# Patient Record
Sex: FEMALE | Race: WHITE | NOT HISPANIC OR LATINO | Employment: OTHER | URBAN - METROPOLITAN AREA
[De-identification: names, ages, dates, MRNs, and addresses within clinical notes are randomized per-mention and may not be internally consistent; named-entity substitution may affect disease eponyms.]

---

## 2017-01-04 DIAGNOSIS — F51.01 PRIMARY INSOMNIA: Primary | ICD-10-CM

## 2017-01-04 DIAGNOSIS — F41.9 ANXIETY: Primary | ICD-10-CM

## 2017-01-04 RX ORDER — ALPRAZOLAM 0.25 MG
0.25 TABLET ORAL 3 TIMES DAILY PRN
Qty: 30 TABLET | Refills: 0 | Status: SHIPPED | OUTPATIENT
Start: 2017-01-04 | End: 2017-02-02

## 2017-01-04 RX ORDER — TRAZODONE HYDROCHLORIDE 100 MG/1
100 TABLET ORAL
Qty: 90 TABLET | Refills: 2 | Status: SHIPPED | OUTPATIENT
Start: 2017-01-04 | End: 2017-09-24

## 2017-01-04 NOTE — TELEPHONE ENCOUNTER
alprazolam      Last Written Prescription Date:  11/29/16  Last Fill Quantity: 30,   # refills: 0  Last Office Visit with [facility], [facility] or  Health prescribing provider: 10/14/16  Future Office visit:       Routing refill request to provider for review/approval because:  Drug not on the [facility], [facility] or  Health refill protocol or controlled substance

## 2017-01-04 NOTE — TELEPHONE ENCOUNTER
Routing refill request to provider for review/approval because:  Medication is reported/historical

## 2017-01-04 NOTE — TELEPHONE ENCOUNTER
Trazodone       Last Written Prescription Date: 2/15/16  Last Fill Quantity: 90; # refills: 2  Last Office Visit with [facility], [facility] or [facility] prescribing provider:  10/14/16        Last PHQ-9 score on record=   PHQ-9 SCORE 6/23/2016   Total Score -   Total Score 3       AST       32   10/7/2015  ALT       33   10/7/2015    Labs showing if normal/abnormal  Lab Results   Component Value Date    AST 32 10/07/2015    ALT 33 10/07/2015

## 2017-01-05 ENCOUNTER — TELEPHONE (OUTPATIENT)
Dept: INTERNAL MEDICINE | Facility: CLINIC | Age: 62
End: 2017-01-05

## 2017-01-05 NOTE — TELEPHONE ENCOUNTER
Reason for Call:  Medication or medication refill:    Do you use a [facility]?  Name of the pharmacy and phone number for the current request:  NA    Name of the medication requested: Probiotics 1    Other request: Patient would like to know if you think this is a good idea for her to be taking, would like Dr. Del Valle advice. Patient already purchased the probiotics.    Can we leave a detailed message on this number? YES    Phone number patient can be reached at: Home number on file [phone] (home)    Best Time: any    Call taken on 1/5/2017 at 3:52 PM by Sigrid Yoon

## 2017-01-26 ENCOUNTER — OFFICE VISIT (OUTPATIENT)
Dept: FAMILY MEDICINE | Facility: CLINIC | Age: 62
End: 2017-01-26
Payer: COMMERCIAL

## 2017-01-26 VITALS
HEIGHT: 65 IN | HEART RATE: 71 BPM | SYSTOLIC BLOOD PRESSURE: 136 MMHG | WEIGHT: 137 LBS | DIASTOLIC BLOOD PRESSURE: 78 MMHG | BODY MASS INDEX: 22.82 KG/M2 | TEMPERATURE: 97.5 F

## 2017-01-26 DIAGNOSIS — H61.21 IMPACTED CERUMEN OF RIGHT EAR: Primary | ICD-10-CM

## 2017-01-26 PROCEDURE — 99212 OFFICE O/P EST SF 10 MIN: CPT | Performed by: FAMILY MEDICINE

## 2017-01-26 RX ORDER — ALPRAZOLAM 0.25 MG
0.25 TABLET ORAL 3 TIMES DAILY PRN
Qty: 30 TABLET | Refills: 0 | Status: CANCELLED | OUTPATIENT
Start: 2017-01-26

## 2017-01-26 NOTE — PROGRESS NOTES
"  SUBJECTIVE:                                                    Denise Ralph is a 62 year old female who presents to clinic today for the following health issues:      Ear pain and plugging on the right, past couple days. Trouble sleeping. NO fevers. Took left over antibiotic last night on the chance it was in infection.       Problem list and histories reviewed & adjusted, as indicated.  Additional history: none    Problem list, Medication list, Allergies, and Medical/Social/Surgical histories reviewed in [facility] and updated as appropriate.    ROS:  Constitutional, HEENT, cardiovascular, pulmonary, gi and gu systems are negative, except as otherwise noted.    OBJECTIVE:                                                    /78 mmHg  Pulse 71  Temp(Src) 97.5  F (36.4  C) (Oral)  Ht 5' 5\" (1.651 m)  Wt 137 lb (62.143 kg)  BMI 22.80 kg/m2  Breastfeeding? No  Body mass index is 22.8 kg/(m^2).  GENERAL: healthy, alert and no distress  HENT: right ear with diffuse cerumen in canal, no erythema, TM visualized, clear with no effusion, left ear normal  NECK: no adenopathy    Diagnostic Test Results:  none     Procedure - right EAC irrigated with good results, and thereafter are clear, with the exception of slight erythema and edema.  The tympanic membrane(s) is  benign in appeareance.    ASSESSMENT:  Ear canal cerumen impaction.    PLAN:  Follow-up if recurs, or as needed.     ASSESSMENT/PLAN:                                                      1. Impacted cerumen of right ear  - REMOVE IMPACTED CERUMEN      Jeanie Trivedi MD  [facility]    "

## 2017-01-26 NOTE — NURSING NOTE
"Chief Complaint   Patient presents with     Ear Problem     Refill Request     pending       Initial /78 mmHg  Pulse 71  Temp(Src) 97.5  F (36.4  C) (Oral)  Ht 5' 5\" (1.651 m)  Wt 137 lb (62.143 kg)  BMI 22.80 kg/m2  Breastfeeding? No Estimated body mass index is 22.8 kg/(m^2) as calculated from the following:    Height as of this encounter: 5' 5\" (1.651 m).    Weight as of this encounter: 137 lb (62.143 kg).  BP completed using cuff size: josselyn Daily CMA          "

## 2017-02-02 DIAGNOSIS — F41.9 ANXIETY: Primary | ICD-10-CM

## 2017-02-02 NOTE — TELEPHONE ENCOUNTER
XANAX      Last Written Prescription Date:  01/04/17  Last Fill Quantity: 30,   # refills: 0  Last Office Visit with [facility], [facility] or  Health prescribing provider: 12/14/16  Future Office visit:       Routing refill request to provider for review/approval because:  Drug not on the [facility], [facility] or  Health refill protocol or controlled substance

## 2017-02-03 RX ORDER — ALPRAZOLAM 0.25 MG
0.25 TABLET ORAL 3 TIMES DAILY PRN
Qty: 30 TABLET | Refills: 0 | Status: SHIPPED | OUTPATIENT
Start: 2017-02-03 | End: 2017-03-13

## 2017-03-13 DIAGNOSIS — F41.9 ANXIETY: ICD-10-CM

## 2017-03-13 RX ORDER — ALPRAZOLAM 0.25 MG
0.25 TABLET ORAL 3 TIMES DAILY PRN
Qty: 30 TABLET | Refills: 0 | Status: SHIPPED | OUTPATIENT
Start: 2017-03-13 | End: 2017-04-18

## 2017-03-28 ENCOUNTER — TELEPHONE (OUTPATIENT)
Dept: INTERNAL MEDICINE | Facility: CLINIC | Age: 62
End: 2017-03-28

## 2017-03-28 NOTE — TELEPHONE ENCOUNTER
Pt calling.  C/o HA (intermittently) and not sleeping well x 4 days.  Took BP just now and was 158/83.    Currently taking Atenolol 50mg BID.    Asking if she needs to increase med?    Last OV 12-14-16    Please advise, thanks.

## 2017-03-28 NOTE — TELEPHONE ENCOUNTER
This is not a dangerous level. If it were over 170/105 would need urgent treatment. Often the BP goes up because of headache. She should do some BP checks at different times of day, try to get 3-4 readings over a week and call them in.

## 2017-03-28 NOTE — TELEPHONE ENCOUNTER
Called pt, relay first portion of MD message. Pt reports her HA has been intermittent since Sunday, but 7-8/10 today with nausea. Reports hx of migraines with auras. States her neck is very tight and her anxiety level is high. Reports her anxiety is out of control and feels it's contributing to HA. Being out in public cause high anxiety today, this is new for her. Takes Xanax prn, but doesn't like to take unless absolutely necessary.    Denies vision changes, fever, numbness/weakness/tingling, vomiting.     Pt reports just taking advil and anne-marie in hopes of decreasing stress level and HA. Discuss not taking meds with alcohol.     Scheduled appt for pt tomorrow. Discuss MD may need to discuss daily med to help address anxiety symptoms, but will be up to MD at appt. Discuss icing or using hot pad or hot shower on neck and attempting to relax.

## 2017-04-12 ENCOUNTER — OFFICE VISIT (OUTPATIENT)
Dept: OBGYN | Facility: CLINIC | Age: 62
End: 2017-04-12
Payer: COMMERCIAL

## 2017-04-12 VITALS
BODY MASS INDEX: 23.01 KG/M2 | WEIGHT: 138.1 LBS | DIASTOLIC BLOOD PRESSURE: 80 MMHG | SYSTOLIC BLOOD PRESSURE: 124 MMHG | TEMPERATURE: 97.5 F | HEIGHT: 65 IN

## 2017-04-12 DIAGNOSIS — N39.0 FREQUENT UTI: Primary | ICD-10-CM

## 2017-04-12 PROCEDURE — 99213 OFFICE O/P EST LOW 20 MIN: CPT | Performed by: OBSTETRICS & GYNECOLOGY

## 2017-04-12 RX ORDER — NITROFURANTOIN 25; 75 MG/1; MG/1
100 CAPSULE ORAL
Qty: 10 CAPSULE | Refills: 0 | Status: SHIPPED | OUTPATIENT
Start: 2017-04-12 | End: 2017-07-06

## 2017-04-12 RX ORDER — ESTRADIOL 10 UG/1
10 INSERT VAGINAL DAILY
Qty: 20 TABLET | Refills: 11 | Status: SHIPPED | OUTPATIENT
Start: 2017-04-12 | End: 2017-05-17

## 2017-04-12 NOTE — MR AVS SNAPSHOT
After Visit Summary   4/12/2017    Denise Ralph    MRN: [id number]           Patient Information     Date Of Birth          1955        Visit Information        Provider Department      4/12/2017 10:15 AM Chelsea Restrepo MD [facility]        Today's Diagnoses     Frequent UTI    -  1       Follow-ups after your visit        Your next 10 appointments already scheduled     Apr 17, 2017  3:40 PM CDT   [facility]    [address]  [facility] [id number] [phone]            May 17, 2017 10:00 AM CDT   [facility] MD   [facility]    [address] [place]  [facility] [id number] [phone]              Who to contact     If you have questions or need follow up information about today's clinic visit or your schedule please contact [facility] directly at [phone].  Normal or non-critical lab and imaging results will be communicated to you by Juntineshart, letter or phone within 4 business days after the clinic has received the results. If you do not hear from us within 7 days, please contact the clinic through [employer] or phone. If you have a critical or abnormal lab result, we will notify you by phone as soon as possible.  Submit refill requests through Funky Moves or call your pharmacy and they will forward the refill request to us. Please allow 3 business days for your refill to be completed.          Additional Information About Your Visit        Juntineshart Information     Funky Moves gives you secure access to your electronic health record. If you see a primary care provider, you can also send messages to your care team and make appointments. If you have questions, please call your primary care clinic.  If you do not have a primary care provider, please call [phone] and they will assist you.        Care  "EveryWhere ID     This is your Care EveryWhere ID. This could be used by other organizations to access your [place] medical records  [id number]        Your Vitals Were     Temperature Height BMI (Body Mass Index)             97.5  F (36.4  C) (Oral) 5' 5\" (1.651 m) 22.98 kg/m2          Blood Pressure from Last 3 Encounters:   04/12/17 124/80   01/26/17 136/78   12/14/16 108/68    Weight from Last 3 Encounters:   04/12/17 138 lb 1.6 oz (62.6 kg)   01/26/17 137 lb (62.1 kg)   12/14/16 137 lb 4.8 oz (62.3 kg)              Today, you had the following     No orders found for display         Today's Medication Changes          These changes are accurate as of: 4/12/17  1:04 PM.  If you have any questions, ask your nurse or doctor.               Start taking these medicines.        Dose/Directions    estradiol 10 MCG Tabs vaginal tablet   Commonly known as:  VAGIFEM   Used for:  Frequent UTI   Started by:  Chelsea Restrepo MD        Dose:  10 mcg   Place 1 tablet (10 mcg) vaginally daily Daily for 14 days then twice weekly   Quantity:  20 tablet   Refills:  11       nitrofurantoin (macrocrystal-monohydrate) 100 MG capsule   Commonly known as:  MACROBID   Used for:  Frequent UTI   Started by:  Chelsea Restrepo MD        Dose:  100 mg   Take 1 capsule (100 mg) by mouth once as needed Take once after intercourse   Quantity:  10 capsule   Refills:  0            Where to get your medicines      These medications were sent to [place] [address] [place] [facility] [address]  [address] [facility] [address]     Phone:  [phone]     estradiol 10 MCG Tabs vaginal tablet    nitrofurantoin (macrocrystal-monohydrate) 100 MG capsule                Primary Care Provider Office Phone # Fax #    Juan F Smallwood [phone]       [facility] [address] [facility] [address]        Thank you!     Thank you for choosing [facility]  for your care. Our goal is " always to provide you with excellent care. Hearing back from our patients is one way we can continue to improve our services. Please take a few minutes to complete the written survey that you may receive in the mail after your visit with us. Thank you!             Your Updated Medication List - Protect others around you: Learn how to safely use, store and throw away your medicines at www.disposemymeds.org.          This list is accurate as of: 4/12/17  1:04 PM.  Always use your most recent med list.                   Brand Name Dispense Instructions for use    albuterol 108 (90 BASE) MCG/ACT Inhaler    PROAIR HFA/PROVENTIL HFA/VENTOLIN HFA    1 Inhaler    Inhale 2 puffs into the lungs every 6 hours as needed for shortness of breath / dyspnea or wheezing       ALLEGRA PO      Take 30 mg by mouth daily       ALPRAZolam 0.25 MG tablet    XANAX    30 tablet    Take 1 tablet (0.25 mg) by mouth 3 times daily as needed for anxiety       atenolol 50 MG tablet    TENORMIN    180 tablet    Take 1 tablet (50 mg) by mouth 2 times daily       estradiol 10 MCG Tabs vaginal tablet    VAGIFEM    20 tablet    Place 1 tablet (10 mcg) vaginally daily Daily for 14 days then twice weekly       fluticasone 50 MCG/ACT spray    FLONASE     USE 2 SPRAYS NASALLY DAILY AS DIRECTED       nitrofurantoin (macrocrystal-monohydrate) 100 MG capsule    MACROBID    10 capsule    Take 1 capsule (100 mg) by mouth once as needed Take once after intercourse       omeprazole 20 MG tablet     90 tablet    Take 1 tablet (20 mg) by mouth daily       traZODone 100 MG tablet    DESYREL    90 tablet    Take 1 tablet (100 mg) by mouth nightly as needed for sleep       XALATAN 0.005 % ophthalmic solution   Generic drug:  latanoprost

## 2017-04-12 NOTE — NURSING NOTE
"Chief Complaint   Patient presents with     UTI     having recurrent UTI's--no sx currently--treated one and it is gone--pt thinks she gets them after intercourse--wondering what to do to prevent them       Initial /80  Temp 97.5  F (36.4  C) (Oral)  Ht 5' 5\" (1.651 m)  Wt 138 lb 1.6 oz (62.6 kg)  BMI 22.98 kg/m2 Estimated body mass index is 22.98 kg/(m^2) as calculated from the following:    Height as of this encounter: 5' 5\" (1.651 m).    Weight as of this encounter: 138 lb 1.6 oz (62.6 kg).  Medication Reconciliation: tahir Vences CMA      "

## 2017-04-12 NOTE — PROGRESS NOTES
CC: Frequent UTIs    S: Denise Ralph is a 62 year old female who reports several UTIs over the past year. Symptoms include frequency, urgency, and dysuria. Never any fevers, chills, nausea, or flank pain. She does not have symptoms today but is just finishing another course of cipro.  - reports intercourse once weekly, often develops symptoms after sex. She does void immediately after and cleans the vulvar area. Afraid to have intercourse now due to frequent UTIs.  - has pyridium at home which does help with symptoms.       Recent urine studies reviewed:  Component      Latest Ref Rng & Units 6/13/2012 10/7/2013   Color Urine       Yellow Yellow   Appearance Urine       Clear Clear   Glucose Urine      NEG mg/dL Negative Negative   Bilirubin Urine      NEG Negative Negative   Ketones Urine      NEG mg/dL Negative Negative   Specific Gravity Urine      1.003 - 1.035 1.010 1.010   Blood Urine      NEG Negative Moderate (A)   pH Urine      5.0 - 7.0 pH 6.5 6.5   Protein Albumin Urine      NEG mg/dL Negative Negative   Urobilinogen Urine      0.2 - 1.0 EU/dL 0.2 0.2   Nitrite Urine      NEG Negative Negative   Leukocyte Esterase Urine      NEG Negative Small (A)   Source       Midstream Urine Midstream Urine   Specimen Description        Midstream Urine   Special Requests           Culture Micro        >100,000 colonies/mL Escherichia coli   Micro Report Status        FINAL 10/09/2013   WBC Urine      0 - 2 /HPF  10-25 (A)   RBC Urine      0 - 2 /HPF  5-10 (A)     Component      Latest Ref Rng & Units 5/19/2014 10/13/2014 5/8/2015   Color Urine       Yellow Yellow Yellow   Appearance Urine       Clear Clear Clear   Glucose Urine      NEG mg/dL Negative Negative Negative   Bilirubin Urine      NEG Negative Negative Negative   Ketones Urine      NEG mg/dL Negative Negative Negative   Specific Gravity Urine      1.003 - 1.035 <=1.005 1.015 1.010   Blood Urine      NEG Negative Negative Negative   pH Urine      5.0 - 7.0  "pH 6.5 7.5 (H) 7.0   Protein Albumin Urine      NEG mg/dL Negative Negative Negative   Urobilinogen Urine      0.2 - 1.0 EU/dL 0.2 0.2 0.2   Nitrite Urine      NEG Negative Negative Negative   Leukocyte Esterase Urine      NEG Small (A) Negative Negative   Source       Midstream Urine . . . Midstream Urine Midstream Urine   Specimen Description            Special Requests            Culture Micro            Micro Report Status            WBC Urine      0 - 2 /HPF O - 2     RBC Urine      0 - 2 /HPF O - 2       Component      Latest Ref Rng & Units 2/12/2016   Color Urine          Appearance Urine          Glucose Urine      NEG mg/dL    Bilirubin Urine      NEG    Ketones Urine      NEG mg/dL    Specific Gravity Urine      1.003 - 1.035    Blood Urine      NEG    pH Urine      5.0 - 7.0 pH    Protein Albumin Urine      NEG mg/dL    Urobilinogen Urine      0.2 - 1.0 EU/dL    Nitrite Urine      NEG    Leukocyte Esterase Urine      NEG    Source          Specimen Description       Midstream Urine   Special Requests       Specimen received in preservative   Culture Micro       10,000 to 50,000 colonies/mL mixed urogenital kasia   Micro Report Status       FINAL 02/14/2016   WBC Urine      0 - 2 /HPF    RBC Urine      0 - 2 /HPF        O: /80  Temp 97.5  F (36.4  C) (Oral)  Ht 5' 5\" (1.651 m)  Wt 138 lb 1.6 oz (62.6 kg)  BMI 22.98 kg/m2   Exam not indicated in this encounter which was spent entirely in counseling    A/P: Denise Ralph is a 62 year old female with frequent urinary symptoms concerning for recurrent UTIs  - rare culture positive infections in our Epic system  - symptoms due seem c/w UTIs  - performing good vulvar hygiene     1. Frequent UTI  - will try vaginal estrogen to aid in prevention and in the mean time provided prescription for macrobid ppx following intercourse.   - continue pyridium as needed for symptomatic control  - drink plenty of fluids  - estradiol (VAGIFEM) 10 MCG TABS vaginal " tablet; Place 1 tablet (10 mcg) vaginally daily Daily for 14 days then twice weekly  Dispense: 20 tablet; Refill: 11  - nitrofurantoin, macrocrystal-monohydrate, (MACROBID) 100 MG capsule; Take 1 capsule (100 mg) by mouth once as needed Take once after intercourse  Dispense: 10 capsule; Refill: 0     - encourage patient to call clinic for UA/UC (not reflex) for future symptoms to help determine if she is truly experiencing infections vs bacteruria vs other etiology for bladder symptoms.     - return to clinic in 1-2 months to follow up on estrogen therapy. Will perform pelvic exam at that time and evaluate for bladder prolapse, which can contribute to frequent infections.     Total time spent was 25 minutes; greater than 50% of time was spent in counseling and/or coordination of care for the above listed diagnoses.     Chelsea Restrepo MD

## 2017-04-17 ENCOUNTER — OFFICE VISIT (OUTPATIENT)
Dept: INTERNAL MEDICINE | Facility: CLINIC | Age: 62
End: 2017-04-17
Payer: COMMERCIAL

## 2017-04-17 VITALS
DIASTOLIC BLOOD PRESSURE: 78 MMHG | WEIGHT: 136.56 LBS | BODY MASS INDEX: 22.75 KG/M2 | TEMPERATURE: 97.8 F | HEART RATE: 50 BPM | OXYGEN SATURATION: 100 % | SYSTOLIC BLOOD PRESSURE: 132 MMHG | HEIGHT: 65 IN

## 2017-04-17 DIAGNOSIS — K21.9 GASTROESOPHAGEAL REFLUX DISEASE WITHOUT ESOPHAGITIS: ICD-10-CM

## 2017-04-17 DIAGNOSIS — F41.1 ANXIETY STATE: ICD-10-CM

## 2017-04-17 DIAGNOSIS — I10 HYPERTENSION GOAL BP (BLOOD PRESSURE) < 140/80: Primary | ICD-10-CM

## 2017-04-17 LAB
ERYTHROCYTE [DISTWIDTH] IN BLOOD BY AUTOMATED COUNT: 12.1 % (ref 10–15)
HCT VFR BLD AUTO: 39.8 % (ref 35–47)
HGB BLD-MCNC: 13.2 G/DL (ref 11.7–15.7)
MCH RBC QN AUTO: 31.9 PG (ref 26.5–33)
MCHC RBC AUTO-ENTMCNC: 33.2 G/DL (ref 31.5–36.5)
MCV RBC AUTO: 96 FL (ref 78–100)
PLATELET # BLD AUTO: 224 10E9/L (ref 150–450)
RBC # BLD AUTO: 4.14 10E12/L (ref 3.8–5.2)
WBC # BLD AUTO: 5.9 10E9/L (ref 4–11)

## 2017-04-17 PROCEDURE — 80053 COMPREHEN METABOLIC PANEL: CPT | Performed by: INTERNAL MEDICINE

## 2017-04-17 PROCEDURE — 84443 ASSAY THYROID STIM HORMONE: CPT | Performed by: INTERNAL MEDICINE

## 2017-04-17 PROCEDURE — 99214 OFFICE O/P EST MOD 30 MIN: CPT | Performed by: INTERNAL MEDICINE

## 2017-04-17 PROCEDURE — 85027 COMPLETE CBC AUTOMATED: CPT | Performed by: INTERNAL MEDICINE

## 2017-04-17 PROCEDURE — 36415 COLL VENOUS BLD VENIPUNCTURE: CPT | Performed by: INTERNAL MEDICINE

## 2017-04-17 PROCEDURE — 80061 LIPID PANEL: CPT | Performed by: INTERNAL MEDICINE

## 2017-04-17 NOTE — MR AVS SNAPSHOT
After Visit Summary   4/17/2017    Denise Ralph    MRN: [id number]           Patient Information     Date Of Birth          1955        Visit Information        Provider Department      4/17/2017 3:40 PM Juan F Smallwood MD [facility]        Today's Diagnoses     Hypertension goal BP (blood pressure) < 140/80    -  1    Anxiety state        Gastroesophageal reflux disease without esophagitis           Follow-ups after your visit        Your next 10 appointments already scheduled     May 01, 2017 10:05 AM CDT   MA SCREENING DIGITAL BILATERAL with [id number]   [facility] ([facility])    [address], [address]  [facility] [address]   [phone]           Do not use any powder, lotion or deodorant under your arms or on your breast. If you do, we will ask you to remove it before your exam.  Wear comfortable, two-piece clothing.  If you have any allergies, tell your care team.  Bring any previous mammograms from other facilities or have them mailed to the breast center. This mammogram location, [facility], now offers 3D mammography. It doesn't replace a screening mammogram and can be done with a regular screening mammogram. It is optional and not all insurances will pay for it. 3D mammography is a special kind of mammogram that produces a three-dimensional image of the breast by using low dose-xrays. 3D allows the radiologist to see the breast tissue differently from 2D, which reduces the chance of repeat testing due to overlapping breast tissue. If you are interested in have a 3D mammogram, please check with your insurance before you arrive for your exam. On the day of your exam you will be asked if you would like 3D imaging.            May 17, 2017 10:00 AM CDT   SHORT with Chelsea Restrepo MD   [facility]    [address]  [facility] [address]   [phone]          "     Who to contact     If you have questions or need follow up information about today's clinic visit or your schedule please contact [facility] directly at [phone].  Normal or non-critical lab and imaging results will be communicated to you by MyChart, letter or phone within 4 business days after the clinic has received the results. If you do not hear from us within 7 days, please contact the clinic through Book'n'Bloomhart or phone. If you have a critical or abnormal lab result, we will notify you by phone as soon as possible.  Submit refill requests through [email or web address] or call your pharmacy and they will forward the refill request to us. Please allow 3 business days for your refill to be completed.          Additional Information About Your Visit        [email or web address] Information     [email or web address] gives you secure access to your electronic health record. If you see a primary care provider, you can also send messages to your care team and make appointments. If you have questions, please call your primary care clinic.  If you do not have a primary care provider, please call [phone] and they will assist you.        Care EveryWhere ID     This is your Care EveryWhere ID. This could be used by other organizations to access your [facility] medical records  [id number]        Your Vitals Were     Pulse Temperature Height Pulse Oximetry BMI (Body Mass Index)       50 97.8  F (36.6  C) (Oral) 5' 5\" (1.651 m) 100% 22.73 kg/m2        Blood Pressure from Last 3 Encounters:   04/17/17 132/78   04/12/17 124/80   01/26/17 136/78    Weight from Last 3 Encounters:   04/17/17 136 lb 9 oz (61.9 kg)   04/12/17 138 lb 1.6 oz (62.6 kg)   01/26/17 137 lb (62.1 kg)              We Performed the Following     CBC with platelets     Comprehensive metabolic panel     Lipid panel reflex to direct LDL     TSH with free T4 reflex        Primary Care Provider Office Phone # Fax #    Juan F Smallwood [phone]       " [facility] [address] [facility] [address]        Thank you!     Thank you for choosing [facility]  for your care. Our goal is always to provide you with excellent care. Hearing back from our patients is one way we can continue to improve our services. Please take a few minutes to complete the written survey that you may receive in the mail after your visit with us. Thank you!             Your Updated Medication List - Protect others around you: Learn how to safely use, store and throw away your medicines at www.disposemymeds.org.          This list is accurate as of: 4/17/17 11:59 PM.  Always use your most recent med list.                   Brand Name Dispense Instructions for use    ALLEGRA PO      Take 30 mg by mouth daily       ALPRAZolam 0.25 MG tablet    XANAX    30 tablet    Take 1 tablet (0.25 mg) by mouth 3 times daily as needed for anxiety       atenolol 50 MG tablet    TENORMIN    180 tablet    Take 1 tablet (50 mg) by mouth 2 times daily       estradiol 10 MCG Tabs vaginal tablet    VAGIFEM    20 tablet    Place 1 tablet (10 mcg) vaginally daily Daily for 14 days then twice weekly       fluticasone 50 MCG/ACT spray    FLONASE     USE 2 SPRAYS NASALLY DAILY AS DIRECTED       nitrofurantoin (macrocrystal-monohydrate) 100 MG capsule    MACROBID    10 capsule    Take 1 capsule (100 mg) by mouth once as needed Take once after intercourse       omeprazole 20 MG tablet     90 tablet    Take 1 tablet (20 mg) by mouth daily       traZODone 100 MG tablet    DESYREL    90 tablet    Take 1 tablet (100 mg) by mouth nightly as needed for sleep       XALATAN 0.005 % ophthalmic solution   Generic drug:  latanoprost

## 2017-04-17 NOTE — LETTER
[facility]  [address], [address]  [place]  [address]      April 19, 2017    Denise Ralph                                                                                                                                                       [address] [facility] [address]              Dear Denise,      The results of your recent tests were within normal.      Enclosed is a copy of the results.  It was a pleasure to see you at your last appointment.    If you have any questions or concerns, please contact our office at [phone].        Sincerely,      Juan F Smallwood M.D.

## 2017-04-17 NOTE — PROGRESS NOTES
SUBJECTIVE:                                                    Denise Ralph is a 62 year old female who presents to clinic today for the following health issues:    Patient is seen for a follow up visit.    Hypertension Follow-up      Outpatient blood pressures are being checked at home.  Results are normal.    Low Salt Diet: low salt   Has h/o HTN. on medical treatment. BP has been controlled. No side effects from medications. No CP, HA, dizziness. good compliance with medications and low salt diet.    Has h/o GERD on PPI  treatment. symptoms are  controlled. No nausea, vomiting, heartburns, bloating.    Has h/o Anxiety. Controlled on PRN Xanax.       Amount of exercise or physical activity: 3 days/week     Problems taking medications regularly: No    Medication side effects: none    Diet: regular (no restrictions)    Reviewed preventive measures       Problem list and histories reviewed & adjusted, as indicated.  Additional history: as documented    Patient Active Problem List   Diagnosis     Irritable bowel syndrome     Anxiety state     Essential hypertension, benign     Disorder of bone and cartilage     Atrophy of vulva     Palpitations     Sciatica     Other and unspecified disc disorder of lumbar region     Depressive disorder, not elsewhere classified     CARDIOVASCULAR SCREENING; LDL GOAL LESS THAN 130     Basal cell carcinoma     Squamous cell carcinoma (H)     Advanced directives, counseling/discussion     GERD (gastroesophageal reflux disease)     Glaucoma     Hypertension goal BP (blood pressure) < 140/80     Past Surgical History:   Procedure Laterality Date     HYSTERECTOMY       HYSTERECTOMY VAGINAL       LAPAROSCOPIC CHOLECYSTECTOMY  2002    with repeat operation because of bile leak     Left shoulder decomp surgery for impingement  approx 1993     WRIST SURGERY Left        Social History   Substance Use Topics     Smoking status: Former Smoker     Packs/day: 0.50     Years: 25.00     Quit date:  "10/1/1997     Smokeless tobacco: Never Used      Comment: quit      Alcohol use 1.0 oz/week     2 Glasses of wine per week      Comment: occasional     Family History   Problem Relation Age of Onset     Cardiovascular Mother      / mi     Breast Cancer Mother      diagnosed age 60's     Respiratory Father      pulmonary fibrosis.     Cardiovascular Brother       of MI age 34 2nd to Cocaine Use     Cancer - colorectal No family hx of          Current Outpatient Prescriptions   Medication Sig Dispense Refill     estradiol (VAGIFEM) 10 MCG TABS vaginal tablet Place 1 tablet (10 mcg) vaginally daily Daily for 14 days then twice weekly 20 tablet 11     nitrofurantoin, macrocrystal-monohydrate, (MACROBID) 100 MG capsule Take 1 capsule (100 mg) by mouth once as needed Take once after intercourse 10 capsule 0     ALPRAZolam (XANAX) 0.25 MG tablet Take 1 tablet (0.25 mg) by mouth 3 times daily as needed for anxiety 30 tablet 0     traZODone (DESYREL) 100 MG tablet Take 1 tablet (100 mg) by mouth nightly as needed for sleep 90 tablet 2     fluticasone (FLONASE) 50 MCG/ACT spray USE 2 SPRAYS NASALLY DAILY AS DIRECTED  6     Fexofenadine HCl (ALLEGRA PO) Take 30 mg by mouth daily       omeprazole 20 MG tablet Take 1 tablet (20 mg) by mouth daily 90 tablet 1     atenolol (TENORMIN) 50 MG tablet Take 1 tablet (50 mg) by mouth 2 times daily 180 tablet 1     XALATAN 0.005 % OP SOLN          Reviewed and updated as needed this visit by clinical staff       Reviewed and updated as needed this visit by Provider         ROS:  Constitutional, HEENT, cardiovascular, pulmonary, GI, , musculoskeletal, neuro, skin, endocrine and psych systems are negative, except as otherwise noted.    OBJECTIVE:                                                    /78  Pulse 50  Temp 97.8  F (36.6  C) (Oral)  Ht 5' 5\" (1.651 m)  Wt 136 lb 9 oz (61.9 kg)  SpO2 100%  BMI 22.73 kg/m2  Body mass index is 22.73 kg/(m^2).  GENERAL: " healthy, alert and no distress  EYES: Eyes grossly normal to inspection, PERRL and conjunctivae and sclerae normal  HENT: ear canals and TM's normal, nose and mouth without ulcers or lesions  NECK: no adenopathy, no asymmetry, masses, or scars and thyroid normal to palpation  RESP: lungs clear to auscultation - no rales, rhonchi or wheezes  CV: regular rate and rhythm, normal S1 S2, no S3 or S4, no murmur, click or rub, no peripheral edema and peripheral pulses strong  ABDOMEN: soft, nontender, no hepatosplenomegaly, no masses and bowel sounds normal  MS: no gross musculoskeletal defects noted, no edema    Diagnostic Test Results:  Results for orders placed or performed in visit on 04/17/17   CBC with platelets   Result Value Ref Range    WBC 5.9 4.0 - 11.0 10e9/L    RBC Count 4.14 3.8 - 5.2 10e12/L    Hemoglobin 13.2 11.7 - 15.7 g/dL    Hematocrit 39.8 35.0 - 47.0 %    MCV 96 78 - 100 fl    MCH 31.9 26.5 - 33.0 pg    MCHC 33.2 31.5 - 36.5 g/dL    RDW 12.1 10.0 - 15.0 %    Platelet Count 224 150 - 450 10e9/L   Comprehensive metabolic panel   Result Value Ref Range    Sodium 139 133 - 144 mmol/L    Potassium 4.2 3.4 - 5.3 mmol/L    Chloride 101 94 - 109 mmol/L    Carbon Dioxide 28 20 - 32 mmol/L    Anion Gap 10 3 - 14 mmol/L    Glucose 92 70 - 99 mg/dL    Urea Nitrogen 14 7 - 30 mg/dL    Creatinine 0.79 0.52 - 1.04 mg/dL    GFR Estimate 73 >60 mL/min/1.7m2    GFR Estimate If Black 89 >60 mL/min/1.7m2    Calcium 9.5 8.5 - 10.1 mg/dL    Bilirubin Total 0.6 0.2 - 1.3 mg/dL    Albumin 4.3 3.4 - 5.0 g/dL    Protein Total 7.1 6.8 - 8.8 g/dL    Alkaline Phosphatase 62 40 - 150 U/L    ALT 24 0 - 50 U/L    AST 27 0 - 45 U/L   TSH with free T4 reflex   Result Value Ref Range    TSH 1.94 0.40 - 4.00 mU/L   Lipid panel reflex to direct LDL   Result Value Ref Range    Cholesterol 187 <200 mg/dL    Triglycerides 68 <150 mg/dL    HDL Cholesterol 87 >49 mg/dL    LDL Cholesterol Calculated 86 <100 mg/dL    Non HDL Cholesterol 100  <130 mg/dL        ASSESSMENT/PLAN:                                                      Problem List Items Addressed This Visit     Anxiety state    GERD (gastroesophageal reflux disease)    Hypertension goal BP (blood pressure) < 140/80 - Primary    Relevant Orders    CBC with platelets (Completed)    Comprehensive metabolic panel (Completed)    TSH with free T4 reflex (Completed)    Lipid panel reflex to direct LDL (Completed)           Assess lab work   Cont treatment   Monitor BP     Follow-Up:in 6 months     Juan F Smallwood MD  [facility]

## 2017-04-17 NOTE — NURSING NOTE
"Chief Complaint   Patient presents with     Recheck Medication     F/U on BP and anxiety       Initial /78  Pulse 50  Temp 97.8  F (36.6  C) (Oral)  Ht 5' 5\" (1.651 m)  Wt 136 lb 9 oz (61.9 kg)  SpO2 100%  BMI 22.73 kg/m2 Estimated body mass index is 22.73 kg/(m^2) as calculated from the following:    Height as of this encounter: 5' 5\" (1.651 m).    Weight as of this encounter: 136 lb 9 oz (61.9 kg).  Medication Reconciliation: complete   Tess Wells MA      "

## 2017-04-18 DIAGNOSIS — F41.9 ANXIETY: ICD-10-CM

## 2017-04-18 LAB
ALBUMIN SERPL-MCNC: 4.3 G/DL (ref 3.4–5)
ALP SERPL-CCNC: 62 U/L (ref 40–150)
ALT SERPL W P-5'-P-CCNC: 24 U/L (ref 0–50)
ANION GAP SERPL CALCULATED.3IONS-SCNC: 10 MMOL/L (ref 3–14)
AST SERPL W P-5'-P-CCNC: 27 U/L (ref 0–45)
BILIRUB SERPL-MCNC: 0.6 MG/DL (ref 0.2–1.3)
BUN SERPL-MCNC: 14 MG/DL (ref 7–30)
CALCIUM SERPL-MCNC: 9.5 MG/DL (ref 8.5–10.1)
CHLORIDE SERPL-SCNC: 101 MMOL/L (ref 94–109)
CHOLEST SERPL-MCNC: 187 MG/DL
CO2 SERPL-SCNC: 28 MMOL/L (ref 20–32)
CREAT SERPL-MCNC: 0.79 MG/DL (ref 0.52–1.04)
GFR SERPL CREATININE-BSD FRML MDRD: 73 ML/MIN/1.7M2
GLUCOSE SERPL-MCNC: 92 MG/DL (ref 70–99)
HDLC SERPL-MCNC: 87 MG/DL
LDLC SERPL CALC-MCNC: 86 MG/DL
NONHDLC SERPL-MCNC: 100 MG/DL
POTASSIUM SERPL-SCNC: 4.2 MMOL/L (ref 3.4–5.3)
PROT SERPL-MCNC: 7.1 G/DL (ref 6.8–8.8)
SODIUM SERPL-SCNC: 139 MMOL/L (ref 133–144)
TRIGL SERPL-MCNC: 68 MG/DL
TSH SERPL DL<=0.005 MIU/L-ACNC: 1.94 MU/L (ref 0.4–4)

## 2017-04-18 ASSESSMENT — ANXIETY QUESTIONNAIRES
IF YOU CHECKED OFF ANY PROBLEMS ON THIS QUESTIONNAIRE, HOW DIFFICULT HAVE THESE PROBLEMS MADE IT FOR YOU TO DO YOUR WORK, TAKE CARE OF THINGS AT HOME, OR GET ALONG WITH OTHER PEOPLE: NOT DIFFICULT AT ALL
2. NOT BEING ABLE TO STOP OR CONTROL WORRYING: NOT AT ALL
6. BECOMING EASILY ANNOYED OR IRRITABLE: SEVERAL DAYS
1. FEELING NERVOUS, ANXIOUS, OR ON EDGE: SEVERAL DAYS
7. FEELING AFRAID AS IF SOMETHING AWFUL MIGHT HAPPEN: NOT AT ALL
3. WORRYING TOO MUCH ABOUT DIFFERENT THINGS: SEVERAL DAYS
GAD7 TOTAL SCORE: 7
5. BEING SO RESTLESS THAT IT IS HARD TO SIT STILL: MORE THAN HALF THE DAYS

## 2017-04-18 ASSESSMENT — PATIENT HEALTH QUESTIONNAIRE - PHQ9: 5. POOR APPETITE OR OVEREATING: MORE THAN HALF THE DAYS

## 2017-04-19 RX ORDER — ALPRAZOLAM 0.25 MG
TABLET ORAL
Qty: 30 TABLET | Refills: 0 | Status: SHIPPED | OUTPATIENT
Start: 2017-04-19 | End: 2017-05-30

## 2017-04-19 ASSESSMENT — ANXIETY QUESTIONNAIRES: GAD7 TOTAL SCORE: 7

## 2017-04-19 NOTE — TELEPHONE ENCOUNTER
Alprazolam      Last Written Prescription Date:  03/13/17  Last Fill Quantity: 30,   # refills: 0  Last Office Visit with [facility], [facility] or [facility] prescribing provider: 04/17/17  Future Office visit:    Next 5 appointments (look out 90 days)     May 17, 2017 10:00 AM CDT   [facility] with Chelsea Restrepo MD   [facility]    [address]  [facility] [address]   [phone]                   Routing refill request to provider for review/approval because:  Drug not on the [facility], [facility] or [facility] refill protocol or controlled substance

## 2017-04-29 ENCOUNTER — TELEPHONE (OUTPATIENT)
Dept: NURSING | Facility: CLINIC | Age: 62
End: 2017-04-29

## 2017-04-29 NOTE — TELEPHONE ENCOUNTER
"Call Type: Triage Call    Presenting Problem: \"I am taking vagifem and my back has become  terrible.\" Worse when first gets out of bed, then loosens up  throughout the days. Called pharmacy today and was told a possible  side effect. She wants to not take it until she talks with her  provider on Monday. She is in [place] now and coming to [place] tomorrow.  Triage Note:  Guideline Title: Back Symptoms ; Back Symptoms  Recommended Disposition: See Provider within 72 Hours  Original Inclination: Would have called clinic  Override Disposition: Call Provider When Office is Open  Intended Action: Call PCP/HCP  Physician Contacted: No  New onset of unexplained back pain with any one of the following risk factors: age  50 years or older, has a known malignancy, diagnosed osteoporosis, OR unexplained  weight loss of 10 pounds or more in 6 months or less. ?  YES  New or worsening signs and symptoms that may indicate shock ? NO  Pregnant,  less than 20 weeks gestation ? NO  Severe back pain AND history of IV drug use, alcoholism, or previous spinal trauma  ? NO  Pregnant and gestation greater than 37 weeks AND low backache/pain that is  constant or increasing in intensity ? NO  Pregnant and gestation 20-37 weeks AND low backache/pain that is constant or  increasing in intensity ? NO  Following significant trauma or injury to neck or back AND immobile since event ?  NO  Pain intensifies with coughing, sneezing or straining ? NO  Pregnant, back symptoms AND no signs of labor ? NO  Burning or tingling feeling, itchiness or stabbing pain in a localized area on one  side of body followed by reddened fluid-filled blisters that break and crust ? NO  Back pain associated with any breathing symptoms (such as noisy breathing,  struggling to breathe, sudden change in respiratory rate) ? NO  New paralysis (unable to move); new weakness (not due to pain); new loss of  coordination (purposeful action) OR new unexplained numbness/tingling " involving  arm and leg on same side of body ? NO  Following significant trauma AND has been mobile since injury but is now having  back or neck pain ? NO  Age 50 years or older with sudden onset of deep boring or tearing pain in back OR  abdomen; may radiate to groin, hips or lower extremities ? NO  Pain predominately in flank area ? NO  Urinary tract symptoms are primary symptoms ? NO  Urinary tract symptoms are primary symptoms ? NO  New onset back pain associated with numbness or weakness in both legs ? NO  Fever of 100.5 F (38.1 C) or higher associated with back symptoms ? NO  Low back pain AND urinary tract symptoms ? NO  Back pain radiates into thigh or below knee ? NO  New onset or unexplained change in bowel or bladder control (unable to urinate and  full feeling or loss of control of bowel or bladder) ? NO  Numbness in the groin and saddle area of pelvis AND lower extremity weakness OR  change in bowel or bladder control (loss of control, retention, overflow) ? NO  Evaluated by provider AND no improvement in symptoms after following treatment  plan for the time specified by provider ? NO  Painful spasms or cramping of large muscle groups (back, legs or abdomen) AND  recent heat exposure ? NO  Mild to moderate pain in back with normal activity or rest AND not responding to  72 hours of home care ? NO  Any other cardiac signs/symptoms for more than 5 minutes, now or within last hour.  Pain is NOT associated with taking a deep breath or a productive cough, movement,  or touch to a localized area on the chest or upper body. ? NO  Pain, redness, and local swelling over tailbone in the crease of the buttocks; may  notice pinkish, cloudy drainage or drainage of pus. ? NO  History of recurrent back pain AND pain not responding to usual medications,  treatments or self care. ? NO  Pain predominately in the neck ? NO  New onset of severe disabling back pain (unable to stand upright; pain wakens you  from sleep;  constant or intense pain when lying down) ? NO  Any temperature elevation in an immunocompromised individual OR frail elderly with  signs of dehydration ? NO  Physician Instructions:  Care Advice:

## 2017-05-01 ENCOUNTER — HOSPITAL ENCOUNTER (OUTPATIENT)
Dept: MAMMOGRAPHY | Facility: CLINIC | Age: 62
Discharge: HOME | End: 2017-05-01
Attending: OBSTETRICS & GYNECOLOGY | Admitting: OBSTETRICS & GYNECOLOGY
Payer: COMMERCIAL

## 2017-05-01 DIAGNOSIS — Z12.31 VISIT FOR SCREENING MAMMOGRAM: ICD-10-CM

## 2017-05-01 PROCEDURE — G0202 SCR MAMMO BI INCL CAD: HCPCS

## 2017-05-03 ENCOUNTER — OFFICE VISIT (OUTPATIENT)
Dept: ORTHOPEDICS | Facility: CLINIC | Age: 62
End: 2017-05-03
Payer: COMMERCIAL

## 2017-05-03 ENCOUNTER — RADIANT APPOINTMENT (OUTPATIENT)
Dept: GENERAL RADIOLOGY | Facility: CLINIC | Age: 62
End: 2017-05-03
Attending: PHYSICAL MEDICINE & REHABILITATION
Payer: COMMERCIAL

## 2017-05-03 VITALS
DIASTOLIC BLOOD PRESSURE: 80 MMHG | SYSTOLIC BLOOD PRESSURE: 125 MMHG | HEIGHT: 65 IN | WEIGHT: 136 LBS | BODY MASS INDEX: 22.66 KG/M2

## 2017-05-03 DIAGNOSIS — M54.42 ACUTE BILATERAL LOW BACK PAIN WITH BILATERAL SCIATICA: Primary | ICD-10-CM

## 2017-05-03 DIAGNOSIS — M43.16 SPONDYLOLISTHESIS OF LUMBAR REGION: ICD-10-CM

## 2017-05-03 DIAGNOSIS — M54.41 ACUTE BILATERAL LOW BACK PAIN WITH BILATERAL SCIATICA: ICD-10-CM

## 2017-05-03 DIAGNOSIS — M51.369 LUMBAR DEGENERATIVE DISC DISEASE: ICD-10-CM

## 2017-05-03 DIAGNOSIS — M54.42 ACUTE BILATERAL LOW BACK PAIN WITH BILATERAL SCIATICA: ICD-10-CM

## 2017-05-03 DIAGNOSIS — M54.41 ACUTE BILATERAL LOW BACK PAIN WITH BILATERAL SCIATICA: Primary | ICD-10-CM

## 2017-05-03 PROCEDURE — 99204 OFFICE O/P NEW MOD 45 MIN: CPT | Performed by: PHYSICAL MEDICINE & REHABILITATION

## 2017-05-03 PROCEDURE — 72100 X-RAY EXAM L-S SPINE 2/3 VWS: CPT

## 2017-05-03 RX ORDER — CYCLOBENZAPRINE HCL 10 MG
10 TABLET ORAL
Qty: 30 TABLET | Refills: 0 | Status: SHIPPED | OUTPATIENT
Start: 2017-05-03 | End: 2017-07-06

## 2017-05-03 RX ORDER — METHYLPREDNISOLONE 4 MG
TABLET, DOSE PACK ORAL
Qty: 21 TABLET | Refills: 0 | Status: SHIPPED | OUTPATIENT
Start: 2017-05-03 | End: 2017-05-16

## 2017-05-03 NOTE — MR AVS SNAPSHOT
After Visit Summary   5/3/2017    Denise Ralph    MRN: [id number]           Patient Information     Date Of Birth          1955        Visit Information        Provider Department      5/3/2017 1:40 PM Javier Mejia DO [facility]        Today's Diagnoses     Acute bilateral low back pain with bilateral sciatica    -  1    Lumbar degenerative disc disease        Spondylolisthesis of lumbar region          Care Instructions    We addressed the following today:    1. Bilateral sciatica  2. Lumbar spondylolisthesis  3. Lumbar degenerative disc disease    Activity modification as discussed  Physical therapy: [facility] - [phone]  Over the counter medication: Acetaminophen (Tylenol) 1000 mg every 6 hours with food (Maximum of 3000mg/day)  Ibuprofen (Advil) maximum of 800 mg four times a day with food  Prescription Medication as directed: Medrol Dose Pack and Cyclobenzaprine  Call in 1 week if no improvement of symptoms if interested in initiation of Gabapentin for further treatment purposes  Follow up/call in 4 weeks if no improvement of symptoms for further evaluation/medical care  (sooner if needed; call direct clinic number [[phone]] at any time with questions or concerns)            Follow-ups after your visit        Additional Services     [facility] PT, HAND, AND CHIROPRACTIC REFERRAL       **This order will print in the [facility] Scheduling Office**    Physical Therapy, Hand Therapy and Chiropractic Care are available through:    *[facility]  *[facility]  *[facility]    Call one number to schedule at any of the above locations: [phone].    Your provider has referred you to: Physical Therapy at [facility] or [facility] - Dasha Chang or Mannie Ta    Indication/Reason for Referral: Low Back Pain  Onset of Illness: 3 weeks  Therapy Orders: Evaluate and Treat  Special Programs: None  Special Request:  None    Naun Magallon      Additional Comments for the Therapist or Chiropractor: Formal physical therapy - exercises to include gentle strengthening/mobilization exercises with progression to abdominal/back extensor strengthening exercises with use of modalities (ie manual traction, electrical stimulation, etc.) as deemed appropriate with home exercise prescription.    Please be aware that coverage of these services is subject to the terms and limitations of your health insurance plan.  Call member services at your health plan with any benefit or coverage questions.      Please bring the following to your appointment:    *Your personal calendar for scheduling future appointments  *Comfortable clothing                  Follow-up notes from your care team     Return in about 4 weeks (around 5/31/2017).      Your next 10 appointments already scheduled     May 17, 2017 10:00 AM CDT   SHORT with Chelsea Restrepo MD   [facility] ([facility]    [address]  [facility] [id number] [phone]              Who to contact     If you have questions or need follow up information about today's clinic visit or your schedule please contact [facility] directly at [phone].  Normal or non-critical lab and imaging results will be communicated to you by LayerVaulthart, letter or phone within 4 business days after the clinic has received the results. If you do not hear from us within 7 days, please contact the clinic through LayerVaulthart or phone. If you have a critical or abnormal lab result, we will notify you by phone as soon as possible.  Submit refill requests through Linkurious or call your pharmacy and they will forward the refill request to us. Please allow 3 business days for your refill to be completed.          Additional Information About Your Visit        Linkurious Information     Linkurious gives you secure access to your electronic health record. If you see a  "primary care provider, you can also send messages to your care team and make appointments. If you have questions, please call your primary care clinic.  If you do not have a primary care provider, please call [phone] and they will assist you.        Care EveryWhere ID     This is your Care EveryWhere ID. This could be used by other organizations to access your [place] medical records  [id number]        Your Vitals Were     Height BMI (Body Mass Index)                5' 5\" (1.651 m) 22.63 kg/m2           Blood Pressure from Last 3 Encounters:   05/03/17 125/80   04/17/17 132/78   04/12/17 124/80    Weight from Last 3 Encounters:   05/03/17 136 lb (61.7 kg)   04/17/17 136 lb 9 oz (61.9 kg)   04/12/17 138 lb 1.6 oz (62.6 kg)              We Performed the Following     [facility], HAND, AND CHIROPRACTIC REFERRAL          Today's Medication Changes          These changes are accurate as of: 5/3/17  2:44 PM.  If you have any questions, ask your nurse or doctor.               Start taking these medicines.        Dose/Directions    cyclobenzaprine 10 MG tablet   Commonly known as:  FLEXERIL   Used for:  Acute bilateral low back pain with bilateral sciatica, Lumbar degenerative disc disease, Spondylolisthesis of lumbar region   Started by:  Javier Mejia DO        Dose:  10 mg   Take 1 tablet (10 mg) by mouth nightly as needed for muscle spasms   Quantity:  30 tablet   Refills:  0       methylPREDNISolone 4 MG tablet   Commonly known as:  MEDROL DOSEPAK   Used for:  Acute bilateral low back pain with bilateral sciatica, Lumbar degenerative disc disease, Spondylolisthesis of lumbar region   Started by:  Javier Mejia DO        Follow package instructions   Quantity:  21 tablet   Refills:  0            Where to get your medicines      These medications were sent to [facility] [address] [place] [facility] [address]  [address] [facility] [address]     Phone:  [phone]     cyclobenzaprine 10 MG " tablet    methylPREDNISolone 4 MG tablet                Primary Care Provider Office Phone # Fax #    Juan F Smallwood [phone]       [facility] [address] [facility] [address]        Thank you!     Thank you for choosing [facility]  for your care. Our goal is always to provide you with excellent care. Hearing back from our patients is one way we can continue to improve our services. Please take a few minutes to complete the written survey that you may receive in the mail after your visit with us. Thank you!             Your Updated Medication List - Protect others around you: Learn how to safely use, store and throw away your medicines at www.disposemymeds.org.          This list is accurate as of: 5/3/17  2:44 PM.  Always use your most recent med list.                   Brand Name Dispense Instructions for use    ALLEGRA PO      Take 30 mg by mouth daily       ALPRAZolam 0.25 MG tablet    XANAX    30 tablet    TAKE 1 TABLET BY MOUTH 3 TIMES DAILY AS NEEDED FOR ANXIETY       atenolol 50 MG tablet    TENORMIN    180 tablet    Take 1 tablet (50 mg) by mouth 2 times daily       cyclobenzaprine 10 MG tablet    FLEXERIL    30 tablet    Take 1 tablet (10 mg) by mouth nightly as needed for muscle spasms       estradiol 10 MCG Tabs vaginal tablet    VAGIFEM    20 tablet    Place 1 tablet (10 mcg) vaginally daily Daily for 14 days then twice weekly       fluticasone 50 MCG/ACT spray    FLONASE     USE 2 SPRAYS NASALLY DAILY AS DIRECTED       methylPREDNISolone 4 MG tablet    MEDROL DOSEPAK    21 tablet    Follow package instructions       nitrofurantoin (macrocrystal-monohydrate) 100 MG capsule    MACROBID    10 capsule    Take 1 capsule (100 mg) by mouth once as needed Take once after intercourse       omeprazole 20 MG tablet     90 tablet    Take 1 tablet (20 mg) by mouth daily       traZODone 100 MG tablet    DESYREL    90 tablet    Take 1 tablet (100 mg) by  mouth nightly as needed for sleep       XALATAN 0.005 % ophthalmic solution   Generic drug:  latanoprost

## 2017-05-03 NOTE — PATIENT INSTRUCTIONS
We addressed the following today:    1. Bilateral sciatica  2. Lumbar spondylolisthesis  3. Lumbar degenerative disc disease    Activity modification as discussed  Physical therapy: [facility] - [phone]  Over the counter medication: Acetaminophen (Tylenol) 1000 mg every 6 hours with food (Maximum of 3000mg/day)  Ibuprofen (Advil) maximum of 800 mg four times a day with food  Prescription Medication as directed: Medrol Dose Pack and Cyclobenzaprine  Call in 1 week if no improvement of symptoms if interested in initiation of Gabapentin for further treatment purposes  Follow up/call in 4 weeks if no improvement of symptoms for further evaluation/medical care  (sooner if needed; call direct clinic number [[phone]] at any time with questions or concerns)

## 2017-05-03 NOTE — Clinical Note
Dear Denise Quispe saw me at [facility] on May 3, 2017.  Please refer to the visit note at your convenience and feel free to contact me should you have any questions.  Sincerely,  Javier Mejia DO, [facility] ]

## 2017-05-03 NOTE — PROGRESS NOTES
" [facility]   Clinic Visit s May 3, 2017    Subjective:  Denise Ralph is a 62 year old female who is seen as self referral for evaluation of acute bilateral low back pain with radiation.    Symptoms began 3 weeks ago.  Reports insidious onset without acute precipitating event.  Reports sharp, unbearable, shooting, and radiating low back pain with radiation present down both legs, right greater than left, with radiation down the posterior thighs/calves to the right 2nd toe.  Pain is 10/10 in maximal severity and 5/10 currently.  Symptoms are generally worse with getting out of bed in the morning and with twisting activities and better with Ibuprofen and changing positions.  Associated symptoms include denies any bowel/bladder dysfunction or saddle anesthesia. Weakness of the lower extremities, right greater than left.  Denies any numbness/tingling of the lower extremities.  Denies any previous low back surgeries.    Patient's past medical, surgical, social, and family histories are reviewed today.  There are no significant contributory medical issues  Past Medical History:   Diagnosis Date     Anxiety     Gets panic attacks     Depressive disorder, not elsewhere classified      Essential hypertension, benign      Irritable bowel syndrome     has had neg colonoscopy & EGD w/u     Lumbar degenerative disc disease 1996    Had PT, traction, no surgery     Sciatica 3/06    R thigh;  MRI = R L2-3 disc        Review of Systems:  Constitutional: NEGATIVE for fever, chills, or change in weight  Skin: NEGATIVE for worrisome rashes, moles, or lesions  Neuro: POSITIVE for weakness of the lower extremities  MSK: see HPI  Additional 10 point ROS is negative other than symptoms noted above and in HPI    Objective:  /80  Ht 5' 5\" (1.651 m)  Wt 136 lb (61.7 kg)  BMI 22.63 kg/m2  General: healthy, alert, and in no distress  Skin: no suspicious lesions or rashes  Psych: mentation appears normal and " affect normal/bright  HEENT: no scleral icterus  CV: no pedal edema  Resp: normal respiratory effort without conversational dyspnea   Neuro: sensory exam is within normal limits.  Motor strength as noted below  Lymph: no palpable lymphadenopathy    MSK:    THORACIC/LUMBAR SPINE  Inspection:    No redness, swelling, overlying skin change, or gross deformity/asymmetry  Palpation:    Tender about the right SI joint    No tenderness over the lumbar spinous processes, lumbar facet joints, left SI joint, left sciatic notch, or right sciatic notch  Range of Motion:     Lumbar flexion limited by pain    Lumbar extension limited by pain  Strength:    5/5 - quadriceps, hamstrings, tibialis anterior, gastrocsoleus, and extensor hallicus longus  Special Tests:    Positive: Straight leg raise (right) and slump test (right)    Negative: Straight leg raise (left), slump test (left), SI joint compression (bilateral), LUKE (bilateral), and Gaenslen's test (right)    Imaging:  No x-rays indicated during today's visit  Lumbar spine x-rays were ordered, independent visualization of images was performed, and interpreted in the office today  Impression:  1. There is a grade 1 spondylolisthesis at L4-L5 which is likely degenerative.   2. Vertebral body alignment is otherwise normal.   3. No fracture or osseous lesion is seen.   4. Again seen is prominent disc height loss at L5-S1.   5. The remaining disc heights are well preserved.  6. There are surgical clips in the soft tissues.   7. No other soft tissue abnormality is seen.    ASSESSMENT:  1. Bilateral lumbosacral radiculitis  2. Lumbar degenerative disc disease  3. Lumbar spondylolisthesis    PLAN:  1. Activity modification as discussed, including limitation of activities that cause pain/discomfort.  2. Formal physical therapy - exercises to include gentle strengthening/mobilization exercises with progression to abdominal/back extensor strengthening exercises with use of modalities  (ie manual traction, electrical stimulation, etc.) as deemed appropriate with home exercise prescription.  3. Acetaminophen/heat/ice as needed for improved pain control.  4. Initiate use of Medrol dose pack for 1 week as prescribed for treatment purposes.  6. Initiate use of Flexeril as needed as prescribed for further treatment purposes.  7. Call in 1 week if no improvement of symptoms if interested in initiation of Gabapentin for further treatment purposes.  8. Follow-up/call in 4 weeks if no improvement of symptoms for further evaluation/medical care.  Consider MRI of the lumbar spine without contrast, etc. as deemed appropriate moving forward. Instructed to contact our office should the condition evolve or worsen, or should any new/progressive neurologic symptoms develop.    Patient's conditions were thoroughly discussed during today's visit with greater than 50% of the visit spent counseling the patient with total time spent face-to-face with the patient being 20 minutes.    Javier Mejia DO, CAM  [facility]    Disclaimer: This note consists of symbols derived from keyboarding, dictation and/or voice recognition software. As a result, there may be errors in the script that have gone undetected. Please consider this when interpreting information found in this chart.

## 2017-05-04 ENCOUNTER — THERAPY VISIT (OUTPATIENT)
Dept: PHYSICAL THERAPY | Facility: CLINIC | Age: 62
End: 2017-05-04
Payer: COMMERCIAL

## 2017-05-04 DIAGNOSIS — M54.41 ACUTE BILATERAL LOW BACK PAIN WITH RIGHT-SIDED SCIATICA: ICD-10-CM

## 2017-05-04 DIAGNOSIS — M54.42 BILATERAL LOW BACK PAIN WITH LEFT-SIDED SCIATICA: Primary | ICD-10-CM

## 2017-05-04 PROCEDURE — 97014 ELECTRIC STIMULATION THERAPY: CPT | Mod: GP | Performed by: PHYSICAL THERAPIST

## 2017-05-04 PROCEDURE — 97110 THERAPEUTIC EXERCISES: CPT | Mod: GP | Performed by: PHYSICAL THERAPIST

## 2017-05-04 PROCEDURE — 97161 PT EVAL LOW COMPLEX 20 MIN: CPT | Mod: GP | Performed by: PHYSICAL THERAPIST

## 2017-05-04 PROCEDURE — 97010 HOT OR COLD PACKS THERAPY: CPT | Mod: GP | Performed by: PHYSICAL THERAPIST

## 2017-05-04 NOTE — PROGRESS NOTES
Subjective:    Patient is a 62 year old female presenting with rehab back hpi.           Pt reports an insidious onset of bilateral LBP with pain radiation into her buttocks and both legs posteriorly down to her ankles.  Awoke one morning approximately 3 weeks ago with this pain and had significant difficulty getting out of bed.  This has continued.  Her pain improves with the leg pain resolving after being up and walking for 15 minutes.  Worse again if sitting longer than 30 minutes..    Patient reports pain:  Lower lumbar spine.  Radiates to:  Gluteals right, gluteals left, thigh right, thigh left, lower leg right and lower leg left.  Pain is described as sharp and shooting and is constant (intermittent leg pain) and reported as 9/10.  Associated symptoms:  Loss of motion/stiffness. Pain is worse in the A.M..  Symptoms are exacerbated by bending, sitting, lifting and standing and relieved by nothing.  Since onset symptoms are gradually improving.  Special tests:  X-ray.      General health as reported by patient is good.                  Barriers include:  None as reported by the patient.    Red flags:  None as reported by the patient.                        Objective:    System         Lumbar/SI Evaluation    Lumbar Myotomes:  normal            Lumbar DTR's:  normal                                                                   Malik Lumbar Evaluation    Posture:  Sitting: fair  Standing: good  Lordosis: WNL  Lateral Shift: no  Correction of Posture: better    Movement Loss:  Flexion (Flex): min and pain  Extension (EXT): mod and pain  Side [place] R (SG R): nil  Side Glide L (SG L): nil  Test Movements:      AALIYAH: During: increases  After: no worse    Repeat AALIYAH: During: no effect  After: no effect    EIL: During: no effect  After: no effect    Repeat EIL: During: decreases  After: better  Mechanical Response: IncROM        Conclusion: derangement  Principle of Treatment:      Extension: Extension                                            ROS    Assessment/Plan:      Patient is a 62 year old female with lumbar complaints.    Patient has the following significant findings with corresponding treatment plan.                Diagnosis 1:  Lumbar derangement with bilateral radiculopathy  Pain -  hot/cold therapy, electric stimulation, self management, education, directional preference exercise and home program  Decreased ROM/flexibility - therapeutic exercise and home program  Decreased strength - therapeutic exercise, therapeutic activities and home program    Therapy Evaluation Codes:   1) History comprised of:   Personal factors that impact the plan of care:      None.    Comorbidity factors that impact the plan of care are:      None.     Medications impacting care: None.  2) Examination of Body Systems comprised of:   Body structures and functions that impact the plan of care:      Lumbar spine.   Activity limitations that impact the plan of care are:      Bending, Lifting and Sitting.  3) Clinical presentation characteristics are:   Stable/Uncomplicated.  4) Decision-Making    Low complexity using standardized patient assessment instrument and/or measureable assessment of functional outcome.  Cumulative Therapy Evaluation is: Low complexity.    Previous and current functional limitations:  (See Goal Flow Sheet for this information)    Short term and Long term goals: (See Goal Flow Sheet for this information)     Communication ability:  Patient appears to be able to clearly communicate and understand verbal and written communication and follow directions correctly.  Treatment Explanation - The following has been discussed with the patient:   RX ordered/plan of care  Anticipated outcomes  Possible risks and side effects  This patient would benefit from PT intervention to resume normal activities.   Rehab potential is good.    Frequency:  1 X week, once daily  Duration:  for 4 weeks  Discharge Plan:  Achieve all  LTG.  Independent in home treatment program.  Reach maximal therapeutic benefit.    Please refer to the daily flowsheet for treatment today, total treatment time and time spent performing 1:1 timed codes.

## 2017-05-05 NOTE — PROGRESS NOTES
Subjective:    Patient is a 62 year old female presenting with rehab left ankle/foot hpi.                                      Pertinent medical history includes:  Migraines (arthritis).  Medical allergies: adhesive.    Current medications:  Sleep medication, muscle relaxants and steroids (eye drops glaucoma).  Current occupation is [employer] insurance.    Primary job tasks include:  Prolonged sitting, prolonged standing, lifting and repetitive tasks (carrying, pushing, pulling, computer work).        Red flags:  Pain at rest/night.                        Objective:    System    Physical Exam    General     ROS    Assessment/Plan:

## 2017-05-07 DIAGNOSIS — I10 ESSENTIAL HYPERTENSION, BENIGN: ICD-10-CM

## 2017-05-07 DIAGNOSIS — R00.2 PALPITATIONS: ICD-10-CM

## 2017-05-08 ENCOUNTER — TELEPHONE (OUTPATIENT)
Dept: ORTHOPEDICS | Facility: CLINIC | Age: 62
End: 2017-05-08

## 2017-05-08 DIAGNOSIS — M51.369 LUMBAR DEGENERATIVE DISC DISEASE: ICD-10-CM

## 2017-05-08 DIAGNOSIS — M43.16 SPONDYLOLISTHESIS OF LUMBAR REGION: ICD-10-CM

## 2017-05-08 DIAGNOSIS — M54.17 LUMBOSACRAL RADICULITIS: Primary | ICD-10-CM

## 2017-05-08 RX ORDER — ATENOLOL 50 MG/1
TABLET ORAL
Qty: 180 TABLET | Refills: 1 | Status: SHIPPED | OUTPATIENT
Start: 2017-05-08 | End: 2017-11-05

## 2017-05-08 NOTE — TELEPHONE ENCOUNTER
Please call patient to inform that options at this time include initiation of Gabapentin for further treatment purposes or MRI of the lumbar spine without contrast for further evaluation/medical care. Would recommend refraining from use of Flexeril except as night for improvement of symptoms because of side effects noted.  Can use Acetaminophen up to 3000 mg for treatment purposes and Ibuprofen up to 3200 mg per day. Would not recommended initiation of narcotic pain medications for treatment purposes. Would recommend holding on formal physical therapy at this time and continue with pain-free home exercise program for improvement of symptoms and avoiding things that are painful for improvement of pain/discomfort.    Javier Mejia DO, CAQSM  [facility]

## 2017-05-08 NOTE — TELEPHONE ENCOUNTER
"Patient left voicemail stating she is having severe pain. Having difficulty getting out of bed. Would like a call as soon as possible and needs help with back pain.     Phone call to patient. Patient tearful at times during conversation. She states she is feeling a little better now that she is up. However, felt like going to the ER this am. States pain was \"beyond a +10/10\".   \"felt like someone was tearing my leg off\"    Cried when tried to get out of bed and  had to help her to the bathroom.   Bearing weight was excruciating. Feels pressure in low back with lying on side.   Pain a little better the day after she started prednisone. Same day she started physical therapy, pain worsened and gradually worsened when it was excruciating this am.   Pain located in low back and radiates down right buttock and leg to foot. Intensity has increased and more on the right side today. Some tingling in whole right leg to foot at times. Denies foot drop.   Denies loss of bladder control or groin pain/numbness.  However, reports difficulty of bearing down with a bowel movement. Does not feel like she was able to go all the way due to pain. States this is not new, but is much worse today.   Icing has helped.   Took last prednisone today.  Taking 400m Advil every 6 hrs.   Did take 1000mg tylenol twice daily yesterday.   Informed she may take 1000mg three times daily, not to exceed 3000mg in a 24 hr period.   She has previous history of elevated LFT's with narcotics/tylenol.   Took Flexeril twice in the evening, but made her too fatigued the whole next day. Also feels it causes constipation. Has not taken anymore.   Patient would like to know what can help with pain. Discussed that narcotics would not be recommended.   Also discussed gabapentin previously discussed as an option and the side effect of drowsiness. Also informed that it works on nerve pain and takes time to take effect. She is somewhat willing to start " gabapentin.     Please advise.     QIANA Mcdonald RN

## 2017-05-08 NOTE — TELEPHONE ENCOUNTER
Atenolol      Last Written Prescription Date: 11/21/16  Last Fill Quantity: 180, # refills: 1    Last Office Visit with [facility], [facility] or [facility] prescribing provider:  04/17/17   Future Office Visit:    Next 5 appointments (look out 90 days)     May 17, 2017 10:00 AM CDT   SHORT with Chelsea Restrepo MD   [facility] ([facility])    [address]  [facility] [id number] [phone]                    BP Readings from Last 3 Encounters:   05/03/17 125/80   04/17/17 132/78   04/12/17 124/80

## 2017-05-09 RX ORDER — GABAPENTIN 300 MG/1
300 CAPSULE ORAL 3 TIMES DAILY
Qty: 90 CAPSULE | Refills: 2 | Status: SHIPPED | OUTPATIENT
Start: 2017-05-09 | End: 2017-07-06

## 2017-05-09 NOTE — TELEPHONE ENCOUNTER
Patient informed that orders were placed and she should f/u in clinic afterward to review results and discuss treatment options.    Travis Daily, ATC

## 2017-05-09 NOTE — TELEPHONE ENCOUNTER
Gabapentin sent to the patient's pharmacy and MRI of the lumbar spine without contrast ordered. Please inform patient and to follow-up 1-2 business days after completion of further diagnostic testing for discussion of results/further medical care.    Javier Mejia DO, CAQSM  [facility]

## 2017-05-09 NOTE — TELEPHONE ENCOUNTER
Spoke with patient and she is very nervous of her pain and is scared to wake up tomorrow because the pain was so severe. She is interested in doing the gabapentin and proceeding with a lumbar MRI because she feels eventually she will need a lumbar injection.     Selected pharmacy in [facility] [place].     Travis Daily ATC

## 2017-05-11 DIAGNOSIS — F40.240 CLAUSTROPHOBIA: Primary | ICD-10-CM

## 2017-05-11 RX ORDER — DIAZEPAM 10 MG
10 TABLET ORAL ONCE
Qty: 1 TABLET | Refills: 0 | Status: SHIPPED | OUTPATIENT
Start: 2017-05-11 | End: 2017-05-11

## 2017-05-11 NOTE — TELEPHONE ENCOUNTER
Patient left voicemail stating she has an MRI scheduled at [facility] on 5/12/17 and has to be there at 9:30 am. She is claustrophobic and requires a sedative.   She would also like to discuss her pain further. Please call.     Phone call to patient. She has previously taken valium prior to MRI's and would like an prescription sent to her pharmacy.  In reviewing her chart, she does take xanax as a prn. She states she hasn't taken it for a couple weeks.   She wants Dr. Mejia to be aware that she is not taking the Gabapentin. She states it gave her a very bad stomach ache, then restless legs. Took one dose only  and has not taken any since then. Does not like how it made her feel.  Is also not taking flexeril. Took it  twice at bedtime and was wiped out the whole next day. Did not feel it helped her pain.     She expresses fear of severe pain in the mornings. Gets better as the day goes on and she keeps active. Pain has not changed much except she is noticing more constant tingling today in both feet. Worse in the lateral edge of right foot. Discussed if pain changed and became worse to contact our office. Red flag symptoms discussed. Informed that MRI will tell Dr. Mejia more. Appointment scheduled for 5/16/17 as schedule was full for Monday. She verbalized understanding.     Please advise on prescription pending and print/sign if appropriate. This can be hand faxed or called to the pharmacy.     QIANA Mcdonald RN

## 2017-05-11 NOTE — TELEPHONE ENCOUNTER
Valium prescription signed. Please call in to patient's pharmacy.    Javier Mejia DO, CAQSM  [facility]

## 2017-05-11 NOTE — TELEPHONE ENCOUNTER
Prescription called to [facility].   Consent to communicate on file. Left message for patient that prescription called to pharmacy.     QIANA Mcdonald RN

## 2017-05-12 ENCOUNTER — HOSPITAL ENCOUNTER (OUTPATIENT)
Dept: MRI IMAGING | Facility: CLINIC | Age: 62
Discharge: HOME | End: 2017-05-12
Attending: PHYSICAL MEDICINE & REHABILITATION | Admitting: PHYSICAL MEDICINE & REHABILITATION
Payer: COMMERCIAL

## 2017-05-12 DIAGNOSIS — M51.369 LUMBAR DEGENERATIVE DISC DISEASE: ICD-10-CM

## 2017-05-12 DIAGNOSIS — M54.17 LUMBOSACRAL RADICULITIS: ICD-10-CM

## 2017-05-12 DIAGNOSIS — M43.16 SPONDYLOLISTHESIS OF LUMBAR REGION: ICD-10-CM

## 2017-05-12 PROCEDURE — 72148 MRI LUMBAR SPINE W/O DYE: CPT

## 2017-05-15 ENCOUNTER — TELEPHONE (OUTPATIENT)
Dept: ORTHOPEDICS | Facility: CLINIC | Age: 62
End: 2017-05-15

## 2017-05-15 NOTE — TELEPHONE ENCOUNTER
Called patient to briefly review MRI results. She will keep f/u visit tomorrow to discuss in greater detail.    Travis Daily ATC

## 2017-05-15 NOTE — TELEPHONE ENCOUNTER
Please call the patient to inform that her MRI shows arthritis along with spinal stenosis that will be discussed further at her clinical visit on 5/16/2017.    Javier Mejia DO, GRAYM  [facility]

## 2017-05-15 NOTE — TELEPHONE ENCOUNTER
"Patient left voicemail stating she had MRI on 5/12/17. She has an appointment with Dr. Mejia on 5/16/17 but is asking if he can look at results today and get back with her. She states it is very difficult for her to \"stand this pain\".     Phone call to patient. Pain is almost unbearable in the mornings. Muscle relaxants don't really help with pain, just make her drowsy. Feels pain was a little worse this am. Has been getting nauseated off and on the past 3 days. Taking 600mg ibuprofen every 6 hrs with food. Denies foot drop or loss of bowel/bladder control.   States she is getting more depressed.   Will discuss with Dr. Mejia and get back with her.     Please advise.     QIANA Mcdonald RN        "

## 2017-05-16 ENCOUNTER — TELEPHONE (OUTPATIENT)
Dept: PALLIATIVE MEDICINE | Facility: CLINIC | Age: 62
End: 2017-05-16

## 2017-05-16 ENCOUNTER — OFFICE VISIT (OUTPATIENT)
Dept: ORTHOPEDICS | Facility: CLINIC | Age: 62
End: 2017-05-16
Payer: COMMERCIAL

## 2017-05-16 VITALS
SYSTOLIC BLOOD PRESSURE: 128 MMHG | HEIGHT: 65 IN | BODY MASS INDEX: 22.66 KG/M2 | DIASTOLIC BLOOD PRESSURE: 73 MMHG | WEIGHT: 136 LBS

## 2017-05-16 DIAGNOSIS — M51.369 LUMBAR DEGENERATIVE DISC DISEASE: ICD-10-CM

## 2017-05-16 DIAGNOSIS — M51.369 BULGING LUMBAR DISC: ICD-10-CM

## 2017-05-16 DIAGNOSIS — M48.061 LUMBAR SPINAL STENOSIS: Primary | ICD-10-CM

## 2017-05-16 PROCEDURE — 99213 OFFICE O/P EST LOW 20 MIN: CPT | Performed by: PHYSICAL MEDICINE & REHABILITATION

## 2017-05-16 NOTE — PROGRESS NOTES
" [facility]   Follow-up Visit s May 16, 2017    Subjective:  Denise Ralph is a 62 year old female who is seen in follow up for evaluation of acute lower back pain with radiation for discussion of MRI of the lumbar spine without contrast results. Last visit was on 5/3/2017. Since that time, symptoms have been worsening. Tried doing physical therapy but hasn't been able to tolerate doing the exercises so she discontinued. Completed a Medrol Dosepak and didn't see any change while on the medication. Tried taking Gabapentin for 1 dose and discontinued because it caused her severe stomach pains and restless leg symptoms. Been using Advil and ice for improvement of symptoms.    Note right low back pain with radiation to the right posterior thigh/calf regions. Symptoms are worse in the morning and with bending activities. Denies any numbness/tingling of the right lower extremity.  Notes weakness of the right lower extremity.  Denies any bowel/bladder incontinence. Denies any saddle anesthesia. Denies any trauma/falls since last clinical visit.    Patient's past medical, surgical, social, and family histories are reviewed today    Objective:  /73  Ht 5' 5\" (1.651 m)  Wt 136 lb (61.7 kg)  BMI 22.63 kg/m2  General: healthy, alert, and in mild distress  Skin: no suspicious lesions or rashes  Neuro: motor exam grossly normal with no focal neurologic deficits appreciated    MSK:    THORACIC/LUMBAR SPINE  Strength:    5/5 - quadriceps, hamstrings, tibialis anterior, gastrocsoleus, and extensor hallicus longus    Imaging:  No x-rays indicated during today's clinical visit   Previous films were reviewed today, independent visualization of images was performed, and results were discussed with the patient   MRI LUMBAR SPINE WITHOUT CONTRAST - 5/12/2017   IMPRESSION:   1. At L4-L5 there has been progression of degenerative changes and what is now moderate-to-severe central canal stenosis with " mild-to-moderate left and mild right neural foraminal stenosis.  2. A previously seen disc herniation on the right at L2-L3 has resolved.  3. Degenerative changes elsewhere with no disc herniation demonstrated. Again seen is mild narrowing of the neural foramina bilaterally at L4-L5. No other area of stenosis is seen.    ASSESSMENT:  1. Lumbar spinal stenosis  2. Lumbar disc bulge  3. Lumbar degenerative disc disease    PLAN:  1. Discussed potential side effects and complications of a L4-5 interlaminar epidural corticosteroid injection including but not necessarily limited to infection, bleeding, allergic reaction, post-injection flare, local tissue breakdown (including but not limited to potential for skin depigmentation and/or subcutaneous fat atrophy), systemic effects of corticosteroids, elevation of blood glucose, injury to soft tissue and/or nerves and seizure.  Patient indicated their understanding and agreed to proceed.    2. Activity modification as discussed, including limitation of activities that cause pain/discomfort.  3. Hold on completion of formal physical therapy/pain-free home exercise at this time because of pain/discomfort.  4. Acetaminophen/Ibuprofen/ice/heat as needed for improved pain control.  5. Return to use of Gabapentin as able to tolerate as discussed and return to use of Flexeril as needed as able to tolerate for further treatment purposes.  6. Follow up 2 weeks after completion of lumbar epidural corticosteroid injection for further evaluation/medical care.  Consider caudal epidural corticosteroid injection, spine surgery referral, initiation of Lyrica, initiation of Tizanidine/Robaxin, return to formal physical therapy/pain-free home exercise program, etc. as deemed appropriate moving forward. Instructed to follow-up if change of symptoms arise.    Patient's conditions were thoroughly discussed during today's visit with greater than 50% of the visit spent counseling the patient with  total time spent face-to-face with the patient being 15 minutes.    Javier Mejia DO, GRAYM  [facility]    Disclaimer: This note consists of symbols derived from keyboarding, dictation and/or voice recognition software. As a result, there may be errors in the script that have gone undetected. Please consider this when interpreting information found in this chart.

## 2017-05-16 NOTE — MR AVS SNAPSHOT
After Visit Summary   5/16/2017    Denise Ralph    MRN: [id number]           Patient Information     Date Of Birth          1955        Visit Information        Provider Department      5/16/2017 11:20 AM Javier Mejia DO [facility]        Today's Diagnoses     Lumbar spinal stenosis    -  1    Bulging lumbar disc        Lumbar degenerative disc disease          Care Instructions    We addressed the following today:    1. Lumbar spinal stenosis/arthritis/disc bulge    Activity modification as discussed  Hold on further formal physical therapy/home exercise program  Topical Treatments: Ice or heat  Over the counter medication: Acetaminophen (Tylenol) 1000 mg every 6 hours with food (Maximum of 3000 mg/day)  Ibuprofen (Advil) maximum of 800 mg four times a day with food  Prescription Medication as directed: Return to use of Gabapentin and Flexeril as discussed as able to tolerate  Epidural corticosteroid injection to be completed at [facility]  Follow up 2 weeks after completion of lumbar epidural corticosteroid injection for further evaluation/medical care (sooner if needed; call direct clinic number [[phone]] at any time with questions or concerns)            Follow-ups after your visit        Additional Services     PAIN MANAGEMENT CENTER ([place]) REFERRAL       Your provider has referred you to the [facility].    Reason for Referral: Procedure or injection only - patient will be contacted within 24 hrs to schedule: Epidural Steroid (interlaminar approach): Lumbar - L4-5    Please complete the following questions:    What is your diagnosis for the patient's pain? Lumbar spinal stenosis/lumbar degenerative disc disease    Do you have any specific questions for the pain specialist? No    Are there any red flags that may impact the assessment or management of the patient? None    **ANY DIAGNOSTIC TESTS THAT ARE NOT IN EPIC  SHOULD BE SENT TO THE PAIN CENTER**    Please note the Pre-Op Pain Consult must be scheduled 2-3 weeks prior to the patient's surgery.  Patient's trying to schedule within 2 weeks of surgery may not be accommodated.     Pre-Op Pain Consults are only good for 30 days.    REGARDING OPIOID MEDICATIONS:  We will always address appropriateness of opioid pain medications, but we generally will not automatically take on a prescribing role. When we do take on prescribing of opioids for chronic pain, it is in collaboration with the referring physician for an intermediate period of time (months), with an expectation that the primary physician or provider will assume the prescribing role if medications are effective at stable doses with demonstrated compliance.  Therefore, please do not assume that your prescribing responsibilities end on the day of pain clinic consultation.  Is this agreeable to you? YES    For any questions, contact the [facility] at [phone].    Please be aware that coverage of these services is subject to the terms and limitations of your health insurance plan.  Call member services at your health plan with any benefit or coverage questions.      Please bring the following with you to your appointment:    (1) Any X-Rays, CTs or MRIs which have been performed.  Contact the facility where they were done to arrange for  prior to your scheduled appointment.    (2) List of current medications   (3) This referral request   (4) Any documents/labs given to you for this referral                  Your next 10 appointments already scheduled     May 16, 2017  2:40 PM CDT   [facility] Spine with Mannie Ta PT   [facility]    [address] [address]   [phone]            May 17, 2017 10:00 AM CDT   [facility]   [facility]    [address]  [place]  "[place] [address]   [phone]              Who to contact     If you have questions or need follow up information about today's clinic visit or your schedule please contact [facility] directly at [phone].  Normal or non-critical lab and imaging results will be communicated to you by FuelMyBloghart, letter or phone within 4 business days after the clinic has received the results. If you do not hear from us within 7 days, please contact the clinic through FuelMyBloghart or phone. If you have a critical or abnormal lab result, we will notify you by phone as soon as possible.  Submit refill requests through Coty or call your pharmacy and they will forward the refill request to us. Please allow 3 business days for your refill to be completed.          Additional Information About Your Visit        Coty Information     Coty gives you secure access to your electronic health record. If you see a primary care provider, you can also send messages to your care team and make appointments. If you have questions, please call your primary care clinic.  If you do not have a primary care provider, please call [phone] and they will assist you.        Care EveryWhere ID     This is your Care EveryWhere ID. This could be used by other organizations to access your [facility] medical records  [id number]        Your Vitals Were     Height BMI (Body Mass Index)                5' 5\" (1.651 m) 22.63 kg/m2           Blood Pressure from Last 3 Encounters:   05/16/17 128/73   05/03/17 125/80   04/17/17 132/78    Weight from Last 3 Encounters:   05/16/17 136 lb (61.7 kg)   05/03/17 136 lb (61.7 kg)   04/17/17 136 lb 9 oz (61.9 kg)              We Performed the Following     PAIN MANAGEMENT CENTER ([place]) REFERRAL          Today's Medication Changes          These changes are accurate as of: 5/16/17 12:28 PM.  If you have any questions, ask your nurse or doctor.               Stop taking these medicines if you haven't " already. Please contact your care team if you have questions.     methylPREDNISolone 4 MG tablet   Commonly known as:  MEDROL DOSEPAK   Stopped by:  Javier Mejia DO                    Primary Care Provider Office Phone # Fax #    Juan F Smallwood [phone]       [facility] [address] [facility] [address]        Thank you!     Thank you for choosing [facility] [place]  for your care. Our goal is always to provide you with excellent care. Hearing back from our patients is one way we can continue to improve our services. Please take a few minutes to complete the written survey that you may receive in the mail after your visit with us. Thank you!             Your Updated Medication List - Protect others around you: Learn how to safely use, store and throw away your medicines at www.disposemymeds.org.          This list is accurate as of: 5/16/17 12:28 PM.  Always use your most recent med list.                   Brand Name Dispense Instructions for use    ALLEGRA PO      Take 30 mg by mouth daily       ALPRAZolam 0.25 MG tablet    XANAX    30 tablet    TAKE 1 TABLET BY MOUTH 3 TIMES DAILY AS NEEDED FOR ANXIETY       atenolol 50 MG tablet    TENORMIN    180 tablet    TAKE 1 TABLET (50 MG) BY MOUTH 2 TIMES DAILY       cyclobenzaprine 10 MG tablet    FLEXERIL    30 tablet    Take 1 tablet (10 mg) by mouth nightly as needed for muscle spasms       estradiol 10 MCG Tabs vaginal tablet    VAGIFEM    20 tablet    Place 1 tablet (10 mcg) vaginally daily Daily for 14 days then twice weekly       fluticasone 50 MCG/ACT spray    FLONASE     USE 2 SPRAYS NASALLY DAILY AS DIRECTED       gabapentin 300 MG capsule    NEURONTIN    90 capsule    Take 1 capsule (300 mg) by mouth 3 times daily       nitrofurantoin (macrocrystal-monohydrate) 100 MG capsule    MACROBID    10 capsule    Take 1 capsule (100 mg) by mouth once as needed Take once after intercourse       omeprazole 20 MG  tablet     90 tablet    Take 1 tablet (20 mg) by mouth daily       traZODone 100 MG tablet    DESYREL    90 tablet    Take 1 tablet (100 mg) by mouth nightly as needed for sleep       XALATAN 0.005 % ophthalmic solution   Generic drug:  latanoprost

## 2017-05-16 NOTE — TELEPHONE ENCOUNTER
Pre-screening Questions for Radiology Injections:    Injection to be done at which interventional clinic site? [facility]    Procedure ordered by Dr. Javier Mejia    Procedure ordered? Lumbar Epidural Steroid Injection    What insurance would patient like us to bill for this procedure? BCBS      Worker's comp-Any injection DO NOT SCHEDULE and route to Julianna Dietrich.      [employer] insurance - For SI joint injections, DO NOT SCHEDULE and route Julianna Dietrich.      [employer]- MBB's must be scheduled at LEAST two weeks apart      [employer] - Any injection besides hip/shoulder/knee joint DO NOT SCHEDULE and route to Julianna Dietrich. She will obtain PA and call pt back to schedule procedure or notify pt of denial.     HP [employer]-PA REQUIRED FOR NON-PATRICIA OR Joint injections    Any chance of pregnancy? NO   If YES, do NOT schedule and route to RN pool    Is an  needed? No     Patient has a drive home? (mandatory) YES: INFORMED    Is patient taking any blood thinners (plavix, coumadin, jantoven, warfarin, heparin, pradaxa or dabigatran )? No   If hold needed, do NOT schedule, route to RN pool     Is patient taking any aspirin products? No     If more than 325mg/day do NOT schedule; route to RN pool     For CERVICAL procedures, hold all aspirin products for 6 days.      Does the patient have a bleeding or clotting disorder? No     If yes, okay to schedule AND route to RN nurse pool    **For any patients with platelet count <100, must be forwarded to provider**    Is patient diabetic?  No  If YES, have them bring their glucometer.    Does patient have an active infection or treated for one within the past week? No     Is patient currently taking any antibiotics?  No     For patients on chronic, preventative, or prophylactic antibiotics, procedures may be scheduled.     For patients on antibiotics for active or recent infection:    Adilene Quiñones [place], [place]-antibiotic course must have been  completed for 4 days    Kaitlyn Reyna-antibiotic course must have been completed for 7 days    Is patient currently taking any steroid medications? (i.e. Prednisone, Medrol)  No     For patients on steroid medications:    Adilene Quiñones [place], [place]-steroid course must have been completed for 4 days    Kaitlyn Reyna-steroid course must have been completed for 7 days    Reviewed with patient:  If you are started on any steroids or antibiotics between now and your appointment, you must contact us because it may affect our ability to perform your procedure.  Yes    Is patient actively being treated for cancer or immunocompromised, including the spleen having been removed? No    If YES, do NOT schedule and route to RN pool     **For Dr. Mclain patients without spleens should have the chart sent to her**    Are you able to get on and off an exam table with minimal or no assistance? Yes  If NO, do NOT schedule and route to RN pool    Are you able to roll over and lay on your stomach with minimal or no assistance? Yes  If NO, do NOT schedule and route to RN pool     Any allergies to contrast dye, iodine, shellfish, or numbing and steroid medications? No  If YES, route to RN pool AND add allergy information to appointment notes    Allergies: Msir [morphine sulfate]; Erythromycin; Penicillins; and Reglan        Has the patient had a flu shot or any other vaccinations within 7 days before or after the procedure.  No       Does patient have an MRI/CT?  YES: MRI 5/2017  (SI joint, hip injections, lumbar sympathetic blocks, and stellate ganglion blocks do not require an MRI)    Was the MRI done w/in the last 3 years?  Yes    Was MRI done at [facility]? Yes      If not, where was it done? N/A       If MRI was not done at [facility], [facility] or [facility] do NOT schedule and route to nursing.  If pt has an imaging disc, the injection may be scheduled but pt has to bring disc to appt. If they show up w/out  disc the injection cannot be done    Reminders (please tell patient if applicable):       Instructed pt to arrive 30 minutes early for IV start if this is for a cervical procedure, ALL sympathetic (stellate ganglion, hypogastric, or lumbar sympathetic block) and all sedation procedures (RFA, spinal cord stimulation trials).  Not Applicable    -IVs are not routinely placed for [place] cervical case       If NPO for sedation, informed patient that it is okay to take medications with sips of water (except if they are to hold blood thinners).  Not Applicable   *DO take blood pressure medication if it is prescribed*      If this is for a cervical PATRICIA, informed patient that aspirin needs to be held for 6 days.   Not Applicable      For all patients not having spinal cord stimulator (SCS) trials or radiofrequency ablations (RFAs), informed patient:  IV sedation is not provided for this procedure.  If you feel that an oral anti-anxiety medication is needed, you can discuss this further with your referring provider or primary care provider.  The Pain Clinic provider will discuss specifics of what the procedure includes at your appointment.  Most procedures last 10-20 minutes.  We use numbing medications to help with any discomfort during the procedure.  Not Applicable      Do not schedule procedures requiring IV placement in the first appointment of the day or first appointment after lunch. REVIEWED      For patients 85 or older we recommend having an adult stay w/ them for the remainder of the day.   REVIEWED    Does the patient have any questions?  NO  Connie Dykes  [facility]

## 2017-05-16 NOTE — NURSING NOTE
"Chief Complaint   Patient presents with     RECHECK     f/u lower back MRI results       Initial /73  Ht 5' 5\" (1.651 m)  Wt 136 lb (61.7 kg)  BMI 22.63 kg/m2 Estimated body mass index is 22.63 kg/(m^2) as calculated from the following:    Height as of this encounter: 5' 5\" (1.651 m).    Weight as of this encounter: 136 lb (61.7 kg).  Medication Reconciliation: complete     Travis Daily, ATC      "

## 2017-05-16 NOTE — PROGRESS NOTES
[facility] - Procedure Note    Date of Visit: 5/17/2017    Procedure performed: Lumbar L4-5 interlaminar epidural steroid injection  Diagnosis: Lumbar spondylosis; Lumbar radiculitis/radiculopathy  : Marija Lopez MD   Anesthesia: none    Indications: Denise Ralph is a 62 year old female who is seen at the request of Dr. Javier Mejia for a lumbar epidural steroid injection. The patient describes a one month history of acute onset low back pain that radiates into the posterior of both legs - Right greater than Left. 50% of her pain is in her back and 50% is radiating pain in her legs. The worst pain is in the morning and she describes it as 10/10 and incapacitating.  The patient has been exhibiting symptoms consistent with lumbar intraspinal inflammation and radiculopathy. Symptoms have been persistent, disabling, and intermittently severe. The patient reports minimal improvement with conservative treatment, including PT and medications.    Lumbar MRI was done on 5/12/2017 which showed   FINDINGS: Numbering of the levels is again based on what appear to be  five lumbar type vertebral bodies. There has been development of a  mild grade 1 spondylolisthesis at L4-L5 which is degenerative in  nature. Vertebral body alignment is otherwise normal. No fracture is  seen. No pars interarticularis defect is demonstrated. Again seen is a  large benign-appearing hemangioma involving the posterior left aspect  of the L1 vertebral body. There are moderately prominent Modic type II  signal changes in the endplates adjacent to the L5-S1 disc. No other  abnormal marrow signal intensity is identified. The conus medullaris  terminates at the level of the T12-L1 disc. No intrathecal abnormality  is seen. The adjacent soft tissues are unremarkable.     Findings by specific level:     T12-L1: The disc and facet joints are normal. No stenosis is seen.     L1-L2: There is mild disc dehydration. The disc height  is  well-preserved. There is mild disc bulge. There is a small fissure of  the posterior annular fibers centrally with no disc herniation or  stenosis seen. The facet joints are unremarkable.     L2-L3: There is mild disc dehydration. The disc height is  well-preserved. There is a mild diffuse disc bulge with no focal  herniation seen. A previously seen right central disc herniation has  resolved. No stenosis is present. The facet joints are unremarkable.     L3-L4: There is disc dehydration. The disc height is well-preserved.  Again seen is a mild disc bulge with no focal herniation seen.  Prominent left and mild-to-moderate right facet joint degenerative  changes are present. There is also prominence of the ligamentum flava.  The degree of facet joint degeneration has progressed. No stenosis is  seen.     L4-L5: There is disc dehydration. The disc height is well-preserved.  There is a moderate disc bulge with no focal herniation seen. There  are prominent hypertrophic degenerative changes in the facet joints  and marked prominence of the ligamentum flava. This has significantly  progressed since the previous examination with progression of what is  now moderate-to-severe central canal stenosis. There is mild to  moderate left and mild right neural foraminal stenosis which has also  progressed.     L5-S1: Again seen is disc dehydration with prominent disc height loss.  There is a mild disc bulge with no focal herniation seen. The facet  joints are unremarkable. No central canal stenosis is seen. There is  mild narrowing of the neural foramina bilaterally. This level is  unchanged.         IMPRESSION:   1. At L4-L5 there has been progression of degenerative changes and  what is now moderate-to-severe central canal stenosis with  mild-to-moderate left and mild right neural foraminal stenosis.  2. A previously seen disc herniation on the right at L2-L3 has  resolved.  3. Degenerative changes elsewhere with no disc  herniation  demonstrated. Again seen is mild narrowing of the neural foramina  bilaterally at L4-L5. No other area of stenosis is seen.     MARKIE STEWART MD    Allergies:      Allergies   Allergen Reactions     Msir [Morphine Sulfate] Nausea     Intollerant to Morphine Sulfate: Nausea,vomiting     Erythromycin      Penicillins Itching     Reglan Other (See Comments)     Muscle spasms in throat        Vitals:  /89  Pulse 64  SpO2 100%    Review of Systems: The patient denies recent fever, chills, illness, use of antibiotics or anticoagulants. All other 10-point review of systems negative.     Procedure: The procedure and risks were explained, and informed written consent was obtained from the patient. Risks include but are not limited to: infection, bleeding, increased pain, and damage to soft tissue, nerve, muscle, and vasculature structures. After getting informed consent, patient was brought into the procedure suite and was placed in a prone position on the procedure table. A Pause for the Cause was performed. Patient was prepped and draped in sterile fashion.     The L4-5 interspace was identified with use of fluoroscopy in AP view. A 25-gauge, 1.5 inch needle was used to anesthetize the skin and subcutaneous tissue entry site with a total of 2 ml of 1% lidocaine. Under fluoroscopic visualization, a 22-gauge, 4.5 inch Tuohy epidural needle was slowly advanced towards the epidural space a few millimeters left of midline. The latter part of the needle advancement was guided with fluoroscopy in the lateral view. The epidural space was identified using loss of resistance technique. After negative aspiration for heme and cerebrospinal fluid, a total of 1 mL of non-ionic contrast was injected to confirm needle placement with 9 mL of contrast wasted. Epidurogram confirmed spread within the posterior epidural space. 2 ml of 40mg/ml of triamcinolone, 2 ml of 1% lidocaine, and 1 ml of preservative free saline  was injected. The needle was removed.  Images were saved to PACS.    The patient tolerated the procedure well, and there was no evidence of procedural complications. No new sensory or motor deficits were noted following the procedure. The patient was stable and able to ambulate on discharge home. Post-procedure instructions were provided.     Pre-procedure pain score: 8/10 in the back, 7/10 in the leg  Post-procedure pain score: 8/10 in the back, 10/10 in the leg    Assessment/Plan: Denise Ralph is a 62 year old female s/p lumbar interlaminar epidural steroid injection today for lumbar spondylosis and radiculitis/radiculopathy.     1. Following today's procedure, the patient was advised to contact the [facility] for any of the following:   Fever, chills, or night sweats   New onset of pain, numbness, or weakness   Any questions/concerns regarding the procedure  If unable to contact the Pain Center, the patient was instructed to go to a local Emergency Room for any complications.   2. The patient will receive a follow-up call in 1 week.   3. Follow-up with Dr. Mejia in 2 weeks for post-procedure evaluation.  4. The procedure was complicated by post-procedure pain.  The patient was unable to move without excruciating pain for more than an hour following the procedure. She described the pain as similar but worse than what she feels in the morning.  Most of the pain was radiating pain down the back of her right leg and into her calf.  Toradol 30mg IM was given and the patient was allowed to rest in the recovery room.  Eventually we had to call transport to take her to the ER as she could not even sit up on the bed.      Marija LopezMD Pain Management

## 2017-05-16 NOTE — Clinical Note
Dear Dr. Smallwood/Denise Dhaliwal saw me at [facility] on May 16, 2017.  Please refer to the visit note at your convenience and feel free to contact me should you have any questions.  Sincerely,  Javier Mejia DO, [facility]

## 2017-05-16 NOTE — PATIENT INSTRUCTIONS
We addressed the following today:    1. Lumbar spinal stenosis/arthritis/disc bulge    Activity modification as discussed  Hold on further formal physical therapy/home exercise program  Topical Treatments: Ice or heat  Over the counter medication: Acetaminophen (Tylenol) 1000 mg every 6 hours with food (Maximum of 3000 mg/day)  Ibuprofen (Advil) maximum of 800 mg four times a day with food  Prescription Medication as directed: Return to use of Gabapentin and Flexeril as discussed as able to tolerate  Epidural corticosteroid injection to be [facility] at [facility]  Follow up 2 weeks after completion of lumbar epidural corticosteroid injection for further evaluation/medical care (sooner if needed; call direct clinic number [[phone]] at any time with questions or concerns)

## 2017-05-17 ENCOUNTER — RADIANT APPOINTMENT (OUTPATIENT)
Dept: GENERAL RADIOLOGY | Facility: CLINIC | Age: 62
End: 2017-05-17
Attending: ANESTHESIOLOGY

## 2017-05-17 ENCOUNTER — HOSPITAL ENCOUNTER (EMERGENCY)
Facility: CLINIC | Age: 62
Discharge: HOME | End: 2017-05-17
Attending: EMERGENCY MEDICINE | Admitting: EMERGENCY MEDICINE
Payer: COMMERCIAL

## 2017-05-17 ENCOUNTER — TELEPHONE (OUTPATIENT)
Dept: OBGYN | Facility: CLINIC | Age: 62
End: 2017-05-17

## 2017-05-17 ENCOUNTER — RADIOLOGY INJECTION OFFICE VISIT (OUTPATIENT)
Dept: PALLIATIVE MEDICINE | Facility: CLINIC | Age: 62
End: 2017-05-17
Payer: COMMERCIAL

## 2017-05-17 VITALS
OXYGEN SATURATION: 97 % | HEART RATE: 64 BPM | SYSTOLIC BLOOD PRESSURE: 182 MMHG | DIASTOLIC BLOOD PRESSURE: 83 MMHG | RESPIRATION RATE: 20 BRPM | TEMPERATURE: 97.9 F

## 2017-05-17 VITALS — OXYGEN SATURATION: 100 % | SYSTOLIC BLOOD PRESSURE: 186 MMHG | DIASTOLIC BLOOD PRESSURE: 102 MMHG | HEART RATE: 64 BPM

## 2017-05-17 DIAGNOSIS — M54.16 LUMBAR RADICULOPATHY: Primary | ICD-10-CM

## 2017-05-17 DIAGNOSIS — M48.061 LUMBAR SPINAL STENOSIS: ICD-10-CM

## 2017-05-17 DIAGNOSIS — M54.41 CHRONIC MIDLINE LOW BACK PAIN WITH RIGHT-SIDED SCIATICA: ICD-10-CM

## 2017-05-17 DIAGNOSIS — G89.29 CHRONIC MIDLINE LOW BACK PAIN WITH RIGHT-SIDED SCIATICA: ICD-10-CM

## 2017-05-17 PROCEDURE — 96374 THER/PROPH/DIAG INJ IV PUSH: CPT

## 2017-05-17 PROCEDURE — 99285 EMERGENCY DEPT VISIT HI MDM: CPT | Mod: 25

## 2017-05-17 PROCEDURE — 25000128 H RX IP 250 OP 636: Performed by: EMERGENCY MEDICINE

## 2017-05-17 PROCEDURE — 96375 TX/PRO/DX INJ NEW DRUG ADDON: CPT

## 2017-05-17 PROCEDURE — 62323 NJX INTERLAMINAR LMBR/SAC: CPT | Performed by: ANESTHESIOLOGY

## 2017-05-17 RX ORDER — HYDROMORPHONE HYDROCHLORIDE 1 MG/ML
0.5 INJECTION, SOLUTION INTRAMUSCULAR; INTRAVENOUS; SUBCUTANEOUS
Status: DISCONTINUED | OUTPATIENT
Start: 2017-05-17 | End: 2017-05-17 | Stop reason: HOSPADM

## 2017-05-17 RX ORDER — ONDANSETRON 4 MG/1
4 TABLET, ORALLY DISINTEGRATING ORAL EVERY 8 HOURS PRN
Qty: 10 TABLET | Refills: 0 | Status: SHIPPED | OUTPATIENT
Start: 2017-05-17 | End: 2017-07-06

## 2017-05-17 RX ORDER — ONDANSETRON 2 MG/ML
4 INJECTION INTRAMUSCULAR; INTRAVENOUS
Status: COMPLETED | OUTPATIENT
Start: 2017-05-17 | End: 2017-05-17

## 2017-05-17 RX ORDER — HYDROCODONE BITARTRATE AND ACETAMINOPHEN 5; 325 MG/1; MG/1
1-2 TABLET ORAL EVERY 8 HOURS PRN
Qty: 15 TABLET | Refills: 0 | Status: SHIPPED | OUTPATIENT
Start: 2017-05-17 | End: 2017-07-06

## 2017-05-17 RX ADMIN — Medication 0.5 MG: at 18:34

## 2017-05-17 RX ADMIN — ONDANSETRON 4 MG: 2 INJECTION INTRAMUSCULAR; INTRAVENOUS at 18:30

## 2017-05-17 ASSESSMENT — ENCOUNTER SYMPTOMS
NUMBNESS: 1
NERVOUS/ANXIOUS: 1
BACK PAIN: 1
ABDOMINAL PAIN: 0

## 2017-05-17 ASSESSMENT — PAIN SCALES - GENERAL
PAINLEVEL: WORST PAIN (10)
PAINLEVEL: WORST PAIN (10)
PAINLEVEL: EXTREME PAIN (8)

## 2017-05-17 NOTE — NURSING NOTE
Injection intake:    If this procedure is requiring IV sedation has patient been NPO for 6  Hours? NA    Is patient on coumadin, plavix or other prescribed blood thinner?   No    If patient is on coumadin was it held for 5 days?   NA    If patient is on plavix was it held for 7 days?    NA     Does patient take aspirin?  no    If this is for a cervical procedure and patient is on aspirin has it been held for 6 days?   NA    Any allergies to contrast dye, iodine, steroid and/or numbing medications?  NO    Is patient currently taking antibiotics or have an active infection?  NO    Does patient have a ? Yes       Is patient pregnant or breastfeeding?  NO    Are the vital signs normal?  Yes 149/83

## 2017-05-17 NOTE — ED NOTES
Bed: ED24  Expected date: 5/17/17  Expected time: 4:44 PM  Means of arrival: Ambulance  Comments:  BSV 1

## 2017-05-17 NOTE — ED AVS SNAPSHOT
[facility] Emergency Department    [address]    [facility] [address]    Phone:  [phone]    Fax:  [phone]                                       Denise Ralph   MRN: [id number]    Department:  [facility] Emergency Department   Date of Visit:  5/17/2017           After Visit Summary Signature Page     I have received my discharge instructions, and my questions have been answered. I have discussed any challenges I see with this plan with the nurse or doctor.    ..........................................................................................................................................  Patient/Patient Representative Signature      ..........................................................................................................................................  Patient Representative Print Name and Relationship to Patient    ..................................................               ................................................  Date                                            Time    ..........................................................................................................................................  Reviewed by Signature/Title    ...................................................              ..............................................  Date                                                            Time

## 2017-05-17 NOTE — ED NOTES
Patient arrives via EMS from the clinic with complaints of right leg and back pain. She reports she was having a spine injection and felt pain in her right leg after the injection was done. She arrives alert and oriented, ABCs intact.

## 2017-05-17 NOTE — NURSING NOTE
Discharge Information    IV Discontiued Time:  NA    Amount of Fluid Infused:  NA    Discharge Criteria = When patient returns to baseline or as per MD order    Consciousness:  Pt is fully awake    Circulation:  BP +/- 20% of pre-procedure level    Respiration:  Patient is able to breathe deeply    O2 Sat:  Patient is able to maintain O2 Sat >92% on room air    Activity:  Moves 4 extremities on command    Ambulation:  Patient is able to stand and walk or stand and pivot into wheelchair    Dressing:  Clean/dry or No Dressing    Notes:   Discharge instructions and AVS given to patient    Patient meets criteria for discharge?  YES    Admitted to PCU?  No    Responsible adult present to accompany patient home?  Yes    Signature/Title:    Jeanie Hogue RN Care Coordinator  [facility]

## 2017-05-17 NOTE — TELEPHONE ENCOUNTER
"Pt calls, cancelled appt for today d/t having severe back pain problems that she's been going to a lot of appts for. Didn't want to do an appt with Dr. Restrepo until she's feeling better. Will call clinic to re-schedule with her back symptoms are better.    Pt reports she stopped taking the estrogen that was prescribed. States \"it's not for me\". D/c'd on med list stating pt stopped med.    Sent to MD as update.  "

## 2017-05-17 NOTE — ED AVS SNAPSHOT
" [facility] Emergency Department    [address]    [facility] [address]    Phone:  [phone]    Fax:  [phone]                                       Denise Ralph   MRN: [id number]    Department:  [facility] Emergency Department   Date of Visit:  5/17/2017           Patient Information     Date Of Birth          1955        Your diagnoses for this visit were:     Chronic midline low back pain with right-sided sciatica        You were seen by Alanis Nixon MD.      Follow-up Information     Follow up with Marija Lopez MD. Schedule an appointment as soon as possible for a visit in 3 days.    Specialties:  Anesthesiology, ANESTHESIOLOGY-PAIN MEDICINE    Why:  As needed    Contact information:    [facility]  [address] [facility] [address] [phone]          Discharge Instructions       Continue your usual medications. If this does not adequately control your pain, you can use the prescribed medication as instructed for uncontrolled pain. If you use this medication, you should not drive or perform other activities that require full attention as this medication may make you drowsy. All opiate pain medications are potentially habit forming and you should only use the minimum amount necessary to control your pain.    While taking any opiate pain medication (such as oxycodone, hydrocodone, hydromorphone, Vicodin, Percocet, etc) you can develop constipation. If you develop constipation, take over the counter Miralax daily according to package directions.        * Sciatica    Sciatica (\"Lumbar Radiculopathy\") causes a pain that spreads from the lower back down into the buttock, hip and leg. Sometimes leg pain can occur without any back pain. Sciatica is due to irritation or pressure on a spinal nerve as it comes out of the spinal canal. This is most often due to a bulge or rupture of a nearby spinal disk (the cartilage cushion between each spinal " bone), which presses on a nearby nerve. Other causes include spinal stenosis (narrowing of the spinal canal) and spasm of the piriformis muscle (a muscle in the buttocks that the sciatic nerve passes through).  Sciatica may begin after a sudden twisting/bending force (such as in a car accident), or sometimes after a simple awkward movement. In either case, muscle spasm is commonly present and contributes to the pain.  The diagnosis of sciatica is made from the symptoms and physical exam. Unless you had a physical injury (such as a car accident or fall), X-rays are usually not ordered for the initial evaluation of sciatica because the nerves and disks cannot be seen on an X-ray. Most sciatica (80-90%) gets better with time.  What can I do about my low back pain?  There are three main things you can do to ease low back pain and help it go away.    Use heat or cold packs.    Take medicine as directed.    Use positions, movements and exercises. Stay active! Too much rest can make your symptoms worse.  Using heat or cold packs  Try cold packs or gentle heat to ease your pain. Use whichever gives the most relief. Apply the cold pack or heat for 15 minutes at a time, as often as needed.  Taking medicine  If taking over-the-counter medicine:    Take ibuprofen (Advil, Motrin) 600 mg. three times a day as needed for pain.  OR    Take Aleve (naproxen sodium) 220 to 440 mg. two times a day as needed for pain  If your doctor prescribed a muscle relaxant (cyclobenzapine 10 mg.):    Take one half ( ) to 1 tablet at bedtime    Do not drive when taking this medicine. This drug may make you sleepy.  Using positions, movements and exercises  Research tells us that moving your joints and muscles can help you recover from back pain. Such activity should be simple and gentle.  Use the positions below as well as walking to help relieve your discomfort. Try taking a short walk every 3 to 4 hours during the day. Walk for a few minutes  inside your home or take longer walks outside, on a treadmill or at a mall. Slowly increase the amount of time you walk. Expect discomfort when you begin, but it should lessen as your back starts to recover.  Finding a position that is comfortable  When your back pain is new, you may find that certain positions will ease your pain. Gently try each of the following positions until you find one that eases your pain. Once you find a position of comfort, use it as often as you like while you recover. Return to your daily routine as soon as possible.     Lie on your back with your legs bent. You can do this by placing a pillow under your knees or lie on the floor and rest your lower legs on the seat of a chair.    Lie on your side with your knees bent and place a pillow between your knees.    Lie on your stomach over pillows.  When should I call my doctor?  Your back pain should improve over the first couple of weeks. As it improves, you should be able to return to your normal activities. But call your doctor if:    You have a sudden change in your ability to control? your bladder or bowels.    You begin to feel tingling in your groin or legs.    The pain spreads down your leg and into your foot.    Your toes, feet or leg muscles begin to feel weak.    You feel generally unwell or sick.    Your pain gets worse.    4412-2623 [employer]. [address] [place] [address]. All rights reserved. This information is not intended as a substitute for professional medical care. Always follow your healthcare professional's instructions.          Future Appointments        Provider Department Dept Phone Center    5/31/2017 10:40 AM Javier Mejia DO [facility] [phone] [facility]      24 Hour Appointment Hotline       To make an appointment at any [facility] clinic, call [phone] ([phone]). If you don't have a family doctor or clinic, we will help you find one. [facility]  clinics are conveniently located to serve the needs of you and your family.             Review of your medicines      START taking        Dose / Directions Last dose taken    HYDROcodone-acetaminophen 5-325 MG per tablet   Commonly known as:  NORCO   Dose:  1-2 tablet   Quantity:  15 tablet        Take 1-2 tablets by mouth every 8 hours as needed   Refills:  0        ondansetron 4 MG ODT tab   Commonly known as:  ZOFRAN ODT   Dose:  4 mg   Quantity:  10 tablet        Take 1 tablet (4 mg) by mouth every 8 hours as needed for nausea   Refills:  0          Our records show that you are taking the medicines listed below. If these are incorrect, please call your family doctor or clinic.        Dose / Directions Last dose taken    ALLEGRA PO   Dose:  30 mg        Take 30 mg by mouth daily   Refills:  0        ALPRAZolam 0.25 MG tablet   Commonly known as:  XANAX   Quantity:  30 tablet        TAKE 1 TABLET BY MOUTH 3 TIMES DAILY AS NEEDED FOR ANXIETY   Refills:  0        atenolol 50 MG tablet   Commonly known as:  TENORMIN   Quantity:  180 tablet        TAKE 1 TABLET (50 MG) BY MOUTH 2 TIMES DAILY   Refills:  1        cyclobenzaprine 10 MG tablet   Commonly known as:  FLEXERIL   Dose:  10 mg   Quantity:  30 tablet        Take 1 tablet (10 mg) by mouth nightly as needed for muscle spasms   Refills:  0        fluticasone 50 MCG/ACT spray   Commonly known as:  FLONASE        USE 2 SPRAYS NASALLY DAILY AS DIRECTED   Refills:  6        gabapentin 300 MG capsule   Commonly known as:  NEURONTIN   Dose:  300 mg   Quantity:  90 capsule        Take 1 capsule (300 mg) by mouth 3 times daily   Refills:  2        nitrofurantoin (macrocrystal-monohydrate) 100 MG capsule   Commonly known as:  MACROBID   Dose:  100 mg   Quantity:  10 capsule        Take 1 capsule (100 mg) by mouth once as needed Take once after intercourse   Refills:  0        omeprazole 20 MG tablet   Dose:  20 mg   Quantity:  90 tablet        Take 1 tablet (20 mg) by  mouth daily   Refills:  1        traZODone 100 MG tablet   Commonly known as:  DESYREL   Dose:  100 mg   Quantity:  90 tablet        Take 1 tablet (100 mg) by mouth nightly as needed for sleep   Refills:  2        XALATAN 0.005 % ophthalmic solution   Generic drug:  latanoprost        Refills:  0                Prescriptions were sent or printed at these locations (2 Prescriptions)                   Other Prescriptions                Printed at Department/Unit printer (2 of 2)         HYDROcodone-acetaminophen (NORCO) 5-325 MG per tablet               ondansetron (ZOFRAN ODT) 4 MG ODT tab                Procedures and tests performed during your visit     Saline Lock IV      Orders Needing Specimen Collection     None      Pending Results     No orders found from 5/15/2017 to 5/18/2017.            Pending Culture Results     No orders found from 5/15/2017 to 5/18/2017.            Pending Results Instructions     If you had any lab results that were not finalized at the time of your Discharge, you can call the ED Lab Result RN at [phone]. You will be contacted by this team for any positive Lab results or changes in treatment. The nurses are available 7 days a week from 10A to 6:30P.  You can leave a message 24 hours per day and they will return your call.        Test Results From Your Hospital Stay               Clinical Quality Measure: Blood Pressure Screening     Your blood pressure was checked while you were in the emergency department today. The last reading we obtained was  BP: (!) 193/99 . Please read the guidelines below about what these numbers mean and what you should do about them.  If your systolic blood pressure (the top number) is less than 120 and your diastolic blood pressure (the bottom number) is less than 80, then your blood pressure is normal. There is nothing more that you need to do about it.  If your systolic blood pressure (the top number) is 120-139 or your diastolic blood pressure (the  bottom number) is 80-89, your blood pressure may be higher than it should be. You should have your blood pressure rechecked within a year by a primary care provider.  If your systolic blood pressure (the top number) is 140 or greater or your diastolic blood pressure (the bottom number) is 90 or greater, you may have high blood pressure. High blood pressure is treatable, but if left untreated over time it can put you at risk for heart attack, stroke, or kidney failure. You should have your blood pressure rechecked by a primary care provider within the next 4 weeks.  If your provider in the emergency department today gave you specific instructions to follow-up with your doctor or provider even sooner than that, you should follow that instruction and not wait for up to 4 weeks for your follow-up visit.        Thank you for choosing [facility]       Thank you for choosing [facility] for your care. Our goal is always to provide you with excellent care. Hearing back from our patients is one way we can continue to improve our services. Please take a few minutes to complete the written survey that you may receive in the mail after you visit with us. Thank you!        [employer] Information     [employer] gives you secure access to your electronic health record. If you see a primary care provider, you can also send messages to your care team and make appointments. If you have questions, please call your primary care clinic.  If you do not have a primary care provider, please call [phone] and they will assist you.        Care EveryWhere ID     This is your Care EveryWhere ID. This could be used by other organizations to access your [facility] medical records  [id number]        After Visit Summary       This is your record. Keep this with you and show to your community pharmacist(s) and doctor(s) at your next visit.

## 2017-05-17 NOTE — PATIENT INSTRUCTIONS
[facility] Procedure Discharge Instructions    Today you saw:       Dr. Marija Lopez    Your procedure:  Epidural steroid injection       Medications used:  Lidocaine (anesthetic)    Kenalog (steroid)  Omnipaque (contrast)                Be cautious when walking as numbness and/or weakness in the legs may occur up to 6-8 hours after the procedure due to effect of the local anesthetic    Do not drive for 6 hours. The effect of the local anesthetic could slow your reflexes.     Avoid strenuous activity for the first 24 hours. You may resume your regular activities after that.     You may shower, however avoid swimming, tub baths or hot tubs for 24 hours following your procedure    You may have a mild to moderate increase in pain for several days following the injection.      You may use ice packs for 10-15 minutes, 3 to 4 times a day at the injection site for comfort    Do not use heat to painful areas for 6 to 8 hours. This will give the local anesthetic time to wear off and prevent you from accidentally burning your skin.    You may use anti-inflammatory medications (such as Ibuprofen/Advil or Aleve) or Tylenol for pain control if necessary    It may take up to 14 days for the steroid medication to start working although you may feel the effect as early as a few days after the procedure.       If you experience any of the following, call the pain center nursing line during work hours at [phone] or on-call physician after hours at [phone]:  -Fever over 100 degree F  -Swelling, bleeding, redness, drainage, warmth at the injection site  -Progressive weakness or numbness in your legs or arms  -Loss of bowel or bladder function  -Unusual headache that is not relieved by Tylenol  -Unusual new onset of pain that is not improving    Phone #s:  Appointment scheduling line: [phone]

## 2017-05-17 NOTE — NURSING NOTE
The following medication was given:     MEDICATION: Ketorolac Tromethamine 60MG/2ML (30 mg/mL) (Toradol)  ROUTE: IM  SITE: Deltoid - Right  DOSE: 30 MG  LOT #: [id number]  :  [employer]  EXPIRATION DATE:  01/19/19

## 2017-05-17 NOTE — MR AVS SNAPSHOT
After Visit Summary   5/17/2017    Denise Ralph    MRN: [id number]           Patient Information     Date Of Birth          1955        Visit Information        Provider Department      5/17/2017 2:45 PM Marija Lopez MD [facility]        Care Instructions    [facility] Procedure Discharge Instructions    Today you saw:       Dr. Marija Lopez    Your procedure:  Epidural steroid injection       Medications used:  Lidocaine (anesthetic)    Kenalog (steroid)  Omnipaque (contrast)                Be cautious when walking as numbness and/or weakness in the legs may occur up to 6-8 hours after the procedure due to effect of the local anesthetic    Do not drive for 6 hours. The effect of the local anesthetic could slow your reflexes.     Avoid strenuous activity for the first 24 hours. You may resume your regular activities after that.     You may shower, however avoid swimming, tub baths or hot tubs for 24 hours following your procedure    You may have a mild to moderate increase in pain for several days following the injection.      You may use ice packs for 10-15 minutes, 3 to 4 times a day at the injection site for comfort    Do not use heat to painful areas for 6 to 8 hours. This will give the local anesthetic time to wear off and prevent you from accidentally burning your skin.    You may use anti-inflammatory medications (such as Ibuprofen/Advil or Aleve) or Tylenol for pain control if necessary    It may take up to 14 days for the steroid medication to start working although you may feel the effect as early as a few days after the procedure.       If you experience any of the following, call the pain center nursing line during work hours at [phone] or on-call physician after hours at [phone]:  -Fever over 100 degree F  -Swelling, bleeding, redness, drainage, warmth at the injection site  -Progressive weakness or numbness in your legs or arms  -Loss of bowel  or bladder function  -Unusual headache that is not relieved by Tylenol  -Unusual new onset of pain that is not improving    Phone #s:  Appointment scheduling line: [phone]        Follow-ups after your visit        Your next 10 appointments already scheduled     May 31, 2017 10:40 AM CDT   Return Visit with Javier Mejia DO   [facility] ([facility])    [address] [facility] [address]  [facility] [address]   [phone]              Who to contact     If you have questions or need follow up information about today's clinic visit or your schedule please contact [facility] directly at [phone].  Normal or non-critical lab and imaging results will be communicated to you by [email or web address], letter or phone within 4 business days after the clinic has received the results. If you do not hear from us within 7 days, please contact the clinic through [employer] or phone. If you have a critical or abnormal lab result, we will notify you by phone as soon as possible.  Submit refill requests through [employer] or call your pharmacy and they will forward the refill request to us. Please allow 3 business days for your refill to be completed.          Additional Information About Your Visit        [email or web address] Information     [employer] gives you secure access to your electronic health record. If you see a primary care provider, you can also send messages to your care team and make appointments. If you have questions, please call your primary care clinic.  If you do not have a primary care provider, please call [phone] and they will assist you.        Care EveryWhere ID     This is your Care EveryWhere ID. This could be used by other organizations to access your [facility] medical records  [id number]        Your Vitals Were     Pulse Pulse Oximetry                58 100%           Blood Pressure from Last 3 Encounters:   05/17/17 149/83   05/16/17 128/73   05/03/17 125/80     Weight from Last 3 Encounters:   05/16/17 61.7 kg (136 lb)   05/03/17 61.7 kg (136 lb)   04/17/17 61.9 kg (136 lb 9 oz)              Today, you had the following     No orders found for display       Primary Care Provider Office Phone # Fax #    Juan F Smallwood [phone]       [facility] [address] [facility] [address]        Thank you!     Thank you for choosing [facility]  for your care. Our goal is always to provide you with excellent care. Hearing back from our patients is one way we can continue to improve our services. Please take a few minutes to complete the written survey that you may receive in the mail after your visit with us. Thank you!             Your Updated Medication List - Protect others around you: Learn how to safely use, store and throw away your medicines at www.disposemymeds.org.          This list is accurate as of: 5/17/17  3:04 PM.  Always use your most recent med list.                   Brand Name Dispense Instructions for use    ALLEGRA PO      Take 30 mg by mouth daily       ALPRAZolam 0.25 MG tablet    XANAX    30 tablet    TAKE 1 TABLET BY MOUTH 3 TIMES DAILY AS NEEDED FOR ANXIETY       atenolol 50 MG tablet    TENORMIN    180 tablet    TAKE 1 TABLET (50 MG) BY MOUTH 2 TIMES DAILY       cyclobenzaprine 10 MG tablet    FLEXERIL    30 tablet    Take 1 tablet (10 mg) by mouth nightly as needed for muscle spasms       fluticasone 50 MCG/ACT spray    FLONASE     USE 2 SPRAYS NASALLY DAILY AS DIRECTED       gabapentin 300 MG capsule    NEURONTIN    90 capsule    Take 1 capsule (300 mg) by mouth 3 times daily       nitrofurantoin (macrocrystal-monohydrate) 100 MG capsule    MACROBID    10 capsule    Take 1 capsule (100 mg) by mouth once as needed Take once after intercourse       omeprazole 20 MG tablet     90 tablet    Take 1 tablet (20 mg) by mouth daily       traZODone 100 MG tablet    DESYREL    90 tablet    Take 1 tablet (100  mg) by mouth nightly as needed for sleep       XALATAN 0.005 % ophthalmic solution   Generic drug:  latanoprost

## 2017-05-17 NOTE — ED PROVIDER NOTES
History     Chief Complaint:  Back Pain      HPI   Denise Ralph is a 62 year old female with a history of spinal stenosis and hypertension who presents to the emergency department today for evaluation of back pain and is accompanied by EMS and her . The patient reports that she was getting a steroid injection, where she immediately felt severe pain radiating all the way from her lower back down the rear of her right leg, and some pain in her left leg that came and went. There, she was given toradol. EMS did not administer any pain medication. This was her first steroid injection in 20 years. She also reports tingling and numbness in her legs. She normally has this pain in mornings, but to a lesser extent, and notes that it generally subsides after 15 minutes. She normally takes 3 advil every 6 hours for pain, but no prescription pain killers. The patient denies any stomach or kidney problems, numbness in her rectum or groin, or any loss of bladder control.    Allergies:  Morphine sulfate - nausea  Erythromycin  Penicillins - itching  Reglan - muscle spasms in throat     Medications:    Neurontin  Tenormin  Flexeril  Xanax  Macrobid  Desyrel  Flonase  Allegra  Omeprazole  Xalatan    Past Medical History:    Anxiety  Depressive disorder  Essential hypertension, benign  IBS  Lumbar degenerative disc disease  Sciatica    Past Surgical History:    Hysterectomy  Laparoscopic cholecystectomy  Left wrist surgery  Left shoulder decomp surgery for impingement      Family History:    MI - mother , brother  due to cocaine use  Breast cancer - mother  Pulmonary fibrosis - father    Social History:  The patient was accompanied to the ED by her .  Smoking Status: Former smoker, quit   Alcohol Use: Occasional  Marital Status:   [2]     Review of Systems   Gastrointestinal: Negative for abdominal pain.   Genitourinary:        No urinary changes   Musculoskeletal: Positive for back pain (lower).         Right leg pain   Neurological: Positive for numbness (Numbness in legs, no numbness in rectum or groin).   Psychiatric/Behavioral: The patient is nervous/anxious.    All other systems reviewed and are negative.      Physical Exam     Vitals:  Patient Vitals for the past 24 hrs:   BP Temp Temp src Pulse Resp SpO2   05/17/17 1923 182/83 - - - - 97 %   05/17/17 1845 - - - - - 99 %   05/17/17 1830 (!) 165/92 - - - - -   05/17/17 1815 (!) 167/93 - - - - 99 %   05/17/17 1800 (!) 173/92 - - - - 97 %   05/17/17 1745 166/76 - - - - -   05/17/17 1730 (!) 187/93 - - - - 100 %   05/17/17 1715 (!) 179/92 - - - - 100 %   05/17/17 1700 179/82 - - - - 100 %   05/17/17 1652 (!) 193/99 97.9  F (36.6  C) Oral 64 20 100 %       Physical Exam  VITAL SIGNS: /83  Pulse 64  Temp 97.9  F (36.6  C) (Oral)  Resp 20  SpO2 97%  Constitutional: anxious  HENT: Normocephalic, atraumatic, bilateral external ears normal, tympanic membranes clear bilaterally, oropharynx moist, no oral exudates, nose normal. No rhinorrhea.  Eyes: PERRL, EOMI, conjunctiva normal, no discharge.   Cardiovascular: Normal heart rate, normal rhythm, no murmurs,   Respiratory: Normal breath sounds, no respiratory distress, no wheezing, no retractions.  Skin: Warm, dry, no erythema, no rash.   Abdomen: Bowel sounds normal, soft, no tenderness, no masses.  Musculoskeletal: Light touch sensation is intact in the legs. Patellar reflexes are intact bilaterally. There is significant paraspinal muscle spasm.  There is a positive straight leg raise on the affected side  Strength Exam:     (L)   (R)  Hip flex   5/5   5/5  Hip ext   5/5   5/5  Knee ext  5/5   5/5   Knee flex  5/5   5/5  Ankle DF  5/5   5/5  Ankle PF  5/5   5/5  EHL/toe ext  5/5   5/5  Eversion  5/5   5/5  Inversion  5/5 5/5  Neurologic: Alert & interactive, normal motor function, normal sensory function, no focal deficits noted.   Psych:  Anxious    Emergency Department Course     Interventions:  1830  zofran 4 mg IV  1834 dilaudid 0.5 mg IV     Emergency Department Course:  Nursing notes and vitals reviewed.  I performed an exam of the patient as documented above.   At 1915 the patient was rechecked and was feeling better. She was standing and walking, and noted her pain was gone.  I discussed the treatment plan with the patient. She expressed understanding of this plan and consented to discharge. She will be discharged home with instructions for care and follow up. In addition, the patient will return to the emergency department if their symptoms persist, worsen, if new symptoms arise or if there is any concern.  All questions were answered.  I personally reviewed the exam results with the Patient and answered all related questions prior to discharge.    Impression & Plan      Medical Decision Making:  Denise Ralph is a 62 year old female who presents for evaluation of back pain and radicular symptoms. They have a history of back pain and known spinal stenosis in the past.  Her pain has improved with interventions in the emergency department. She did not sustain any trauma, therefore x-rays are not necessary due to the low likelihood of fracture or subluxation. Advanced imaging with CT/MRI is not indicated at this time, but may be indicated in the future if symptoms fail to resolve.  Nor is there any indication for consultation with neurosurgery or orthopedic spinal surgeon.  There is no clinical evidence of cauda equina syndrome, discitis, spinal/epidural space hematoma or epidural abscess or other emergently worrisome etiology.     The neurological exam is normal, with the exception of the dermatomal symptoms noted.  She will be discharged with pain medications to use as directed.  No heavy lifting, bending or twisting. Return if increasing pain, muscular weakness, or bowel or bladder dysfunction. She was quite comfortable with plan.     Diagnosis:    ICD-10-CM    1. Chronic midline low back pain with  right-sided sciatica M54.41     G89.29      Disposition:   Home    Discharge Medications:  Discharge Medication List as of 5/17/2017  7:19 PM      START taking these medications    Details   HYDROcodone-acetaminophen (NORCO) 5-325 MG per tablet Take 1-2 tablets by mouth every 8 hours as needed, Disp-15 tablet, R-0, Local Print      ondansetron (ZOFRAN ODT) 4 MG ODT tab Take 1 tablet (4 mg) by mouth every 8 hours as needed for nausea, Disp-10 tablet, R-0, Local Print             Scribe Disclosure:  I, Asaf Henning, am serving as a scribe at 5:18 PM on 5/17/2017 to document services personally performed by Alanis Nixon MD, based on my observations and the provider's statements to me.    5/17/2017   [facility] EMERGENCY DEPARTMENT       Alanis Nixon MD  05/18/17 0057

## 2017-05-18 ENCOUNTER — TELEPHONE (OUTPATIENT)
Dept: PALLIATIVE MEDICINE | Facility: CLINIC | Age: 62
End: 2017-05-18

## 2017-05-18 NOTE — DISCHARGE INSTRUCTIONS
"Continue your usual medications. If this does not adequately control your pain, you can use the prescribed medication as instructed for uncontrolled pain. If you use this medication, you should not drive or perform other activities that require full attention as this medication may make you drowsy. All opiate pain medications are potentially habit forming and you should only use the minimum amount necessary to control your pain.    While taking any opiate pain medication (such as oxycodone, hydrocodone, hydromorphone, Vicodin, Percocet, etc) you can develop constipation. If you develop constipation, take over the counter Miralax daily according to package directions.        * Sciatica    Sciatica (\"Lumbar Radiculopathy\") causes a pain that spreads from the lower back down into the buttock, hip and leg. Sometimes leg pain can occur without any back pain. Sciatica is due to irritation or pressure on a spinal nerve as it comes out of the spinal canal. This is most often due to a bulge or rupture of a nearby spinal disk (the cartilage cushion between each spinal bone), which presses on a nearby nerve. Other causes include spinal stenosis (narrowing of the spinal canal) and spasm of the piriformis muscle (a muscle in the buttocks that the sciatic nerve passes through).  Sciatica may begin after a sudden twisting/bending force (such as in a car accident), or sometimes after a simple awkward movement. In either case, muscle spasm is commonly present and contributes to the pain.  The diagnosis of sciatica is made from the symptoms and physical exam. Unless you had a physical injury (such as a car accident or fall), X-rays are usually not ordered for the initial evaluation of sciatica because the nerves and disks cannot be seen on an X-ray. Most sciatica (80-90%) gets better with time.  What can I do about my low back pain?  There are three main things you can do to ease low back pain and help it go away.    Use heat or cold " packs.    Take medicine as directed.    Use positions, movements and exercises. Stay active! Too much rest can make your symptoms worse.  Using heat or cold packs  Try cold packs or gentle heat to ease your pain. Use whichever gives the most relief. Apply the cold pack or heat for 15 minutes at a time, as often as needed.  Taking medicine  If taking over-the-counter medicine:    Take ibuprofen (Advil, Motrin) 600 mg. three times a day as needed for pain.  OR    Take Aleve (naproxen sodium) 220 to 440 mg. two times a day as needed for pain  If your doctor prescribed a muscle relaxant (cyclobenzapine 10 mg.):    Take one half ( ) to 1 tablet at bedtime    Do not drive when taking this medicine. This drug may make you sleepy.  Using positions, movements and exercises  Research tells us that moving your joints and muscles can help you recover from back pain. Such activity should be simple and gentle.  Use the positions below as well as walking to help relieve your discomfort. Try taking a short walk every 3 to 4 hours during the day. Walk for a few minutes inside your home or take longer walks outside, on a treadmill or at a mall. Slowly increase the amount of time you walk. Expect discomfort when you begin, but it should lessen as your back starts to recover.  Finding a position that is comfortable  When your back pain is new, you may find that certain positions will ease your pain. Gently try each of the following positions until you find one that eases your pain. Once you find a position of comfort, use it as often as you like while you recover. Return to your daily routine as soon as possible.     Lie on your back with your legs bent. You can do this by placing a pillow under your knees or lie on the floor and rest your lower legs on the seat of a chair.    Lie on your side with your knees bent and place a pillow between your knees.    Lie on your stomach over pillows.  When should I call my doctor?  Your back pain  should improve over the first couple of weeks. As it improves, you should be able to return to your normal activities. But call your doctor if:    You have a sudden change in your ability to control? your bladder or bowels.    You begin to feel tingling in your groin or legs.    The pain spreads down your leg and into your foot.    Your toes, feet or leg muscles begin to feel weak.    You feel generally unwell or sick.    Your pain gets worse.    8621-2983 [employer]. [address] [place] [address], [place] [address]. All rights reserved. This information is not intended as a substitute for professional medical care. Always follow your healthcare professional's instructions.

## 2017-05-18 NOTE — TELEPHONE ENCOUNTER
Spoke with Denise and she is doing much better today. Reports getting much better by 7 p.m night. She just wanted doctor and staff to know and appreciated care given.    Routing to provider as DIPAK Lopez MSN, RN-BC  Care Coordinator  [facility] [place]

## 2017-05-18 NOTE — TELEPHONE ENCOUNTER
"Pt LM at 0956 on 5/18 stating that she is doing better today post-LESI. She stated that she is now \"up and running\". Pt also wanted to thank Dr. Lopez for her care during the procedure. Please call. Phone #[phone]    Connie Dykes    [facility]    "

## 2017-05-18 NOTE — TELEPHONE ENCOUNTER
I am so happy to hear this.  I did review the ER report last night and saw that she was discharged around 7pm feeling much better.  Thanks for making the follow up call.     Marija Lopez MD

## 2017-05-18 NOTE — ED NOTES
Able to stand at bedside; up to BSC earlier with movement slow.  At this time able to move faster than earlier; improved from ability to move around at home per patient and .

## 2017-05-19 DIAGNOSIS — K21.9 GASTROESOPHAGEAL REFLUX DISEASE WITHOUT ESOPHAGITIS: ICD-10-CM

## 2017-05-19 NOTE — TELEPHONE ENCOUNTER
Omeprazole      Last Written Prescription Date: 11/21/16  Last Fill Quantity: 90,  # refills: 1   Last Office Visit with [facility], [facility] or [facility] prescribing provider: 04/17/17                                         Next 5 appointments (look out 90 days)     May 31, 2017 10:40 AM CDT   Return Visit with Javier Mejia DO   [facility] ([facility])    [address] [address] [address] [phone]

## 2017-05-30 DIAGNOSIS — F41.9 ANXIETY: ICD-10-CM

## 2017-05-31 ENCOUNTER — OFFICE VISIT (OUTPATIENT)
Dept: ORTHOPEDICS | Facility: CLINIC | Age: 62
End: 2017-05-31
Payer: COMMERCIAL

## 2017-05-31 VITALS
DIASTOLIC BLOOD PRESSURE: 73 MMHG | SYSTOLIC BLOOD PRESSURE: 116 MMHG | BODY MASS INDEX: 22.66 KG/M2 | HEIGHT: 65 IN | WEIGHT: 136 LBS

## 2017-05-31 DIAGNOSIS — M48.061 LUMBAR SPINAL STENOSIS: Primary | ICD-10-CM

## 2017-05-31 DIAGNOSIS — M51.369 BULGING LUMBAR DISC: ICD-10-CM

## 2017-05-31 DIAGNOSIS — M51.369 LUMBAR DEGENERATIVE DISC DISEASE: ICD-10-CM

## 2017-05-31 PROCEDURE — 99213 OFFICE O/P EST LOW 20 MIN: CPT | Performed by: PHYSICAL MEDICINE & REHABILITATION

## 2017-05-31 RX ORDER — ALPRAZOLAM 0.25 MG
TABLET ORAL
Qty: 30 TABLET | Refills: 0 | Status: SHIPPED | OUTPATIENT
Start: 2017-05-31 | End: 2017-07-03

## 2017-05-31 NOTE — Clinical Note
Dear Dr. Smallwood/Denise Lopez saw me at [facility] on May 31, 2017.  Please refer to the visit note at your convenience and feel free to contact me should you have any questions.  Sincerely,  Javier Mejia DO, [facility]

## 2017-05-31 NOTE — PATIENT INSTRUCTIONS
We addressed the following today:    1. Lumbar spinal stenosis/disc bulge/arthritis    Activity modification as discussed  Home exercise program as instructed  Physical therapy: [facility] - [phone]  Topical Treatments: Ice or Heat  Over the counter medication: Acetaminophen (Tylenol) 1000 mg every 6 hours with food (Maximum of 3000 mg/day)  Ibuprofen (Advil) maximum of 800 mg four times a day with food  Follow up in 4 weeks if no improvement of symptoms for further evaluation/medical care (sooner if needed; call direct clinic number [[phone]] at any time with questions or concerns)

## 2017-05-31 NOTE — TELEPHONE ENCOUNTER
Alprazolam      Last Written Prescription Date:  04/19/17  Last Fill Quantity: 30,   # refills: 0  Last Office Visit with [facility], [facility] or [facility] prescribing provider: 04/17/17  Future Office visit:    Next 5 appointments (look out 90 days)     May 31, 2017 10:40 AM CDT   Return Visit with Javier Mejia DO   [facility]IC[facility] ([facility])    [address] [address] [address]   [phone]                   Routing refill request to provider for review/approval because:  Drug not on the [facility], [facility] or [facility] refill protocol or controlled substance

## 2017-05-31 NOTE — MR AVS SNAPSHOT
After Visit Summary   5/31/2017    Denise Ralph    MRN: [id number]           Patient Information     Date Of Birth          1955        Visit Information        Provider Department      5/31/2017 10:40 AM Javier Mejia DO [facility]        Today's Diagnoses     Lumbar spinal stenosis    -  1    Bulging lumbar disc        Lumbar degenerative disc disease          Care Instructions    We addressed the following today:    1. Lumbar spinal stenosis/disc bulge/arthritis    Activity modification as discussed  Home exercise program as instructed  Physical therapy: [facility] - [phone]  Topical Treatments: Ice or Heat  Over the counter medication: Acetaminophen (Tylenol) 1000 mg every 6 hours with food (Maximum of 3000 mg/day)  Ibuprofen (Advil) maximum of 800 mg four times a day with food  Follow up in 4 weeks if no improvement of symptoms for further evaluation/medical care (sooner if needed; call direct clinic number [[phone]] at any time with questions or concerns)              Follow-ups after your visit        Follow-up notes from your care team     Return in about 4 weeks (around 6/28/2017).      Who to contact     If you have questions or need follow up information about today's clinic visit or your schedule please contact [facility] directly at [phone].  Normal or non-critical lab and imaging results will be communicated to you by MyChart, letter or phone within 4 business days after the clinic has received the results. If you do not hear from us within 7 days, please contact the clinic through [employer] or phone. If you have a critical or abnormal lab result, we will notify you by phone as soon as possible.  Submit refill requests through [employer] or call your pharmacy and they will forward the refill request to us. Please allow 3 business days for your refill to be completed.          Additional Information About  "Your Visit        MyChart Information     [employer] gives you secure access to your electronic health record. If you see a primary care provider, you can also send messages to your care team and make appointments. If you have questions, please call your primary care clinic.  If you do not have a primary care provider, please call [phone] and they will assist you.        Care EveryWhere ID     This is your Care EveryWhere ID. This could be used by other organizations to access your [facility] medical records  [id number]        Your Vitals Were     Height BMI (Body Mass Index)                5' 5\" (1.651 m) 22.63 kg/m2           Blood Pressure from Last 3 Encounters:   05/31/17 116/73   05/17/17 182/83   05/17/17 (!) 186/102    Weight from Last 3 Encounters:   05/31/17 136 lb (61.7 kg)   05/16/17 136 lb (61.7 kg)   05/03/17 136 lb (61.7 kg)              Today, you had the following     No orders found for display       Primary Care Provider Office Phone # Fax #    Juan F Smallwood [phone]       [facility] [address] [place] [address]        Thank you!     Thank you for choosing [facility]  for your care. Our goal is always to provide you with excellent care. Hearing back from our patients is one way we can continue to improve our services. Please take a few minutes to complete the written survey that you may receive in the mail after your visit with us. Thank you!             Your Updated Medication List - Protect others around you: Learn how to safely use, store and throw away your medicines at www.disposemymeds.org.          This list is accurate as of: 5/31/17 11:32 AM.  Always use your most recent med list.                   Brand Name Dispense Instructions for use    ALLEGRA PO      Take 30 mg by mouth daily       ALPRAZolam 0.25 MG tablet    XANAX    30 tablet    TAKE 1 TAB BY MOUTH 3 TIMES DAILY AS NEEDED FOR ANXIETY       atenolol 50 MG " tablet    TENORMIN    180 tablet    TAKE 1 TABLET (50 MG) BY MOUTH 2 TIMES DAILY       cyclobenzaprine 10 MG tablet    FLEXERIL    30 tablet    Take 1 tablet (10 mg) by mouth nightly as needed for muscle spasms       fluticasone 50 MCG/ACT spray    FLONASE     USE 2 SPRAYS NASALLY DAILY AS DIRECTED       gabapentin 300 MG capsule    NEURONTIN    90 capsule    Take 1 capsule (300 mg) by mouth 3 times daily       HYDROcodone-acetaminophen 5-325 MG per tablet    NORCO    15 tablet    Take 1-2 tablets by mouth every 8 hours as needed       nitrofurantoin (macrocrystal-monohydrate) 100 MG capsule    MACROBID    10 capsule    Take 1 capsule (100 mg) by mouth once as needed Take once after intercourse       omeprazole 20 MG CR capsule    priLOSEC    90 capsule    TAKE 1 CAPSULE BY MOUTH ONCE DAILY       ondansetron 4 MG ODT tab    ZOFRAN ODT    10 tablet    Take 1 tablet (4 mg) by mouth every 8 hours as needed for nausea       traZODone 100 MG tablet    DESYREL    90 tablet    Take 1 tablet (100 mg) by mouth nightly as needed for sleep       XALATAN 0.005 % ophthalmic solution   Generic drug:  latanoprost

## 2017-05-31 NOTE — PROGRESS NOTES
"[facility]   Follow-up Visit s May 31, 2017    Subjective:  eDnise Ralph is a 62 year old female who is seen in follow up for evaluation of chronic low back pain without radiation.  Last visit was on 5/17/2017 with a L4-5 interlaminar epidural corticosteroid injection completed since last clinical visit with 55-60% improvement of symptoms. Has been using ice and Ibuprofen for improvement of pain/discomfort. Has been completing some of her home exercise program as previously prescribed from formal physical therapy. Did not return to use of Gabapentin as previously discussed for further treatment purposes.    Notes bilateral low back pain without radiation. Symptoms are worse with prolonged inactivity/sleeping activities and in the morning and better with movement.  Denies any numbness/tingling/weakness of the lower extremities. Denies any bowel/bladder incontinence. Denies any saddle anesthesia. Denies any trauma/falls since last clinical visit.    Patient's past medical, surgical, social, and family histories are reviewed today    Objective:  /73  Ht 5' 5\" (1.651 m)  Wt 136 lb (61.7 kg)  BMI 22.63 kg/m2  General: healthy, alert, and in no distress  Skin: no suspicious lesions or rashes  Neuro: motor exam as noted below    MSK:    THORACIC/LUMBAR SPINE  Inspection:    No redness, swelling, overlying skin change, or gross deformity/asymmetry  Palpation:    No tenderness over the lumbar spinous processes, left SI joint, or right SI joint  Strength:    Quadriceps 5/5, hamstrings 5/5, gastrocsoleus 5/5, tibialis anterior 5-/5 (right), and extensor hallicus longus 5/5  Special Tests:    Positive: None    Negative: Straight leg raise (right), SI joint compression (bilateral), and LUKE (right)    Imaging:  No x-rays indicated during today's visit  Previous report was reviewed today and results were discussed with the patient    ASSESSMENT:  1. Lumbar spinal stenosis  2. Lumbar disc " bulge  3. Lumbar degenerative disc disease    PLAN:  1. Activity modification as discussed, including limitation of activities that cause pain/discomfort.  2. Acetaminophen/Ibuprofen/ice as needed for improved pain control.  3. Return to formal physical therapy and continue with pain-free home exercise program for improvement of current symptoms stayed.  4. Follow-up in 4 weeks for further evaluation/medical care.  If asymptomatic, can follow-up as needed.  Consider a repeat L4-5 interlaminar epidural corticosteroid injection, caudal epidural corticosteroid injection, etc. as deemed appropriate moving forward. Instructed to follow-up if change of symptoms arise.    Patient's conditions were thoroughly discussed during today's visit with greater than 50% of the visit spent counseling the patient with total time spent face-to-face with the patient being 15 minutes.    Javier Mejia DO, [facility]  [facility]    Disclaimer: This note consists of symbols derived from keyboarding, dictation and/or voice recognition software. As a result, there may be errors in the script that have gone undetected. Please consider this when interpreting information found in this chart.

## 2017-05-31 NOTE — NURSING NOTE
"Chief Complaint   Patient presents with     RECHECK     lower back pain       Initial /73  Ht 5' 5\" (1.651 m)  Wt 136 lb (61.7 kg)  BMI 22.63 kg/m2 Estimated body mass index is 22.63 kg/(m^2) as calculated from the following:    Height as of this encounter: 5' 5\" (1.651 m).    Weight as of this encounter: 136 lb (61.7 kg).  Medication Reconciliation: complete     Travis Daily, ATC      "

## 2017-06-01 ENCOUNTER — THERAPY VISIT (OUTPATIENT)
Dept: PHYSICAL THERAPY | Facility: CLINIC | Age: 62
End: 2017-06-01
Payer: COMMERCIAL

## 2017-06-01 DIAGNOSIS — M54.41 ACUTE BILATERAL LOW BACK PAIN WITH RIGHT-SIDED SCIATICA: ICD-10-CM

## 2017-06-01 PROCEDURE — 97110 THERAPEUTIC EXERCISES: CPT | Mod: GP

## 2017-06-01 NOTE — MR AVS SNAPSHOT
After Visit Summary   6/1/2017    Denise Ralph    MRN: [id number]           Patient Information     Date Of Birth          1955        Visit Information        Provider Department      6/1/2017 4:40 PM Marleni Fofana PTA [facility]        Today's Diagnoses     Acute bilateral low back pain with right-sided sciatica           Follow-ups after your visit        Your next 10 appointments already scheduled     Jun 09, 2017 11:30 AM CDT   [facility] with Marleni Fofana PTA   [facility] ([facility])    [address]  [facility] [id number] [phone]              Who to contact     If you have questions or need follow up information about today's clinic visit or your schedule please contact [facility] directly at [phone].  Normal or non-critical lab and imaging results will be communicated to you by [employer], letter or phone within 4 business days after the clinic has received the results. If you do not hear from us within 7 days, please contact the clinic through [employer] or phone. If you have a critical or abnormal lab result, we will notify you by phone as soon as possible.  Submit refill requests through [employer] or call your pharmacy and they will forward the refill request to us. Please allow 3 business days for your refill to be completed.          Additional Information About Your Visit        MyChart Information     [employer] gives you secure access to your electronic health record. If you see a primary care provider, you can also send messages to your care team and make appointments. If you have questions, please call your primary care clinic.  If you do not have a primary care provider, please call [phone] and they will assist you.        Care EveryWhere ID     This is your Care EveryWhere ID. This could be used by other organizations to access your [place] medical records  [id number]          Blood Pressure from Last 3 Encounters:   05/31/17 116/73   05/17/17 182/83   05/17/17 (!) 186/102    Weight from Last 3 Encounters:   05/31/17 61.7 kg (136 lb)   05/16/17 61.7 kg (136 lb)   05/03/17 61.7 kg (136 lb)              We Performed the Following     Therapeutic Exercises        Primary Care Provider Office Phone # Fax #    Juan F Smallwood [phone]       [facility] [address] [facility] [address]        Thank you!     Thank you for choosing [facility]  for your care. Our goal is always to provide you with excellent care. Hearing back from our patients is one way we can continue to improve our services. Please take a few minutes to complete the written survey that you may receive in the mail after your visit with us. Thank you!             Your Updated Medication List - Protect others around you: Learn how to safely use, store and throw away your medicines at www.disposemymeds.org.          This list is accurate as of: 6/1/17  6:10 PM.  Always use your most recent med list.                   Brand Name Dispense Instructions for use    ALLEGRA PO      Take 30 mg by mouth daily       ALPRAZolam 0.25 MG tablet    XANAX    30 tablet    TAKE 1 TAB BY MOUTH 3 TIMES DAILY AS NEEDED FOR ANXIETY       atenolol 50 MG tablet    TENORMIN    180 tablet    TAKE 1 TABLET (50 MG) BY MOUTH 2 TIMES DAILY       cyclobenzaprine 10 MG tablet    FLEXERIL    30 tablet    Take 1 tablet (10 mg) by mouth nightly as needed for muscle spasms       fluticasone 50 MCG/ACT spray    FLONASE     USE 2 SPRAYS NASALLY DAILY AS DIRECTED       gabapentin 300 MG capsule    NEURONTIN    90 capsule    Take 1 capsule (300 mg) by mouth 3 times daily       HYDROcodone-acetaminophen 5-325 MG per tablet    NORCO    15 tablet    Take 1-2 tablets by mouth every 8 hours as needed       nitrofurantoin (macrocrystal-monohydrate) 100 MG capsule    MACROBID    10 capsule    Take 1  capsule (100 mg) by mouth once as needed Take once after intercourse       omeprazole 20 MG CR capsule    priLOSEC    90 capsule    TAKE 1 CAPSULE BY MOUTH ONCE DAILY       ondansetron 4 MG ODT tab    ZOFRAN ODT    10 tablet    Take 1 tablet (4 mg) by mouth every 8 hours as needed for nausea       traZODone 100 MG tablet    DESYREL    90 tablet    Take 1 tablet (100 mg) by mouth nightly as needed for sleep       XALATAN 0.005 % ophthalmic solution   Generic drug:  latanoprost

## 2017-06-02 ENCOUNTER — TELEPHONE (OUTPATIENT)
Dept: ORTHOPEDICS | Facility: CLINIC | Age: 62
End: 2017-06-02

## 2017-06-02 NOTE — TELEPHONE ENCOUNTER
Patient calls stating she got mychart xray results today and does not feel that they match up to her recent MRI. Questioning if we have the right patient. She states she does not have surgical clips. Will review her chart further and get back with her.     Patient had laparoscopic cholecysytectomy in 2002. There are xrays present in her chart from 2003 that show surgical clips present in the gallbladder area.     Phone call to patient and informed of previous clips from gall bladder surgery and information is correct. She was appreciative of return call and verbalized understanding.     QIANA Mcdonald RN

## 2017-06-08 ENCOUNTER — TELEPHONE (OUTPATIENT)
Dept: ORTHOPEDICS | Facility: CLINIC | Age: 62
End: 2017-06-08

## 2017-06-08 DIAGNOSIS — M48.061 LUMBAR SPINAL STENOSIS: Primary | ICD-10-CM

## 2017-06-08 DIAGNOSIS — M51.369 BULGING LUMBAR DISC: ICD-10-CM

## 2017-06-08 RX ORDER — PREGABALIN 50 MG/1
50 CAPSULE ORAL 3 TIMES DAILY
Qty: 90 CAPSULE | Refills: 0 | Status: SHIPPED | OUTPATIENT
Start: 2017-06-08 | End: 2017-07-06

## 2017-06-08 NOTE — TELEPHONE ENCOUNTER
Please call patient to inform that referral has been placed and prescription for Lyrica has been sent to the patient's pharmacy.    Javier Mejia DO, CAQSM  [facility]

## 2017-06-08 NOTE — TELEPHONE ENCOUNTER
Phone call to patient and informed of the below.   She verbalized understanding.     QIANA Mcdonald RN

## 2017-06-08 NOTE — TELEPHONE ENCOUNTER
Please call patient to inform that treatment options at this time include a repeat L4-5 interlaminar epidural corticosteroid injection, caudal epidural corticosteroid injection, initiation of Lyrica, or spine surgery referral for further treatment purposes.  No appointment is necessary at this time.    Javier Mejia DO, [facility]  [facility]

## 2017-06-08 NOTE — TELEPHONE ENCOUNTER
"Patient calls stating pain improved by about 40% the first 2 weeks. Pain then started coming back and progressively worsened a few days ago.   She states the pain is back where it was before the injection. She states she is \"struggling with living and getting depressed\". Patient tearful during call. She denies wanting to hurt herself.    is currently having medical issues with his neck/back and needs surgery.     Pain is in the same location of bilateral low back but now radiating again down right buttock, back of right leg and into calf. States the nerve in her right leg is \"on fire sometimes.\". Feels like she has a tight rubber band around her calf that might snap. Also has intermittent tingling in medial calf. Denies foot drop. Right leg feels a little weaker. Denies loss of bowel/bladder control but states it is very difficult to sit on the toilet to go to the bathroom. Denies groin pain/numbness or new injury.   Pain is worse with sitting slouched or initially getting out of bed, and lying on right side. Pain better with walking. Has difficulty getting into a car or driving, sitting due to back spasms.      She is wanting to know next steps and if surgery would help. She doesn't want to have surgery but states she cannot keep living like this. She doesn't think she can go to physical therapy tomorrow due to pain and does not feel it is helping. Did not tolerate gabapentin due to stomach upset and restless legs. Had to go to the ER after PATRICIA previously.     Please advise if you want patient to come in to be seen or what next steps would be.     QIANA Mcdonald RN          "

## 2017-06-08 NOTE — TELEPHONE ENCOUNTER
Phone call to patient and informed of Dr. Mejia's recommendations. She states she would like to see the surgeon. She is afraid to have another injection due to her previous experience. Would want to have it done at the hospital to have access to immediate pain control if needed and does not want to go through all of that again. She also states Dr. Mejia told her previously that he did not think she was a good candidate for another injection.     She asks that a prescription be sent for Lyrica and she will try that. She is aware a PA may be needed and we will inform her if that occurs.   She also would like a referral to a neurosurgeon.   She is familiar with Dr. Dee as her  sees him.     Pharmacy set up in [facility]. Please place orders.     QIANA Mcdonald RN

## 2017-06-14 ENCOUNTER — TELEPHONE (OUTPATIENT)
Dept: NEUROSURGERY | Facility: CLINIC | Age: 62
End: 2017-06-14

## 2017-06-14 ENCOUNTER — OFFICE VISIT (OUTPATIENT)
Dept: NEUROSURGERY | Facility: CLINIC | Age: 62
End: 2017-06-14
Attending: NEUROLOGICAL SURGERY
Payer: COMMERCIAL

## 2017-06-14 VITALS
BODY MASS INDEX: 23.1 KG/M2 | WEIGHT: 138.8 LBS | DIASTOLIC BLOOD PRESSURE: 86 MMHG | OXYGEN SATURATION: 98 % | SYSTOLIC BLOOD PRESSURE: 150 MMHG | HEART RATE: 57 BPM

## 2017-06-14 DIAGNOSIS — M48.061 SPINAL STENOSIS, LUMBAR REGION, WITHOUT NEUROGENIC CLAUDICATION: Primary | ICD-10-CM

## 2017-06-14 DIAGNOSIS — M54.16 LUMBAR RADICULOPATHY: ICD-10-CM

## 2017-06-14 PROCEDURE — 99203 OFFICE O/P NEW LOW 30 MIN: CPT | Performed by: NEUROLOGICAL SURGERY

## 2017-06-14 PROCEDURE — 99211 OFF/OP EST MAY X REQ PHY/QHP: CPT | Performed by: NEUROLOGICAL SURGERY

## 2017-06-14 ASSESSMENT — PAIN SCALES - GENERAL: PAINLEVEL: MILD PAIN (2)

## 2017-06-14 NOTE — PROGRESS NOTES
Neurosurgery Consult Note   [facility]        CC: LBP and RLE pain    Primary care Provider: Juan F Smallwood    Referring provider:   Javier Mejia, DO  [facility]  [address]   [place] [address]      [place]: Denise Ralph is a 62 year old female that presents to clinic with a complaint of LBP and RLE pain in the L5 distribution. Her pain is worse with moving, walking and activity. She has tried FL an PATRICIA and she says she had to be admitted for pain and weakness after her injection and she refuses to have another. She describes weakness in the RLE.      Past Medical History:   Diagnosis Date     Anxiety     Gets panic attacks     Depressive disorder, not elsewhere classified      Essential hypertension, benign      Irritable bowel syndrome     has had neg colonoscopy & EGD w/u     Lumbar degenerative disc disease 1996    Had PT, traction, no surgery     Sciatica 3/06    R thigh;  MRI = R L2-3 disc        Past Surgical History:   Procedure Laterality Date     HYSTERECTOMY       HYSTERECTOMY VAGINAL       LAPAROSCOPIC CHOLECYSTECTOMY  2002    with repeat operation because of bile leak     Left shoulder decomp surgery for impingement  approx 1993     WRIST SURGERY Left        Current Outpatient Prescriptions   Medication     pregabalin (LYRICA) 50 MG capsule     ALPRAZolam (XANAX) 0.25 MG tablet     omeprazole (PRILOSEC) 20 MG CR capsule     atenolol (TENORMIN) 50 MG tablet     traZODone (DESYREL) 100 MG tablet     fluticasone (FLONASE) 50 MCG/ACT spray     Fexofenadine HCl (ALLEGRA PO)     XALATAN 0.005 % OP SOLN     HYDROcodone-acetaminophen (NORCO) 5-325 MG per tablet     ondansetron (ZOFRAN ODT) 4 MG ODT tab     gabapentin (NEURONTIN) 300 MG capsule     cyclobenzaprine (FLEXERIL) 10 MG tablet     nitrofurantoin, macrocrystal-monohydrate, (MACROBID) 100 MG capsule     No current facility-administered medications for this visit.        Allergies    Allergen Reactions     Msir [Morphine Sulfate] Nausea     Intollerant to Morphine Sulfate: Nausea,vomiting     Erythromycin      Penicillins Itching     Reglan Other (See Comments)     Muscle spasms in throat       Social History     Social History     Marital status:      Spouse name: N/A     Number of children: N/A     Years of education: N/A     Occupational History           Social History Main Topics     Smoking status: Former Smoker     Packs/day: 0.50     Years: 25.00     Quit date: 10/1/1997     Smokeless tobacco: Never Used      Comment: quit      Alcohol use 1.0 oz/week     2 Glasses of wine per week      Comment: occasional     Drug use: No     Sexual activity: Yes     Partners: Male     Other Topics Concern     None     Social History Narrative       Family History   Problem Relation Age of Onset     Cardiovascular Mother      / mi     Breast Cancer Mother      diagnosed age 60's     Respiratory Father      pulmonary fibrosis.     Cardiovascular Brother       of MI age 34 2nd to Cocaine Use     Cancer - colorectal No family hx of          Review Of Systems  Skin: negative  Eyes: negative  Ears/Nose/Throat: negative  Respiratory: No shortness of breath, dyspnea on exertion, cough, or hemoptysis  Cardiovascular: negative  Gastrointestinal: negative  Genitourinary: negative  Musculoskeletal: as above  Neurologic: as above  Psychiatric: negative  Hematologic/Lymphatic/Immunologic: negative  Endocrine: negative    B/P: 150/86, T: Data Unavailable, P: 57, R: Data Unavailable    Examination:  Awake  Alert  Oriented x 3  Speech clear  Cranial nerves II - XII intact  Face symmetric  Back nontender   Limited ROM of back  Motor exam    RLE - iliopsoas 5/5, quads 5/5, hamstrings 5/5, dorsiflexion 5/5, plantar flexion 5/5, eversion 5/5, inversion 5/5, EHL 5/5   LLE -  iliopsoas 5/5, quads 5/5, hamstrings 5/5, dorsiflexion 5/5, plantar flexion 5/5, eversion 5/5, inversion 5/5,  EHL 5/5  Sensation decreased in right L5 distribution   Clonus negative  DTR 1+  Positive Lase'nicole's sign on the right at 50 degrees   Ambulation - limps    Imaging:   MRI lumbar - L4-5 severe stenosis with bilateral foraminal stenosis and a left and possible right synovial cyst. L5-S1 DDD      Assessment/Plan:   1. I have offered a L4-5 decompression with instrumented fusion. I discussed with the patient the risk of surgery to include, but, not be limited to; nerve injury, pseudoarthrosis, failure of hardware, failure of improvement of symptoms, CSF leak,  infection, post op hematoma, the need for recurrent surgery, paralysis, coma and death. Pt will call if she wants to proceed.      Jay Dee MD, MS, FAANS  Neurosurgeon  [facility]  [address] [place] [address]. [place] [address]  Tel [phone]

## 2017-06-14 NOTE — NURSING NOTE
"Denise Ralph is a 62 year old female who presents for:  Chief Complaint   Patient presents with     Neurologic Problem     Lumbar spinal stenosis, LBP radiates down the R leg, Numbness and tingling in the L calf and some in the inner R calf,         Initial Vitals:  /86 (BP Location: Right arm)  Pulse 57  Wt 138 lb 12.8 oz (63 kg)  SpO2 98%  BMI 23.1 kg/m2 Estimated body mass index is 23.1 kg/(m^2) as calculated from the following:    Height as of 5/31/17: 5' 5\" (1.651 m).    Weight as of this encounter: 138 lb 12.8 oz (63 kg).. Body surface area is 1.7 meters squared. BP completed using cuff size: regular  Mild Pain (2)    Do you feel safe in your environment?  Yes  Do you need any refills today? No    Nursing Comments: Lumbar spinal stenosis, LBP radiates down the R leg, Numbness and tingling in the L calf and some in the inner R calf.        5 min. nursing intake time  Sharifa Quiroga MA       Discharge plan: -please call clinic at [phone] if you wish to proceed with surgery.   2 min. nursing discharge time  Sharifa Quiroga MA        "

## 2017-06-14 NOTE — MR AVS SNAPSHOT
After Visit Summary   6/14/2017    Denise Ralph    MRN: [id number]           Patient Information     Date Of Birth          1955        Visit Information        Provider Department      6/14/2017 11:00 AM Jay Dee MD [facility]        Today's Diagnoses     Spinal stenosis, lumbar region, with radiculopathy    -  1    Lumbar radiculopathy          Care Instructions    -please call clinic at [phone] if you wish to proceed with surgery.           Follow-ups after your visit        Who to contact     If you have questions or need follow up information about today's clinic visit or your schedule please contact [facility] directly at [phone].  Normal or non-critical lab and imaging results will be communicated to you by MyChart, letter or phone within 4 business days after the clinic has received the results. If you do not hear from us within 7 days, please contact the clinic through [employer] or phone. If you have a critical or abnormal lab result, we will notify you by phone as soon as possible.  Submit refill requests through [employer] or call your pharmacy and they will forward the refill request to us. Please allow 3 business days for your refill to be completed.          Additional Information About Your Visit        MyChart Information     [employer] gives you secure access to your electronic health record. If you see a primary care provider, you can also send messages to your care team and make appointments. If you have questions, please call your primary care clinic.  If you do not have a primary care provider, please call [phone] and they will assist you.        Care EveryWhere ID     This is your Care EveryWhere ID. This could be used by other organizations to access your [facility] medical records  [id number]        Your Vitals Were     Pulse Pulse Oximetry BMI (Body Mass Index)             57 98% 23.1 kg/m2          Blood  Pressure from Last 3 Encounters:   06/14/17 150/86   05/31/17 116/73   05/17/17 182/83    Weight from Last 3 Encounters:   06/14/17 138 lb 12.8 oz (63 kg)   05/31/17 136 lb (61.7 kg)   05/16/17 136 lb (61.7 kg)              Today, you had the following     No orders found for display       Primary Care Provider Office Phone # Fax #    Juan F Smallwood [phone]       [facility] [address] [facility] [address]        Thank you!     Thank you for choosing [facility]  for your care. Our goal is always to provide you with excellent care. Hearing back from our patients is one way we can continue to improve our services. Please take a few minutes to complete the written survey that you may receive in the mail after your visit with us. Thank you!             Your Updated Medication List - Protect others around you: Learn how to safely use, store and throw away your medicines at www.disposemymeds.org.          This list is accurate as of: 6/14/17  2:14 PM.  Always use your most recent med list.                   Brand Name Dispense Instructions for use    ALLEGRA PO      Take 30 mg by mouth daily       ALPRAZolam 0.25 MG tablet    XANAX    30 tablet    TAKE 1 TAB BY MOUTH 3 TIMES DAILY AS NEEDED FOR ANXIETY       atenolol 50 MG tablet    TENORMIN    180 tablet    TAKE 1 TABLET (50 MG) BY MOUTH 2 TIMES DAILY       cyclobenzaprine 10 MG tablet    FLEXERIL    30 tablet    Take 1 tablet (10 mg) by mouth nightly as needed for muscle spasms       fluticasone 50 MCG/ACT spray    FLONASE     USE 2 SPRAYS NASALLY DAILY AS DIRECTED       gabapentin 300 MG capsule    NEURONTIN    90 capsule    Take 1 capsule (300 mg) by mouth 3 times daily       HYDROcodone-acetaminophen 5-325 MG per tablet    NORCO    15 tablet    Take 1-2 tablets by mouth every 8 hours as needed       nitrofurantoin (macrocrystal-monohydrate) 100 MG capsule    MACROBID    10 capsule    Take 1 capsule  (100 mg) by mouth once as needed Take once after intercourse       omeprazole 20 MG CR capsule    priLOSEC    90 capsule    TAKE 1 CAPSULE BY MOUTH ONCE DAILY       ondansetron 4 MG ODT tab    ZOFRAN ODT    10 tablet    Take 1 tablet (4 mg) by mouth every 8 hours as needed for nausea       pregabalin 50 MG capsule    LYRICA    90 capsule    Take 1 capsule (50 mg) by mouth 3 times daily       traZODone 100 MG tablet    DESYREL    90 tablet    Take 1 tablet (100 mg) by mouth nightly as needed for sleep       XALATAN 0.005 % ophthalmic solution   Generic drug:  latanoprost

## 2017-06-14 NOTE — TELEPHONE ENCOUNTER
Pt is calling with multiple questions about surgery that Dr. Dee recommended pt to have done.    1. What is spinal stenosis?  2. Has Dr. Dee had a pt that has had this surgery before that she could talk to? Pt is really worried about the recovery.    Informed pt that a nurse will be contacting her to answer these questions.

## 2017-06-15 ENCOUNTER — TELEPHONE (OUTPATIENT)
Dept: NEUROSURGERY | Facility: CLINIC | Age: 62
End: 2017-06-15

## 2017-06-15 ASSESSMENT — PATIENT HEALTH QUESTIONNAIRE - PHQ9: SUM OF ALL RESPONSES TO PHQ QUESTIONS 1-9: 6

## 2017-06-15 NOTE — TELEPHONE ENCOUNTER
Patient called back. Reviewed and answered her questions to the best of my ability and patient's satisfaction.

## 2017-06-21 ENCOUNTER — TELEPHONE (OUTPATIENT)
Dept: NEUROSURGERY | Facility: CLINIC | Age: 62
End: 2017-06-21

## 2017-06-21 NOTE — TELEPHONE ENCOUNTER
Pt had questions about pain and states that she is still waiting for her spouse to schedule with Dr. Jay oneil and he has not.  Recommend she coordinate with her . Also recommend that she continue to stay hydrated and note intake of potassium.      Her spouse is Noe Ralph.

## 2017-06-21 NOTE — TELEPHONE ENCOUNTER
Pt called. States she has Q's about her symptoms prior to scheduling surgery. Also has Q's about recovery after surgery.

## 2017-06-26 ENCOUNTER — TELEPHONE (OUTPATIENT)
Dept: NEUROSURGERY | Facility: CLINIC | Age: 62
End: 2017-06-26

## 2017-06-26 NOTE — TELEPHONE ENCOUNTER
Called and informed patient will need to ask Dr Dee. Will get back to patient with an answer. Patient verbalized understanding. Message sent to Dr Dee.

## 2017-06-27 ENCOUNTER — TELEPHONE (OUTPATIENT)
Dept: NEUROSURGERY | Facility: CLINIC | Age: 62
End: 2017-06-27

## 2017-06-27 NOTE — TELEPHONE ENCOUNTER
Called and spoke to patient. Following up with response from Dr. Dee. Patient was wondering if she would be a candidate for coflex. Per Dr Dee, patient would not be a candidate for it and with his experience those do not work long term. Patient verbalized understanding.

## 2017-07-03 DIAGNOSIS — F41.9 ANXIETY: ICD-10-CM

## 2017-07-03 RX ORDER — ALPRAZOLAM 0.25 MG
TABLET ORAL
Qty: 30 TABLET | Refills: 0 | Status: SHIPPED | OUTPATIENT
Start: 2017-07-03 | End: 2017-08-13

## 2017-07-03 NOTE — TELEPHONE ENCOUNTER
Xanax      Last Written Prescription Date:  5-31-17  Last Fill Quantity: 30,   # refills: 0  Last Office Visit with [facility] or  Health prescribing provider: 4-17-17  Future Office visit:       Routing refill request to provider for review/approval because:  Drug not on the [facility] or [facility] refill protocol or controlled substance    No signed CSA form in chart.    Please advise, thanks.

## 2017-07-06 ENCOUNTER — OFFICE VISIT (OUTPATIENT)
Dept: FAMILY MEDICINE | Facility: CLINIC | Age: 62
End: 2017-07-06
Payer: COMMERCIAL

## 2017-07-06 VITALS
WEIGHT: 137 LBS | TEMPERATURE: 97.6 F | HEIGHT: 65 IN | DIASTOLIC BLOOD PRESSURE: 70 MMHG | HEART RATE: 59 BPM | SYSTOLIC BLOOD PRESSURE: 126 MMHG | BODY MASS INDEX: 22.82 KG/M2

## 2017-07-06 DIAGNOSIS — H60.542 ECZEMA OF EXTERNAL EAR, LEFT: Primary | ICD-10-CM

## 2017-07-06 PROCEDURE — 99213 OFFICE O/P EST LOW 20 MIN: CPT | Performed by: FAMILY MEDICINE

## 2017-07-06 RX ORDER — TRIAMCINOLONE ACETONIDE 1 MG/G
CREAM TOPICAL
Qty: 15 G | Refills: 0 | Status: SHIPPED | OUTPATIENT
Start: 2017-07-06 | End: 2017-10-18

## 2017-07-06 NOTE — LETTER
My Depression Action Plan  Name: Denise Ralph   Date of Birth 1955  Date: 7/6/2017    My doctor: Juan F Smallwood   My clinic: [address] [address] [phone]          GREEN    ZONE   Good Control    What it looks like:     Things are going generally well. You have normal up s and down s. You may even feel depressed from time to time, but bad moods usually last less than a day.   What you need to do:  1. Continue to care for yourself (see self care plan)  2. Check your depression survival kit and update it as needed  3. Follow your physician s recommendations including any medication.  4. Do not stop taking medication unless you consult with your physician first.           YELLOW         ZONE Getting Worse    What it looks like:     Depression is starting to interfere with your life.     It may be hard to get out of bed; you may be starting to isolate yourself from others.    Symptoms of depression are starting to last most all day and this has happened for several days.     You may have suicidal thoughts but they are not constant.   What you need to do:     1. Call your care team, your response to treatment will improve if you keep your care team informed of your progress. Yellow periods are signs an adjustment may need to be made.     2. Continue your self-care, even if you have to fake it!    3. Talk to someone in your support network    4. Open up your depression survival kit           RED    ZONE Medical Alert - Get Help    What it looks like:     Depression is seriously interfering with your life.     You may experience these or other symptoms: You can t get out of bed most days, can t work or engage in other necessary activities, you have trouble taking care of basic hygiene, or basic responsibilities, thoughts of suicide or death that will not go away, self-injurious behavior.     What you need to do:  1. Call your care team and  request a same-day appointment. If they are not available (weekends or after hours) call your local crisis line, emergency room or 911.      Electronically signed by: Chato Daily, July 6, 2017    Depression Self Care Plan / Survival Kit    Self-Care for Depression  Here s the deal. Your body and mind are really not as separate as most people think.  What you do and think affects how you feel and how you feel influences what you do and think. This means if you do things that people who feel good do, it will help you feel better.  Sometimes this is all it takes.  There is also a place for medication and therapy depending on how severe your depression is, so be sure to consult with your medical provider and/ or Behavioral Health Consultant if your symptoms are worsening or not improving.     In order to better manage my stress, I will:    Exercise  Get some form of exercise, every day. This will help reduce pain and release endorphins, the  feel good  chemicals in your brain. This is almost as good as taking antidepressants!  This is not the same as joining a gym and then never going! (they count on that by the way ) It can be as simple as just going for a walk or doing some gardening, anything that will get you moving.      Hygiene   Maintain good hygiene (Get out of bed in the morning, Make your bed, Brush your teeth, Take a shower, and Get dressed like you were going to work, even if you are unemployed).  If your clothes don't fit try to get ones that do.    Diet  I will strive to eat foods that are good for me, drink plenty of water, and avoid excessive sugar, caffeine, alcohol, and other mood-altering substances.  Some foods that are helpful in depression are: complex carbohydrates, B vitamins, flaxseed, fish or fish oil, fresh fruits and vegetables.    Psychotherapy  I agree to participate in Individual Therapy (if recommended).    Medication  If prescribed medications, I agree to take them.  Missing doses can  result in serious side effects.  I understand that drinking alcohol, or other illicit drug use, may cause potential side effects.  I will not stop my medication abruptly without first discussing it with my provider.    Staying Connected With Others  I will stay in touch with my friends, family members, and my primary care provider/team.    Use your imagination  Be creative.  We all have a creative side; it doesn t matter if it s oil painting, sand castles, or mud pies! This will also kick up the endorphins.    Witness Beauty  (AKA stop and smell the roses) Take a look outside, even in mid-winter. Notice colors, textures. Watch the squirrels and birds.     Service to others  Be of service to others.  There is always someone else in need.  By helping others we can  get out of ourselves  and remember the really important things.  This also provides opportunities for practicing all the other parts of the program.    Humor  Laugh and be silly!  Adjust your TV habits for less news and crime-drama and more comedy.    Control your stress  Try breathing deep, massage therapy, biofeedback, and meditation. Find time to relax each day.     My support system    Clinic Contact:  Phone number:    Contact 1:  Phone number:    Contact 2:  Phone number:    Muslim/:  Phone number:    Therapist:  Phone number:    Local crisis center:    Phone number:    Other community support:  Phone number:

## 2017-07-06 NOTE — PROGRESS NOTES
SUBJECTIVE:                                                    Denise Ralph is a 62 year old female who presents to clinic today for the following health issues:      Left ear itching for 2 weeks off and on, seems to be the outside of the ear not the canal. History of cerumen.    No recent exposures to the ear. No recent lake swimming.      Problem list and histories reviewed & adjusted, as indicated.  Additional history: none    Patient Active Problem List   Diagnosis     Irritable bowel syndrome     Anxiety state     Essential hypertension, benign     Disorder of bone and cartilage     Atrophy of vulva     Palpitations     Sciatica     Other and unspecified disc disorder of lumbar region     Depressive disorder, not elsewhere classified     CARDIOVASCULAR SCREENING; LDL GOAL LESS THAN 130     Basal cell carcinoma     Squamous cell carcinoma     Advanced directives, counseling/discussion     GERD (gastroesophageal reflux disease)     Glaucoma     Hypertension goal BP (blood pressure) < 140/80     Bilateral low back pain with left-sided sciatica     Acute bilateral low back pain with right-sided sciatica     Past Surgical History:   Procedure Laterality Date     HYSTERECTOMY       HYSTERECTOMY VAGINAL       LAPAROSCOPIC CHOLECYSTECTOMY      with repeat operation because of bile leak     Left shoulder decomp surgery for impingement  approx      WRIST SURGERY Left        Social History   Substance Use Topics     Smoking status: Former Smoker     Packs/day: 0.50     Years: 25.00     Quit date: 10/1/1997     Smokeless tobacco: Never Used      Comment: quit      Alcohol use 1.0 oz/week     2 Glasses of wine per week      Comment: occasional     Family History   Problem Relation Age of Onset     Cardiovascular Mother      / mi     Breast Cancer Mother      diagnosed age 60's     Respiratory Father      pulmonary fibrosis.     Cardiovascular Brother       of MI age 34 2nd to Cocaine Use      "Cancer - colorectal No family hx of            Reviewed and updated as needed this visit by clinical staff       Reviewed and updated as needed this visit by Provider         ROS:  Constitutional, HEENT, cardiovascular, pulmonary, gi and gu systems are negative, except as otherwise noted.    OBJECTIVE:     /70 (BP Location: Right arm, Patient Position: Sitting, Cuff Size: Adult Regular)  Pulse 59  Temp 97.6  F (36.4  C) (Oral)  Ht 5' 5\" (1.651 m)  Wt 137 lb (62.1 kg)  Breastfeeding? No  BMI 22.8 kg/m2  Body mass index is 22.8 kg/(m^2).  GENERAL: healthy, alert and no distress  HENT: Small amount of cerumen in the left external auditory canal, cleaned with curette, mild dermatitis at the opening of the EAC    Diagnostic Test Results:  none     ASSESSMENT/PLAN:     1. Eczema of external ear, left - suggested steroid cream to help with itch, short term  - triamcinolone (KENALOG) 0.1 % cream; Apply sparingly to affected area two times daily for 5 days.  Dispense: 15 g; Refill: 0      Jeanie Trivedi MD  [facility]      "

## 2017-07-06 NOTE — MR AVS SNAPSHOT
After Visit Summary   7/6/2017    Denise Ralph    MRN: [id number]           Patient Information     Date Of Birth          1955        Visit Information        Provider Department      7/6/2017 11:45 AM Jeanie Trivedi MD [facility]        Today's Diagnoses     Eczema of external ear, left    -  1       Follow-ups after your visit        Your next 10 appointments already scheduled     Aug 03, 2017   Procedure with Jay Dee MD   [facility] (--)    [address] [facility] [id number] [phone]              Who to contact     If you have questions or need follow up information about today's clinic visit or your schedule please contact [facility] directly at [phone].  Normal or non-critical lab and imaging results will be communicated to you by MyChart, letter or phone within 4 business days after the clinic has received the results. If you do not hear from us within 7 days, please contact the clinic through MyChart or phone. If you have a critical or abnormal lab result, we will notify you by phone as soon as possible.  Submit refill requests through [employer] or call your pharmacy and they will forward the refill request to us. Please allow 3 business days for your refill to be completed.          Additional Information About Your Visit        MyChart Information     [employer] gives you secure access to your electronic health record. If you see a primary care provider, you can also send messages to your care team and make appointments. If you have questions, please call your primary care clinic.  If you do not have a primary care provider, please call [phone] and they will assist you.        Care EveryWhere ID     This is your Care EveryWhere ID. This could be used by other organizations to access your [place] medical records  [id number]        Your Vitals Were     Pulse Temperature Height  "Breastfeeding? BMI (Body Mass Index)       59 97.6  F (36.4  C) (Oral) 5' 5\" (1.651 m) No 22.8 kg/m2        Blood Pressure from Last 3 Encounters:   07/06/17 126/70   06/14/17 150/86   05/31/17 116/73    Weight from Last 3 Encounters:   07/06/17 137 lb (62.1 kg)   06/14/17 138 lb 12.8 oz (63 kg)   05/31/17 136 lb (61.7 kg)              Today, you had the following     No orders found for display         Today's Medication Changes          These changes are accurate as of: 7/6/17  6:18 PM.  If you have any questions, ask your nurse or doctor.               Start taking these medicines.        Dose/Directions    triamcinolone 0.1 % cream   Commonly known as:  KENALOG   Used for:  Eczema of external ear, left   Started by:  Jeanie Trivedi MD        Apply sparingly to affected area two times daily for 5 days.   Quantity:  15 g   Refills:  0         Stop taking these medicines if you haven't already. Please contact your care team if you have questions.     cyclobenzaprine 10 MG tablet   Commonly known as:  FLEXERIL   Stopped by:  Jeanie Trivedi MD           fluticasone 50 MCG/ACT spray   Commonly known as:  FLONASE   Stopped by:  Jeanie Trivedi MD           gabapentin 300 MG capsule   Commonly known as:  NEURONTIN   Stopped by:  Jeanie Trivedi MD           HYDROcodone-acetaminophen 5-325 MG per tablet   Commonly known as:  NORCO   Stopped by:  Jeanie Trivedi MD           nitroFURantoin (macrocrystal-monohydrate) 100 MG capsule   Commonly known as:  MACROBID   Stopped by:  Jeanie Trivedi MD           ondansetron 4 MG ODT tab   Commonly known as:  ZOFRAN ODT   Stopped by:  Jeanie Trivedi MD           pregabalin 50 MG capsule   Commonly known as:  LYRICA   Stopped by:  Jeanie Trivedi MD                Where to get your medicines      These medications were sent to [facility] - [place], [place] - [address]  [address] [facility] [address]     " Phone:  [phone]     triamcinolone 0.1 % cream                Primary Care Provider Office Phone # Fax #    Juan F Smallwood [phone]       [facility] [address] [facility] [address]        Equal Access to Services     INEZ THOMPSON : Hadii aad ku hadasho Soomaali, waaxda luqadaha, qaybta kaalmada adeegyada, waxay ashleyin hayaan adeeg khjairoyaniv lamaria fernanda bull. So [facility] [phone].    ATENCIÓN: Si habla español, tiene a simpson disposición servicios gratuitos de asistencia lingüística. Llame al [phone].    We comply with applicable federal civil rights laws and [place] laws. We do not discriminate on the basis of race, color, national origin, age, disability sex, sexual orientation or gender identity.            Thank you!     Thank you for choosing [facility]  for your care. Our goal is always to provide you with excellent care. Hearing back from our patients is one way we can continue to improve our services. Please take a few minutes to complete the written survey that you may receive in the mail after your visit with us. Thank you!             Your Updated Medication List - Protect others around you: Learn how to safely use, store and throw away your medicines at www.disposemymeds.org.          This list is accurate as of: 7/6/17  6:18 PM.  Always use your most recent med list.                   Brand Name Dispense Instructions for use Diagnosis    ALLEGRA PO      Take 30 mg by mouth daily        ALPRAZolam 0.25 MG tablet    XANAX    30 tablet    TAKE 1 TABLET BY MOUTH 3 TIMES DAILY AS NEEDED FOR ANXIETY    Anxiety       atenolol 50 MG tablet    TENORMIN    180 tablet    TAKE 1 TABLET (50 MG) BY MOUTH 2 TIMES DAILY    Essential hypertension, benign, Palpitations       omeprazole 20 MG CR capsule    priLOSEC    90 capsule    TAKE 1 CAPSULE BY MOUTH ONCE DAILY    Gastroesophageal reflux disease without esophagitis       traZODone 100 MG tablet    DESYREL    90  tablet    Take 1 tablet (100 mg) by mouth nightly as needed for sleep    Primary insomnia       triamcinolone 0.1 % cream    KENALOG    15 g    Apply sparingly to affected area two times daily for 5 days.    Eczema of external ear, left       XALATAN 0.005 % ophthalmic solution   Generic drug:  latanoprost

## 2017-07-06 NOTE — NURSING NOTE
"Chief Complaint   Patient presents with     Ear Problem       Initial /70 (BP Location: Right arm, Patient Position: Sitting, Cuff Size: Adult Regular)  Pulse 59  Temp 97.6  F (36.4  C) (Oral)  Ht 5' 5\" (1.651 m)  Wt 137 lb (62.1 kg)  Breastfeeding? No  BMI 22.8 kg/m2 Estimated body mass index is 22.8 kg/(m^2) as calculated from the following:    Height as of this encounter: 5' 5\" (1.651 m).    Weight as of this encounter: 137 lb (62.1 kg).  Medication Reconciliation: complete     Chato Daily CMA          "

## 2017-07-17 ENCOUNTER — TELEPHONE (OUTPATIENT)
Dept: NEUROSURGERY | Facility: CLINIC | Age: 62
End: 2017-07-17

## 2017-07-17 NOTE — TELEPHONE ENCOUNTER
Wondering when needs to stop Motrin prior to surgery.   Wondering when she should expect surgery packet.   Does she need a raised a toilet seat or walking?  Please call

## 2017-07-18 NOTE — TELEPHONE ENCOUNTER
Called and answered patient's questions. Reviewed patient education see below.  Pre-operative Education    Education included but not limited to:  - Pre-operative physical with primary care physician within 30 days of surgical date. Pre op scheduled at [facility] on 7/19/17.  - Pre-operative clearance (cardiology, hematology, etc).   - Discontinue Aspirin, NSAIDs, Diclofenac x 7 days prior to surgical date.   -May try tylenol for pain 1000 mg three times per day for pain  -Do not begin taking Non-Steroidal Anti-Inflammatory Drugs or NSAIDs (Advil,Motrin, Ibuprofen,Nuprin, Diclofenac,Meloxicam, Aleve, Celebrex, Aspirin, etc.) until 12 weeks after surgery if you had a fusion. May cause bleeding and interfere with bone healing.  -Patient is not a smoker  -Forms to be completed: none per patient  -Surgical risks: blood clots in the leg or lung, problems urinating, nerve damage, drainage from the incision, infection,stiffness  -Preparation timeline  - When to start NPO           -Special bathing procedure.Patient received Hibiclens and showering instruction sheet.   Hospital stay:  Checking in  The surgery itself   Recovery room  - Transition to the hospital room where you will begin your recovery  - Managing pain   - 2-3 night hospitalization.   - Post operative incisional pain x 1-2 weeks which will require pain medications and muscle relaxants.   -Do NOT drive while taking narcotic pain medication.  -Post operative incision care-Keep your incision clean and dry at all times. OK to remove dressing on postop day 2. OK to shower on postop day 3 and allow water to run over incision, pat dry after shower. No bathing, swimming or submerging in water. Call immediately or come to ED if any drainage occurs, or you develop new pain. Watch for signs of infection: redness, swelling, warmth, drainage, and fever of 101 degrees or higher. Notify clinic.   - Post operative activity limitations: 6-8 weeks, no lifting > 10  pounds, no bending, twisting, or overhead reaching.  -Orthotics will fit you for a brace in the hospital.Lumbar Fusion: wear for 6-8 weeks   - Follow up appointments in 2 weeks for suture/staple removal and 6 weeks and 3 months with Nurse Practitioner with X-ray prior. Please call to schedule follow up appointment at [phone].   - Spine Education book was also given to the patient for further review. Mailed surgery pkt and fusion guide to patient. Emailed instructions sheet to patient at [email or web address] on 7/18/17.       Patient verbalized understanding of above instructions. All questions were answered to the best of my ability and the patient's satisfaction. Patient advised to call with any additional questions or concerns.  Kateryna Robertson RN

## 2017-07-18 NOTE — PATIENT INSTRUCTIONS
Patient Instructions  Pre-Operative:  -Surgery scheduled at [facility] on 8/3/17 for L4-5 decompression and fusion   -Surgical risks: blood clots in the leg or lung, problems urinating, nerve damage, drainage from the incision, infection,stiffness  - Pre-operative physical with primary care physician within 30 days of surgical date.    -2-3 night hospitalization.     -Shower procedure: please shower with antimicrobial soap the night before surgery and morning of surgery. Please refer to showering instruction sheet in folder.  -Stop all solid foods 8 hours before surgery.  -Keep drinking clear liquids until 4 hours before surgery. Clear liquids include water, clear juice, black coffee, or clear tea without milk, Gatorade, clear soda.   - Discontinue Aspirin, NSAIDs (Advil/Ibuprofen, Naproxen,Nuprin,Relafen/Nabumetone, Diclofenac,Meloxicam, Aleve, Celebrex) x 7 days prior to surgical date.  - May try tylenol for pain 1000 mg three times per day for pain      Post-Operative:  -Do not begin taking Non-Steroidal Anti-Inflammatory Drugs or NSAIDs (Advil/ibuprofen, Naproxen,Nuprin, Relafen/Nabumetone, Diclofenac,Meloxicam, Aleve, Celebrex, Aspirin, etc.) until 12 weeks after surgery. May cause bleeding and interfere with bone healing.   - Post operative incisional pain x 1-2 weeks which will require pain medications and muscle relaxants. You will receive medication upon discharge.  -Do NOT drive while taking narcotic pain medication.  -Do not drink alcohol while using any pain medication.  -Post operative incision care- Watch for signs of infection: redness, swelling, warmth, drainage, and fever of 101 degrees or higher. [facility] [phone].  -No submerging incision in water such as pools, hot tubs,baths for at least 8 weeks or until incision is healed. Showers are fine.    - Post operative activity limitations: 6-8 weeks, no lifting > 10 pounds, no bending, twisting, or overhead reaching.  -Orthotics  will fit you for a brace in the hospital.Lumbar Fusion: wear for 6-8 weeks   - Follow up appointments: 2 weeks suture/staple removal and 6 week post op follow up visit with Nurse practitioner and x-ray prior to appointment.Please call to schedule follow up appointment at [phone].

## 2017-07-19 ENCOUNTER — OFFICE VISIT (OUTPATIENT)
Dept: FAMILY MEDICINE | Facility: CLINIC | Age: 62
End: 2017-07-19
Payer: COMMERCIAL

## 2017-07-19 VITALS
TEMPERATURE: 97.6 F | SYSTOLIC BLOOD PRESSURE: 130 MMHG | WEIGHT: 138.1 LBS | DIASTOLIC BLOOD PRESSURE: 80 MMHG | BODY MASS INDEX: 22.19 KG/M2 | HEART RATE: 53 BPM | OXYGEN SATURATION: 97 % | HEIGHT: 66 IN

## 2017-07-19 DIAGNOSIS — G89.29 CHRONIC BILATERAL LOW BACK PAIN WITH LEFT-SIDED SCIATICA: ICD-10-CM

## 2017-07-19 DIAGNOSIS — M54.42 CHRONIC BILATERAL LOW BACK PAIN WITH LEFT-SIDED SCIATICA: ICD-10-CM

## 2017-07-19 DIAGNOSIS — Z01.812 PRE-OPERATIVE LABORATORY EXAMINATION: ICD-10-CM

## 2017-07-19 DIAGNOSIS — Z01.818 PREOP GENERAL PHYSICAL EXAM: Primary | ICD-10-CM

## 2017-07-19 PROBLEM — M54.41 ACUTE BILATERAL LOW BACK PAIN WITH RIGHT-SIDED SCIATICA: Status: RESOLVED | Noted: 2017-05-04 | Resolved: 2017-07-19

## 2017-07-19 LAB
ERYTHROCYTE [DISTWIDTH] IN BLOOD BY AUTOMATED COUNT: 11.8 % (ref 10–15)
HCT VFR BLD AUTO: 37.9 % (ref 35–47)
HGB BLD-MCNC: 12.9 G/DL (ref 11.7–15.7)
INR PPP: 0.95 (ref 0.86–1.14)
MCH RBC QN AUTO: 32.6 PG (ref 26.5–33)
MCHC RBC AUTO-ENTMCNC: 34 G/DL (ref 31.5–36.5)
MCV RBC AUTO: 96 FL (ref 78–100)
MRSA DNA SPEC QL NAA+PROBE: NORMAL
PLATELET # BLD AUTO: 235 10E9/L (ref 150–450)
RBC # BLD AUTO: 3.96 10E12/L (ref 3.8–5.2)
SPECIMEN SOURCE: NORMAL
WBC # BLD AUTO: 5.9 10E9/L (ref 4–11)

## 2017-07-19 PROCEDURE — 85027 COMPLETE CBC AUTOMATED: CPT | Performed by: NURSE PRACTITIONER

## 2017-07-19 PROCEDURE — 36415 COLL VENOUS BLD VENIPUNCTURE: CPT | Performed by: NURSE PRACTITIONER

## 2017-07-19 PROCEDURE — 87641 MR-STAPH DNA AMP PROBE: CPT | Performed by: FAMILY MEDICINE

## 2017-07-19 PROCEDURE — 99214 OFFICE O/P EST MOD 30 MIN: CPT | Performed by: NURSE PRACTITIONER

## 2017-07-19 PROCEDURE — 87640 STAPH A DNA AMP PROBE: CPT | Mod: 59 | Performed by: FAMILY MEDICINE

## 2017-07-19 PROCEDURE — 85610 PROTHROMBIN TIME: CPT | Performed by: NURSE PRACTITIONER

## 2017-07-19 NOTE — PROGRESS NOTES
[facility]  [address] [address]  [phone]  Dept: [phone]    PRE-OP EVALUATION:  Today's date: 2017    Denise Ralph (: 1955) presents for pre-operative evaluation assessment as requested by Dr. Dee.  She requires evaluation and anesthesia risk assessment prior to undergoing surgery/procedure for treatment of OPTICAL TRACKING SYSTEM FUSION SPINE POSTERIOR LUMBAR ONE LEVEL  .  Proposed procedure: Decompression and Fusion      Date of Surgery/ Procedure: 2017  Time of Surgery/ Procedure: 11:30  Hospital/Surgical Facility: [facility]   Fax number for surgical facility:   Primary Physician: Juan F Smallwood  Type of Anesthesia Anticipated: General    Patient has a Health Care Directive or Living Will:  YES at home needs signed and will bring in day of surgery    1. NO - Do you have a history of heart attack, stroke, stent, bypass or surgery on an artery in the head, neck, heart or legs?  2. NO - Do you ever have any pain or discomfort in your chest?  3. NO - Do you have a history of  Heart Failure?  4. NO - Are you troubled by shortness of breath when: walking on the level, up a slight hill or at night?  5. NO - Do you currently have a cold, bronchitis or other respiratory infection?  6. NO - Do you have a cough, shortness of breath or wheezing?  7. YES - Do you sometimes get pains in the calves of your legs when you walk?  8. NO - Do you or anyone in your family have previous history of blood clots?  9. NO - Do you or does anyone in your family have a serious bleeding problem such as prolonged bleeding following surgeries or cuts?  10. NO - Have you ever had problems with anemia or been told to take iron pills?  11. NO - Have you had any abnormal blood loss such as black, tarry or bloody stools, or abnormal vaginal bleeding?  12. NO - Have you ever had a blood transfusion?  13. NO - Have you or any of your relatives ever had problems with  anesthesia?  14. NO - Do you have sleep apnea, excessive snoring or daytime drowsiness?  15. NO - Do you have any prosthetic heart valves?  16. NO - Do you have prosthetic joints?  17. NO - Is there any chance that you may be pregnant?        HPI:                                                      Brief HPI related to upcoming procedure: worsening pain in the lumbar spine due to severe stenosis with shifting of the vertebrae          MEDICAL HISTORY:                                                    Patient Active Problem List    Diagnosis Date Noted     Bilateral low back pain with left-sided sciatica 05/04/2017     Priority: Medium     Acute bilateral low back pain with right-sided sciatica 05/04/2017     Priority: Medium     Hypertension goal BP (blood pressure) < 140/80 10/06/2015     Priority: Medium     Glaucoma 12/05/2014     Priority: Medium     GERD (gastroesophageal reflux disease) 09/05/2013     Priority: Medium     Advanced directives, counseling/discussion 10/16/2012     Priority: Medium     Discussed Advance Directive planning with patient; information given to patient to review.         Basal cell carcinoma 08/01/2009     Priority: Medium     Atrophy of vulva 07/29/2007     Priority: Medium     Disorder of bone and cartilage 07/26/2007     Priority: Medium     Problem list name updated by automated process. Provider to review       Squamous cell carcinoma 04/01/2007     Priority: Medium     Essential hypertension, benign 09/07/2006     Priority: Medium     Irritable bowel syndrome 08/09/2004     Priority: Medium     Anxiety state 08/09/2004     Priority: Medium     Problem list name updated by automated process. Provider to review        Past Medical History:   Diagnosis Date     Anxiety     Gets panic attacks     Depressive disorder, not elsewhere classified      Essential hypertension, benign     No cardiologist     Glaucoma      Irritable bowel syndrome     has had neg colonoscopy & EGD w/u      Lumbar degenerative disc disease 1996    Had PT, traction, no surgery     Other chronic pain     JOint pain for many years.     PONV (postoperative nausea and vomiting)      Sciatica 3/06    R thigh;  MRI = R L2-3 disc      Past Surgical History:   Procedure Laterality Date     EYE SURGERY Bilateral     Treatment of glaucoma     HYSTERECTOMY       HYSTERECTOMY VAGINAL       LAPAROSCOPIC CHOLECYSTECTOMY  2002    with repeat operation because of bile leak     Left shoulder decomp surgery for impingement  approx 1993     WRIST SURGERY Left      Current Outpatient Prescriptions   Medication Sig Dispense Refill     triamcinolone (KENALOG) 0.1 % cream Apply sparingly to affected area two times daily for 5 days. 15 g 0     ALPRAZolam (XANAX) 0.25 MG tablet TAKE 1 TABLET BY MOUTH 3 TIMES DAILY AS NEEDED FOR ANXIETY 30 tablet 0     atenolol (TENORMIN) 50 MG tablet TAKE 1 TABLET (50 MG) BY MOUTH 2 TIMES DAILY 180 tablet 1     traZODone (DESYREL) 100 MG tablet Take 1 tablet (100 mg) by mouth nightly as needed for sleep 90 tablet 2     Fexofenadine HCl (ALLEGRA PO) Take 30 mg by mouth daily       XALATAN 0.005 % OP SOLN  (Patient taking differently: both eyes one drop nightly)       omeprazole (PRILOSEC) 20 MG CR capsule TAKE 1 CAPSULE BY MOUTH ONCE DAILY (Patient not taking: Reported on 7/19/2017) 90 capsule 3     OTC products: None, except as noted above    Allergies   Allergen Reactions     Erythromycin Shortness Of Breath     Msir [Morphine Sulfate] Nausea     Intollerant to Morphine Sulfate: Nausea,vomiting     Penicillins Itching     Reglan Other (See Comments)     Muscle spasms in throat      Latex Allergy: NO    Social History   Substance Use Topics     Smoking status: Former Smoker     Packs/day: 0.50     Years: 25.00     Quit date: 10/1/1997     Smokeless tobacco: Never Used      Comment: quit 1997     Alcohol use 1.0 oz/week     2 Glasses of wine per week      Comment: 2 glasses of wine daily     History   Drug Use  "No       REVIEW OF SYSTEMS:                                                    Constitutional, HEENT, cardiovascular, pulmonary, gi and gu systems are negative, except as otherwise noted.      EXAM:                                                    /80 (BP Location: Right arm, Patient Position: Chair, Cuff Size: Adult Regular)  Pulse 53  Temp 97.6  F (36.4  C) (Oral)  Ht 5' 6\" (1.676 m)  Wt 138 lb 1.6 oz (62.6 kg)  SpO2 97%  Breastfeeding? No  BMI 22.29 kg/m2    GENERAL APPEARANCE: healthy, alert and no distress     EYES: EOMI, PERRL     HENT: ear canals and TM's normal and nose and mouth without ulcers or lesions     NECK: no adenopathy, no asymmetry, masses, or scars and thyroid normal to palpation     RESP: lungs clear to auscultation - no rales, rhonchi or wheezes     CV: regular rates and rhythm, normal S1 S2, no S3 or S4 and no murmur, click or rub     ABDOMEN:  soft, nontender, no HSM or masses and bowel sounds normal     MS: extremities normal- no gross deformities noted, no evidence of inflammation in joints, FROM in all extremities.     SKIN: no suspicious lesions or rashes     NEURO: Normal strength and tone, sensory exam grossly normal, mentation intact and speech normal     PSYCH: mentation appears normal. and affect normal/bright     LYMPHATICS: No axillary, cervical, or supraclavicular nodes    DIAGNOSTICS:                                                    Coagulation Studies (e.g. INR, PTT, platelet count)    Recent Labs   Lab Test  04/17/17   1613  02/12/16   1700   02/27/15   2243  02/27/15   1840   HGB  13.2  12.3   < >   --   12.8   PLT  224  187   < >   --   277   INR   --    --    --   0.83*  0.88   NA  139  134   < >   --   138   POTASSIUM  4.2  4.1   < >   --   3.7   CR  0.79  0.74   < >   --   0.64    < > = values in this interval not displayed.        IMPRESSION:                                                    Reason for surgery/procedure: spinal stenosis and worsening " pain      The proposed surgical procedure is considered INTERMEDIATE risk.    REVISED CARDIAC RISK INDEX  The patient has the following serious cardiovascular risks for perioperative complications such as (MI, PE, VFib and 3  AV Block):  No serious cardiac risks  INTERPRETATION: 1 risks: Class II (low risk - 0.9% complication rate)    The patient has the following additional risks for perioperative complications:  No identified additional risks        RECOMMENDATIONS:                                                          --Patient is to take all scheduled medications on the day of surgery EXCEPT for modifications listed below.    APPROVAL GIVEN to proceed with proposed procedure, without further diagnostic evaluation       Signed Electronically by: Jaz Donovan NP    Copy of this evaluation report is provided to requesting physician.    [facility] Preop Guidelines

## 2017-07-19 NOTE — PROGRESS NOTES
Subjective:    HPI                    Objective:    System    Physical Exam    General     ROS    Assessment/Plan:      DISCHARGE REPORT    Progress reporting period is from 5/4/2017 to 6/1/2017.       SUBJECTIVE  Subjective changes noted by patient:  As of Denise's last appointment she stated she is doing much better.  She has the greatest pain in the morning for about 15 minutes.  She is able to walk for 1 mile and perform gardening.     Current pain level is 0/10 (morning 6/10  1/2 hour).     Previous pain level was  9/10.   Changes in function:  Yes (See Goal flowsheet attached for changes in current functional level)  Adverse reaction to treatment or activity: None    OBJECTIVE  Changes noted in objective findings:  Yes,   Objective: Lumbar flex-100, ext-100, R SG- 80, L SG-100,      ASSESSMENT/PLAN  Updated problem list and treatment plan: Diagnosis 1:  LBP  Pain -  hot/cold therapy  Decreased ROM/flexibility - manual therapy and therapeutic exercise  Decreased strength - therapeutic exercise and therapeutic activities  STG/LTGs have been met or progress has been made towards goals:  Yes (See Goal flow sheet completed today.)  Assessment of Progress: The patient's condition is improving.  Self Management Plans:  Patient has been instructed in a home treatment program.    Denise continues to require the following intervention to meet STG and LTG's:  PT intervention is no longer required to meet STG/LTG. Pt did not return for further PT     Recommendations:  This patient is ready to be discharged from therapy and continue their home treatment program.    Please refer to the daily flowsheet for treatment today, total treatment time and time spent performing 1:1 timed codes.

## 2017-07-19 NOTE — MR AVS SNAPSHOT
After Visit Summary   7/19/2017    Denise Ralph    MRN: [id number]           Patient Information     Date Of Birth          1955        Visit Information        Provider Department      7/19/2017 1:00 PM Jaz Donovan NP [facility]        Today's Diagnoses     Preop general physical exam    -  1    Pre-operative laboratory examination        Chronic bilateral low back pain with left-sided sciatica          Care Instructions      Before Your Surgery      Call your surgeon if there is any change in your health. This includes signs of a cold or flu (such as a sore throat, runny nose, cough, rash or fever).    Do not smoke, drink alcohol or take over the counter medicine (unless your surgeon or primary care doctor tells you to) for the 24 hours before and after surgery.    If you take prescribed drugs: Follow your doctor s orders about which medicines to take and which to stop until after surgery.    Eating and drinking prior to surgery: follow the instructions from your surgeon    Take a shower or bath the night before surgery. Use the soap your surgeon gave you to gently clean your skin. If you do not have soap from your surgeon, use your regular soap. Do not shave or scrub the surgery site.  Wear clean pajamas and have clean sheets on your bed.           Follow-ups after your visit        Your next 10 appointments already scheduled     Aug 03, 2017   Procedure with Jay Dee MD   [facility] (--)    [address] [facility] [id number] [phone]              Future tests that were ordered for you today     Open Future Orders        Priority Expected Expires Ordered    ABO/Rh type and screen Routine 8/2/2017 8/3/2017 7/18/2017            Who to contact     If you have questions or need follow up information about today's clinic visit or your schedule please contact [facility] directly at [phone].  Normal or  "non-critical lab and imaging results will be communicated to you by MyChart, letter or phone within 4 business days after the clinic has received the results. If you do not hear from us within 7 days, please contact the clinic through [employer] or phone. If you have a critical or abnormal lab result, we will notify you by phone as soon as possible.  Submit refill requests through [employer] or call your pharmacy and they will forward the refill request to us. Please allow 3 business days for your refill to be completed.          Additional Information About Your Visit        [employer] Information     [employer] gives you secure access to your electronic health record. If you see a primary care provider, you can also send messages to your care team and make appointments. If you have questions, please call your primary care clinic.  If you do not have a primary care provider, please call [phone] and they will assist you.        Care EveryWhere ID     This is your Care EveryWhere ID. This could be used by other organizations to access your [place] medical records  [id number]        Your Vitals Were     Pulse Temperature Height Pulse Oximetry Breastfeeding? BMI (Body Mass Index)    53 97.6  F (36.4  C) (Oral) 5' 6\" (1.676 m) 97% No 22.29 kg/m2       Blood Pressure from Last 3 Encounters:   07/19/17 130/80   07/06/17 126/70   06/14/17 150/86    Weight from Last 3 Encounters:   07/19/17 138 lb 1.6 oz (62.6 kg)   07/06/17 137 lb (62.1 kg)   06/14/17 138 lb 12.8 oz (63 kg)              We Performed the Following     CBC with platelets     INR     Methicillin Resistant Staph Aureus PCR          Today's Medication Changes          These changes are accurate as of: 7/19/17  1:30 PM.  If you have any questions, ask your nurse or doctor.               These medicines have changed or have updated prescriptions.        Dose/Directions    XALATAN 0.005 % ophthalmic solution   This may have changed:  See the new instructions. "   Generic drug:  latanoprost        Refills:  0                Primary Care Provider Office Phone # Fax #    Juan F Smallwood [phone]       [facility] [address] [facility] [address]        Equal Access to Services     WALTERELAINE DONNA AH: Hadii aad ku hadasho Soomaali, waaxda luqadaha, qaybta kaalmada adeegyada, waxay idiin hayaan adeeg khbaldev lamaria fernanda bull. So [facility] [phone].    ATENCIÓN: Si habla español, tiene a simpson disposición servicios gratuitos de asistencia lingüística. Llame al [phone].    We comply with applicable federal civil rights laws and [place] laws. We do not discriminate on the basis of race, color, national origin, age, disability sex, sexual orientation or gender identity.            Thank you!     Thank you for choosing [facility]  for your care. Our goal is always to provide you with excellent care. Hearing back from our patients is one way we can continue to improve our services. Please take a few minutes to complete the written survey that you may receive in the mail after your visit with us. Thank you!             Your Updated Medication List - Protect others around you: Learn how to safely use, store and throw away your medicines at www.disposemymeds.org.          This list is accurate as of: 7/19/17  1:30 PM.  Always use your most recent med list.                   Brand Name Dispense Instructions for use Diagnosis    ALLEGRA PO      Take 30 mg by mouth daily        ALPRAZolam 0.25 MG tablet    XANAX    30 tablet    TAKE 1 TABLET BY MOUTH 3 TIMES DAILY AS NEEDED FOR ANXIETY    Anxiety       atenolol 50 MG tablet    TENORMIN    180 tablet    TAKE 1 TABLET (50 MG) BY MOUTH 2 TIMES DAILY    Essential hypertension, benign, Palpitations       omeprazole 20 MG CR capsule    priLOSEC    90 capsule    TAKE 1 CAPSULE BY MOUTH ONCE DAILY    Gastroesophageal reflux disease without esophagitis       traZODone 100 MG tablet    DESYREL    90  tablet    Take 1 tablet (100 mg) by mouth nightly as needed for sleep    Primary insomnia       triamcinolone 0.1 % cream    KENALOG    15 g    Apply sparingly to affected area two times daily for 5 days.    Eczema of external ear, left       XALATAN 0.005 % ophthalmic solution   Generic drug:  latanoprost

## 2017-07-24 RX ORDER — LATANOPROST 50 UG/ML
1 SOLUTION/ DROPS OPHTHALMIC AT BEDTIME
COMMUNITY

## 2017-07-24 NOTE — PHARMACY-ADMISSION MEDICATION HISTORY
Medication reconciliation interview completed by pre-admitting nurse Sunshine Frank, reviewed by pharmacy. Removed omeprazole per pre-admitting nurse note 'patient not taking' & re-entered xalatan to reflect dosing (dose was entered in comments). No further clarifications needed.       Prior to Admission medications    Medication Sig Last Dose Taking? Auth Provider   latanoprost (XALATAN) 0.005 % ophthalmic solution Place 1 drop into both eyes At Bedtime  Yes Unknown, Entered By History   triamcinolone (KENALOG) 0.1 % cream Apply sparingly to affected area two times daily for 5 days.  Yes Jeanie Trivedi MD   ALPRAZolam (XANAX) 0.25 MG tablet TAKE 1 TABLET BY MOUTH 3 TIMES DAILY AS NEEDED FOR ANXIETY  Yes Eula Oakes MD   atenolol (TENORMIN) 50 MG tablet TAKE 1 TABLET (50 MG) BY MOUTH 2 TIMES DAILY  Yes Juan F Smallwood MD   traZODone (DESYREL) 100 MG tablet Take 1 tablet (100 mg) by mouth nightly as needed for sleep  Yes Juan F Smallwood MD   Fexofenadine HCl (ALLEGRA PO) Take 30 mg by mouth daily   Reported, Patient

## 2017-07-26 ENCOUNTER — TELEPHONE (OUTPATIENT)
Dept: NEUROSURGERY | Facility: CLINIC | Age: 62
End: 2017-07-26

## 2017-07-26 DIAGNOSIS — M54.16 LUMBAR RADICULAR PAIN: Primary | ICD-10-CM

## 2017-07-26 RX ORDER — HYDROCODONE BITARTRATE AND ACETAMINOPHEN 5; 325 MG/1; MG/1
1 TABLET ORAL EVERY 6 HOURS PRN
Qty: 35 TABLET | Refills: 0 | Status: ON HOLD | OUTPATIENT
Start: 2017-07-26 | End: 2017-08-06

## 2017-07-26 NOTE — TELEPHONE ENCOUNTER
Rx placed at  [facility]. Patient notified. Also reminded patient to bring Photo ID, and to call back if someone else is authorized to  for patient.     Laura Iqbal, [facility]

## 2017-07-26 NOTE — TELEPHONE ENCOUNTER
Spoke to pt and she is having severe pain. She took an old Hydrocodone and it really helped.  Her surgery is 8-3-2017. We will refill this for her to  today.

## 2017-07-26 NOTE — TELEPHONE ENCOUNTER
Pt states the pain she is experiencing now is unbearable. She stopped taking Advil on Monday 7/24/17 as directed prior to her lumbar fusion (scheduled for 8/3/17). Cannot take Tylenol d/t the potential harmful effects of her liver, as her counts go up when she takes too much Tylenol. States that she has not been taking prescription pain medication, other than at time of her last injection (was prescribed Hydrocodone). Although, this morning at 4:30am she took one of her remaining Hydrocodone along with an anti-nausea pill. Requesting a call back to discuss recommendations.

## 2017-07-31 NOTE — H&P (VIEW-ONLY)
[facility]  [address] [facility] [address]  [phone]  Dept: [phone]    PRE-OP EVALUATION:  Today's date: 2017    Denise Ralph (: 1955) presents for pre-operative evaluation assessment as requested by Dr. Dee.  She requires evaluation and anesthesia risk assessment prior to undergoing surgery/procedure for treatment of OPTICAL TRACKING SYSTEM FUSION SPINE POSTERIOR LUMBAR ONE LEVEL  .  Proposed procedure: Decompression and Fusion      Date of Surgery/ Procedure: 2017  Time of Surgery/ Procedure: 11:30  Hospital/Surgical Facility: [facility]   Fax number for surgical facility:   Primary Physician: Juan F Smallwood  Type of Anesthesia Anticipated: General    Patient has a Health Care Directive or Living Will:  YES at home needs signed and will bring in day of surgery    1. NO - Do you have a history of heart attack, stroke, stent, bypass or surgery on an artery in the head, neck, heart or legs?  2. NO - Do you ever have any pain or discomfort in your chest?  3. NO - Do you have a history of  Heart Failure?  4. NO - Are you troubled by shortness of breath when: walking on the level, up a slight hill or at night?  5. NO - Do you currently have a cold, bronchitis or other respiratory infection?  6. NO - Do you have a cough, shortness of breath or wheezing?  7. YES - Do you sometimes get pains in the calves of your legs when you walk?  8. NO - Do you or anyone in your family have previous history of blood clots?  9. NO - Do you or does anyone in your family have a serious bleeding problem such as prolonged bleeding following surgeries or cuts?  10. NO - Have you ever had problems with anemia or been told to take iron pills?  11. NO - Have you had any abnormal blood loss such as black, tarry or bloody stools, or abnormal vaginal bleeding?  12. NO - Have you ever had a blood transfusion?  13. NO - Have you or any of your relatives ever had problems with  anesthesia?  14. NO - Do you have sleep apnea, excessive snoring or daytime drowsiness?  15. NO - Do you have any prosthetic heart valves?  16. NO - Do you have prosthetic joints?  17. NO - Is there any chance that you may be pregnant?        HPI:                                                      Brief HPI related to upcoming procedure: worsening pain in the lumbar spine due to severe stenosis with shifting of the vertebrae          MEDICAL HISTORY:                                                    Patient Active Problem List    Diagnosis Date Noted     Bilateral low back pain with left-sided sciatica 05/04/2017     Priority: Medium     Acute bilateral low back pain with right-sided sciatica 05/04/2017     Priority: Medium     Hypertension goal BP (blood pressure) < 140/80 10/06/2015     Priority: Medium     Glaucoma 12/05/2014     Priority: Medium     GERD (gastroesophageal reflux disease) 09/05/2013     Priority: Medium     Advanced directives, counseling/discussion 10/16/2012     Priority: Medium     Discussed Advance Directive planning with patient; information given to patient to review.         Basal cell carcinoma 08/01/2009     Priority: Medium     Atrophy of vulva 07/29/2007     Priority: Medium     Disorder of bone and cartilage 07/26/2007     Priority: Medium     Problem list name updated by automated process. Provider to review       Squamous cell carcinoma 04/01/2007     Priority: Medium     Essential hypertension, benign 09/07/2006     Priority: Medium     Irritable bowel syndrome 08/09/2004     Priority: Medium     Anxiety state 08/09/2004     Priority: Medium     Problem list name updated by automated process. Provider to review        Past Medical History:   Diagnosis Date     Anxiety     Gets panic attacks     Depressive disorder, not elsewhere classified      Essential hypertension, benign     No cardiologist     Glaucoma      Irritable bowel syndrome     has had neg colonoscopy & EGD w/u      Lumbar degenerative disc disease 1996    Had PT, traction, no surgery     Other chronic pain     JOint pain for many years.     PONV (postoperative nausea and vomiting)      Sciatica 3/06    R thigh;  MRI = R L2-3 disc      Past Surgical History:   Procedure Laterality Date     EYE SURGERY Bilateral     Treatment of glaucoma     HYSTERECTOMY       HYSTERECTOMY VAGINAL       LAPAROSCOPIC CHOLECYSTECTOMY  2002    with repeat operation because of bile leak     Left shoulder decomp surgery for impingement  approx 1993     WRIST SURGERY Left      Current Outpatient Prescriptions   Medication Sig Dispense Refill     triamcinolone (KENALOG) 0.1 % cream Apply sparingly to affected area two times daily for 5 days. 15 g 0     ALPRAZolam (XANAX) 0.25 MG tablet TAKE 1 TABLET BY MOUTH 3 TIMES DAILY AS NEEDED FOR ANXIETY 30 tablet 0     atenolol (TENORMIN) 50 MG tablet TAKE 1 TABLET (50 MG) BY MOUTH 2 TIMES DAILY 180 tablet 1     traZODone (DESYREL) 100 MG tablet Take 1 tablet (100 mg) by mouth nightly as needed for sleep 90 tablet 2     Fexofenadine HCl (ALLEGRA PO) Take 30 mg by mouth daily       XALATAN 0.005 % OP SOLN  (Patient taking differently: both eyes one drop nightly)       omeprazole (PRILOSEC) 20 MG CR capsule TAKE 1 CAPSULE BY MOUTH ONCE DAILY (Patient not taking: Reported on 7/19/2017) 90 capsule 3     OTC products: None, except as noted above    Allergies   Allergen Reactions     Erythromycin Shortness Of Breath     Msir [Morphine Sulfate] Nausea     Intollerant to Morphine Sulfate: Nausea,vomiting     Penicillins Itching     Reglan Other (See Comments)     Muscle spasms in throat      Latex Allergy: NO    Social History   Substance Use Topics     Smoking status: Former Smoker     Packs/day: 0.50     Years: 25.00     Quit date: 10/1/1997     Smokeless tobacco: Never Used      Comment: quit 1997     Alcohol use 1.0 oz/week     2 Glasses of wine per week      Comment: 2 glasses of wine daily     History   Drug Use  "No       REVIEW OF SYSTEMS:                                                    Constitutional, HEENT, cardiovascular, pulmonary, gi and gu systems are negative, except as otherwise noted.      EXAM:                                                    /80 (BP Location: Right arm, Patient Position: Chair, Cuff Size: Adult Regular)  Pulse 53  Temp 97.6  F (36.4  C) (Oral)  Ht 5' 6\" (1.676 m)  Wt 138 lb 1.6 oz (62.6 kg)  SpO2 97%  Breastfeeding? No  BMI 22.29 kg/m2    GENERAL APPEARANCE: healthy, alert and no distress     EYES: EOMI, PERRL     HENT: ear canals and TM's normal and nose and mouth without ulcers or lesions     NECK: no adenopathy, no asymmetry, masses, or scars and thyroid normal to palpation     RESP: lungs clear to auscultation - no rales, rhonchi or wheezes     CV: regular rates and rhythm, normal S1 S2, no S3 or S4 and no murmur, click or rub     ABDOMEN:  soft, nontender, no HSM or masses and bowel sounds normal     MS: extremities normal- no gross deformities noted, no evidence of inflammation in joints, FROM in all extremities.     SKIN: no suspicious lesions or rashes     NEURO: Normal strength and tone, sensory exam grossly normal, mentation intact and speech normal     PSYCH: mentation appears normal. and affect normal/bright     LYMPHATICS: No axillary, cervical, or supraclavicular nodes    DIAGNOSTICS:                                                    Coagulation Studies (e.g. INR, PTT, platelet count)    Recent Labs   Lab Test  04/17/17   1613  02/12/16   1700   02/27/15   2243  02/27/15   1840   HGB  13.2  12.3   < >   --   12.8   PLT  224  187   < >   --   277   INR   --    --    --   0.83*  0.88   NA  139  134   < >   --   138   POTASSIUM  4.2  4.1   < >   --   3.7   CR  0.79  0.74   < >   --   0.64    < > = values in this interval not displayed.        IMPRESSION:                                                    Reason for surgery/procedure: spinal stenosis and worsening " pain      The proposed surgical procedure is considered INTERMEDIATE risk.    REVISED CARDIAC RISK INDEX  The patient has the following serious cardiovascular risks for perioperative complications such as (MI, PE, VFib and 3  AV Block):  No serious cardiac risks  INTERPRETATION: 1 risks: Class II (low risk - 0.9% complication rate)    The patient has the following additional risks for perioperative complications:  No identified additional risks        RECOMMENDATIONS:                                                          --Patient is to take all scheduled medications on the day of surgery EXCEPT for modifications listed below.    APPROVAL GIVEN to proceed with proposed procedure, without further diagnostic evaluation       Signed Electronically by: Jaz oDnovan NP    Copy of this evaluation report is provided to requesting physician.    [place] Preop Guidelines

## 2017-08-02 ENCOUNTER — HOSPITAL ENCOUNTER (OUTPATIENT)
Dept: LAB | Facility: CLINIC | Age: 62
Discharge: HOME | End: 2017-08-02
Attending: NEUROLOGICAL SURGERY | Admitting: NEUROLOGICAL SURGERY
Payer: COMMERCIAL

## 2017-08-02 DIAGNOSIS — Z01.812 PRE-OPERATIVE LABORATORY EXAMINATION: ICD-10-CM

## 2017-08-02 LAB
ABO + RH BLD: NORMAL
ABO + RH BLD: NORMAL
BLD GP AB SCN SERPL QL: NORMAL
BLOOD BANK CMNT PATIENT-IMP: NORMAL
SPECIMEN EXP DATE BLD: NORMAL

## 2017-08-02 PROCEDURE — 86901 BLOOD TYPING SEROLOGIC RH(D): CPT | Performed by: NEUROLOGICAL SURGERY

## 2017-08-02 PROCEDURE — 36415 COLL VENOUS BLD VENIPUNCTURE: CPT | Performed by: NEUROLOGICAL SURGERY

## 2017-08-02 PROCEDURE — 86850 RBC ANTIBODY SCREEN: CPT | Performed by: NEUROLOGICAL SURGERY

## 2017-08-02 PROCEDURE — 86900 BLOOD TYPING SEROLOGIC ABO: CPT | Performed by: NEUROLOGICAL SURGERY

## 2017-08-03 ENCOUNTER — APPOINTMENT (OUTPATIENT)
Dept: GENERAL RADIOLOGY | Facility: CLINIC | Age: 62
DRG: 460 | End: 2017-08-03
Attending: NEUROLOGICAL SURGERY
Payer: COMMERCIAL

## 2017-08-03 ENCOUNTER — ANESTHESIA (OUTPATIENT)
Dept: SURGERY | Facility: CLINIC | Age: 62
DRG: 460 | End: 2017-08-03
Payer: COMMERCIAL

## 2017-08-03 ENCOUNTER — ANESTHESIA EVENT (OUTPATIENT)
Dept: SURGERY | Facility: CLINIC | Age: 62
DRG: 460 | End: 2017-08-03
Payer: COMMERCIAL

## 2017-08-03 ENCOUNTER — HOSPITAL ENCOUNTER (INPATIENT)
Facility: CLINIC | Age: 62
LOS: 3 days | Discharge: HOME | DRG: 460 | End: 2017-08-06
Attending: NEUROLOGICAL SURGERY | Admitting: NEUROLOGICAL SURGERY
Payer: COMMERCIAL

## 2017-08-03 DIAGNOSIS — Z98.1 S/P LUMBAR FUSION: Primary | ICD-10-CM

## 2017-08-03 LAB
CREAT SERPL-MCNC: 0.67 MG/DL (ref 0.52–1.04)
GFR SERPL CREATININE-BSD FRML MDRD: 89 ML/MIN/1.7M2
POTASSIUM SERPL-SCNC: 4 MMOL/L (ref 3.4–5.3)

## 2017-08-03 PROCEDURE — 25000300 ZZH OR RX SURGIFLO HEMOSTATIC MATRIX 10ML 199102S OPNP: Performed by: NEUROLOGICAL SURGERY

## 2017-08-03 PROCEDURE — 71000013 ZZH RECOVERY PHASE 1 LEVEL 1 EA ADDTL HR: Performed by: NEUROLOGICAL SURGERY

## 2017-08-03 PROCEDURE — 25000132 ZZH RX MED GY IP 250 OP 250 PS 637: Performed by: NEUROLOGICAL SURGERY

## 2017-08-03 PROCEDURE — 25000128 H RX IP 250 OP 636: Performed by: ANESTHESIOLOGY

## 2017-08-03 PROCEDURE — 36000071 ZZH SURGERY LEVEL 5 W FLUORO 1ST 30 MIN: Performed by: NEUROLOGICAL SURGERY

## 2017-08-03 PROCEDURE — 40000278 XR SURGERY CARM FLUORO LESS THAN 5 MIN: Mod: TC

## 2017-08-03 PROCEDURE — 22633 ARTHRD CMBN 1NTRSPC LUMBAR: CPT | Performed by: NEUROLOGICAL SURGERY

## 2017-08-03 PROCEDURE — 37000009 ZZH ANESTHESIA TECHNICAL FEE, EACH ADDTL 15 MIN: Performed by: NEUROLOGICAL SURGERY

## 2017-08-03 PROCEDURE — 25000125 ZZHC RX 250: Performed by: NURSE ANESTHETIST, CERTIFIED REGISTERED

## 2017-08-03 PROCEDURE — 22853 INSJ BIOMECHANICAL DEVICE: CPT | Performed by: NEUROLOGICAL SURGERY

## 2017-08-03 PROCEDURE — 25000128 H RX IP 250 OP 636: Performed by: NURSE ANESTHETIST, CERTIFIED REGISTERED

## 2017-08-03 PROCEDURE — 36415 COLL VENOUS BLD VENIPUNCTURE: CPT | Performed by: ANESTHESIOLOGY

## 2017-08-03 PROCEDURE — 27210995 ZZH RX 272: Performed by: NEUROLOGICAL SURGERY

## 2017-08-03 PROCEDURE — 36000069 ZZH SURGERY LEVEL 5 EA 15 ADDTL MIN: Performed by: NEUROLOGICAL SURGERY

## 2017-08-03 PROCEDURE — 25000128 H RX IP 250 OP 636

## 2017-08-03 PROCEDURE — 0ST20ZZ RESECTION OF LUMBAR VERTEBRAL DISC, OPEN APPROACH: ICD-10-PCS | Performed by: NEUROLOGICAL SURGERY

## 2017-08-03 PROCEDURE — 0SG0071 FUSION OF LUMBAR VERTEBRAL JOINT WITH AUTOLOGOUS TISSUE SUBSTITUTE, POSTERIOR APPROACH, POSTERIOR COLUMN, OPEN APPROACH: ICD-10-PCS | Performed by: NEUROLOGICAL SURGERY

## 2017-08-03 PROCEDURE — 27210794 ZZH OR GENERAL SUPPLY STERILE: Performed by: NEUROLOGICAL SURGERY

## 2017-08-03 PROCEDURE — C1762 CONN TISS, HUMAN(INC FASCIA): HCPCS | Performed by: NEUROLOGICAL SURGERY

## 2017-08-03 PROCEDURE — 25000128 H RX IP 250 OP 636: Performed by: NEUROLOGICAL SURGERY

## 2017-08-03 PROCEDURE — 40000985 XR LUMBAR SPINE PORT 1 VW: Mod: TC

## 2017-08-03 PROCEDURE — 25000125 ZZHC RX 250: Performed by: NEUROLOGICAL SURGERY

## 2017-08-03 PROCEDURE — 37000008 ZZH ANESTHESIA TECHNICAL FEE, 1ST 30 MIN: Performed by: NEUROLOGICAL SURGERY

## 2017-08-03 PROCEDURE — C1713 ANCHOR/SCREW BN/BN,TIS/BN: HCPCS | Performed by: NEUROLOGICAL SURGERY

## 2017-08-03 PROCEDURE — 12000007 ZZH R&B INTERMEDIATE

## 2017-08-03 PROCEDURE — 84132 ASSAY OF SERUM POTASSIUM: CPT | Performed by: ANESTHESIOLOGY

## 2017-08-03 PROCEDURE — 25800025 ZZH RX 258: Performed by: NEUROLOGICAL SURGERY

## 2017-08-03 PROCEDURE — 40000306 ZZH STATISTIC PRE PROC ASSESS II: Performed by: NEUROLOGICAL SURGERY

## 2017-08-03 PROCEDURE — 82565 ASSAY OF CREATININE: CPT | Performed by: ANESTHESIOLOGY

## 2017-08-03 PROCEDURE — 27810322 ZZHC OR SPINE - CAGE/SPACER/DISK/CORD/CONNECTOR OPNP: Performed by: NEUROLOGICAL SURGERY

## 2017-08-03 PROCEDURE — 0SG00AJ FUSION OF LUMBAR VERTEBRAL JOINT WITH INTERBODY FUSION DEVICE, POSTERIOR APPROACH, ANTERIOR COLUMN, OPEN APPROACH: ICD-10-PCS | Performed by: NEUROLOGICAL SURGERY

## 2017-08-03 PROCEDURE — 8E0WXBZ COMPUTER ASSISTED PROCEDURE OF TRUNK REGION: ICD-10-PCS | Performed by: NEUROLOGICAL SURGERY

## 2017-08-03 PROCEDURE — 25000566 ZZH SEVOFLURANE, EA 15 MIN: Performed by: NEUROLOGICAL SURGERY

## 2017-08-03 PROCEDURE — 22840 INSERT SPINE FIXATION DEVICE: CPT | Performed by: NEUROLOGICAL SURGERY

## 2017-08-03 PROCEDURE — 22214 INCIS 1 VERTEBRAL SEG LUMBAR: CPT | Performed by: NEUROLOGICAL SURGERY

## 2017-08-03 PROCEDURE — 71000012 ZZH RECOVERY PHASE 1 LEVEL 1 FIRST HR: Performed by: NEUROLOGICAL SURGERY

## 2017-08-03 DEVICE — GRAFT BONE MAGNIFUSE 1CMX5CM 7509215: Type: IMPLANTABLE DEVICE | Site: SPINE LUMBAR | Status: FUNCTIONAL

## 2017-08-03 DEVICE — IMPLANTABLE DEVICE
Type: IMPLANTABLE DEVICE | Site: SPINE LUMBAR | Status: NON-FUNCTIONAL
Removed: 2021-06-11

## 2017-08-03 RX ORDER — ATENOLOL 50 MG/1
50 TABLET ORAL 2 TIMES DAILY
Status: DISCONTINUED | OUTPATIENT
Start: 2017-08-03 | End: 2017-08-06 | Stop reason: HOSPADM

## 2017-08-03 RX ORDER — DIMENHYDRINATE 50 MG/ML
25 INJECTION, SOLUTION INTRAMUSCULAR; INTRAVENOUS
Status: DISCONTINUED | OUTPATIENT
Start: 2017-08-03 | End: 2017-08-03 | Stop reason: HOSPADM

## 2017-08-03 RX ORDER — ACETAMINOPHEN 325 MG/1
650 TABLET ORAL EVERY 4 HOURS PRN
Status: DISCONTINUED | OUTPATIENT
Start: 2017-08-06 | End: 2017-08-06 | Stop reason: HOSPADM

## 2017-08-03 RX ORDER — KETOROLAC TROMETHAMINE 30 MG/ML
30 INJECTION, SOLUTION INTRAMUSCULAR; INTRAVENOUS
Status: DISCONTINUED | OUTPATIENT
Start: 2017-08-03 | End: 2017-08-03 | Stop reason: HOSPADM

## 2017-08-03 RX ORDER — NEOSTIGMINE METHYLSULFATE 1 MG/ML
VIAL (ML) INJECTION PRN
Status: DISCONTINUED | OUTPATIENT
Start: 2017-08-03 | End: 2017-08-03

## 2017-08-03 RX ORDER — METOCLOPRAMIDE 10 MG/1
10 TABLET ORAL EVERY 6 HOURS PRN
Status: DISCONTINUED | OUTPATIENT
Start: 2017-08-03 | End: 2017-08-03

## 2017-08-03 RX ORDER — PROCHLORPERAZINE MALEATE 5 MG
5-10 TABLET ORAL EVERY 6 HOURS PRN
Status: DISCONTINUED | OUTPATIENT
Start: 2017-08-03 | End: 2017-08-06 | Stop reason: HOSPADM

## 2017-08-03 RX ORDER — METOCLOPRAMIDE HYDROCHLORIDE 5 MG/ML
10 INJECTION INTRAMUSCULAR; INTRAVENOUS EVERY 6 HOURS PRN
Status: DISCONTINUED | OUTPATIENT
Start: 2017-08-03 | End: 2017-08-03

## 2017-08-03 RX ORDER — ACETAMINOPHEN 325 MG/1
975 TABLET ORAL EVERY 8 HOURS
Status: DISCONTINUED | OUTPATIENT
Start: 2017-08-03 | End: 2017-08-03

## 2017-08-03 RX ORDER — LABETALOL HYDROCHLORIDE 5 MG/ML
10 INJECTION, SOLUTION INTRAVENOUS
Status: DISCONTINUED | OUTPATIENT
Start: 2017-08-03 | End: 2017-08-03 | Stop reason: HOSPADM

## 2017-08-03 RX ORDER — HYDRALAZINE HYDROCHLORIDE 20 MG/ML
2.5-5 INJECTION INTRAMUSCULAR; INTRAVENOUS EVERY 10 MIN PRN
Status: DISCONTINUED | OUTPATIENT
Start: 2017-08-03 | End: 2017-08-03 | Stop reason: HOSPADM

## 2017-08-03 RX ORDER — NALOXONE HYDROCHLORIDE 0.4 MG/ML
.1-.4 INJECTION, SOLUTION INTRAMUSCULAR; INTRAVENOUS; SUBCUTANEOUS
Status: DISCONTINUED | OUTPATIENT
Start: 2017-08-03 | End: 2017-08-06 | Stop reason: HOSPADM

## 2017-08-03 RX ORDER — HYDROMORPHONE HYDROCHLORIDE 1 MG/ML
.3-.5 INJECTION, SOLUTION INTRAMUSCULAR; INTRAVENOUS; SUBCUTANEOUS
Status: DISCONTINUED | OUTPATIENT
Start: 2017-08-03 | End: 2017-08-06 | Stop reason: HOSPADM

## 2017-08-03 RX ORDER — ONDANSETRON 4 MG/1
4 TABLET, ORALLY DISINTEGRATING ORAL EVERY 30 MIN PRN
Status: DISCONTINUED | OUTPATIENT
Start: 2017-08-03 | End: 2017-08-03 | Stop reason: HOSPADM

## 2017-08-03 RX ORDER — FENTANYL CITRATE 50 UG/ML
INJECTION, SOLUTION INTRAMUSCULAR; INTRAVENOUS PRN
Status: DISCONTINUED | OUTPATIENT
Start: 2017-08-03 | End: 2017-08-03

## 2017-08-03 RX ORDER — CEFAZOLIN SODIUM 2 G/100ML
2 INJECTION, SOLUTION INTRAVENOUS EVERY 8 HOURS
Status: DISCONTINUED | OUTPATIENT
Start: 2017-08-03 | End: 2017-08-06

## 2017-08-03 RX ORDER — ACETAMINOPHEN 325 MG/1
650 TABLET ORAL EVERY 4 HOURS PRN
Status: DISCONTINUED | OUTPATIENT
Start: 2017-08-06 | End: 2017-08-03

## 2017-08-03 RX ORDER — DIPHENHYDRAMINE HYDROCHLORIDE 50 MG/ML
25 INJECTION INTRAMUSCULAR; INTRAVENOUS EVERY 6 HOURS PRN
Status: DISCONTINUED | OUTPATIENT
Start: 2017-08-03 | End: 2017-08-06 | Stop reason: HOSPADM

## 2017-08-03 RX ORDER — ACETAMINOPHEN 325 MG/1
975 TABLET ORAL EVERY 8 HOURS
Status: COMPLETED | OUTPATIENT
Start: 2017-08-03 | End: 2017-08-06

## 2017-08-03 RX ORDER — GLYCOPYRROLATE 0.2 MG/ML
INJECTION, SOLUTION INTRAMUSCULAR; INTRAVENOUS PRN
Status: DISCONTINUED | OUTPATIENT
Start: 2017-08-03 | End: 2017-08-03

## 2017-08-03 RX ORDER — DIAZEPAM 5 MG
5 TABLET ORAL EVERY 6 HOURS PRN
Status: DISCONTINUED | OUTPATIENT
Start: 2017-08-03 | End: 2017-08-06 | Stop reason: HOSPADM

## 2017-08-03 RX ORDER — ATENOLOL 50 MG/1
50 TABLET ORAL DAILY
Status: DISCONTINUED | OUTPATIENT
Start: 2017-08-04 | End: 2017-08-03

## 2017-08-03 RX ORDER — TRAZODONE HYDROCHLORIDE 100 MG/1
100 TABLET ORAL
Status: DISCONTINUED | OUTPATIENT
Start: 2017-08-03 | End: 2017-08-05

## 2017-08-03 RX ORDER — ALPRAZOLAM 0.25 MG
0.25 TABLET ORAL
Status: DISCONTINUED | OUTPATIENT
Start: 2017-08-03 | End: 2017-08-06 | Stop reason: HOSPADM

## 2017-08-03 RX ORDER — DIPHENHYDRAMINE HCL 25 MG
25 CAPSULE ORAL EVERY 6 HOURS PRN
Status: DISCONTINUED | OUTPATIENT
Start: 2017-08-03 | End: 2017-08-06 | Stop reason: HOSPADM

## 2017-08-03 RX ORDER — OXYCODONE HYDROCHLORIDE 5 MG/1
5-10 TABLET ORAL
Status: DISCONTINUED | OUTPATIENT
Start: 2017-08-03 | End: 2017-08-03

## 2017-08-03 RX ORDER — ONDANSETRON 2 MG/ML
INJECTION INTRAMUSCULAR; INTRAVENOUS PRN
Status: DISCONTINUED | OUTPATIENT
Start: 2017-08-03 | End: 2017-08-03

## 2017-08-03 RX ORDER — ONDANSETRON 2 MG/ML
4 INJECTION INTRAMUSCULAR; INTRAVENOUS EVERY 30 MIN PRN
Status: DISCONTINUED | OUTPATIENT
Start: 2017-08-03 | End: 2017-08-03 | Stop reason: HOSPADM

## 2017-08-03 RX ORDER — CEFAZOLIN SODIUM 1 G/3ML
1 INJECTION, POWDER, FOR SOLUTION INTRAMUSCULAR; INTRAVENOUS SEE ADMIN INSTRUCTIONS
Status: DISCONTINUED | OUTPATIENT
Start: 2017-08-03 | End: 2017-08-03 | Stop reason: HOSPADM

## 2017-08-03 RX ORDER — DROPERIDOL 2.5 MG/ML
0.62 INJECTION, SOLUTION INTRAMUSCULAR; INTRAVENOUS
Status: DISCONTINUED | OUTPATIENT
Start: 2017-08-03 | End: 2017-08-03 | Stop reason: RX

## 2017-08-03 RX ORDER — SODIUM CHLORIDE, SODIUM LACTATE, POTASSIUM CHLORIDE, CALCIUM CHLORIDE 600; 310; 30; 20 MG/100ML; MG/100ML; MG/100ML; MG/100ML
INJECTION, SOLUTION INTRAVENOUS CONTINUOUS
Status: DISCONTINUED | OUTPATIENT
Start: 2017-08-03 | End: 2017-08-03 | Stop reason: HOSPADM

## 2017-08-03 RX ORDER — LATANOPROST 50 UG/ML
1 SOLUTION/ DROPS OPHTHALMIC AT BEDTIME
Status: DISCONTINUED | OUTPATIENT
Start: 2017-08-03 | End: 2017-08-06 | Stop reason: HOSPADM

## 2017-08-03 RX ORDER — HYDROMORPHONE HYDROCHLORIDE 2 MG/1
2-4 TABLET ORAL
Status: DISCONTINUED | OUTPATIENT
Start: 2017-08-03 | End: 2017-08-06 | Stop reason: HOSPADM

## 2017-08-03 RX ORDER — HYDROCODONE BITARTRATE AND ACETAMINOPHEN 5; 325 MG/1; MG/1
1 TABLET ORAL EVERY 6 HOURS PRN
Status: DISCONTINUED | OUTPATIENT
Start: 2017-08-03 | End: 2017-08-03

## 2017-08-03 RX ORDER — LIDOCAINE HYDROCHLORIDE 10 MG/ML
INJECTION, SOLUTION INFILTRATION; PERINEURAL PRN
Status: DISCONTINUED | OUTPATIENT
Start: 2017-08-03 | End: 2017-08-03

## 2017-08-03 RX ORDER — PROPOFOL 10 MG/ML
INJECTION, EMULSION INTRAVENOUS PRN
Status: DISCONTINUED | OUTPATIENT
Start: 2017-08-03 | End: 2017-08-03

## 2017-08-03 RX ORDER — BUPIVACAINE HYDROCHLORIDE AND EPINEPHRINE 5; 5 MG/ML; UG/ML
INJECTION, SOLUTION EPIDURAL; INTRACAUDAL; PERINEURAL PRN
Status: DISCONTINUED | OUTPATIENT
Start: 2017-08-03 | End: 2017-08-03 | Stop reason: HOSPADM

## 2017-08-03 RX ORDER — FENTANYL CITRATE 50 UG/ML
25-50 INJECTION, SOLUTION INTRAMUSCULAR; INTRAVENOUS
Status: DISCONTINUED | OUTPATIENT
Start: 2017-08-03 | End: 2017-08-03 | Stop reason: HOSPADM

## 2017-08-03 RX ORDER — DOCUSATE SODIUM 100 MG/1
100 CAPSULE, LIQUID FILLED ORAL 2 TIMES DAILY
Status: DISCONTINUED | OUTPATIENT
Start: 2017-08-03 | End: 2017-08-06 | Stop reason: HOSPADM

## 2017-08-03 RX ORDER — DEXAMETHASONE SODIUM PHOSPHATE 4 MG/ML
4 INJECTION, SOLUTION INTRA-ARTICULAR; INTRALESIONAL; INTRAMUSCULAR; INTRAVENOUS; SOFT TISSUE
Status: DISCONTINUED | OUTPATIENT
Start: 2017-08-03 | End: 2017-08-03 | Stop reason: HOSPADM

## 2017-08-03 RX ORDER — SODIUM CHLORIDE AND POTASSIUM CHLORIDE 150; 900 MG/100ML; MG/100ML
INJECTION, SOLUTION INTRAVENOUS CONTINUOUS
Status: DISCONTINUED | OUTPATIENT
Start: 2017-08-03 | End: 2017-08-06 | Stop reason: HOSPADM

## 2017-08-03 RX ORDER — VANCOMYCIN HCL 900 MCG/MG
POWDER (GRAM) MISCELLANEOUS PRN
Status: DISCONTINUED | OUTPATIENT
Start: 2017-08-03 | End: 2017-08-03 | Stop reason: HOSPADM

## 2017-08-03 RX ORDER — CEFAZOLIN SODIUM 2 G/100ML
2 INJECTION, SOLUTION INTRAVENOUS
Status: COMPLETED | OUTPATIENT
Start: 2017-08-03 | End: 2017-08-03

## 2017-08-03 RX ORDER — LIDOCAINE 40 MG/G
CREAM TOPICAL
Status: DISCONTINUED | OUTPATIENT
Start: 2017-08-03 | End: 2017-08-06 | Stop reason: HOSPADM

## 2017-08-03 RX ORDER — ONDANSETRON 2 MG/ML
4 INJECTION INTRAMUSCULAR; INTRAVENOUS EVERY 6 HOURS PRN
Status: DISCONTINUED | OUTPATIENT
Start: 2017-08-03 | End: 2017-08-06 | Stop reason: HOSPADM

## 2017-08-03 RX ORDER — MEPERIDINE HYDROCHLORIDE 25 MG/ML
12.5 INJECTION INTRAMUSCULAR; INTRAVENOUS; SUBCUTANEOUS
Status: DISCONTINUED | OUTPATIENT
Start: 2017-08-03 | End: 2017-08-03 | Stop reason: HOSPADM

## 2017-08-03 RX ORDER — DEXAMETHASONE SODIUM PHOSPHATE 4 MG/ML
INJECTION, SOLUTION INTRA-ARTICULAR; INTRALESIONAL; INTRAMUSCULAR; INTRAVENOUS; SOFT TISSUE PRN
Status: DISCONTINUED | OUTPATIENT
Start: 2017-08-03 | End: 2017-08-03

## 2017-08-03 RX ORDER — ONDANSETRON 4 MG/1
4 TABLET, ORALLY DISINTEGRATING ORAL EVERY 6 HOURS PRN
Status: DISCONTINUED | OUTPATIENT
Start: 2017-08-03 | End: 2017-08-06 | Stop reason: HOSPADM

## 2017-08-03 RX ORDER — PROPOFOL 10 MG/ML
INJECTION, EMULSION INTRAVENOUS CONTINUOUS PRN
Status: DISCONTINUED | OUTPATIENT
Start: 2017-08-03 | End: 2017-08-03

## 2017-08-03 RX ORDER — LIDOCAINE 40 MG/G
CREAM TOPICAL
Status: DISCONTINUED | OUTPATIENT
Start: 2017-08-03 | End: 2017-08-03 | Stop reason: HOSPADM

## 2017-08-03 RX ADMIN — FENTANYL CITRATE 100 MCG: 50 INJECTION, SOLUTION INTRAMUSCULAR; INTRAVENOUS at 08:38

## 2017-08-03 RX ADMIN — DOCUSATE SODIUM 100 MG: 100 CAPSULE, LIQUID FILLED ORAL at 21:10

## 2017-08-03 RX ADMIN — ONDANSETRON 4 MG: 2 INJECTION INTRAMUSCULAR; INTRAVENOUS at 09:15

## 2017-08-03 RX ADMIN — CEFAZOLIN SODIUM 1 G: 2 INJECTION, SOLUTION INTRAVENOUS at 10:29

## 2017-08-03 RX ADMIN — HYDROMORPHONE HYDROCHLORIDE: 10 INJECTION, SOLUTION INTRAMUSCULAR; INTRAVENOUS; SUBCUTANEOUS at 11:40

## 2017-08-03 RX ADMIN — SODIUM CHLORIDE AND POTASSIUM CHLORIDE: 9; 1.49 INJECTION, SOLUTION INTRAVENOUS at 14:27

## 2017-08-03 RX ADMIN — HYDROMORPHONE HYDROCHLORIDE 1 MG: 1 INJECTION, SOLUTION INTRAMUSCULAR; INTRAVENOUS; SUBCUTANEOUS at 09:00

## 2017-08-03 RX ADMIN — ROCURONIUM BROMIDE 50 MG: 10 INJECTION INTRAVENOUS at 08:38

## 2017-08-03 RX ADMIN — LIDOCAINE HYDROCHLORIDE 50 MG: 10 INJECTION, SOLUTION INFILTRATION; PERINEURAL at 08:38

## 2017-08-03 RX ADMIN — SODIUM CHLORIDE, POTASSIUM CHLORIDE, SODIUM LACTATE AND CALCIUM CHLORIDE: 600; 310; 30; 20 INJECTION, SOLUTION INTRAVENOUS at 09:00

## 2017-08-03 RX ADMIN — ACETAMINOPHEN 975 MG: 325 TABLET, FILM COATED ORAL at 18:32

## 2017-08-03 RX ADMIN — MEPERIDINE HYDROCHLORIDE 12.5 MG: 25 INJECTION, SOLUTION INTRAMUSCULAR; INTRAVENOUS; SUBCUTANEOUS at 11:51

## 2017-08-03 RX ADMIN — PROPOFOL 200 MG: 10 INJECTION, EMULSION INTRAVENOUS at 08:38

## 2017-08-03 RX ADMIN — DEXAMETHASONE SODIUM PHOSPHATE 4 MG: 4 INJECTION, SOLUTION INTRA-ARTICULAR; INTRALESIONAL; INTRAMUSCULAR; INTRAVENOUS; SOFT TISSUE at 09:15

## 2017-08-03 RX ADMIN — SODIUM CHLORIDE AND POTASSIUM CHLORIDE: 9; 1.49 INJECTION, SOLUTION INTRAVENOUS at 18:02

## 2017-08-03 RX ADMIN — FENTANYL CITRATE 25 MCG: 50 INJECTION INTRAMUSCULAR; INTRAVENOUS at 11:57

## 2017-08-03 RX ADMIN — FENTANYL CITRATE 50 MCG: 50 INJECTION INTRAMUSCULAR; INTRAVENOUS at 11:28

## 2017-08-03 RX ADMIN — GLYCOPYRROLATE 0.2 MG: 0.2 INJECTION, SOLUTION INTRAMUSCULAR; INTRAVENOUS at 08:38

## 2017-08-03 RX ADMIN — PROPOFOL: 10 INJECTION, EMULSION INTRAVENOUS at 10:05

## 2017-08-03 RX ADMIN — MEPERIDINE HYDROCHLORIDE 12.5 MG: 25 INJECTION, SOLUTION INTRAMUSCULAR; INTRAVENOUS; SUBCUTANEOUS at 11:47

## 2017-08-03 RX ADMIN — LATANOPROST 1 DROP: 50 SOLUTION/ DROPS OPHTHALMIC at 21:09

## 2017-08-03 RX ADMIN — PROPOFOL 100 MCG/KG/MIN: 10 INJECTION, EMULSION INTRAVENOUS at 08:45

## 2017-08-03 RX ADMIN — MIDAZOLAM HYDROCHLORIDE 2 MG: 1 INJECTION, SOLUTION INTRAMUSCULAR; INTRAVENOUS at 08:26

## 2017-08-03 RX ADMIN — HYDROMORPHONE HYDROCHLORIDE 0.5 MG: 1 INJECTION, SOLUTION INTRAMUSCULAR; INTRAVENOUS; SUBCUTANEOUS at 12:25

## 2017-08-03 RX ADMIN — GLYCOPYRROLATE 0.4 MG: 0.2 INJECTION, SOLUTION INTRAMUSCULAR; INTRAVENOUS at 10:57

## 2017-08-03 RX ADMIN — CEFAZOLIN SODIUM 2 G: 2 INJECTION, SOLUTION INTRAVENOUS at 08:26

## 2017-08-03 RX ADMIN — DEXAMETHASONE SODIUM PHOSPHATE 4 MG: 4 INJECTION, SOLUTION INTRA-ARTICULAR; INTRALESIONAL; INTRAMUSCULAR; INTRAVENOUS; SOFT TISSUE at 08:38

## 2017-08-03 RX ADMIN — HYDROMORPHONE HYDROCHLORIDE 0.3 MG: 1 INJECTION, SOLUTION INTRAMUSCULAR; INTRAVENOUS; SUBCUTANEOUS at 11:33

## 2017-08-03 RX ADMIN — SODIUM CHLORIDE, POTASSIUM CHLORIDE, SODIUM LACTATE AND CALCIUM CHLORIDE: 600; 310; 30; 20 INJECTION, SOLUTION INTRAVENOUS at 08:26

## 2017-08-03 RX ADMIN — SODIUM CHLORIDE, POTASSIUM CHLORIDE, SODIUM LACTATE AND CALCIUM CHLORIDE: 600; 310; 30; 20 INJECTION, SOLUTION INTRAVENOUS at 10:57

## 2017-08-03 RX ADMIN — Medication 2 MG: at 10:57

## 2017-08-03 RX ADMIN — HYDROMORPHONE HYDROCHLORIDE 0.5 MG: 1 INJECTION, SOLUTION INTRAMUSCULAR; INTRAVENOUS; SUBCUTANEOUS at 10:07

## 2017-08-03 RX ADMIN — CEFAZOLIN SODIUM 2 G: 2 INJECTION, SOLUTION INTRAVENOUS at 18:10

## 2017-08-03 NOTE — ANESTHESIA PREPROCEDURE EVALUATION
Anesthesia Evaluation     .             ROS/MED HX    ENT/Pulmonary:       Neurologic:     (+)other neuro radiculopathic    Cardiovascular:     (+) hypertension----. : . . . :. .       METS/Exercise Tolerance:     Hematologic:  - neg hematologic  ROS       Musculoskeletal:   (+) , , other musculoskeletal- spinal degeneration      GI/Hepatic:     (+) GERD Asymptomatic on medication,       Renal/Genitourinary:  - ROS Renal section negative       Endo:  - neg endo ROS       Psychiatric:  - neg psychiatric ROS       Infectious Disease:  - neg infectious disease ROS       Malignancy:      - no malignancy   Other: Comment: Glaucoma    Lab Test        07/19/17 04/17/17 02/12/16      --          02/27/15     02/27/15                       1331          1613          1700           --           2243          1840          WBC          5.9          5.9          12.0*          < >         --          5.8           HGB          12.9         13.2         12.3           < >         --          12.8          MCV          96           96           93             < >         --          93            PLT          235          224          187            < >         --          277           INR          0.95          --           --           --          0.83*        0.88           < > = values in this interval not displayed.                  Lab Test        04/17/17 02/12/16 01/06/16                       1613          1700          1519          NA           139          134          139           POTASSIUM    4.2          4.1          4.4           CHLORIDE     101          99           102           CO2          28           30           32            BUN          14           13           14            CR           0.79         0.74         0.69          ANIONGAP     10           5            5             MARLON          9.5          9.5          9.8           GLC          92           151*         84                (+) No chance of pregnancy C-spine cleared: N/A, H/O Chronic Pain,no other significant disability                    Physical Exam  Normal systems: cardiovascular, pulmonary and dental    Airway   Mallampati: II  TM distance: >3 FB  Neck ROM: full    Dental     Cardiovascular       Pulmonary                     Anesthesia Plan      History & Physical Review  History and physical reviewed and following examination; no interval change.    ASA Status:  2 .    NPO Status:  > 8 hours    Plan for General with Intravenous induction. Maintenance will be Balanced.    PONV prophylaxis:  Ondansetron (or other 5HT-3) and Dexamethasone or Solumedrol       Postoperative Care  Postoperative pain management:  IV analgesics.      Consents  Anesthetic plan, risks, benefits and alternatives discussed with:  Patient.  Use of blood products discussed: Yes.   Use of blood products discussed with Patient.  Consented to blood products.  .                          .

## 2017-08-03 NOTE — OP NOTE
OPERATIVE REPORT        PREOPERATIVE DIAGNOSIS:   1. Lumbar spinal deformity with L4-5 grade 1 spondylolisthesis with severe stenosis and BLE lumbar radiculopathy    2. Low back pain    POSTOPERATIVE DIAGNOSIS:   1. Same  2. Lumbar instability    PROCEDURE: L4-5 Smith-Meyers osteotomy   1. L4 Ang-Meyers osteotomy with exposure of the L4 and L5 roots  2. L4-5 complete discectomy with arthrodesis and placement of a 10-17 x 30 mm Globus Rise expandable interbody graft with locally harvested autologous bone placed centrally and anteriorly  3. Placement of Globus Creo GOMEZ pedicle screws bilaterally from L4 to L5  4. L4 - L5 posterior lateral fusion with placement of Medtronic Magnifuse and locally harvested autologous bone  5. Use of Stealth and O-arm intraoperative neuronavigation  6. Use of C-arm fluoroscopy and intraoperative microscope    ASSISTANT: Terrence Burris    INDICATION FOR PROCEDURE: The patient is a 62 year old female that presented with the above clinical and radiographic findings. After reviewing the examination and imaging studies, the decision was made to proceed with the above procedure. The patient understood the risks of surgery to be infection, CSF leak, nerve root injury, failure of hardware, the need for recurrent surgery, epidural fibrosis, weakness, coma and death. The patient voiced understanding and wanted to proceed.     DESCRIPTION OF PROCEDURE: The patient was seen in the pre op area and the procedure was discussed with the patient and spouse once again and all questions were answered. The consent was then signed and the lumbar spine was marked with a marker. The patient was transported to the operating room on a stretcher and received general endotracheal anesthesia and a Hahn catheter was placed. The patient was placed on the operating table in the prone position on the Abhay frame with all pressure points padded. The back was then prepped and draped in a normal sterile fashion and  C-arm was used to identify the appropriate level. Local anesthesia was injected along the planned incision with 10 blade scalpel used to make a midline incision with dissection down through the subcutaneous tissue to the fascia. The fascia was opened bilaterally with the Bovie cautery. Subperiosteal dissection was used to expose the lamina facet joint and transverse processes from L4-5. The L4-5 interspace was grossly unstable. The Versatrac retractor was then placed to retract the paraspinous muscles. The operating microscope was the brought into the field and attention then turned to the decompression where a L4 Ang-Meyers osteotomy was performed with the Midas Brenden drill, the Kerrison rongeur and the pituitary rongeur which were used to remove the spinous process, the lamina and facet joint. This completely decompressed and exposed the L4 exiting roots bilaterally and the L5 traversing roots bilaterally. Kambin's triangle was exposed from the left side and the disk space was incised with the 11 blade knife after retracting the thecal sac and nerve root medially. The disk was removed with the pituitary rongeur and the endplate beronica from size 8-13 mm. The nerves were thoroughly decompressed and a size 10-17 x 30 mm Globus Rise expandable interbody graft was placed at L4-5 with autologous bone placed both anteriorly and centrally inside the graft. The Stealth and O-arm were then brought in for intraoperative pedicle screw placement and the pedicle screws were placed using the normal technique of a  hole with the Midas Brenden drill, the gear shift probe to penetrate the pedicle and the 5.5 mm tap to tap the pedicle. The pedicle screws were then navigated down the pedicles from L4 to L5. The connecting rods were placed and secured from L4-5 and the locking caps were secured with the counter torque system.  I then placed 5 ml of Evicel were placed over the dura. Copious irrigation was performed with saline. The  wound was irrigated once again and the retractors were removed. Decortication of the lateral facet and transverse process was performed from L4-5 and locally harvested autologous bone and Medtronic Magnifuse were placed out laterally for the posterolateral fusion. Next, 2 Abhay-Smith drains were left subfascially. The fascia was closed with 0 Vicryl, the subcutaneous tissue closed with 2 - 0 and 3-0 Vicryl, and the skin closed with staples. The patient was then transported back to the stretcher, extubated, and sent to recovery.     At the end of the case, all counts were correct    No complications    Estimated blood loss 100 ml     IV fluids See Anesthesia    The patient received Ancef preoperatively and Vancomycin powder in the wound       Jay Dee MD, MS, FAANS

## 2017-08-03 NOTE — ANESTHESIA CARE TRANSFER NOTE
Patient: Denise Ralph    Procedure(s):  L4-5 Decompression and Fusion  - Wound Class: I-Clean    Diagnosis: Stenosis, Radiculopathy   Diagnosis Additional Information: No value filed.    Anesthesia Type:   General     Note:  Airway :Face Mask  Patient transferred to:PACU        Vitals: (Last set prior to Anesthesia Care Transfer)    CRNA VITALS  8/3/2017 1040 - 8/3/2017 1117      8/3/2017             Pulse: 71    SpO2: 100 %    Resp Rate (observed): 14                Electronically Signed By: IRENE Jose CRNA  August 3, 2017  11:17 AM

## 2017-08-03 NOTE — PLAN OF CARE
"Problem: Goal Outcome Summary  Goal: Goal Outcome Summary  Outcome: No Change  Admitted from PACU following surgery today with Dr Dee  VS /75  Temp 96.8  F (36  C) (Axillary)  Resp 18  Ht 1.651 m (5' 5\")  Wt 61.2 kg (135 lb)  SpO2 100%  BMI 22.47 kg/m2  Lung sounds clear  O2 2L nasal cannula  Bowel sounds absent, no flatus  Tolerating clear diet, advance as tolerated  IVF Maintenance fluids infusing  Dressings CDI  CMS intact, plantar/dorsiflexion strong  Drains 2 KAYE drains with 55cc and 40cc output this shift  Activity bedrest  Pain PCA dilaudid intact  Patient/family centered care spouse, Bill at bedside following surgery  Plan initiate therapy tomorrow          "

## 2017-08-03 NOTE — IP AVS SNAPSHOT
MRN:[id number]                      After Visit Summary   8/3/2017    Denise Ralph    MRN: [id number]           Thank you!     Thank you for choosing [facility] for your care. Our goal is always to provide you with excellent care. Hearing back from our patients is one way we can continue to improve our services. Please take a few minutes to complete the written survey that you may receive in the mail after you visit. If you would like to speak to someone directly about your visit please contact Patient Relations at [phone]. Thank you!          Patient Information     Date Of Birth          1955        Designated Caregiver       Most Recent Value    Caregiver    Will someone help with your care after discharge? yes    Name of designated caregiver Romie Ralph    Phone number of caregiver [phone]    Caregiver address [address] [id number]      About your hospital stay     You were admitted on:  August 3, 2017 You last received care in the:  [facility]    You were discharged on:  August 6, 2017        Reason for your hospital stay       You were in the hospital for a L4-L5 decompression and fusion                  Who to Call     For medical emergencies, please call 911.  For non-urgent questions about your medical care, please call your primary care provider or clinic, [phone]  For questions related to your surgery, please call your surgery clinic        Attending Provider     Provider Specialty    Jay Dee MD Neurosurgery       Primary Care Provider Office Phone # Fax #    Juan F Smallwood [phone]      After Care Instructions     Activity       Your activity upon discharge: Discharge instructions:  No lifting of more than 10 pounds, no bending, no twisting until follow up visit.  Wear brace when out of bed.    Ok to shampoo hair, shower or bathe, but, do not scrub or submerge incision under  water until evaluated post-operatively in clinic.    Ok to walk as tolerated, avoid bed rest and prolonged sitting.    No contact sports until after follow up visit  No high impact activities such as; running/jogging, snowmobile or 4 simon riding or any other recreational vehicles.    Do not take NSAIDS (ibuprofen, advil, aleve, naproxen, etc) for pain control.    Do NOT drive while taking narcotic pain medication.    Call the [facility] at [phone] for increasing redness, swelling or pus draining from the incision, increased pain or any other questions and concerns.            Diet       Follow this diet upon discharge: Orders Placed This Encounter      Advance Diet as Tolerated: Regular Diet Adult            Wound care and dressings       Instructions to care for your wound at home:Keep your incision clean and dry at all times.  OK to remove dressing on postop day 2.  OK to shower on postop day 3 and allow water to run over incision, pat dry after shower.  No bathing swimming or submerging in water.  Call immediately or come to ED if any drainage occurs, or you develop new pain, redness, or swelling.                  Follow-up Appointments     Follow-up and recommended labs and tests        Please follow up at the [facility] in 10-14 days for staple removal and in 6 weeks with a lumbar xray prior.  Please call the clinic at [phone] to schedule your appointment with Pau Mendenhall CNP or Marleni Singer CNP .                  Future tests that were ordered for you     XR Lumbar Spine 2/3 Views                 Pending Results     No orders found from 8/1/2017 to 8/4/2017.            Statement of Approval     Ordered          08/06/17 0946  I have reviewed and agree with all the recommendations and orders detailed in this document.  EFFECTIVE NOW     Approved and electronically signed by:  Pau Mendenhall APRN CNP             Admission Information     Date & Time Provider  "Department Dept. Phone    8/3/2017 Jay Dee MD [facility] [phone]      Your Vitals Were     Blood Pressure Pulse Temperature Respirations Height Weight    156/77 (BP Location: Left arm) 61 98  F (36.7  C) (Oral) 16 1.651 m (5' 5\") 61.2 kg (135 lb)    Pulse Oximetry BMI (Body Mass Index)                98% 22.47 kg/m2          [employer] Information     [employer] gives you secure access to your electronic health record. If you see a primary care provider, you can also send messages to your care team and make appointments. If you have questions, please call your primary care clinic.  If you do not have a primary care provider, please call [phone] and they will assist you.        Care EveryWhere ID     This is your Care EveryWhere ID. This could be used by other organizations to access your [facility] medical records  [id number]        Equal Access to Services     INEZ THOMPSON AH: Joseph García, wajeanne elkins, qatiffanie kaalmapooja falcon, rolan godwin . So [facility] [phone].    ATENCIÓN: Si habla español, tiene a simpson disposición servicios gratuitos de asistencia lingüística. Llame al [phone].    We comply with applicable federal civil rights laws and [place] laws. We do not discriminate on the basis of race, color, national origin, age, disability sex, sexual orientation or gender identity.               Review of your medicines      START taking        Dose / Directions    oxyCODONE 5 MG IR tablet   Commonly known as:  ROXICODONE        Dose:  5 mg   Take 1 tablet (5 mg) by mouth every 4 hours as needed for moderate to severe pain   Quantity:  30 tablet   Refills:  0         CONTINUE these medicines which have NOT CHANGED        Dose / Directions    ALLEGRA PO        Dose:  30 mg   Take 30 mg by mouth daily   Refills:  0       ALPRAZolam 0.25 MG tablet   Commonly known as:  XANAX   Used for:  Anxiety        TAKE 1 TABLET BY MOUTH 3 " TIMES DAILY AS NEEDED FOR ANXIETY   Quantity:  30 tablet   Refills:  0       atenolol 50 MG tablet   Commonly known as:  TENORMIN   Used for:  Essential hypertension, benign, Palpitations        TAKE 1 TABLET (50 MG) BY MOUTH 2 TIMES DAILY   Quantity:  180 tablet   Refills:  1       latanoprost 0.005 % ophthalmic solution   Commonly known as:  XALATAN        Dose:  1 drop   Place 1 drop into both eyes At Bedtime   Refills:  0       OMEPRAZOLE PO        Dose:  20 mg   Take 20 mg by mouth every morning   Refills:  0       traZODone 100 MG tablet   Commonly known as:  DESYREL   Used for:  Primary insomnia        Dose:  100 mg   Take 1 tablet (100 mg) by mouth nightly as needed for sleep   Quantity:  90 tablet   Refills:  2       triamcinolone 0.1 % cream   Commonly known as:  KENALOG   Used for:  Eczema of external ear, left        Apply sparingly to affected area two times daily for 5 days.   Quantity:  15 g   Refills:  0         STOP taking     HYDROcodone-acetaminophen 5-325 MG per tablet   Commonly known as:  NORCO                Where to get your medicines      Some of these will need a paper prescription and others can be bought over the counter. Ask your nurse if you have questions.     Bring a paper prescription for each of these medications     oxyCODONE 5 MG IR tablet                Protect others around you: Learn how to safely use, store and throw away your medicines at www.disposemymeds.org.             Medication List: This is a list of all your medications and when to take them. Check marks below indicate your daily home schedule. Keep this list as a reference.      Medications           Morning Afternoon Evening Bedtime As Needed    ALLEGRA PO   Take 30 mg by mouth daily                                ALPRAZolam 0.25 MG tablet   Commonly known as:  XANAX   TAKE 1 TABLET BY MOUTH 3 TIMES DAILY AS NEEDED FOR ANXIETY   Last time this was given:  0.25 mg on 8/5/2017 10:27 PM                                 atenolol 50 MG tablet   Commonly known as:  TENORMIN   TAKE 1 TABLET (50 MG) BY MOUTH 2 TIMES DAILY   Last time this was given:  50 mg on 8/6/2017  8:27 AM                                latanoprost 0.005 % ophthalmic solution   Commonly known as:  XALATAN   Place 1 drop into both eyes At Bedtime   Last time this was given:  1 drop on 8/5/2017 10:29 PM                                OMEPRAZOLE PO   Take 20 mg by mouth every morning   Last time this was given:  20 mg on 8/6/2017  8:27 AM                                oxyCODONE 5 MG IR tablet   Commonly known as:  ROXICODONE   Take 1 tablet (5 mg) by mouth every 4 hours as needed for moderate to severe pain                                traZODone 100 MG tablet   Commonly known as:  DESYREL   Take 1 tablet (100 mg) by mouth nightly as needed for sleep                                triamcinolone 0.1 % cream   Commonly known as:  KENALOG   Apply sparingly to affected area two times daily for 5 days.

## 2017-08-03 NOTE — ANESTHESIA POSTPROCEDURE EVALUATION
Patient: Denise Ralph    Procedure(s):  L4-5 Decompression and Fusion  - Wound Class: I-Clean    Diagnosis:Stenosis, Radiculopathy   Diagnosis Additional Information: 1. Lumbar spinal deformity with L4-5 grade 1 spondylolisthesis with severe stenosis and BLE lumbar radiculopathy    2. Low back pain    Anesthesia Type:  General    Note:  Anesthesia Post Evaluation    Patient location during evaluation: PACU  Patient participation: Able to fully participate in evaluation  Level of consciousness: awake and alert  Pain management: adequate  Airway patency: patent  Cardiovascular status: acceptable  Respiratory status: acceptable  Hydration status: acceptable  PONV: controlled     Anesthetic complications: None          Last vitals:  Vitals:    08/03/17 1125 08/03/17 1130 08/03/17 1133   BP: 163/82     Resp: 14 17    Temp:      SpO2: 100% 97% 100%         Electronically Signed By: Herberth Osborne MD  August 3, 2017  11:41 AM

## 2017-08-04 ENCOUNTER — APPOINTMENT (OUTPATIENT)
Dept: PHYSICAL THERAPY | Facility: CLINIC | Age: 62
DRG: 460 | End: 2017-08-04
Attending: NEUROLOGICAL SURGERY
Payer: COMMERCIAL

## 2017-08-04 ENCOUNTER — APPOINTMENT (OUTPATIENT)
Dept: OCCUPATIONAL THERAPY | Facility: CLINIC | Age: 62
DRG: 460 | End: 2017-08-04
Attending: NEUROLOGICAL SURGERY
Payer: COMMERCIAL

## 2017-08-04 LAB — GLUCOSE BLDC GLUCOMTR-MCNC: 105 MG/DL (ref 70–99)

## 2017-08-04 PROCEDURE — 25000128 H RX IP 250 OP 636: Performed by: NEUROLOGICAL SURGERY

## 2017-08-04 PROCEDURE — 97535 SELF CARE MNGMENT TRAINING: CPT | Mod: GO

## 2017-08-04 PROCEDURE — 40000193 ZZH STATISTIC PT WARD VISIT: Performed by: PHYSICAL THERAPIST

## 2017-08-04 PROCEDURE — 97165 OT EVAL LOW COMPLEX 30 MIN: CPT | Mod: GO

## 2017-08-04 PROCEDURE — 00000146 ZZHCL STATISTIC GLUCOSE BY METER IP

## 2017-08-04 PROCEDURE — 12000007 ZZH R&B INTERMEDIATE

## 2017-08-04 PROCEDURE — 40000133 ZZH STATISTIC OT WARD VISIT

## 2017-08-04 PROCEDURE — 97161 PT EVAL LOW COMPLEX 20 MIN: CPT | Mod: GP | Performed by: PHYSICAL THERAPIST

## 2017-08-04 PROCEDURE — 25000132 ZZH RX MED GY IP 250 OP 250 PS 637: Performed by: NEUROLOGICAL SURGERY

## 2017-08-04 PROCEDURE — 25000125 ZZHC RX 250: Performed by: NEUROLOGICAL SURGERY

## 2017-08-04 PROCEDURE — 97530 THERAPEUTIC ACTIVITIES: CPT | Mod: GP | Performed by: PHYSICAL THERAPIST

## 2017-08-04 PROCEDURE — 97116 GAIT TRAINING THERAPY: CPT | Mod: GP | Performed by: PHYSICAL THERAPIST

## 2017-08-04 RX ORDER — OXYCODONE HYDROCHLORIDE 5 MG/1
5-10 TABLET ORAL EVERY 4 HOURS PRN
Status: DISCONTINUED | OUTPATIENT
Start: 2017-08-04 | End: 2017-08-06 | Stop reason: HOSPADM

## 2017-08-04 RX ORDER — TRAMADOL HYDROCHLORIDE 50 MG/1
50-100 TABLET ORAL EVERY 4 HOURS PRN
Status: DISCONTINUED | OUTPATIENT
Start: 2017-08-04 | End: 2017-08-05

## 2017-08-04 RX ADMIN — CEFAZOLIN SODIUM 2 G: 2 INJECTION, SOLUTION INTRAVENOUS at 18:42

## 2017-08-04 RX ADMIN — DOCUSATE SODIUM 100 MG: 100 CAPSULE, LIQUID FILLED ORAL at 08:16

## 2017-08-04 RX ADMIN — DOCUSATE SODIUM 100 MG: 100 CAPSULE, LIQUID FILLED ORAL at 22:01

## 2017-08-04 RX ADMIN — ALPRAZOLAM 0.25 MG: 0.25 TABLET ORAL at 22:02

## 2017-08-04 RX ADMIN — OMEPRAZOLE 20 MG: 20 CAPSULE, DELAYED RELEASE ORAL at 08:16

## 2017-08-04 RX ADMIN — HYDROMORPHONE HYDROCHLORIDE: 10 INJECTION, SOLUTION INTRAMUSCULAR; INTRAVENOUS; SUBCUTANEOUS at 06:10

## 2017-08-04 RX ADMIN — HYDROMORPHONE HYDROCHLORIDE 2 MG: 2 TABLET ORAL at 17:17

## 2017-08-04 RX ADMIN — ACETAMINOPHEN 975 MG: 325 TABLET, FILM COATED ORAL at 10:43

## 2017-08-04 RX ADMIN — HYDROMORPHONE HYDROCHLORIDE 2 MG: 2 TABLET ORAL at 10:43

## 2017-08-04 RX ADMIN — DIPHENHYDRAMINE HYDROCHLORIDE 25 MG: 50 INJECTION, SOLUTION INTRAMUSCULAR; INTRAVENOUS at 00:50

## 2017-08-04 RX ADMIN — ONDANSETRON 4 MG: 4 TABLET, ORALLY DISINTEGRATING ORAL at 10:48

## 2017-08-04 RX ADMIN — HYDROMORPHONE HYDROCHLORIDE 2 MG: 2 TABLET ORAL at 14:19

## 2017-08-04 RX ADMIN — LATANOPROST 1 DROP: 50 SOLUTION/ DROPS OPHTHALMIC at 22:01

## 2017-08-04 RX ADMIN — CEFAZOLIN SODIUM 2 G: 2 INJECTION, SOLUTION INTRAVENOUS at 02:23

## 2017-08-04 RX ADMIN — ACETAMINOPHEN 975 MG: 325 TABLET, FILM COATED ORAL at 02:23

## 2017-08-04 RX ADMIN — ACETAMINOPHEN 975 MG: 325 TABLET, FILM COATED ORAL at 18:42

## 2017-08-04 RX ADMIN — CEFAZOLIN SODIUM 2 G: 2 INJECTION, SOLUTION INTRAVENOUS at 10:43

## 2017-08-04 RX ADMIN — HYDROMORPHONE HYDROCHLORIDE 4 MG: 2 TABLET ORAL at 20:43

## 2017-08-04 NOTE — PROGRESS NOTES
08/04/17 0857   Quick Adds   Type of Visit Initial PT Evaluation   Living Environment   Lives With spouse   Living Arrangements house   Home Accessibility stairs to enter home;stairs within home   Number of Stairs to Enter Home 2   Number of Stairs Within Home 12   Stair Railings at Home inside, present on right side   Transportation Available car;family or friend will provide   Living Environment Comment Pt lives in a 2 story home.   Self-Care   Usual Activity Tolerance good   Current Activity Tolerance fair   Regular Exercise yes   Activity/Exercise Type strength training;walking   Exercise Amount/Frequency 3-5 times/wk   Equipment Currently Used at Home shower chair;raised toilet  (reacher, shoe horn)   Activity/Exercise/Self-Care Comment Pt reports has not been able to work out since April.Pt is retired from [employer] industry.   Functional Level Prior   Ambulation 0-->independent   Transferring 0-->independent   Toileting 0-->independent   Bathing 0-->independent   Dressing 0-->independent   Eating 0-->independent   Communication 0-->understands/communicates without difficulty   Swallowing 0-->swallows foods/liquids without difficulty   Cognition 0 - no cognition issues reported   Fall history within last six months no   Prior Functional Level Comment Pt inde with all IADLs   General Information   Onset of Illness/Injury or Date of Surgery - Date 08/03/17   Referring Physician Dr. Dee   Patient/Family Goals Statement Pt is hoping to D/C home tomorrow   Pertinent History of Current Problem (include personal factors and/or comorbidities that impact the POC) Pt is status post L 4-5 disectomy and arthrodesis.   Precautions/Limitations spinal precautions   General Info Comments Pt agreeable to PT   Cognitive Status Examination   Orientation orientation to person, place and time   Level of Consciousness alert   Follows Commands and Answers Questions 100% of the time;able to follow multistep instructions   Personal  "Safety and Judgment intact   Memory intact   Pain Assessment   Patient Currently in Pain Yes, see Vital Sign flowsheet   Posture    Posture Forward head position;Protracted shoulders   Range of Motion (ROM)   ROM Comment limited due to lumbar precautions   Strength   Strength Comments functional weakness, but demos 3+/5, reports improvement   Bed Mobility   Bed Mobility Comments CGA/SBA   Transfer Skills   Transfer Comments CGA   Gait   Gait Comments CGA with FWW   Balance   Balance Comments use of FWW lightly   Sensory Examination   Sensory Perception Comments reports prior to surgery pain down LEs, L >R. Improved currently   Modality Interventions   Planned Modality Interventions Cryotherapy   General Therapy Interventions   Planned Therapy Interventions balance training;bed mobility training;gait training;strengthening;transfer training;home program guidelines;risk factor education;progressive activity/exercise   Clinical Impression   Criteria for Skilled Therapeutic Intervention yes, treatment indicated   PT Diagnosis decreased functional mobility status post lumbar surgery   Influenced by the following impairments pain, decreased strength, spinal precautions   Functional limitations due to impairments decreased transfers, decreased ambulation, stairs   Clinical Presentation Stable/Uncomplicated   Clinical Presentation Rationale improving following lumbar fusion   Clinical Decision Making (Complexity) Low complexity   Therapy Frequency` 2 times/day   Predicted Duration of Therapy Intervention (days/wks) 3 days   Anticipated Discharge Disposition Home with Assist   Risk & Benefits of therapy have been explained Yes   Patient, Family & other staff in agreement with plan of care Yes   [facility] [employer] TM \"6 Clicks\"   2016, Trustees of [facility], under license to [employer].  All rights reserved.   6 Clicks Short Forms Basic Mobility Inpatient Short Form   [facility] [employer]  \"6 Clicks\" V.2 " Basic Mobility Inpatient Short Form   1. Turning from your back to your side while in a flat bed without using bedrails? 3 - A Little   2. Moving from lying on your back to sitting on the side of a flat bed without using bedrails? 3 - A Little   3. Moving to and from a bed to a chair (including a wheelchair)? 3 - A Little   4. Standing up from a chair using your arms (e.g., wheelchair, or bedside chair)? 3 - A Little   5. To walk in hospital room? 3 - A Little   6. Climbing 3-5 steps with a railing? 2 - A Lot   Basic Mobility Raw Score (Score out of 24.Lower scores equate to lower levels of function) 17   Total Evaluation Time   Total Evaluation Time (Minutes) 10

## 2017-08-04 NOTE — PLAN OF CARE
Problem: Goal Outcome Summary  Goal: Goal Outcome Summary  OT: Evaluation completed and treatment initiated. Denise Ralph is a 62 year old female who was admitted on 8/3/2017. She presented with low back and bilateral LE radicular pain. She underwent L4-L5 decompression and fusion with Dr. Jay Dee on 8/3/2017. Pt lives with  in 2 story home. Pt has tub-shower and walk-in shower, prefers tub-shower and has grab bars, purchased shower chair.  Pt has RTS on standard toilet in 1 bathroom, elevated toilet in other. Laundry is in basement,  will assist. Pt does not work,  has time off to stay with pt after discharge.     Discharge Planner OT   Patient plan for discharge: Home with spouse assist  Current status: Pt able to complete bed mobility using log roll technique from supine <> sit EOB with SBA, bed rails.  Pt able to recall 3/3 spinal precautions. Pt able to don/doff corset brace in sitting with SBA, extended time. Pt able to don/doff socks in sitting with no AE within precautions.  Sit <> stand transfers and functional room mobility <> bathroom completed with CGA, FWW. In bathroom, pt completed elevated toilet transfer with FWW, CGA, clothing management and pericares completed with SBA. 1 g/h task completed at sink with CGA, FWW, no noted LOB.  Pt educated on walk-in shower transfer, able to complete with CGA, FWW, verbal cues. Pt returned to bed at end of session.  Barriers to return to prior living situation: None anticipated at this time.  Recommendations for discharge: TBD POD#2  Rationale for recommendations: TBD POD#2       Entered by: Lupe Murguia 08/04/2017 12:51 PM

## 2017-08-04 NOTE — PROGRESS NOTES
08/04/17 1100   Quick Adds   Type of Visit Initial Occupational Therapy Evaluation   Living Environment   Lives With spouse   Living Arrangements house   Home Accessibility stairs to enter home;stairs within home;tub/shower is not walk in   Number of Stairs to Enter Home 2   Number of Stairs Within Home 12   Stair Railings at Home inside, present on right side   Transportation Available car;family or friend will provide   Living Environment Comment Pt lives with  in 2 story home. Pt has tub-shower and walk-in shower, prefers tub-shower and has grab bars, purchased shower chair.  Pt has RTS on standard toilet in 1 bathroom, elevated toilet in other. Laundry is in basement,  will assist. Pt does not work,  has time off to stay with pt after discharge.   Self-Care   Dominant Hand right   Usual Activity Tolerance good   Current Activity Tolerance fair   Regular Exercise yes   Activity/Exercise Type strength training;walking   Exercise Amount/Frequency 3-5 times/wk   Equipment Currently Used at Home none   Activity/Exercise/Self-Care Comment Pt owns RTS, shower chair, reacher, shoe horn   Functional Level Prior   Ambulation 0-->independent   Transferring 0-->independent   Toileting 0-->independent   Bathing 0-->independent   Dressing 0-->independent   Eating 0-->independent   Communication 0-->understands/communicates without difficulty   Swallowing 0-->swallows foods/liquids without difficulty   Cognition 0 - no cognition issues reported   Fall history within last six months no   Which of the above functional risks had a recent onset or change? ambulation;transferring;toileting;bathing;dressing   Prior Functional Level Comment Pt reports independence in all ADLs/IADLs prior to admit. Pt shares IADL duties with , pt usually cooks, cleans, does laundry, gets groceries, and does yardwork.       Present no   General Information   Onset of Illness/Injury or Date of Surgery -  Date 08/03/17   Referring Physician Dr. Jay Dee   Patient/Family Goals Statement Return home with  assist.   Additional Occupational Profile Info/Pertinent History of Current Problem Denise Ralph is a 62 year old female who was admitted on 8/3/2017. She presented with low back and bilateral LE radicular pain. She underwent L4-L5 decompression and fusion with Dr. Jay Dee on 8/3/2017. Pt lives with  in 2 story home. Pt has tub-shower and walk-in shower, prefers tub-shower and has grab bars, purchased shower chair.  Pt has RTS on standard toilet in 1 bathroom, elevated toilet in other. Laundry is in basement,  will assist. Pt does not work,  has time off to stay with pt after discharge.   Precautions/Limitations spinal precautions   General Observations Corset brace when OOB   General Info Comments Activity: up with assist, ambulate, up in chair   Cognitive Status Examination   Orientation orientation to person, place and time   Level of Consciousness alert   Able to Follow Commands WNL/WFL   Personal Safety (Cognitive) WNL/WFL   Memory intact   Attention No deficits were identified   Organization/Problem Solving No deficits were identified   Visual Perception   Visual Perception Wears glasses   Sensory Examination   Sensory Comments No numbness/tingling.   Pain Assessment   Patient Currently in Pain (Yes, 2/10 at rest.)   Posture   Posture not impaired   Range of Motion (ROM)   ROM Comment Past L shoulder surgery, B UE ROM WFL.   Strength   Strength Comments Not assessed due to spinal precautions.   Hand Strength   Hand Strength Comments Not formally assessed, WFL during functional activity.   Coordination   Coordination Comments Not formally assessed, WFL during functional activity.   Mobility   Bed Mobility Comments Pt able to use log roll technique from supine > sit EOB with SBA, use of bed rails   Transfer Skill: Bed to Chair/Chair to Bed   Level of [place]: Bed  to Chair contact guard   Assistive Device - Transfer Skill Bed to Chair Chair to Bed Rehab Eval rolling walker   Transfer Skill: Sit to Stand   Level of [place]: Sit/Stand contact guard   Assistive Device for Transfer: Sit/Stand rolling walker   Transfer Skill: Toilet Transfer   Level of [place]: Toilet contact guard   Physical Assist/Nonphysical Assist: Toilet verbal cues   Assistive Device rolling walker   Upper Body Dressing   Level of [place]: Dress Upper Body stand-by assist  (In sitting, don/doff corset brace)   Physical Assist/Nonphysical Assist: Dress Upper Body set-up required   Lower Body Dressing   Level of [place]: Dress Lower Body stand-by assist  (Able to don/doff socks sitting with SBA within precautions)   Toileting   Level of [place]: Toilet contact guard   Grooming   Level of [place]: Grooming contact guard   Instrumental Activities of Daily Living (IADL)   Previous Responsibilities laundry;housekeeping;meal prep;shopping;yardwork;medication management;finances;driving   Activities of Daily Living Analysis   Impairments Contributing to Impaired Activities of Daily Living balance impaired;pain;post surgical precautions   General Therapy Interventions   Planned Therapy Interventions ADL retraining;IADL retraining;transfer training   Clinical Impression   Criteria for Skilled Therapeutic Interventions Met yes, treatment indicated   OT Diagnosis Impaired ability to complete ADLs/IADLs   Influenced by the following impairments Impaired balance, pain, post-surgical precautions   Assessment of Occupational Performance 3-5 Performance Deficits   Identified Performance Deficits Dressing, showering task/transfer, toileting task/transfer, functional transfers/moblity   Clinical Decision Making (Complexity) Low complexity   Therapy Frequency daily   Predicted Duration of Therapy Intervention (days/wks) 2-3 days   Anticipated Equipment Needs at Discharge bath sponge;reacher;shower  "chair;sock aide   Anticipated Discharge Disposition Home with Assist   Risks and Benefits of Treatment have been explained. Yes   Patient, Family & other staff in agreement with plan of care Yes   [facility] TM \"6 Clicks\"   2016, Trustees of [facility], under license to [employer].  All rights reserved.   6 Clicks Short Forms Daily Activity Inpatient Short Form   [facility]  \"6 Clicks\" Daily Activity Inpatient Short Form   1. Putting on and taking off regular lower body clothing? 3 - A Little   2. Bathing (including washing, rinsing, drying)? 3 - A Little   3. Toileting, which includes using toilet, bedpan or urinal? 3 - A Little   4. Putting on and taking off regular upper body clothing? 3 - A Little   5. Taking care of personal grooming such as brushing teeth? 4 - None   6. Eating meals? 4 - None   Daily Activity Raw Score (Score out of 24.Lower scores equate to lower levels of function) 20   Total Evaluation Time   Total Evaluation Time (Minutes) 10     "

## 2017-08-04 NOTE — PROGRESS NOTES
[facility]    Neurosurgery Progress Note    Date of Service (when I saw the patient): 08/04/2017     Assessment & Plan   Denise Ralph is a 62 year old female who was admitted on 8/3/2017. She presented with low back and bilateral LE radicular pain. She underwent L4-L5 decompression and fusion with Dr. Jay Dee on 8/3/2017. Today she is seen up sitting in a chair. She reports doing very well. Reports she has mild 3/10 lumbar incisional pain but currently denies any radicular pain, weakness, numbness or tingling in her BLE. Strength is 5/5 in BLE and she has already been up ambulating with PT. Today, we will have her continue ambulation with PT/OT, d/c her PCA and transition to oral pain meds and d/c her Hahn catheter. The nurse may remove her KAYE drain(s) when output <30mL in one shift.         Active Problems:    S/P lumbar fusion    Assessment: Stable    Plan: PT/OT and ambulation  Brace when out of bed   Wean off PCA and start oral analgesics   Encourage use of IS and deep breathing   Keep drains in until less than 30 cc's   Suture/Staple removal in 10-14 days  Anticipate D/C home in 1-2 days depending on progress      I have discussed the following assessment and plan with Dr. Jay Dee who is in agreement with initial plan and will follow up with further consultation recommendations.    Dorian Olmedo [facility]  [facility]  [address]  [address] [place] [id number]    Tel [phone]  Pager [phone]      Interval History   Doing well, pain controlled, ambulating.     Physical Exam   Temp: 98.8  F (37.1  C) Temp src: Oral BP: 108/59   Heart Rate: 61 Resp: 18 SpO2: 99 % O2 Device: Nasal cannula Oxygen Delivery: 1 LPM  Vitals:    08/01/17 1100 08/03/17 0707   Weight: 61.2 kg (135 lb) 61.2 kg (135 lb)     Vital Signs with Ranges  Temp:  [96  F (35.6  C)-98.8  F (37.1  C)] 98.8  F (37.1  C)  Heart Rate:  [52-65] 61  Resp:  [10-19]  "18  BP: (103-167)/(52-82) 108/59  SpO2:  [97 %-100 %] 99 %  I/O last 3 completed shifts:  In: 4883 [P.O.:1000; I.V.:3883]  Out: 2725 [Urine:2250; Drains:375; Blood:100]    Heart Rate: 61, Blood pressure 108/59, temperature 98.8  F (37.1  C), temperature source Oral, resp. rate 18, height 1.651 m (5' 5\"), weight 61.2 kg (135 lb), SpO2 99 %, not currently breastfeeding.  135 lbs 0 oz  HEENT:  Normocephalic, atraumatic.  PERRLA.  EOM s intact.    Neck:  Supple, non-tender, without lymphadenopathy.  Heart:  No peripheral edema  Lungs:  No SOB  Abdomen:  Soft, non-tender, non-distended.   Skin:  Warm and dry, good capillary refill.  Extremities:  Good radial and dorsalis pedis pulses bilaterally, no edema, cyanosis or clubbing.    NEUROLOGICAL EXAMINATION:   Mental status:  Alert and Oriented x 3, speech is fluent.  Cranial nerves:  II-XII intact.   Motor:  Strength is 5/5 throughout the lower extremities  Hip Flexor:                Right: 5/5  Left:  5/5  Hip Adductor:             Right:  5/5  Left:  5/5  Hip Abductor:             Right:  5/5  Left:  5/5  Gastroc Soleus:        Right:  5/5  Left:  5/5  Tib/Ant:                      Right:  5/5  Left:  5/5  EHL:                     Right:  5/5  Left:  5/5  Sensation:  intact  Reflexes:  Negative Babinski.  Negative Clonus.    Gait:  Deferred        Medications     0.9% sodium chloride + KCl 20 mEq/L 75 mL/hr at 08/03/17 1802       latanoprost  1 drop Both Eyes At Bedtime     omeprazole (priLOSEC) CR capsule 20 mg  20 mg Oral QAM     sodium chloride (PF)  3 mL Intracatheter Q8H     ceFAZolin  2 g Intravenous Q8H     acetaminophen  975 mg Oral Q8H     docusate sodium  100 mg Oral BID     atenolol  50 mg Oral BID       Data     All new lab and imaging data was personally reviewed by me.  CBC RESULTS:   Recent Labs   Lab Test  07/19/17   1331   WBC  5.9   RBC  3.96   HGB  12.9   HCT  37.9   MCV  96   MCH  32.6   MCHC  34.0   RDW  11.8   PLT  235     Basic Metabolic " Panel:  Lab Results   Component Value Date     04/17/2017      Lab Results   Component Value Date    POTASSIUM 4.0 08/03/2017     Lab Results   Component Value Date    CHLORIDE 101 04/17/2017     Lab Results   Component Value Date    MARLON 9.5 04/17/2017     Lab Results   Component Value Date    CO2 28 04/17/2017     Lab Results   Component Value Date    BUN 14 04/17/2017     Lab Results   Component Value Date    CR 0.67 08/03/2017     Lab Results   Component Value Date    GLC 92 04/17/2017     INR:  Lab Results   Component Value Date    INR 0.95 07/19/2017    INR 0.83 02/27/2015    INR 0.88 02/27/2015

## 2017-08-04 NOTE — PLAN OF CARE
Problem: Goal Outcome Summary  Goal: Goal Outcome Summary  PT: PT eval completed. Pt is status post lumbar discectomy and arthrodesis. Pt lives in 2 level home with . Pt doing well, able to ambulate in room.   Discharge Planner PT   Patient plan for discharge: home tomorrow  Current status: Pt was able to ambulate in room with FWW. Sitting up in chair following session.   Barriers to return to prior living situation: none  Recommendations for discharge: home with  tomorrow  Rationale for recommendations: Pt doing well following surgery, would benefit from continued PT as inpt to progress to independence with mobility skills.       Entered by: Sunshine Herbert 08/04/2017 9:44 AM

## 2017-08-04 NOTE — PROGRESS NOTES
Patient was fit with a Barbara lumbosacral DP 32-38'' 3 panel (#[id number]). Patient was able to don the device during the appointment. Patient is satisfied with the fit and function of the orthosis.  Lumbosacral orthosis is comfortable to wear, there are no significant areas of pressure visible, patient can sit/stand without issues, devices provides intracavity pressure, device controls the spine in the sagittal plane.  Patient was given instructions on donning/doffing, care instructions, warranty issues, fitting issues, and who to contact if there are issues.  Lumbosacral orthosis will treat patient's post surgical condition by restriction flexion/extension of the lumbar spine and providing abdominal compression to offload/immobilize the surgical site and facilitate healing following surgery to the spine.   Patient will follow Dr. Dee' wearing protocols after surgery and will follow up with the [facility] if there are any issues or questions.

## 2017-08-04 NOTE — PLAN OF CARE
Problem: Individualization  Goal: Patient Preferences  Outcome: Improving  RN:pt vss,   Lungs:clear, 02 stable, encouraged IS hourly  BS: active      Diet: Regular  CMS+, encouraged D/P flexion, and PCD's are in place  Dressing: CDI  Activity: pt log rolled q2 hours, up with A1 with pt  Pain: controlled with oral dilaudid 2mg.   Output: adequate  KAYE drain x2 seei/o  Plans to dc home tomorrow  No other issues

## 2017-08-04 NOTE — PLAN OF CARE
Problem: Goal Outcome Summary  Goal: Goal Outcome Summary  Outcome: Improving  A/O.  CMS intact. Dressing D/I.  KAYE drains in place.  Hahn patent.  Stood at bedside x2 with Ax2.  Brace to be fitted in AM.  Rates pain 2-3/10-on Dilaudid PCA.  Tolerated clear liquids well.  Atenolol held this evening.  Remains on 2L.  Will continue to monitor.

## 2017-08-05 ENCOUNTER — APPOINTMENT (OUTPATIENT)
Dept: OCCUPATIONAL THERAPY | Facility: CLINIC | Age: 62
DRG: 460 | End: 2017-08-05
Attending: NEUROLOGICAL SURGERY
Payer: COMMERCIAL

## 2017-08-05 ENCOUNTER — APPOINTMENT (OUTPATIENT)
Dept: GENERAL RADIOLOGY | Facility: CLINIC | Age: 62
DRG: 460 | End: 2017-08-05
Attending: NEUROLOGICAL SURGERY
Payer: COMMERCIAL

## 2017-08-05 LAB — GLUCOSE BLDC GLUCOMTR-MCNC: 98 MG/DL (ref 70–99)

## 2017-08-05 PROCEDURE — 40000133 ZZH STATISTIC OT WARD VISIT: Performed by: REHABILITATION PRACTITIONER

## 2017-08-05 PROCEDURE — 72020 X-RAY EXAM OF SPINE 1 VIEW: CPT

## 2017-08-05 PROCEDURE — 25000132 ZZH RX MED GY IP 250 OP 250 PS 637: Performed by: NURSE PRACTITIONER

## 2017-08-05 PROCEDURE — 25000132 ZZH RX MED GY IP 250 OP 250 PS 637: Performed by: NEUROLOGICAL SURGERY

## 2017-08-05 PROCEDURE — 25000128 H RX IP 250 OP 636: Performed by: NEUROLOGICAL SURGERY

## 2017-08-05 PROCEDURE — 00000146 ZZHCL STATISTIC GLUCOSE BY METER IP

## 2017-08-05 PROCEDURE — 12000000 ZZH R&B MED SURG/OB

## 2017-08-05 PROCEDURE — 25000132 ZZH RX MED GY IP 250 OP 250 PS 637: Performed by: INTERNAL MEDICINE

## 2017-08-05 PROCEDURE — 97535 SELF CARE MNGMENT TRAINING: CPT | Mod: GO | Performed by: REHABILITATION PRACTITIONER

## 2017-08-05 RX ORDER — SENNOSIDES 8.6 MG
1-2 TABLET ORAL ONCE
Status: COMPLETED | OUTPATIENT
Start: 2017-08-05 | End: 2017-08-05

## 2017-08-05 RX ORDER — SENNOSIDES 8.6 MG
1-2 TABLET ORAL 2 TIMES DAILY
Status: DISCONTINUED | OUTPATIENT
Start: 2017-08-05 | End: 2017-08-06 | Stop reason: HOSPADM

## 2017-08-05 RX ADMIN — ALPRAZOLAM 0.25 MG: 0.25 TABLET ORAL at 22:27

## 2017-08-05 RX ADMIN — LATANOPROST 1 DROP: 50 SOLUTION/ DROPS OPHTHALMIC at 22:29

## 2017-08-05 RX ADMIN — OMEPRAZOLE 20 MG: 20 CAPSULE, DELAYED RELEASE ORAL at 07:58

## 2017-08-05 RX ADMIN — DOCUSATE SODIUM 100 MG: 100 CAPSULE, LIQUID FILLED ORAL at 22:27

## 2017-08-05 RX ADMIN — CEFAZOLIN SODIUM 2 G: 2 INJECTION, SOLUTION INTRAVENOUS at 02:42

## 2017-08-05 RX ADMIN — DIAZEPAM 5 MG: 5 TABLET ORAL at 16:11

## 2017-08-05 RX ADMIN — ACETAMINOPHEN 975 MG: 325 TABLET, FILM COATED ORAL at 10:55

## 2017-08-05 RX ADMIN — ACETAMINOPHEN 975 MG: 325 TABLET, FILM COATED ORAL at 19:11

## 2017-08-05 RX ADMIN — DOCUSATE SODIUM 100 MG: 100 CAPSULE, LIQUID FILLED ORAL at 07:58

## 2017-08-05 RX ADMIN — ACETAMINOPHEN 975 MG: 325 TABLET, FILM COATED ORAL at 02:46

## 2017-08-05 RX ADMIN — CEFAZOLIN SODIUM 2 G: 2 INJECTION, SOLUTION INTRAVENOUS at 19:11

## 2017-08-05 RX ADMIN — CEFAZOLIN SODIUM 2 G: 2 INJECTION, SOLUTION INTRAVENOUS at 10:55

## 2017-08-05 RX ADMIN — ATENOLOL 50 MG: 50 TABLET ORAL at 22:27

## 2017-08-05 RX ADMIN — SENNOSIDES 2 TABLET: 8.6 TABLET, FILM COATED ORAL at 22:26

## 2017-08-05 RX ADMIN — TRAMADOL HYDROCHLORIDE 50 MG: 50 TABLET, COATED ORAL at 00:24

## 2017-08-05 RX ADMIN — SENNOSIDES 2 TABLET: 8.6 TABLET, FILM COATED ORAL at 15:17

## 2017-08-05 RX ADMIN — TRAMADOL HYDROCHLORIDE 100 MG: 50 TABLET, COATED ORAL at 05:12

## 2017-08-05 NOTE — PLAN OF CARE
Problem: Goal Outcome Summary  Goal: Goal Outcome Summary  PT: Pt attempted for session this AM. Pt reporting migraine, and requesting PT return later. Will check back as time/schedule allows. Nursing updated.         2nd Attempt: Pt continues to complain of headache. Requesting PT return after lunch. Will check back as time/schedule allows.     3rd Attempt: Pt continues to decline PT at this time as pt had just ambulated with nursing. Rec pt ambulate with nursing again this PM. PT will check in with pt tomorrow AM.

## 2017-08-05 NOTE — PLAN OF CARE
Problem: Goal Outcome Summary  Goal: Goal Outcome Summary  Discharge Planner OT   Patient plan for discharge: Home with assist  Current status: Pt supine in bed upon arrival, able to compelte log roll from supine <> sit EOB with mod I using bed rails.  Pt able to don/doff corset brace in sitting with SBA.  Sit <> stand transfers and functional room mobility completed with SBA, no AD.  In bathroom, pt completed elevated toilet transfer with SBA, no AD, clothing management and pericares for toileting completed with mod I. Pt completed 3 g/h tasks in standing at sink, no AD, SBA, no noted dizziness or LOB.  Pt declined tub-shower transfer in satellite clinic due to headache, able to simulate tub-shower using walk-in shower set-up, pt able to complete simulated tub-shower transfer with SBA, verbal cues, no AD, used wall of shower for support. In sitting, pt able to don/doff socks with SBA within precautions.    Barriers to return to prior living situation: None anticipated  Recommendations for discharge: Home with assist, AE: shower chair  Rationale for recommendations: Pt is currently SBA in ADLs, will benefit from SBA upon return home to maximize safety,  able to provide 24 hour assist.       Entered by: Lupe Murguia 08/05/2017 8:44 AM    Occupational Therapy Discharge Summary     Reason for therapy discharge:    All goals and outcomes met, no further needs identified.     Progress towards therapy goal(s). See goals on Care Plan in [facility] electronic health record for goal details.  Goals met     Therapy recommendation(s):    No further therapy is recommended. Recommend pt use shower chair and have SBA in ADLs/IADLs from  upon return home.

## 2017-08-05 NOTE — PLAN OF CARE
Problem: Goal Outcome Summary  Goal: Goal Outcome Summary  Up independently, ambulated in [place]. Headache most of the day. Refused pain meds, taking only Tylenol. KAYE with 80cc out this shift. Voiding. Eating regular diet. Back dressing CDI. Brace on when OOB. Hopes to discharge home tomorrow.

## 2017-08-05 NOTE — PLAN OF CARE
"Problem: Goal Outcome Summary  Goal: Goal Outcome Summary  Outcome: No Change  /79  Temp 98  F (36.7  C) (Oral)  Resp 16  Ht 1.651 m (5' 5\")  Wt 61.2 kg (135 lb)  SpO2 98%  BMI 22.47 kg/m2  Lungs: clear, encouraged CDB and IS  BS: positive, passing flatus, tolerating regular diet  Urine: voids adequate amount of urine independently  CMS: intact, dressing CDI KAYE drains output 15 and 45cc  Pain: controlled Oral dilaudid, Pt reported feeling loopy, per Dr Dee, switched to PO ultram and oxycodone, Ice to back  Activity: up independently in room, SBA in hallway, tolerated walking in hallway x2  Possible discharge home tomorrow                "

## 2017-08-05 NOTE — PROGRESS NOTES
[facility]    Neurosurgery Progress Note    Date of Service (when I saw the patient): 08/05/2017     Assessment & Plan   Denise Ralph is a 62 year old female who was admitted on 8/3/2017. She presented with low back and bilateral LE radicular pain. She underwent L4-L5 decompression and fusion with Dr. Jay Dee on 8/3/2017. Today pt is lying in bed reporting a headache. She feels it is from the Ultram and does not want to take it anymore. She has tolerated Oxycodone in the past. We will have her try Oxycodone 5 mg tabs only to start for pain.  Pt is anxious about going home due to headache.  We will have her try the Oxycodone and if she does well we will plan DC home tomorrow.  She agreed.  She denies any back pain. .         Active Problems:    S/P lumbar fusion    Assessment: Stable    Plan: PT/OT and ambulation  Brace when out of bed   Pain control with Oxycodone  Encourage use of IS and deep breathing   Keep drains in until less than 30 cc's   Suture/Staple removal in 10-14 days  Anticipate D/C home tomorrow    I have discussed the following assessment and plan Dr. Jay Dee  who is in agreement with initial plan and will follow up with further consultation recommendations.    Pau Mendenhall [facility]  Spine [facility]  [place] [address]    Tel [phone]  Pager [phone]      Interval History   Stable with headache     Physical Exam   Temp: 97.6  F (36.4  C) Temp src: Oral BP: 155/69   Heart Rate: 57 Resp: 16 SpO2: 99 % O2 Device: None (Room air)    Vitals:    08/01/17 1100 08/03/17 0707   Weight: 135 lb (61.2 kg) 135 lb (61.2 kg)     Vital Signs with Ranges  Temp:  [95.8  F (35.4  C)-98  F (36.7  C)] 97.6  F (36.4  C)  Heart Rate:  [54-62] 57  Resp:  [16-18] 16  BP: (120-161)/(52-79) 155/69  SpO2:  [96 %-99 %] 99 %  I/O last 3 completed shifts:  In: 1509 [P.O.:1359; I.V.:150]  Out: 575 [Urine:350;  "Drains:225]    Heart Rate: 57, Blood pressure 155/69, temperature 97.6  F (36.4  C), temperature source Oral, resp. rate 16, height 5' 5\" (1.651 m), weight 135 lb (61.2 kg), SpO2 99 %, not currently breastfeeding.  135 lbs 0 oz  HEENT:  Normocephalic, atraumatic.  PERRLA.  EOM s intact.    Neck:  Supple, non-tender, without lymphadenopathy.  Heart:  No peripheral edema  Lungs:  No SOB  Abdomen:  Soft, non-tender, non-distended.   Skin:  Warm and dry, good capillary refill.  Extremities:  Good radial and dorsalis pedis pulses bilaterally, no edema, cyanosis or clubbing.    NEUROLOGICAL EXAMINATION:     Mental status:  Alert and Oriented x 3, speech is fluent.  Cranial nerves:  II-XII intact.   Motor:  Strength is 5/5 throughout the upper and lower extremities  Shoulder Abduction:  Right:  5/5   Left:  5/5  Biceps:                      Right:  5/5   Left:  5/5  Triceps:                     Right:  5/5   Left:  5/5  Wrist Extensors:       Right:  5/5   Left:  5/5  Wrist Flexors:           Right:  5/5   Left:  5/5  interosseus :            Right:  5/5   Left:  5/5   Hip Flexor:                Right: 5/5  Left:  5/5  Hip Adductor:             Right:  5/5  Left:  5/5  Hip Abductor:             Right:  5/5  Left:  5/5  Gastroc Soleus:        Right:  5/5  Left:  5/5  Tib/Ant:                      Right:  5/5  Left:  5/5  EHL:                     Right:  5/5  Left:  5/5  Sensation:  intact  Gait:  Deferred      Medications     0.9% sodium chloride + KCl 20 mEq/L 75 mL/hr at 08/03/17 1802       latanoprost  1 drop Both Eyes At Bedtime     omeprazole (priLOSEC) CR capsule 20 mg  20 mg Oral QAM     sodium chloride (PF)  3 mL Intracatheter Q8H     ceFAZolin  2 g Intravenous Q8H     acetaminophen  975 mg Oral Q8H     docusate sodium  100 mg Oral BID     atenolol  50 mg Oral BID       Data     All new lab and imaging data was personally reviewed by me.  CBC RESULTS:   Recent Labs   Lab Test  07/19/17   1331   WBC  5.9   RBC  3.96 "   HGB  12.9   HCT  37.9   MCV  96   MCH  32.6   MCHC  34.0   RDW  11.8   PLT  235     Basic Metabolic Panel:  Lab Results   Component Value Date     04/17/2017      Lab Results   Component Value Date    POTASSIUM 4.0 08/03/2017     Lab Results   Component Value Date    CHLORIDE 101 04/17/2017     Lab Results   Component Value Date    MARLON 9.5 04/17/2017     Lab Results   Component Value Date    CO2 28 04/17/2017     Lab Results   Component Value Date    BUN 14 04/17/2017     Lab Results   Component Value Date    CR 0.67 08/03/2017     Lab Results   Component Value Date    GLC 92 04/17/2017     INR:  Lab Results   Component Value Date    INR 0.95 07/19/2017    INR 0.83 02/27/2015    INR 0.88 02/27/2015     35 minutes were spent with patient and on the floor.

## 2017-08-05 NOTE — PLAN OF CARE
Problem: Goal Outcome Summary  Goal: Goal Outcome Summary  Outcome: Improving  Vss. Afebrile. Lungs clr-room air high 90s. Is done to 1800. Hypoactive bs/+gas, no nausea. Tolerating diet. Last bm 08/03/17. Voiding. Saline locked. Drsg-cdi. Cms wnl. [place]-patent. Left drain removed as parameters met. Pain managed with Ultram. Ambulating per self with brace in room. Repositioning self. Tolerating ice. No other significant issues noted overnight.

## 2017-08-06 ENCOUNTER — APPOINTMENT (OUTPATIENT)
Dept: PHYSICAL THERAPY | Facility: CLINIC | Age: 62
DRG: 460 | End: 2017-08-06
Attending: NEUROLOGICAL SURGERY
Payer: COMMERCIAL

## 2017-08-06 VITALS
OXYGEN SATURATION: 98 % | WEIGHT: 135 LBS | SYSTOLIC BLOOD PRESSURE: 156 MMHG | TEMPERATURE: 98 F | HEIGHT: 65 IN | RESPIRATION RATE: 16 BRPM | BODY MASS INDEX: 22.49 KG/M2 | HEART RATE: 61 BPM | DIASTOLIC BLOOD PRESSURE: 77 MMHG

## 2017-08-06 PROCEDURE — 40000193 ZZH STATISTIC PT WARD VISIT: Performed by: PHYSICAL THERAPIST

## 2017-08-06 PROCEDURE — 25000132 ZZH RX MED GY IP 250 OP 250 PS 637: Performed by: NEUROLOGICAL SURGERY

## 2017-08-06 PROCEDURE — 25000128 H RX IP 250 OP 636: Performed by: NEUROLOGICAL SURGERY

## 2017-08-06 PROCEDURE — 97116 GAIT TRAINING THERAPY: CPT | Mod: GP | Performed by: PHYSICAL THERAPIST

## 2017-08-06 PROCEDURE — 97530 THERAPEUTIC ACTIVITIES: CPT | Mod: GP | Performed by: PHYSICAL THERAPIST

## 2017-08-06 PROCEDURE — 25000132 ZZH RX MED GY IP 250 OP 250 PS 637: Performed by: INTERNAL MEDICINE

## 2017-08-06 PROCEDURE — 25000132 ZZH RX MED GY IP 250 OP 250 PS 637: Performed by: NURSE PRACTITIONER

## 2017-08-06 RX ORDER — CALCIUM CARBONATE 500 MG/1
500-1000 TABLET, CHEWABLE ORAL
Status: DISCONTINUED | OUTPATIENT
Start: 2017-08-06 | End: 2017-08-06 | Stop reason: HOSPADM

## 2017-08-06 RX ORDER — OXYCODONE HYDROCHLORIDE 5 MG/1
5 TABLET ORAL EVERY 4 HOURS PRN
Qty: 30 TABLET | Refills: 0 | Status: SHIPPED | OUTPATIENT
Start: 2017-08-06 | End: 2017-08-14

## 2017-08-06 RX ADMIN — ATENOLOL 50 MG: 50 TABLET ORAL at 08:27

## 2017-08-06 RX ADMIN — SENNOSIDES 2 TABLET: 8.6 TABLET, FILM COATED ORAL at 08:27

## 2017-08-06 RX ADMIN — DOCUSATE SODIUM 100 MG: 100 CAPSULE, LIQUID FILLED ORAL at 08:27

## 2017-08-06 RX ADMIN — CEFAZOLIN SODIUM 2 G: 2 INJECTION, SOLUTION INTRAVENOUS at 02:45

## 2017-08-06 RX ADMIN — ACETAMINOPHEN 975 MG: 325 TABLET, FILM COATED ORAL at 11:16

## 2017-08-06 RX ADMIN — ACETAMINOPHEN 975 MG: 325 TABLET, FILM COATED ORAL at 02:45

## 2017-08-06 RX ADMIN — DIAZEPAM 5 MG: 5 TABLET ORAL at 02:45

## 2017-08-06 RX ADMIN — OMEPRAZOLE 20 MG: 20 CAPSULE, DELAYED RELEASE ORAL at 08:27

## 2017-08-06 NOTE — DISCHARGE SUMMARY
[facility]    Discharge Summary  Neurosurgery    Date of Admission:  8/3/2017  Date of Discharge:  8/6/2017  Discharging Provider: Pau Mendenhall  Date of Service (when I saw the patient): 08/06/17    Discharge Diagnoses   Active Problems:    S/P lumbar fusion      History of Present Illness   Denise Ralph is a 62 year old female who was admitted on 8/3/2017. She presented with low back and bilateral LE radicular pain. She underwent L4-L5 decompression and fusion with Dr. Jay Dee on 8/3/2017.  Post op pt had difficulty tolerating opioids.  She had a headache yesterday after taking Ultram. Today she is sitting at edge of bed eating. She denies any headache. She reports that she can't believe how good she feels. We will DC her on Oxycodone for severe pain which she has tolerated in the past.     Hospital Course   Denise Ralph was admitted on 8/3/2017.  The following problems were addressed during her hospitalization:    Active Problems:    S/P lumbar fusion    Assessment: stable     Plan: DC home         I have discussed the following assessment and plan with Dr. Jay Dee  who is in agreement with initial plan and will follow up with further consultation recommendations.    Pau Mendenhall [facility]  Spine and Brain Clinic  [place] [address]    Tel [phone]  Pager [phone]        Significant Results and Procedures   Post lumbar fusion    Pending Results     Unresulted Labs Ordered in the Past 30 Days of this Admission     No orders found from 6/4/2017 to 8/4/2017.          Code Status   Full Code    Primary Care Physician   Juan F Smallwood    Physical Exam   Temp: 98  F (36.7  C) Temp src: Oral BP: 156/77 Pulse: 61 Heart Rate: 62 Resp: 16 SpO2: 98 % O2 Device: None (Room air)    Vitals:    08/01/17 1100 08/03/17 0707   Weight: 135 lb (61.2 kg) 135 lb (61.2 kg)     Vital Signs with Ranges  Temp:  [95.5  F (35.3   C)-98.4  F (36.9  C)] 98  F (36.7  C)  Pulse:  [61-64] 61  Heart Rate:  [62-66] 62  Resp:  [16] 16  BP: (148-185)/(73-95) 156/77  SpO2:  [97 %-99 %] 98 %  I/O last 3 completed shifts:  In: 600 [P.O.:450; I.V.:150]  Out: 125 [Drains:125]    Constitutional: Awake, alert, cooperative, no apparent distress, and appears stated age.  Eyes: Lids and lashes normal, pupils equal, round and reactive to light, extra ocular muscles intact  ENT: Normocephalic, without obvious abnormality, atraumatic  Respiratory: No increased work of breathing  Skin: No bruising or bleeding, normal skin color, texture, turgor, no redness, warmth, or swelling.  Incision with staples intact, minimal drainage, open to air.  Musculoskeletal: There is no redness, warmth, or swelling of the joints.  Full range of motion noted.  Motor strength is 5 out of 5 all extremities bilaterally.  Tone is normal.  Neurologic: Awake, alert, oriented to name, place and time.  Cranial nerves II-XII are grossly intact.  Motor is 5 out of 5 bilaterally.     Neuropsychiatric: Calm, normal eye contact, alert, normal affect, oriented to self, place, time and situation, memory for past and recent events intact and thought process normal.       Time Spent on this Encounter   I, Pau Mendenhall, personally saw the patient today and spent greater than 30 minutes discharging this patient.    Discharge Disposition   Discharged to home  Condition at discharge: Stable    Consultations This Hospital Stay   OCCUPATIONAL THERAPY ADULT IP CONSULT  PHYSICAL THERAPY ADULT IP CONSULT  ORTHOSIS SPINAL IP CONSULT    Discharge Orders     XR Lumbar Spine 2/3 Views     Reason for your hospital stay   You were in the hospital for a L4-L5 decompression and fusion     Follow-up and recommended labs and tests    Please follow up at the [facility] in 10-14 days for staple removal and in 6 weeks with a lumbar xray prior.  Please call the clinic at [phone] to schedule your  appointment with Pau Mendenhall CNP or Marleni Singer CNP .     Activity   Your activity upon discharge: Discharge instructions:  No lifting of more than 10 pounds, no bending, no twisting until follow up visit.  Wear brace when out of bed.    Ok to shampoo hair, shower or bathe, but, do not scrub or submerge incision under water until evaluated post-operatively in clinic.    Ok to walk as tolerated, avoid bed rest and prolonged sitting.    No contact sports until after follow up visit  No high impact activities such as; running/jogging, snowmobile or 4 simon riding or any other recreational vehicles.    Do not take NSAIDS (ibuprofen, advil, aleve, naproxen, etc) for pain control.    Do NOT drive while taking narcotic pain medication.    Call the [facility] at [phone] for increasing redness, swelling or pus draining from the incision, increased pain or any other questions and concerns.     Wound care and dressings   Instructions to care for your wound at home:Keep your incision clean and dry at all times.  OK to remove dressing on postop day 2.  OK to shower on postop day 3 and allow water to run over incision, pat dry after shower.  No bathing swimming or submerging in water.  Call immediately or come to ED if any drainage occurs, or you develop new pain, redness, or swelling.     Full Code     Diet   Follow this diet upon discharge: Orders Placed This Encounter     Advance Diet as Tolerated: Regular Diet Adult       Discharge Medications   Current Discharge Medication List      START taking these medications    Details   oxyCODONE (ROXICODONE) 5 MG IR tablet Take 1 tablet (5 mg) by mouth every 4 hours as needed for moderate to severe pain  Qty: 30 tablet, Refills: 0    Associated Diagnoses: S/P lumbar fusion         CONTINUE these medications which have NOT CHANGED    Details   OMEPRAZOLE PO Take 20 mg by mouth every morning      latanoprost (XALATAN) 0.005 % ophthalmic solution Place 1 drop  into both eyes At Bedtime      triamcinolone (KENALOG) 0.1 % cream Apply sparingly to affected area two times daily for 5 days.  Qty: 15 g, Refills: 0    Associated Diagnoses: Eczema of external ear, left      ALPRAZolam (XANAX) 0.25 MG tablet TAKE 1 TABLET BY MOUTH 3 TIMES DAILY AS NEEDED FOR ANXIETY  Qty: 30 tablet, Refills: 0    Comments: Not to exceed 5 additional fills before 11/27/2017  Associated Diagnoses: Anxiety      atenolol (TENORMIN) 50 MG tablet TAKE 1 TABLET (50 MG) BY MOUTH 2 TIMES DAILY  Qty: 180 tablet, Refills: 1    Comments: Pt due for Oct appt-=needs to call our office.  Associated Diagnoses: Essential hypertension, benign; Palpitations      traZODone (DESYREL) 100 MG tablet Take 1 tablet (100 mg) by mouth nightly as needed for sleep  Qty: 90 tablet, Refills: 2    Associated Diagnoses: Primary insomnia      Fexofenadine HCl (ALLEGRA PO) Take 30 mg by mouth daily         STOP taking these medications       HYDROcodone-acetaminophen (NORCO) 5-325 MG per tablet Comments:   Reason for Stopping:             Allergies   Allergies   Allergen Reactions     Erythromycin Shortness Of Breath     Msir [Morphine Sulfate] Nausea     Intollerant to Morphine Sulfate: Nausea,vomiting     Penicillins Itching     Reglan Other (See Comments)     Muscle spasms in throat

## 2017-08-06 NOTE — PLAN OF CARE
Problem: Goal Outcome Summary  Goal: Goal Outcome Summary  Physical Therapy Discharge Summary     Reason for therapy discharge:    Discharged to home.     Progress towards therapy goal(s). See goals on Care Plan in [facility] electronic health record for goal details.  Goals met     Therapy recommendation(s):    Continue home exercise program.

## 2017-08-06 NOTE — PLAN OF CARE
Problem: Goal Outcome Summary  Goal: Goal Outcome Summary  Outcome: Improving  Vss except for slightly elevated sbp-but pt very anxious. Recheck of sbp better after medication & walk in hallway. Afebrile. Lungs clr-room air high 90s. Is done. +bs/+gas, no nausea. Heartburn/belching relieved with tums. Tolerating diet. Last bm 08/03/17. Voiding. Saline locked. Drsg-cdi. Cms-wnl. No drains. Pain managed with Tylenol/Valium. No headache. Tolerating some ice and ambulation. Repositioning self. Pt anticipates discharge to home today. No other significant issues noted overnight.

## 2017-08-06 NOTE — PLAN OF CARE
"Problem: Goal Outcome Summary  Goal: Goal Outcome Summary  Outcome: No Change  /77  Pulse 63  Temp 96.4  F (35.8  C) (Oral)  Resp 16  Ht 1.651 m (5' 5\")  Wt 61.2 kg (135 lb)  SpO2 99%  BMI 22.47 kg/m2  Lungs: clear, encouraged CDB and IS  BS: positive, passing flatus, tolerating regular diet, no BM this shift, Senna ordered  Urine: voids adequate amount of urine  CMS: intact, dressing CDI, KAYE had 30 cc out  Pain: c/o mild pain, on scheduled Tylenol, refused additional pain meds, ice to back  Activity: up independently in room, SBA in hallway, tolerated walking in hallway x2  BP elevated in afternoon, resolved with rest. Pt also c/o anxiety, PRN valium given, Pt reported relief.                   "

## 2017-08-06 NOTE — PLAN OF CARE
Problem: Goal Outcome Summary  Goal: Goal Outcome Summary  Up independently. Pain minimal, taking only Tylenol. Dressing to back removed, staples well approximated. No drainage. Had BM today. Feeling much better. Discharge orders received. Prescription filled, discharge instructions and follow up reviewed with patient. Discharged home with all belongings.

## 2017-08-07 ENCOUNTER — TELEPHONE (OUTPATIENT)
Dept: INTERNAL MEDICINE | Facility: CLINIC | Age: 62
End: 2017-08-07

## 2017-08-07 NOTE — TELEPHONE ENCOUNTER
ED / Discharge Outreach Protocol   Patient Contact   Attempt # 1  Was call answered? No. Left message on voicemail with information to call me back.    Joseph PULLIAM RN

## 2017-08-07 NOTE — TELEPHONE ENCOUNTER
IP F/U    Date: 08/06/17  Diagnosis: S/P Lumbar Fusion, Stenosis, Radiculopathy  Is patient active in care coordination? No  Was patient in TCU? No

## 2017-08-07 NOTE — TELEPHONE ENCOUNTER
"Hospital/TCU/ED for chronic condition Discharge Protocol    \"Hi, my name is Joseph Harvey, a registered nurse, and I am calling from [facility].  I am calling to follow up and see how things are going for you after your recent emergency visit/hospital/TCU stay.\"    Tell me how you are doing now that you are home?\" Taking things slow. Pain is okay.      Discharge Instructions    \"Let's review your discharge instructions.  What is/are the follow-up recommendations?  Pt. Response: [place]    \"Has an appointment with your primary care provider been scheduled?\"   no need at this time    \"When you see the provider, I would recommend that you bring your medications with you.\"    Medications    \"Tell me what changed about your medicines when you discharged?\"    Changes to chronic meds?    0-1    \"What questions do you have about your medications?\"    no     New diagnoses of heart failure, COPD, diabetes, or MI?    No     Medication reconciliation completed? Yes  Was MTM referral placed (*Make sure to put transitions as reason for referral)?   No    Call Summary    \"What questions or concerns do you have about your recent visit and your follow-up care?\"     none    \"If you have questions or things don't continue to improve, we encourage you contact us through the main clinic number (give number).  Even if the clinic is not open, triage nurses are available 24/7 to help you.     We would like you to know that our clinic has extended hours (provide information).  We also have urgent care (provide details on closest location and hours/contact info)\"      \"Thank you for your time and take care!\"         "

## 2017-08-08 ENCOUNTER — NURSE TRIAGE (OUTPATIENT)
Dept: NURSING | Facility: CLINIC | Age: 62
End: 2017-08-08

## 2017-08-08 NOTE — TELEPHONE ENCOUNTER
Additional Information    Negative: [1] Abdominal pain is main symptom AND [2] adult male    Negative: [1] Abdominal pain is main symptom AND [2] adult female    Negative: Rectal bleeding or blood in stool is main symptom    Negative: Rectal pain or itching is main symptom    Negative: Patient sounds very sick or weak to the triager    Negative: [1] Vomiting AND [2] abdomen looks much more swollen than usual    Negative: [1] Vomiting AND [2] contains bile (green color)    Negative: [1] Constant abdominal pain AND [2] present > 2 hours    Negative: [1] Sudden onset rectal pain (straining, rectal pressure or fullness) AND [2] NOT better after SITZ bath, suppository or enema    Negative: [1] Intermittent mild abdominal pain AND [2] fever    Negative: Abdomen is more swollen than usual    Negative: Last bowel movement (BM) > 4 days ago    Negative: Leaking stool    Negative: Unable to have a bowel movement (BM) without laxative or enema    Negative: Unable to have a bowel movement (BM) without manually removing stool (using finger to pull out stool or perform disimpaction)    Negative: [1] Constipation persists > 1 week AND [2] following Constipation Care Advice    Negative: [1] Weight loss > 10 pounds (22 kg) AND [2] not dieting    Negative: Pencil-like, narrow stools    Negative: Taking new prescription medication    Negative: [1] Minor bleeding from rectum (e.g., blood just on toilet paper, few drops, streaks on surface of normal formed BM) AND [2] 3 or more times    Negative: [1] Uses enema or laxative (e.g., lactulose, milk of magnesia) AND [2] > once a month    Negative: Constipation is a chronic symptom (recurrent or ongoing AND present > 4 weeks)    Negative: Mild constipation (all triage questions negative)    Over-The-Counter (OTC) medicines for mild constipation (all triage questions negative)    Protocols used: CONSTIPATION-ADULT-AH  Spinal surgery 8/10/2017. Taking pain meds though she would rather not be  taking them. Is also eating fiber, taking laxatives and stool softeners. She's wondering if she can use an enema tonight.  She'll do an enema tonight with her husbands help.  She'll continue trying to take fewer pain medication and take a walk tomorrow. She'll continue with laxatives, stool softener,s fluids, high fiber foods.  Tasha UMAÑA RN [facility]

## 2017-08-09 ENCOUNTER — TELEPHONE (OUTPATIENT)
Dept: NEUROSURGERY | Facility: CLINIC | Age: 62
End: 2017-08-09

## 2017-08-09 NOTE — TELEPHONE ENCOUNTER
Spoke to patient. Patient is s/p lumbar fusion 8/3/17. Patient states she was able to go to the bathroom and feels the enema helped. Advised to continue increasing fluid and fiber in diet, patient is currently taking colace, and miralax. Informed her ok to do another enema if needed. Advised to avoid treadmill until she is seen for her 6 week post op visit for safety reasons. Patient verbalized understanding.

## 2017-08-09 NOTE — TELEPHONE ENCOUNTER
Pt called. States she is still constipated and isn't sure what she should do. She tried an enema last night which helped minimally but is seeking recommendations. Also asking if she is able to begin walking for short amounts of time on treadmill.

## 2017-08-13 DIAGNOSIS — F41.9 ANXIETY: ICD-10-CM

## 2017-08-14 ENCOUNTER — TELEPHONE (OUTPATIENT)
Dept: NEUROSURGERY | Facility: CLINIC | Age: 62
End: 2017-08-14

## 2017-08-14 ENCOUNTER — OFFICE VISIT (OUTPATIENT)
Dept: NEUROSURGERY | Facility: CLINIC | Age: 62
End: 2017-08-14
Attending: NURSE PRACTITIONER
Payer: COMMERCIAL

## 2017-08-14 VITALS
OXYGEN SATURATION: 98 % | BODY MASS INDEX: 22.47 KG/M2 | TEMPERATURE: 97.8 F | HEART RATE: 59 BPM | WEIGHT: 135 LBS | DIASTOLIC BLOOD PRESSURE: 84 MMHG | SYSTOLIC BLOOD PRESSURE: 135 MMHG

## 2017-08-14 DIAGNOSIS — Z98.1 S/P LUMBAR FUSION: ICD-10-CM

## 2017-08-14 PROCEDURE — 99211 OFF/OP EST MAY X REQ PHY/QHP: CPT | Performed by: NURSE PRACTITIONER

## 2017-08-14 PROCEDURE — 99024 POSTOP FOLLOW-UP VISIT: CPT | Performed by: NURSE PRACTITIONER

## 2017-08-14 RX ORDER — OXYCODONE HYDROCHLORIDE 5 MG/1
5 TABLET ORAL EVERY 6 HOURS PRN
Qty: 45 TABLET | Refills: 0 | Status: SHIPPED | OUTPATIENT
Start: 2017-08-14 | End: 2017-09-14

## 2017-08-14 RX ORDER — ALPRAZOLAM 0.25 MG
TABLET ORAL
Qty: 30 TABLET | Refills: 0 | Status: SHIPPED | OUTPATIENT
Start: 2017-08-14 | End: 2017-09-15

## 2017-08-14 ASSESSMENT — PAIN SCALES - GENERAL: PAINLEVEL: MILD PAIN (2)

## 2017-08-14 NOTE — PATIENT INSTRUCTIONS
P:      - Keep your incision clean and dry at all times. No bathing swimming or submerging in water.  Call immediately or come to ED if any drainage occurs, or you develop new pain, redness, or swelling.    - Return in 4 weeks for follow up appointment and xray    - Brace on when out of bed. No lifting greater than 10-15 pounds. No bending, twisting, or overhead reaching.

## 2017-08-14 NOTE — MR AVS SNAPSHOT
After Visit Summary   8/14/2017    Denise Ralph    MRN: [id number]           Patient Information     Date Of Birth          1955        Visit Information        Provider Department      8/14/2017 11:00 AM Pau Mendenhall APRN CNP [facility]        Today's Diagnoses     S/P lumbar fusion          Care Instructions    P:      - Keep your incision clean and dry at all times. No bathing swimming or submerging in water.  Call immediately or come to ED if any drainage occurs, or you develop new pain, redness, or swelling.    - Return in 4 weeks for follow up appointment and xray    - Brace on when out of bed. No lifting greater than 10-15 pounds. No bending, twisting, or overhead reaching.               Follow-ups after your visit        Your next 10 appointments already scheduled     Sep 15, 2017  1:00 PM CDT   Return Visit with IRENE Loza CNP   [facility] ([facility])    [address] [place] [id number] [phone]              Who to contact     If you have questions or need follow up information about today's clinic visit or your schedule please contact [facility] directly at [phone].  Normal or non-critical lab and imaging results will be communicated to you by Proformativehart, letter or phone within 4 business days after the clinic has received the results. If you do not hear from us within 7 days, please contact the clinic through Proformativehart or phone. If you have a critical or abnormal lab result, we will notify you by phone as soon as possible.  Submit refill requests through Jirafe or call your pharmacy and they will forward the refill request to us. Please allow 3 business days for your refill to be completed.          Additional Information About Your Visit        ProformativeharAdsIt Information     Jirafe gives you secure access to your electronic health record. If you see a  primary care provider, you can also send messages to your care team and make appointments. If you have questions, please call your primary care clinic.  If you do not have a primary care provider, please call [phone] and they will assist you.        Care EveryWhere ID     This is your Care EveryWhere ID. This could be used by other organizations to access your [facility] medical records  [id number]        Your Vitals Were     Pulse Temperature Pulse Oximetry BMI (Body Mass Index)          59 97.8  F (36.6  C) 98% 22.47 kg/m2         Blood Pressure from Last 3 Encounters:   08/14/17 135/84   08/06/17 156/77   07/19/17 130/80    Weight from Last 3 Encounters:   08/14/17 135 lb (61.2 kg)   08/03/17 135 lb (61.2 kg)   07/19/17 138 lb 1.6 oz (62.6 kg)              Today, you had the following     No orders found for display         Today's Medication Changes          These changes are accurate as of: 8/14/17 11:23 AM.  If you have any questions, ask your nurse or doctor.               These medicines have changed or have updated prescriptions.        Dose/Directions    oxyCODONE 5 MG IR tablet   Commonly known as:  ROXICODONE   This may have changed:  when to take this   Used for:  S/P lumbar fusion   Changed by:  Pau Mendenhall APRN CNP        Dose:  5 mg   Take 1 tablet (5 mg) by mouth every 6 hours as needed for moderate to severe pain   Quantity:  45 tablet   Refills:  0            Where to get your medicines      Some of these will need a paper prescription and others can be bought over the counter.  Ask your nurse if you have questions.     Bring a paper prescription for each of these medications     oxyCODONE 5 MG IR tablet                Primary Care Provider Office Phone # Fax #    Juan F Smallwood [phone]       [address]  [facility] [address]        Equal Access to Services     INEZ THOMPSON AH: Joseph García, opal elkins, patel falcon,  rolan bryanyamel matamoros'aan ah. So [facility] [phone].    ATENCIÓN: Si kylee arellano, tiene a simpson disposición servicios gratuitos de asistencia lingüística. Ana Luisa hair [phone].    We comply with applicable federal civil rights laws and [place] laws. We do not discriminate on the basis of race, color, national origin, age, disability sex, sexual orientation or gender identity.            Thank you!     Thank you for choosing [facility]  for your care. Our goal is always to provide you with excellent care. Hearing back from our patients is one way we can continue to improve our services. Please take a few minutes to complete the written survey that you may receive in the mail after your visit with us. Thank you!             Your Updated Medication List - Protect others around you: Learn how to safely use, store and throw away your medicines at www.disposemymeds.org.          This list is accurate as of: 8/14/17 11:23 AM.  Always use your most recent med list.                   Brand Name Dispense Instructions for use Diagnosis    ALLEGRA PO      Take 30 mg by mouth daily        ALPRAZolam 0.25 MG tablet    XANAX    30 tablet    TAKE 1 TAB BY MOUTH 3 TIMES DAILY AS NEEDED FOR ANXIETY    Anxiety       atenolol 50 MG tablet    TENORMIN    180 tablet    TAKE 1 TABLET (50 MG) BY MOUTH 2 TIMES DAILY    Essential hypertension, benign, Palpitations       latanoprost 0.005 % ophthalmic solution    XALATAN     Place 1 drop into both eyes At Bedtime        OMEPRAZOLE PO      Take 20 mg by mouth every morning        oxyCODONE 5 MG IR tablet    ROXICODONE    45 tablet    Take 1 tablet (5 mg) by mouth every 6 hours as needed for moderate to severe pain    S/P lumbar fusion       traZODone 100 MG tablet    DESYREL    90 tablet    Take 1 tablet (100 mg) by mouth nightly as needed for sleep    Primary insomnia       triamcinolone 0.1 % cream    KENALOG    15 g    Apply sparingly to affected area two  times daily for 5 days.    Eczema of external ear, left

## 2017-08-14 NOTE — NURSING NOTE
"Denise Ralph is a 62 year old female who presents for:  Chief Complaint   Patient presents with     Neurologic Problem     post op remove stabples        Initial Vitals:  /84  Pulse 59  Temp 97.8  F (36.6  C)  Wt 135 lb (61.2 kg)  SpO2 98%  BMI 22.47 kg/m2 Estimated body mass index is 22.47 kg/(m^2) as calculated from the following:    Height as of 8/3/17: 5' 5\" (1.651 m).    Weight as of this encounter: 135 lb (61.2 kg).. Body surface area is 1.68 meters squared. BP completed using cuff size: regular  Mild Pain (2)    Do you feel safe in your environment?  Yes  Do you need any refills today? No    Nursing Comments: remove staples .  Patient rates low back pain today as Mild Pain (2)      5 min. nursing intake time  Laura Iqbal CMA       Discharge plan: see providers discharge dictation  2 min. nursing discharge time  Laura Iqbal CMA  "

## 2017-08-14 NOTE — PROGRESS NOTES
Suture/Staple removal visit note:  S:  Patient has minimal pain,  and has no current concerns or questions regarding post-surgical plan of care.  O:  Staples removed by me, wound is healing well without erythema, fluctuation, or induration.  A: Ms. Ralph returned to clinic post-op for staple removal.  Patient tolerated procedure without incident. Pt is doing well. She notes she is taking about 2-3 pain pills per day. Overall she is doing good.   P:      - Keep your incision clean and dry at all times. No bathing swimming or submerging in water.  Call immediately or come to ED if any drainage occurs, or you develop new pain, redness, or swelling.    - Return in 4 weeks for follow up appointment and xray    - Brace on when out of bed. No lifting greater than 10-15 pounds. No bending, twisting, or overhead reaching.     Pau Mendenhall [facility]  [address] [address]    Tel [phone]  Pager [phone]

## 2017-08-14 NOTE — TELEPHONE ENCOUNTER
Had steri strips after staple removal today.  Wondering how long to leave on and also was given some to take home and is wondering when/how she should apply those.

## 2017-08-14 NOTE — TELEPHONE ENCOUNTER
Called and informed patient that she needs to keep her steri strips on for 2 weeks and they will fall off on their own. Ok to shower with them. Advised to have her  replace any that fall off in between then and may trim ends as needed. Patient verbalized understanding.

## 2017-08-22 ENCOUNTER — OFFICE VISIT (OUTPATIENT)
Dept: PODIATRY | Facility: CLINIC | Age: 62
End: 2017-08-22
Payer: COMMERCIAL

## 2017-08-22 VITALS
WEIGHT: 135 LBS | SYSTOLIC BLOOD PRESSURE: 132 MMHG | BODY MASS INDEX: 22.49 KG/M2 | DIASTOLIC BLOOD PRESSURE: 76 MMHG | HEIGHT: 65 IN

## 2017-08-22 DIAGNOSIS — M79.671 RIGHT FOOT PAIN: Primary | ICD-10-CM

## 2017-08-22 DIAGNOSIS — L60.0 INGROWN NAIL OF GREAT TOE OF RIGHT FOOT: ICD-10-CM

## 2017-08-22 PROCEDURE — 11730 AVULSION NAIL PLATE SIMPLE 1: CPT | Mod: T5 | Performed by: PODIATRIST

## 2017-08-22 PROCEDURE — 99203 OFFICE O/P NEW LOW 30 MIN: CPT | Mod: 25 | Performed by: PODIATRIST

## 2017-08-22 RX ORDER — SILVER SULFADIAZINE 10 MG/G
CREAM TOPICAL 2 TIMES DAILY
Qty: 25 G | Refills: 1 | Status: SHIPPED | OUTPATIENT
Start: 2017-08-22 | End: 2017-08-29

## 2017-08-22 RX ORDER — CLINDAMYCIN HCL 150 MG
150 CAPSULE ORAL 3 TIMES DAILY
Qty: 30 CAPSULE | Refills: 0 | Status: SHIPPED | OUTPATIENT
Start: 2017-08-22 | End: 2017-10-18

## 2017-08-22 NOTE — NURSING NOTE
"Chief Complaint   Patient presents with     Toenail     right great toe on the medial side of the toenail        Initial /76  Ht 5' 5\" (1.651 m)  Wt 135 lb (61.2 kg)  BMI 22.47 kg/m2 Estimated body mass index is 22.47 kg/(m^2) as calculated from the following:    Height as of this encounter: 5' 5\" (1.651 m).    Weight as of this encounter: 135 lb (61.2 kg).  Medication Reconciliation: complete   Aime Whittington MA      "

## 2017-08-22 NOTE — LETTER
8/22/2017         RE: Denise Ralph  [address] [facility] [address]        Dear Colleague,    Thank you for referring your patient, Denise Ralph, to the [facility]. Please see a copy of my visit note below.    PATIENT HISTORY:  Denise Ralph is a 62 year old female who presents to clinic for pain to right great toe. Thinks nail is ingrown. Denies injury. Recently had back surgery 2.5 weeks ago. Pain is 4/10. Has tried cutting it out herself but too painful. Denies fever, chills, nausa.     Review of Systems:  Patient denies fever, chills, rash, wound, limping, numbness, weakness, heart burn, blood in stool, chest pain with activity, calf pain when walking, shortness of breath with activity, chronic cough, easy bleeding/bruising, swelling of ankles, excessive thirst, fatigue, depression, anxiety.  Patient admits to stiffness.     PAST MEDICAL HISTORY:   Past Medical History:   Diagnosis Date     Anxiety     Gets panic attacks     Depressive disorder, not elsewhere classified      Essential hypertension, benign     No cardiologist     Glaucoma      Irritable bowel syndrome     has had neg colonoscopy & EGD w/u     Lumbar degenerative disc disease 1996    Had PT, traction, no surgery     Other chronic pain     JOint pain for many years.     PONV (postoperative nausea and vomiting)      Sciatica 3/06    R thigh;  MRI = R L2-3 disc         PAST SURGICAL HISTORY:   Past Surgical History:   Procedure Laterality Date     EYE SURGERY Bilateral     Treatment of glaucoma     HYSTERECTOMY       HYSTERECTOMY VAGINAL       LAPAROSCOPIC CHOLECYSTECTOMY  2002    with repeat operation because of bile leak     Left shoulder decomp surgery for impingement  approx 1993     OPTICAL TRACKING SYSTEM FUSION POSTERIOR SPINE LUMBAR N/A 8/3/2017    Procedure: OPTICAL TRACKING SYSTEM FUSION SPINE POSTERIOR LUMBAR ONE LEVEL;  1. L4 Ang-Meyers osteotomy with exposure of the L4 and L5 roots  2. L4-5  complete discectomy with arthrodesis  3. Placement of Globus Creo GOMEZ pedicle screws bilaterally from L4 to L5  4. L4 - L5 posterior lateral fusion with placement of Medtronic Magnifuse and locally harvested autologous bone  5. Use of Stealth and O     WRIST SURGERY Left         MEDICATIONS:   Current Outpatient Prescriptions:      ALPRAZolam (XANAX) 0.25 MG tablet, TAKE 1 TAB BY MOUTH 3 TIMES DAILY AS NEEDED FOR ANXIETY, Disp: 30 tablet, Rfl: 0     OMEPRAZOLE PO, Take 20 mg by mouth every morning, Disp: , Rfl:      latanoprost (XALATAN) 0.005 % ophthalmic solution, Place 1 drop into both eyes At Bedtime, Disp: , Rfl:      atenolol (TENORMIN) 50 MG tablet, TAKE 1 TABLET (50 MG) BY MOUTH 2 TIMES DAILY, Disp: 180 tablet, Rfl: 1     traZODone (DESYREL) 100 MG tablet, Take 1 tablet (100 mg) by mouth nightly as needed for sleep, Disp: 90 tablet, Rfl: 2     oxyCODONE (ROXICODONE) 5 MG IR tablet, Take 1 tablet (5 mg) by mouth every 6 hours as needed for moderate to severe pain (Patient not taking: Reported on 8/22/2017), Disp: 45 tablet, Rfl: 0     triamcinolone (KENALOG) 0.1 % cream, Apply sparingly to affected area two times daily for 5 days. (Patient not taking: Reported on 8/22/2017), Disp: 15 g, Rfl: 0     Fexofenadine HCl (ALLEGRA PO), Take 30 mg by mouth daily, Disp: , Rfl:      ALLERGIES:    Allergies   Allergen Reactions     Erythromycin Shortness Of Breath     Msir [Morphine Sulfate] Nausea     Intollerant to Morphine Sulfate: Nausea,vomiting     Dilaudid [Hydromorphone] Visual Disturbance     Hallucinations     Penicillins Itching     Reglan Other (See Comments)     Muscle spasms in throat        SOCIAL HISTORY:   Social History     Social History     Marital status:      Spouse name: N/A     Number of children: N/A     Years of education: N/A     Occupational History           Social History Main Topics     Smoking status: Former Smoker     Packs/day: 0.50     Years: 25.00     Quit date:  "10/1/1997     Smokeless tobacco: Never Used      Comment: quit      Alcohol use 1.0 oz/week     2 Glasses of wine per week      Comment: 2 glasses of wine daily     Drug use: No     Sexual activity: Yes     Partners: Male     Other Topics Concern     Not on file     Social History Narrative        FAMILY HISTORY:   Family History   Problem Relation Age of Onset     Cardiovascular Mother      / mi     Breast Cancer Mother      diagnosed age 60's     Respiratory Father      pulmonary fibrosis.     Cardiovascular Brother       of MI age 34 2nd to Cocaine Use     Cancer - colorectal No family hx of         EXAM:Vitals: /76  Ht 5' 5\" (1.651 m)  Wt 135 lb (61.2 kg)  BMI 22.47 kg/m2  BMI= Body mass index is 22.47 kg/(m^2).    General appearance: Patient is alert and fully cooperative with history & exam.  No sign of distress is noted during the visit.     Psychiatric: Affect is pleasant & appropriate.  Patient appears motivated to improve health.     Respiratory: Breathing is regular & unlabored while sitting.     HEENT: Hearing is intact to spoken word.  Speech is clear.  No gross evidence of visual impairment that would impact ambulation.     Dermatologic: both borders of right great toenail are incurvated. Pain on palpation. Localized redness.     Vascular: DP & PT pulses are intact & regular bilaterally.  No significant edema or varicosities noted.  CFT and skin temperature is normal to both lower extremities.     Neurologic: Lower extremity sensation is intact to light touch.  No evidence of weakness or contracture in the lower extremities.  No evidence of neuropathy.     Musculoskeletal: Patient is ambulatory without assistive device or brace.  No gross ankle deformity noted.  No foot or ankle joint effusion is noted.     ASSESSMENT:    Right foot pain  Ingrown nail of great toe of right foot     PLAN:  Reviewed patient's chart in [facility]. The potential causes and nature of an ingrown toenail " were discussed with the patient.  We reviewed the natural history/prognosis of the condition and potential risks if no treatment is provided.      Treatment options discussed included conservative management (oral antibiotics, soaking of foot, adequate width shoes)  as well as surgical management (partial or total nail removal).  The pros and cons of both forms of treatment were reviewed.      After thorough discussion and answering all questions, the patient elected to have the borders removed. Will soak and apply antibiotic cream and bandage for 2 weeks 2x a day. Was given antibiotic due to recent back surgery.    Procedure: After verbal consent, the right big toe was anesthetized with 5cc's of 1% lidocaine plain. A tourniquet was applied to the toe. The medial and lateral borders were then raised from the nail bed and then cut the length of the nail.  The offending nail borders were then removed.   Bacitracin was applied to the nail bed.  The tourniquet was removed.  Bandage was applied to the toe.  The patient tolerated the procedure and anesthesia well.       Sarah Montesinos DPM, Podiatry/Foot and Ankle Surgery          Again, thank you for allowing me to participate in the care of your patient.        Sincerely,        Sarah Montesinos DPM, Podiatry/Foot and Ankle Surgery

## 2017-08-22 NOTE — PROGRESS NOTES
PATIENT HISTORY:  Denise Ralph is a 62 year old female who presents to clinic for pain to right great toe. Thinks nail is ingrown. Denies injury. Recently had back surgery 2.5 weeks ago. Pain is 4/10. Has tried cutting it out herself but too painful. Denies fever, chills, nausa.     Review of Systems:  Patient denies fever, chills, rash, wound, limping, numbness, weakness, heart burn, blood in stool, chest pain with activity, calf pain when walking, shortness of breath with activity, chronic cough, easy bleeding/bruising, swelling of ankles, excessive thirst, fatigue, depression, anxiety.  Patient admits to stiffness.     PAST MEDICAL HISTORY:   Past Medical History:   Diagnosis Date     Anxiety     Gets panic attacks     Depressive disorder, not elsewhere classified      Essential hypertension, benign     No cardiologist     Glaucoma      Irritable bowel syndrome     has had neg colonoscopy & EGD w/u     Lumbar degenerative disc disease 1996    Had PT, traction, no surgery     Other chronic pain     JOint pain for many years.     PONV (postoperative nausea and vomiting)      Sciatica 3/06    R thigh;  MRI = R L2-3 disc         PAST SURGICAL HISTORY:   Past Surgical History:   Procedure Laterality Date     EYE SURGERY Bilateral     Treatment of glaucoma     HYSTERECTOMY       HYSTERECTOMY VAGINAL       LAPAROSCOPIC CHOLECYSTECTOMY  2002    with repeat operation because of bile leak     Left shoulder decomp surgery for impingement  approx 1993     OPTICAL TRACKING SYSTEM FUSION POSTERIOR SPINE LUMBAR N/A 8/3/2017    Procedure: OPTICAL TRACKING SYSTEM FUSION SPINE POSTERIOR LUMBAR ONE LEVEL;  1. L4 Ang-Meyers osteotomy with exposure of the L4 and L5 roots  2. L4-5 complete discectomy with arthrodesis  3. Placement of Globus Creo GOMEZ pedicle screws bilaterally from L4 to L5  4. L4 - L5 posterior lateral fusion with placement of Medtronic Magnifuse and locally harvested autologous bone  5. Use of Stealth and O      WRIST SURGERY Left         MEDICATIONS:   Current Outpatient Prescriptions:      ALPRAZolam (XANAX) 0.25 MG tablet, TAKE 1 TAB BY MOUTH 3 TIMES DAILY AS NEEDED FOR ANXIETY, Disp: 30 tablet, Rfl: 0     OMEPRAZOLE PO, Take 20 mg by mouth every morning, Disp: , Rfl:      latanoprost (XALATAN) 0.005 % ophthalmic solution, Place 1 drop into both eyes At Bedtime, Disp: , Rfl:      atenolol (TENORMIN) 50 MG tablet, TAKE 1 TABLET (50 MG) BY MOUTH 2 TIMES DAILY, Disp: 180 tablet, Rfl: 1     traZODone (DESYREL) 100 MG tablet, Take 1 tablet (100 mg) by mouth nightly as needed for sleep, Disp: 90 tablet, Rfl: 2     oxyCODONE (ROXICODONE) 5 MG IR tablet, Take 1 tablet (5 mg) by mouth every 6 hours as needed for moderate to severe pain (Patient not taking: Reported on 8/22/2017), Disp: 45 tablet, Rfl: 0     triamcinolone (KENALOG) 0.1 % cream, Apply sparingly to affected area two times daily for 5 days. (Patient not taking: Reported on 8/22/2017), Disp: 15 g, Rfl: 0     Fexofenadine HCl (ALLEGRA PO), Take 30 mg by mouth daily, Disp: , Rfl:      ALLERGIES:    Allergies   Allergen Reactions     Erythromycin Shortness Of Breath     Msir [Morphine Sulfate] Nausea     Intollerant to Morphine Sulfate: Nausea,vomiting     Dilaudid [Hydromorphone] Visual Disturbance     Hallucinations     Penicillins Itching     Reglan Other (See Comments)     Muscle spasms in throat        SOCIAL HISTORY:   Social History     Social History     Marital status:      Spouse name: N/A     Number of children: N/A     Years of education: N/A     Occupational History           Social History Main Topics     Smoking status: Former Smoker     Packs/day: 0.50     Years: 25.00     Quit date: 10/1/1997     Smokeless tobacco: Never Used      Comment: quit 1997     Alcohol use 1.0 oz/week     2 Glasses of wine per week      Comment: 2 glasses of wine daily     Drug use: No     Sexual activity: Yes     Partners: Male     Other Topics Concern  "    Not on file     Social History Narrative        FAMILY HISTORY:   Family History   Problem Relation Age of Onset     Cardiovascular Mother      / mi     Breast Cancer Mother      diagnosed age 60's     Respiratory Father      pulmonary fibrosis.     Cardiovascular Brother       of MI age 34 2nd to Cocaine Use     Cancer - colorectal No family hx of         EXAM:Vitals: /76  Ht 5' 5\" (1.651 m)  Wt 135 lb (61.2 kg)  BMI 22.47 kg/m2  BMI= Body mass index is 22.47 kg/(m^2).    General appearance: Patient is alert and fully cooperative with history & exam.  No sign of distress is noted during the visit.     Psychiatric: Affect is pleasant & appropriate.  Patient appears motivated to improve health.     Respiratory: Breathing is regular & unlabored while sitting.     HEENT: Hearing is intact to spoken word.  Speech is clear.  No gross evidence of visual impairment that would impact ambulation.     Dermatologic: both borders of right great toenail are incurvated. Pain on palpation. Localized redness.     Vascular: DP & PT pulses are intact & regular bilaterally.  No significant edema or varicosities noted.  CFT and skin temperature is normal to both lower extremities.     Neurologic: Lower extremity sensation is intact to light touch.  No evidence of weakness or contracture in the lower extremities.  No evidence of neuropathy.     Musculoskeletal: Patient is ambulatory without assistive device or brace.  No gross ankle deformity noted.  No foot or ankle joint effusion is noted.     ASSESSMENT:    Right foot pain  Ingrown nail of great toe of right foot     PLAN:  Review[facility] in [facility]. The potential causes and nature of an ingrown toenail were discussed with the patient.  We reviewed the natural history/prognosis of the condition and potential risks if no treatment is provided.      Treatment options discussed included conservative management (oral antibiotics, soaking of foot, adequate " width shoes)  as well as surgical management (partial or total nail removal).  The pros and cons of both forms of treatment were reviewed.      After thorough discussion and answering all questions, the patient elected to have the borders removed. Will soak and apply antibiotic cream and bandage for 2 weeks 2x a day. Was given antibiotic due to recent back surgery.    Procedure: After verbal consent, the right big toe was anesthetized with 5cc's of 1% lidocaine plain. A tourniquet was applied to the toe. The medial and lateral borders were then raised from the nail bed and then cut the length of the nail.  The offending nail borders were then removed.   Bacitracin was applied to the nail bed.  The tourniquet was removed.  Bandage was applied to the toe.  The patient tolerated the procedure and anesthesia well.       Sarah Montesinos DPM, Podiatry/Foot and Ankle Surgery

## 2017-08-22 NOTE — MR AVS SNAPSHOT
After Visit Summary   8/22/2017    Denise Ralph    MRN: [id number]           Patient Information     Date Of Birth          1955        Visit Information        Provider Department      8/22/2017 10:00 AM Sarah Montesinos DPM, Podiatry/Foot and Ankle Surgery [facility]        Care Instructions      DR. MONTESINOS'S CLINIC LOCATIONS:   MONDAY AM - [place] TUESDAY - [place]   [address] [place] [address] [place] [address]   [phone] / [phone] / [phone]       WEDNESDAY - [place] FRIDAY AM - [facility]   [address]   [place] [address] [place] [address]   [phone] / [phone]       FRIDAY PM - [place] SCHEDULE SURGERY: [phone]   [address] BILLING QUESTIONS: [phone]   [place] [address] AFTER HOURS: [phone]   [phone] / [phone] APPOINTMENTS: [phone]         Body Mass Index (BMI)  Many things can cause foot and ankle problems. Foot structure, activity level, foot mechanics and injuries are common causes of pain.  One very important issue that often goes unmentioned, is body weight. Extra weight can cause increased stress on muscles, ligaments, bones and tendons.  Sometimes just a few extra pounds is all it takes to put one over her/his threshold. Without reducing that stress, it can be difficult to alleviate pain. Some people are uncomfortable addressing this issue, but we feel it is important for you to think about it. As Foot &  Ankle specialists, our job is addressing the lower extremity problem and possible causes. Regarding extra body weight, we encourage patients to discuss diet and weight management plans with their primary care doctors. It is this team approach that gives you the best opportunity for pain relief and getting you back on your feet.        INGROWN TOENAILS  When a toenail is ingrown, it is  curved and grows into the skin, usually at the nail borders (the sides of the nail). This  digging in  of the nail irritates the skin, often creating pain, redness, swelling, and warmth in the toe.  If an ingrown nail causes a break in the skin, bacteria may enter and cause an infection in the area, which is often marked by drainage and a foul odor. However, even if the toe isn t painful, red, swollen, or warm, a nail that curves downward into the skin can progress to an infection.  CAUSES:  Heredity: In many people, the tendency for ingrown toenails is inherited.   Trauma: Sometimes an ingrown toenail is the result of trauma, such as stubbing your toe, having an object fall on your toe, or engaging in activities that involve repeated pressure on the toes, such as kicking or running.   Improper Trimming:  The most common cause of ingrown toenails is cutting your nails too short. This encourages the skin next to the nail to fold over the nail.   Improperly Sized Footwear: Ingrown toenails can result from wearing socks and shoes that are tight or short.   Nail Conditions: Ingrown toenails can be caused by nail problems, such as fungal infections or losing a nail due to trauma.   TREATMENT: Sometimes initial treatment for ingrown toenails can be safely performed at home. However, home treatment is strongly discouraged if an infection is suspected, or for those who have medical conditions that put feet at high risk, such as diabetes, nerve damage in the foot, or poor circulation.  Home care: If you don t have an infection or any of the above medical conditions, you can soak your foot in room-temperature water (adding Epsom s salt may be recommended by your doctor), and gently massage the side of the nail fold to help reduce the inflammation.  Avoid attempting  bathroom surgery.  Repeated cutting of the nail can cause the condition to worsen over time. If your symptoms fail to improve, it s time to see a foot and ankle  surgeon.  Physician care: After examining the toe, the foot and ankle surgeon will select the treatment best suited for you. If an infection is present, an oral antibiotic may be prescribed.  Sometimes a minor surgical procedure, often performed in the office, will ease the pain and remove the offending nail. After applying a local anesthetic, the doctor removes part of the nail s side border. Some nails may become ingrown again, requiring removal of the nail root.  Following the nail procedure, a light bandage will be applied. Most people experience very little pain after surgery and may resume normal activity the next day. If your surgeon has prescribed an oral antibiotic, be sure to take all the medication, even if your symptoms have improved.  PREVENTION:  Proper Trimming: Cut toenails in a fairly straight line, and don t cut them too short. You should be able to get your fingernail under the sides and end of the nail.   Well-fitting Footwear: Don t wear shoes that are short or tight in the toe area. Avoid shoes that are loose, because they too cause pressure on the toes, especially when running or walking briskly.     INGROWN TOENAIL REMOVAL AFTERCARE     Go directly home and elevate the affected foot on one or two pillows for the remainder of the day/evening if possible. Your toe may stay numb anywhere from 2-8 hours.     Take Tylenol, ibuprofen or another anti-inflammatory as needed for pain.     Take antibiotic if that has been prescribed. Finish the entire prescribed antibiotic even if your symptoms have improved.     The evening of the procedure, soak/wash the affected area in warm water (you may add Epsom salt) for 5 to 10 minutes. Do this twice a day for 2-4 weeks (6-8 weeks if you had phenol) (you may count showering/bathing as one soak).  After soaks, pat the area dry and then allow to airdry for a few minutes. Apply antibiotic ointment to the area and cover with 2 X 2 gauze and paper tape or  band-aid.    You may pursue everyday activities as tolerated with either an open toe shoe or cut-out shoe as needed or you may wear regular shoes if no pain is noted.    Watch for any signs and symptoms of infection such as: redness, red streaks going up the foot/leg, swelling, pus or foul odor. Those that have had the phenol procedure, the toe will drain longer and will look like it is infected because it is a chemical burn.     Please call with questions.            Follow-ups after your visit        Your next 10 appointments already scheduled     Sep 14, 2017  1:20 PM CDT   Return Visit with IRENE Loza CNP   [facility] ([facility])    [address] [address] [address] [phone]              Who to contact     If you have questions or need follow up information about today's clinic visit or your schedule please contact [facility] directly at [phone].  Normal or non-critical lab and imaging results will be communicated to you by [employer], letter or phone within 4 business days after the clinic has received the results. If you do not hear from us within 7 days, please contact the clinic through [employer] or phone. If you have a critical or abnormal lab result, we will notify you by phone as soon as possible.  Submit refill requests through [employer] or call your pharmacy and they will forward the refill request to us. Please allow 3 business days for your refill to be completed.          Additional Information About Your Visit        [employer] Information     [employer] gives you secure access to your electronic health record. If you see a primary care provider, you can also send messages to your care team and make appointments. If you have questions, please call your primary care clinic.  If you do not have a primary care provider, please call [phone] and they will assist you.        Care EveryWhere ID      "This is your Care EveryWhere ID. This could be used by other organizations to access your [facility] medical records  [id number]        Your Vitals Were     Height BMI (Body Mass Index)                5' 5\" (1.651 m) 22.47 kg/m2           Blood Pressure from Last 3 Encounters:   08/22/17 132/76   08/14/17 135/84   08/06/17 156/77    Weight from Last 3 Encounters:   08/22/17 135 lb (61.2 kg)   08/14/17 135 lb (61.2 kg)   08/03/17 135 lb (61.2 kg)              Today, you had the following     No orders found for display       Primary Care Provider Office Phone # Fax #    Juan F Smallwood [phone]       [address]  [facility] [address]        Equal Access to Services     [facility]: Hadii amrita mackey hadasho Sojameel, waaxda luqadaha, qaybta kaalmada adeegyada, rolan godwin . So [facility] [phone].    ATENCIÓN: Si habla español, tiene a simpson disposición servicios gratuitos de asistencia lingüística. Ana Luisa al [phone].    We comply with applicable federal civil rights laws and [place] laws. We do not discriminate on the basis of race, color, national origin, age, disability sex, sexual orientation or gender identity.            Thank you!     Thank you for choosing [facility]  for your care. Our goal is always to provide you with excellent care. Hearing back from our patients is one way we can continue to improve our services. Please take a few minutes to complete the written survey that you may receive in the mail after your visit with us. Thank you!             Your Updated Medication List - Protect others around you: Learn how to safely use, store and throw away your medicines at www.disposemymeds.org.          This list is accurate as of: 8/22/17 10:35 AM.  Always use your most recent med list.                   Brand Name Dispense Instructions for use Diagnosis    ALLEGRA PO      Take 30 mg by mouth daily        ALPRAZolam 0.25 MG tablet "    XANAX    30 tablet    TAKE 1 TAB BY MOUTH 3 TIMES DAILY AS NEEDED FOR ANXIETY    Anxiety       atenolol 50 MG tablet    TENORMIN    180 tablet    TAKE 1 TABLET (50 MG) BY MOUTH 2 TIMES DAILY    Essential hypertension, benign, Palpitations       latanoprost 0.005 % ophthalmic solution    XALATAN     Place 1 drop into both eyes At Bedtime        OMEPRAZOLE PO      Take 20 mg by mouth every morning        oxyCODONE 5 MG IR tablet    ROXICODONE    45 tablet    Take 1 tablet (5 mg) by mouth every 6 hours as needed for moderate to severe pain    S/P lumbar fusion       traZODone 100 MG tablet    DESYREL    90 tablet    Take 1 tablet (100 mg) by mouth nightly as needed for sleep    Primary insomnia       triamcinolone 0.1 % cream    KENALOG    15 g    Apply sparingly to affected area two times daily for 5 days.    Eczema of external ear, left

## 2017-08-22 NOTE — PATIENT INSTRUCTIONS
DR. PANDYA'S CLINIC LOCATIONS:   MONDAY AM - [place] TUESDAY - [place]   [address] [address] [address] [address] [place] [address]   [phone] / [phone]       WEDNESDAY - [place] FRIDAY AM - [facility]   [address]   [place] [address] [place] [address]   [phone] / [phone]       FRIDAY PM - [place] SCHEDULE SURGERY: [phone]   [address] BILLING QUESTIONS: [phone]   [place] [address] AFTER HOURS: [phone]   [phone] / [phone] APPOINTMENTS: [phone]         Body Mass Index (BMI)  Many things can cause foot and ankle problems. Foot structure, activity level, foot mechanics and injuries are common causes of pain.  One very important issue that often goes unmentioned, is body weight. Extra weight can cause increased stress on muscles, ligaments, bones and tendons.  Sometimes just a few extra pounds is all it takes to put one over her/his threshold. Without reducing that stress, it can be difficult to alleviate pain. Some people are uncomfortable addressing this issue, but we feel it is important for you to think about it. As Foot &  Ankle specialists, our job is addressing the lower extremity problem and possible causes. Regarding extra body weight, we encourage patients to discuss diet and weight management plans with their primary care doctors. It is this team approach that gives you the best opportunity for pain relief and getting you back on your feet.        INGROWN TOENAILS  When a toenail is ingrown, it is curved and grows into the skin, usually at the nail borders (the sides of the nail). This  digging in  of the nail irritates the skin, often creating pain, redness, swelling, and warmth in the toe.  If an ingrown nail causes a break in the skin, bacteria may enter and cause an infection in the area, which is often marked by drainage and a  foul odor. However, even if the toe isn t painful, red, swollen, or warm, a nail that curves downward into the skin can progress to an infection.  CAUSES:  Heredity: In many people, the tendency for ingrown toenails is inherited.   Trauma: Sometimes an ingrown toenail is the result of trauma, such as stubbing your toe, having an object fall on your toe, or engaging in activities that involve repeated pressure on the toes, such as kicking or running.   Improper Trimming:  The most common cause of ingrown toenails is cutting your nails too short. This encourages the skin next to the nail to fold over the nail.   Improperly Sized Footwear: Ingrown toenails can result from wearing socks and shoes that are tight or short.   Nail Conditions: Ingrown toenails can be caused by nail problems, such as fungal infections or losing a nail due to trauma.   TREATMENT: Sometimes initial treatment for ingrown toenails can be safely performed at home. However, home treatment is strongly discouraged if an infection is suspected, or for those who have medical conditions that put feet at high risk, such as diabetes, nerve damage in the foot, or poor circulation.  Home care: If you don t have an infection or any of the above medical conditions, you can soak your foot in room-temperature water (adding Epsom s salt may be recommended by your doctor), and gently massage the side of the nail fold to help reduce the inflammation.  Avoid attempting  bathroom surgery.  Repeated cutting of the nail can cause the condition to worsen over time. If your symptoms fail to improve, it s time to see a foot and ankle surgeon.  Physician care: After examining the toe, the foot and ankle surgeon will select the treatment best suited for you. If an infection is present, an oral antibiotic may be prescribed.  Sometimes a minor surgical procedure, often performed in the office, will ease the pain and remove the offending nail. After applying a local  anesthetic, the doctor removes part of the nail s side border. Some nails may become ingrown again, requiring removal of the nail root.  Following the nail procedure, a light bandage will be applied. Most people experience very little pain after surgery and may resume normal activity the next day. If your surgeon has prescribed an oral antibiotic, be sure to take all the medication, even if your symptoms have improved.  PREVENTION:  Proper Trimming: Cut toenails in a fairly straight line, and don t cut them too short. You should be able to get your fingernail under the sides and end of the nail.   Well-fitting Footwear: Don t wear shoes that are short or tight in the toe area. Avoid shoes that are loose, because they too cause pressure on the toes, especially when running or walking briskly.     INGROWN TOENAIL REMOVAL AFTERCARE     Go directly home and elevate the affected foot on one or two pillows for the remainder of the day/evening if possible. Your toe may stay numb anywhere from 2-8 hours.     Take Tylenol, ibuprofen or another anti-inflammatory as needed for pain.     Take antibiotic if that has been prescribed. Finish the entire prescribed antibiotic even if your symptoms have improved.     The evening of the procedure, soak/wash the affected area in warm water (you may add Epsom salt) for 5 to 10 minutes. Do this twice a day for 2-4 weeks (6-8 weeks if you had phenol) (you may count showering/bathing as one soak).  After soaks, pat the area dry and then allow to airdry for a few minutes. Apply antibiotic ointment to the area and cover with 2 X 2 gauze and paper tape or band-aid.    You may pursue everyday activities as tolerated with either an open toe shoe or cut-out shoe as needed or you may wear regular shoes if no pain is noted.    Watch for any signs and symptoms of infection such as: redness, red streaks going up the foot/leg, swelling, pus or foul odor. Those that have had the phenol procedure, the  toe will drain longer and will look like it is infected because it is a chemical burn.     Please call with questions.

## 2017-08-23 ENCOUNTER — TELEPHONE (OUTPATIENT)
Dept: PODIATRY | Facility: CLINIC | Age: 62
End: 2017-08-23

## 2017-08-23 NOTE — TELEPHONE ENCOUNTER
Patient is calling to ask if she should switch antibiotics?  She has had 4 doses of the Clindamycin and now is having  A significant headache. Can this be changed or can she stop it?  She has some drug allergies, please advise.   Pharmacy is teed up if you want to change it. She does not  Feel she can take it for a full 10 days. Ok to leave message with details  On it at her contact number. She is not feeling well on this.     Cuca Chavez, RN  Triage Nurse

## 2017-08-23 NOTE — TELEPHONE ENCOUNTER
She can stop taking it. It is more just prophlyactic. The cream the was ordered for her toe also has antibiotics in it so she should be okay. If she develops fever, chills, or streaking redness, let us know and we would start something at that time.     Sarah Montesinos, RENEE

## 2017-08-26 ENCOUNTER — NURSE TRIAGE (OUTPATIENT)
Dept: NURSING | Facility: CLINIC | Age: 62
End: 2017-08-26

## 2017-08-26 NOTE — TELEPHONE ENCOUNTER
Patient calling with questions regarding steri strips coming off early. Patient is out of them andsshe has not been able to find any in stores. Will  butterfly closure for last  2 days she is to wear something no wound.  Additional Information    Negative: [1] Major abdominal surgical incision AND [2] wound gaping open AND [3] visible internal organs    Negative: Sounds like a life-threatening emergency to the triager    Negative: [1] Bleeding from incision AND [2] won't stop after 10 minutes of direct pressure    Negative: [1] Widespread rash AND [2] bright red, sunburn-like    Negative: Severe pain in the incision    Negative: [1] Suture came out early AND [2] wound gaping AND [3] < 48 hours since sutures placed    Negative: [1] Incision gaping open AND [2] length of opening > 2 inches (5 cm)    Negative: Patient sounds very sick or weak to the triager    Negative: Sounds like a serious complication to the triager    Negative: Fever > 100.5 F (38.1 C)    Negative: [1] Incision looks infected (spreading redness, pain) AND [2] fever > 99.5 F (37.5 C)    Negative: [1] Incision looks infected (spreading redness, pain) AND [2] large red area (> 2 in. or 5 cm)    Negative: [1] Incision looks infected (spreading redness, pain) AND [2] face wound    Negative: [1] Red streak runs from the incision AND [2] longer than 1 inch (2.5 cm)    Negative: [1] Pus or bad-smelling fluid draining from incision AND [2] no fever    Negative: [1] Post-op pain AND [2] not controlled with pain medications    Negative: Dressing soaked with blood or body fluid (e.g., drainage)    Negative: Caller has URGENT question and triager unable to answer question    Negative: [1] Small red area or streak AND [2] no fever    Negative: [1] Clear or blood-tinged fluid draining from wound AND [2] no fever    Negative: [1] 48 hours since surgery AND [2] wound is becoming more tender    Negative: [1] Incision gaping open AND [2] on the face AND [3] >  48 hours since sutures placed    Negative: [1] Incision gaping open AND [3] length of opening > 1/2 inch (1 cm) AND [3] > 48 hours since sutures placed    Negative: Suture or staple removal is overdue    Negative: [1] Suture or staple came out early AND [2] caller wants wound checked    Negative: Caller has NON-URGENT question and triager unable to answer question    Negative: Pimple where a stitch comes through the skin    Negative: Suture or staple came out early    Negative: Suture removal date, questions about    Skin tape (e.g., Steri-strips) removal, questions about    Protocols used: POST-OP INCISION SYMPTOMS-ADULT-AH

## 2017-09-06 ENCOUNTER — TELEPHONE (OUTPATIENT)
Dept: NEUROSURGERY | Facility: CLINIC | Age: 62
End: 2017-09-06

## 2017-09-06 NOTE — TELEPHONE ENCOUNTER
Pt called stating that her stiches are bothering her and she feels like they are poking out of the incision. She wants to know if that is normal or if she should come in prior to her follow up next week to have them looked at?

## 2017-09-06 NOTE — TELEPHONE ENCOUNTER
Patient reports some of the dissolvable sutures are poking through the incision. Patient denies any s/sx infection. Mild redness d/t irritation. Offered for patient to make a nurse only visit to look at incision and trim the suture. Patient said if it becomes more irritated she may call but wants to try to wait until her f/u visit with Pau next week on 9/14/17. Reviewed incision care and to watch s/sx infection and to notify clinic. Patient verbalized understanding.

## 2017-09-07 ENCOUNTER — TRANSFERRED RECORDS (OUTPATIENT)
Dept: HEALTH INFORMATION MANAGEMENT | Facility: CLINIC | Age: 62
End: 2017-09-07

## 2017-09-13 ENCOUNTER — OFFICE VISIT (OUTPATIENT)
Dept: PODIATRY | Facility: CLINIC | Age: 62
End: 2017-09-13
Payer: COMMERCIAL

## 2017-09-13 VITALS
SYSTOLIC BLOOD PRESSURE: 130 MMHG | HEIGHT: 65 IN | DIASTOLIC BLOOD PRESSURE: 78 MMHG | WEIGHT: 135 LBS | BODY MASS INDEX: 22.49 KG/M2

## 2017-09-13 DIAGNOSIS — M19.071 PRIMARY LOCALIZED OSTEOARTHROSIS, ANKLE AND FOOT, RIGHT: ICD-10-CM

## 2017-09-13 DIAGNOSIS — M79.671 RIGHT FOOT PAIN: Primary | ICD-10-CM

## 2017-09-13 PROCEDURE — 99213 OFFICE O/P EST LOW 20 MIN: CPT | Performed by: PODIATRIST

## 2017-09-13 NOTE — NURSING NOTE
"Chief Complaint   Patient presents with     Toenail     right 2nd toe on the medial side pt unsure of it is ingrown        Initial /78  Ht 5' 5\" (1.651 m)  Wt 135 lb (61.2 kg)  BMI 22.47 kg/m2 Estimated body mass index is 22.47 kg/(m^2) as calculated from the following:    Height as of this encounter: 5' 5\" (1.651 m).    Weight as of this encounter: 135 lb (61.2 kg).  Medication Reconciliation: complete   Aime Whittington MA      "

## 2017-09-13 NOTE — PATIENT INSTRUCTIONS
DR. PANDYA'S CLINIC LOCATIONS:   MONDAY AM - [place] TUESDAY - [place]   [address] [address] [address] [address] [place] [address]   [phone] / [phone] / [phone]       WEDNESDAY - [place] FRIDAY AM - WOUND CENTER   [address]   [place] [address] [place] [address]   [phone] / [phone]       FRIDAY PM - [place] SCHEDULE SURGERY: [phone] [address] APPOINTMENTS: [phone]   [place] [address] BILLING QUESTIONS: [phone] / [phone]      OSTEOARTHRITIS OF THE FOOT & ANKLE  Osteoarthritis is a condition characterized by the breakdown and eventual loss of cartilage in one or more joints. Cartilage (the connective tissue found at the end of the bones in the joints) protects and cushions the bones during movement. When cartilage deteriorates or is lost, symptoms develop that can restrict one s ability to easily perform daily activities.  Osteoarthritis is also known as degenerative arthritis, reflecting its nature to develop as part of the aging process. As the most common form of arthritis, osteoarthritis affects millions of Americans. Some people refer to osteoarthritis simply as arthritis, even though there are many different types of arthritis.  Osteoarthritis appears at various joints throughout the body, including the hands, feet, spine, hips, and knees. In the foot, the disease most frequently occurs in the big toe, although it is also often found in the midfoot and ankle.  CAUSES  Osteoarthritis is considered a  wear and tear  disease because the cartilage in the joint wears down with repeated stress and use over time. As the cartilage deteriorates and gets thinner, the bones lose their protective covering and eventually may rub together, causing pain and inflammation of the joint.  An injury may also lead to osteoarthritis, although it may take  months or years after the injury for the condition to develop. For example, osteoarthritis in the big toe is often caused by kicking or jamming the toe, or by dropping something on the toe. Osteoarthritis in the midfoot is often caused by dropping something on it, or by a sprain or fracture. In the ankle, osteoarthritis is usually caused by a fracture and occasionally by a severe sprain.  Sometimes osteoarthritis develops as a result of abnormal foot mechanics such as flat feet or high arches. A flat foot causes less stability in the ligaments (bands of tissue that connect bones), resulting in excessive strain on the joints, which can cause arthritis. A high arch is rigid and lacks mobility, causing a jamming of joints that creates an increased risk of arthritis.  SYMPTOMS  People with osteoarthritis in the foot or ankle experience, in varying degrees, one or more of the following: Pain and stiffness in the joint, swelling in or near the joint, or difficulty walking or bending the joint.   Some patients with osteoarthritis also develop a bone spur (a bony protrusion) at the affected joint. Shoe pressure may cause pain at the site of a bone spur, and in some cases blisters or calluses may form over its surface. Bone spurs can also limit the movement of the joint.    DIAGNOSIS  In diagnosing osteoarthritis, the foot and ankle surgeon will examine the foot thoroughly, looking for swelling in the joint, limited mobility, and pain with movement. In some cases, deformity and/or enlargement (spur) of the joint may be noted. X-rays may be ordered to evaluate the extent of the disease.  NON-SURGICAL TREATMENT  To help relieve symptoms, the surgeon may begin treating osteoarthritis with one or more of the following non-surgical approaches:  Oral medications. Nonsteroidal anti-inflammatory drugs (NSAIDs), such as ibuprofen, are often helpful in reducing the inflammation and pain. Occasionally a prescription for a steroid  medication is needed to adequately reduce symptoms.   Orthotic devices. Custom orthotic devices (shoe inserts) are often prescribed to provide support to improve the foot s mechanics or cushioning to help minimize pain.   Bracing. Bracing, which restricts motion and supports the joint, can reduce pain during walking and help prevent further deformity.   Immobilization. Protecting the foot from movement by wearing a cast or removable cast-boot may be necessary to allow the inflammation to resolve.   Steroid injections. In some cases, steroid injections are applied to the affected joint to deliver anti-inflammatory medication.   Physical therapy. Exercises to strengthen the muscles, especially when the osteoarthritis occurs in the ankle, may give the patient greater stability and help avoid injury that might worsen the condition.   DO I NEED SURGERY?  When osteoarthritis has progressed substantially or failed to improve with non-surgical treatment, surgery may be recommended. In advanced cases, surgery may be the only option. The goal of surgery is to decrease pain and improve function. The foot and ankle surgeon will consider a number of factors when selecting the procedure best suited to the patient s condition and lifestyle.  FOOT CARE NURSES  If you are interested in having a foot care nurse come out to your   home, please call one of these contacts for more information:  [facility]  [phone] [facility]  [phone]   [facility]  [phone]  [facility] [phone]  [place]   [facility]  [phone]  At [facility] [phone]           Body Mass Index (BMI)  Many things can cause foot and ankle problems. Foot structure, activity level, foot mechanics and injuries are common causes of pain.  One very important issue that often goes unmentioned, is body weight. Extra weight can cause increased stress on muscles,  ligaments, bones and tendons.  Sometimes just a few extra pounds is all it takes to put one over her/his threshold. Without reducing that stress, it can be difficult to alleviate pain. Some people are uncomfortable addressing this issue, but we feel it is important for you to think about it. As Foot &  Ankle specialists, our job is addressing the lower extremity problem and possible causes. Regarding extra body weight, we encourage patients to discuss diet and weight management plans with their primary care doctors. It is this team approach that gives you the best opportunity for pain relief and getting you back on your feet.

## 2017-09-13 NOTE — PROGRESS NOTES
"Podiatry / Foot and Ankle Surgery Progress Note    September 13, 2017    Subject: Patient was seen for follow up on recurrent ingrown nail. This time it is to the right 2nd toe. Right great toe is doing well. Denies injury. Sore with pressure but also hurts at the joint too. Is wondering if she needs the nail removed.     Objective:  Vitals: Ht 1.651 m (5' 5\")  Wt 61.2 kg (135 lb)  BMI 22.47 kg/m2  BMI= Body mass index is 22.47 kg/(m^2).    Dermatologic: medial border of right great toenail is incurvated. Pain on palpation.       Vascular: DP & PT pulses are intact & regular bilaterally.  No significant edema or varicosities noted.  CFT and skin temperature is normal to both lower extremities.      Neurologic: Lower extremity sensation is intact to light touch.  No evidence of weakness or contracture in the lower extremities.  No evidence of neuropathy.      Musculoskeletal: Patient is ambulatory without assistive device or brace.  minimal pain with range of motion of the right 2nd proximal interphalangeal joint      ASSESSMENT:     Right foot pain  Primary localized osteoarthrosis, ankle and foot, right      PLAN:  Reviewed patient's chart in epic. At this time, she is just going to have the nail cut back with clipper. Tolerated procedure well.     Talked about arthritis and treatments including orthotics, injections, icing, NSAIDS, bracing, physical therapy, compounding pain cream, or surgical intervention.            Sarah Montesinos DPM, Podiatry/Foot and Ankle Surgery          "

## 2017-09-13 NOTE — LETTER
"    9/13/2017         RE: Den[place] Ralph  [address] [facility] [id number]        Dear Colleague,    Thank you for referring your patient, Denise Ralph, to the [facility]. Please see a copy of my visit note below.    Podiatry / Foot and Ankle Surgery Progress Note    September 13, 2017    Subject: Patient was seen for follow up on recurrent ingrown nail. This time it is to the right 2nd toe. Right great toe is doing well. Denies injury. Sore with pressure but also hurts at the joint too. Is wondering if she needs the nail removed.     Objective:  Vitals: Ht 1.651 m (5' 5\")  Wt 61.2 kg (135 lb)  BMI 22.47 kg/m2  BMI= Body mass index is 22.47 kg/(m^2).    Dermatologic: medial border of right great toenail is incurvated. Pain on palpation.       Vascular: DP & PT pulses are intact & regular bilaterally.  No significant edema or varicosities noted.  CFT and skin temperature is normal to both lower extremities.      Neurologic: Lower extremity sensation is intact to light touch.  No evidence of weakness or contracture in the lower extremities.  No evidence of neuropathy.      Musculoskeletal: Patient is ambulatory without assistive device or brace.   minimal pain with range of motion of the right 2nd proximal interphalangeal joint      ASSESSMENT:     Right foot pain  Primary localized osteoarthrosis, ankle and foot, right      PLAN:  Reviewed patient's chart in epic. At this time, she is just going to have the nail cut back with clipper. Tolerated procedure well.     Talked about arthritis and treatments including orthotics, injections, icing, NSAIDS, bracing, physical therapy, compounding pain cream, or surgical intervention.            Sarah Montesinos DPM, Podiatry/Foot and Ankle Surgery            Again, thank you for allowing me to participate in the care of your patient.        Sincerely,        Sarah Montesinos DPM, Podiatry/Foot and Ankle Surgery    "

## 2017-09-14 ENCOUNTER — RADIANT APPOINTMENT (OUTPATIENT)
Dept: GENERAL RADIOLOGY | Facility: CLINIC | Age: 62
End: 2017-09-14
Attending: NURSE PRACTITIONER
Payer: COMMERCIAL

## 2017-09-14 ENCOUNTER — OFFICE VISIT (OUTPATIENT)
Dept: NEUROSURGERY | Facility: CLINIC | Age: 62
End: 2017-09-14
Attending: NURSE PRACTITIONER
Payer: COMMERCIAL

## 2017-09-14 VITALS
DIASTOLIC BLOOD PRESSURE: 79 MMHG | BODY MASS INDEX: 23.3 KG/M2 | SYSTOLIC BLOOD PRESSURE: 119 MMHG | WEIGHT: 140 LBS | HEART RATE: 56 BPM | OXYGEN SATURATION: 98 %

## 2017-09-14 DIAGNOSIS — Z98.1 S/P LUMBAR FUSION: ICD-10-CM

## 2017-09-14 DIAGNOSIS — Z98.1 STATUS POST LUMBAR SPINAL FUSION: Primary | ICD-10-CM

## 2017-09-14 PROCEDURE — 99211 OFF/OP EST MAY X REQ PHY/QHP: CPT | Performed by: NURSE PRACTITIONER

## 2017-09-14 PROCEDURE — 99024 POSTOP FOLLOW-UP VISIT: CPT | Performed by: NURSE PRACTITIONER

## 2017-09-14 PROCEDURE — 72100 X-RAY EXAM L-S SPINE 2/3 VWS: CPT

## 2017-09-14 ASSESSMENT — PAIN SCALES - GENERAL: PAINLEVEL: NO PAIN (0)

## 2017-09-14 NOTE — NURSING NOTE
"Denise Ralph is a 62 year old female who presents for:  Chief Complaint   Patient presents with     Neurologic Problem     6 week follow up status post lumbar fusion DOS 08/03/17        Initial Vitals:  /79 (BP Location: Right arm, Patient Position: Sitting, Cuff Size: Adult Regular)  Pulse 56  Wt 140 lb (63.5 kg)  SpO2 98%  BMI 23.3 kg/m2 Estimated body mass index is 23.3 kg/(m^2) as calculated from the following:    Height as of 9/13/17: 5' 5\" (1.651 m).    Weight as of this encounter: 140 lb (63.5 kg).. Body surface area is 1.71 meters squared. BP completed using cuff size: regular  No Pain (0)    Do you feel safe in your environment?  Yes  Do you need any refills today? No    Nursing Comments: 6 week follow up status post lumbar fusion DOS 08/03/17.        5 min. nursing intake time  Sharifa Quiroga MA       Discharge plan: - Wean from brace per patient comfort   - May increase lifting restriction to 20 pounds   - followup in 6 weeks with xray prior   - Call the clinic at [phone] for increased pain or any other questions and concerns.  2 min. nursing discharge time  Sharifa Quiroga MA        "

## 2017-09-14 NOTE — MR AVS SNAPSHOT
After Visit Summary   9/14/2017    Denise Ralph    MRN: [id number]           Patient Information     Date Of Birth          1955        Visit Information        Provider Department      9/14/2017 1:20 PM Pau Mendenhall APRN CNP [place] [facility]        Today's Diagnoses     Status post lumbar spinal fusion    -  1      Care Instructions    - Wean from brace per patient comfort   - May increase lifting restriction to 20 pounds   - followup in 6 weeks with xray prior   - Call the clinic at [phone] for increased pain or any other questions and concerns.          Follow-ups after your visit        Future tests that were ordered for you today     Open Future Orders        Priority Expected Expires Ordered    XR Lumbar Spine 2/3 Views Routine 10/26/2017 9/14/2018 9/14/2017            Who to contact     If you have questions or need follow up information about today's clinic visit or your schedule please contact [facility] directly at [phone].  Normal or non-critical lab and imaging results will be communicated to you by [employer], letter or phone within 4 business days after the clinic has received the results. If you do not hear from us within 7 days, please contact the clinic through "[employer]t or phone. If you have a critical or abnormal lab result, we will notify you by phone as soon as possible.  Submit refill requests through R[employer]T or call your pharmacy and they will forward the refill request to us. Please allow 3 business days for your refill to be completed.          Additional Information About Your Visit        Loomhart Information     R[employer]T gives you secure access to your electronic health record. If you see a primary care provider, you can also send messages to your care team and make appointments. If you have questions, please call your primary care clinic.  If you do not have a primary care provider, please call [phone] and they will  assist you.        Care EveryWhere ID     This is your Care EveryWhere ID. This could be used by other organizations to access your [place] medical records  [id number]        Your Vitals Were     Pulse Pulse Oximetry BMI (Body Mass Index)             56 98% 23.3 kg/m2          Blood Pressure from Last 3 Encounters:   09/14/17 119/79   09/13/17 130/78   08/22/17 132/76    Weight from Last 3 Encounters:   09/14/17 140 lb (63.5 kg)   09/13/17 135 lb (61.2 kg)   08/22/17 135 lb (61.2 kg)               Primary Care Provider Office Phone # Fax #    Juan F [facility] [phone]       [address]  [facility] [address]        Equal Access to Services     INEZ THOMPSON : Hadii amrita mackey hadasho Soomaali, waaxda luqadaha, qaybta kaalmada adeegyada, rolan jiang hayronan godwin . So [facility] [phone].    ATENCIÓN: Si habla español, tiene a simpson disposición servicios gratuitos de asistencia lingüística. Llame al [phone].    We comply with applicable federal civil rights laws and [place] laws. We do not discriminate on the basis of race, color, national origin, age, disability sex, sexual orientation or gender identity.            Thank you!     Thank you for choosing [facility]  for your care. Our goal is always to provide you with excellent care. Hearing back from our patients is one way we can continue to improve our services. Please take a few minutes to complete the written survey that you may receive in the mail after your visit with us. Thank you!             Your Updated Medication List - Protect others around you: Learn how to safely use, store and throw away your medicines at www.disposemymeds.org.          This list is accurate as of: 9/14/17  1:22 PM.  Always use your most recent med list.                   Brand Name Dispense Instructions for use Diagnosis    ALLEGRA PO      Take 30 mg by mouth daily        ALPRAZolam 0.25 MG tablet    XANAX    30 tablet     TAKE 1 TAB BY MOUTH 3 TIMES DAILY AS NEEDED FOR ANXIETY    Anxiety       atenolol 50 MG tablet    TENORMIN    180 tablet    TAKE 1 TABLET (50 MG) BY MOUTH 2 TIMES DAILY    Essential hypertension, benign, Palpitations       clindamycin 150 MG capsule    CLEOCIN    30 capsule    Take 1 capsule (150 mg) by mouth 3 times daily    Right foot pain, Ingrown nail of great toe of right foot       latanoprost 0.005 % ophthalmic solution    XALATAN     Place 1 drop into both eyes At Bedtime        OMEPRAZOLE PO      Take 20 mg by mouth every morning        traZODone 100 MG tablet    DESYREL    90 tablet    Take 1 tablet (100 mg) by mouth nightly as needed for sleep    Primary insomnia       triamcinolone 0.1 % cream    KENALOG    15 g    Apply sparingly to affected area two times daily for 5 days.    Eczema of external ear, left

## 2017-09-14 NOTE — PATIENT INSTRUCTIONS
- Wean from brace per patient comfort   - May increase lifting restriction to 20 pounds   - followup in 6 weeks with xray prior   - Call the clinic at [phone] for increased pain or any other questions and concerns.

## 2017-09-14 NOTE — PROGRESS NOTES
[facility]  Neurosurgery followup:    HPI: Ms. Ralph is a 62 year old female that returns 6 weeks post  L4-5 interbody fusion.  She reports that about 3 weeks ago she turned the corner and feels wonderful. She has not even needed her brace.  She denies any pain at this time.        Exam:  Constitutional:  Alert, well nourished, NAD.  HEENT: Normocephalic, atraumatic.   Pulm:  Without shortness of breath   CV:  No pitting edema of BLE.      Neurological:  Awake  Alert  Oriented x 3  Motor exam:        IP Q DF PF EHL  R   5  5   5   5    5  L   5  5   5   5    5     Able to spontaneously move L/E bilaterally  Sensation intact throughout all L/E dermatomes     Incisions:  Lumbar incision healed  Imaging: lumbar xray shows fusion intact     A/P: Ms. Ralph is a 62 year old female that returns 6 weeks post  L4-5 interbody fusion.  She reports that about 3 weeks ago she turned the corner and feels wonderful. She has not even needed her brace.  She denies any pain at this time.  We will have her increase her activity and return in 6 weeks with another xray.      Patient Instructions   - Wean from brace per patient comfort   - May increase lifting restriction to 20 pounds   - followup in 6 weeks with xray prior   - Call the clinic at [phone] for increased pain or any other questions and concerns.         Pau Mendenhall [facility]  [facility]  [address]  [address] [place] [address]    Tel [phone]  Pager [phone]

## 2017-09-15 DIAGNOSIS — F41.9 ANXIETY: ICD-10-CM

## 2017-09-15 RX ORDER — ALPRAZOLAM 0.25 MG
TABLET ORAL
Qty: 30 TABLET | Refills: 0 | Status: SHIPPED | OUTPATIENT
Start: 2017-09-15 | End: 2017-10-18

## 2017-09-15 NOTE — TELEPHONE ENCOUNTER
XANAX      Last Written Prescription Date:  08/14/17  Last Fill Quantity: 30,   # refills: 0  Last Office Visit with [facility] or [facility] prescribing provider: 04/17/17  Future Office visit:    Next 5 appointments (look out 90 days)     Oct 26, 2017  1:40 PM CDT   Return Visit with IRENE Loza CNP   [facility] ([facility])    [address] [address] [id number] [phone]                   Routing refill request to provider for review/approval because:  Drug not on the [facility], [facility] or [facility] refill protocol or controlled substance

## 2017-09-24 DIAGNOSIS — F51.01 PRIMARY INSOMNIA: ICD-10-CM

## 2017-09-25 RX ORDER — TRAZODONE HYDROCHLORIDE 100 MG/1
TABLET ORAL
Qty: 90 TABLET | Refills: 0 | Status: SHIPPED | OUTPATIENT
Start: 2017-09-25 | End: 2017-12-21

## 2017-09-25 NOTE — TELEPHONE ENCOUNTER
Trazodone        Last Written Prescription Date: 1/4/17  Last Fill Quantity: 90; # refills: 2  Last Office Visit with [facility], [facility] or  [facility] prescribing provider:  4/17/17    Pt due for Oct appt       Next 5 appointments (look out 90 days)     Oct 26, 2017  1:40 PM CDT   Return Visit with IRENE Loza CNP   [facility] ([facility])    [address] [address] [address] [phone]                   Last PHQ-9 score on record=   PHQ-9 SCORE 6/14/2017   Total Score -   Total Score 6       Lab Results   Component Value Date    AST 27 04/17/2017     Lab Results   Component Value Date    ALT 24 04/17/2017       Labs showing if normal/abnormal  Lab Results   Component Value Date    AST 27 04/17/2017    ALT 24 04/17/2017

## 2017-10-03 ENCOUNTER — TELEPHONE (OUTPATIENT)
Dept: NEUROSURGERY | Facility: CLINIC | Age: 62
End: 2017-10-03

## 2017-10-03 NOTE — TELEPHONE ENCOUNTER
REASON FOR CALL:  Wondering when she can start to take advil again after surgery.     Detailed message can be left:  YES

## 2017-10-03 NOTE — TELEPHONE ENCOUNTER
Patient can resume NSAIDs 12 weeks post op, so on 10/26/17. Patient is s/p lumbar fusion 8/3/17. Called and informed patient. She verbalized understanding.

## 2017-10-15 DIAGNOSIS — F51.01 PRIMARY INSOMNIA: ICD-10-CM

## 2017-10-16 DIAGNOSIS — F41.9 ANXIETY: ICD-10-CM

## 2017-10-17 RX ORDER — ALPRAZOLAM 0.25 MG
TABLET ORAL
Qty: 30 TABLET | Refills: 0 | OUTPATIENT
Start: 2017-10-17

## 2017-10-17 RX ORDER — TRAZODONE HYDROCHLORIDE 100 MG/1
TABLET ORAL
Qty: 90 TABLET | Refills: 2 | OUTPATIENT
Start: 2017-10-17

## 2017-10-17 NOTE — TELEPHONE ENCOUNTER
Lloyd has appt on 10/18. Can you have her sign a CSA?      Last refill-9/15/17-#30      /Last OV-4/17/17    No CSA on file

## 2017-10-18 ENCOUNTER — OFFICE VISIT (OUTPATIENT)
Dept: INTERNAL MEDICINE | Facility: CLINIC | Age: 62
End: 2017-10-18
Payer: COMMERCIAL

## 2017-10-18 VITALS
HEART RATE: 59 BPM | WEIGHT: 139 LBS | TEMPERATURE: 97.9 F | BODY MASS INDEX: 23.16 KG/M2 | SYSTOLIC BLOOD PRESSURE: 146 MMHG | OXYGEN SATURATION: 98 % | HEIGHT: 65 IN | DIASTOLIC BLOOD PRESSURE: 82 MMHG

## 2017-10-18 DIAGNOSIS — G44.209 TENSION HEADACHE: ICD-10-CM

## 2017-10-18 DIAGNOSIS — I10 HYPERTENSION GOAL BP (BLOOD PRESSURE) < 140/80: Primary | ICD-10-CM

## 2017-10-18 DIAGNOSIS — Z98.1 S/P LUMBAR FUSION: ICD-10-CM

## 2017-10-18 DIAGNOSIS — F41.9 ANXIETY: ICD-10-CM

## 2017-10-18 DIAGNOSIS — Z79.899 CONTROLLED SUBSTANCE AGREEMENT SIGNED: ICD-10-CM

## 2017-10-18 PROCEDURE — 99214 OFFICE O/P EST MOD 30 MIN: CPT | Performed by: INTERNAL MEDICINE

## 2017-10-18 RX ORDER — ALPRAZOLAM 0.25 MG
TABLET ORAL
Qty: 30 TABLET | Refills: 0 | Status: SHIPPED | OUTPATIENT
Start: 2017-10-18 | End: 2017-11-12

## 2017-10-18 RX ORDER — ALPRAZOLAM 0.25 MG
TABLET ORAL
Qty: 30 TABLET | Refills: 0 | Status: SHIPPED | OUTPATIENT
Start: 2017-10-18 | End: 2017-10-18

## 2017-10-18 ASSESSMENT — ANXIETY QUESTIONNAIRES
1. FEELING NERVOUS, ANXIOUS, OR ON EDGE: MORE THAN HALF THE DAYS
6. BECOMING EASILY ANNOYED OR IRRITABLE: NOT AT ALL
3. WORRYING TOO MUCH ABOUT DIFFERENT THINGS: SEVERAL DAYS
GAD7 TOTAL SCORE: 7
2. NOT BEING ABLE TO STOP OR CONTROL WORRYING: SEVERAL DAYS
7. FEELING AFRAID AS IF SOMETHING AWFUL MIGHT HAPPEN: NOT AT ALL
IF YOU CHECKED OFF ANY PROBLEMS ON THIS QUESTIONNAIRE, HOW DIFFICULT HAVE THESE PROBLEMS MADE IT FOR YOU TO DO YOUR WORK, TAKE CARE OF THINGS AT HOME, OR GET ALONG WITH OTHER PEOPLE: NOT DIFFICULT AT ALL
5. BEING SO RESTLESS THAT IT IS HARD TO SIT STILL: SEVERAL DAYS

## 2017-10-18 ASSESSMENT — PATIENT HEALTH QUESTIONNAIRE - PHQ9: 5. POOR APPETITE OR OVEREATING: MORE THAN HALF THE DAYS

## 2017-10-18 NOTE — PROGRESS NOTES
SUBJECTIVE:   Denise Ralph is a 62 year old female who presents to clinic today for the following health issues:    Patient is seen for a follow up visit.  Concern for HA, posterior, neck, pressure feeling. For several days, moderate. Tylenol not helping.   Had lumbar surgery 2 months ago. Improved pain. No neurologic deficits. Able to ambulate    Has h/o anxiety. On Xanax. Takes as needed 2-3 times a week. Discussed chronic use, side effects.     Hypertension Follow-up      Outpatient blood pressures are not being checked.    Low Salt Diet: no added salt  Has h/o HTN. on medical treatment. BP has been controlled. No side effects from medications. No CP, HA, dizziness. good compliance with medications and low salt diet.        Amount of exercise or physical activity: 3-4    Problems taking medications regularly: No    Medication side effects: none    Diet: regular (no restrictions)            Problem list and histories reviewed & adjusted, as indicated.  Additional history: as documented    Patient Active Problem List   Diagnosis     Irritable bowel syndrome     Anxiety state     Essential hypertension, benign     Disorder of bone and cartilage     Basal cell carcinoma     Squamous cell carcinoma     Advanced directives, counseling/discussion     GERD (gastroesophageal reflux disease)     Glaucoma     Hypertension goal BP (blood pressure) < 140/80     S/P lumbar fusion     Controlled substance agreement signed     Tension headache     Past Surgical History:   Procedure Laterality Date     EYE SURGERY Bilateral     Treatment of glaucoma     HYSTERECTOMY       HYSTERECTOMY VAGINAL       LAPAROSCOPIC CHOLECYSTECTOMY  2002    with repeat operation because of bile leak     Left shoulder decomp surgery for impingement  approx 1993     OPTICAL TRACKING SYSTEM FUSION POSTERIOR SPINE LUMBAR N/A 8/3/2017    Procedure: OPTICAL TRACKING SYSTEM FUSION SPINE POSTERIOR LUMBAR ONE LEVEL;  1. L4 Ang-Meyers osteotomy with  "exposure of the L4 and L5 roots  2. L4-5 complete discectomy with arthrodesis  3. Placement of Globus Creo GOMEZ pedicle screws bilaterally from L4 to L5  4. L4 - L5 posterior lateral fusion with placement of Medtronic Magnifuse and locally harvested autologous bone  5. Use of Stealth and O     WRIST SURGERY Left        Social History   Substance Use Topics     Smoking status: Former Smoker     Packs/day: 0.50     Years: 25.00     Quit date: 10/1/1997     Smokeless tobacco: Never Used      Comment: quit      Alcohol use 1.0 oz/week     2 Glasses of wine per week      Comment: 2 glasses of wine daily     Family History   Problem Relation Age of Onset     Cardiovascular Mother      / mi     Breast Cancer Mother      diagnosed age 60's     Respiratory Father      pulmonary fibrosis.     Cardiovascular Brother       of MI age 34 2nd to Cocaine Use     Cancer - colorectal No family hx of          Current Outpatient Prescriptions   Medication Sig Dispense Refill     ALPRAZolam (XANAX) 0.25 MG tablet TAKE 1 TAB BY MOUTH 3 TIMES DAILY AS NEEDED FOR ANXIETY 30 tablet 0     traZODone (DESYREL) 100 MG tablet TAKE 1 TABLET BY MOUTH NIGHTLY AS NEEDED FOR SLEEP 90 tablet 0     OMEPRAZOLE PO Take 20 mg by mouth every morning       latanoprost (XALATAN) 0.005 % ophthalmic solution Place 1 drop into both eyes At Bedtime       atenolol (TENORMIN) 50 MG tablet TAKE 1 TABLET (50 MG) BY MOUTH 2 TIMES DAILY 180 tablet 1         Reviewed and updated as needed this visit by clinical staff     Reviewed and updated as needed this visit by Provider         ROS:  Constitutional, HEENT, cardiovascular, pulmonary, gi and gu systems are negative, except as otherwise noted.      OBJECTIVE:   /82  Pulse 59  Temp 97.9  F (36.6  C) (Oral)  Ht 5' 5\" (1.651 m)  Wt 139 lb (63 kg)  SpO2 98%  BMI 23.13 kg/m2  Body mass index is 23.13 kg/(m^2).   GENERAL: healthy, alert and no distress  NECK: no adenopathy, no asymmetry, masses, or " scars and thyroid normal to palpation  RESP: lungs clear to auscultation - no rales, rhonchi or wheezes  CV: regular rate and rhythm, normal S1 S2, no S3 or S4, no murmur, click or rub, no peripheral edema and peripheral pulses strong  ABDOMEN: soft, nontender, no hepatosplenomegaly, no masses and bowel sounds normal  MS: no gross musculoskeletal defects noted, no edema    Diagnostic Test Results:  none     ASSESSMENT/PLAN:     Problem List Items Addressed This Visit     Hypertension goal BP (blood pressure) < 140/80 - Primary    S/P lumbar fusion    Controlled substance agreement signed    Tension headache      Other Visit Diagnoses     Anxiety        Relevant Medications    ALPRAZolam (XANAX) 0.25 MG tablet           Cont treatment   Monitor BP, add HCTZ if not improved in 2-3 days   Monitor lab     Follow-Up:in 3 months     Juan F Smallwood MD  [facility]

## 2017-10-18 NOTE — NURSING NOTE
"Chief Complaint   Patient presents with     Recheck Medication     F/U on BP, meds       Initial /82  Pulse 59  Temp 97.9  F (36.6  C) (Oral)  Ht 5' 5\" (1.651 m)  Wt 139 lb (63 kg)  SpO2 98%  BMI 23.13 kg/m2 Estimated body mass index is 23.13 kg/(m^2) as calculated from the following:    Height as of this encounter: 5' 5\" (1.651 m).    Weight as of this encounter: 139 lb (63 kg).  Medication Reconciliation: complete   Tess Wells MA      "

## 2017-10-18 NOTE — MR AVS SNAPSHOT
After Visit Summary   10/18/2017    Denise Ralph    MRN: [id number]           Patient Information     Date Of Birth          1955        Visit Information        Provider Department      10/18/2017 2:00 PM Juan F Smallwood MD [facility]        Today's Diagnoses     Controlled substance agreement signed    -  1    Anxiety          Care Instructions    Try Zantac 300 mg at bedtime for heartburn   Check blood pressure for 2 days to decide on starting new medication.           Follow-ups after your visit        Your next 10 appointments already scheduled     Oct 26, 2017  1:20 PM CDT   XR LUMBAR SPINE 2/3 VIEWS with [id number]   [facility] ([facility])    [address]  [address]  [place] [address]   [phone]           Please bring a list of your current medicines to your exam. (Include vitamins, minerals and over-thecounter medicines.) Leave your valuables at home.  Tell your doctor if there is a chance you may be pregnant.  You do not need to do anything special for this exam.            Oct 26, 2017  1:40 PM CDT   Return Visit with IRENE Loza CNP   [facility] ([facility])    [address]  [place] [id number] [phone]              Who to contact     If you have questions or need follow up information about today's clinic visit or your schedule please contact [facility] directly at [phone].  Normal or non-critical lab and imaging results will be communicated to you by MyChart, letter or phone within 4 business days after the clinic has received the results. If you do not hear from us within 7 days, please contact the clinic through MyChart or phone. If you have a critical or abnormal lab result, we will notify you by phone as soon as possible.  Submit refill requests through [employer] or call your pharmacy and they will forward  "the refill request to us. Please allow 3 business days for your refill to be completed.          Additional Information About Your Visit        [employer] Information     [employer] gives you secure access to your electronic health record. If you see a primary care provider, you can also send messages to your care team and make appointments. If you have questions, please call your primary care clinic.  If you do not have a primary care provider, please call [phone] and they will assist you.        Care EveryWhere ID     This is your Care EveryWhere ID. This could be used by other organizations to access your [facility] medical records  [id number]        Your Vitals Were     Pulse Temperature Height Pulse Oximetry BMI (Body Mass Index)       59 97.9  F (36.6  C) (Oral) 5' 5\" (1.651 m) 98% 23.13 kg/m2        Blood Pressure from Last 3 Encounters:   10/18/17 146/82   09/14/17 119/79   09/13/17 130/78    Weight from Last 3 Encounters:   10/18/17 139 lb (63 kg)   09/14/17 140 lb (63.5 kg)   09/13/17 135 lb (61.2 kg)              Today, you had the following     No orders found for display         Today's Medication Changes          These changes are accurate as of: 10/18/17  2:49 PM.  If you have any questions, ask your nurse or doctor.               Start taking these medicines.        Dose/Directions    ALPRAZolam 0.25 MG tablet   Commonly known as:  XANAX   Used for:  Anxiety   Started by:  Juan F Smallwood MD        TAKE 1 TAB BY MOUTH 3 TIMES DAILY AS NEEDED FOR ANXIETY   Quantity:  30 tablet   Refills:  0            Where to get your medicines      Some of these will need a paper prescription and others can be bought over the counter.  Ask your nurse if you have questions.     Bring a paper prescription for each of these medications     ALPRAZolam 0.25 MG tablet                Primary Care Provider Office Phone # Fax #    Juan F Smallwood [phone]       [address]  [facility] " [address]        Equal Access to Services     [facility]: Hadii amrita mackey jayesh García, wajemimada luqadaha, qaybta kaediepooja falcon, rolan bull. So [facility] [phone].    ATENCIÓN: Si habla español, tiene a simpson disposición servicios gratuitos de asistencia lingüística. Janesame al [phone].    We comply with applicable federal civil rights laws and [place] laws. We do not discriminate on the basis of race, color, national origin, age, disability, sex, sexual orientation, or gender identity.            Thank you!     Thank you for choosing [facility]  for your care. Our goal is always to provide you with excellent care. Hearing back from our patients is one way we can continue to improve our services. Please take a few minutes to complete the written survey that you may receive in the mail after your visit with us. Thank you!             Your Updated Medication List - Protect others around you: Learn how to safely use, store and throw away your medicines at www.disposemymeds.org.          This list is accurate as of: 10/18/17  2:49 PM.  Always use your most recent med list.                   Brand Name Dispense Instructions for use Diagnosis    ALPRAZolam 0.25 MG tablet    XANAX    30 tablet    TAKE 1 TAB BY MOUTH 3 TIMES DAILY AS NEEDED FOR ANXIETY    Anxiety       atenolol 50 MG tablet    TENORMIN    180 tablet    TAKE 1 TABLET (50 MG) BY MOUTH 2 TIMES DAILY    Essential hypertension, benign, Palpitations       latanoprost 0.005 % ophthalmic solution    XALATAN     Place 1 drop into both eyes At Bedtime        OMEPRAZOLE PO      Take 20 mg by mouth every morning        traZODone 100 MG tablet    DESYREL    90 tablet    TAKE 1 TABLET BY MOUTH NIGHTLY AS NEEDED FOR SLEEP    Primary insomnia

## 2017-10-18 NOTE — PATIENT INSTRUCTIONS
Try Zantac 300 mg at bedtime for heartburn   Check blood pressure for 2 days to decide on starting new medication.

## 2017-10-18 NOTE — LETTER
[facility]    10/18/17    Patient: Denise Ralph  YOB: 1955  Medical Record Number: [id number]                                                                  Controlled Substance Agreement  I understand that my care provider has prescribed controlled substances (narcotics, tranquilizers, and/or stimulants) to help manage my condition(s).  I am taking this medicine to help me function or work.  I know that this is strong medicine.  It could have serious side effects and even cause a dependency on the drug.  If I stop these medicines suddenly, I could have severe withdrawal symptoms.    The risks, benefits, and side effects of these medication(s) were explained to me.  I agree that:  1. I will take part in other treatments as advised by my provider.  This may be psychiatry or counseling, physical therapy, behavioral therapy, group treatment, or a referral to a pain clinic.  I will reduce or stop my medicine when my provider tells me to do so.   2. I will take my medicines as prescribed.  I will not change the dose or schedule unless my provider tells me to.  There will be no refills if I  run out early.   I may be contacted at any time without warning and asked to complete a drug test or pill count.   3. I will keep all my appointments at the clinic.  If I miss appointments or fail to follow instructions, my provider may stop my medicine.  4. I will not ask other providers to prescribe controlled substances. And I will not accept controlled substances from other people. If I need another prescribed controlled substance for a new reason, I will notify my provider within one business day.  5. If I enroll in the [place] Medical Marijuana program, I will tell my provider.  I will also sign an agreement to share my medical records with my provider.  6. I will use one pharmacy to fill all of my controlled substance prescriptions.  If my prescription is mailed to my pharmacy, it may take 5  to 7 days for my medicine to be ready.  7. I understand that my provider, clinic care team, and pharmacy can track controlled substance prescriptions from other providers through a central database (prescription monitoring program).  8. I will bring in my list of medications (or my medicine bottles) each time I come to the clinic.  REV- 04/2016                                                                                                                                            Page 1 of 2      [facility]    10/18/17    Patient: Denise Ralph  YOB: 1955  Medical Record Number: [id number]    9. Refills of controlled substances will be made only during office hours.  It is up to me to make sure that I do not run out of my medicines on weekends or holidays.    10. I am responsible for my prescriptions.  If the medicine is lost or stolen, it will not be replaced.   I also agree not to share these medicines with anyone.  11. I agree to not use ANY illegal or recreational drugs.  This includes marijuana, cocaine, bath salts or other drugs.  I agree not to use alcohol unless my provider says I may.  I agree to give urine samples whenever asked.  If I fail to give a urine sample, the provider may stop my medicine.     12. I will tell my nurse or provider right away if I become pregnant or have a new medical problem treated outside of [facility].  13. I understand that this medicine can affect my thinking and judgment.  It may be unsafe for me to drive, use machinery and do dangerous tasks.  I will not do any of these things until I know how the medicine affects me.  If my dose changes, I will wait to see how it affects me.  I will contact my provider if I have concerns about medicine side effects.  I understand that if I do not follow any of the conditions above, my prescriptions or treatment may be stopped.    I agree that my provider, clinic care team, and pharmacy may work with any  city, state or federal law enforcement agency that investigates the misuse, sale, or other diversion of my controlled medicine. I will allow my provider to discuss my care with or share a copy of this agreement with any other treating provider, pharmacy or emergency room where I receive care.  I agree to give up (waive) any right of privacy or confidentiality with respect to these authorizations.   I have read this agreement and have asked questions about anything I did not understand.   ___________________________________    ___________________________  Patient Signature                                                           Date and Time  ___________________________________     ____________________________  Witness                                                                            Date and Time  ___________________________________  Juan F Smallwood MD  REV-  04/2016                                                                                                                                                                 Page 2 of 2

## 2017-10-19 ASSESSMENT — ANXIETY QUESTIONNAIRES: GAD7 TOTAL SCORE: 7

## 2017-10-20 ENCOUNTER — TELEPHONE (OUTPATIENT)
Dept: INTERNAL MEDICINE | Facility: CLINIC | Age: 62
End: 2017-10-20

## 2017-10-20 DIAGNOSIS — I10 HTN, GOAL BELOW 140/90: Primary | ICD-10-CM

## 2017-10-20 NOTE — TELEPHONE ENCOUNTER
Late entry: patient left voicemail on [facility] triage line today stating BP is still high. Asked for call back to check if provider wants to prescribe another pill.

## 2017-10-21 RX ORDER — HYDROCHLOROTHIAZIDE 12.5 MG/1
12.5 TABLET ORAL DAILY
Qty: 30 TABLET | Refills: 0 | Status: ON HOLD | OUTPATIENT
Start: 2017-10-21 | End: 2017-11-01

## 2017-10-21 NOTE — TELEPHONE ENCOUNTER
Pt calling back today.  Paged on-call Dr. Yan at 11:56am via Smart Web to [phone].  Dr. Yan returned call shortly after and sent over an rx for HCTZ.  Advised pt not to exercise with a high BP/headache.  She stated her understanding, will continue to monitor her symptoms and will call back with any new, worsening, or persistent symptoms.     BP today 160/100  BP yesterday 145/89 - 135/80    Pharmacy: [facility]    Nida Buitrago RN/FNA

## 2017-10-21 NOTE — TELEPHONE ENCOUNTER
"On call MD 10/21    Nurse advisor called that the  and still GOMEZ.    Dr Smallwood   \" Cont treatment   Monitor BP, add HCTZ if not improved in 2-3 days   Monitor lab \"    I sent HCTZ 12.5   "

## 2017-10-26 ENCOUNTER — TELEPHONE (OUTPATIENT)
Dept: INTERNAL MEDICINE | Facility: CLINIC | Age: 62
End: 2017-10-26

## 2017-10-26 ENCOUNTER — OFFICE VISIT (OUTPATIENT)
Dept: NEUROSURGERY | Facility: CLINIC | Age: 62
End: 2017-10-26
Attending: NURSE PRACTITIONER
Payer: COMMERCIAL

## 2017-10-26 ENCOUNTER — RADIANT APPOINTMENT (OUTPATIENT)
Dept: GENERAL RADIOLOGY | Facility: CLINIC | Age: 62
End: 2017-10-26
Attending: NURSE PRACTITIONER
Payer: COMMERCIAL

## 2017-10-26 VITALS
DIASTOLIC BLOOD PRESSURE: 99 MMHG | BODY MASS INDEX: 23.09 KG/M2 | SYSTOLIC BLOOD PRESSURE: 162 MMHG | WEIGHT: 138.6 LBS | HEIGHT: 65 IN | OXYGEN SATURATION: 98 % | HEART RATE: 67 BPM

## 2017-10-26 DIAGNOSIS — Z98.1 STATUS POST LUMBAR SPINAL FUSION: ICD-10-CM

## 2017-10-26 DIAGNOSIS — Z98.1 STATUS POST LUMBAR SPINAL FUSION: Primary | ICD-10-CM

## 2017-10-26 PROCEDURE — 99211 OFF/OP EST MAY X REQ PHY/QHP: CPT | Performed by: NURSE PRACTITIONER

## 2017-10-26 PROCEDURE — 99024 POSTOP FOLLOW-UP VISIT: CPT | Performed by: NURSE PRACTITIONER

## 2017-10-26 PROCEDURE — 72100 X-RAY EXAM L-S SPINE 2/3 VWS: CPT

## 2017-10-26 ASSESSMENT — PAIN SCALES - GENERAL: PAINLEVEL: NO PAIN (0)

## 2017-10-26 NOTE — PATIENT INSTRUCTIONS
- May increase lifting restriction to 30 pounds and slowly increase as tolerated.   - followup in 3 months with xray prior   - Call the clinic at [phone] for increased pain or any other questions and concerns.

## 2017-10-26 NOTE — LETTER
10/26/2017         RE: Denise Ralph  [address] [id number]        Dear Colleague,    Thank you for referring your patient, Denise Ralph, to the [facility]. Please see a copy of my visit note below.    [facility]  Neurosurgery followup:    HPI: Ms. Ralph is a 62 year old female that returns 12 weeks post  L4-5 interbody fusion.   She returns today reporting that she is pain free. She is very happy with the results of her surgery.     Exam:  Constitutional:  Alert, well nourished, NAD.  HEENT: Normocephalic, atraumatic.   Pulm:  Without shortness of breath   CV:  No pitting edema of BLE.      Neurological:  Awake  Alert  Oriented x 3  Motor exam:        IP Q DF PF EHL  R   5  5   5   5    5  L   5  5   5   5    5     Able to spontaneously move L/E bilaterally  Sensation intact throughout all L/E dermatomes       Imaging: lumbar xray shows fusion intact     A/P:  Ms. Ralph is a 62 year old female that returns 12 weeks post  L4-5 interbody fusion.   She returns today reporting that she is pain free. She is very happy with the results of her surgery.  She states that she is anxious because she accidentally took 2 Trazodone 2 nights ago instead of 1. So she is more anxious. Otherwise she is doing very well. We will have her return in 3 months.     Patient Instructions   - May increase lifting restriction to 30 pounds and slowly increase as tolerated.   - followup in 3 months with xray prior   - Call the clinic at [phone] for increased pain or any other questions and concerns.     Pau Mendenhall CNP  [facility]  [address] [address]    Tel [phone]  Pager [phone]        Again, thank you for allowing me to participate in the care of your patient.        Sincerely,        IRENE Loza CNP

## 2017-10-26 NOTE — TELEPHONE ENCOUNTER
Pt calls stating her BP is still elevated, despite starting HCTZ 12.5 on Saturday. Her BP was elevated last week, this is why it was started.  She also takes Atenolol 50 mg BID.     She took her pills at 8:00 am this AM and checked her BP at 11:00 AM.     /100.     She has confirmed her machine with clinic, that it is accurate in the past.     She also complains of a bad headache.     She reports she has a lot of anxiety and was going to take a Xanax but has appointment with her Spine doctor, so has to wait until later today.     Please advise for the BP.

## 2017-10-26 NOTE — MR AVS SNAPSHOT
After Visit Summary   10/26/2017    Denise Ralph    MRN: [id number]           Patient Information     Date Of Birth          1955        Visit Information        Provider Department      10/26/2017 1:40 PM Pau Mendenhall APRN CNP [facility]        Today's Diagnoses     Status post lumbar spinal fusion    -  1      Care Instructions    - May increase lifting restriction to 30 pounds and slowly increase as tolerated.   - followup in 3 months with xray prior   - Call the clinic at [phone] for increased pain or any other questions and concerns.          Follow-ups after your visit        Your next 10 appointments already scheduled     Oct 26, 2017  1:40 PM CDT   Return Visit with IRENE Loza CNP   [facility] ([facility])    [address] [address] [phone]              Future tests that were ordered for you today     Open Future Orders        Priority Expected Expires Ordered    XR Lumbar Spine 2/3 Views Routine 1/26/2018 10/26/2018 10/26/2017            Who to contact     If you have questions or need follow up information about today's clinic visit or your schedule please contact [facility] directly at [phone].  Normal or non-critical lab and imaging results will be communicated to you by [employer] [facility], letter or phone within 4 business days after the clinic has received the results. If you do not hear from us within 7 days, please contact the clinic through [employer] [facility] or phone. If you have a critical or abnormal lab result, we will notify you by phone as soon as possible.  Submit refill requests through [employer] or call your pharmacy and they will forward the refill request to us. Please allow 3 business days for your refill to be completed.          Additional Information About Your Visit        [employer] Information     [employer] gives you secure access  "to your electronic health record. If you see a primary care provider, you can also send messages to your care team and make appointments. If you have questions, please call your primary care clinic.  If you do not have a primary care provider, please call [phone] and they will assist you.        Care EveryWhere ID     This is your Care EveryWhere ID. This could be used by other organizations to access your [place] medical records  [id number]        Your Vitals Were     Pulse Height Pulse Oximetry BMI (Body Mass Index)          67 5' 5\" (1.651 m) 98% 23.06 kg/m2         Blood Pressure from Last 3 Encounters:   10/26/17 (!) 162/99   10/18/17 146/82   09/14/17 119/79    Weight from Last 3 Encounters:   10/26/17 138 lb 9.6 oz (62.9 kg)   10/18/17 139 lb (63 kg)   09/14/17 140 lb (63.5 kg)               Primary Care Provider Office Phone # Fax #    Juan F Smallwood [phone]       [address] [facility] [address]        Equal Access to Services     [facility]: Hadii aad ku hadasho Soomaali, waaxda luqadaha, qaybta kaalmada adeegyada, rolan godwin . So [facility] [phone].    ATENCIÓN: Si habla español, tiene a simpson disposición servicios gratuitos de asistencia lingüística. [facility] [phone].    We comply with applicable federal civil rights laws and [place] laws. We do not discriminate on the basis of race, color, national origin, age, disability, sex, sexual orientation, or gender identity.            Thank you!     Thank you for choosing [facility]  for your care. Our goal is always to provide you with excellent care. Hearing back from our patients is one way we can continue to improve our services. Please take a few minutes to complete the written survey that you may receive in the mail after your visit with us. Thank you!             Your Updated Medication List - Protect others around you: Learn how to safely use, store and " throw away your medicines at www.disposemymeds.org.          This list is accurate as of: 10/26/17  1:35 PM.  Always use your most recent med list.                   Brand Name Dispense Instructions for use Diagnosis    ALPRAZolam 0.25 MG tablet    XANAX    30 tablet    TAKE 1 TAB BY MOUTH 3 TIMES DAILY AS NEEDED FOR ANXIETY    Anxiety       atenolol 50 MG tablet    TENORMIN    180 tablet    TAKE 1 TABLET (50 MG) BY MOUTH 2 TIMES DAILY    Essential hypertension, benign, Palpitations       hydrochlorothiazide 12.5 MG Tabs tablet     30 tablet    Take 1 tablet (12.5 mg) by mouth daily    HTN, goal below 140/90       latanoprost 0.005 % ophthalmic solution    XALATAN     Place 1 drop into both eyes At Bedtime        OMEPRAZOLE PO      Take 20 mg by mouth every morning        traZODone 100 MG tablet    DESYREL    90 tablet    TAKE 1 TABLET BY MOUTH NIGHTLY AS NEEDED FOR SLEEP    Primary insomnia

## 2017-10-26 NOTE — NURSING NOTE
"Denise Ralph is a 62 year old female who presents for:  Chief Complaint   Patient presents with     Neurologic Problem     12 week follow up status post lumbar fusion DOS 08/03/17        Initial Vitals:  BP (!) 162/99 (BP Location: Right arm, Patient Position: Sitting, Cuff Size: Adult Regular)  Pulse 67  Ht 5' 5\" (1.651 m)  Wt 138 lb 9.6 oz (62.9 kg)  SpO2 98%  BMI 23.06 kg/m2 Estimated body mass index is 23.06 kg/(m^2) as calculated from the following:    Height as of this encounter: 5' 5\" (1.651 m).    Weight as of this encounter: 138 lb 9.6 oz (62.9 kg).. Body surface area is 1.7 meters squared. BP completed using cuff size: regular  No Pain (0)    Do you feel safe in your environment?  Yes  Do you need any refills today? No    Nursing Comments: 12 week follow up status post lumbar fusion DOS 08/03/17.        5 min. nursing intake time  Sharifa Quiroga MA       Discharge plan: - May increase lifting restriction to 30 pounds and slowly increase as tolerated.   - followup in 3 months with xray prior   - Call the clinic at [phone] for increased pain or any other questions and concerns.  2 min. nursing discharge time  Sharifa Quiroga MA        "

## 2017-10-26 NOTE — PROGRESS NOTES
[facility]  Neurosurgery followup:    HPI: Ms. Ralph is a 62 year old female that returns 12 weeks post  L4-5 interbody fusion.  She returns today reporting that she is pain free. She is very happy with the results of her surgery.     Exam:  Constitutional:  Alert, well nourished, NAD.  HEENT: Normocephalic, atraumatic.   Pulm:  Without shortness of breath   CV:  No pitting edema of BLE.      Neurological:  Awake  Alert  Oriented x 3  Motor exam:        IP Q DF PF EHL  R   5  5   5   5    5  L   5  5   5   5    5     Able to spontaneously move L/E bilaterally  Sensation intact throughout all L/E dermatomes       Imaging: lumbar xray shows fusion intact     A/P:  Ms. Ralph is a 62 year old female that returns 12 weeks post  L4-5 interbody fusion.  She returns today reporting that she is pain free. She is very happy with the results of her surgery.  She states that she is anxious because she accidentally took 2 Trazodone 2 nights ago instead of 1. So she is more anxious. Otherwise she is doing very well. We will have her return in 3 months.     Patient Instructions   - May increase lifting restriction to 30 pounds and slowly increase as tolerated.   - followup in 3 months with xray prior   - Call the clinic at [phone] for increased pain or any other questions and concerns.     Pau Mendenhall [facility]  [facility]  [address]  [address] [place] [id number]    Tel [phone]  Pager [phone]

## 2017-10-27 ENCOUNTER — TELEPHONE (OUTPATIENT)
Dept: INTERNAL MEDICINE | Facility: CLINIC | Age: 62
End: 2017-10-27

## 2017-10-27 DIAGNOSIS — R07.9 CHEST PAIN, UNSPECIFIED TYPE: Primary | ICD-10-CM

## 2017-10-27 NOTE — TELEPHONE ENCOUNTER
Pt called. Stated about a year ago she discussed having a stress test with Selin. At the time pt stated she would think about it, but now she would like to move forward with test. Asked why Selin recommend test, and pt stated probably because of my anxiety, and both my parents had heart issues.

## 2017-10-31 ENCOUNTER — HOSPITAL ENCOUNTER (INPATIENT)
Facility: CLINIC | Age: 62
LOS: 1 days | Discharge: HOME | End: 2017-11-01
Attending: EMERGENCY MEDICINE | Admitting: INTERNAL MEDICINE
Payer: COMMERCIAL

## 2017-10-31 ENCOUNTER — TELEPHONE (OUTPATIENT)
Dept: INTERNAL MEDICINE | Facility: CLINIC | Age: 62
End: 2017-10-31

## 2017-10-31 ENCOUNTER — APPOINTMENT (OUTPATIENT)
Dept: CT IMAGING | Facility: CLINIC | Age: 62
End: 2017-10-31
Attending: EMERGENCY MEDICINE
Payer: COMMERCIAL

## 2017-10-31 DIAGNOSIS — E87.1 HYPONATREMIA: ICD-10-CM

## 2017-10-31 DIAGNOSIS — I10 HYPERTENSION GOAL BP (BLOOD PRESSURE) < 140/80: Primary | ICD-10-CM

## 2017-10-31 LAB
ALBUMIN SERPL-MCNC: 3.8 G/DL (ref 3.4–5)
ALBUMIN UR-MCNC: NEGATIVE MG/DL
ALP SERPL-CCNC: 74 U/L (ref 40–150)
ALT SERPL W P-5'-P-CCNC: 28 U/L (ref 0–50)
ANION GAP SERPL CALCULATED.3IONS-SCNC: 10 MMOL/L (ref 3–14)
ANION GAP SERPL CALCULATED.3IONS-SCNC: 10 MMOL/L (ref 3–14)
APPEARANCE UR: CLEAR
AST SERPL W P-5'-P-CCNC: 44 U/L (ref 0–45)
BASOPHILS # BLD AUTO: 0 10E9/L (ref 0–0.2)
BASOPHILS NFR BLD AUTO: 0.5 %
BILIRUB SERPL-MCNC: 0.6 MG/DL (ref 0.2–1.3)
BILIRUB UR QL STRIP: NEGATIVE
BUN SERPL-MCNC: 13 MG/DL (ref 7–30)
BUN SERPL-MCNC: 13 MG/DL (ref 7–30)
CALCIUM SERPL-MCNC: 9.2 MG/DL (ref 8.5–10.1)
CALCIUM SERPL-MCNC: 9.6 MG/DL (ref 8.5–10.1)
CHLORIDE SERPL-SCNC: 81 MMOL/L (ref 94–109)
CHLORIDE SERPL-SCNC: 81 MMOL/L (ref 94–109)
CO2 SERPL-SCNC: 24 MMOL/L (ref 20–32)
CO2 SERPL-SCNC: 25 MMOL/L (ref 20–32)
COLOR UR AUTO: YELLOW
CREAT SERPL-MCNC: 0.68 MG/DL (ref 0.52–1.04)
CREAT SERPL-MCNC: 0.74 MG/DL (ref 0.52–1.04)
DIFFERENTIAL METHOD BLD: NORMAL
EOSINOPHIL # BLD AUTO: 0.1 10E9/L (ref 0–0.7)
EOSINOPHIL NFR BLD AUTO: 1.1 %
ERYTHROCYTE [DISTWIDTH] IN BLOOD BY AUTOMATED COUNT: 11.9 % (ref 10–15)
GFR SERPL CREATININE-BSD FRML MDRD: 79 ML/MIN/1.7M2
GFR SERPL CREATININE-BSD FRML MDRD: 88 ML/MIN/1.7M2
GLUCOSE SERPL-MCNC: 85 MG/DL (ref 70–99)
GLUCOSE SERPL-MCNC: 87 MG/DL (ref 70–99)
GLUCOSE UR STRIP-MCNC: NEGATIVE MG/DL
HCT VFR BLD AUTO: 37.7 % (ref 35–47)
HGB BLD-MCNC: 13.1 G/DL (ref 11.7–15.7)
HGB UR QL STRIP: ABNORMAL
IMM GRANULOCYTES # BLD: 0 10E9/L (ref 0–0.4)
IMM GRANULOCYTES NFR BLD: 0.3 %
KETONES UR STRIP-MCNC: NEGATIVE MG/DL
LEUKOCYTE ESTERASE UR QL STRIP: ABNORMAL
LIPASE SERPL-CCNC: 239 U/L (ref 73–393)
LYMPHOCYTES # BLD AUTO: 2.1 10E9/L (ref 0.8–5.3)
LYMPHOCYTES NFR BLD AUTO: 32.9 %
MCH RBC QN AUTO: 31 PG (ref 26.5–33)
MCHC RBC AUTO-ENTMCNC: 34.7 G/DL (ref 31.5–36.5)
MCV RBC AUTO: 89 FL (ref 78–100)
MONOCYTES # BLD AUTO: 0.6 10E9/L (ref 0–1.3)
MONOCYTES NFR BLD AUTO: 9.4 %
MUCOUS THREADS #/AREA URNS LPF: PRESENT /LPF
NEUTROPHILS # BLD AUTO: 3.6 10E9/L (ref 1.6–8.3)
NEUTROPHILS NFR BLD AUTO: 55.8 %
NITRATE UR QL: NEGATIVE
NRBC # BLD AUTO: 0 10*3/UL
NRBC BLD AUTO-RTO: 0 /100
PH UR STRIP: 5 PH (ref 5–7)
PLATELET # BLD AUTO: 247 10E9/L (ref 150–450)
POTASSIUM SERPL-SCNC: 3.6 MMOL/L (ref 3.4–5.3)
POTASSIUM SERPL-SCNC: 4.1 MMOL/L (ref 3.4–5.3)
PROT SERPL-MCNC: 7.1 G/DL (ref 6.8–8.8)
RBC # BLD AUTO: 4.22 10E12/L (ref 3.8–5.2)
RBC #/AREA URNS AUTO: 1 /HPF (ref 0–2)
SODIUM SERPL-SCNC: 115 MMOL/L (ref 133–144)
SODIUM SERPL-SCNC: 116 MMOL/L (ref 133–144)
SOURCE: ABNORMAL
SP GR UR STRIP: 1.01 (ref 1–1.03)
TROPONIN I BLD-MCNC: 0 UG/L (ref 0–0.1)
UROBILINOGEN UR STRIP-MCNC: 0 MG/DL (ref 0–2)
WBC # BLD AUTO: 6.5 10E9/L (ref 4–11)
WBC #/AREA URNS AUTO: 3 /HPF (ref 0–2)

## 2017-10-31 PROCEDURE — 84300 ASSAY OF URINE SODIUM: CPT | Performed by: EMERGENCY MEDICINE

## 2017-10-31 PROCEDURE — 96361 HYDRATE IV INFUSION ADD-ON: CPT

## 2017-10-31 PROCEDURE — 25000128 H RX IP 250 OP 636: Performed by: EMERGENCY MEDICINE

## 2017-10-31 PROCEDURE — 96374 THER/PROPH/DIAG INJ IV PUSH: CPT | Mod: 59

## 2017-10-31 PROCEDURE — 82570 ASSAY OF URINE CREATININE: CPT | Performed by: EMERGENCY MEDICINE

## 2017-10-31 PROCEDURE — 84484 ASSAY OF TROPONIN QUANT: CPT

## 2017-10-31 PROCEDURE — 83690 ASSAY OF LIPASE: CPT | Performed by: EMERGENCY MEDICINE

## 2017-10-31 PROCEDURE — 81001 URINALYSIS AUTO W/SCOPE: CPT | Performed by: EMERGENCY MEDICINE

## 2017-10-31 PROCEDURE — 74177 CT ABD & PELVIS W/CONTRAST: CPT

## 2017-10-31 PROCEDURE — 96375 TX/PRO/DX INJ NEW DRUG ADDON: CPT

## 2017-10-31 PROCEDURE — 80048 BASIC METABOLIC PNL TOTAL CA: CPT | Performed by: EMERGENCY MEDICINE

## 2017-10-31 PROCEDURE — 80053 COMPREHEN METABOLIC PANEL: CPT | Performed by: EMERGENCY MEDICINE

## 2017-10-31 PROCEDURE — 85025 COMPLETE CBC W/AUTO DIFF WBC: CPT | Performed by: EMERGENCY MEDICINE

## 2017-10-31 PROCEDURE — 99285 EMERGENCY DEPT VISIT HI MDM: CPT | Mod: 25

## 2017-10-31 RX ORDER — ONDANSETRON 2 MG/ML
4 INJECTION INTRAMUSCULAR; INTRAVENOUS
Status: COMPLETED | OUTPATIENT
Start: 2017-10-31 | End: 2017-10-31

## 2017-10-31 RX ORDER — IOPAMIDOL 755 MG/ML
500 INJECTION, SOLUTION INTRAVASCULAR ONCE
Status: COMPLETED | OUTPATIENT
Start: 2017-10-31 | End: 2017-10-31

## 2017-10-31 RX ORDER — SODIUM CHLORIDE 9 MG/ML
INJECTION, SOLUTION INTRAVENOUS CONTINUOUS
Status: DISCONTINUED | OUTPATIENT
Start: 2017-10-31 | End: 2017-11-01

## 2017-10-31 RX ORDER — SODIUM CHLORIDE 9 MG/ML
INJECTION, SOLUTION INTRAVENOUS ONCE
Status: DISCONTINUED | OUTPATIENT
Start: 2017-10-31 | End: 2017-11-01

## 2017-10-31 RX ORDER — ACETAMINOPHEN 500 MG
500 TABLET ORAL EVERY 4 HOURS PRN
Status: DISCONTINUED | OUTPATIENT
Start: 2017-10-31 | End: 2017-11-01

## 2017-10-31 RX ORDER — LORAZEPAM 2 MG/ML
0.5 INJECTION INTRAMUSCULAR ONCE
Status: COMPLETED | OUTPATIENT
Start: 2017-10-31 | End: 2017-11-01

## 2017-10-31 RX ADMIN — ONDANSETRON 4 MG: 2 INJECTION INTRAMUSCULAR; INTRAVENOUS at 21:50

## 2017-10-31 RX ADMIN — SODIUM CHLORIDE 1000 ML: 9 INJECTION, SOLUTION INTRAVENOUS at 21:50

## 2017-10-31 RX ADMIN — SODIUM CHLORIDE 58 ML: 9 INJECTION, SOLUTION INTRAVENOUS at 22:29

## 2017-10-31 RX ADMIN — IOPAMIDOL 68 ML: 755 INJECTION, SOLUTION INTRAVENOUS at 22:29

## 2017-10-31 ASSESSMENT — ENCOUNTER SYMPTOMS
VOMITING: 1
DIARRHEA: 1
NAUSEA: 1
SHORTNESS OF BREATH: 0

## 2017-10-31 NOTE — TELEPHONE ENCOUNTER
"Pt informed of message below from provider.  Patient states she is \"so thirsty\", and have had diarrhea, nausea, and dizziness, headaches have lessoned.  Advised patient to drink water and an electrolyte solution to rehydrate. Patient states she is not retaining any water, reports to be 135 lbs and about 5'3\". Patient stating, there has to be something else I can take for this.  Provider please review and advise. Thank you.      "

## 2017-10-31 NOTE — IP AVS SNAPSHOT
[place] [address] [facility]    [address]    [facility] [address]    Phone:  [phone]    Fax:  [phone]                                       After Visit Summary   10/31/2017    Denise Ralph    MRN: [id number]           After Visit Summary Signature Page     I have received my discharge instructions, and my questions have been answered. I have discussed any challenges I see with this plan with the nurse or doctor.    ..........................................................................................................................................  Patient/Patient Representative Signature      ..........................................................................................................................................  Patient Representative Print Name and Relationship to Patient    ..................................................               ................................................  Date                                            Time    ..........................................................................................................................................  Reviewed by Signature/Title    ...................................................              ..............................................  Date                                                            Time

## 2017-10-31 NOTE — TELEPHONE ENCOUNTER
Call to pt and advised. She agrees. She will hold her HCTZ in the AM. She is wondering if she might have stomach bug now too.     She will see how she feels in the AM. Will cancel if feels like it is only viral.

## 2017-10-31 NOTE — TELEPHONE ENCOUNTER
Pt states since HCTZ was increased to 25 mg from 12.5 mg, her symptoms of nausea, diarrhea has gotten worse. Has not vomited, but feels like she has to. She is also very dizzy. Today is the worse day.   Today is 6th day of 25 mg HCTZ.  Increased on 10/26.   When she started taking HCTZ 12.5 mg she had a few days of nausea, dizzy, but then this got better. This was started on 10/21    /85 this AM.     Has only taken 12.5 mg this am, did not take the second one yet.     Please advise. Should she go back the one a day and continue to monitor BP or other recommendations?       BP Readings from Last 3 Encounters:   10/26/17 (!) 162/99   10/18/17 146/82   09/14/17 119/79

## 2017-10-31 NOTE — IP AVS SNAPSHOT
MRN:[id number]                      After Visit Summary   10/31/2017    Denise Ralph    MRN: [id number]           Thank you!     Thank you for choosing [facility] for your care. Our goal is always to provide you with excellent care. Hearing back from our patients is one way we can continue to improve our services. Please take a few minutes to complete the written survey that you may receive in the mail after you visit. If you would like to speak to someone directly about your visit please contact Patient Relations at [phone]. Thank you!          Patient Information     Date Of Birth          1955        Designated Caregiver       Most Recent Value    Caregiver    Will someone help with your care after discharge? yes    Name of designated caregiver Noe Ralph    Phone number of caregiver See facesheet    Caregiver address See facesheet      About your hospital stay     You were admitted on:  November 1, 2017 You last received care in the:  [place] [address] [facility]    You were discharged on:  November 1, 2017        Reason for your hospital stay       Hyponatremia                  Who to Call     For medical emergencies, please call 911.  For non-urgent questions about your medical care, please call your primary care provider or clinic, [phone]          Attending Provider     Provider Specialty    Estevan Arreguin MD Emergency Medicine    [facility]       Primary Care Provider Office Phone # Fax #    Juan F Smallwood [phone]      After Care Instructions     Activity       Your activity upon discharge: activity as tolerated            Diet       Follow this diet upon discharge: Orders Placed This Encounter      Regular Diet Adult for one week then cardiac diet                  Follow-up Appointments     Follow-up and recommended labs and tests        Follow up with primary care provider, Juan F COLBY  "Selin, within 7 days for hospital follow- up.  The following labs/tests are recommended: BMP.                  Pending Results     No orders found for last 3 day(s).            Statement of Approval     Ordered          11/01/17 6435  I have reviewed and agree with all the recommendations and orders detailed in this document.  EFFECTIVE NOW     Approved and electronically signed by:  Yo Mattson MD             Admission Information     Date & Time Provider Department Dept. Phone    10/31/2017 Geo Washington MD [place] [address] [facility] [phone]      Your Vitals Were     Blood Pressure Pulse Temperature Respirations Height Weight    137/70 59 96.2  F (35.7  C) (Oral) 16 1.651 m (5' 5\") 65.2 kg (143 lb 11.2 oz)    Pulse Oximetry BMI (Body Mass Index)                99% 23.91 kg/m2          MyChart Information     [facility] gives you secure access to your electronic health record. If you see a primary care provider, you can also send messages to your care team and make appointments. If you have questions, please call your primary care clinic.  If you do not have a primary care provider, please call [phone] and they will assist you.        Care EveryWhere ID     This is your Care EveryWhere ID. This could be used by other organizations to access your [place] medical records  [id number]        Equal Access to Services     INEZ THOMPSON AH: Joseph García, waaxda luqadaha, qaybta kaalmapooja falcon, rolan bull. So [facility] [phone].    ATENCIÓN: Si habla español, tiene a simpson disposición servicios gratuitos de asistencia lingüística. Llame al [phone].    We comply with applicable federal civil rights laws and [place] laws. We do not discriminate on the basis of race, color, national origin, age, disability, sex, sexual orientation, or gender identity.               Review of your medicines      START taking        Dose / Directions    amLODIPine " 2.5 MG tablet   Commonly known as:  NORVASC   Used for:  Hypertension goal BP (blood pressure) < 140/80        Dose:  2.5 mg   Take 1 tablet (2.5 mg) by mouth daily   Quantity:  30 tablet   Refills:  0         CONTINUE these medicines which have NOT CHANGED        Dose / Directions    ALPRAZolam 0.25 MG tablet   Commonly known as:  XANAX   Used for:  Anxiety        TAKE 1 TAB BY MOUTH 3 TIMES DAILY AS NEEDED FOR ANXIETY   Quantity:  30 tablet   Refills:  0       atenolol 50 MG tablet   Commonly known as:  TENORMIN   Used for:  Essential hypertension, benign, Palpitations        TAKE 1 TABLET (50 MG) BY MOUTH 2 TIMES DAILY   Quantity:  180 tablet   Refills:  1       latanoprost 0.005 % ophthalmic solution   Commonly known as:  XALATAN        Dose:  1 drop   Place 1 drop into both eyes At Bedtime   Refills:  0       OMEPRAZOLE PO        Dose:  20 mg   Take 20 mg by mouth every morning   Refills:  0       traZODone 100 MG tablet   Commonly known as:  DESYREL   Used for:  Primary insomnia        TAKE 1 TABLET BY MOUTH NIGHTLY AS NEEDED FOR SLEEP   Quantity:  90 tablet   Refills:  0         STOP taking     hydrochlorothiazide 25 MG tablet   Commonly known as:  HYDRODIURIL                Where to get your medicines      These medications were sent to [facility] [address] [facility]  [address] [facility] [address]     Phone:  [phone]     amLODIPine 2.5 MG tablet                Protect others around you: Learn how to safely use, store and throw away your medicines at www.disposemymeds.org.             Medication List: This is a list of all your medications and when to take them. Check marks below indicate your daily home schedule. Keep this list as a reference.      Medications           Morning Afternoon Evening Bedtime As Needed    ALPRAZolam 0.25 MG tablet   Commonly known as:  XANAX   TAKE 1 TAB BY MOUTH 3 TIMES DAILY AS NEEDED FOR ANXIETY   Next Dose Due:  Not  given during hospital stay. May take as directed                                   amLODIPine 2.5 MG tablet   Commonly known as:  NORVASC   Take 1 tablet (2.5 mg) by mouth daily   Last time this was given:  2.5 mg on 11/1/2017 10:35 AM   Next Dose Due:  Tomorrow 11/2 in the morning            8 AM                       atenolol 50 MG tablet   Commonly known as:  TENORMIN   TAKE 1 TABLET (50 MG) BY MOUTH 2 TIMES DAILY   Next Dose Due:  Not given during hospital stay. May take as directed            8 AM           8 AM               latanoprost 0.005 % ophthalmic solution   Commonly known as:  XALATAN   Place 1 drop into both eyes At Bedtime   Last time this was given:  1 drop on 11/1/2017  3:11 AM   Next Dose Due:  Tonight 11/1 in the evening                        10 PM           OMEPRAZOLE PO   Take 20 mg by mouth every morning   Last time this was given:  20 mg on 11/1/2017  7:00 AM   Next Dose Due:  Tomorrow 11/2 in the morning            8 AM                       traZODone 100 MG tablet   Commonly known as:  DESYREL   TAKE 1 TABLET BY MOUTH NIGHTLY AS NEEDED FOR SLEEP                                             More Information        Discharge Instructions: Taking Calcium Channel Blockers  Your healthcare provider prescribed a medicine called a calcium channel blocker for you. This type of medicine can treat high blood pressure, correct abnormal heart rhythms, and relieve a type of chest pain called angina.     The name of your calcium channel blocker is  _____________________      Home care    Follow the fact sheet that came with your medicine. It tells you when and how to take your medicine. Ask for a sheet if you didn t get one.    Take this medicine exactly as directed, even if you feel fine.    If you miss a dose of this medicine, take it as soon as you remember--unless it s almost time for your next dose. In that case, just wait and take your next dose at the normal time. Don t take a double dose. If you  aren't sure what to do, call your healthcare provider or your pharmacist.    Don t drive until you know how you will react to this medicine.    Tell your healthcare provider about any other medicines or herbal remedies you are using.    Be sure to give this medicine time to work. It may take several weeks to lower blood pressure.    Learn to take your own pulse. Keep a record of your results. Ask your provider which pulse rates mean that you need medical attention.    Don t eat grapefruit or drink grapefruit juice. These may interact with calcium channel blockers.    Ask your healthcare provider how much exercise and activity is safe.    See your provider regularly while taking this medicine.  Possible side effects  Tell your healthcare provider if you have any of these side effects. Don t stop taking the medicine unless your doctor tells you to. Mild side effects include:    Sore, bleeding gums    Mild headache    Dizziness or lightheadedness    Flushing    Nausea    Leg swelling  When to call your healthcare provider  Call your healthcare provider right away if you have any of the following:    Severe headache    Slow, weak pulse    Breathing problems, coughing, or wheezing    Irregular, fast, or pounding heartbeat    Skin rash    Swollen ankles, feet, or lower legs    Constipation   Date Last Reviewed: 6/1/2016 2000-2017 [employer]. [address] [place] [address]. All rights reserved. This information is not intended as a substitute for professional medical care. Always follow your healthcare professional's instructions.                Taking Your Blood Pressure  Blood pressure is the force of blood against the artery wall as it moves from the heart through the blood vessels. You can take your own blood pressure reading using a digital monitor. Take your readings the same each time, using the same arm. Take readings as often as your healthcare provider instructs.  About blood pressure  monitors  Blood pressure monitors are designed for certain ages and cases. You can find monitors for older adults, for pregnant women, and for children. Make sure the one you choose is the right one for your age and situation.  The American Heart Association recommends an automatic cuff monitor that fits on your upper arm (bicep). The cuff should fit your arm size. A cuff that s too large or too small will not give an accurate reading. Measure around your upper arm to find your size.  Monitors that attach to your finger or wrist are not as accurate as monitors for your upper arm.  Ask your healthcare provider for help in choosing a monitor. Bring your monitor to your next provider visit if you need help in using it the correct way.  The steps below are general instructions for using an automatic digital monitor.  Step 1. Relax      Take your blood pressure at the same time every day, such as in the morning or evening, or at the time your healthcare provider recommends.    Wait at least a half-hour after smoking, eating, or exercising. Don't drink coffee, tea, soda, or other caffeinated beverages before checking your blood pressure.    Sit comfortably at a table with both feet on the floor. Do not cross your legs or feet. Place the monitor near you.    Rest for a few minutes before you begin.  Step 2. Wrap the cuff      Place your arm on the table, palm up. Your arm should be at the level of your heart. Wrap the cuff around your upper arm, just above your elbow. It s best done on bare skin, not over clothing. Most cuffs will indicate where the brachial artery (the blood vessel in the middle of the arm at the inner side of the elbow) should line up with the cuff. Look in your monitor's instruction booklet for an illustration. You can also bring your cuff to your healthcare provider and have them show you how to correctly place the cuff.  Step 3. Inflate the cuff      Push the button that starts the pump.    The cuff  will tighten, then loosen.    The numbers will change. When they stop changing, your blood pressure reading will appear.    Take 2 or 3 readings one minute apart.  Step 4. Write down the results of each reading      Write down your blood pressure numbers for each reading. Note the date and time. Keep your results in one place, such as a notebook. Even if your monitor has a built-in memory, keep a hard copy of the readings.    Remove the cuff from your arm. Turn off the machine.    Bring your blood pressure records with your healthcare providers at each visit.    If you start a new blood pressure medicine, note the day you started the new medicine. Also note the day if you change the dose of your medicine. This information goes on your blood pressure recording sheet. This will help your healthcare provider monitor how well the medicine changes are working.    Ask your healthcare provider what numbers should prompt you to call him or her. Also ask what numbers should prompt you to get help right away.  Date Last Reviewed: 11/1/2016 2000-2017 [employer]. [address] [place] [address]. All rights reserved. This information is not intended as a substitute for professional medical care. Always follow your healthcare professional's instructions.                Discharge Instructions for High Blood Pressure (Hypertension)  You have been diagnosed with high blood pressure (also called hypertension). This means the force of blood against your artery walls is too strong. It also means your heart is working hard to move blood. High blood pressure usually has no symptoms, but over time, it can damage your heart, blood vessels, eyes, kidneys, and other organs. With help from your doctor, you can manage your blood pressure and protect your health.  Taking medicine    Learn to take your own blood pressure. Keep a record of your results. Ask your doctor which readings mean that you need medical  "attention.    Take your blood pressure medicine exactly as directed. Don t skip doses. Missing doses can cause your blood pressure to get out of control.    If you do miss a dose (or doses) check with your healthcare provider about what to do.    Avoid medicine that contain heart stimulants, including over-the-counter drugs. Check for warnings about high blood pressure on the label. Ask the pharmacist before purchasing something you haven't used before    Check with your doctor or pharmacist before taking a decongestant. Some decongestants can worsen high blood pressure.  Lifestyle changes    Maintain a healthy weight. Get help to lose any extra pounds.    Cut back on salt.    Limit canned, dried, packaged, and fast foods.    Don t add salt to your food at the table.    Season foods with herbs instead of salt when you cook.    Request no added salt when you go to a restaurant.    The American Heart Association s (AHA) \"ideal\" sodium intake recommendation is 1,500 milligrams per day.  However, since American's eat so much salt, the AHA says a positive change can occur by cutting back to even 2,400 milligrams of sodium a day.     Follow the DASH (Dietary Approaches to Stop Hypertension) eating plan. This plan recommends vegetables, fruits, whole gains, and other heart healthy foods.    Begin an exercise program. Ask your doctor how to get started. The American Heart Association recommends aerobic exercise 3 to 4 times a week for an average of 40 minutes at a time, with your doctor's approval. Simple activities like walking or gardening can help.    Break the smoking habit. Enroll in a stop-smoking program to improve your chances of success. Ask your healthcare provider about programs and medicines to help you stop smoking.    Limit drinks that contain caffeine (coffee, black or green tea, cola) to 2 per day.    Never take stimulants such as amphetamines or cocaine; these drugs can be deadly for someone with high blood " pressure.    Control your stress. Learn stress-management techniques.    Limit alcohol to no more than 1 drink a day for women and 2 drinks a day for men.  Follow-up care  Make a follow-up appointment as directed by our staff.     When to seek medical care  Call your doctor immediately or seek emergency care if you have any of the following:    Chest pain or shortness of breath (call 911)    Moderate to severe headache    Weakness in the muscles of your face, arms, or legs    Trouble speaking    Extreme drowsiness    Confusion    Fainting or dizziness    Pulsating or rushing sound in your ears    Unexplained nosebleed    Weakness, tingling, or numbness of your face, arms, or legs    Change in vision    Blood pressure measured at home that is greater than 180/110   Date Last Reviewed: 4/27/2016 2000-2017 [employer]. [address] [place] [address], [place] [address]. All rights reserved. This information is not intended as a substitute for professional medical care. Always follow your healthcare professional's instructions.

## 2017-11-01 VITALS
HEIGHT: 65 IN | HEART RATE: 59 BPM | SYSTOLIC BLOOD PRESSURE: 137 MMHG | DIASTOLIC BLOOD PRESSURE: 70 MMHG | WEIGHT: 143.7 LBS | OXYGEN SATURATION: 99 % | TEMPERATURE: 96.2 F | BODY MASS INDEX: 23.94 KG/M2 | RESPIRATION RATE: 16 BRPM

## 2017-11-01 PROBLEM — E87.1 HYPONATREMIA: Status: ACTIVE | Noted: 2017-11-01

## 2017-11-01 LAB
ANION GAP SERPL CALCULATED.3IONS-SCNC: 7 MMOL/L (ref 3–14)
BUN SERPL-MCNC: 8 MG/DL (ref 7–30)
CALCIUM SERPL-MCNC: 9.1 MG/DL (ref 8.5–10.1)
CHLORIDE SERPL-SCNC: 97 MMOL/L (ref 94–109)
CO2 SERPL-SCNC: 28 MMOL/L (ref 20–32)
CREAT SERPL-MCNC: 0.52 MG/DL (ref 0.52–1.04)
CREAT UR-MCNC: 68 MG/DL
GFR SERPL CREATININE-BSD FRML MDRD: >90 ML/MIN/1.7M2
GLUCOSE SERPL-MCNC: 98 MG/DL (ref 70–99)
INTERPRETATION ECG - MUSE: NORMAL
OSMOLALITY SERPL: 260 MMOL/KG (ref 280–301)
OSMOLALITY UR: 135 MMOL/KG (ref 100–1200)
POTASSIUM SERPL-SCNC: 4.3 MMOL/L (ref 3.4–5.3)
SODIUM SERPL-SCNC: 122 MMOL/L (ref 133–144)
SODIUM SERPL-SCNC: 128 MMOL/L (ref 133–144)
SODIUM SERPL-SCNC: 132 MMOL/L (ref 133–144)
SODIUM UR-SCNC: 18 MMOL/L

## 2017-11-01 PROCEDURE — 36415 COLL VENOUS BLD VENIPUNCTURE: CPT | Performed by: INTERNAL MEDICINE

## 2017-11-01 PROCEDURE — 12000007 ZZH R&B INTERMEDIATE

## 2017-11-01 PROCEDURE — 83930 ASSAY OF BLOOD OSMOLALITY: CPT | Performed by: INTERNAL MEDICINE

## 2017-11-01 PROCEDURE — 25000132 ZZH RX MED GY IP 250 OP 250 PS 637: Performed by: INTERNAL MEDICINE

## 2017-11-01 PROCEDURE — 83935 ASSAY OF URINE OSMOLALITY: CPT | Performed by: INTERNAL MEDICINE

## 2017-11-01 PROCEDURE — 25000128 H RX IP 250 OP 636: Performed by: EMERGENCY MEDICINE

## 2017-11-01 PROCEDURE — 84295 ASSAY OF SERUM SODIUM: CPT | Performed by: INTERNAL MEDICINE

## 2017-11-01 PROCEDURE — 25000128 H RX IP 250 OP 636: Performed by: INTERNAL MEDICINE

## 2017-11-01 PROCEDURE — 25000132 ZZH RX MED GY IP 250 OP 250 PS 637: Performed by: EMERGENCY MEDICINE

## 2017-11-01 PROCEDURE — 80048 BASIC METABOLIC PNL TOTAL CA: CPT | Performed by: INTERNAL MEDICINE

## 2017-11-01 PROCEDURE — 99234 HOSP IP/OBS SM DT SF/LOW 45: CPT | Performed by: INTERNAL MEDICINE

## 2017-11-01 RX ORDER — HYDROCHLOROTHIAZIDE 25 MG/1
25 TABLET ORAL DAILY
Status: ON HOLD | COMMUNITY
End: 2017-11-01

## 2017-11-01 RX ORDER — ACETAMINOPHEN 325 MG/1
650 TABLET ORAL EVERY 4 HOURS PRN
Status: DISCONTINUED | OUTPATIENT
Start: 2017-11-01 | End: 2017-11-01 | Stop reason: HOSPADM

## 2017-11-01 RX ORDER — HYDRALAZINE HYDROCHLORIDE 20 MG/ML
10 INJECTION INTRAMUSCULAR; INTRAVENOUS EVERY 4 HOURS PRN
Status: DISCONTINUED | OUTPATIENT
Start: 2017-11-01 | End: 2017-11-01 | Stop reason: HOSPADM

## 2017-11-01 RX ORDER — ONDANSETRON 2 MG/ML
4 INJECTION INTRAMUSCULAR; INTRAVENOUS EVERY 6 HOURS PRN
Status: DISCONTINUED | OUTPATIENT
Start: 2017-11-01 | End: 2017-11-01 | Stop reason: HOSPADM

## 2017-11-01 RX ORDER — NALOXONE HYDROCHLORIDE 0.4 MG/ML
.1-.4 INJECTION, SOLUTION INTRAMUSCULAR; INTRAVENOUS; SUBCUTANEOUS
Status: DISCONTINUED | OUTPATIENT
Start: 2017-11-01 | End: 2017-11-01 | Stop reason: HOSPADM

## 2017-11-01 RX ORDER — ALPRAZOLAM 0.25 MG
0.25 TABLET ORAL 3 TIMES DAILY PRN
Status: DISCONTINUED | OUTPATIENT
Start: 2017-11-01 | End: 2017-11-01 | Stop reason: HOSPADM

## 2017-11-01 RX ORDER — SODIUM CHLORIDE 9 MG/ML
INJECTION, SOLUTION INTRAVENOUS CONTINUOUS
Status: DISCONTINUED | OUTPATIENT
Start: 2017-11-01 | End: 2017-11-01

## 2017-11-01 RX ORDER — LATANOPROST 50 UG/ML
1 SOLUTION/ DROPS OPHTHALMIC AT BEDTIME
Status: DISCONTINUED | OUTPATIENT
Start: 2017-11-01 | End: 2017-11-01 | Stop reason: HOSPADM

## 2017-11-01 RX ORDER — AMLODIPINE BESYLATE 2.5 MG/1
2.5 TABLET ORAL DAILY
Status: DISCONTINUED | OUTPATIENT
Start: 2017-11-01 | End: 2017-11-01 | Stop reason: HOSPADM

## 2017-11-01 RX ORDER — AMLODIPINE BESYLATE 2.5 MG/1
2.5 TABLET ORAL DAILY
Qty: 30 TABLET | Refills: 0 | Status: SHIPPED | OUTPATIENT
Start: 2017-11-01 | End: 2017-11-15

## 2017-11-01 RX ORDER — ONDANSETRON 4 MG/1
4 TABLET, ORALLY DISINTEGRATING ORAL EVERY 6 HOURS PRN
Status: DISCONTINUED | OUTPATIENT
Start: 2017-11-01 | End: 2017-11-01 | Stop reason: HOSPADM

## 2017-11-01 RX ADMIN — LATANOPROST 1 DROP: 50 SOLUTION/ DROPS OPHTHALMIC at 03:11

## 2017-11-01 RX ADMIN — OMEPRAZOLE 20 MG: 20 CAPSULE, DELAYED RELEASE ORAL at 07:00

## 2017-11-01 RX ADMIN — ACETAMINOPHEN 500 MG: 500 TABLET, FILM COATED ORAL at 00:12

## 2017-11-01 RX ADMIN — SODIUM CHLORIDE: 9 INJECTION, SOLUTION INTRAVENOUS at 02:02

## 2017-11-01 RX ADMIN — SODIUM CHLORIDE: 9 INJECTION, SOLUTION INTRAVENOUS at 00:11

## 2017-11-01 RX ADMIN — AMLODIPINE BESYLATE 2.5 MG: 2.5 TABLET ORAL at 10:35

## 2017-11-01 RX ADMIN — LORAZEPAM 0.5 MG: 2 INJECTION INTRAMUSCULAR at 00:11

## 2017-11-01 ASSESSMENT — ACTIVITIES OF DAILY LIVING (ADL)
ADLS_ACUITY_SCORE: 9
ADLS_ACUITY_SCORE: 10
ADLS_ACUITY_SCORE: 9
ADLS_ACUITY_SCORE: 9

## 2017-11-01 ASSESSMENT — VISUAL ACUITY
OU: NORMAL ACUITY
OU: NORMAL ACUITY

## 2017-11-01 NOTE — PLAN OF CARE
Problem: Patient Care Overview  Goal: Plan of Care/Patient Progress Review  Outcome: Improving  Sodium 132. Per MD, pt okay for discharge. Vital signs stable. HR rosie at baseline.  Pt started on norvasc.  No loose BMs today. Fair appetite.  Up ind, steady gait. Slight dizziness present per pt report.  Will D/C shortly back to home.

## 2017-11-01 NOTE — PLAN OF CARE
Problem: Patient Care Overview  Goal: Plan of Care/Patient Progress Review  Outcome: No Change  AOx4, VSS. Neuros  WNL. Tele  SB with ST depression.  Up with SBA. Sodium On admission was 116 with a recheck of 122.  On clear liquid diet.  Voiding well without any problem.Denies pain this shift.

## 2017-11-01 NOTE — ED NOTES
[facility]  ED Nurse Handoff Report    Denise Ralph is a 62 year old female   ED Chief complaint: Nausea, Vomiting, & Diarrhea  . ED Diagnosis:   Final diagnoses:   None     Allergies:   Allergies   Allergen Reactions     Erythromycin Shortness Of Breath     Msir [Morphine Sulfate] Nausea     Intollerant to Morphine Sulfate: Nausea,vomiting     Dilaudid [Hydromorphone] Visual Disturbance     Hallucinations     Penicillins Itching     Reglan Other (See Comments)     Muscle spasms in throat       Code Status: Full Code  Activity level - Baseline/Home:  Independent. Activity Level - Current:   Independent. Lift room needed: No. Bariatric: No   Needed: No   Isolation: No. Infection: Not Applicable.     Vital Signs:   Vitals:    10/31/17 2016 10/31/17 2245   BP: (!) 207/105 174/81   BP Location:  Right arm   Pulse: 68 64   Resp: 17 16   Temp: 98  F (36.7  C)    TempSrc: Temporal    SpO2: 98% 98%   Weight: 61.2 kg (135 lb)        Cardiac Rhythm:  ,      Pain level: 0-10 Pain Scale: 0  Patient confused: No. Patient Falls Risk: Yes.   Elimination Status: Has voided   Patient Report - Initial Complaint: N/V/D, not feeling well. Focused Assessment: Pt anxious about being ill.   Tests Performed: Blood, urine, CT. Abnormal Results:   Labs Ordered and Resulted from Time of ED Arrival Up to the Time of Departure from the ED   COMPREHENSIVE METABOLIC PANEL - Abnormal; Notable for the following:        Result Value    Sodium 115 (*)     Chloride 81 (*)     All other components within normal limits   ROUTINE UA WITH MICROSCOPIC - Abnormal; Notable for the following:     Blood Urine Small (*)     Leukocyte Esterase Urine Trace (*)     WBC Urine 3 (*)     Mucous Urine Present (*)     All other components within normal limits   BASIC METABOLIC PANEL - Abnormal; Notable for the following:     Sodium 116 (*)     Chloride 81 (*)     All other components within normal limits   CBC WITH PLATELETS DIFFERENTIAL    LIPASE   CREATININE RANDOM URINE   SODIUM RANDOM URINE   VITAL SIGNS   PULSE OXIMETRY NURSING   ISTAT TROPONIN NURSING POCT   FREE WATER   TROPONIN POCT     .   Treatments provided: IV fluids,ativan  Family Comments: SO went home.  OBS brochure/video discussed/provided to patient:  No  ED Medications:   Medications   0.9% sodium chloride BOLUS (0 mLs Intravenous Stopped 11/1/17 0001)     Followed by   0.9% sodium chloride BOLUS (not administered)     Followed by   0.9% sodium chloride infusion (not administered)   0.9% sodium chloride infusion (not administered)   acetaminophen (TYLENOL) tablet 500 mg (not administered)   LORazepam (ATIVAN) injection 0.5 mg (not administered)   ondansetron (ZOFRAN) injection 4 mg (4 mg Intravenous Given 10/31/17 2150)   0.9% sodium chloride BOLUS (0 mLs Intravenous Stopped 10/31/17 2236)   iopamidol (ISOVUE-370) solution 500 mL (68 mLs Intravenous Given 10/31/17 2229)     Drips infusing:  No  For the majority of the shift, the patient's behavior Green. Interventions performed were N/A.     Severe Sepsis OR Septic Shock Diagnosis Present: No      ED Nurse Name/Phone Number: Tori Elly,   12:01 AM    RECEIVING UNIT ED HANDOFF REVIEW    Above ED Nurse Handoff Report was reviewed: Yes  Reviewed by: Radha Ruano on November 1, 2017 at 1:11 AM

## 2017-11-01 NOTE — PHARMACY-ADMISSION MEDICATION HISTORY
Admission medication history interview status for this patient is complete. See EPIC admission navigator for allergy information, prior to admission medications and immunization status.     Medication history interview source(s):Patient  Medication history resources (including written lists, pill bottles, clinic record):None  Primary pharmacy: [facility]    Changes made to PTA medication list:  Added: none  Deleted: none  Changed: HCTZ 12.5mg --> 25mg -- started as new med about 2 weeks ago then 5 days ago was increased from 12.5mg -->25mg    Actions taken by pharmacist (provider contacted, etc):None     Additional medication history information:None    Medication reconciliation/reorder completed by provider prior to medication history? Yes    Do you take OTC medications (eg tylenol, ibuprofen, fish oil, eye/ear drops, etc)? N    For patients on insulin therapy: N (Y/N)        Prior to Admission medications    Medication Sig Last Dose Taking? Auth Provider   hydrochlorothiazide (HYDRODIURIL) 25 MG tablet Take 25 mg by mouth daily 10/31/2017 at am Yes Unknown, Entered By History   ALPRAZolam (XANAX) 0.25 MG tablet TAKE 1 TAB BY MOUTH 3 TIMES DAILY AS NEEDED FOR ANXIETY Past Week at Unknown time Yes Juan F Smallwood MD   traZODone (DESYREL) 100 MG tablet TAKE 1 TABLET BY MOUTH NIGHTLY AS NEEDED FOR SLEEP 10/30/2017 at pm Yes Juan F Smallwood MD   OMEPRAZOLE PO Take 20 mg by mouth every morning 10/31/2017 at am Yes Reported, Patient   latanoprost (XALATAN) 0.005 % ophthalmic solution Place 1 drop into both eyes At Bedtime 10/30/2017 at PM Yes Unknown, Entered By History   atenolol (TENORMIN) 50 MG tablet TAKE 1 TABLET (50 MG) BY MOUTH 2 TIMES DAILY 10/31/2017 at pm Yes Juan F Smallwood MD

## 2017-11-01 NOTE — ED NOTES
"Pt has been ill since this am  N/v/d   \"feels like I could black out\"   Did have recent change in b/p med  That was making [facility]tz took 12.5  Like she was  Here for eval  Abc intact  Denies pain  "

## 2017-11-01 NOTE — H&P
CHIEF COMPLAINT:  Nausea, dizziness, diarrhea.      HISTORY OF PRESENT ILLNESS:  Denise Ralph is a 62-year-old white female with a history of hypertension, glaucoma, who presents with nausea, dizziness, and diarrhea.  The patient states that she was initiated on hydrochlorothiazide roughly two weeks ago at 12.5 mg, which was recently increased in dose maybe five days ago.  Initially, when she started this medication, she had episodes of nausea as well as dizziness.  This seemed to get better.  However, when her medication dose was increased to 25 mg 5-6 days ago, she had worsening of her symptoms; she has had worsening nausea, and she has been more dizzy when she gets up.  She also had an episode of diarrhea yesterday which she characterized as loose watery bowel movement.  She denies any abdominal pain.  She has had some subjective chills.  She has also had had a headache, roughly 5/10, in her occiput region which has been going on for several days.      In the ER over here, she was seen by Dr. Rico Arreguin.  I discussed the care with him.  She was noted to have marked hyponatremia with a sodium level of 116, and I am asked to admit her for further evaluation.      PAST MEDICAL HISTORY:   1.  Glaucoma.   2.  Degenerative joint disease.   3.  Irritable bowel syndrome.   4.  Hypertension.   5.  Depression.   6.  Anxiety.      PAST SURGICAL HISTORY:   1.  Left wrist surgery.   2.  Laparoscopic cholecystectomy.   3.  Vaginal hysterectomy.   4.  Bilateral eye surgery.   5.  L4-L5 fusion.      FAMILY HISTORY:  Significant for coronary artery disease in her mother and father.      SOCIAL HISTORY:  She does not smoke.  She does admit to drinking alcohol, at least two glasses of wine a day.      ALLERGIES:  Erythromycin, morphine sulfate, Dilaudid, penicillin, Reglan.      HOME MEDICATIONS:  Omeprazole, Xanax, atenolol, trazodone, HCTZ.      REVIEW OF SYSTEMS:  As mentioned in the HPI.  Her headache is 5/10 in her occipital  region.  There is no associated photophobia.  It has been going on for the past few days.  She denies any recent travel or sick contacts.  She denies any recent antibiotic intake.  Her diarrhea is nonbloody.  All other systems were extensively reviewed and deemed unremarkable and negative.      PHYSICAL EXAM:   VITAL SIGNS:  Temperature is 98.0.  Pulse is 64.  Blood pressure is 174/81.  Respiratory rate is 16.  O2 sat is 98% on room air.   GENERAL:  She is alert, awake, oriented, coherent, nontoxic, in no acute distress.   HEENT:  Pupils equal, round, reactive to light.  There is no photophobia.  Pharynx, no exudate noted.   LUNGS:  Clear to auscultation.   HEART:  Regular rate, S1-S2 normal.  No murmur, rubs or gallops.   ABDOMEN:  Soft, nontender, with good bowel sounds.   EXTREMITIES:  No edema.   SKIN:  There is no rash.   NEURO:  Cranial nerves II-XII are grossly within normal limits.  Motor:  She moves all extremities.      LABS:  Lab work obtained here included a CMP as well as a lipase, all of which are grossly within normal limits other than a sodium of 115 and a chloride of 81.  Her urine sodium is 18, and urine creatinine is 68.  Her troponin is undetectable.  Her CBC with differential is grossly unremarkable.  A urinalysis obtained on her shows trace leukocyte esterase with 3 WBC.      CT of the abdomen and pelvis shows no acute abnormality.  Specifically, there is no evidence of diverticulitis.      An EKG shows normal sinus rhythm at 64 beats per minute, with nonspecific T-wave flattening in her inferolateral leads.      ASSESSMENT AND PLAN:   1.  Hyponatremia:  Quite marked, likely related to dehydration and HCTZ use.  We will place her on normal saline.  We will monitor her sodium frequently.  Will avoid overcorrection.  Of note, she has had IV fluid boluses in the ER.  Hence we will check her sodium once she comes to the floor. She may need hypotonic saline if she hs been corrected too rapidly.  2.   Hypertension:  We will keep her off the HCTZ and monitor her blood pressures.   3.  CODE STATUS:  FULL CODE.   4.  She will be admitted as an inpatient due to marked hyponatremia.         ROMERO LAMBERT MD             D: 2017 00:37   T: 2017 01:33   MT: [id number]      Name:     ROSALIA DAY   MRN:      [id number]        Account:      [id number]   :      1955           Admitted:     [id number]      Document: [id number]       cc: Juan F Smallwood MD

## 2017-11-01 NOTE — ED PROVIDER NOTES
"  History     Chief Complaint:  Nausea, Vomiting, & Diarrhea      HPI   Denise Ralph is a 62 year old female who presents to the emergency department today for evaluation of one day of nausea, vomiting, and diarrhea. She states that she has \"never been so sick to her stomach\" and \"feels like she could black out.\" She does note a recent change in blood pressure medication as well as the addition of hydrochlorothiazide two weeks ago. Patient also states that she has had decrease in her urine output. Patient denies chest pain, shortness of breath, recent travel, or known ill contacts. Patient denies all other complaints.       Allergies:  Erythromycin  Msir [Morphine Sulfate]  Dilaudid [Hydromorphone]  Penicillins  Reglan    Medications:    Hydrochlorothiazide  Xanax  Tenormin  Desyrel  Omeprazole  Xalatan     Past Medical History:    Anxiety   Depressive disorder, not elsewhere classified   Essential hypertension, benign   Irritable bowel syndrome   Lumbar degenerative disc disease   Other chronic pain   PONV (postoperative nausea and vomiting)   Sciatica   Unspecified glaucoma    Past Surgical History:    Eye surgery, glaucoma, bilateral   Hysterectomy   Vaginal hysterectomy   Laparoscopic cholecystectomy with repeat operation because of bile leak  Left shoulder decompression surgery for impingement  Optical tracking system fusion posterior spine lumbar  Wrist surgery, left     Family History:    Mother: Cardiovascular, Breast Cancer   Father: Pulmonary Fibrosis  Brother: Myocardial Infarction, Cocaine Use    Social History:  The patient was accompanied to the ED by her .  Smoking Status: Former Smoker   Packs/day: 0.50  Years: 25.00  Quit date: 10/1/1997  Smokeless Tobacco: Never Used  Alcohol Use: Positive  Marital Status:        Review of Systems   Respiratory: Negative for shortness of breath.    Cardiovascular: Negative for chest pain.   Gastrointestinal: Positive for diarrhea, nausea and " vomiting.   Genitourinary: Positive for decreased urine volume.   All other systems reviewed and are negative.    Physical Exam     Patient Vitals for the past 24 hrs:   BP Temp Temp src Pulse Resp SpO2 Weight   10/31/17 2245 174/81 - - 64 16 98 % -   10/31/17 2016 (!) 207/105 98  F (36.7  C) Temporal 68 17 98 % 61.2 kg (135 lb)     Physical Exam  General: The patient is alert, in no respiratory distress.    HENT: Mucous membranes dry.    Cardiovascular: Regular rate and rhythm. Good pulses in all four extremities. Normal capillary refill and skin turgor.     Respiratory: Lungs are clear. No nasal flaring. No retractions. No wheezing, no crackles.    Gastrointestinal: Abdomen soft. No guarding, no rebound. No palpable hernias. Epigastric abdominal tenderness.    Musculoskeletal: No gross deformity.     Skin: No rashes or petechiae.     Neurologic: The patient is alert and oriented x3. GCS 15. No testable cranial nerve deficit. Follows commands with clear and appropriate speech. Gives appropriate answers. Good strength in all extremities. No gross neurologic deficit. Gross sensation intact. Pupils are round and reactive. No meningismus.     Lymphatic: No cervical adenopathy. No lower extremity swelling.    Psychiatric: The patient is non-tearful.     Emergency Department Course   ECG (20:58:50):  Rate 64 bpm. MA interval 162. QRS duration 88. QT/QTc 426/439. P-R-T axes 27 57 -40. Normal sinus rhythm. ST depression II. T wave inversion in III and AVF. Interpreted at 2109 by Estevan Arreguin MD.     Imaging:  Radiology findings were communicated with the patient and family  who voiced understanding of the findings.    CT Abdomen/Pelvis with IV and Oral Contrast:   IMPRESSION: No acute abnormality is seen. Specifically, there is no evidence of diverticulitis.     Results per radiology.     Laboratory:  Laboratory findings were communicated with the patient who voiced understanding of the findings.    CBC: WNL (WBC  6.5, HGB 13.1, )    2126 CMP:  (LL), Chloride 81 (L) o/w WNL (Creatinine 0.74)  2151 BMP:  (LL), Chloride 81 (L) o/w WNL (Creatinine 0.68)   Lipase: 239     2153: Troponin I: <0.015     UA with micro: Blood Small (A), Leukocyte Esterase Trace (A), WBC 3 (H), Mucous Present (A) o/w negative     Interventions:  2150: NS 1L IV Bolus   2150: Zofran 4 mg IV  0011: NS 1L IV Bolus   0011: Sodium Chloride Infusion IV  0011: Ativan 0.5 mg IV  0012: Tylenol 500 mg PO    Emergency Department Course:  Past medical records, nursing notes, and vitals reviewed.  2039: I performed an exam of the patient and obtained history, as documented above.  IV inserted and blood drawn.   Above interventions provided.   The patient was sent for a abdomen CT while in the emergency department, findings above.   I personally reviewed the laboratory results with the Patient and spouse and answered all related questions prior to admission.  Findings and plan explained to the Patient and spouse who consents to admission.   0015: Discussed the patient with Dr. Washington, who will admit the patient to a medical bed for further monitoring, evaluation, and treatment.        Impression & Plan    Medical Decision Making:  Denise Ralph is a 62 year old female who reports that she has recently started hydrochlorothiazide which was increased and then developed signs of not feeling well followed by diarrhea. The diarrhea was quite significant and I felt that the diarrhea is most likely the cause of her hyponatremia. She appears overtly dehydrated. I did check urine, sodium, and creatinine. I originally considered abdominal causes with her significant nausea such as diverticulitis and colitis, so a CT was ordered. The patient's low sodium was confirmed by repeat BMP. Gentle hydration was started with normal saline and the patient was admitted to the hospital is stable condition.    Diagnosis:    ICD-10-CM   1. Hyponatremia E87.1   2. Nausea  and vomiting  3. Diarrhea  4. dehydration    Disposition:   Admitted to a medical bed.     Critical Care time was 35 minutes for this patient excluding procedures.       [facility] EMERGENCY DEPARTMENT  I, Mickie Berg, am serving as a scribe at 8:39 PM on 10/31/2017 to document services personally performed by Estevan Arreguin MD based on my observations and the provider's statements to me.       Estevan Arreguin MD  11/01/17 0119

## 2017-11-02 ENCOUNTER — TELEPHONE (OUTPATIENT)
Dept: INTERNAL MEDICINE | Facility: CLINIC | Age: 62
End: 2017-11-02

## 2017-11-02 NOTE — TELEPHONE ENCOUNTER
IP F/U    Date: 11/01/17  Diagnosis: Hyponatremia; Hypertension  Is patient active in care coordination? No  Was patient in TCU? No

## 2017-11-02 NOTE — TELEPHONE ENCOUNTER
"  ED for acute condition Discharge Protocol    \"Hi, my name is Sonyamagno Del Real, a registered nurse, and I am calling from [facility].  I am calling to follow up and see how things are going for you after your recent emergency visit.\"    Tell me how you are doing now that you are home?\" Doing better at home      Discharge Instructions    \"Let's review your discharge instructions.  What is/are the follow-up recommendations?  Pt. Response: follow up with PCP in 1 week    \"Has an appointment with your primary care provider been scheduled?\"  No (needed - schedule appointment and remind to bring meds)    Medications    \"Tell me what changed about your medicines when you discharged?\"    Started Amlodipine, stopped diuretic    \"What questions do you have about your medications?\"   None          Call Summary    \"What questions or concerns do you have about your recent visit and your follow-up care?\"     none    \"If you have questions or things don't continue to improve, we encourage you contact us through the main clinic number (give number).  Even if the clinic is not open, triage nurses are available 24/7 to help you.     We would like you to know that our clinic has extended hours (provide information).  We also have urgent care (provide details on closest location and hours/contact info)\"    \"Thank you for your time and take care!\"              "

## 2017-11-03 NOTE — DISCHARGE SUMMARY
[facility]  Discharge Summary    Name: Denise Ralph    MRN: [id number]     YOB: 1955    Age: 62 year old                                                                                                                  Date of Discharge:  11/1/2017  4:00 PM                                                        Date of Admission: 10/31/2017            Primary Care Provider: Juan F Smallwood            Discharge Physician:Yo Mattson           Discharging Service:  Hospitalist      This is a summary of brief hospital stay for Denise Ralph  Who was admitted for observation and monitoring.    Reason for admission/Presenting signs and symptoms:      Nausea, dizziness, diarrhea    Discharge Diagnosis:    Hyponatremia likely due to diuretic therapy -HCTZ   -- HCTZ stopped and given saline which improved her sodium and advised to follow with PCP off HCTZ     HTN   -- hctz stopped and low dose norvasc started   -- advised to see PCP in follow up visit for BP monitoring and medication titration             Significant Labs and diagnostic studies      All laboratory and imaging data in the past 24 hours reviewed       Recent Labs  Lab 10/31/17  2126   WBC 6.5   HGB 13.1   HCT 37.7   MCV 89        No results for input(s): CULT in the last 168 hours.    Recent Labs  Lab 11/01/17  1318 11/01/17  0625 11/01/17  0210 10/31/17  2151 10/31/17  2126   * 128* 122* 116* 115*   POTASSIUM 4.3  --   --  3.6 4.1   CHLORIDE 97  --   --  81* 81*   CO2 28  --   --  25 24   ANIONGAP 7  --   --  10 10   GLC 98  --   --  87 85   BUN 8  --   --  13 13   CR 0.52  --   --  0.68 0.74   GFRESTIMATED >90  --   --  88 79   GFRESTBLACK >90  --   --  >90 >90   MARLON 9.1  --   --  9.6 9.2   PROTTOTAL  --   --   --   --  7.1   ALBUMIN  --   --   --   --  3.8   BILITOTAL  --   --   --   --  0.6   ALKPHOS  --   --   --   --  74   AST  --   --   --   --  44   ALT  --   --   --   --  28         Recent Labs  Lab  11/01/17  1318 10/31/17  2151 10/31/17  2126   GLC 98 87 85           No results for input(s): INR in the last 168 hours.          Recent Labs  Lab 10/31/17  2153   TROPONIN 0.00       No results found for this or any previous visit (from the past 48 hour(s)).          Results for orders placed or performed during the hospital encounter of 10/31/17   CT Abdomen Pelvis w Contrast    Narrative    CT ABDOMEN AND PELVIS WITH CONTRAST  10/31/2017 10:36 PM    HISTORY: Diarrhea and abdominal pain. Evaluate for diverticulitis.    COMPARISON: A CT on 10/15/2014.    TECHNIQUE: Routine transverse CT imaging of the abdomen and pelvis was  performed following the uneventful administration of 68mL Isovue-370  intravenous contrast. Radiation dose for this scan was reduced using  automated exposure control, adjustment of the mA and/or kV according  to patient size, or iterative reconstruction technique.    FINDINGS: The visualized lung bases are clear. Again seen are a few  small low-density liver lesions, again likely representing cysts.  There has been a cholecystectomy. Mild prominence of the biliary tree  is within the normal postoperative range. The spleen, pancreas,  adrenal glands, kidneys, and bladder remain unremarkable. No enlarged  lymph node or other abnormal mass is demonstrated. No free fluid is  seen. No free intraperitoneal gas is identified. The gastrointestinal  tract is unremarkable. There is a normal-appearing appendix. There is  calcification of the vascular structures. There are interval surgical  changes in the lumbar spine. There degenerative changes elsewhere in  the spine. No other osseous abnormality is seen. No abdominal or  pelvic wall pathology is demonstrated.       Impression    IMPRESSION: No acute abnormality is seen. Specifically, there is no  evidence of diverticulitis.    MARKIE STEWART MD            On the day discharge patient was stable and understands the need to have follow up for  medical problems and or abnormal test results as outpatient.  Patient`s other chronic medical problems were stable and home medications were continued as above.Patient was medically stable to discharge to above setting and follow up with Primary care provider  Patient /family understood above discharge medications and recommendations.             Chronic Medical problems:     Past Medical History:   Diagnosis Date     Anxiety     Gets panic attacks     Depressive disorder, not elsewhere classified      Essential hypertension, benign     No cardiologist     Irritable bowel syndrome     has had neg colonoscopy & EGD w/u     Lumbar degenerative disc disease 1996    Had PT, traction, no surgery     Other chronic pain     JOint pain for many years.     PONV (postoperative nausea and vomiting)      Sciatica 3/06    R thigh;  MRI = R L2-3 disc      Unspecified glaucoma(365.9)               Discharge Disposition:    Discharged to home     Allergies:    Allergies   Allergen Reactions     Erythromycin Shortness Of Breath     Msir [Morphine Sulfate] Nausea     Intollerant to Morphine Sulfate: Nausea,vomiting     Dilaudid [Hydromorphone] Visual Disturbance     Hallucinations     Penicillins Itching     Reglan Other (See Comments)     Muscle spasms in throat        Discharge Medications:     Discharge Medication List as of 11/1/2017  3:54 PM      START taking these medications    Details   amLODIPine (NORVASC) 2.5 MG tablet Take 1 tablet (2.5 mg) by mouth daily, Disp-30 tablet, R-0, E-Prescribe         CONTINUE these medications which have NOT CHANGED    Details   ALPRAZolam (XANAX) 0.25 MG tablet TAKE 1 TAB BY MOUTH 3 TIMES DAILY AS NEEDED FOR ANXIETY, Disp-30 tablet, R-0, Local Print      traZODone (DESYREL) 100 MG tablet TAKE 1 TABLET BY MOUTH NIGHTLY AS NEEDED FOR SLEEP, Disp-90 tablet, R-0, E-PrescribePt due for Oct appt=needs to call our office.      OMEPRAZOLE PO Take 20 mg by mouth every morning, Historical     "  latanoprost (XALATAN) 0.005 % ophthalmic solution Place 1 drop into both eyes At Bedtime, Historical      atenolol (TENORMIN) 50 MG tablet TAKE 1 TABLET (50 MG) BY MOUTH 2 TIMES DAILY, Disp-180 tablet, R-1, E-PrescribePt due for Oct appt-=needs to call our office.         STOP taking these medications       hydrochlorothiazide (HYDRODIURIL) 25 MG tablet Comments:   Reason for Stopping:                Condition on Discharge:    Discharge condition: Stable   Discharge vitals: Blood pressure 137/70, pulse 59, temperature 96.2  F (35.7  C), temperature source Oral, resp. rate 16, height 1.651 m (5' 5\"), weight 65.2 kg (143 lb 11.2 oz), SpO2 99 %, not currently breastfeeding.   Code status on discharge: Full Code     History of Illness:  See detailed admission note for full details.          Consultations:  No consultations were requested during this admission     Pending Results:    Unresulted Labs Ordered in the Past 30 Days of this Admission     No orders found from 9/1/2017 to 11/1/2017.               Discharge Instructions and Follow-Up:     Discharge diet:   Active Diet Order      Diet cardiac      Discharge activity: Activity as tolerated   Discharge follow-up: Follow up with primary care provider in 7 days   Other instructions: Patient/family  instructed and counseled regarding discharge medications above including potential risks and benefits.Patient/family understood benefits and potential serious side effects of taking these medications and need to follow up with PCP  if the patient develops complications.  Patient is also advised to see a doctor immediately for severe symptoms.  Written discharge instructions was also given to patient/family.       Total time spent in face to face contact with the patient and coordinating discharge was:    35 Minutes.  "

## 2017-11-05 DIAGNOSIS — R00.2 PALPITATIONS: ICD-10-CM

## 2017-11-05 DIAGNOSIS — I10 ESSENTIAL HYPERTENSION, BENIGN: ICD-10-CM

## 2017-11-07 ENCOUNTER — OFFICE VISIT (OUTPATIENT)
Dept: INTERNAL MEDICINE | Facility: CLINIC | Age: 62
End: 2017-11-07
Payer: COMMERCIAL

## 2017-11-07 VITALS
BODY MASS INDEX: 23.01 KG/M2 | HEIGHT: 65 IN | WEIGHT: 138.1 LBS | HEART RATE: 74 BPM | RESPIRATION RATE: 20 BRPM | OXYGEN SATURATION: 98 % | SYSTOLIC BLOOD PRESSURE: 110 MMHG | DIASTOLIC BLOOD PRESSURE: 70 MMHG

## 2017-11-07 DIAGNOSIS — E87.1 HYPONATREMIA: Primary | ICD-10-CM

## 2017-11-07 DIAGNOSIS — Z23 NEED FOR PROPHYLACTIC VACCINATION AND INOCULATION AGAINST INFLUENZA: ICD-10-CM

## 2017-11-07 DIAGNOSIS — I10 ESSENTIAL HYPERTENSION, BENIGN: ICD-10-CM

## 2017-11-07 DIAGNOSIS — Z09 HOSPITAL DISCHARGE FOLLOW-UP: ICD-10-CM

## 2017-11-07 PROCEDURE — 36415 COLL VENOUS BLD VENIPUNCTURE: CPT | Performed by: INTERNAL MEDICINE

## 2017-11-07 PROCEDURE — 90686 IIV4 VACC NO PRSV 0.5 ML IM: CPT | Performed by: INTERNAL MEDICINE

## 2017-11-07 PROCEDURE — 90471 IMMUNIZATION ADMIN: CPT | Performed by: INTERNAL MEDICINE

## 2017-11-07 PROCEDURE — 99495 TRANSJ CARE MGMT MOD F2F 14D: CPT | Mod: 25 | Performed by: INTERNAL MEDICINE

## 2017-11-07 PROCEDURE — 80048 BASIC METABOLIC PNL TOTAL CA: CPT | Performed by: INTERNAL MEDICINE

## 2017-11-07 RX ORDER — ATENOLOL 50 MG/1
TABLET ORAL
Qty: 180 TABLET | Refills: 0 | Status: SHIPPED | OUTPATIENT
Start: 2017-11-07 | End: 2018-02-03

## 2017-11-07 ASSESSMENT — PAIN SCALES - GENERAL: PAINLEVEL: NO PAIN (0)

## 2017-11-07 NOTE — NURSING NOTE
"Chief Complaint   Patient presents with     Hospital F/U     10/31/2017-11/1/2017 hyponatremia       Initial /70 (BP Location: Left arm, Patient Position: Sitting, Cuff Size: Adult Regular)  Pulse 74  Resp 20  Ht 5' 5\" (1.651 m)  Wt 138 lb 1.6 oz (62.6 kg)  SpO2 98%  Breastfeeding? No  BMI 22.98 kg/m2 Estimated body mass index is 22.98 kg/(m^2) as calculated from the following:    Height as of this encounter: 5' 5\" (1.651 m).    Weight as of this encounter: 138 lb 1.6 oz (62.6 kg).  Medication Reconciliation: complete   Cheryl Ballesteros, Medical Assistant      "

## 2017-11-07 NOTE — PROGRESS NOTES

## 2017-11-07 NOTE — PROGRESS NOTES
SUBJECTIVE:   Denise Ralph is a 62 year old female who presents to clinic today for the following health issues:          Hospital Follow-up Visit:    Hospital/Nursing Home/IP Rehab Facility: [facility]  Date of Admission: 10/31/2017  Date of Discharge: 11/1/2017  Reason(s) for Admission: hyponatremia            Problems taking medications regularly:  None       Medication changes since discharge: see updated med list-current per patient       Problems adhering to non-medication therapy:  None    Summary of hospitalization:  [facility] discharge summary reviewed  Diagnostic Tests/Treatments reviewed.  Follow up needed: lsb  Other Healthcare Providers Involved in Patient s Care:         None  Update since discharge: improved.     Post Discharge Medication Reconciliation: discharge medications reconciled, continue medications without change.  Plan of care communicated with patient     Coding guidelines for this visit:  Type of Medical   Decision Making Face-to-Face Visit       within 7 Days of discharge Face-to-Face Visit        within 14 days of discharge   Moderate Complexity [id number]   High Complexity 48064 34105          Patient is seen for a follow up visit.  Recently hospitalized for dizziness, nausea, diarrhea.   Found to have significant hyponatremia. Likely related to recently started HCTZ for her HTN and the acute diarrhea. Possible viral illness.   Improved with IV NS, HCTZ stopped. Feels better now. No dizziness, n/v/diarrhea/ fever.   Started on Norvasc for HTN, along with PTA Atenolol. BP is controlled. No medications side effects.     Problem list and histories reviewed & adjusted, as indicated.  Additional history: as documented    Patient Active Problem List   Diagnosis     Irritable bowel syndrome     Anxiety state     Essential hypertension, benign     Disorder of bone and cartilage     Basal cell carcinoma     Squamous cell carcinoma     Advanced directives,  counseling/discussion     GERD (gastroesophageal reflux disease)     Glaucoma     Hypertension goal BP (blood pressure) < 140/80     S/P lumbar fusion     Controlled substance agreement signed     Tension headache     Hyponatremia     Past Surgical History:   Procedure Laterality Date     EYE SURGERY Bilateral     Treatment of glaucoma     HYSTERECTOMY       HYSTERECTOMY VAGINAL       LAPAROSCOPIC CHOLECYSTECTOMY      with repeat operation because of bile leak     Left shoulder decomp surgery for impingement  approx      OPTICAL TRACKING SYSTEM FUSION POSTERIOR SPINE LUMBAR N/A 8/3/2017    Procedure: OPTICAL TRACKING SYSTEM FUSION SPINE POSTERIOR LUMBAR ONE LEVEL;  1. L4 Ang-Meyers osteotomy with exposure of the L4 and L5 roots  2. L4-5 complete discectomy with arthrodesis  3. Placement of Globus Creo GOMEZ pedicle screws bilaterally from L4 to L5  4. L4 - L5 posterior lateral fusion with placement of Medtronic Magnifuse and locally harvested autologous bone  5. Use of Stealth and O     WRIST SURGERY Left        Social History   Substance Use Topics     Smoking status: Former Smoker     Packs/day: 0.50     Years: 25.00     Quit date: 10/1/1997     Smokeless tobacco: Never Used      Comment: quit      Alcohol use 1.0 oz/week     2 Glasses of wine per week      Comment: 2 glasses of wine daily     Family History   Problem Relation Age of Onset     Cardiovascular Mother      / mi     Breast Cancer Mother      diagnosed age 60's     Respiratory Father      pulmonary fibrosis.     Cardiovascular Brother       of MI age 34 2nd to Cocaine Use     Cancer - colorectal No family hx of          Current Outpatient Prescriptions   Medication Sig Dispense Refill     atenolol (TENORMIN) 50 MG tablet TAKE 1 TABLET (50 MG) BY MOUTH 2 TIMES DAILY 180 tablet 0     amLODIPine (NORVASC) 2.5 MG tablet Take 1 tablet (2.5 mg) by mouth daily 30 tablet 0     ALPRAZolam (XANAX) 0.25 MG tablet TAKE 1 TAB BY MOUTH 3  "TIMES DAILY AS NEEDED FOR ANXIETY 30 tablet 0     traZODone (DESYREL) 100 MG tablet TAKE 1 TABLET BY MOUTH NIGHTLY AS NEEDED FOR SLEEP 90 tablet 0     OMEPRAZOLE PO Take 20 mg by mouth every morning       latanoprost (XALATAN) 0.005 % ophthalmic solution Place 1 drop into both eyes At Bedtime           Reviewed and updated as needed this visit by clinical staffTobacco  Allergies  Meds       Reviewed and updated as needed this visit by Provider         ROS:  Constitutional, HEENT, cardiovascular, pulmonary, gi and gu systems are negative, except as otherwise noted.      OBJECTIVE:   /70 (BP Location: Left arm, Patient Position: Sitting, Cuff Size: Adult Regular)  Pulse 74  Resp 20  Ht 5' 5\" (1.651 m)  Wt 138 lb 1.6 oz (62.6 kg)  SpO2 98%  Breastfeeding? No  BMI 22.98 kg/m2  Body mass index is 22.98 kg/(m^2).   GENERAL: healthy, alert and no distress  EYES: Eyes grossly normal to inspection, PERRL and conjunctivae and sclerae normal  NECK: no adenopathy, no asymmetry, masses, or scars and thyroid normal to palpation  RESP: lungs clear to auscultation - no rales, rhonchi or wheezes  CV: regular rate and rhythm, normal S1 S2, no S3 or S4, no murmur, click or rub, no peripheral edema and peripheral pulses strong  ABDOMEN: soft, nontender, no hepatosplenomegaly, no masses and bowel sounds normal  MS: no gross musculoskeletal defects noted, no edema  SKIN: no suspicious lesions or rashes  NEURO: Normal strength and tone, mentation intact and speech normal    Diagnostic Test Results:  No results found for this or any previous visit (from the past 24 hour(s)).    ASSESSMENT/PLAN:     Problem List Items Addressed This Visit     Essential hypertension, benign    Hyponatremia - Primary    Relevant Orders    Basic metabolic panel (Completed)      Other Visit Diagnoses     Need for prophylactic vaccination and inoculation against influenza        Relevant Orders    FLU VAC, SPLIT VIRUS IM > 3 YO (QUADRIVALENT) " [91669] (Completed)    Vaccine Administration, Initial [27653] (Completed)    Hospital discharge follow-up               Assess BPM  Cont treatment  BP controlled   Improved post hospitalization     Follow-Up:with results     Juan F Smallwood MD  [facility]

## 2017-11-07 NOTE — MR AVS SNAPSHOT
After Visit Summary   11/7/2017    Denise Ralph    MRN: [id number]           Patient Information     Date Of Birth          1955        Visit Information        Provider Department      11/7/2017 3:40 PM Juan F Smallwood MD [facility]        Today's Diagnoses     Hyponatremia    -  1    Need for prophylactic vaccination and inoculation against influenza        Essential hypertension, benign        Hospital discharge follow-up           Follow-ups after your visit        Your next 10 appointments already scheduled     Jan 22, 2018  1:00 PM CST   XR LUMBAR SPINE 2/3 VIEWS with [id number]   [facility] ([facility])    [address]  [address]  [facility] [address]   [phone]           Please bring a list of your current medicines to your exam. (Include vitamins, minerals and over-thecounter medicines.) Leave your valuables at home.  Tell your doctor if there is a chance you may be pregnant.  You do not need to do anything special for this exam.            Jan 22, 2018  1:40 PM CST   Return Visit with IRENE Loza CNP   [facility] ([facility])    [address]  [facility] [address] [phone]              Who to contact     If you have questions or need follow up information about today's clinic visit or your schedule please contact [facility] directly at [phone].  Normal or non-critical lab and imaging results will be communicated to you by MyChart, letter or phone within 4 business days after the clinic has received the results. If you do not hear from us within 7 days, please contact the clinic through MyChart or phone. If you have a critical or abnormal lab result, we will notify you by phone as soon as possible.  Submit refill requests through [employer] or call your pharmacy and they will forward the refill request to us. Please  "allow 3 business days for your refill to be completed.          Additional Information About Your Visit        MyChart Information     [email or web address] gives you secure access to your electronic health record. If you see a primary care provider, you can also send messages to your care team and make appointments. If you have questions, please call your primary care clinic.  If you do not have a primary care provider, please call [phone] and they will assist you.        Care EveryWhere ID     This is your Care EveryWhere ID. This could be used by other organizations to access your [facility] medical records  [id number]        Your Vitals Were     Pulse Respirations Height Pulse Oximetry Breastfeeding? BMI (Body Mass Index)    74 20 5' 5\" (1.651 m) 98% No 22.98 kg/m2       Blood Pressure from Last 3 Encounters:   11/07/17 110/70   11/01/17 137/70   10/26/17 (!) 162/99    Weight from Last 3 Encounters:   11/07/17 138 lb 1.6 oz (62.6 kg)   11/01/17 143 lb 11.2 oz (65.2 kg)   10/26/17 138 lb 9.6 oz (62.9 kg)              We Performed the Following     Basic metabolic panel     FLU VAC, SPLIT VIRUS IM > 3 YO (QUADRIVALENT) [90353]     Vaccine Administration, Initial [82475]        Primary Care Provider Office Phone # Fax #    Juan F Smallwood [phone]       [address] [facility] [address]        Equal Access to Services     [facility] campusBILLY AH: Hadii aad ku hadasho Sofranklynali, waaxda luqadaha, qaybta kaalmada olgayada, rolan matamoros'ronan bull. So [facility] [phone].    ATENCIÓN: Si habla español, tiene a simpson disposición servicios gratuitos de asistencia lingüística. Llame al [phone].    We comply with applicable federal civil rights laws and [place] laws. We do not discriminate on the basis of race, color, national origin, age, disability, sex, sexual orientation, or gender identity.            Thank you!     Thank you for choosing [facility]  for your care. Our " goal is always to provide you with excellent care. Hearing back from our patients is one way we can continue to improve our services. Please take a few minutes to complete the written survey that you may receive in the mail after your visit with us. Thank you!             Your Updated Medication List - Protect others around you: Learn how to safely use, store and throw away your medicines at www.disposemymeds.org.          This list is accurate as of: 11/7/17 11:59 PM.  Always use your most recent med list.                   Brand Name Dispense Instructions for use Diagnosis    ALPRAZolam 0.25 MG tablet    XANAX    30 tablet    TAKE 1 TAB BY MOUTH 3 TIMES DAILY AS NEEDED FOR ANXIETY    Anxiety       amLODIPine 2.5 MG tablet    NORVASC    30 tablet    Take 1 tablet (2.5 mg) by mouth daily    Hypertension goal BP (blood pressure) < 140/80       atenolol 50 MG tablet    TENORMIN    180 tablet    TAKE 1 TABLET (50 MG) BY MOUTH 2 TIMES DAILY    Essential hypertension, benign, Palpitations       latanoprost 0.005 % ophthalmic solution    XALATAN     Place 1 drop into both eyes At Bedtime        OMEPRAZOLE PO      Take 20 mg by mouth every morning        traZODone 100 MG tablet    DESYREL    90 tablet    TAKE 1 TABLET BY MOUTH NIGHTLY AS NEEDED FOR SLEEP    Primary insomnia

## 2017-11-08 ENCOUNTER — TELEPHONE (OUTPATIENT)
Dept: INTERNAL MEDICINE | Facility: CLINIC | Age: 62
End: 2017-11-08

## 2017-11-08 LAB
ANION GAP SERPL CALCULATED.3IONS-SCNC: 9 MMOL/L (ref 3–14)
BUN SERPL-MCNC: 12 MG/DL (ref 7–30)
CALCIUM SERPL-MCNC: 9.2 MG/DL (ref 8.5–10.1)
CHLORIDE SERPL-SCNC: 100 MMOL/L (ref 94–109)
CO2 SERPL-SCNC: 26 MMOL/L (ref 20–32)
CREAT SERPL-MCNC: 0.64 MG/DL (ref 0.52–1.04)
GFR SERPL CREATININE-BSD FRML MDRD: >90 ML/MIN/1.7M2
GLUCOSE SERPL-MCNC: 82 MG/DL (ref 70–99)
POTASSIUM SERPL-SCNC: 3.6 MMOL/L (ref 3.4–5.3)
SODIUM SERPL-SCNC: 135 MMOL/L (ref 133–144)

## 2017-11-08 NOTE — TELEPHONE ENCOUNTER
Denise Ralph is a 62 year old female who calls with Dizziness. Symptoms feel similar to last week when she went to ER, pt admitted to the hospital for hyponatremia.     NURSING ASSESSMENT:  Description:  Dizziness  Onset/duration:  Started today  Precip. factors:  Started Amlodipine 1 week ago after being in the hospital for dizziness and nausea.   Associated symptoms:  Nausea, mild headache, loose stools after eating breakfast, tongue feels like she ate something salty, lips are more sensitive  Improves/worsens symptoms:  nothing  Last exam/Treatment:  11/7/17  Allergies:   Allergies   Allergen Reactions     Erythromycin Shortness Of Breath     Msir [Morphine Sulfate] Nausea     Intollerant to Morphine Sulfate: Nausea,vomiting     Dilaudid [Hydromorphone] Visual Disturbance     Hallucinations     Penicillins Itching     Reglan Other (See Comments)     Muscle spasms in throat     BMP from yesterday is not back yet.     MEDICATIONS:   Taking medication(s) as prescribed? Yes    NURSING PLAN: Nursing advice to patient Will contact lab for result of BMP, then discuss with Dr. Smallwood for recommendations    Contacted Lab and spoke to Aziza, she discovered BMP was accidentally not sent to out this morning with . Aziza will send this to the hospital to be run STAT.    Sonya Del Real RN

## 2017-11-12 DIAGNOSIS — F41.9 ANXIETY: ICD-10-CM

## 2017-11-13 RX ORDER — ALPRAZOLAM 0.25 MG
TABLET ORAL
Qty: 30 TABLET | Refills: 0 | Status: SHIPPED | OUTPATIENT
Start: 2017-11-13 | End: 2017-12-13

## 2017-11-15 DIAGNOSIS — I10 HYPERTENSION GOAL BP (BLOOD PRESSURE) < 140/80: ICD-10-CM

## 2017-11-15 RX ORDER — AMLODIPINE BESYLATE 2.5 MG/1
2.5 TABLET ORAL DAILY
Qty: 30 TABLET | Refills: 5 | Status: SHIPPED | OUTPATIENT
Start: 2017-11-15 | End: 2018-04-24

## 2017-11-15 NOTE — TELEPHONE ENCOUNTER
AMLODIPINE. THIS WAS STARTED IN THE HOSPITAL. SHE IS GOING OUT OF TOWN AND NEEDS REFILL.         Last Written Prescription Date: 11/1/17  Last Fill Quantity: 30, # refills: 0      Last Office Visit with [facility], P or [facility] prescribing provider:  11/7/17     Future Office Visit:    Next 5 appointments (look out 90 days)     Jan 22, 2018  1:40 PM CST   Return Visit with Marleni Singer NP   [facility] ([facility])    [address] [address] [address] [phone]                    BP Readings from Last 3 Encounters:   11/07/17 110/70   11/01/17 137/70   10/26/17 (!) 162/99

## 2017-12-14 ENCOUNTER — TRANSFERRED RECORDS (OUTPATIENT)
Dept: HEALTH INFORMATION MANAGEMENT | Facility: CLINIC | Age: 62
End: 2017-12-14

## 2017-12-21 ENCOUNTER — TRANSFERRED RECORDS (OUTPATIENT)
Dept: HEALTH INFORMATION MANAGEMENT | Facility: CLINIC | Age: 62
End: 2017-12-21

## 2017-12-21 DIAGNOSIS — F51.01 PRIMARY INSOMNIA: ICD-10-CM

## 2017-12-22 RX ORDER — TRAZODONE HYDROCHLORIDE 100 MG/1
TABLET ORAL
Qty: 90 TABLET | Refills: 1 | Status: SHIPPED | OUTPATIENT
Start: 2017-12-22 | End: 2018-06-15

## 2017-12-26 NOTE — TELEPHONE ENCOUNTER
Per 11/7/17 BMP result note:   Notes Recorded by Juan F Smallwood MD on 11/10/2017 at 4:07 PM  Discussed result with patient.  ------    Notes Recorded by Juan F Smallwood MD on 11/8/2017 at 5:15 PM  Normal result reviewed, discussed with  patient.

## 2018-01-12 DIAGNOSIS — F41.9 ANXIETY: ICD-10-CM

## 2018-01-12 RX ORDER — ALPRAZOLAM 0.25 MG
TABLET ORAL
Qty: 30 TABLET | Refills: 0 | Status: SHIPPED | OUTPATIENT
Start: 2018-01-12 | End: 2018-02-06

## 2018-01-12 NOTE — TELEPHONE ENCOUNTER
alprazolam      Last Written Prescription Date:  12/14/17  Last Fill Quantity: 30,   # refills: 0  Last Office Visit: 11/7/17  Future Office visit:    Next 5 appointments (look out 90 days)     Jan 17, 2018  1:30 PM CST   PHYSICAL with Chelsea Restrepo MD   [facility] ([facility])    [address]  [facility] [address]   [phone]            Jan 22, 2018  1:40 PM CST   Return Visit with Marleni Singer NP   [facility] ([facility])    [address]  [facility] [address]   [phone]                   Routing refill request to provider for review/approval because:  Drug not on the [facility], [facility] or [facility] refill protocol or controlled substance  Maria C Miller RN

## 2018-01-17 ENCOUNTER — OFFICE VISIT (OUTPATIENT)
Dept: OBGYN | Facility: CLINIC | Age: 63
End: 2018-01-17
Payer: COMMERCIAL

## 2018-01-17 VITALS
HEART RATE: 80 BPM | WEIGHT: 140.3 LBS | BODY MASS INDEX: 23.38 KG/M2 | DIASTOLIC BLOOD PRESSURE: 72 MMHG | SYSTOLIC BLOOD PRESSURE: 118 MMHG | HEIGHT: 65 IN

## 2018-01-17 DIAGNOSIS — N39.0 RECURRENT UTI: Primary | ICD-10-CM

## 2018-01-17 DIAGNOSIS — Z01.419 ENCOUNTER FOR GYNECOLOGICAL EXAMINATION WITHOUT ABNORMAL FINDING: ICD-10-CM

## 2018-01-17 DIAGNOSIS — N95.2 VAGINAL ATROPHY: ICD-10-CM

## 2018-01-17 PROCEDURE — 99396 PREV VISIT EST AGE 40-64: CPT | Performed by: OBSTETRICS & GYNECOLOGY

## 2018-01-17 PROCEDURE — 99213 OFFICE O/P EST LOW 20 MIN: CPT | Mod: 25 | Performed by: OBSTETRICS & GYNECOLOGY

## 2018-01-17 RX ORDER — ESTRADIOL 10 UG/1
10 INSERT VAGINAL
Qty: 18 TABLET | Refills: 11 | Status: SHIPPED | OUTPATIENT
Start: 2018-01-18 | End: 2018-12-04

## 2018-01-17 RX ORDER — NITROFURANTOIN 25; 75 MG/1; MG/1
100 CAPSULE ORAL
Qty: 10 CAPSULE | Refills: 3 | Status: SHIPPED | OUTPATIENT
Start: 2018-01-17 | End: 2018-12-04

## 2018-01-17 NOTE — PROGRESS NOTES
Gynecology Clinic Visit:    SUBJECTIVE:     Denise is a 62 year old  who presents for annual exam.   Postmenopausal, s/p total hysterectomy.  She is having no menopausal symptoms. No vaginal bleeding noted.     Besides routine health maintenance,  she would like to discuss recurrent UTIs following intercourse and vaginal atrophy. Tried 2 weeks of vagifem and then discontinued when other health concerns became more important. Rare intercourse now due to back problems, but concerned that any future intercourse may cause repeat infections. Notices burning, a foul smell, and bladder spasms with infections.   Estrogen replacement therapy: Yes -  Vaginal only    Family history of breast CA: Yes (Please explain): see below  Family history of uterine/ovarian CA: No  Family history of colon CA: No    HEALTH MAINTENANCE:  Last Mammogram: 2017 . History of abnormal Mammo: No  Pap; (  Lab Results   Component Value Date    PAP NIL 2013    PAP NIL 2009    PAP NIL 2008    )    HISTORY:  Patient Active Problem List   Diagnosis     Irritable bowel syndrome     Anxiety state     Essential hypertension, benign     Disorder of bone and cartilage     Basal cell carcinoma     Squamous cell carcinoma     Advanced directives, counseling/discussion     GERD (gastroesophageal reflux disease)     Glaucoma     Hypertension goal BP (blood pressure) < 140/80     S/P lumbar fusion     Controlled substance agreement signed     Tension headache     Hyponatremia     Past Surgical History:   Procedure Laterality Date     EYE SURGERY Bilateral     Treatment of glaucoma     HYSTERECTOMY       HYSTERECTOMY VAGINAL       LAPAROSCOPIC CHOLECYSTECTOMY      with repeat operation because of bile leak     Left shoulder decomp surgery for impingement  approx      OPTICAL TRACKING SYSTEM FUSION POSTERIOR SPINE LUMBAR N/A 8/3/2017    Procedure: OPTICAL TRACKING SYSTEM FUSION SPINE POSTERIOR LUMBAR ONE LEVEL;  1. L4 Smith-Meyers  osteotomy with exposure of the L4 and L5 roots  2. L4-5 complete discectomy with arthrodesis  3. Placement of Globus Creo GOMEZ pedicle screws bilaterally from L4 to L5  4. L4 - L5 posterior lateral fusion with placement of Medtronic Magnifuse and locally harvested autologous bone  5. Use of Stealth and O     WRIST SURGERY Left       Social History   Substance Use Topics     Smoking status: Former Smoker     Packs/day: 0.50     Years: 25.00     Quit date: 10/1/1997     Smokeless tobacco: Never Used      Comment: quit      Alcohol use 1.0 oz/week     2 Glasses of wine per week      Comment: 2 glasses of wine daily      Problem (# of Occurrences) Relation (Name,Age of Onset)    Breast Cancer (1) Mother: diagnosed age 60's    Cardiovascular (2) Mother: / mi, Brother:  of MI age 34 2nd to Cocaine Use    Respiratory (1) Father: pulmonary fibrosis.       Negative family history of: Cancer - colorectal              Obstetric History       T2      L2     SAB0   TAB0   Ectopic0   Multiple0   Live Births0       # Outcome Date GA Lbr Nathan/2nd Weight Sex Delivery Anes PTL Lv   2 Term            1 Term                 Past Medical History:   Diagnosis Date     Anxiety     Gets panic attacks     Depressive disorder, not elsewhere classified      Essential hypertension, benign     No cardiologist     Irritable bowel syndrome     has had neg colonoscopy & EGD w/u     Lumbar degenerative disc disease     Had PT, traction, no surgery     Other chronic pain     JOint pain for many years.     PONV (postoperative nausea and vomiting)      Sciatica 3/06    R thigh;  MRI = R L2-3 disc      Unspecified glaucoma(365.9)            Current Outpatient Prescriptions:      ALPRAZolam (XANAX) 0.25 MG tablet, TAKE 1 TABLET BY MOUTH 3 TIMES DAILY AS NEEDED FOR ANXIETY, Disp: 30 tablet, Rfl: 0     traZODone (DESYREL) 100 MG tablet, TAKE 1 TABLET BY MOUTH NIGHTLY AS NEEDED FOR SLEEP, Disp: 90 tablet, Rfl: 1      "amLODIPine (NORVASC) 2.5 MG tablet, Take 1 tablet (2.5 mg) by mouth daily, Disp: 30 tablet, Rfl: 5     atenolol (TENORMIN) 50 MG tablet, TAKE 1 TABLET (50 MG) BY MOUTH 2 TIMES DAILY, Disp: 180 tablet, Rfl: 0     OMEPRAZOLE PO, Take 20 mg by mouth every morning, Disp: , Rfl:      latanoprost (XALATAN) 0.005 % ophthalmic solution, Place 1 drop into both eyes At Bedtime, Disp: , Rfl:   Allergies   Allergen Reactions     Erythromycin Shortness Of Breath     Msir [Morphine Sulfate] Nausea     Intollerant to Morphine Sulfate: Nausea,vomiting     Dilaudid [Hydromorphone] Visual Disturbance     Hallucinations     Penicillins Itching     Reglan Other (See Comments)     Muscle spasms in throat       Past medical, surgical, social and family history were reviewed and updated in EPIC.    ROS:    Const: no weight changes, fever, chills  :  Recurrent UTIs with intercourse  GI: no constipation, diarrhea, abdominal pain  Breast: no nipple discharge, skin changes, lumps      OBJECTIVE:     EXAM:  /72 (BP Location: Right arm, Patient Position: Chair, Cuff Size: Adult Regular)  Pulse 80  Ht 5' 5\" (1.651 m)  Wt 140 lb 4.8 oz (63.6 kg)  BMI 23.35 kg/m2 Body mass index is 23.35 kg/(m^2).   Constitutional: healthy, alert and no distress  Head: Normocephalic. No masses, lesions, tenderness or abnormalities  Neck: Neck supple. Trachea midline. No adenopathy. Thyroid symmetric, normal size.   Cardiovascular: RRR.   Respiratory: Negative.   Breast: No visible masses or suspicious skin changes.  No discrete or dominant masses to palpation.  No axillary lymphadenopathy.  Gastrointestinal: Abdomen soft, non-tender, non-distended. No masses, organomegaly  Vulva:  No external lesions, normal female hair distribution, no inguinal adenopathy.    Urethra:  Midline, non-tender, well supported, no discharge  Vagina:  Atrophic, pale, no abnormal discharge, no lesions, cuff intact  Cervix: no lesions, no discharge and surgically " absent  Uterus:  surgically absent  Ovaries:  No masses appreciated, non-tender, mobile  Musculoskeletal: extremities normal  Skin: no suspicious lesions or rashes  Psychiatric: Affect appropriate, cooperative, mentation appears normal.       ASSESSMENT/PLAN:                                                    Denise Ralph is a 62 year old female  with satisfactory annual exam and the following concerns.    PLAN:    1. Encounter for gynecological examination without abnormal finding  1)  Pap smear no longer indicated s/p total hysterectomy.  2)  Mammogram: up to date, repeat annually, due in     2. Recurrent UTI  - occurs with intercourse. Reviewed pyridium for discomfort, home test strips to evaluate for infection, ppx macrobid as needed.  - nitroFURantoin, macrocrystal-monohydrate, (MACROBID) 100 MG capsule; Take 1 capsule (100 mg) by mouth once as needed Take once after intercourse  Dispense: 10 capsule; Refill: 3    3. Vaginal atrophy  - plan estradiol for 3 months, if no improvement with dryness and UTI symptoms will discontinue  - discussed personal lubricant and vulvar hygiene.   - estradiol (VAGIFEM) 10 MCG TABS vaginal tablet; Place 1 tablet (10 mcg) vaginally twice a week Take daily for 2 weeks, then twice weekly for 3 months  Dispense: 18 tablet; Refill: 11       PE:  Reviewed health maintenance including diet, regular exercise   and periodic exams.    Return to clinic in 1 year, 3 months if ongoing issue with vaginal atrophy and UTIs.      COUNSELING:   Reviewed preventive health counseling, as reflected in patient instructions   reports that she quit smoking about 20 years ago. She has a 12.50 pack-year smoking history. She has never used smokeless tobacco.          FRAX Risk Assessment    Chelsea Restrepo MD   Obstetrics and Gynecology  2018

## 2018-01-17 NOTE — NURSING NOTE
"Chief Complaint   Patient presents with     Physical     last pap was 04/22/13- NIL     Medication Request     for abx to take after having intercourse to prevent UTI's       Initial /72 (BP Location: Right arm, Patient Position: Chair, Cuff Size: Adult Regular)  Pulse 80  Ht 5' 5\" (1.651 m)  Wt 140 lb 4.8 oz (63.6 kg)  BMI 23.35 kg/m2 Estimated body mass index is 23.35 kg/(m^2) as calculated from the following:    Height as of this encounter: 5' 5\" (1.651 m).    Weight as of this encounter: 140 lb 4.8 oz (63.6 kg).  Medication Reconciliation: complete     Imelda Salcedo CMA      "

## 2018-01-17 NOTE — PATIENT INSTRUCTIONS
Vaginal Atrophy    Vaginal atrophy is a normal part of the aging process and is a reaction to decreased circulating estrogen, but this doesn't mean that there is nothing we can do about it. Symptoms often include vaginal dryness, itching, pain with intercourse, and narrowing of the vagina.     For dryness you can start by trying Replens, or another over the counter vaginal moisturizer.    Try a silicone or oil based lubricant for intercourse to make it more comfortable. If you want to talk with an expert on lubricants try visiting the [facility] on [facility] where they have a large variety of lubricants to try.   You can purchase lubricants at www.[employer] or amazon.com if they are hard to find elsewhere.    Read about sexual issues after menopause at [email or web address]    Vaginal estrogen is a medication inserted into the vagina which can decrease dryness, itching, narrowing, and pain with intercourse. It is administered in very low doses and is locally active. The risks associated with vaginal estrogen are far fewer and less significant than those associated with systemic hormone replacement therapy.  You may need to contact your insurance company to determine which vaginal estrogen formulation is covered at the best rate. This is different for each insurance company and for each type of supplemental coverage. Options include the following:    - Vagifem tablet, inserted vaginally daily for 14 days then 2-3x weekly  - Estring, a ring placed inside the vagina that rests at the top of the vagina for 3 months  - Estrace cream, a cream that is applied daily for 14 days then 2x/week inside the vagina. Apply 0.5g.  - Premarin cream, a cream that is applied daily for 14 days then 2x/week inside the vagina. Apply 0.5g        Preventive Health Recommendations  Female Ages 40-64  Yearly exam:    See your health care provider every year in order to    Review health changes.      Discuss  preventive care.       Review your medicines if you your doctor has prescribed any.    Pap Smears: You should have a Pap test every 3 years or have a Pap test with HPV screening every 5 years.    You do not need a Pap test if your uterus was removed (hysterectomy) and you have not had cancer.   Breast Cancer Screening: You should begin mammography for breast cancer screening by age 40 or 50 depending on your personal risks and your doctors recommendation. Screening should occur every year or every other year based on your discussion with your doctor.  Colorectal Cancer Screening: Starting at age 50 you need to undergo colonoscopy (every 5-10 years) or other colon cancer screening.    Health Maintenance:  - Your cholesterol should be checked every 5 years, more often if you have increased risk factors such as diabetes, high blood pressure, tobacco use, obesity, or a strong family history of cardiovascular disease before age 50 in men and 60 in women  - If you are at risk for diabetes due to being overweight or obese, having a strong family history, or having a history of gestational diabetes you should have a diabetes test (fasting glucose or Hemoglobin A1c level) every 3 years.  Shots: Get a flu shot each year. Get a tetanus shot every 10 years.    Nutrition:      Eat at least 5 servings of fruits and vegetables each day.    Eat whole-grain bread, whole-wheat pasta, faro, quinoa, barley and brown rice instead of white grains and rice.    Consume 1000mg of Calcium and 1000u of Vitamin D daily. If these are not in your regular diet you should take a supplement.    To calculate the calcium in your food add a zero to the percent found on the packaging (ex: 30% = 300mg of calcium per serving)    Good sources of Calcium include:    Low-fat dairy products (200 to 300 milligrams per serving)      Dark green leafy vegetables     Canned salmon or sardines with bones     Soy products, such as tofu     Calcium-fortified cereals  and orange juice    Osteopenia is low bone mass, your risk increases once you reach menopause and your calcium needs then increase to 1,200mg daily    The [place] of Medicine recommends that total calcium intake, from supplements and diet combined, should be no more than 2,000 milligrams daily for people older than 50.    Lifestyle    Get in at least 150 minutes of physical activity a week (30 minutes a day, 5 days of the week), of which about half should be vigorous exercise (jogging, swimming, lifting weights).  This will help you control your weight and prevent disease. You must increase the intensity and duration of exercise in order to lose weight, if that is your goal.    Limit alcohol to one drink per day.    No smoking.      Wear sunscreen to prevent skin cancer.    See your dentist every six months for an exam and cleaning        Menopause and Perimenopause    Hot flashes, night sweats, mood changes, sleep disturbances, changes in the menstrual periods, decreased interest in sex, vaginal dryness or painful sex, and changes in the skin and hair are all common symptoms of perimenopause (the period of time, lasting several years, leading up to menopause). Menopause is defined as 12 months without a menstrual period.     It might be helpful to make a list of your symptoms, and to rank them in order of how much they bother you, or which ones you would fix first if you could. This can help us decide what treatment options might be best for you. Treatment can include one or more of the following: lifestyle changes like diet and exercise, supplements, prescription medications for hormones, and other prescriptions that are nonhormonal. We will discuss these in more detail after your test results (if you are having lab work done) are available, when you come back. Some tests that might be done for women in perimenopause include blood work for hormone levels and to check the thyroid or ultrasound or biopsy of the  lining of the uterus (if you are having abnormal bleeding).

## 2018-01-17 NOTE — MR AVS SNAPSHOT
After Visit Summary   1/17/2018    Denise Ralph    MRN: [id number]           Patient Information     Date Of Birth          1955        Visit Information        Provider Department      1/17/2018 1:30 PM Chelsea Restrepo MD [facility]        Today's Diagnoses     Recurrent UTI    -  1    Encounter for gynecological examination without abnormal finding        Vaginal atrophy          Care Instructions    Vaginal Atrophy    Vaginal atrophy is a normal part of the aging process and is a reaction to decreased circulating estrogen, but this doesn't mean that there is nothing we can do about it. Symptoms often include vaginal dryness, itching, pain with intercourse, and narrowing of the vagina.     For dryness you can start by trying Replens, or another over the counter vaginal moisturizer.    Try a silicone or oil based lubricant for intercourse to make it more comfortable. If you want to talk with an expert on lubricants try visiting the [facility] on [facility] where they have a large variety of lubricants to try.   You can purchase lubricants at www.[employer] or amazon.com if they are hard to find elsewhere.    Read about sexual issues after menopause at [email or web address]    Vaginal estrogen is a medication inserted into the vagina which can decrease dryness, itching, narrowing, and pain with intercourse. It is administered in very low doses and is locally active. The risks associated with vaginal estrogen are far fewer and less significant than those associated with systemic hormone replacement therapy.  You may need to contact your insurance company to determine which vaginal estrogen formulation is covered at the best rate. This is different for each insurance company and for each type of supplemental coverage. Options include the following:    - Vagifem tablet, inserted vaginally daily for 14 days then 2-3x weekly  - Estring, a ring placed  inside the vagina that rests at the top of the vagina for 3 months  - Estrace cream, a cream that is applied daily for 14 days then 2x/week inside the vagina. Apply 0.5g.  - Premarin cream, a cream that is applied daily for 14 days then 2x/week inside the vagina. Apply 0.5g        Preventive Health Recommendations  Female Ages 40-64  Yearly exam:    See your health care provider every year in order to    Review health changes.      Discuss preventive care.       Review your medicines if you your doctor has prescribed any.    Pap Smears: You should have a Pap test every 3 years or have a Pap test with HPV screening every 5 years.    You do not need a Pap test if your uterus was removed (hysterectomy) and you have not had cancer.   Breast Cancer Screening: You should begin mammography for breast cancer screening by age 40 or 50 depending on your personal risks and your doctors recommendation. Screening should occur every year or every other year based on your discussion with your doctor.  Colorectal Cancer Screening: Starting at age 50 you need to undergo colonoscopy (every 5-10 years) or other colon cancer screening.    Health Maintenance:  - Your cholesterol should be checked every 5 years, more often if you have increased risk factors such as diabetes, high blood pressure, tobacco use, obesity, or a strong family history of cardiovascular disease before age 50 in men and 60 in women  - If you are at risk for diabetes due to being overweight or obese, having a strong family history, or having a history of gestational diabetes you should have a diabetes test (fasting glucose or Hemoglobin A1c level) every 3 years.  Shots: Get a flu shot each year. Get a tetanus shot every 10 years.    Nutrition:      Eat at least 5 servings of fruits and vegetables each day.    Eat whole-grain bread, whole-wheat pasta, faro, quinoa, barley and brown rice instead of white grains and rice.    Consume 1000mg of Calcium and 1000u of  Vitamin D daily. If these are not in your regular diet you should take a supplement.    To calculate the calcium in your food add a zero to the percent found on the packaging (ex: 30% = 300mg of calcium per serving)    Good sources of Calcium include:    Low-fat dairy products (200 to 300 milligrams per serving)      Dark green leafy vegetables     Canned salmon or sardines with bones     Soy products, such as tofu     Calcium-fortified cereals and orange juice    Osteopenia is low bone mass, your risk increases once you reach menopause and your calcium needs then increase to 1,200mg daily    The [place] of Medicine recommends that total calcium intake, from supplements and diet combined, should be no more than 2,000 milligrams daily for people older than 50.    Lifestyle    Get in at least 150 minutes of physical activity a week (30 minutes a day, 5 days of the week), of which about half should be vigorous exercise (jogging, swimming, lifting weights).  This will help you control your weight and prevent disease. You must increase the intensity and duration of exercise in order to lose weight, if that is your goal.    Limit alcohol to one drink per day.    No smoking.      Wear sunscreen to prevent skin cancer.    See your dentist every six months for an exam and cleaning        Menopause and Perimenopause    Hot flashes, night sweats, mood changes, sleep disturbances, changes in the menstrual periods, decreased interest in sex, vaginal dryness or painful sex, and changes in the skin and hair are all common symptoms of perimenopause (the period of time, lasting several years, leading up to menopause). Menopause is defined as 12 months without a menstrual period.     It might be helpful to make a list of your symptoms, and to rank them in order of how much they bother you, or which ones you would fix first if you could. This can help us decide what treatment options might be best for you. Treatment can include one  or more of the following: lifestyle changes like diet and exercise, supplements, prescription medications for hormones, and other prescriptions that are nonhormonal. We will discuss these in more detail after your test results (if you are having lab work done) are available, when you come back. Some tests that might be done for women in perimenopause include blood work for hormone levels and to check the thyroid or ultrasound or biopsy of the lining of the uterus (if you are having abnormal bleeding).                  Follow-ups after your visit        Your next 10 appointments already scheduled     Jan 22, 2018  1:00 PM CST   XR LUMBAR SPINE 2/3 VIEWS with [id number]   [facility] ([facility])    [address]  [address]  [facility] [address]   [phone]           Please bring a list of your current medicines to your exam. (Include vitamins, minerals and over-thecounter medicines.) Leave your valuables at home.  Tell your doctor if there is a chance you may be pregnant.  You do not need to do anything special for this exam.            Jan 22, 2018  1:40 PM CST   Return Visit with Marleni Singer NP   [facility] ([facility])    [address]  [facility] [id number] [phone]              Who to contact     If you have questions or need follow up information about today's clinic visit or your schedule please contact [facility] directly at [phone].  Normal or non-critical lab and imaging results will be communicated to you by MyChart, letter or phone within 4 business days after the clinic has received the results. If you do not hear from us within 7 days, please contact the clinic through MyChart or phone. If you have a critical or abnormal lab result, we will notify you by phone as soon as possible.  Submit refill requests through [employer] or call your pharmacy and they will  "forward the refill request to us. Please allow 3 business days for your refill to be completed.          Additional Information About Your Visit        [employer] Information     [employer] gives you secure access to your electronic health record. If you see a primary care provider, you can also send messages to your care team and make appointments. If you have questions, please call your primary care clinic.  If you do not have a primary care provider, please call [phone] and they will assist you.        Care EveryWhere ID     This is your Care EveryWhere ID. This could be used by other organizations to access your [facility] medical records  [id number]        Your Vitals Were     Pulse Height BMI (Body Mass Index)             80 5' 5\" (1.651 m) 23.35 kg/m2          Blood Pressure from Last 3 Encounters:   01/17/18 118/72   11/07/17 110/70   11/01/17 137/70    Weight from Last 3 Encounters:   01/17/18 140 lb 4.8 oz (63.6 kg)   11/07/17 138 lb 1.6 oz (62.6 kg)   11/01/17 143 lb 11.2 oz (65.2 kg)              Today, you had the following     No orders found for display         Today's Medication Changes          These changes are accurate as of: 1/17/18  2:43 PM.  If you have any questions, ask your nurse or doctor.               Start taking these medicines.        Dose/Directions    estradiol 10 MCG Tabs vaginal tablet   Commonly known as:  VAGIFEM   Used for:  Vaginal atrophy   Started by:  Chelsea Restrepo MD        Dose:  10 mcg   Start taking on:  1/18/2018   Place 1 tablet (10 mcg) vaginally twice a week Take daily for 2 weeks, then twice weekly for 3 months   Quantity:  18 tablet   Refills:  11       nitroFURantoin (macrocrystal-monohydrate) 100 MG capsule   Commonly known as:  MACROBID   Used for:  Recurrent UTI   Started by:  Chelsea Restrepo MD        Dose:  100 mg   Take 1 capsule (100 mg) by mouth once as needed Take once after intercourse   Quantity:  10 capsule   Refills:  3            Where to get your " medicines      These medications were sent to [facility] [address] [facility] - [place], [place] - [address]  [address], [facility] [address]     Phone:  [phone]     estradiol 10 MCG Tabs vaginal tablet    nitroFURantoin (macrocrystal-monohydrate) 100 MG capsule                Primary Care Provider Office Phone # Fax #    Juan F Smallwood [phone]       [address]  [facility] [address]        Equal Access to Services     ELAINE [facility] HospitalBILLY : Hadii aad ku hadasho Soomaali, waaxda luqadaha, qaybta kaalmada adeegyada, waxay idiin hayaan adeeg kharash lamaria fernanda . So [facility] [phone].    ATENCIÓN: Si habla español, tiene a simpson disposición servicios gratuitos de asistencia lingüística. [facility] [phone].    We comply with applicable federal civil rights laws and [place] laws. We do not discriminate on the basis of race, color, national origin, age, disability, sex, sexual orientation, or gender identity.            Thank you!     Thank you for choosing [facility]  for your care. Our goal is always to provide you with excellent care. Hearing back from our patients is one way we can continue to improve our services. Please take a few minutes to complete the written survey that you may receive in the mail after your visit with us. Thank you!             Your Updated Medication List - Protect others around you: Learn how to safely use, store and throw away your medicines at www.disposemymeds.org.          This list is accurate as of: 1/17/18  2:43 PM.  Always use your most recent med list.                   Brand Name Dispense Instructions for use Diagnosis    ALPRAZolam 0.25 MG tablet    XANAX    30 tablet    TAKE 1 TABLET BY MOUTH 3 TIMES DAILY AS NEEDED FOR ANXIETY    Anxiety       amLODIPine 2.5 MG tablet    NORVASC    30 tablet    Take 1 tablet (2.5 mg) by mouth daily    Hypertension goal BP (blood pressure) < 140/80       atenolol 50 MG tablet    TENORMIN    180  tablet    TAKE 1 TABLET (50 MG) BY MOUTH 2 TIMES DAILY    Essential hypertension, benign, Palpitations       estradiol 10 MCG Tabs vaginal tablet   Start taking on:  1/18/2018    VAGIFEM    18 tablet    Place 1 tablet (10 mcg) vaginally twice a week Take daily for 2 weeks, then twice weekly for 3 months    Vaginal atrophy       latanoprost 0.005 % ophthalmic solution    XALATAN     Place 1 drop into both eyes At Bedtime        nitroFURantoin (macrocrystal-monohydrate) 100 MG capsule    MACROBID    10 capsule    Take 1 capsule (100 mg) by mouth once as needed Take once after intercourse    Recurrent UTI       OMEPRAZOLE PO      Take 20 mg by mouth every morning        traZODone 100 MG tablet    DESYREL    90 tablet    TAKE 1 TABLET BY MOUTH NIGHTLY AS NEEDED FOR SLEEP    Primary insomnia

## 2018-01-25 ENCOUNTER — RADIANT APPOINTMENT (OUTPATIENT)
Dept: GENERAL RADIOLOGY | Facility: CLINIC | Age: 63
End: 2018-01-25
Attending: NURSE PRACTITIONER
Payer: COMMERCIAL

## 2018-01-25 ENCOUNTER — OFFICE VISIT (OUTPATIENT)
Dept: NEUROSURGERY | Facility: CLINIC | Age: 63
End: 2018-01-25
Attending: NURSE PRACTITIONER
Payer: COMMERCIAL

## 2018-01-25 VITALS
HEART RATE: 60 BPM | TEMPERATURE: 97 F | WEIGHT: 140 LBS | DIASTOLIC BLOOD PRESSURE: 80 MMHG | OXYGEN SATURATION: 100 % | SYSTOLIC BLOOD PRESSURE: 134 MMHG | HEIGHT: 65 IN | BODY MASS INDEX: 23.32 KG/M2

## 2018-01-25 DIAGNOSIS — Z98.1 STATUS POST LUMBAR SPINAL FUSION: Primary | ICD-10-CM

## 2018-01-25 DIAGNOSIS — Z98.1 STATUS POST LUMBAR SPINAL FUSION: ICD-10-CM

## 2018-01-25 PROCEDURE — 72100 X-RAY EXAM L-S SPINE 2/3 VWS: CPT

## 2018-01-25 PROCEDURE — G0463 HOSPITAL OUTPT CLINIC VISIT: HCPCS | Performed by: NURSE PRACTITIONER

## 2018-01-25 PROCEDURE — 99213 OFFICE O/P EST LOW 20 MIN: CPT | Performed by: NURSE PRACTITIONER

## 2018-01-25 ASSESSMENT — PAIN SCALES - GENERAL: PAINLEVEL: NO PAIN (1)

## 2018-01-25 NOTE — PROGRESS NOTES
[facility]  Neurosurgery followup:    HPI: Ms. Ralph is a 62 year old female that returns almost 6 months post  L4-5 interbody fusion.  She returns today reporting that she is pain free.  She states that prior to surgery she use to cry due to pain with getting out of bed.  She now notes no pain. She does have some stiffness at times as well.       Exam:  Constitutional:  Alert, well nourished, NAD.  HEENT: Normocephalic, atraumatic.   Pulm:  Without shortness of breath   CV:  No pitting edema of BLE.      Neurological:  Awake  Alert  Oriented x 3  Motor exam:        IP Q DF PF EHL  R   5  5   5   5    5  L   5  5   5   5    5     Able to spontaneously move L/E bilaterally  Sensation intact throughout all L/E dermatomes       Imaging: lumbar xray shows fusion intact    A/P: Ms. Ralph is a 62 year old female that returns almost 6 months post  L4-5 interbody fusion.  She returns today reporting that she is pain free.  She states that prior to surgery she use to cry due to pain with getting out of bed.  She now notes no pain. She does have some stiffness at times as well. We will have her continue her activity and return in 6 months.     Patient Instructions   - Continue activity   - followup in 6 months with xray prior   - Call the clinic at [phone] for increased pain or any other questions and concerns.         [facility]  [facility]  [address]  [address] [place] [address]    Tel [phone]  Pager [phone]

## 2018-01-25 NOTE — MR AVS SNAPSHOT
After Visit Summary   1/25/2018    Denise Ralph    MRN: [id number]           Patient Information     Date Of Birth          1955        Visit Information        Provider Department      1/25/2018 3:00 PM Pau Mendenhall APRN CNP [facility]        Today's Diagnoses     Status post lumbar spinal fusion    -  1      Care Instructions    - Continue activity   - followup in 6 months with xray prior   - Call the clinic at [phone] for increased pain or any other questions and concerns.              Follow-ups after your visit        Your next 10 appointments already scheduled     Jan 25, 2018  2:40 PM CST   (Arrive by 2:25 PM)   XR LUMBAR SPINE 2/3 VIEWS with [id number]   [facility] ([facility])    [address] [facility]  [address] [place] [address]   [phone]           Please bring a list of your current medicines to your exam. (Include vitamins, minerals and over-thecounter medicines.) Leave your valuables at home.  Tell your doctor if there is a chance you may be pregnant.  You do not need to do anything special for this exam.            Jan 25, 2018  3:00 PM CST   Return Visit with IRENE Loza CNP   [facility] ([facility])    [address] [address] [id number] [phone]              Future tests that were ordered for you today     Open Future Orders        Priority Expected Expires Ordered    XR Lumbar Spine 2/3 Views Routine 7/25/2018 1/25/2019 1/25/2018            Who to contact     If you have questions or need follow up information about today's clinic visit or your schedule please contact [facility] directly at [phone].  Normal or non-critical lab and imaging results will be communicated to you by MyChart, letter or phone within 4 business days after the clinic has received the results. If you do not hear from us  "within 7 days, please contact the clinic through ScribbleLive or phone. If you have a critical or abnormal lab result, we will notify you by phone as soon as possible.  Submit refill requests through ScribbleLive or call your pharmacy and they will forward the refill request to us. Please allow 3 business days for your refill to be completed.          Additional Information About Your Visit        Regaalohar[employer]ola Books Information     ScribbleLive gives you secure access to your electronic health record. If you see a primary care provider, you can also send messages to your care team and make appointments. If you have questions, please call your primary care clinic.  If you do not have a primary care provider, please call [phone] and they will assist you.        Care EveryWhere ID     This is your Care EveryWhere ID. This could be used by other organizations to access your [place] medical records  [id number]        Your Vitals Were     Pulse Temperature Height Pulse Oximetry BMI (Body Mass Index)       60 97  F (36.1  C) 5' 5\" (1.651 m) 100% 23.3 kg/m2        Blood Pressure from Last 3 Encounters:   01/25/18 134/80   01/17/18 118/72   11/07/17 110/70    Weight from Last 3 Encounters:   01/25/18 140 lb (63.5 kg)   01/17/18 140 lb 4.8 oz (63.6 kg)   11/07/17 138 lb 1.6 oz (62.6 kg)               Primary Care Provider Office Phone # Fax #    Juan F Smallwood [phone]       [address] [facility] [address]        Equal Access to Services     [facility] : Hadii aad ku hadasho Soomaali, waaxda luqadaha, qaybta kaalmada adeegyada, rolan godwin . So [facility] [phone].    ATENCIÓN: Si habla español, tiene a simpson disposición servicios gratuitos de asistencia lingüística. Lleloy al [phone].    We comply with applicable federal civil rights laws and [place] laws. We do not discriminate on the basis of race, color, national origin, age, disability, sex, sexual orientation, or gender " identity.            Thank you!     Thank you for choosing [facility]  for your care. Our goal is always to provide you with excellent care. Hearing back from our patients is one way we can continue to improve our services. Please take a few minutes to complete the written survey that you may receive in the mail after your visit with us. Thank you!             Your Updated Medication List - Protect others around you: Learn how to safely use, store and throw away your medicines at www.disposemymeds.org.          This list is accurate as of 1/25/18  2:22 PM.  Always use your most recent med list.                   Brand Name Dispense Instructions for use Diagnosis    ALPRAZolam 0.25 MG tablet    XANAX    30 tablet    TAKE 1 TABLET BY MOUTH 3 TIMES DAILY AS NEEDED FOR ANXIETY    Anxiety       amLODIPine 2.5 MG tablet    NORVASC    30 tablet    Take 1 tablet (2.5 mg) by mouth daily    Hypertension goal BP (blood pressure) < 140/80       atenolol 50 MG tablet    TENORMIN    180 tablet    TAKE 1 TABLET (50 MG) BY MOUTH 2 TIMES DAILY    Essential hypertension, benign, Palpitations       estradiol 10 MCG Tabs vaginal tablet    VAGIFEM    18 tablet    Place 1 tablet (10 mcg) vaginally twice a week Take daily for 2 weeks, then twice weekly for 3 months    Vaginal atrophy       latanoprost 0.005 % ophthalmic solution    XALATAN     Place 1 drop into both eyes At Bedtime        nitroFURantoin (macrocrystal-monohydrate) 100 MG capsule    MACROBID    10 capsule    Take 1 capsule (100 mg) by mouth once as needed Take once after intercourse    Recurrent UTI       OMEPRAZOLE PO      Take 20 mg by mouth every morning        traZODone 100 MG tablet    DESYREL    90 tablet    TAKE 1 TABLET BY MOUTH NIGHTLY AS NEEDED FOR SLEEP    Primary insomnia

## 2018-01-25 NOTE — NURSING NOTE
"Denise Ralph is a 63 year old female who presents for:  Chief Complaint   Patient presents with     Neurologic Problem     6 month follow up, lumbar fusion 8/3/17        Initial Vitals:  /80 (BP Location: Right arm, Patient Position: Chair, Cuff Size: Adult Regular)  Pulse 60  Temp 97  F (36.1  C)  Ht 5' 5\" (1.651 m)  Wt 140 lb (63.5 kg)  SpO2 100%  BMI 23.3 kg/m2 Estimated body mass index is 23.3 kg/(m^2) as calculated from the following:    Height as of this encounter: 5' 5\" (1.651 m).    Weight as of this encounter: 140 lb (63.5 kg).. Body surface area is 1.71 meters squared. BP completed using cuff size: regular  No Pain (1)    Do you feel safe in your environment?  Yes  Do you need any refills today? No    Nursing Comments: 6 month follow up, lumbar fusion 8/3/17.  Patient rates low back pain today as 1      5 min. nursing intake time  Litzy Kahn CMA      Discharge plan:     - Continue activity   - followup in 6 months with xray prior   - Call the clinic at [phone] for increased pain or any other questions and concerns.    2 min. nursing discharge time  Litzy Kahn CMA    "

## 2018-01-25 NOTE — PATIENT INSTRUCTIONS
- Continue activity   - followup in 6 months with xray prior   - Call the clinic at [phone] for increased pain or any other questions and concerns.

## 2018-02-03 DIAGNOSIS — R00.2 PALPITATIONS: ICD-10-CM

## 2018-02-03 DIAGNOSIS — I10 ESSENTIAL HYPERTENSION, BENIGN: ICD-10-CM

## 2018-02-06 DIAGNOSIS — F41.9 ANXIETY: ICD-10-CM

## 2018-02-06 RX ORDER — ATENOLOL 50 MG/1
TABLET ORAL
Qty: 180 TABLET | Refills: 0 | Status: SHIPPED | OUTPATIENT
Start: 2018-02-06 | End: 2018-05-07

## 2018-02-06 NOTE — TELEPHONE ENCOUNTER
"Requested Prescriptions   Pending Prescriptions Disp Refills     atenolol (TENORMIN) 50 MG tablet [Pharmacy Med Name: ATENOLOL 50 MG TABLET] 180 tablet 0     Sig: TAKE 1 TABLET (50 MG) BY MOUTH 2 TIMES DAILY    Beta-Blockers Protocol Passed    2/3/2018 11:06 AM       Passed - Blood pressure under 140/90    BP Readings from Last 3 Encounters:   01/25/18 134/80   01/17/18 118/72   11/07/17 110/70                Passed - Patient is age 6 or older       Passed - Recent or future visit with authorizing provider's specialty    Patient had office visit in the last year or has a visit in the next 30 days with authorizing provider.  See \"Patient Info\" tab in inbasket, or \"Choose Columns\" in Meds & Orders section of the refill encounter.             Prescription approved per [facility] Refill Protocol.    "

## 2018-02-07 RX ORDER — ALPRAZOLAM 0.25 MG
TABLET ORAL
Qty: 30 TABLET | Refills: 0 | Status: SHIPPED | OUTPATIENT
Start: 2018-02-07 | End: 2018-03-01

## 2018-02-07 NOTE — TELEPHONE ENCOUNTER
Alprazolam      Last Written Prescription Date:  1-12-18  Last Fill Quantity: 30,   # refills: 0  Last Office Visit: 11-7-17  Future Office visit:       Routing refill request to provider for review/approval because:  Drug not on the [facility], P or [facility] refill protocol or controlled substance    Signed CSA form in chart.    [place] reviewed: access not granted by provider.    [place]  [email or web address]

## 2018-03-01 DIAGNOSIS — F41.9 ANXIETY: ICD-10-CM

## 2018-03-01 NOTE — TELEPHONE ENCOUNTER
Xanax      Last Written Prescription Date:  2-7-18  Last Fill Quantity: 30,   # refills: 0  Last Office Visit: 11-7-17  Future Office visit:       Routing refill request to provider for review/approval because:  Drug not on the [facility], P or [facility] refill protocol or controlled substance    Signed CSA form in chart.    [place] reviewed: access not granted by provider.    [place]:  [email or web address]    Please advise, thanks.

## 2018-03-02 RX ORDER — ALPRAZOLAM 0.25 MG
TABLET ORAL
Qty: 30 TABLET | Refills: 0 | Status: SHIPPED | OUTPATIENT
Start: 2018-03-02 | End: 2018-04-01

## 2018-04-01 DIAGNOSIS — F41.9 ANXIETY: ICD-10-CM

## 2018-04-02 RX ORDER — ALPRAZOLAM 0.25 MG
TABLET ORAL
Qty: 30 TABLET | Refills: 0 | Status: SHIPPED | OUTPATIENT
Start: 2018-04-02 | End: 2018-04-28

## 2018-04-24 DIAGNOSIS — I10 HYPERTENSION GOAL BP (BLOOD PRESSURE) < 140/80: ICD-10-CM

## 2018-04-24 RX ORDER — AMLODIPINE BESYLATE 2.5 MG/1
TABLET ORAL
Qty: 90 TABLET | Refills: 0 | Status: SHIPPED | OUTPATIENT
Start: 2018-04-24 | End: 2018-07-19

## 2018-04-24 NOTE — TELEPHONE ENCOUNTER
"Requested Prescriptions   Pending Prescriptions Disp Refills     amLODIPine (NORVASC) 2.5 MG tablet [Pharmacy Med Name: AMLODIPINE BESYLATE 2.5 MG TAB] 30 tablet 5     Sig: TAKE 1 TABLET (2.5 MG) BY MOUTH DAILY    Calcium Channel Blockers Protocol  Passed    4/24/2018  1:18 AM       Passed - Blood pressure under 140/90 in past 12 months    BP Readings from Last 3 Encounters:   01/25/18 134/80   01/17/18 118/72   11/07/17 110/70                Passed - Recent (12 mo) or future (30 days) visit within the authorizing provider's specialty    Patient had office visit in the last 12 months or has a visit in the next 30 days with authorizing provider or within the authorizing provider's specialty.  See \"Patient Info\" tab in inbasket, or \"Choose Columns\" in Meds & Orders section of the refill encounter.           Passed - Patient is age 18 or older       Passed - No active pregnancy on record       Passed - Normal serum creatinine on file in past 12 months    Recent Labs   Lab Test  11/07/17   1637   CR  0.64            Passed - No positive pregnancy test in past 12 months          "

## 2018-04-27 ENCOUNTER — TELEPHONE (OUTPATIENT)
Dept: INTERNAL MEDICINE | Facility: CLINIC | Age: 63
End: 2018-04-27

## 2018-04-27 NOTE — TELEPHONE ENCOUNTER
Patient informed ok to get Shingrix vaccine,  had Zostavax in 10/07/2014. Advised needs 2 doses 2-6 months apart, second dose administered no greater that 6 months after first dose.

## 2018-04-27 NOTE — TELEPHONE ENCOUNTER
Reason for Call:  Other shot    Detailed comments: Pt is wondering if she should get the shingrix shot.  Pt has had the previous shingles vaccine.    Phone Number Patient can be reached at: Home number on file [phone] (home)    Best Time: Pt would like an answer soon.    Can we leave a detailed message on this number? YES    Call taken on 4/27/2018 at 9:54 AM by Vidya Devi

## 2018-04-27 NOTE — TELEPHONE ENCOUNTER
Reason for Call: Request for an order or referral:    Order or referral being requested: order     Date needed: as soon as possible    Has the patient been seen by the PCP for this problem? NO    Additional comments:     Phone number Patient can be reached at:  Cell number on file:    Telephone Information:   Mobile [phone]       Best Time:  any    Can we leave a detailed message on this number?  YES    Call taken on 4/27/2018 at 11:16 AM by Attila Kendall

## 2018-04-28 DIAGNOSIS — F41.9 ANXIETY: ICD-10-CM

## 2018-04-30 ENCOUNTER — ALLIED HEALTH/NURSE VISIT (OUTPATIENT)
Dept: NURSING | Facility: CLINIC | Age: 63
End: 2018-04-30
Payer: COMMERCIAL

## 2018-04-30 DIAGNOSIS — Z23 NEED FOR SHINGLES VACCINE: Primary | ICD-10-CM

## 2018-04-30 DIAGNOSIS — F41.9 ANXIETY: ICD-10-CM

## 2018-04-30 PROCEDURE — 90750 HZV VACC RECOMBINANT IM: CPT

## 2018-04-30 PROCEDURE — 99207 ZZC NO CHARGE NURSE ONLY: CPT

## 2018-04-30 PROCEDURE — 90471 IMMUNIZATION ADMIN: CPT

## 2018-04-30 RX ORDER — ALPRAZOLAM 0.25 MG
TABLET ORAL
Qty: 30 TABLET | Refills: 0 | Status: SHIPPED | OUTPATIENT
Start: 2018-04-30 | End: 2018-06-07

## 2018-04-30 NOTE — NURSING NOTE
Screening Questionnaire for Adult Immunization    Are you sick today?   No   Do you have allergies to medications, food, a vaccine component or latex?   No   Have you ever had a serious reaction after receiving a vaccination?   No   Do you have a long-term health problem with heart disease, lung disease, asthma, kidney disease, metabolic disease (e.g. diabetes), anemia, or other blood disorder?   No   Do you have cancer, leukemia, HIV/AIDS, or any other immune system problem?   No   In the past 3 months, have you taken medications that affect  your immune system, such as prednisone, other steroids, or anticancer drugs; drugs for the treatment of rheumatoid arthritis, Crohn s disease, or psoriasis; or have you had radiation treatments?   No   Have you had a seizure, or a brain or other nervous system problem?   No   During the past year, have you received a transfusion of blood or blood     products, or been given immune (gamma) globulin or antiviral drug?   No   For women: Are you pregnant or is there a chance you could become        pregnant during the next month?   No   Have you received any vaccinations in the past 4 weeks?   No     Immunization questionnaire answers were all negative.        Per orders of Dr. Smallwood, injection of Shingrix given by Sonya Reed. Patient instructed to remain in clinic for 15 minutes afterwards, and to report any adverse reaction to me immediately.       Screening performed by Sonya Reed on 4/30/2018 at 3:54 PM.

## 2018-04-30 NOTE — MR AVS SNAPSHOT
"              After Visit Summary   4/30/2018    Denise Ralph    MRN: [id number]           Patient Information     Date Of Birth          1955        Visit Information        Provider Department      4/30/2018 2:30 PM RI IM NURSE [facility]        Today's Diagnoses     Need for shingles vaccine    -  1       Follow-ups after your visit        Your next 10 appointments already scheduled     May 03, 2018  1:00 PM CDT   (Arrive by 12:45 PM)   MA SCREENING BILATERAL W/ ELIZABETH with [id number]   [facility] ([facility])    [address], [address]  [facility] [place] [address]   [phone]           Three-dimensional (3D) mammograms are available at [place] locations in [place], [facility], [place], [facility], [facility], and [place]. [facility] locations include [place] and [facility] in [place]. Benefits of 3D mammograms include: - Improved rate of cancer detection - Decreases your chance of having to go back for more tests, which means fewer: - \"False-positive\" results (This means that there is an abnormal area but it isn't cancer.) - Invasive testing procedures, such as a biopsy or surgery - Can provide clearer images of the breast if you have dense breast tissue. 3D mammography is an optional exam that anyone can have with a 2D mammogram. It doesn't replace or take the place of a 2D mammogram. 2D mammograms remain an effective screening test for all women.  Not all insurance companies cover the cost of a 3D mammogram. Check with your insurance.            Jun 26, 2018  1:30 PM CDT   Nurse Only with RI IM NURSE   [facility] ([facility])    [address]  [facility] [address]   [phone]              Who to contact     If you have questions or need follow up information about today's clinic visit or your schedule please contact [facility] directly at " [phone].  Normal or non-critical lab and imaging results will be communicated to you by MyChart, letter or phone within 4 business days after the clinic has received the results. If you do not hear from us within 7 days, please contact the clinic through apartumhart or phone. If you have a critical or abnormal lab result, we will notify you by phone as soon as possible.  Submit refill requests through ePropertyData or call your pharmacy and they will forward the refill request to us. Please allow 3 business days for your refill to be completed.          Additional Information About Your Visit        apartumhart Information     ePropertyData gives you secure access to your electronic health record. If you see a primary care provider, you can also send messages to your care team and make appointments. If you have questions, please call your primary care clinic.  If you do not have a primary care provider, please call [phone] and they will assist you.        Care EveryWhere ID     This is your Care EveryWhere ID. This could be used by other organizations to access your [place] medical records  [id number]         Blood Pressure from Last 3 Encounters:   01/25/18 134/80   01/17/18 118/72   11/07/17 110/70    Weight from Last 3 Encounters:   01/25/18 140 lb (63.5 kg)   01/17/18 140 lb 4.8 oz (63.6 kg)   11/07/17 138 lb 1.6 oz (62.6 kg)              We Performed the Following     ZOSTER VACCINE RECOMBINANT ADJUVANTED IM NJX (SHINGRIX)        Primary Care Provider Office Phone # Fax #    Juan F Smallwood [phone]       [facility] [address] [facility] [address]        Equal Access to Services     [facility]: Hadii aad ku hadasho Sojameel, waaxda luqadaha, qaybta kaalmada ezekiel, rolan bull. So [facility] [phone].    ATENCIÓN: Si habla español, tiene a simpson disposición servicios gratuitos de asistencia lingüística. Llame al [phone].    We comply with applicable federal  civil rights laws and [place] laws. We do not discriminate on the basis of race, color, national origin, age, disability, sex, sexual orientation, or gender identity.            Thank you!     Thank you for choosing [facility]  for your care. Our goal is always to provide you with excellent care. Hearing back from our patients is one way we can continue to improve our services. Please take a few minutes to complete the written survey that you may receive in the mail after your visit with us. Thank you!             Your Updated Medication List - Protect others around you: Learn how to safely use, store and throw away your medicines at www.disposemymeds.org.          This list is accurate as of 4/30/18  3:56 PM.  Always use your most recent med list.                   Brand Name Dispense Instructions for use Diagnosis    ALPRAZolam 0.25 MG tablet    XANAX    30 tablet    TAKE 1 TABLET BY MOUTH 3 TIMES A DAY AS NEEDED FOR ANXIETY    Anxiety       amLODIPine 2.5 MG tablet    NORVASC    90 tablet    TAKE 1 TABLET (2.5 MG) BY MOUTH DAILY    Hypertension goal BP (blood pressure) < 140/80       atenolol 50 MG tablet    TENORMIN    180 tablet    TAKE 1 TABLET (50 MG) BY MOUTH 2 TIMES DAILY    Essential hypertension, benign, Palpitations       estradiol 10 MCG Tabs vaginal tablet    VAGIFEM    18 tablet    Place 1 tablet (10 mcg) vaginally twice a week Take daily for 2 weeks, then twice weekly for 3 months    Vaginal atrophy       latanoprost 0.005 % ophthalmic solution    XALATAN     Place 1 drop into both eyes At Bedtime        nitroFURantoin (macrocrystal-monohydrate) 100 MG capsule    MACROBID    10 capsule    Take 1 capsule (100 mg) by mouth once as needed Take once after intercourse    Recurrent UTI       OMEPRAZOLE PO      Take 20 mg by mouth every morning        traZODone 100 MG tablet    DESYREL    90 tablet    TAKE 1 TABLET BY MOUTH NIGHTLY AS NEEDED FOR SLEEP    Primary insomnia

## 2018-05-01 ENCOUNTER — MYC MEDICAL ADVICE (OUTPATIENT)
Dept: INTERNAL MEDICINE | Facility: CLINIC | Age: 63
End: 2018-05-01

## 2018-05-01 DIAGNOSIS — K21.9 GASTROESOPHAGEAL REFLUX DISEASE WITHOUT ESOPHAGITIS: ICD-10-CM

## 2018-05-01 DIAGNOSIS — F41.9 ANXIETY: ICD-10-CM

## 2018-05-01 RX ORDER — ALPRAZOLAM 0.25 MG
TABLET ORAL
Qty: 30 TABLET | Refills: 0 | OUTPATIENT
Start: 2018-05-01

## 2018-05-01 NOTE — TELEPHONE ENCOUNTER
"Per 7/24 hosp pharm dict-Medication reconciliation interview completed by pre-admitting nurse Sunshine Frank, reviewed by pharmacy. Removed omeprazole per pre-admitting nurse note 'patient not taking' & re-entered xalatan to reflect dosing (dose was entered in comments). No further clarifications needed.     Then med was put back on med list on 1/25/18    Sent pt my chart message to clarify       Requested Prescriptions   Pending Prescriptions Disp Refills     omeprazole (PRILOSEC) 20 MG CR capsule [Pharmacy Med Name: OMEPRAZOLE DR 20 MG CAPSULE] 90 capsule 3     Sig: TAKE 1 CAPSULE BY MOUTH ONCE DAILY    PPI Protocol Passed    5/1/2018  1:36 AM       Passed - Not on Clopidogrel (unless Pantoprazole ordered)       Passed - No diagnosis of osteoporosis on record       Passed - Recent (12 mo) or future (30 days) visit within the authorizing provider's specialty    Patient had office visit in the last 12 months or has a visit in the next 30 days with authorizing provider or within the authorizing provider's specialty.  See \"Patient Info\" tab in inbasket, or \"Choose Columns\" in Meds & Orders section of the refill encounter.           Passed - Patient is age 18 or older       Passed - No active pregnacy on record       Passed - No positive pregnancy test in past 12 months          "

## 2018-05-01 NOTE — TELEPHONE ENCOUNTER
See 5/1 my chart-Pt still taking med    Hi Lisa,  That is weird it dropped off!    Yes I am still taking 1 Omeprazole each morning.   20mg capsule

## 2018-05-03 ENCOUNTER — HOSPITAL ENCOUNTER (OUTPATIENT)
Dept: MAMMOGRAPHY | Facility: CLINIC | Age: 63
Discharge: HOME | End: 2018-05-03
Attending: OBSTETRICS & GYNECOLOGY | Admitting: OBSTETRICS & GYNECOLOGY
Payer: COMMERCIAL

## 2018-05-03 DIAGNOSIS — Z12.31 VISIT FOR SCREENING MAMMOGRAM: ICD-10-CM

## 2018-05-03 PROCEDURE — 77063 BREAST TOMOSYNTHESIS BI: CPT

## 2018-05-07 DIAGNOSIS — R00.2 PALPITATIONS: ICD-10-CM

## 2018-05-07 DIAGNOSIS — I10 ESSENTIAL HYPERTENSION, BENIGN: ICD-10-CM

## 2018-05-08 RX ORDER — ATENOLOL 50 MG/1
TABLET ORAL
Qty: 180 TABLET | Refills: 0 | Status: SHIPPED | OUTPATIENT
Start: 2018-05-08 | End: 2018-08-08

## 2018-05-08 NOTE — TELEPHONE ENCOUNTER
"      Requested Prescriptions   Pending Prescriptions Disp Refills     atenolol (TENORMIN) 50 MG tablet [Pharmacy Med Name: ATENOLOL 50 MG TABLET] 180 tablet 0     Sig: TAKE 1 TABLET (50 MG) BY MOUTH 2 TIMES DAILY    Beta-Blockers Protocol Passed    5/7/2018  1:31 AM       Passed - Blood pressure under 140/90 in past 12 months    BP Readings from Last 3 Encounters:   01/25/18 134/80   01/17/18 118/72   11/07/17 110/70                Passed - Patient is age 6 or older       Passed - Recent (12 mo) or future (30 days) visit within the authorizing provider's specialty    Patient had office visit in the last 12 months or has a visit in the next 30 days with authorizing provider or within the authorizing provider's specialty.  See \"Patient Info\" tab in inbasket, or \"Choose Columns\" in Meds & Orders section of the refill encounter.              "

## 2018-05-26 ENCOUNTER — OFFICE VISIT (OUTPATIENT)
Dept: URGENT CARE | Facility: URGENT CARE | Age: 63
End: 2018-05-26
Payer: COMMERCIAL

## 2018-05-26 VITALS
SYSTOLIC BLOOD PRESSURE: 118 MMHG | BODY MASS INDEX: 23.3 KG/M2 | HEART RATE: 66 BPM | WEIGHT: 140 LBS | TEMPERATURE: 97.7 F | OXYGEN SATURATION: 98 % | DIASTOLIC BLOOD PRESSURE: 68 MMHG

## 2018-05-26 DIAGNOSIS — M62.838 SPASM OF MUSCLE: Primary | ICD-10-CM

## 2018-05-26 PROCEDURE — 99213 OFFICE O/P EST LOW 20 MIN: CPT | Performed by: PHYSICIAN ASSISTANT

## 2018-05-26 NOTE — PROGRESS NOTES
Radiology Post-Procedure Note    Pre Op Diagnosis: Suspected NPH    Post Op Diagnosis: Same    Procedure: lumbar puncture    Procedure performed by:Dr. Rafa Santiago, and supervised by Dr. Naqvi      Written Informed Consent Obtained: Yes    Specimen Removed: 5 mL CSF    Estimated Blood Loss: Minimal    Findings: Following written informed consent and sterile prep and drape, a 22 gauge spinal needle was inserted at L3 - L4 intralaminar space under fluoroscopic surveillance.  Opening pressure was measured at 20 mmH2O. 5 mL clear CSF was removed and specimen was sent to the lab for further analysis. Slight blood tinge was noted in catheter tubing after removal of 5 mL CSF. Stylet was then replaced, needle removed and hemostasis achieved. Pt tolerated procedure well without complications.  Full report to follow. Pt. Instructed on post procedure care including but not limited to signs of infection, bleeding, headaches, and follow up with ordering physician.        Rafa Santiago MD  PGY-II  Dept of Radiology   Pager: [phone]     SUBJECTIVE:   Denise Ralph is a 63 year old female presenting with a chief complaint of   Chief Complaint   Patient presents with     Urgent Care     Jaw Pain     right side of jaw for 3 days having spasms        She is an established patient of [facility].    Patient presenting to urgent care with complaint of muscle spasm right mandible for the past 3 days.  Denies any pain.  Any numbness or tingling over the right side of her face or upper or lower extremities.  Denies any URI symptoms.  No treatment tried prior to arrival.    Review of Systems   Constitutional: Negative for chills and fever.   HENT: Negative for sore throat.    Musculoskeletal:        Muscle spasm face       Past Medical History:   Diagnosis Date     Anxiety     Gets panic attacks     Depressive disorder, not elsewhere classified      Essential hypertension, benign     No cardiologist     Irritable bowel syndrome     has had neg colonoscopy & EGD w/u     Lumbar degenerative disc disease     Had PT, traction, no surgery     Other chronic pain     JOint pain for many years.     PONV (postoperative nausea and vomiting)      Sciatica 3/06    R thigh;  MRI = R L2-3 disc      Unspecified glaucoma(365.9)      Family History   Problem Relation Age of Onset     Cardiovascular Mother      / mi     Breast Cancer Mother      diagnosed age 60's     Respiratory Father      pulmonary fibrosis.     Cardiovascular Brother       of MI age 34 2nd to Cocaine Use     Cancer - colorectal No family hx of      Current Outpatient Prescriptions   Medication Sig Dispense Refill     ALPRAZolam (XANAX) 0.25 MG tablet TAKE 1 TABLET BY MOUTH 3 TIMES A DAY AS NEEDED FOR ANXIETY 30 tablet 0     amLODIPine (NORVASC) 2.5 MG tablet TAKE 1 TABLET (2.5 MG) BY MOUTH DAILY 90 tablet 0     atenolol (TENORMIN) 50 MG tablet TAKE 1 TABLET (50 MG) BY MOUTH 2 TIMES DAILY 180 tablet 0     estradiol (VAGIFEM) 10 MCG TABS vaginal tablet Place 1 tablet (10 mcg) vaginally twice a  week Take daily for 2 weeks, then twice weekly for 3 months 18 tablet 11     latanoprost (XALATAN) 0.005 % ophthalmic solution Place 1 drop into both eyes At Bedtime       nitroFURantoin, macrocrystal-monohydrate, (MACROBID) 100 MG capsule Take 1 capsule (100 mg) by mouth once as needed Take once after intercourse 10 capsule 3     omeprazole (PRILOSEC) 20 MG CR capsule TAKE 1 CAPSULE BY MOUTH ONCE DAILY 90 capsule 1     OMEPRAZOLE PO Take 20 mg by mouth every morning       traZODone (DESYREL) 100 MG tablet TAKE 1 TABLET BY MOUTH NIGHTLY AS NEEDED FOR SLEEP 90 tablet 1     Social History   Substance Use Topics     Smoking status: Former Smoker     Packs/day: 0.50     Years: 25.00     Quit date: 10/1/1997     Smokeless tobacco: Never Used      Comment: quit 1997     Alcohol use 1.0 oz/week     2 Glasses of wine per week      Comment: 2 glasses of wine daily       OBJECTIVE  /68  Pulse 66  Temp 97.7  F (36.5  C) (Oral)  Wt 140 lb (63.5 kg)  SpO2 98%  BMI 23.3 kg/m2    Physical Exam   General appearance: 63-year-old female in no acute distress   Face: No lesions noted over the right mandible, no erythema, mild muscle spasms reproduced with light percussion  ENT: tympanic membranes are clear bilaterally, throat is moist and pink   Neck is supple without lymphadenopathy   Lungs are clear to auscultation bilaterally   Chest with normal heart tones S1-S2:     Labs:  No results found for this or any previous visit (from the past 24 hour(s)).        ASSESSMENT:      ICD-10-CM    1. Spasm of muscle M62.838         Medical Decision Making:    Differential Diagnosis:  Muscle spasm, TMJ    Serious Comorbid Conditions:  none    PLAN:  Muscle spasm face: Recommend warm washcloth application a couple times daily.  Gentle massage.  Follow-up if any worsening symptoms.  Patient understands and agrees with the plan.    Followup:    If not improving or if condition worsens, follow up with your Primary Care Provider

## 2018-05-30 ASSESSMENT — ENCOUNTER SYMPTOMS
FEVER: 0
SORE THROAT: 0
CHILLS: 0

## 2018-05-31 ENCOUNTER — OFFICE VISIT (OUTPATIENT)
Dept: URGENT CARE | Facility: URGENT CARE | Age: 63
End: 2018-05-31
Payer: COMMERCIAL

## 2018-05-31 VITALS
HEART RATE: 64 BPM | SYSTOLIC BLOOD PRESSURE: 122 MMHG | DIASTOLIC BLOOD PRESSURE: 74 MMHG | OXYGEN SATURATION: 98 % | WEIGHT: 140 LBS | TEMPERATURE: 98.3 F | BODY MASS INDEX: 23.3 KG/M2

## 2018-05-31 DIAGNOSIS — R05.9 COUGH: Primary | ICD-10-CM

## 2018-05-31 PROCEDURE — 99213 OFFICE O/P EST LOW 20 MIN: CPT | Performed by: FAMILY MEDICINE

## 2018-05-31 RX ORDER — CEFDINIR 300 MG/1
300 CAPSULE ORAL 2 TIMES DAILY
Qty: 20 CAPSULE | Refills: 0 | Status: SHIPPED | OUTPATIENT
Start: 2018-05-31 | End: 2018-12-04

## 2018-05-31 RX ORDER — CODEINE PHOSPHATE AND GUAIFENESIN 10; 100 MG/5ML; MG/5ML
1 SOLUTION ORAL EVERY 4 HOURS PRN
Qty: 120 ML | Refills: 0 | Status: SHIPPED | OUTPATIENT
Start: 2018-05-31 | End: 2018-12-04

## 2018-05-31 NOTE — MR AVS SNAPSHOT
After Visit Summary   5/31/2018    Denise Ralph    MRN: [id number]           Patient Information     Date Of Birth          1955        Visit Information        Provider Department      5/31/2018 5:05 PM Beka Castro MD [facility]        Today's Diagnoses     Cough    -  1       Follow-ups after your visit        Your next 10 appointments already scheduled     Jun 18, 2018  1:00 PM CDT   XR LUMBAR SPINE 2/3 VIEWS with [id number]   [facility] ([facility])    [address]  [address]  [facility] [address]   [phone]           Please bring a list of your current medicines to your exam. (Include vitamins, minerals and over-thecounter medicines.) Leave your valuables at home.  Tell your doctor if there is a chance you may be pregnant.  You do not need to do anything special for this exam.            Jun 18, 2018  1:40 PM CDT   Return Visit with IRENE Loza CNP   [facility] ([facility])    [address]  [facility] [id number] [phone]            Jun 26, 2018  1:30 PM CDT   Nurse Only with [facility] NURSE   [facility] ([facility])    [address]  [facility] [id number] [phone]              Who to contact     If you have questions or need follow up information about today's clinic visit or your schedule please contact [facility] directly at [phone].  Normal or non-critical lab and imaging results will be communicated to you by MyChart, letter or phone within 4 business days after the clinic has received the results. If you do not hear from us within 7 days, please contact the clinic through MyChart or phone. If you have a critical or abnormal lab result, we will notify you by phone as soon as possible.  Submit refill requests through anfix or call your pharmacy and they will  forward the refill request to us. Please allow 3 business days for your refill to be completed.          Additional Information About Your Visit        Instapagehart Information     [employer] gives you secure access to your electronic health record. If you see a primary care provider, you can also send messages to your care team and make appointments. If you have questions, please call your primary care clinic.  If you do not have a primary care provider, please call [phone] and they will assist you.        Care EveryWhere ID     This is your Care EveryWhere ID. This could be used by other organizations to access your [facility] medical records  [id number]        Your Vitals Were     Pulse Temperature Pulse Oximetry BMI (Body Mass Index)          64 98.3  F (36.8  C) (Oral) 98% 23.3 kg/m2         Blood Pressure from Last 3 Encounters:   05/31/18 122/74   05/26/18 118/68   01/25/18 134/80    Weight from Last 3 Encounters:   05/31/18 140 lb (63.5 kg)   05/26/18 140 lb (63.5 kg)   01/25/18 140 lb (63.5 kg)              Today, you had the following     No orders found for display         Today's Medication Changes          These changes are accurate as of 5/31/18 11:59 PM.  If you have any questions, ask your nurse or doctor.               Start taking these medicines.        Dose/Directions    cefdinir 300 MG capsule   Commonly known as:  OMNICEF   Used for:  Cough   Started by:  Beka Castro MD        Dose:  300 mg   Take 1 capsule (300 mg) by mouth 2 times daily   Quantity:  20 capsule   Refills:  0       guaiFENesin-codeine 100-10 MG/5ML Soln solution   Commonly known as:  ROBITUSSIN AC   Used for:  Cough   Started by:  Beka Castro MD        Dose:  1 tsp.   Take 5 mLs by mouth every 4 hours as needed for cough   Quantity:  120 mL   Refills:  0            Where to get your medicines      These medications were sent to [facility] [address] [place] [facility] [address] [address] [facility]  [address]    Hours:  Tech issues with their phone system Phone:  [phone]     cefdinir 300 MG capsule         Some of these will need a paper prescription and others can be bought over the counter.  Ask your nurse if you have questions.     Bring a paper prescription for each of these medications     guaiFENesin-codeine 100-10 MG/5ML Soln solution                Primary Care Provider Office Phone # Fax #    Juan F Smallwood [phone]       Arianna BONILLA NICOLLET [facility] [address]        Equal Access to Services     [facility]: Hadii aad ku hadasho Soomaali, waaxda luqadaha, qaybta kaalmada adeegyada, waxay ashleyin hayronan godwin . So [facility] [phone].    ATENCIÓN: Si habla español, tiene a simpson disposición servicios gratuitos de asistencia lingüística. Ll[facility] [phone].    We comply with applicable federal civil rights laws and [place] laws. We do not discriminate on the basis of race, color, national origin, age, disability, sex, sexual orientation, or gender identity.            Thank you!     Thank you for choosing [facility]  for your care. Our goal is always to provide you with excellent care. Hearing back from our patients is one way we can continue to improve our services. Please take a few minutes to complete the written survey that you may receive in the mail after your visit with us. Thank you!             Your Updated Medication List - Protect others around you: Learn how to safely use, store and throw away your medicines at www.disposemymeds.org.          This list is accurate as of 5/31/18 11:59 PM.  Always use your most recent med list.                   Brand Name Dispense Instructions for use Diagnosis    amLODIPine 2.5 MG tablet    NORVASC    90 tablet    TAKE 1 TABLET (2.5 MG) BY MOUTH DAILY    Hypertension goal BP (blood pressure) < 140/80       atenolol 50 MG tablet    TENORMIN    180 tablet    TAKE 1 TABLET (50 MG) BY MOUTH 2 TIMES  DAILY    Essential hypertension, benign, Palpitations       cefdinir 300 MG capsule    OMNICEF    20 capsule    Take 1 capsule (300 mg) by mouth 2 times daily    Cough       estradiol 10 MCG Tabs vaginal tablet    VAGIFEM    18 tablet    Place 1 tablet (10 mcg) vaginally twice a week Take daily for 2 weeks, then twice weekly for 3 months    Vaginal atrophy       guaiFENesin-codeine 100-10 MG/5ML Soln solution    ROBITUSSIN AC    120 mL    Take 5 mLs by mouth every 4 hours as needed for cough    Cough       latanoprost 0.005 % ophthalmic solution    XALATAN     Place 1 drop into both eyes At Bedtime        nitroFURantoin (macrocrystal-monohydrate) 100 MG capsule    MACROBID    10 capsule    Take 1 capsule (100 mg) by mouth once as needed Take once after intercourse    Recurrent UTI       * OMEPRAZOLE PO      Take 20 mg by mouth every morning        * omeprazole 20 MG CR capsule    priLOSEC    90 capsule    TAKE 1 CAPSULE BY MOUTH ONCE DAILY    Gastroesophageal reflux disease without esophagitis       traZODone 100 MG tablet    DESYREL    90 tablet    TAKE 1 TABLET BY MOUTH NIGHTLY AS NEEDED FOR SLEEP    Primary insomnia       * Notice:  This list has 2 medication(s) that are the same as other medications prescribed for you. Read the directions carefully, and ask your doctor or other care provider to review them with you.

## 2018-05-31 NOTE — PROGRESS NOTES
SUBJECTIVE:   Denise Ralph is a 63 year old female presenting with a chief complaint of   Chief Complaint   Patient presents with     Urgent Care     Cough     started Monday, right ear discomfort        She is {UC New/Established:396317} patient of [facility].    { Conditions:829978}    Review of Systems    Past Medical History:   Diagnosis Date     Anxiety     Gets panic attacks     Depressive disorder, not elsewhere classified      Essential hypertension, benign     No cardiologist     Irritable bowel syndrome     has had neg colonoscopy & EGD w/u     Lumbar degenerative disc disease     Had PT, traction, no surgery     Other chronic pain     JOint pain for many years.     PONV (postoperative nausea and vomiting)      Sciatica 3/06    R thigh;  MRI = R L2-3 disc      Unspecified glaucoma(365.9)      Family History   Problem Relation Age of Onset     Cardiovascular Mother      / mi     Breast Cancer Mother      diagnosed age 60's     Respiratory Father      pulmonary fibrosis.     Cardiovascular Brother       of MI age 34 2nd to Cocaine Use     Cancer - colorectal No family hx of      Current Outpatient Prescriptions   Medication Sig Dispense Refill     ALPRAZolam (XANAX) 0.25 MG tablet TAKE 1 TABLET BY MOUTH 3 TIMES A DAY AS NEEDED FOR ANXIETY 30 tablet 0     amLODIPine (NORVASC) 2.5 MG tablet TAKE 1 TABLET (2.5 MG) BY MOUTH DAILY 90 tablet 0     atenolol (TENORMIN) 50 MG tablet TAKE 1 TABLET (50 MG) BY MOUTH 2 TIMES DAILY 180 tablet 0     estradiol (VAGIFEM) 10 MCG TABS vaginal tablet Place 1 tablet (10 mcg) vaginally twice a week Take daily for 2 weeks, then twice weekly for 3 months 18 tablet 11     latanoprost (XALATAN) 0.005 % ophthalmic solution Place 1 drop into both eyes At Bedtime       nitroFURantoin, macrocrystal-monohydrate, (MACROBID) 100 MG capsule Take 1 capsule (100 mg) by mouth once as needed Take once after intercourse 10 capsule 3     omeprazole (PRILOSEC) 20 MG CR capsule TAKE  "1 CAPSULE BY MOUTH ONCE DAILY 90 capsule 1     OMEPRAZOLE PO Take 20 mg by mouth every morning       traZODone (DESYREL) 100 MG tablet TAKE 1 TABLET BY MOUTH NIGHTLY AS NEEDED FOR SLEEP 90 tablet 1     Social History   Substance Use Topics     Smoking status: Former Smoker     Packs/day: 0.50     Years: 25.00     Quit date: 10/1/1997     Smokeless tobacco: Never Used      Comment: quit 1997     Alcohol use 1.0 oz/week     2 Glasses of wine per week      Comment: 2 glasses of wine daily       OBJECTIVE  /74  Pulse 64  Temp 98.3  F (36.8  C) (Oral)  Wt 140 lb (63.5 kg)  SpO2 98%  BMI 23.3 kg/m2    Physical Exam    Labs:  No results found for this or any previous visit (from the past 24 hour(s)).    {XRay was/was not done (Optional):209905}    ASSESSMENT:    No diagnosis found.     Medical Decision Making:    Differential Diagnosis:  { Differential Choices:093384}    Serious Comorbid Conditions:  { Serious Comorbid Conditions:586253}    PLAN:    { Plan Choices:155136}    Followup:    { Followup:500470::\"If not improving or if condition worsens, follow up with your Primary Care Provider\"}    There are no Patient Instructions on file for this visit.      "

## 2018-06-07 DIAGNOSIS — F41.9 ANXIETY: ICD-10-CM

## 2018-06-08 RX ORDER — ALPRAZOLAM 0.25 MG
TABLET ORAL
Qty: 30 TABLET | Refills: 0 | Status: SHIPPED | OUTPATIENT
Start: 2018-06-08 | End: 2018-07-17

## 2018-06-14 ENCOUNTER — TRANSFERRED RECORDS (OUTPATIENT)
Dept: HEALTH INFORMATION MANAGEMENT | Facility: CLINIC | Age: 63
End: 2018-06-14

## 2018-06-15 DIAGNOSIS — F51.01 PRIMARY INSOMNIA: ICD-10-CM

## 2018-06-17 NOTE — PROGRESS NOTES
SUBJECTIVE:  Denise Ralph is a 63 year old female  with any moreshe is had a cough for greater than a week and now complaining of some ear pain.  Cough at times has been productive.    OBJECTIVE:  /74  Pulse 64  Temp 98.3  F (36.8  C) (Oral)  Wt 140 lb (63.5 kg)  SpO2 98%  BMI 23.3 kg/m2  General appearance: mild distress.    Ears: abnormal: R TM erythematous; L TM normal  Nose: purulent rhinorrhea  Oropharynx: mild erythema  Neck: supple and moderate nontender anterior cervical nodes  Lungs: chest clear to IPPA and clear to IPPA    ASSESSMENT:  Otitis Media    PLAN:  1) Antibiotics per [facility] orders.  2) Symptomatic therapy suggested: use acetaminophen, ibuprofen prn.   3) Call or return to clinic prn if these symptoms worsen or fail to improve as anticipated.

## 2018-06-18 ENCOUNTER — OFFICE VISIT (OUTPATIENT)
Dept: NEUROSURGERY | Facility: CLINIC | Age: 63
End: 2018-06-18
Attending: NURSE PRACTITIONER
Payer: COMMERCIAL

## 2018-06-18 ENCOUNTER — RADIANT APPOINTMENT (OUTPATIENT)
Dept: GENERAL RADIOLOGY | Facility: CLINIC | Age: 63
End: 2018-06-18
Attending: NURSE PRACTITIONER
Payer: COMMERCIAL

## 2018-06-18 VITALS
HEIGHT: 65 IN | OXYGEN SATURATION: 97 % | HEART RATE: 53 BPM | BODY MASS INDEX: 23.32 KG/M2 | DIASTOLIC BLOOD PRESSURE: 73 MMHG | SYSTOLIC BLOOD PRESSURE: 113 MMHG | WEIGHT: 140 LBS

## 2018-06-18 DIAGNOSIS — Z98.1 STATUS POST LUMBAR SPINAL FUSION: ICD-10-CM

## 2018-06-18 DIAGNOSIS — Z98.1 STATUS POST LUMBAR SPINAL FUSION: Primary | ICD-10-CM

## 2018-06-18 PROCEDURE — G0463 HOSPITAL OUTPT CLINIC VISIT: HCPCS | Performed by: NURSE PRACTITIONER

## 2018-06-18 PROCEDURE — 99213 OFFICE O/P EST LOW 20 MIN: CPT | Performed by: NURSE PRACTITIONER

## 2018-06-18 PROCEDURE — 72100 X-RAY EXAM L-S SPINE 2/3 VWS: CPT

## 2018-06-18 RX ORDER — TRAZODONE HYDROCHLORIDE 100 MG/1
TABLET ORAL
Qty: 90 TABLET | Refills: 1 | Status: SHIPPED | OUTPATIENT
Start: 2018-06-18 | End: 2018-09-30

## 2018-06-18 ASSESSMENT — PAIN SCALES - GENERAL: PAINLEVEL: NO PAIN (0)

## 2018-06-18 NOTE — TELEPHONE ENCOUNTER
"Requested Prescriptions   Pending Prescriptions Disp Refills     traZODone (DESYREL) 100 MG tablet [Pharmacy Med Name: TRAZODONE 100 MG TABLET] 90 tablet 1     Sig: TAKE 1 TABLET BY MOUTH NIGHTLY AS NEEDED FOR SLEEP    Serotonin Modulators Passed    6/15/2018  3:29 AM       Passed - Recent (12 mo) or future (30 days) visit within the authorizing provider's specialty    Patient had office visit in the last 12 months or has a visit in the next 30 days with authorizing provider or within the authorizing provider's specialty.  See \"Patient Info\" tab in inbasket, or \"Choose Columns\" in Meds & Orders section of the refill encounter.           Passed - Patient is age 18 or older       Passed - No active pregnancy on record       Passed - No positive pregnancy test in past 12 months        Last Written Prescription Date:  12/22/17  Last Fill Quantity: 90,  # refills: 1   Last office visit: 11/7/2017 with prescribing provider:  Yes   Future Office Visit:   Next 5 appointments (look out 90 days)     Jun 18, 2018  1:40 PM CDT   Return Visit with IRENE Loza CNP   [facility] ([facility])    [address]  [facility] [place] [id number] [phone]            Jun 26, 2018  1:30 PM CDT   Nurse Only with [facility] NURSE   [facility] ([facility])    [address]  [facility] [id number] [phone]                 Prescription approved per [facility] Refill Protocol.      "

## 2018-06-18 NOTE — NURSING NOTE
"Denise Ralph is a 63 year old female who presents for:  Chief Complaint   Patient presents with     Neurologic Problem     12 month follow up status post lumbar fusion DOS 08/03/2017        Vitals:    Vitals:    06/18/18 1328   BP: 113/73   BP Location: Right arm   Patient Position: Sitting   Cuff Size: Adult Regular   Pulse: 53   SpO2: 97%   Weight: 140 lb (63.5 kg)   Height: 5' 5\" (1.651 m)       BMI:  Estimated body mass index is 23.3 kg/(m^2) as calculated from the following:    Height as of this encounter: 5' 5\" (1.651 m).    Weight as of this encounter: 140 lb (63.5 kg).    Pain Score:  No Pain (0)      Do you feel safe in your environment?  Yes      Sharifa Quiroga          "

## 2018-06-18 NOTE — PATIENT INSTRUCTIONS
-  Activity as tolerated   - Return as needed.  - Call the clinic at [phone] with anyother questions and concerns.

## 2018-06-18 NOTE — LETTER
6/18/2018         RE: Denise Ralph  [address] [address]        Dear Colleague,    Thank you for referring your patient, Denise Ralph, to the [facility]. Please see a copy of my visit note below.    [facility]  Neurosurgery followup:    HPI: Ms. Ralph is a 62 year old female that returns almost 10 months post  L4-5 interbody fusion.  She returns today reporting that she is pain free. She only notes back soreness with working out.  She is very happy with the results of the surgery.       Exam:  Constitutional:  Alert, well nourished, NAD.  HEENT: Normocephalic, atraumatic.   Pulm:  Without shortness of breath   CV:  No pitting edema of BLE.      Neurological:  Awake  Alert  Oriented x 3  Motor exam:        IP Q DF PF EHL  R   5  5   5   5    5  L   5  5   5   5    5     Able to spontaneously move L/E bilaterally  Sensation intact throughout all L/E dermatomes       Imaging: lumbar xray shows fusion intact     A/P: Ms. Ralph is a 62 year old female that returns almost 10 months post  L4-5 interbody fusion.  She returns today reporting that she is pain free. She only notes back soreness with working out.  She is very happy with the results of the surgery.  We will have her return as needed.      Patient Instructions   -  Activity as tolerated   - Return as needed.  - Call the clinic at [phone] with anyother questions and concerns.                 Pau Mendenhall CNP  [facility]  [address] [address]    Tel [phone]  Pager [phone]        Again, thank you for allowing me to participate in the care of your patient.        Sincerely,        IRENE Loza CNP

## 2018-06-18 NOTE — PROGRESS NOTES
[facility]  Neurosurgery followup:    HPI: Ms. Ralph is a 62 year old female that returns almost 10 months post  L4-5 interbody fusion.  She returns today reporting that she is pain free. She only notes back soreness with working out.  She is very happy with the results of the surgery.       Exam:  Constitutional:  Alert, well nourished, NAD.  HEENT: Normocephalic, atraumatic.   Pulm:  Without shortness of breath   CV:  No pitting edema of BLE.      Neurological:  Awake  Alert  Oriented x 3  Motor exam:        IP Q DF PF EHL  R   5  5   5   5    5  L   5  5   5   5    5     Able to spontaneously move L/E bilaterally  Sensation intact throughout all L/E dermatomes       Imaging: lumbar xray shows fusion intact     A/P: Ms. Ralph is a 62 year old female that returns almost 10 months post  L4-5 interbody fusion.  She returns today reporting that she is pain free. She only notes back soreness with working out.  She is very happy with the results of the surgery.  We will have her return as needed.      Patient Instructions   -  Activity as tolerated   - Return as needed.  - Call the clinic at [phone] with anyother questions and concerns.                 Pau Mendenhall [facility]  [address]  [address] [place] [address]    Tel [phone]  Pager [phone]

## 2018-06-18 NOTE — MR AVS SNAPSHOT
After Visit Summary   6/18/2018    Denise Ralph    MRN: [id number]           Patient Information     Date Of Birth          1955        Visit Information        Provider Department      6/18/2018 1:40 PM Pau Mendenhall APRN CNP [facility]        Care Instructions    -  Activity as tolerated   - Return as needed.  - Call the clinic at [phone] with anyother questions and concerns.              Follow-ups after your visit        Your next 10 appointments already scheduled     Jun 18, 2018  1:40 PM CDT   Return Visit with IRENE Loza CNP   [facility] ([facility])    [address]  [facility] [address] [phone]            Jun 26, 2018  1:30 PM CDT   Nurse Only with RI IM NURSE   [facility]    [address]  [facility] [id number] [phone]              Who to contact     If you have questions or need follow up information about today's clinic visit or your schedule please contact [facility] directly at [phone].  Normal or non-critical lab and imaging results will be communicated to you by Integrated Plasmonicshart, letter or phone within 4 business days after the clinic has received the results. If you do not hear from us within 7 days, please contact the clinic through [employer]asmon[employer] or phone. If you have a critical or abnormal lab result, we will notify you by phone as soon as possible.  Submit refill requests through [employer] or call your pharmacy and they will forward the refill request to us. Please allow 3 business days for your refill to be completed.          Additional Information About Your Visit        MyChart Information     [employer] gives you secure access to your electronic health record. If you see a primary care provider, you can also send messages to your care team and make appointments. If you have  "questions, please call your primary care clinic.  If you do not have a primary care provider, please call [phone] and they will assist you.        Care EveryWhere ID     This is your Care EveryWhere ID. This could be used by other organizations to access your [place] medical records  [id number]        Your Vitals Were     Pulse Height Pulse Oximetry BMI (Body Mass Index)          53 5' 5\" (1.651 m) 97% 23.3 kg/m2         Blood Pressure from Last 3 Encounters:   06/18/18 113/73   05/31/18 122/74   05/26/18 118/68    Weight from Last 3 Encounters:   06/18/18 140 lb (63.5 kg)   05/31/18 140 lb (63.5 kg)   05/26/18 140 lb (63.5 kg)              Today, you had the following     No orders found for display       Primary Care Provider Office Phone # Fax #    Juan F Smallwood [phone]       [address]  [facility] [address]        Equal Access to Services     [facility]: Hadii aad ku hadasho Soomaali, waaxda luqadaha, qaybta kaalmada adeegyada, waxurban jiang hayronan godwin . So [facility] [phone].    ATENCIÓN: Si habla español, tiene a simpson disposición servicios gratuitos de asistencia lingüística. [facility] [phone].    We comply with applicable federal civil rights laws and [place] laws. We do not discriminate on the basis of race, color, national origin, age, disability, sex, sexual orientation, or gender identity.            Thank you!     Thank you for choosing [facility]  for your care. Our goal is always to provide you with excellent care. Hearing back from our patients is one way we can continue to improve our services. Please take a few minutes to complete the written survey that you may receive in the mail after your visit with us. Thank you!             Your Updated Medication List - Protect others around you: Learn how to safely use, store and throw away your medicines at www.disposemymeds.org.          This list is accurate as of 6/18/18 "  1:36 PM.  Always use your most recent med list.                   Brand Name Dispense Instructions for use Diagnosis    ALPRAZolam 0.25 MG tablet    XANAX    30 tablet    TAKE 1 TAB BY MOUTH 3 TIMES DAILY AS NEEDED FOR ANXIETY    Anxiety       amLODIPine 2.5 MG tablet    NORVASC    90 tablet    TAKE 1 TABLET (2.5 MG) BY MOUTH DAILY    Hypertension goal BP (blood pressure) < 140/80       atenolol 50 MG tablet    TENORMIN    180 tablet    TAKE 1 TABLET (50 MG) BY MOUTH 2 TIMES DAILY    Essential hypertension, benign, Palpitations       cefdinir 300 MG capsule    OMNICEF    20 capsule    Take 1 capsule (300 mg) by mouth 2 times daily    Cough       estradiol 10 MCG Tabs vaginal tablet    VAGIFEM    18 tablet    Place 1 tablet (10 mcg) vaginally twice a week Take daily for 2 weeks, then twice weekly for 3 months    Vaginal atrophy       guaiFENesin-codeine 100-10 MG/5ML Soln solution    ROBITUSSIN AC    120 mL    Take 5 mLs by mouth every 4 hours as needed for cough    Cough       latanoprost 0.005 % ophthalmic solution    XALATAN     Place 1 drop into both eyes At Bedtime        nitroFURantoin (macrocrystal-monohydrate) 100 MG capsule    MACROBID    10 capsule    Take 1 capsule (100 mg) by mouth once as needed Take once after intercourse    Recurrent UTI       * OMEPRAZOLE PO      Take 20 mg by mouth every morning        * omeprazole 20 MG CR capsule    priLOSEC    90 capsule    TAKE 1 CAPSULE BY MOUTH ONCE DAILY    Gastroesophageal reflux disease without esophagitis       traZODone 100 MG tablet    DESYREL    90 tablet    TAKE 1 TABLET BY MOUTH NIGHTLY AS NEEDED FOR SLEEP    Primary insomnia       * Notice:  This list has 2 medication(s) that are the same as other medications prescribed for you. Read the directions carefully, and ask your doctor or other care provider to review them with you.

## 2018-06-26 ENCOUNTER — ALLIED HEALTH/NURSE VISIT (OUTPATIENT)
Dept: NURSING | Facility: CLINIC | Age: 63
End: 2018-06-26
Payer: COMMERCIAL

## 2018-06-26 DIAGNOSIS — Z23 ENCOUNTER FOR IMMUNIZATION: Primary | ICD-10-CM

## 2018-06-26 PROCEDURE — 90471 IMMUNIZATION ADMIN: CPT

## 2018-06-26 PROCEDURE — 90750 HZV VACC RECOMBINANT IM: CPT

## 2018-06-26 NOTE — MR AVS SNAPSHOT
After Visit Summary   6/26/2018    Denise Ralph    MRN: [id number]           Patient Information     Date Of Birth          1955        Visit Information        Provider Department      6/26/2018 1:30 PM RI IM NURSE [facility]        Today's Diagnoses     Encounter for immunization    -  1       Follow-ups after your visit        Who to contact     If you have questions or need follow up information about today's clinic visit or your schedule please contact [facility] directly at [phone].  Normal or non-critical lab and imaging results will be communicated to you by [employer]ivehart, letter or phone within 4 business days after the clinic has received the results. If you do not hear from us within 7 days, please contact the clinic through [employer] or phone. If you have a critical or abnormal lab result, we will notify you by phone as soon as possible.  Submit refill requests through [employer] or call your pharmacy and they will forward the refill request to us. Please allow 3 business days for your refill to be completed.          Additional Information About Your Visit        MyChart Information     [employer] gives you secure access to your electronic health record. If you see a primary care provider, you can also send messages to your care team and make appointments. If you have questions, please call your primary care clinic.  If you do not have a primary care provider, please call [phone] and they will assist you.        Care EveryWhere ID     This is your Care EveryWhere ID. This could be used by other organizations to access your [place] medical records  [id number]         Blood Pressure from Last 3 Encounters:   06/18/18 113/73   05/31/18 122/74   05/26/18 118/68    Weight from Last 3 Encounters:   06/18/18 140 lb (63.5 kg)   05/31/18 140 lb (63.5 kg)   05/26/18 140 lb (63.5 kg)              We Performed the Following     ADMIN 1st VACCINE     ZOSTER  VACCINE RECOMBINANT ADJUVANTED IM NJX        Primary Care Provider Office Phone # Fax #    Juan F Smallwood [phone]       [address] [facility] [address]        Equal Access to Services     INEZ THOMPSON : Hadii aad ku hadmishao Soomaali, waaxda luqadaha, qaybta kaalmada adeegyada, waxay idiin hayradhan adeольга jackson laPadminironan bull. So [facility] [place] [phone].    ATENCIÓN: Si habla español, tiene a simpson disposición servicios gratuitos de asistencia lingüística. Llame al [phone].    We comply with applicable federal civil rights laws and [place] laws. We do not discriminate on the basis of race, color, national origin, age, disability, sex, sexual orientation, or gender identity.            Thank you!     Thank you for choosing [facility]  for your care. Our goal is always to provide you with excellent care. Hearing back from our patients is one way we can continue to improve our services. Please take a few minutes to complete the written survey that you may receive in the mail after your visit with us. Thank you!             Your Updated Medication List - Protect others around you: Learn how to safely use, store and throw away your medicines at www.disposemymeds.org.          This list is accurate as of 6/26/18  2:04 PM.  Always use your most recent med list.                   Brand Name Dispense Instructions for use Diagnosis    ALPRAZolam 0.25 MG tablet    XANAX    30 tablet    TAKE 1 TAB BY MOUTH 3 TIMES DAILY AS NEEDED FOR ANXIETY    Anxiety       amLODIPine 2.5 MG tablet    NORVASC    90 tablet    TAKE 1 TABLET (2.5 MG) BY MOUTH DAILY    Hypertension goal BP (blood pressure) < 140/80       atenolol 50 MG tablet    TENORMIN    180 tablet    TAKE 1 TABLET (50 MG) BY MOUTH 2 TIMES DAILY    Essential hypertension, benign, Palpitations       cefdinir 300 MG capsule    OMNICEF    20 capsule    Take 1 capsule (300 mg) by mouth 2 times daily    Cough       estradiol 10 MCG Tabs  vaginal tablet    VAGIFEM    18 tablet    Place 1 tablet (10 mcg) vaginally twice a week Take daily for 2 weeks, then twice weekly for 3 months    Vaginal atrophy       guaiFENesin-codeine 100-10 MG/5ML Soln solution    ROBITUSSIN AC    120 mL    Take 5 mLs by mouth every 4 hours as needed for cough    Cough       latanoprost 0.005 % ophthalmic solution    XALATAN     Place 1 drop into both eyes At Bedtime        nitroFURantoin (macrocrystal-monohydrate) 100 MG capsule    MACROBID    10 capsule    Take 1 capsule (100 mg) by mouth once as needed Take once after intercourse    Recurrent UTI       * OMEPRAZOLE PO      Take 20 mg by mouth every morning        * omeprazole 20 MG CR capsule    priLOSEC    90 capsule    TAKE 1 CAPSULE BY MOUTH ONCE DAILY    Gastroesophageal reflux disease without esophagitis       traZODone 100 MG tablet    DESYREL    90 tablet    TAKE 1 TABLET BY MOUTH NIGHTLY AS NEEDED FOR SLEEP    Primary insomnia       * Notice:  This list has 2 medication(s) that are the same as other medications prescribed for you. Read the directions carefully, and ask your doctor or other care provider to review them with you.

## 2018-06-26 NOTE — NURSING NOTE
"Chief Complaint   Patient presents with     Allied Health Visit     bA     initial There were no vitals taken for this visit. Estimated body mass index is 23.3 kg/(m^2) as calculated from the following:    Height as of 6/18/18: 5' 5\" (1.651 m).    Weight as of 6/18/18: 140 lb (63.5 kg)..  bp completed using cuff size NA (Not Taken)    "

## 2018-07-17 DIAGNOSIS — F41.9 ANXIETY: ICD-10-CM

## 2018-07-18 RX ORDER — ALPRAZOLAM 0.25 MG
TABLET ORAL
Qty: 30 TABLET | Refills: 0 | Status: SHIPPED | OUTPATIENT
Start: 2018-07-18 | End: 2018-08-21

## 2018-07-18 NOTE — TELEPHONE ENCOUNTER
Last Written Prescription Date:  6/8/18  Last Fill Quantity: 30,  # refills: 0   Last office visit: 11/7/2017 with prescribing provider:     KIMBERLY on file

## 2018-07-19 DIAGNOSIS — I10 HYPERTENSION GOAL BP (BLOOD PRESSURE) < 140/80: ICD-10-CM

## 2018-07-19 NOTE — TELEPHONE ENCOUNTER
"Requested Prescriptions   Pending Prescriptions Disp Refills     amLODIPine (NORVASC) 2.5 MG tablet [Pharmacy Med Name: AMLODIPINE BESYLATE 2.5 MG TAB]  Last Written Prescription Date:  4/24/2018  Last Fill Quantity: 90 tablet,  # refills: 0   Last Office Visit: 11/7/2017   Future Office Visit:      90 tablet 0     Sig: TAKE 1 TABLET (2.5 MG) BY MOUTH DAILY    Calcium Channel Blockers Protocol  Passed    7/19/2018  4:05 AM       Passed - Blood pressure under 140/90 in past 12 months    BP Readings from Last 3 Encounters:   06/18/18 113/73   05/31/18 122/74   05/26/18 118/68                Passed - Recent (12 mo) or future (30 days) visit within the authorizing provider's specialty    Patient had office visit in the last 12 months or has a visit in the next 30 days with authorizing provider or within the authorizing provider's specialty.  See \"Patient Info\" tab in inbasket, or \"Choose Columns\" in Meds & Orders section of the refill encounter.           Passed - Patient is age 18 or older       Passed - No active pregnancy on record       Passed - Normal serum creatinine on file in past 12 months    Recent Labs   Lab Test  11/07/17   1637   CR  0.64            Passed - No positive pregnancy test in past 12 months          "

## 2018-07-23 RX ORDER — AMLODIPINE BESYLATE 2.5 MG/1
TABLET ORAL
Qty: 90 TABLET | Refills: 0 | Status: SHIPPED | OUTPATIENT
Start: 2018-07-23 | End: 2018-10-16

## 2018-08-08 DIAGNOSIS — I10 ESSENTIAL HYPERTENSION, BENIGN: ICD-10-CM

## 2018-08-08 DIAGNOSIS — R00.2 PALPITATIONS: ICD-10-CM

## 2018-08-08 RX ORDER — ATENOLOL 50 MG/1
TABLET ORAL
Qty: 180 TABLET | Refills: 0 | Status: SHIPPED | OUTPATIENT
Start: 2018-08-08 | End: 2018-11-05

## 2018-08-08 NOTE — TELEPHONE ENCOUNTER
"Requested Prescriptions   Pending Prescriptions Disp Refills     atenolol (TENORMIN) 50 MG tablet [Pharmacy Med Name: ATENOLOL 50 MG TABLET]  Last Written Prescription Date:  5/8/2018  Last Fill Quantity: 180,  # refills: 0   Last office visit: 11/7/2017 with prescribing provider:     Future Office Visit:   180 tablet 0     Sig: TAKE 1 TABLET (50 MG) BY MOUTH 2 TIMES DAILY    Beta-Blockers Protocol Passed    8/8/2018  1:50 AM       Passed - Blood pressure under 140/90 in past 12 months    BP Readings from Last 3 Encounters:   06/18/18 113/73   05/31/18 122/74   05/26/18 118/68                Passed - Patient is age 6 or older       Passed - Recent (12 mo) or future (30 days) visit within the authorizing provider's specialty    Patient had office visit in the last 12 months or has a visit in the next 30 days with authorizing provider or within the authorizing provider's specialty.  See \"Patient Info\" tab in inbasket, or \"Choose Columns\" in Meds & Orders section of the refill encounter.            "

## 2018-08-13 ENCOUNTER — TRANSFERRED RECORDS (OUTPATIENT)
Dept: HEALTH INFORMATION MANAGEMENT | Facility: CLINIC | Age: 63
End: 2018-08-13

## 2018-08-21 DIAGNOSIS — F41.9 ANXIETY: ICD-10-CM

## 2018-08-22 NOTE — TELEPHONE ENCOUNTER
Requested Prescriptions   Pending Prescriptions Disp Refills     ALPRAZolam (XANAX) 0.25 MG tablet [Pharmacy Med Name: ALPRAZOLAM 0.25 MG TABLET] 30 tablet 0     Sig: TAKE 1 TABLET BY MOUTH 3 TIMES DAILY AS NEEDED FOR ANXIETY.    There is no refill protocol information for this order      Last Written Prescription Date:  07/18/2018  Last Fill Quantity: 30,  # refills: 0   Last office visit: 11/7/2017 with prescribing provider:     Future Office Visit:

## 2018-08-27 RX ORDER — ALPRAZOLAM 0.25 MG
TABLET ORAL
Qty: 30 TABLET | Refills: 0 | Status: SHIPPED | OUTPATIENT
Start: 2018-08-27 | End: 2018-09-30

## 2018-08-27 NOTE — TELEPHONE ENCOUNTER
Pt calls for refill today. She has been waiting since 8/21    CSA signed.     [place] able to check [place]  as PCP is not linked in for [employer].     Last refill on 7/18/18 for #30

## 2018-09-30 DIAGNOSIS — F41.9 ANXIETY: ICD-10-CM

## 2018-09-30 DIAGNOSIS — F51.01 PRIMARY INSOMNIA: ICD-10-CM

## 2018-10-01 NOTE — TELEPHONE ENCOUNTER
"Requested Prescriptions   Pending Prescriptions Disp Refills     ALPRAZolam (XANAX) 0.25 MG tablet [Pharmacy Med Name: ALPRAZOLAM 0.25 MG TABLET]  Last Written Prescription Date:  8/27/2018  Last Fill Quantity: 30,  # refills: 0   Last office visit: 11/7/2017 with prescribing provider:     Future Office Visit:   30 tablet 0     Sig: TAKE 1 TABLET BY MOUTH THREE TIMES A DAY AS NEEDED FOR ANXIETY    There is no refill protocol information for this order        traZODone (DESYREL) 100 MG tablet [Pharmacy Med Name: TRAZODONE 100 MG TABLET]  Last Written Prescription Date:  6/18/2018  Last Fill Quantity: 90,  # refills: 1   Last office visit: 11/7/2017 with prescribing provider:     Future Office Visit:   90 tablet 1     Sig: TAKE 1 TABLET BY MOUTH NIGHTLY AS NEEDED FOR SLEEP    Serotonin Modulators Passed    9/30/2018  9:27 PM       Passed - Recent (12 mo) or future (30 days) visit within the authorizing provider's specialty    Patient had office visit in the last 12 months or has a visit in the next 30 days with authorizing provider or within the authorizing provider's specialty.  See \"Patient Info\" tab in inbasket, or \"Choose Columns\" in Meds & Orders section of the refill encounter.           Passed - Patient is age 18 or older       Passed - No active pregnancy on record       Passed - No positive pregnancy test in past 12 months          "

## 2018-10-02 RX ORDER — TRAZODONE HYDROCHLORIDE 100 MG/1
TABLET ORAL
Qty: 90 TABLET | Refills: 1 | Status: ON HOLD | OUTPATIENT
Start: 2018-10-02 | End: 2019-05-28

## 2018-10-02 RX ORDER — ALPRAZOLAM 0.25 MG
TABLET ORAL
Qty: 30 TABLET | Refills: 0 | Status: SHIPPED | OUTPATIENT
Start: 2018-10-02 | End: 2018-11-07

## 2018-10-02 NOTE — TELEPHONE ENCOUNTER
Alprazolam and Trazodone refill request.     Last OV 11/7/17.    Last refill on Alprazolam on 8/27/18, #30, 0 refill.     Routing refill request to provider for review/approval because:  Drug not on the [facility] refill protocol

## 2018-10-16 DIAGNOSIS — I10 HYPERTENSION GOAL BP (BLOOD PRESSURE) < 140/80: ICD-10-CM

## 2018-10-16 NOTE — TELEPHONE ENCOUNTER
"Requested Prescriptions   Pending Prescriptions Disp Refills     amLODIPine (NORVASC) 2.5 MG tablet  Last Written Prescription Date:  11/7/2017  Last Fill Quantity: 90,  # refills: 0   Last office visit: 11/7/2017 with prescribing provider:     Future Office Visit:   90 tablet 0    Calcium Channel Blockers Protocol  Passed    10/16/2018 11:25 AM       Passed - Blood pressure under 140/90 in past 12 months    BP Readings from Last 3 Encounters:   06/18/18 113/73   05/31/18 122/74   05/26/18 118/68                Passed - Recent (12 mo) or future (30 days) visit within the authorizing provider's specialty    Patient had office visit in the last 12 months or has a visit in the next 30 days with authorizing provider or within the authorizing provider's specialty.  See \"Patient Info\" tab in inbasket, or \"Choose Columns\" in Meds & Orders section of the refill encounter.           Passed - Patient is age 18 or older       Passed - No active pregnancy on record       Passed - Normal serum creatinine on file in past 12 months    Recent Labs   Lab Test  11/07/17   1637   CR  0.64            Passed - No positive pregnancy test in past 12 months        "

## 2018-10-17 RX ORDER — AMLODIPINE BESYLATE 2.5 MG/1
TABLET ORAL
Qty: 90 TABLET | Refills: 0 | Status: SHIPPED | OUTPATIENT
Start: 2018-10-17 | End: 2018-12-04

## 2018-10-17 NOTE — TELEPHONE ENCOUNTER
Patient calling to check on status of refill request.     Preferred pharmacy is [facility] on [address] in [place].

## 2018-11-05 DIAGNOSIS — R00.2 PALPITATIONS: ICD-10-CM

## 2018-11-05 DIAGNOSIS — I10 ESSENTIAL HYPERTENSION, BENIGN: ICD-10-CM

## 2018-11-05 NOTE — TELEPHONE ENCOUNTER
"Requested Prescriptions   Pending Prescriptions Disp Refills     atenolol (TENORMIN) 50 MG tablet  Last Written Prescription Date:  08/08/18  Last Fill Quantity: 180,  # refills: 0   Last office visit: 11/7/2017 with prescribing provider:  11/07/17   Future Office Visit:     180 tablet 0    Beta-Blockers Protocol Passed    11/5/2018 10:27 AM       Passed - Blood pressure under 140/90 in past 12 months    BP Readings from Last 3 Encounters:   06/18/18 113/73   05/31/18 122/74   05/26/18 118/68                Passed - Patient is age 6 or older       Passed - Recent (12 mo) or future (30 days) visit within the authorizing provider's specialty    Patient had office visit in the last 12 months or has a visit in the next 30 days with authorizing provider or within the authorizing provider's specialty.  See \"Patient Info\" tab in inbasket, or \"Choose Columns\" in Meds & Orders section of the refill encounter.                  "

## 2018-11-07 DIAGNOSIS — F41.9 ANXIETY: ICD-10-CM

## 2018-11-07 RX ORDER — ATENOLOL 50 MG/1
TABLET ORAL
Qty: 60 TABLET | Refills: 0 | Status: SHIPPED | OUTPATIENT
Start: 2018-11-07 | End: 2018-12-04

## 2018-11-08 NOTE — TELEPHONE ENCOUNTER
Requested Prescriptions   Pending Prescriptions Disp Refills     ALPRAZolam (XANAX) 0.25 MG tablet [Pharmacy Med Name: ALPRAZOLAM 0.25  Last Written Prescription Date:  10/2/2018  Last Fill Quantity: 30,  # refills: 0   Last office visit: 11/7/2017 with prescribing provider:     Future Office Visit:   MG TABLET] 30 tablet 0     Sig: TAKE 1 TABLET BY MOUTH 3 TIMES A DAY AS NEEDED FOR ANXIETY    There is no refill protocol information for this order

## 2018-11-09 RX ORDER — ALPRAZOLAM 0.25 MG
TABLET ORAL
Qty: 30 TABLET | Refills: 0 | Status: SHIPPED | OUTPATIENT
Start: 2018-11-09 | End: 2018-12-14

## 2018-11-19 DIAGNOSIS — K21.9 GASTROESOPHAGEAL REFLUX DISEASE WITHOUT ESOPHAGITIS: ICD-10-CM

## 2018-11-19 NOTE — TELEPHONE ENCOUNTER
"Requested Prescriptions   Pending Prescriptions Disp Refills      CR capsule [Pharmacy Med Name: OMEPRAZOLE DR 20 MG CAPSULE]  Last Written Prescription Date:  5/1/2018  Last Fill Quantity: 90,  # refills: 1   Last office visit: 11/7/2017 with prescribing provider:     Future Office Visit:   Next 5 appointments (look out 90 days)     Dec 04, 2018  2:20 PM CST   SHORT with Juan F Smallwood MD   [facility]    [address]  [facility] [id number] [phone]                90 capsule 1     Sig: TAKE 1 CAPSULE BY MOUTH ONCE DAILY    PPI Protocol Passed    11/19/2018  4:03 AM       Passed - Not on Clopidogrel (unless Pantoprazole ordered)       Passed - No diagnosis of osteoporosis on record       Passed - Recent (12 mo) or future (30 days) visit within the authorizing provider's specialty    Patient had office visit in the last 12 months or has a visit in the next 30 days with authorizing provider or within the authorizing provider's specialty.  See \"Patient Info\" tab in inbasket, or \"Choose Columns\" in Meds & Orders section of the refill encounter.             Passed - Patient is age 18 or older       Passed - No active pregnacy on record       Passed - No positive pregnancy test in past 12 months        "

## 2018-12-04 ENCOUNTER — OFFICE VISIT (OUTPATIENT)
Dept: INTERNAL MEDICINE | Facility: CLINIC | Age: 63
End: 2018-12-04
Payer: COMMERCIAL

## 2018-12-04 VITALS
TEMPERATURE: 97.8 F | BODY MASS INDEX: 23.07 KG/M2 | HEIGHT: 65 IN | RESPIRATION RATE: 12 BRPM | DIASTOLIC BLOOD PRESSURE: 80 MMHG | WEIGHT: 138.5 LBS | SYSTOLIC BLOOD PRESSURE: 136 MMHG | HEART RATE: 55 BPM | OXYGEN SATURATION: 99 %

## 2018-12-04 DIAGNOSIS — R00.2 PALPITATIONS: ICD-10-CM

## 2018-12-04 DIAGNOSIS — Z78.0 MENOPAUSE: ICD-10-CM

## 2018-12-04 DIAGNOSIS — I10 HYPERTENSION GOAL BP (BLOOD PRESSURE) < 140/80: ICD-10-CM

## 2018-12-04 DIAGNOSIS — I10 ESSENTIAL HYPERTENSION, BENIGN: ICD-10-CM

## 2018-12-04 DIAGNOSIS — K21.9 GASTROESOPHAGEAL REFLUX DISEASE WITHOUT ESOPHAGITIS: ICD-10-CM

## 2018-12-04 DIAGNOSIS — Z00.00 ENCOUNTER FOR PREVENTATIVE ADULT HEALTH CARE EXAMINATION: Primary | ICD-10-CM

## 2018-12-04 LAB
ALBUMIN UR-MCNC: NEGATIVE MG/DL
APPEARANCE UR: CLEAR
BILIRUB UR QL STRIP: NEGATIVE
COLOR UR AUTO: YELLOW
ERYTHROCYTE [DISTWIDTH] IN BLOOD BY AUTOMATED COUNT: 12 % (ref 10–15)
GLUCOSE UR STRIP-MCNC: NEGATIVE MG/DL
HCT VFR BLD AUTO: 38.8 % (ref 35–47)
HGB BLD-MCNC: 13.1 G/DL (ref 11.7–15.7)
HGB UR QL STRIP: NEGATIVE
KETONES UR STRIP-MCNC: NEGATIVE MG/DL
LEUKOCYTE ESTERASE UR QL STRIP: NEGATIVE
MCH RBC QN AUTO: 32.3 PG (ref 26.5–33)
MCHC RBC AUTO-ENTMCNC: 33.8 G/DL (ref 31.5–36.5)
MCV RBC AUTO: 96 FL (ref 78–100)
NITRATE UR QL: NEGATIVE
PH UR STRIP: 7 PH (ref 5–7)
PLATELET # BLD AUTO: 229 10E9/L (ref 150–450)
RBC # BLD AUTO: 4.06 10E12/L (ref 3.8–5.2)
SOURCE: NORMAL
SP GR UR STRIP: 1.01 (ref 1–1.03)
UROBILINOGEN UR STRIP-ACNC: 0.2 EU/DL (ref 0.2–1)
WBC # BLD AUTO: 5.2 10E9/L (ref 4–11)

## 2018-12-04 PROCEDURE — 81003 URINALYSIS AUTO W/O SCOPE: CPT | Performed by: INTERNAL MEDICINE

## 2018-12-04 PROCEDURE — 84443 ASSAY THYROID STIM HORMONE: CPT | Performed by: INTERNAL MEDICINE

## 2018-12-04 PROCEDURE — 85027 COMPLETE CBC AUTOMATED: CPT | Performed by: INTERNAL MEDICINE

## 2018-12-04 PROCEDURE — 36415 COLL VENOUS BLD VENIPUNCTURE: CPT | Performed by: INTERNAL MEDICINE

## 2018-12-04 PROCEDURE — 99396 PREV VISIT EST AGE 40-64: CPT | Performed by: INTERNAL MEDICINE

## 2018-12-04 PROCEDURE — 80053 COMPREHEN METABOLIC PANEL: CPT | Performed by: INTERNAL MEDICINE

## 2018-12-04 PROCEDURE — 80061 LIPID PANEL: CPT | Performed by: INTERNAL MEDICINE

## 2018-12-04 RX ORDER — AMLODIPINE BESYLATE 2.5 MG/1
TABLET ORAL
Qty: 90 TABLET | Refills: 3 | Status: SHIPPED | OUTPATIENT
Start: 2018-12-04 | End: 2019-04-01

## 2018-12-04 RX ORDER — ATENOLOL 50 MG/1
TABLET ORAL
Qty: 180 TABLET | Refills: 3 | Status: SHIPPED | OUTPATIENT
Start: 2018-12-04 | End: 2019-01-03

## 2018-12-04 ASSESSMENT — ANXIETY QUESTIONNAIRES
6. BECOMING EASILY ANNOYED OR IRRITABLE: NOT AT ALL
IF YOU CHECKED OFF ANY PROBLEMS ON THIS QUESTIONNAIRE, HOW DIFFICULT HAVE THESE PROBLEMS MADE IT FOR YOU TO DO YOUR WORK, TAKE CARE OF THINGS AT HOME, OR GET ALONG WITH OTHER PEOPLE: NOT DIFFICULT AT ALL
1. FEELING NERVOUS, ANXIOUS, OR ON EDGE: SEVERAL DAYS
2. NOT BEING ABLE TO STOP OR CONTROL WORRYING: MORE THAN HALF THE DAYS
7. FEELING AFRAID AS IF SOMETHING AWFUL MIGHT HAPPEN: NOT AT ALL
3. WORRYING TOO MUCH ABOUT DIFFERENT THINGS: SEVERAL DAYS
5. BEING SO RESTLESS THAT IT IS HARD TO SIT STILL: MORE THAN HALF THE DAYS
GAD7 TOTAL SCORE: 7

## 2018-12-04 ASSESSMENT — PATIENT HEALTH QUESTIONNAIRE - PHQ9
5. POOR APPETITE OR OVEREATING: SEVERAL DAYS
SUM OF ALL RESPONSES TO PHQ QUESTIONS 1-9: 8

## 2018-12-04 NOTE — LETTER
December 6, 2018      Denise Ralph  [address] [address]        Dear ,    We are writing to inform you of your test results.    Normal result reviewed,   Borderline high calcium, suggest to recheck in one month lab work for ionized calcium,PTH, vit D.    Resulted Orders   CBC with platelets   Result Value Ref Range    WBC 5.2 4.0 - 11.0 10e9/L    RBC Count 4.06 3.8 - 5.2 10e12/L    Hemoglobin 13.1 11.7 - 15.7 g/dL    Hematocrit 38.8 35.0 - 47.0 %    MCV 96 78 - 100 fl    MCH 32.3 26.5 - 33.0 pg    MCHC 33.8 31.5 - 36.5 g/dL    RDW 12.0 10.0 - 15.0 %    Platelet Count 229 150 - 450 10e9/L   Comprehensive metabolic panel   Result Value Ref Range    Sodium 133 133 - 144 mmol/L    Potassium 4.4 3.4 - 5.3 mmol/L    Chloride 97 94 - 109 mmol/L    Carbon Dioxide 27 20 - 32 mmol/L    Anion Gap 9 3 - 14 mmol/L    Glucose 84 70 - 99 mg/dL    Urea Nitrogen 13 7 - 30 mg/dL    Creatinine 0.58 0.52 - 1.04 mg/dL    GFR Estimate >90 >60 mL/min/1.7m2      Comment:      Non  GFR Calc    GFR Estimate If Black >90 >60 mL/min/1.7m2      Comment:       GFR Calc    Calcium 10.3 (H) 8.5 - 10.1 mg/dL    Bilirubin Total 0.6 0.2 - 1.3 mg/dL    Albumin 4.2 3.4 - 5.0 g/dL    Protein Total 7.1 6.8 - 8.8 g/dL    Alkaline Phosphatase 74 40 - 150 U/L    ALT 47 0 - 50 U/L    AST 45 0 - 45 U/L   Lipid panel reflex to direct LDL Fasting   Result Value Ref Range    Cholesterol 166 <200 mg/dL    Triglycerides 54 <150 mg/dL    HDL Cholesterol 89 >49 mg/dL    LDL Cholesterol Calculated 66 <100 mg/dL      Comment:      Desirable:       <100 mg/dl    Non HDL Cholesterol 77 <130 mg/dL   TSH with free T4 reflex   Result Value Ref Range    TSH 2.21 0.40 - 4.00 mU/L   *UA reflex to Microscopic   Result Value Ref Range    Color Urine Yellow     Appearance Urine Clear     Glucose Urine Negative NEG^Negative mg/dL    Bilirubin Urine Negative NEG^Negative    Ketones Urine Negative NEG^Negative mg/dL     Specific Gravity Urine 1.010 1.003 - 1.035    Blood Urine Negative NEG^Negative    pH Urine 7.0 5.0 - 7.0 pH    Protein Albumin Urine Negative NEG^Negative mg/dL    Urobilinogen Urine 0.2 0.2 - 1.0 EU/dL    Nitrite Urine Negative NEG^Negative    Leukocyte Esterase Urine Negative NEG^Negative    Source Midstream Urine        If you have any questions or concerns, please call the clinic at the number listed above.       Sincerely,        Juan F Smallwood MD

## 2018-12-04 NOTE — PROGRESS NOTES
SUBJECTIVE:   Denise Ralph is a 63 year old female who presents to clinic today for the following health issues:      Hypertension Follow-up      Outpatient blood pressures are not being checked.    Low Salt Diet: low salt      Amount of exercise or physical activity: None    Problems taking medications regularly: No    Medication side effects: none    Diet: regular (no restrictions)

## 2018-12-04 NOTE — PROGRESS NOTES
SUBJECTIVE:   CC: Denise Ralph is an 63 year old woman who presents for preventive health visit.     Healthy Habits:    Do you get at least three servings of calcium containing foods daily (dairy, green leafy vegetables, etc.)? yes    Amount of exercise or daily activities, outside of work: 2 day(s) per week    Problems taking medications regularly No    Medication side effects: No    Have you had an eye exam in the past two years? yes    Do you see a dentist twice per year? yes    Do you have sleep apnea, excessive snoring or daytime drowsiness?no      PROBLEMS TO ADD ON...  Has h/o HTN. on medical treatment. BP has been controlled. No side effects from medications. No CP, HA, dizziness. good compliance with medications and low salt diet.  Has h/o anxiety, on PRN Ativan. Controlled. Has started working for health insurance company , more stress encountered.   Has h/o GERD on PPI , but has stopped treatment. symptoms are controlled. No nausea, vomiting, heartburns, bloating.        Today's PHQ-2 Score:   PHQ-2 ( 1999 Pfizer) 6/14/2017 4/17/2017   Q1: Little interest or pleasure in doing things 0 0   Q2: Feeling down, depressed or hopeless 3 0   PHQ-2 Score 3 0       Abuse: Current or Past(Physical, Sexual or Emotional)- No  Do you feel safe in your environment? Yes    Social History   Substance Use Topics     Smoking status: Former Smoker     Packs/day: 0.50     Years: 25.00     Quit date: 10/1/1997     Smokeless tobacco: Never Used      Comment: quit 1997     Alcohol use 1.0 oz/week     2 Glasses of wine per week      Comment: 2 glasses of wine daily     If you drink alcohol do you typically have >3 drinks per day or >7 drinks per week? Not Applicable                     Reviewed orders with patient.  Reviewed health maintenance and updated orders accordingly - Yes  Labs reviewed in EPIC  BP Readings from Last 3 Encounters:   12/04/18 136/80   06/18/18 113/73   05/31/18 122/74    Wt Readings from Last 3  "Encounters:   12/04/18 138 lb 8 oz (62.8 kg)   06/18/18 140 lb (63.5 kg)   05/31/18 140 lb (63.5 kg)                    Mammogram Screening: Patient over age 50, mutual decision to screen reflected in health maintenance.    Pertinent mammograms are reviewed under the imaging tab.  History of abnormal Pap smear: Status post benign hysterectomy. Health Maintenance and Surgical History updated.  PAP / HPV 4/22/2013 8/17/2009 8/1/2008   PAP NIL NIL NIL     Reviewed and updated as needed this visit by clinical staff  Tobacco  Allergies  Meds  Med Hx  Surg Hx  Fam Hx  Soc Hx        Reviewed and updated as needed this visit by Provider            ROS:  CONSTITUTIONAL: NEGATIVE for fever, chills, change in weight  INTEGUMENTARY/SKIN: NEGATIVE for worrisome rashes, moles or lesions  EYES: NEGATIVE for vision changes or irritation  ENT: NEGATIVE for ear, mouth and throat problems  RESP: NEGATIVE for significant cough or SOB  BREAST: NEGATIVE for masses, tenderness or discharge  CV: NEGATIVE for chest pain, palpitations or peripheral edema  GI: NEGATIVE for nausea, abdominal pain, heartburn, or change in bowel habits  : NEGATIVE for unusual urinary or vaginal symptoms. No vaginal bleeding.  MUSCULOSKELETAL: NEGATIVE for significant arthralgias or myalgia  NEURO: NEGATIVE for weakness, dizziness or paresthesias  PSYCHIATRIC: NEGATIVE for changes in mood or affect     OBJECTIVE:   /80  Pulse 55  Temp 97.8  F (36.6  C) (Oral)  Resp 12  Ht 5' 5\" (1.651 m)  Wt 138 lb 8 oz (62.8 kg)  SpO2 99%  BMI 23.05 kg/m2  EXAM:  GENERAL: healthy, alert and no distress  EYES: Eyes grossly normal to inspection, PERRL and conjunctivae and sclerae normal  HENT: ear canals and TM's normal, nose and mouth without ulcers or lesions  NECK: no adenopathy, no asymmetry, masses, or scars and thyroid normal to palpation  RESP: lungs clear to auscultation - no rales, rhonchi or wheezes  CV: regular rate and rhythm, normal S1 S2, no S3 " "or S4, no murmur, click or rub, no peripheral edema and peripheral pulses strong  ABDOMEN: soft, nontender, no hepatosplenomegaly, no masses and bowel sounds normal  MS: no gross musculoskeletal defects noted, no edema  SKIN: no suspicious lesions or rashes  NEURO: Normal strength and tone, mentation intact and speech normal  PSYCH: mentation appears normal, affect normal/bright    Diagnostic Test Results:  none     ASSESSMENT/PLAN:       ICD-10-CM    1. Encounter for preventative adult health care examination Z00.00 CBC with platelets     Comprehensive metabolic panel     Lipid panel reflex to direct LDL Fasting     TSH with free T4 reflex     *UA reflex to Microscopic   2. Hypertension goal BP (blood pressure) < 140/80 I10 amLODIPine (NORVASC) 2.5 MG tablet     CBC with platelets     Comprehensive metabolic panel     Lipid panel reflex to direct LDL Fasting     TSH with free T4 reflex     *UA reflex to Microscopic   3. BENIGN HYPERTENSION I10 atenolol (TENORMIN) 50 MG tablet   4. Palpitations R00.2 atenolol (TENORMIN) 50 MG tablet   5. Gastroesophageal reflux disease without esophagitis K21.9    6. Menopause Z78.0 DX Hip/Pelvis/Spine       COUNSELING:   Reviewed preventive health counseling, as reflected in patient instructions       Regular exercise       Healthy diet/nutrition       Vision screening       Hearing screening    BP Readings from Last 1 Encounters:   12/04/18 136/80     Estimated body mass index is 23.05 kg/(m^2) as calculated from the following:    Height as of this encounter: 5' 5\" (1.651 m).    Weight as of this encounter: 138 lb 8 oz (62.8 kg).           reports that she quit smoking about 21 years ago. She has a 12.50 pack-year smoking history. She has never used smokeless tobacco.      Counseling Resources:  ATP IV Guidelines  Pooled Cohorts Equation Calculator  Breast Cancer Risk Calculator  FRAX Risk Assessment  ICSI Preventive Guidelines  Dietary Guidelines for Americans, 2010  USDA's " MyPlate  ASA Prophylaxis  Lung CA Screening    Juan F Smallwood MD  [facility]

## 2018-12-04 NOTE — NURSING NOTE
"Vital signs:  Temp: 97.8  F (36.6  C) Temp src: Oral BP: 136/80 Pulse: 55   Resp: 12 SpO2: 99 %     Height: 5' 5\" (165.1 cm) Weight: 138 lb 8 oz (62.8 kg)  Estimated body mass index is 23.05 kg/(m^2) as calculated from the following:    Height as of this encounter: 5' 5\" (1.651 m).    Weight as of this encounter: 138 lb 8 oz (62.8 kg).          "

## 2018-12-04 NOTE — MR AVS SNAPSHOT
After Visit Summary   12/4/2018    Denise Ralph    MRN: [id number]           Patient Information     Date Of Birth          1955        Visit Information        Provider Department      12/4/2018 2:20 PM Juan F Smallwood MD [facility]        Today's Diagnoses     Menopause    -  1    Hypertension goal BP (blood pressure) < 140/80        BENIGN HYPERTENSION        Palpitations        Gastroesophageal reflux disease without esophagitis        Encounter for preventative adult health care examination           Follow-ups after your visit        Follow-up notes from your care team     Return in about 1 year (around 12/4/2019) for Physical Exam.      Future tests that were ordered for you today     Open Future Orders        Priority Expected Expires Ordered    DX Hip/Pelvis/Spine Routine  12/4/2019 12/4/2018            Who to contact     If you have questions or need follow up information about today's clinic visit or your schedule please contact [facility] directly at [phone].  Normal or non-critical lab and imaging results will be communicated to you by MyChart, letter or phone within 4 business days after the clinic has received the results. If you do not hear from us within 7 days, please contact the clinic through [employer] or phone. If you have a critical or abnormal lab result, we will notify you by phone as soon as possible.  Submit refill requests through [employer] or call your pharmacy and they will forward the refill request to us. Please allow 3 business days for your refill to be completed.          Additional Information About Your Visit        MyChart Information     [employer] gives you secure access to your electronic health record. If you see a primary care provider, you can also send messages to your care team and make appointments. If you have questions, please call your primary care clinic.  If you do not have a primary care provider, please call  "[phone] and they will assist you.        Care EveryWhere ID     This is your Care EveryWhere ID. This could be used by other organizations to access your [place] medical records  [id number]        Your Vitals Were     Pulse Temperature Respirations Height Pulse Oximetry BMI (Body Mass Index)    55 97.8  F (36.6  C) (Oral) 12 5' 5\" (1.651 m) 99% 23.05 kg/m2       Blood Pressure from Last 3 Encounters:   12/04/18 136/80   06/18/18 113/73   05/31/18 122/74    Weight from Last 3 Encounters:   12/04/18 138 lb 8 oz (62.8 kg)   06/18/18 140 lb (63.5 kg)   05/31/18 140 lb (63.5 kg)              We Performed the Following     *UA reflex to Microscopic     CBC with platelets     Comprehensive metabolic panel     Lipid panel reflex to direct LDL Fasting     TSH with free T4 reflex          Today's Medication Changes          These changes are accurate as of 12/4/18  3:03 PM.  If you have any questions, ask your nurse or doctor.               Stop taking these medicines if you haven't already. Please contact your care team if you have questions.     omeprazole 20 MG DR capsule   Commonly known as:  priLOSEC   Stopped by:  Juan F Smallwood MD                Where to get your medicines      These medications were sent to [facility] [id number] [facility] - [place], [place] - [address]  [address], [facility] [address]     Phone:  [phone]     amLODIPine 2.5 MG tablet    atenolol 50 MG tablet                Primary Care Provider Office Phone # Fax #    Juan F Smallwood [phone]       [address] [facility] [address]        Equal Access to Services     [facility] : Hadii amrita mackey hadbradford Sojameel, waaxda luqadaha, qaybta kaalmada adeольгаyada, rolan bull. So [facility] [phone].    ATENCIÓN: Si habla español, tiene a simpson disposición servicios gratuitos de asistencia lingüística. Llame al [phone].    We comply with applicable federal civil rights laws and " [place] laws. We do not discriminate on the basis of race, color, national origin, age, disability, sex, sexual orientation, or gender identity.            Thank you!     Thank you for choosing [facility]  for your care. Our goal is always to provide you with excellent care. Hearing back from our patients is one way we can continue to improve our services. Please take a few minutes to complete the written survey that you may receive in the mail after your visit with us. Thank you!             Your Updated Medication List - Protect others around you: Learn how to safely use, store and throw away your medicines at www.disposemymeds.org.          This list is accurate as of 12/4/18  3:03 PM.  Always use your most recent med list.                   Brand Name Dispense Instructions for use Diagnosis    ALPRAZolam 0.25 MG tablet    XANAX    30 tablet    TAKE 1 TABLET BY MOUTH 3 TIMES A DAY AS NEEDED FOR ANXIETY    Anxiety       amLODIPine 2.5 MG tablet    NORVASC    90 tablet    TAKE 1 TABLET (2.5 MG) BY MOUTH DAILY    Hypertension goal BP (blood pressure) < 140/80       atenolol 50 MG tablet    TENORMIN    180 tablet    TAKE 1 TABLET (50 MG) BY MOUTH 2 TIMES DAILY    Essential hypertension, benign, Palpitations       latanoprost 0.005 % ophthalmic solution    XALATAN     Place 1 drop into both eyes At Bedtime        traZODone 100 MG tablet    DESYREL    90 tablet    TAKE 1 TABLET BY MOUTH NIGHTLY AS NEEDED FOR SLEEP    Primary insomnia

## 2018-12-05 LAB
ALBUMIN SERPL-MCNC: 4.2 G/DL (ref 3.4–5)
ALP SERPL-CCNC: 74 U/L (ref 40–150)
ALT SERPL W P-5'-P-CCNC: 47 U/L (ref 0–50)
ANION GAP SERPL CALCULATED.3IONS-SCNC: 9 MMOL/L (ref 3–14)
AST SERPL W P-5'-P-CCNC: 45 U/L (ref 0–45)
BILIRUB SERPL-MCNC: 0.6 MG/DL (ref 0.2–1.3)
BUN SERPL-MCNC: 13 MG/DL (ref 7–30)
CALCIUM SERPL-MCNC: 10.3 MG/DL (ref 8.5–10.1)
CHLORIDE SERPL-SCNC: 97 MMOL/L (ref 94–109)
CHOLEST SERPL-MCNC: 166 MG/DL
CO2 SERPL-SCNC: 27 MMOL/L (ref 20–32)
CREAT SERPL-MCNC: 0.58 MG/DL (ref 0.52–1.04)
GFR SERPL CREATININE-BSD FRML MDRD: >90 ML/MIN/1.7M2
GLUCOSE SERPL-MCNC: 84 MG/DL (ref 70–99)
HDLC SERPL-MCNC: 89 MG/DL
LDLC SERPL CALC-MCNC: 66 MG/DL
NONHDLC SERPL-MCNC: 77 MG/DL
POTASSIUM SERPL-SCNC: 4.4 MMOL/L (ref 3.4–5.3)
PROT SERPL-MCNC: 7.1 G/DL (ref 6.8–8.8)
SODIUM SERPL-SCNC: 133 MMOL/L (ref 133–144)
TRIGL SERPL-MCNC: 54 MG/DL
TSH SERPL DL<=0.005 MIU/L-ACNC: 2.21 MU/L (ref 0.4–4)

## 2018-12-05 ASSESSMENT — ANXIETY QUESTIONNAIRES: GAD7 TOTAL SCORE: 7

## 2018-12-13 ENCOUNTER — TELEPHONE (OUTPATIENT)
Dept: INTERNAL MEDICINE | Facility: CLINIC | Age: 63
End: 2018-12-13

## 2018-12-13 NOTE — TELEPHONE ENCOUNTER
Reason for Call:  Request for results:    Name of test or procedure: labs    Date of test of procedure: 12/04/18    Location of the test or procedure: here    OK to leave the result message on voice mail or with a family member? YES    Phone number Patient can be reached at:  Home number on file [phone] (home)-pls leave a detailed msg.    Additional comments: Pt wants to discuss her slightly high calcium level.  Pt is on a calcium supplement and is wondering if she needs to discontinue.    Call taken on 12/13/2018 at 9:11 AM by Vidya Devi

## 2018-12-13 NOTE — TELEPHONE ENCOUNTER
Pt asking if she should stop her Calcium supplement.       Notes Recorded by Juan F Smallwood MD on 12/5/2018 at 4:19 PM    Normal result reviewed, please notify patient.  Borderline high calcium, suggest to recheck in one month lab work for ionized calcium,PTH, vit D.    Calcium = 10.3 on 12/4/18

## 2018-12-14 ENCOUNTER — ANCILLARY PROCEDURE (OUTPATIENT)
Dept: BONE DENSITY | Facility: CLINIC | Age: 63
End: 2018-12-14
Payer: COMMERCIAL

## 2018-12-14 ENCOUNTER — ANCILLARY PROCEDURE (OUTPATIENT)
Dept: BONE DENSITY | Facility: CLINIC | Age: 63
End: 2018-12-14
Attending: INTERNAL MEDICINE
Payer: COMMERCIAL

## 2018-12-14 DIAGNOSIS — Z78.0 ASYMPTOMATIC POSTMENOPAUSAL STATUS: ICD-10-CM

## 2018-12-14 DIAGNOSIS — F41.9 ANXIETY: ICD-10-CM

## 2018-12-14 DIAGNOSIS — Z78.0 MENOPAUSE: ICD-10-CM

## 2018-12-14 PROCEDURE — 77081 DXA BONE DENSITY APPENDICULR: CPT | Performed by: INTERNAL MEDICINE

## 2018-12-14 PROCEDURE — 77080 DXA BONE DENSITY AXIAL: CPT | Mod: 59 | Performed by: INTERNAL MEDICINE

## 2018-12-14 NOTE — TELEPHONE ENCOUNTER
Requested Prescriptions   Pending Prescriptions Disp Refills     ALPRAZolam (XANAX) 0.25 MG tablet [Pharmacy Med Name: ALPRAZOLAM 0.25 MG TABLET] 30 tablet 0     Sig: TAKE 1 TABLET BY MOUTH 3 TIMES DAILY AS NEEDED FOR ANXIETY    There is no refill protocol information for this order      Last Written Prescription Date:  11/09/2018  Last Fill Quantity: 30,  # refills: 0   Last office visit: 12/4/2018 with prescribing provider:     Future Office Visit:

## 2018-12-18 RX ORDER — ALPRAZOLAM 0.25 MG
TABLET ORAL
Qty: 30 TABLET | Refills: 0 | Status: SHIPPED | OUTPATIENT
Start: 2018-12-18 | End: 2019-01-18

## 2019-01-03 ENCOUNTER — TELEPHONE (OUTPATIENT)
Dept: INTERNAL MEDICINE | Facility: CLINIC | Age: 64
End: 2019-01-03

## 2019-01-03 DIAGNOSIS — R00.2 PALPITATIONS: ICD-10-CM

## 2019-01-03 DIAGNOSIS — I10 ESSENTIAL HYPERTENSION, BENIGN: ICD-10-CM

## 2019-01-03 RX ORDER — ATENOLOL 50 MG/1
TABLET ORAL
Qty: 180 TABLET | Refills: 3 | Status: ON HOLD | OUTPATIENT
Start: 2019-01-03 | End: 2019-05-28

## 2019-01-03 NOTE — TELEPHONE ENCOUNTER
Reason for Call:  Medication or medication refill:    Do you use a [facility]?  Name of the pharmacy and phone number for the current request:  [facility] [address]    Name of the medication requested: Atenolol    Other request: Pt did not receive this med. Also pharmacy told pt that the new rx sent by Dr. Smallwood they didn't received    Can we leave a detailed message on this number? YES    Phone number patient can be reached at: Cell number on file:    Telephone Information:   Mobile [phone]       Best Time: any    Call taken on 1/3/2019 at 12:29 PM by Barbara Faria

## 2019-01-03 NOTE — TELEPHONE ENCOUNTER
Last OV 12/4/18    BP Readings from Last 3 Encounters:   12/04/18 136/80   06/18/18 113/73   05/31/18 122/74

## 2019-01-07 ENCOUNTER — TRANSFERRED RECORDS (OUTPATIENT)
Dept: HEALTH INFORMATION MANAGEMENT | Facility: CLINIC | Age: 64
End: 2019-01-07

## 2019-01-11 ENCOUNTER — DOCUMENTATION ONLY (OUTPATIENT)
Dept: INTERNAL MEDICINE | Facility: CLINIC | Age: 64
End: 2019-01-11

## 2019-01-11 DIAGNOSIS — E83.52 HYPERCALCEMIA: Primary | ICD-10-CM

## 2019-01-14 DIAGNOSIS — E83.52 HYPERCALCEMIA: ICD-10-CM

## 2019-01-14 LAB
CA-I SERPL ISE-MCNC: 5 MG/DL (ref 4.4–5.2)
PTH-INTACT SERPL-MCNC: 55 PG/ML (ref 18–80)

## 2019-01-14 PROCEDURE — 36415 COLL VENOUS BLD VENIPUNCTURE: CPT | Performed by: INTERNAL MEDICINE

## 2019-01-14 PROCEDURE — 82306 VITAMIN D 25 HYDROXY: CPT | Performed by: INTERNAL MEDICINE

## 2019-01-14 PROCEDURE — 82330 ASSAY OF CALCIUM: CPT | Performed by: INTERNAL MEDICINE

## 2019-01-14 PROCEDURE — 83970 ASSAY OF PARATHORMONE: CPT | Performed by: INTERNAL MEDICINE

## 2019-01-15 LAB — DEPRECATED CALCIDIOL+CALCIFEROL SERPL-MC: 41 UG/L (ref 20–75)

## 2019-01-18 DIAGNOSIS — F41.9 ANXIETY: ICD-10-CM

## 2019-01-18 NOTE — TELEPHONE ENCOUNTER
Requested Prescriptions   Pending Prescriptions Disp Refills     ALPRAZolam (XANAX) 0.25 MG tablet [Pharmacy Med Name: ALPRAZOLAM 0.25 MG TABLET] 30 tablet 0     Sig: TAKE 1 TABLET BY MOUTH 3 TIMES DAILY AS NEEDED FOR ANXIETY    There is no refill protocol information for this order      Last Written Prescription Date:  12/18/2018  Last Fill Quantity: 30,  # refills: 0   Last office visit: 12/4/2018 with prescribing provider:     Future Office Visit:

## 2019-01-22 ENCOUNTER — TELEPHONE (OUTPATIENT)
Dept: INTERNAL MEDICINE | Facility: CLINIC | Age: 64
End: 2019-01-22

## 2019-01-22 RX ORDER — ALPRAZOLAM 0.25 MG
TABLET ORAL
Qty: 30 TABLET | Refills: 0 | Status: SHIPPED | OUTPATIENT
Start: 2019-01-22 | End: 2019-02-28

## 2019-01-22 NOTE — TELEPHONE ENCOUNTER
Routing refill request to provider for review/approval because:  Drug not on the [facility] refill protocol   Marleni Escobar RN

## 2019-02-28 DIAGNOSIS — F41.9 ANXIETY: ICD-10-CM

## 2019-02-28 NOTE — TELEPHONE ENCOUNTER
Requested Prescriptions   Pending Prescriptions Disp Refills     ALPRAZolam (XANAX) 0.25 MG tablet [Pharmacy Med Name: ALPRAZOLAM 0.25 MG TABLET]  Last Written Prescription Date:  1/22/2019  Last Fill Quantity: 30,  # refills: 0   Last office visit: 12/4/2018 with prescribing provider:     Future Office Visit:   30 tablet 0     Sig: TAKE 1 TABLET BY MOUTH 3 TIMES DAILY AS NEEDED FOR ANXIETY    There is no refill protocol information for this order

## 2019-03-04 RX ORDER — ALPRAZOLAM 0.25 MG
TABLET ORAL
Qty: 30 TABLET | Refills: 0 | Status: SHIPPED | OUTPATIENT
Start: 2019-03-04 | End: 2019-04-10

## 2019-03-20 ENCOUNTER — MYC MEDICAL ADVICE (OUTPATIENT)
Dept: INTERNAL MEDICINE | Facility: CLINIC | Age: 64
End: 2019-03-20

## 2019-03-20 NOTE — TELEPHONE ENCOUNTER
See her [employer] message.     BP Readings from Last 3 Encounters:   12/04/18 136/80   06/18/18 113/73   05/31/18 122/74

## 2019-04-01 ENCOUNTER — MYC MEDICAL ADVICE (OUTPATIENT)
Dept: INTERNAL MEDICINE | Facility: CLINIC | Age: 64
End: 2019-04-01

## 2019-04-01 DIAGNOSIS — I10 ESSENTIAL HYPERTENSION, BENIGN: Primary | ICD-10-CM

## 2019-04-01 RX ORDER — AMLODIPINE BESYLATE 5 MG/1
5 TABLET ORAL DAILY
Qty: 90 TABLET | Refills: 3 | Status: ON HOLD | OUTPATIENT
Start: 2019-04-01 | End: 2019-05-28

## 2019-04-01 NOTE — TELEPHONE ENCOUNTER
Please see patient's [employer] message with blood pressure update.    (Patient should not need a refill as medication was e-scribed to pharmacy 12-4-18 #90 with 3 refills.  Patient informed via [employer].)    Last office visit 12-4-18    BP Readings from Last 3 Encounters:   12/04/18 136/80   06/18/18 113/73   05/31/18 122/74       Please advise, thanks.

## 2019-04-10 DIAGNOSIS — F41.9 ANXIETY: ICD-10-CM

## 2019-04-11 NOTE — TELEPHONE ENCOUNTER
Requested Prescriptions   Pending Prescriptions Disp Refills     ALPRAZolam (XANAX) 0.25 MG tablet [Pharmacy Med Name: ALPRAZOLAM 0.25 MG TABLET] 30 tablet 0     Sig: TAKE 1 TABLET BY MOUTH 3 TIMES A DAY AS NEEDED FOR ANXIETY       There is no refill protocol information for this order      Last Written Prescription Date:  03/04/2019  Last Fill Quantity: 30,  # refills: 0   Last office visit: 12/4/2018 with prescribing provider:     Future Office Visit:

## 2019-04-11 NOTE — TELEPHONE ENCOUNTER
Controlled Substance Refill Request for Alprazolam  Problem List Complete:  No     PROVIDER TO CONSIDER COMPLETION OF PROBLEM LIST AND OVERVIEW/CONTROLLED SUBSTANCE AGREEMENT    Last Written Prescription Date:  3/4/19  Last Fill Quantity: 30,   # refills: 0    THE MOST RECENT OFFICE VISIT MUST BE WITHIN THE PAST 3 MONTHS. AT LEAST ONE FACE TO FACE VISIT MUST OCCUR EVERY 6 MONTHS. ADDITIONAL VISITS CAN BE VIRTUAL.  (THIS STATEMENT SHOULD BE DELETED.)    Last Office Visit with [facility] primary care provider: 12/4/18    Future Office visit:     Controlled substance agreement:   Encounter-Level CSA - 10/18/2017:    Controlled Substance Agreement - Scan on 10/28/2017  7:58 PM: CONTROLLED SUBSTANCE AGREEMENT (below)       Patient-Level CSA:    There are no patient-level csa.         Last Urine Drug Screen: No results found for: CDAUT, No results found for: COMDAT, No results found for: THC13, PCP13, COC13, MAMP13, OPI13, AMP13, BZO13, TCA13, MTD13, BAR13, OXY13, PPX13, BUP13     Processing:  Fax Rx to "[facility] pharmacy     [email or web address]       checked in past 3 months?  Access not granted by provider.    Please advise, thanks.

## 2019-04-12 RX ORDER — ALPRAZOLAM 0.25 MG
TABLET ORAL
Qty: 30 TABLET | Refills: 0 | Status: SHIPPED | OUTPATIENT
Start: 2019-04-12 | End: 2019-05-01

## 2019-05-01 ENCOUNTER — OFFICE VISIT (OUTPATIENT)
Dept: INTERNAL MEDICINE | Facility: CLINIC | Age: 64
End: 2019-05-01
Payer: MEDICARE

## 2019-05-01 VITALS
SYSTOLIC BLOOD PRESSURE: 160 MMHG | RESPIRATION RATE: 12 BRPM | HEIGHT: 65 IN | WEIGHT: 138 LBS | OXYGEN SATURATION: 100 % | BODY MASS INDEX: 22.99 KG/M2 | DIASTOLIC BLOOD PRESSURE: 70 MMHG | HEART RATE: 55 BPM | TEMPERATURE: 98.1 F

## 2019-05-01 DIAGNOSIS — I10 HYPERTENSION GOAL BP (BLOOD PRESSURE) < 140/80: Primary | ICD-10-CM

## 2019-05-01 DIAGNOSIS — F41.9 ANXIETY: ICD-10-CM

## 2019-05-01 DIAGNOSIS — I10 ESSENTIAL HYPERTENSION, BENIGN: ICD-10-CM

## 2019-05-01 PROCEDURE — 99213 OFFICE O/P EST LOW 20 MIN: CPT | Performed by: NURSE PRACTITIONER

## 2019-05-01 RX ORDER — ALPRAZOLAM 0.25 MG
0.25 TABLET ORAL 3 TIMES DAILY
Qty: 30 TABLET | Refills: 1 | Status: ON HOLD | OUTPATIENT
Start: 2019-05-01 | End: 2019-05-28

## 2019-05-01 RX ORDER — HYDRALAZINE HYDROCHLORIDE 25 MG/1
25 TABLET, FILM COATED ORAL 2 TIMES DAILY
Qty: 60 TABLET | Refills: 1 | Status: SHIPPED | OUTPATIENT
Start: 2019-05-01 | End: 2019-06-07

## 2019-05-01 RX ORDER — PAROXETINE 10 MG/1
10 TABLET, FILM COATED ORAL EVERY MORNING
Qty: 30 TABLET | Refills: 1 | Status: ON HOLD | OUTPATIENT
Start: 2019-05-01 | End: 2019-05-28

## 2019-05-01 ASSESSMENT — PATIENT HEALTH QUESTIONNAIRE - PHQ9: 5. POOR APPETITE OR OVEREATING: MORE THAN HALF THE DAYS

## 2019-05-01 ASSESSMENT — ANXIETY QUESTIONNAIRES
IF YOU CHECKED OFF ANY PROBLEMS ON THIS QUESTIONNAIRE, HOW DIFFICULT HAVE THESE PROBLEMS MADE IT FOR YOU TO DO YOUR WORK, TAKE CARE OF THINGS AT HOME, OR GET ALONG WITH OTHER PEOPLE: NOT DIFFICULT AT ALL
3. WORRYING TOO MUCH ABOUT DIFFERENT THINGS: SEVERAL DAYS
6. BECOMING EASILY ANNOYED OR IRRITABLE: MORE THAN HALF THE DAYS
1. FEELING NERVOUS, ANXIOUS, OR ON EDGE: SEVERAL DAYS
7. FEELING AFRAID AS IF SOMETHING AWFUL MIGHT HAPPEN: MORE THAN HALF THE DAYS
5. BEING SO RESTLESS THAT IT IS HARD TO SIT STILL: MORE THAN HALF THE DAYS
2. NOT BEING ABLE TO STOP OR CONTROL WORRYING: MORE THAN HALF THE DAYS
GAD7 TOTAL SCORE: 12

## 2019-05-01 ASSESSMENT — MIFFLIN-ST. JEOR: SCORE: 1171.84

## 2019-05-01 NOTE — PROGRESS NOTES
SUBJECTIVE:   Denise Ralph is a 64 year old female who presents to clinic today for the following   health issues:      Hypertension Follow-up      Outpatient blood pressures are being checked at home.  Results are 148//92    Not any real decrease with increaser in amlodipine     Low Salt Diet: no added salt      Amount of exercise or physical activity: 3 days a week.    Problems taking medications regularly: No    Medication side effects: none    Diet: low salt    Atenolol 50 mg twice daily     Amlodipine 5 mg daily     148/82 recheck    Losartan made her tongue feel weird and lisinopril also made her feel not ok   hydrochlorothiazide stopped due to dehydration and low sodium     Tried paxil - tolerated in past   Her anxiety is worse with all her stressors  Will restart paxil     Son and family at their house often - he is getting a divorce  Selling their house- or thinking about it   Going on 2 trips     Right ear pain.   Feels like fluid in it at times         Additional history: as documented    Reviewed  and updated as needed this visit by clinical staff  Tobacco  Allergies  Meds  Soc Hx        Reviewed and updated as needed this visit by Provider  Allergies         Patient Active Problem List   Diagnosis     Irritable bowel syndrome     Anxiety state     Essential hypertension, benign     Disorder of bone and cartilage     Basal cell carcinoma     Squamous cell carcinoma     Advanced directives, counseling/discussion     GERD (gastroesophageal reflux disease)     Glaucoma     Hypertension goal BP (blood pressure) < 140/80     S/P lumbar fusion     Controlled substance agreement signed     Tension headache     Hyponatremia     Frequent UTI     Past Surgical History:   Procedure Laterality Date     EYE SURGERY Bilateral     Treatment of glaucoma     HYSTERECTOMY       HYSTERECTOMY VAGINAL       LAPAROSCOPIC CHOLECYSTECTOMY  2002    with repeat operation because of bile leak     Left shoulder decomp  "surgery for impingement  approx      OPTICAL TRACKING SYSTEM FUSION POSTERIOR SPINE LUMBAR N/A 8/3/2017    Procedure: OPTICAL TRACKING SYSTEM FUSION SPINE POSTERIOR LUMBAR ONE LEVEL;  1. L4 Ang-Meyers osteotomy with exposure of the L4 and L5 roots  2. L4-5 complete discectomy with arthrodesis  3. Placement of Globus Creo GOMEZ pedicle screws bilaterally from L4 to L5  4. L4 - L5 posterior lateral fusion with placement of Medtronic Magnifuse and locally harvested autologous bone  5. Use of Stealth and O     WRIST SURGERY Left        Social History     Tobacco Use     Smoking status: Former Smoker     Packs/day: 0.50     Years: 25.00     Pack years: 12.50     Last attempt to quit: 10/1/1997     Years since quittin.5     Smokeless tobacco: Never Used     Tobacco comment: quit    Substance Use Topics     Alcohol use: Yes     Alcohol/week: 1.0 oz     Types: 2 Glasses of wine per week     Comment: 2 glasses of wine daily     Family History   Problem Relation Age of Onset     Cardiovascular Mother         / mi     Breast Cancer Mother         diagnosed age 60's     Respiratory Father         pulmonary fibrosis.     Cardiovascular Brother          of MI age 34 2nd to Cocaine Use     Cancer - colorectal No family hx of            ROS:  Constitutional, HEENT, cardiovascular, pulmonary, gi and gu systems are negative, except as otherwise noted.    OBJECTIVE:     /70   Pulse 55   Temp 98.1  F (36.7  C) (Oral)   Resp 12   Ht 1.651 m (5' 5\")   Wt 62.6 kg (138 lb)   SpO2 100%   BMI 22.96 kg/m    Body mass index is 22.96 kg/m .  GENERAL: alert and no distress- emotional stressed  RESP: lungs clear to auscultation - no rales, rhonchi or wheezes  CV: regular rate and rhythm  MS: no gross musculoskeletal defects noted, no edema  PSYCH: mentation appears normal, affect normal/bright    Diagnostic Test Results:  none     ASSESSMENT/PLAN:             1. Anxiety  Will start paxil as stress has been " worse lately and may be impacting blood pressure   paxil worked well in past for her at low dose   - ALPRAZolam (XANAX) 0.25 MG tablet; Take 1 tablet (0.25 mg) by mouth 3 times daily AS NEEDED FOR ANXIETY  Dispense: 30 tablet; Refill: 1  - PARoxetine (PAXIL) 10 MG tablet; Take 1 tablet (10 mg) by mouth every morning  Dispense: 30 tablet; Refill: 1    2. Hypertension goal BP (blood pressure) < 140/80  Needs improvement   Cannot increase betablocker as heart rate 55  Amlodipine 5 mg and increase actually seemed to increase blood pressure - did not decrease- do not want to risk fluid retention in feet with higher dose since increase did not change blood pressure in good way   Will try adding hydralazine   Did not tolerated hydrochlorothiazide- low sodium    - hydrALAZINE (APRESOLINE) 25 MG tablet; Take 1 tablet (25 mg) by mouth 2 times daily  Dispense: 60 tablet; Refill: 1    3. Essential hypertension, benign  As above    Patient Instructions   Add hydralazine 25 mg twice daily for blood pressure     Continue atenolol 25 mg twice daily   Amlodipine 5 mg daily     Follow up blood pressure in about a month     We will try Paxil 10 mg daily for your mood    Follow up in a month to see how you are doing       IRENE Epstein [facility]

## 2019-05-01 NOTE — PATIENT INSTRUCTIONS
Add hydralazine 25 mg twice daily for blood pressure     Continue atenolol 25 mg twice daily   Amlodipine 5 mg daily     Follow up blood pressure in about a month     We will try Paxil 10 mg daily for your mood    Follow up in a month to see how you are doing

## 2019-05-02 ASSESSMENT — ANXIETY QUESTIONNAIRES: GAD7 TOTAL SCORE: 12

## 2019-05-23 ENCOUNTER — OFFICE VISIT (OUTPATIENT)
Dept: URGENT CARE | Facility: URGENT CARE | Age: 64
End: 2019-05-23
Payer: MEDICARE

## 2019-05-23 ENCOUNTER — ANCILLARY PROCEDURE (OUTPATIENT)
Dept: GENERAL RADIOLOGY | Facility: CLINIC | Age: 64
End: 2019-05-23
Attending: FAMILY MEDICINE
Payer: MEDICARE

## 2019-05-23 VITALS
DIASTOLIC BLOOD PRESSURE: 71 MMHG | WEIGHT: 140.5 LBS | OXYGEN SATURATION: 96 % | TEMPERATURE: 100.5 F | HEART RATE: 79 BPM | BODY MASS INDEX: 23.38 KG/M2 | SYSTOLIC BLOOD PRESSURE: 114 MMHG

## 2019-05-23 DIAGNOSIS — M79.89 LEG SWELLING: ICD-10-CM

## 2019-05-23 DIAGNOSIS — R05.8 PRODUCTIVE COUGH: ICD-10-CM

## 2019-05-23 DIAGNOSIS — R53.83 TIREDNESS: ICD-10-CM

## 2019-05-23 DIAGNOSIS — I10 HYPERTENSION GOAL BP (BLOOD PRESSURE) < 140/80: ICD-10-CM

## 2019-05-23 DIAGNOSIS — F41.9 ANXIETY: ICD-10-CM

## 2019-05-23 DIAGNOSIS — R50.9 FEVER IN ADULT: Primary | ICD-10-CM

## 2019-05-23 LAB
ALBUMIN UR-MCNC: NEGATIVE MG/DL
APPEARANCE UR: CLEAR
BASOPHILS # BLD AUTO: 0 10E9/L (ref 0–0.2)
BASOPHILS NFR BLD AUTO: 0.3 %
BILIRUB UR QL STRIP: NEGATIVE
COLOR UR AUTO: YELLOW
DIFFERENTIAL METHOD BLD: ABNORMAL
EOSINOPHIL # BLD AUTO: 0.2 10E9/L (ref 0–0.7)
EOSINOPHIL NFR BLD AUTO: 2.2 %
GLUCOSE UR STRIP-MCNC: NEGATIVE MG/DL
HGB UR QL STRIP: NEGATIVE
KETONES UR STRIP-MCNC: NEGATIVE MG/DL
LEUKOCYTE ESTERASE UR QL STRIP: NEGATIVE
LYMPHOCYTES # BLD AUTO: 0.7 10E9/L (ref 0.8–5.3)
LYMPHOCYTES NFR BLD AUTO: 9.4 %
MONOCYTES # BLD AUTO: 0.4 10E9/L (ref 0–1.3)
MONOCYTES NFR BLD AUTO: 5.4 %
NEUTROPHILS # BLD AUTO: 6 10E9/L (ref 1.6–8.3)
NEUTROPHILS NFR BLD AUTO: 82.7 %
NITRATE UR QL: NEGATIVE
PH UR STRIP: 5.5 PH (ref 5–7)
SOURCE: NORMAL
SP GR UR STRIP: 1.01 (ref 1–1.03)
UROBILINOGEN UR STRIP-ACNC: 1 EU/DL (ref 0.2–1)
WBC # BLD AUTO: 7.2 10E9/L (ref 4–11)

## 2019-05-23 PROCEDURE — 99214 OFFICE O/P EST MOD 30 MIN: CPT

## 2019-05-23 PROCEDURE — 80053 COMPREHEN METABOLIC PANEL: CPT | Performed by: FAMILY MEDICINE

## 2019-05-23 PROCEDURE — 71046 X-RAY EXAM CHEST 2 VIEWS: CPT

## 2019-05-23 PROCEDURE — 85048 AUTOMATED LEUKOCYTE COUNT: CPT | Performed by: FAMILY MEDICINE

## 2019-05-23 PROCEDURE — 36415 COLL VENOUS BLD VENIPUNCTURE: CPT | Performed by: FAMILY MEDICINE

## 2019-05-23 PROCEDURE — 85004 AUTOMATED DIFF WBC COUNT: CPT | Performed by: FAMILY MEDICINE

## 2019-05-23 PROCEDURE — 81003 URINALYSIS AUTO W/O SCOPE: CPT | Performed by: FAMILY MEDICINE

## 2019-05-23 RX ORDER — PAROXETINE 10 MG/1
10 TABLET, FILM COATED ORAL EVERY MORNING
Qty: 30 TABLET | Refills: 0 | OUTPATIENT
Start: 2019-05-23

## 2019-05-23 RX ORDER — HYDRALAZINE HYDROCHLORIDE 25 MG/1
25 TABLET, FILM COATED ORAL 2 TIMES DAILY
Qty: 60 TABLET | Refills: 0 | OUTPATIENT
Start: 2019-05-23

## 2019-05-23 RX ORDER — AZITHROMYCIN 250 MG/1
TABLET, FILM COATED ORAL
Qty: 6 TABLET | Refills: 0 | Status: ON HOLD | OUTPATIENT
Start: 2019-05-23 | End: 2019-05-28

## 2019-05-23 RX ORDER — CODEINE PHOSPHATE AND GUAIFENESIN 10; 100 MG/5ML; MG/5ML
1-2 SOLUTION ORAL EVERY 4 HOURS PRN
Qty: 118 ML | Refills: 0 | Status: SHIPPED | OUTPATIENT
Start: 2019-05-23 | End: 2019-08-15

## 2019-05-23 NOTE — TELEPHONE ENCOUNTER
"Requested Prescriptions   Pending Prescriptions Disp Refills     hydrALAZINE (APRESOLINE) 25 MG tablet [Pharmacy Med Name: HYDRALAZINE 25 MG TABLET] 60 tablet 0     Sig: TAKE 1 TABLET (25 MG) BY MOUTH 2 TIMES DAILY   Last Written Prescription Date:  5/1/19  Last Fill Quantity: 60,  # refills: 1   Last office visit: 5/1/2019 with prescribing provider:  Milton Nicolas Office Visit:        Vasodilators Failed - 5/23/2019  9:33 AM        Failed - Most recent BP less than 140/90 on record     BP Readings from Last 3 Encounters:   05/01/19 160/70   12/04/18 136/80   06/18/18 113/73                 Passed - Most recent encounter is not a hospital encounter. Patient has recent (12 mos) or future (1 mos) visit with authorizing provider's specialty     Patient's most recent encounter is NOT a hospital encounter and has had an office visit in the last 12 months or has a visit in the next 30 days with authorizing provider or within the authorizing provider's specialty.      See \"Patient Info\" tab in inbasket, or \"Choose Columns\" in Meds & Orders section of the refill encounter.      If most recent encounter is a hospital encounter AND the patient does NOT have an appointment scheduled with the authorizing provider or authorizing provider's specialty within the next 30 days, forward refill to authorizing provider for medication review.          Passed - Medication is active on med list        Passed - Patient is of age 18 years or older        Passed - Patient is not pregnant        Passed - Patient has not had a positive pregnancy test within the past 12 months        PARoxetine (PAXIL) 10 MG tablet [Pharmacy Med Name: PAROXETINE HCL 10 MG TABLET] 30 tablet 0     Sig: TAKE 1 TABLET (10 MG) BY MOUTH EVERY MORNING   Last Written Prescription Date:  5/1/19  Last Fill Quantity: 30,  # refills: 1   Last office visit: 5/1/2019 with prescribing provider:  Milton Nicolas Office Visit:        SSRIs Protocol Passed - 5/23/2019  9:33 " "AM        Passed - Recent (12 mo) or future (30 days) visit within the authorizing provider's specialty     Patient had office visit in the last 12 months or has a visit in the next 30 days with authorizing provider or within the authorizing provider's specialty.  See \"Patient Info\" tab in inbasket, or \"Choose Columns\" in Meds & Orders section of the refill encounter.              Passed - Medication is active on med list        Passed - Patient is age 18 or older        Passed - No active pregnancy on record        Passed - No positive pregnancy test in last 12 months        Rxs were both e-prescribed on 5/1 and had a refill.  This is a duplicate request and was denied.  Maria C Miller RN    "

## 2019-05-24 ENCOUNTER — TELEPHONE (OUTPATIENT)
Dept: INTERNAL MEDICINE | Facility: CLINIC | Age: 64
End: 2019-05-24

## 2019-05-24 DIAGNOSIS — R05.9 COUGH: ICD-10-CM

## 2019-05-24 DIAGNOSIS — J18.9 COMMUNITY ACQUIRED PNEUMONIA, UNSPECIFIED LATERALITY: Primary | ICD-10-CM

## 2019-05-24 LAB
ALBUMIN SERPL-MCNC: 3.2 G/DL (ref 3.4–5)
ALP SERPL-CCNC: 101 U/L (ref 40–150)
ALT SERPL W P-5'-P-CCNC: 79 U/L (ref 0–50)
ANION GAP SERPL CALCULATED.3IONS-SCNC: 7 MMOL/L (ref 3–14)
AST SERPL W P-5'-P-CCNC: 50 U/L (ref 0–45)
BILIRUB SERPL-MCNC: 0.3 MG/DL (ref 0.2–1.3)
BUN SERPL-MCNC: 15 MG/DL (ref 7–30)
CALCIUM SERPL-MCNC: 10.1 MG/DL (ref 8.5–10.1)
CHLORIDE SERPL-SCNC: 95 MMOL/L (ref 94–109)
CO2 SERPL-SCNC: 28 MMOL/L (ref 20–32)
CREAT SERPL-MCNC: 0.74 MG/DL (ref 0.52–1.04)
GFR SERPL CREATININE-BSD FRML MDRD: 85 ML/MIN/{1.73_M2}
GLUCOSE SERPL-MCNC: 105 MG/DL (ref 70–99)
POTASSIUM SERPL-SCNC: 4.2 MMOL/L (ref 3.4–5.3)
PROT SERPL-MCNC: 6.6 G/DL (ref 6.8–8.8)
SODIUM SERPL-SCNC: 130 MMOL/L (ref 133–144)

## 2019-05-24 RX ORDER — BENZONATATE 200 MG/1
200 CAPSULE ORAL 3 TIMES DAILY PRN
Qty: 30 CAPSULE | Refills: 0 | Status: ON HOLD | OUTPATIENT
Start: 2019-05-24 | End: 2019-05-28

## 2019-05-24 NOTE — PROGRESS NOTES
SUBJECTIVE:   Denise Ralph is a 64 year old female with history of anxiety, depressive disorder, essential hypertension, IBS, lumbar degenerative disease presenting with a chief complaint of cough, fever for 9 days and also generalized tiredness and fatigue.  And some leg swelling.  She was in [place] and developed high fever and was told that she possibly had influenza B though no testing was done.  Patient continued having diarrhea fever along with coughing and also feeling tired.  Currently she continues having coughing symptoms which she denies being productive but has fever along with generalized fatigue.  She has tried taking over-the-counter medication with not much relief of symptoms.   She is an established patient of [facility].  Onset of symptoms was 9 day(s) ago.  Course of illness is worsening.    Severity moderate  Current and Associated symptoms: cough - non-productive  Treatment measures tried include Tylenol/Ibuprofen.  Predisposing factors include ill contact:     Past Medical History:   Diagnosis Date     Anxiety     Gets panic attacks     Depressive disorder, not elsewhere classified      Essential hypertension, benign     No cardiologist     Irritable bowel syndrome     has had neg colonoscopy & EGD w/u     Lumbar degenerative disc disease 1996    Had PT, traction, no surgery     Other chronic pain     JOint pain for many years.     PONV (postoperative nausea and vomiting)      Sciatica 3/06    R thigh;  MRI = R L2-3 disc      Unspecified glaucoma(365.9)      Current Outpatient Medications   Medication Sig Dispense Refill     ALPRAZolam (XANAX) 0.25 MG tablet Take 1 tablet (0.25 mg) by mouth 3 times daily AS NEEDED FOR ANXIETY 30 tablet 1     amLODIPine (NORVASC) 5 MG tablet Take 1 tablet (5 mg) by mouth daily 90 tablet 3     atenolol (TENORMIN) 50 MG tablet TAKE 1 TABLET (50 MG) BY MOUTH 2 TIMES DAILY 180 tablet 3     azithromycin (ZITHROMAX) 250 MG tablet Take 2 tablets (500 mg) by mouth daily  for 1 day, THEN 1 tablet (250 mg) daily for 4 days. 6 tablet 0     guaiFENesin-codeine (ROBITUSSIN AC) 100-10 MG/5ML solution Take 5-10 mLs by mouth every 4 hours as needed for cough 118 mL 0     hydrALAZINE (APRESOLINE) 25 MG tablet Take 1 tablet (25 mg) by mouth 2 times daily 60 tablet 1     latanoprost (XALATAN) 0.005 % ophthalmic solution Place 1 drop into both eyes At Bedtime       traZODone (DESYREL) 100 MG tablet TAKE 1 TABLET BY MOUTH NIGHTLY AS NEEDED FOR SLEEP 90 tablet 1     benzonatate (TESSALON) 200 MG capsule Take 1 capsule (200 mg) by mouth 3 times daily as needed for cough 30 capsule 0     PARoxetine (PAXIL) 10 MG tablet Take 1 tablet (10 mg) by mouth every morning (Patient not taking: Reported on 2019) 30 tablet 1     Social History     Tobacco Use     Smoking status: Former Smoker     Packs/day: 0.50     Years: 25.00     Pack years: 12.50     Last attempt to quit: 10/1/1997     Years since quittin.6     Smokeless tobacco: Never Used     Tobacco comment: quit    Substance Use Topics     Alcohol use: Yes     Alcohol/week: 1.0 oz     Types: 2 Glasses of wine per week     Comment: 2 glasses of wine daily     Family History   Problem Relation Age of Onset     Cardiovascular Mother         / mi     Breast Cancer Mother         diagnosed age 60's     Respiratory Father         pulmonary fibrosis.     Cardiovascular Brother          of MI age 34 2nd to Cocaine Use     Cancer - colorectal No family hx of          ROS:    10 point ROS of systems including Eyes,  Cardiovascular, Gastroenterology, Genitourinary, Integumentary, Muscularskeletal, Psychiatric were all negative except for pertinent positives noted in my HPI         OBJECTIVE:  /71   Pulse 79   Temp 100.5  F (38.1  C) (Tympanic)   Wt 63.7 kg (140 lb 8 oz)   SpO2 96%   Breastfeeding? No   BMI 23.38 kg/m    GENERAL APPEARANCE: healthy, alert and no distress  EYES: EOMI,  PERRL, conjunctiva clear  HENT: ear canals  and TM's normal.  Nose and mouth without ulcers, erythema or lesions  NECK: supple, nontender, no lymphadenopathy  RESP: lungs clear to auscultation - no rales, rhonchi or wheezes  CV: regular rates and rhythm, normal S1 S2, no murmur noted  ABDOMEN:  soft, nontender, no HSM or masses and bowel sounds normal  SKIN: no suspicious lesions or rashes  Physical Exam      X-Ray was done, my findings are: No acute infiltrate though in the left upper lung there is a opacity which could be an infiltrate.      Medical Decision Making:    Differential Diagnosis:  URI Adult/Peds:  Bronchitis-viral, Influenza, Pneumonia, Viral pharyngitis, Viral syndrome and Viral upper respiratory illness      ASSESSMENT:  Denise was seen today for urgent care, uri and swelling.    Diagnoses and all orders for this visit:    Fever in adult  -     WBC with Diff  -     XR Chest 2 Views  -     Comprehensive metabolic panel  -     UA reflex to Microscopic  -     azithromycin (ZITHROMAX) 250 MG tablet; Take 2 tablets (500 mg) by mouth daily for 1 day, THEN 1 tablet (250 mg) daily for 4 days.    Leg swelling    Tiredness    Productive cough  -     azithromycin (ZITHROMAX) 250 MG tablet; Take 2 tablets (500 mg) by mouth daily for 1 day, THEN 1 tablet (250 mg) daily for 4 days.  -     guaiFENesin-codeine (ROBITUSSIN AC) 100-10 MG/5ML solution; Take 5-10 mLs by mouth every 4 hours as needed for cough    .  Results for orders placed or performed in visit on 05/23/19   XR Chest 2 Views    Narrative    XR CHEST 2 VW 5/24/2019 8:46 AM    HISTORY: Fever in adult; Cough      Impression    IMPRESSION: Negative.    YOBANI HODGES MD   WBC with Diff   Result Value Ref Range    WBC 7.2 4.0 - 11.0 10e9/L    % Neutrophils 82.7 %    % Lymphocytes 9.4 %    % Monocytes 5.4 %    % Eosinophils 2.2 %    % Basophils 0.3 %    Absolute Neutrophil 6.0 1.6 - 8.3 10e9/L    Absolute Lymphocytes 0.7 (L) 0.8 - 5.3 10e9/L    Absolute Monocytes 0.4 0.0 - 1.3 10e9/L    Absolute  Eosinophils 0.2 0.0 - 0.7 10e9/L    Absolute Basophils 0.0 0.0 - 0.2 10e9/L    Diff Method Automated Method    Comprehensive metabolic panel   Result Value Ref Range    Sodium 130 (L) 133 - 144 mmol/L    Potassium 4.2 3.4 - 5.3 mmol/L    Chloride 95 94 - 109 mmol/L    Carbon Dioxide 28 20 - 32 mmol/L    Anion Gap 7 3 - 14 mmol/L    Glucose 105 (H) 70 - 99 mg/dL    Urea Nitrogen 15 7 - 30 mg/dL    Creatinine 0.74 0.52 - 1.04 mg/dL    GFR Estimate 85 >60 mL/min/[1.73_m2]    GFR Estimate If Black >90 >60 mL/min/[1.73_m2]    Calcium 10.1 8.5 - 10.1 mg/dL    Bilirubin Total 0.3 0.2 - 1.3 mg/dL    Albumin 3.2 (L) 3.4 - 5.0 g/dL    Protein Total 6.6 (L) 6.8 - 8.8 g/dL    Alkaline Phosphatase 101 40 - 150 U/L    ALT 79 (H) 0 - 50 U/L    AST 50 (H) 0 - 45 U/L   UA reflex to Microscopic   Result Value Ref Range    Color Urine Yellow     Appearance Urine Clear     Glucose Urine Negative NEG^Negative mg/dL    Bilirubin Urine Negative NEG^Negative    Ketones Urine Negative NEG^Negative mg/dL    Specific Gravity Urine 1.010 1.003 - 1.035    Blood Urine Negative NEG^Negative    pH Urine 5.5 5.0 - 7.0 pH    Protein Albumin Urine Negative NEG^Negative mg/dL    Urobilinogen Urine 1.0 0.2 - 1.0 EU/dL    Nitrite Urine Negative NEG^Negative    Leukocyte Esterase Urine Negative NEG^Negative    Source Midstream Urine            PLAN:  Due to her prolonged history of coughing and fever would treat her with antibiotic   Tylenol, Ibuprofen, Fluids, Rest, Saline gargles, Saline nasal spray and Vaporizer will treat with azithromycin for 5 days  If symptoms do not get better in 48 hours should follow-up for further evaluation and treatment  See orders in Epic  Discussed with pt that with fever and cough for 9 days would treat her with the antibiotic   Paola Pitts MD

## 2019-05-24 NOTE — TELEPHONE ENCOUNTER
She was not diagnosed with pneumonia, the chest x-ray was clear, the lung exam was clear, the doctors notes that it was cough, not pneumonia.  It may be a virus or it could be a bacterial bronchitis.  I think she should stay with the antibiotic she was prescribed.  I will send the Tessalon.  It can be taken 3 times a day, she should take a sip of warm water and swallow that then try to swallow the capsule without water or just a sip of water soap melts in the throat.  Do not eat or drink anything for 10 to 15 minutes after taking it.  Her primary can review the information about the ankle swelling and blood pressure when he returns next week.  Do not close this message.

## 2019-05-24 NOTE — TELEPHONE ENCOUNTER
Patient was seen in  last night and diagnosed with Pneumonia.  She was given Zithromax even though she expressed to the doctor that it has not worked well for her in the past and she ended up in the hospital.  Patient is requesting an alternative medication.    Patient is requesting something for her cough without codeine.  Patient states she has heard of a pill that helps with coughs.  Please advise.    Patient reports swollen ankles for almost two weeks.  Patient increased her Amlodipine about 3 weeks ago and feels this is what is causing the swelling.  Patient states she cut her tablet in half this morning.  Please advise if this is ok moving forward.

## 2019-05-24 NOTE — TELEPHONE ENCOUNTER
Patient advised of Dr. Mcrae's message below.    Routed to primary care provider to address the ankle  edema and blood pressure issue.    Please advise, thanks.

## 2019-05-27 ENCOUNTER — APPOINTMENT (OUTPATIENT)
Dept: GENERAL RADIOLOGY | Facility: CLINIC | Age: 64
DRG: 864 | End: 2019-05-27
Attending: EMERGENCY MEDICINE
Payer: MEDICARE

## 2019-05-27 ENCOUNTER — APPOINTMENT (OUTPATIENT)
Dept: CT IMAGING | Facility: CLINIC | Age: 64
DRG: 864 | End: 2019-05-27
Attending: EMERGENCY MEDICINE
Payer: MEDICARE

## 2019-05-27 ENCOUNTER — HOSPITAL ENCOUNTER (INPATIENT)
Facility: CLINIC | Age: 64
LOS: 1 days | Discharge: HOME | DRG: 864 | End: 2019-05-28
Attending: EMERGENCY MEDICINE | Admitting: INTERNAL MEDICINE
Payer: MEDICARE

## 2019-05-27 DIAGNOSIS — R50.9 FEVER, UNSPECIFIED FEVER CAUSE: ICD-10-CM

## 2019-05-27 LAB
ALBUMIN SERPL-MCNC: 3.3 G/DL (ref 3.4–5)
ALBUMIN UR-MCNC: NEGATIVE MG/DL
ALP SERPL-CCNC: 97 U/L (ref 40–150)
ALT SERPL W P-5'-P-CCNC: 44 U/L (ref 0–50)
ANION GAP SERPL CALCULATED.3IONS-SCNC: 5 MMOL/L (ref 3–14)
APPEARANCE UR: CLEAR
AST SERPL W P-5'-P-CCNC: 25 U/L (ref 0–45)
BASOPHILS # BLD AUTO: 0.1 10E9/L (ref 0–0.2)
BASOPHILS NFR BLD AUTO: 0.7 %
BILIRUB DIRECT SERPL-MCNC: 0.1 MG/DL (ref 0–0.2)
BILIRUB SERPL-MCNC: 0.4 MG/DL (ref 0.2–1.3)
BILIRUB UR QL STRIP: NEGATIVE
BUN SERPL-MCNC: 14 MG/DL (ref 7–30)
CALCIUM SERPL-MCNC: 9.1 MG/DL (ref 8.5–10.1)
CHLORIDE SERPL-SCNC: 96 MMOL/L (ref 94–109)
CO2 SERPL-SCNC: 29 MMOL/L (ref 20–32)
COLOR UR AUTO: NORMAL
CREAT SERPL-MCNC: 0.72 MG/DL (ref 0.52–1.04)
CRP SERPL-MCNC: 11.1 MG/L (ref 0–8)
DIFFERENTIAL METHOD BLD: ABNORMAL
EOSINOPHIL # BLD AUTO: 0.6 10E9/L (ref 0–0.7)
EOSINOPHIL NFR BLD AUTO: 5.2 %
ERYTHROCYTE [DISTWIDTH] IN BLOOD BY AUTOMATED COUNT: 12.1 % (ref 10–15)
ERYTHROCYTE [SEDIMENTATION RATE] IN BLOOD BY WESTERGREN METHOD: 22 MM/H (ref 0–30)
GFR SERPL CREATININE-BSD FRML MDRD: 88 ML/MIN/{1.73_M2}
GLUCOSE SERPL-MCNC: 100 MG/DL (ref 70–99)
GLUCOSE UR STRIP-MCNC: NEGATIVE MG/DL
HCT VFR BLD AUTO: 34.4 % (ref 35–47)
HGB BLD-MCNC: 11.7 G/DL (ref 11.7–15.7)
HGB UR QL STRIP: NEGATIVE
IMM GRANULOCYTES # BLD: 0.1 10E9/L (ref 0–0.4)
IMM GRANULOCYTES NFR BLD: 0.8 %
KETONES UR STRIP-MCNC: NEGATIVE MG/DL
LACTATE SERPL-SCNC: 1.2 MMOL/L (ref 0.4–2)
LEUKOCYTE ESTERASE UR QL STRIP: NEGATIVE
LYMPHOCYTES # BLD AUTO: 1.2 10E9/L (ref 0.8–5.3)
LYMPHOCYTES NFR BLD AUTO: 10.9 %
MCH RBC QN AUTO: 32.5 PG (ref 26.5–33)
MCHC RBC AUTO-ENTMCNC: 34 G/DL (ref 31.5–36.5)
MCV RBC AUTO: 96 FL (ref 78–100)
MONOCYTES # BLD AUTO: 0.5 10E9/L (ref 0–1.3)
MONOCYTES NFR BLD AUTO: 4.5 %
NEUTROPHILS # BLD AUTO: 8.3 10E9/L (ref 1.6–8.3)
NEUTROPHILS NFR BLD AUTO: 77.9 %
NITRATE UR QL: NEGATIVE
NRBC # BLD AUTO: 0 10*3/UL
NRBC BLD AUTO-RTO: 0 /100
PH UR STRIP: 6.5 PH (ref 5–7)
PLATELET # BLD AUTO: 431 10E9/L (ref 150–450)
POTASSIUM SERPL-SCNC: 3.8 MMOL/L (ref 3.4–5.3)
PROT SERPL-MCNC: 6.8 G/DL (ref 6.8–8.8)
RBC # BLD AUTO: 3.6 10E12/L (ref 3.8–5.2)
RBC #/AREA URNS AUTO: <1 /HPF (ref 0–2)
SODIUM SERPL-SCNC: 130 MMOL/L (ref 133–144)
SOURCE: NORMAL
SP GR UR STRIP: 1.01 (ref 1–1.03)
SQUAMOUS #/AREA URNS AUTO: <1 /HPF (ref 0–1)
UROBILINOGEN UR STRIP-MCNC: NORMAL MG/DL (ref 0–2)
WBC # BLD AUTO: 10.6 10E9/L (ref 4–11)
WBC #/AREA URNS AUTO: <1 /HPF (ref 0–5)

## 2019-05-27 PROCEDURE — 96374 THER/PROPH/DIAG INJ IV PUSH: CPT

## 2019-05-27 PROCEDURE — 80076 HEPATIC FUNCTION PANEL: CPT | Performed by: EMERGENCY MEDICINE

## 2019-05-27 PROCEDURE — 87040 BLOOD CULTURE FOR BACTERIA: CPT | Performed by: EMERGENCY MEDICINE

## 2019-05-27 PROCEDURE — 25000132 ZZH RX MED GY IP 250 OP 250 PS 637: Performed by: EMERGENCY MEDICINE

## 2019-05-27 PROCEDURE — 84145 PROCALCITONIN (PCT): CPT | Performed by: INTERNAL MEDICINE

## 2019-05-27 PROCEDURE — 36415 COLL VENOUS BLD VENIPUNCTURE: CPT | Performed by: EMERGENCY MEDICINE

## 2019-05-27 PROCEDURE — 96375 TX/PRO/DX INJ NEW DRUG ADDON: CPT

## 2019-05-27 PROCEDURE — 70450 CT HEAD/BRAIN W/O DYE: CPT

## 2019-05-27 PROCEDURE — 80048 BASIC METABOLIC PNL TOTAL CA: CPT | Performed by: EMERGENCY MEDICINE

## 2019-05-27 PROCEDURE — 83605 ASSAY OF LACTIC ACID: CPT | Performed by: EMERGENCY MEDICINE

## 2019-05-27 PROCEDURE — 86140 C-REACTIVE PROTEIN: CPT | Performed by: EMERGENCY MEDICINE

## 2019-05-27 PROCEDURE — 85652 RBC SED RATE AUTOMATED: CPT | Performed by: EMERGENCY MEDICINE

## 2019-05-27 PROCEDURE — 87899 AGENT NOS ASSAY W/OPTIC: CPT | Performed by: EMERGENCY MEDICINE

## 2019-05-27 PROCEDURE — 71046 X-RAY EXAM CHEST 2 VIEWS: CPT

## 2019-05-27 PROCEDURE — 81001 URINALYSIS AUTO W/SCOPE: CPT | Performed by: EMERGENCY MEDICINE

## 2019-05-27 PROCEDURE — 85025 COMPLETE CBC W/AUTO DIFF WBC: CPT | Performed by: EMERGENCY MEDICINE

## 2019-05-27 PROCEDURE — 87015 SPECIMEN INFECT AGNT CONCNTJ: CPT | Performed by: EMERGENCY MEDICINE

## 2019-05-27 PROCEDURE — 25000128 H RX IP 250 OP 636: Performed by: EMERGENCY MEDICINE

## 2019-05-27 PROCEDURE — 72100 X-RAY EXAM L-S SPINE 2/3 VWS: CPT

## 2019-05-27 PROCEDURE — 87207 SMEAR SPECIAL STAIN: CPT | Performed by: EMERGENCY MEDICINE

## 2019-05-27 PROCEDURE — A9270 NON-COVERED ITEM OR SERVICE: HCPCS | Performed by: EMERGENCY MEDICINE

## 2019-05-27 PROCEDURE — 99285 EMERGENCY DEPT VISIT HI MDM: CPT | Mod: 25

## 2019-05-27 RX ORDER — DIPHENHYDRAMINE HYDROCHLORIDE 50 MG/ML
25 INJECTION INTRAMUSCULAR; INTRAVENOUS ONCE
Status: COMPLETED | OUTPATIENT
Start: 2019-05-27 | End: 2019-05-27

## 2019-05-27 RX ORDER — ACETAMINOPHEN 500 MG
500 TABLET ORAL EVERY 4 HOURS PRN
Status: DISCONTINUED | OUTPATIENT
Start: 2019-05-27 | End: 2019-05-28

## 2019-05-27 RX ADMIN — ACETAMINOPHEN 500 MG: 500 TABLET, FILM COATED ORAL at 22:47

## 2019-05-27 RX ADMIN — PROCHLORPERAZINE EDISYLATE 5 MG: 5 INJECTION INTRAMUSCULAR; INTRAVENOUS at 23:54

## 2019-05-27 RX ADMIN — DIPHENHYDRAMINE HYDROCHLORIDE 25 MG: 50 INJECTION, SOLUTION INTRAMUSCULAR; INTRAVENOUS at 23:54

## 2019-05-27 ASSESSMENT — ENCOUNTER SYMPTOMS
FREQUENCY: 0
NAUSEA: 1
CONSTIPATION: 1
CHILLS: 1
DIFFICULTY URINATING: 0
FEVER: 1
COUGH: 1
DYSURIA: 0
HEMATURIA: 0
SHORTNESS OF BREATH: 0
HEADACHES: 1
VOMITING: 0

## 2019-05-28 ENCOUNTER — APPOINTMENT (OUTPATIENT)
Dept: GENERAL RADIOLOGY | Facility: CLINIC | Age: 64
DRG: 864 | End: 2019-05-28
Attending: INTERNAL MEDICINE
Payer: MEDICARE

## 2019-05-28 VITALS
DIASTOLIC BLOOD PRESSURE: 76 MMHG | BODY MASS INDEX: 23.61 KG/M2 | OXYGEN SATURATION: 96 % | WEIGHT: 141.9 LBS | TEMPERATURE: 97.4 F | RESPIRATION RATE: 16 BRPM | HEART RATE: 70 BPM | SYSTOLIC BLOOD PRESSURE: 143 MMHG

## 2019-05-28 PROBLEM — R50.9 FUO (FEVER OF UNKNOWN ORIGIN): Status: ACTIVE | Noted: 2019-05-28

## 2019-05-28 LAB
ANION GAP SERPL CALCULATED.3IONS-SCNC: 3 MMOL/L (ref 3–14)
APPEARANCE CSF: CLEAR
BUN SERPL-MCNC: 10 MG/DL (ref 7–30)
CALCIUM SERPL-MCNC: 8.4 MG/DL (ref 8.5–10.1)
CHLORIDE SERPL-SCNC: 101 MMOL/L (ref 94–109)
CO2 SERPL-SCNC: 29 MMOL/L (ref 20–32)
COLOR CSF: COLORLESS
CREAT SERPL-MCNC: 0.65 MG/DL (ref 0.52–1.04)
ERYTHROCYTE [DISTWIDTH] IN BLOOD BY AUTOMATED COUNT: 12.1 % (ref 10–15)
EV RNA SPEC QL NAA+PROBE: NEGATIVE
FLUAV+FLUBV RNA SPEC QL NAA+PROBE: NEGATIVE
FLUAV+FLUBV RNA SPEC QL NAA+PROBE: NEGATIVE
GFR SERPL CREATININE-BSD FRML MDRD: >90 ML/MIN/{1.73_M2}
GLUCOSE CSF-MCNC: 51 MG/DL (ref 40–70)
GLUCOSE SERPL-MCNC: 92 MG/DL (ref 70–99)
GRAM STN SPEC: NORMAL
HCT VFR BLD AUTO: 29.5 % (ref 35–47)
HGB BLD-MCNC: 10 G/DL (ref 11.7–15.7)
Lab: NORMAL
MCH RBC QN AUTO: 32.3 PG (ref 26.5–33)
MCHC RBC AUTO-ENTMCNC: 33.9 G/DL (ref 31.5–36.5)
MCV RBC AUTO: 95 FL (ref 78–100)
PARASITE SPEC INSPECT: NORMAL
PARASITE SPEC INSPECT: NORMAL
PLASMODIUM AG BLD QL IA: NEGATIVE
PLATELET # BLD AUTO: 368 10E9/L (ref 150–450)
POTASSIUM SERPL-SCNC: 4 MMOL/L (ref 3.4–5.3)
PROCALCITONIN SERPL-MCNC: 0.31 NG/ML
PROT CSF-MCNC: 35 MG/DL (ref 15–60)
RBC # BLD AUTO: 3.1 10E12/L (ref 3.8–5.2)
RBC # CSF MANUAL: 0 /UL (ref 0–2)
RBC # CSF MANUAL: NORMAL /UL (ref 0–2)
RSV RNA SPEC NAA+PROBE: NEGATIVE
SODIUM SERPL-SCNC: 133 MMOL/L (ref 133–144)
SPECIMEN SOURCE: NORMAL
TUBE # CSF: 4 #
WBC # BLD AUTO: 7.3 10E9/L (ref 4–11)
WBC # CSF MANUAL: 4 /UL (ref 0–5)
WBC # CSF MANUAL: NORMAL /UL (ref 0–5)

## 2019-05-28 PROCEDURE — A9270 NON-COVERED ITEM OR SERVICE: HCPCS | Performed by: INTERNAL MEDICINE

## 2019-05-28 PROCEDURE — 36415 COLL VENOUS BLD VENIPUNCTURE: CPT | Performed by: INTERNAL MEDICINE

## 2019-05-28 PROCEDURE — 87015 SPECIMEN INFECT AGNT CONCNTJ: CPT | Performed by: INTERNAL MEDICINE

## 2019-05-28 PROCEDURE — 25000128 H RX IP 250 OP 636: Performed by: EMERGENCY MEDICINE

## 2019-05-28 PROCEDURE — 80048 BASIC METABOLIC PNL TOTAL CA: CPT | Performed by: INTERNAL MEDICINE

## 2019-05-28 PROCEDURE — 86789 WEST NILE VIRUS ANTIBODY: CPT | Performed by: INTERNAL MEDICINE

## 2019-05-28 PROCEDURE — 25800030 ZZH RX IP 258 OP 636: Performed by: EMERGENCY MEDICINE

## 2019-05-28 PROCEDURE — 89050 BODY FLUID CELL COUNT: CPT | Performed by: INTERNAL MEDICINE

## 2019-05-28 PROCEDURE — 87631 RESP VIRUS 3-5 TARGETS: CPT | Performed by: INTERNAL MEDICINE

## 2019-05-28 PROCEDURE — 99235 HOSP IP/OBS SAME DATE MOD 70: CPT | Performed by: INTERNAL MEDICINE

## 2019-05-28 PROCEDURE — 96375 TX/PRO/DX INJ NEW DRUG ADDON: CPT

## 2019-05-28 PROCEDURE — 86788 WEST NILE VIRUS AB IGM: CPT | Performed by: INTERNAL MEDICINE

## 2019-05-28 PROCEDURE — 87529 HSV DNA AMP PROBE: CPT | Performed by: INTERNAL MEDICINE

## 2019-05-28 PROCEDURE — 25800030 ZZH RX IP 258 OP 636: Performed by: INTERNAL MEDICINE

## 2019-05-28 PROCEDURE — 84157 ASSAY OF PROTEIN OTHER: CPT | Performed by: INTERNAL MEDICINE

## 2019-05-28 PROCEDURE — 009U3ZX DRAINAGE OF SPINAL CANAL, PERCUTANEOUS APPROACH, DIAGNOSTIC: ICD-10-PCS | Performed by: RADIOLOGY

## 2019-05-28 PROCEDURE — 86617 LYME DISEASE ANTIBODY: CPT | Performed by: INTERNAL MEDICINE

## 2019-05-28 PROCEDURE — 87498 ENTEROVIRUS PROBE&REVRS TRNS: CPT | Performed by: INTERNAL MEDICINE

## 2019-05-28 PROCEDURE — 25000132 ZZH RX MED GY IP 250 OP 250 PS 637: Performed by: INTERNAL MEDICINE

## 2019-05-28 PROCEDURE — 99207 ZZC CDG-CODE CATEGORY CHANGED: CPT | Performed by: INTERNAL MEDICINE

## 2019-05-28 PROCEDURE — 87205 SMEAR GRAM STAIN: CPT | Performed by: INTERNAL MEDICINE

## 2019-05-28 PROCEDURE — 87070 CULTURE OTHR SPECIMN AEROBIC: CPT | Performed by: INTERNAL MEDICINE

## 2019-05-28 PROCEDURE — 85027 COMPLETE CBC AUTOMATED: CPT | Performed by: INTERNAL MEDICINE

## 2019-05-28 PROCEDURE — 25000128 H RX IP 250 OP 636: Performed by: INTERNAL MEDICINE

## 2019-05-28 PROCEDURE — 25000125 ZZHC RX 250: Performed by: RADIOLOGY

## 2019-05-28 PROCEDURE — 82945 GLUCOSE OTHER FLUID: CPT | Performed by: INTERNAL MEDICINE

## 2019-05-28 PROCEDURE — 96374 THER/PROPH/DIAG INJ IV PUSH: CPT

## 2019-05-28 PROCEDURE — 62270 DX LMBR SPI PNXR: CPT

## 2019-05-28 PROCEDURE — 12000000 ZZH R&B MED SURG/OB

## 2019-05-28 RX ORDER — CODEINE PHOSPHATE AND GUAIFENESIN 10; 100 MG/5ML; MG/5ML
5-10 SOLUTION ORAL EVERY 4 HOURS PRN
Status: DISCONTINUED | OUTPATIENT
Start: 2019-05-28 | End: 2019-05-28 | Stop reason: HOSPADM

## 2019-05-28 RX ORDER — HYDROMORPHONE HYDROCHLORIDE 1 MG/ML
.3-.5 INJECTION, SOLUTION INTRAMUSCULAR; INTRAVENOUS; SUBCUTANEOUS
Status: DISCONTINUED | OUTPATIENT
Start: 2019-05-28 | End: 2019-05-28 | Stop reason: HOSPADM

## 2019-05-28 RX ORDER — CEFTRIAXONE 2 G/1
2 INJECTION, POWDER, FOR SOLUTION INTRAMUSCULAR; INTRAVENOUS ONCE
Status: COMPLETED | OUTPATIENT
Start: 2019-05-28 | End: 2019-05-28

## 2019-05-28 RX ORDER — ONDANSETRON 4 MG/1
4 TABLET, ORALLY DISINTEGRATING ORAL EVERY 6 HOURS PRN
Status: DISCONTINUED | OUTPATIENT
Start: 2019-05-28 | End: 2019-05-28 | Stop reason: HOSPADM

## 2019-05-28 RX ORDER — CEFTRIAXONE 2 G/1
2 INJECTION, POWDER, FOR SOLUTION INTRAMUSCULAR; INTRAVENOUS EVERY 12 HOURS
Status: DISCONTINUED | OUTPATIENT
Start: 2019-05-28 | End: 2019-05-28

## 2019-05-28 RX ORDER — OXYCODONE HYDROCHLORIDE 5 MG/1
5-10 TABLET ORAL
Status: DISCONTINUED | OUTPATIENT
Start: 2019-05-28 | End: 2019-05-28 | Stop reason: HOSPADM

## 2019-05-28 RX ORDER — ALPRAZOLAM 0.25 MG
0.25 TABLET ORAL 3 TIMES DAILY PRN
Status: DISCONTINUED | OUTPATIENT
Start: 2019-05-28 | End: 2019-05-28 | Stop reason: HOSPADM

## 2019-05-28 RX ORDER — AMOXICILLIN 250 MG
2 CAPSULE ORAL 2 TIMES DAILY PRN
Status: DISCONTINUED | OUTPATIENT
Start: 2019-05-28 | End: 2019-05-28 | Stop reason: HOSPADM

## 2019-05-28 RX ORDER — ATENOLOL 50 MG/1
50 TABLET ORAL 2 TIMES DAILY
COMMUNITY
End: 2019-12-19

## 2019-05-28 RX ORDER — BENZONATATE 100 MG/1
200 CAPSULE ORAL 3 TIMES DAILY PRN
Status: DISCONTINUED | OUTPATIENT
Start: 2019-05-28 | End: 2019-05-28 | Stop reason: HOSPADM

## 2019-05-28 RX ORDER — AMLODIPINE BESYLATE 2.5 MG/1
2.5 TABLET ORAL DAILY
Status: DISCONTINUED | OUTPATIENT
Start: 2019-05-28 | End: 2019-05-28 | Stop reason: HOSPADM

## 2019-05-28 RX ORDER — NALOXONE HYDROCHLORIDE 0.4 MG/ML
.1-.4 INJECTION, SOLUTION INTRAMUSCULAR; INTRAVENOUS; SUBCUTANEOUS
Status: DISCONTINUED | OUTPATIENT
Start: 2019-05-28 | End: 2019-05-28 | Stop reason: HOSPADM

## 2019-05-28 RX ORDER — PAROXETINE 10 MG/1
10 TABLET, FILM COATED ORAL EVERY MORNING
Status: DISCONTINUED | OUTPATIENT
Start: 2019-05-28 | End: 2019-05-28

## 2019-05-28 RX ORDER — SODIUM CHLORIDE 9 MG/ML
INJECTION, SOLUTION INTRAVENOUS CONTINUOUS
Status: DISCONTINUED | OUTPATIENT
Start: 2019-05-28 | End: 2019-05-28 | Stop reason: HOSPADM

## 2019-05-28 RX ORDER — AZITHROMYCIN 250 MG/1
250 TABLET, FILM COATED ORAL ONCE
Status: COMPLETED | OUTPATIENT
Start: 2019-05-28 | End: 2019-05-28

## 2019-05-28 RX ORDER — HYDRALAZINE HYDROCHLORIDE 25 MG/1
25 TABLET, FILM COATED ORAL 2 TIMES DAILY
Status: DISCONTINUED | OUTPATIENT
Start: 2019-05-28 | End: 2019-05-28 | Stop reason: HOSPADM

## 2019-05-28 RX ORDER — TRAZODONE HYDROCHLORIDE 100 MG/1
100 TABLET ORAL AT BEDTIME
COMMUNITY
End: 2019-08-15

## 2019-05-28 RX ORDER — ONDANSETRON 2 MG/ML
4 INJECTION INTRAMUSCULAR; INTRAVENOUS EVERY 6 HOURS PRN
Status: DISCONTINUED | OUTPATIENT
Start: 2019-05-28 | End: 2019-05-28 | Stop reason: HOSPADM

## 2019-05-28 RX ORDER — AMOXICILLIN 250 MG
1 CAPSULE ORAL 2 TIMES DAILY PRN
Status: DISCONTINUED | OUTPATIENT
Start: 2019-05-28 | End: 2019-05-28 | Stop reason: HOSPADM

## 2019-05-28 RX ORDER — ALPRAZOLAM 0.25 MG
0.25 TABLET ORAL 3 TIMES DAILY PRN
COMMUNITY
End: 2019-07-27

## 2019-05-28 RX ORDER — PROCHLORPERAZINE MALEATE 5 MG
5 TABLET ORAL EVERY 6 HOURS PRN
Status: DISCONTINUED | OUTPATIENT
Start: 2019-05-28 | End: 2019-05-28 | Stop reason: HOSPADM

## 2019-05-28 RX ORDER — AMLODIPINE BESYLATE 5 MG/1
2.5 TABLET ORAL DAILY
COMMUNITY
End: 2019-08-15

## 2019-05-28 RX ORDER — TRAZODONE HYDROCHLORIDE 50 MG/1
100 TABLET, FILM COATED ORAL
Status: DISCONTINUED | OUTPATIENT
Start: 2019-05-28 | End: 2019-05-28 | Stop reason: HOSPADM

## 2019-05-28 RX ORDER — PROCHLORPERAZINE 25 MG
12.5 SUPPOSITORY, RECTAL RECTAL EVERY 12 HOURS PRN
Status: DISCONTINUED | OUTPATIENT
Start: 2019-05-28 | End: 2019-05-28 | Stop reason: HOSPADM

## 2019-05-28 RX ORDER — ACETAMINOPHEN 325 MG/1
975 TABLET ORAL EVERY 6 HOURS PRN
Status: DISCONTINUED | OUTPATIENT
Start: 2019-05-28 | End: 2019-05-28 | Stop reason: HOSPADM

## 2019-05-28 RX ORDER — ATENOLOL 50 MG/1
50 TABLET ORAL 2 TIMES DAILY
Status: DISCONTINUED | OUTPATIENT
Start: 2019-05-28 | End: 2019-05-28 | Stop reason: HOSPADM

## 2019-05-28 RX ORDER — LATANOPROST 50 UG/ML
1 SOLUTION/ DROPS OPHTHALMIC AT BEDTIME
Status: DISCONTINUED | OUTPATIENT
Start: 2019-05-28 | End: 2019-05-28 | Stop reason: HOSPADM

## 2019-05-28 RX ADMIN — SODIUM CHLORIDE: 9 INJECTION, SOLUTION INTRAVENOUS at 11:26

## 2019-05-28 RX ADMIN — LIDOCAINE HYDROCHLORIDE 10 ML: 10 INJECTION, SOLUTION INFILTRATION; PERINEURAL at 14:24

## 2019-05-28 RX ADMIN — CEFTRIAXONE SODIUM 2 G: 2 INJECTION, POWDER, FOR SOLUTION INTRAMUSCULAR; INTRAVENOUS at 00:50

## 2019-05-28 RX ADMIN — AZITHROMYCIN 250 MG: 250 TABLET, FILM COATED ORAL at 09:37

## 2019-05-28 RX ADMIN — SODIUM CHLORIDE: 9 INJECTION, SOLUTION INTRAVENOUS at 17:34

## 2019-05-28 RX ADMIN — SODIUM CHLORIDE: 9 INJECTION, SOLUTION INTRAVENOUS at 02:50

## 2019-05-28 RX ADMIN — ACYCLOVIR SODIUM 600 MG: 50 INJECTION, SOLUTION INTRAVENOUS at 01:17

## 2019-05-28 RX ADMIN — AMLODIPINE BESYLATE 2.5 MG: 2.5 TABLET ORAL at 11:23

## 2019-05-28 RX ADMIN — ACETAMINOPHEN 975 MG: 325 TABLET, FILM COATED ORAL at 12:41

## 2019-05-28 RX ADMIN — ACETAMINOPHEN 975 MG: 325 TABLET, FILM COATED ORAL at 06:33

## 2019-05-28 RX ADMIN — ATENOLOL 50 MG: 50 TABLET ORAL at 11:23

## 2019-05-28 RX ADMIN — CEFTRIAXONE 2 G: 2 INJECTION, POWDER, FOR SOLUTION INTRAMUSCULAR; INTRAVENOUS at 11:26

## 2019-05-28 ASSESSMENT — ACTIVITIES OF DAILY LIVING (ADL)
ADLS_ACUITY_SCORE: 11

## 2019-05-28 NOTE — PHARMACY-ADMISSION MEDICATION HISTORY
Admission medication history interview status for this patient is complete. See [facility] admission navigator for allergy information, prior to admission medications and immunization status.     Medication history interview source(s):Patient  Medication history resources (including written lists, pill bottles, clinic record):None  Primary pharmacy:[facility] on [address]    Changes made to PTA medication list:  Added: none  Deleted: zithromax(completed today), benzonatate(never filled), paxil(not taking)  Changed: Norvasc (decreased from 5mg to 2.5mg about 5 days ago d/t ankle swelling), trazodone from prn to scheduled    Actions taken by pharmacist (provider contacted, etc):None     Additional medication history information:None    Medication reconciliation/reorder completed by provider prior to medication history? Yes    Do you take OTC medications (eg tylenol, ibuprofen, fish oil, eye/ear drops, etc)? N(Y/N)    For patients on insulin therapy: N (Y/N)  Lantus/levemir/NPH/Mix 70/30 dose:   (Y/N) (see Med list for doses)   Sliding scale Novolog Y/N  If Yes, do you have a baseline novolog pre-meal dose:  units with meals  Patients eat three meals a day:   Y/N    How many episodes of hypoglycemia do you have per week: _______  How many missed doses do you have per week: ______  How many times do you check your blood glucose per day: _______  Do you have a Continuous glucose monitor (CGM)   Y/N (remind pt that not approved for hospital use)   Any Barriers to therapy - Be specific :  cost of medications, comfortable with giving injections (if applicable), comfortable and confident with current diabetes regimen: Y/N ______________      Prior to Admission medications    Medication Sig Last Dose Taking? Auth Provider   ALPRAZolam (XANAX) 0.25 MG tablet Take 0.25 mg by mouth 3 times daily as needed for anxiety  Yes Unknown, Entered By History   amLODIPine (NORVASC) 5 MG tablet Take 2.5 mg by mouth daily 5/27/2019 at Unknown time  Yes Unknown, Entered By History   atenolol (TENORMIN) 50 MG tablet Take 50 mg by mouth 2 times daily 5/27/2019 at am Yes Unknown, Entered By History   guaiFENesin-codeine (ROBITUSSIN AC) 100-10 MG/5ML solution Take 5-10 mLs by mouth every 4 hours as needed for cough  Yes Paola Pitts MD   hydrALAZINE (APRESOLINE) 25 MG tablet Take 1 tablet (25 mg) by mouth 2 times daily 5/27/2019 at am Yes Alesha Rose APRN CNP   latanoprost (XALATAN) 0.005 % ophthalmic solution Place 1 drop into both eyes At Bedtime 5/26/2019 at Unknown time Yes Unknown, Entered By History   traZODone (DESYREL) 100 MG tablet Take 100 mg by mouth At Bedtime 5/26/2019 at Unknown time Yes Unknown, Entered By History

## 2019-05-28 NOTE — ED NOTES
[facility]  ED Nurse Handoff Report    Denise Ralph is a 64 year old female presents with ongoing fever and mild fatigue.  Pt has [place] got [place] sx have not improved.  Here for IR guided LP in AM.  Pt is alert and independent in ambulation.    ED Chief complaint: Fever  . ED Diagnosis:   Final diagnoses:   Fever, unspecified fever cause     Allergies:   Allergies   Allergen Reactions     Erythromycin Shortness Of Breath     Msir [Morphine Sulfate] Nausea     Intollerant to Morphine Sulfate: Nausea,vomiting     Dilaudid [Hydromorphone] Visual Disturbance     Hallucinations     Lisinopril      Cough       Losartan      Tongue felt weird       Penicillins Itching     Reglan Other (See Comments)     Muscle spasms in throat       Code Status: Full Code  Activity level - Baseline/Home:  Independent. Activity Level - Current:   Independent. Lift room needed: No. Bariatric: No   Needed: No   Isolation: No. Infection: Not Applicable.     Vital Signs:   Vitals:    05/27/19 2123 05/27/19 2245 05/27/19 2248   BP: 141/84 141/88    Pulse: 76     Resp: 16     Temp: 100.2  F (37.9  C)     TempSrc: Temporal     SpO2: 99%  96%   Weight: 62.1 kg (137 lb)         Cardiac Rhythm:  ,      Pain level: 0-10 Pain Scale: 0  Patient confused: No. Patient Falls Risk: Yes.   Elimination Status: Has voided   Patient Report - Initial Complaint: fever/headache. Focused Assessment: afebrile, mild headache   Tests Performed: blood, urine, xray. Abnormal Results: mild CRP elevation.   Treatments provided: fluids, compazine, benadryl, tylenol  Family Comments:  at bedside  OBS brochure/video discussed/provided to patient:  Yes  ED Medications:   Medications   acetaminophen (TYLENOL) tablet 500 mg (500 mg Oral Given 5/27/19 2247)   acyclovir (ZOVIRAX) 600 mg in D5W 100 mL intermittent infusion (has no administration in time range)   cefTRIAXone (ROCEPHIN) 2 g vial to  attach to  ml bag for ADULTS or NS 50 ml bag for PEDS (has no administration in time range)   prochlorperazine (COMPAZINE) injection 5 mg (5 mg Intravenous Given 5/27/19 0300)   diphenhydrAMINE (BENADRYL) injection 25 mg (25 mg Intravenous Given 5/27/19 2354)     Drips infusing:  No  For the majority of the shift, the patient's behavior Green. Interventions performed were n/a.     Severe Sepsis OR Septic Shock Diagnosis Present: No      ED Nurse Name/Phone Number: Jonathan Seaver,   12:29 AM    RECEIVING UNIT ED HANDOFF REVIEW    Above ED Nurse Handoff Report was reviewed: Yes  Reviewed by: Zeny Ha on May 28, 2019 at 1:22 AM

## 2019-05-28 NOTE — PROGRESS NOTES
Patient tolerated procedure well.  Samples obtained and sent to lab. Patient states understanding to remain flat for the next hour, encouraged to drink fluids today.  Site intact, no drainage.  Dressing applied to site, clean dry and intact.

## 2019-05-28 NOTE — H&P
[facility]  Hospitalist Admission Note  May 28, 2019  Name: Denise Ralph    MRN: [id number]  YOB: 1955    Age: 64 year old  Date of admission: 5/27/2019  Primary care provider: Juan F Smallwood      Summary:  Patient is a 64-year-old female who presents here with persistent fevers.  Past medical history significant for anxiety, depression, hypertension, lumbar degenerative disc disease status post L4-L5 lumbar fusion, and other past medical history as outlined below.  Patient reports that she was in [facility] and started developing a fever 9 days ago while she was there.  She was seen at the urgent care and felt that she probably had the flu.  Only supportive cares with PRN ibuprofen, antitussives, and vitamin C was recommended.  Since arriving back, patient had persistent fevers along with a headache and was seen at urgent care here in the  and empirically started on a course of azithromycin.  She has had 4 days worth.  Because her fever persisted, patient presented to the ED for further evaluation.  No obvious localizing symptoms with the exception of a generalized headache.  Patient denies any history of chronic headaches.  In regards to her fever, she denies any tick bite, unusual ingestion, or sick contact exposure.  I was asked to admit her for further inpatient cares to work-up her fever.      In the ED, patient did receive a dose of ceftriaxone and acyclovir given concerns that with the only symptoms of a generalized headache, they wanted to cover her for possible meningitis.  She denies any nuchal rigidity however.  Labs were remarkable for a sodium 130.  Urinalysis was negative.  Blood cultures were drawn and a malaria and parasitic stain was also ordered.  Chest x-ray and lumbar spine was otherwise negative.     Problem list/Plan:  1. Fever: No obvious source of infection.  Given her complaint of headache, will empirically continue her on IV ceftriaxone  2 g IV every 12 hours until a lumbar puncture could be performed.  The ED physician was not comfortable with attempting lumbar puncture as patient has had an L4-L5 lumbar disc fusion and should get imaging guidance for LP with interventional radiology.  Will order LP.  We will also request infectious disease consultation to assist with diagnosis and treatment.  Again, malaria screen and parasitic stain was ordered in the ED although [facility] is not an endemic area for malaria.  She denies any tick bites.  For completeness, we will check an influenza and RSV swab with PCR although symptoms persisted a lot longer than would be expected and she may be out of the window for treatment.  Low suspicion for HSV so we will hold off on further antivirals for now.  Other than the headache, patient does not have any other symptoms that would suggest meningitis.  No nuchal rigidity.  If infectious work-up is negative, consider other work-up for autoimmune disease or occult malignancy although she does not report any weight loss or localizing symptoms.  Do note that she had a colonoscopy done in 2014 that only demonstrated a sigmoid polyp with pathology representing a benign leiomyoma.  2. Benign essential hypertension: We will resume her chronic amlodipine 5 mg daily, atenolol 50 mg daily, and hydralazine 25 mg p.o. twice daily with parameters  3. History of depression/anxiety: May continue the patient's chronic Paxil with Xanax  4. History of glaucoma: May continue the patient's chronic latanoprost      -Additional workup plan which will include lumbar puncture and infectious disease consultation    All lab work and imaging data independently reviewed by myself    Prophylaxis;  PCD's/Ambulation.     Code status: Home when able    Discharge: To be determined    Time spent: Greater than 40 minutes  Chief Complaint:   Persistent fever and headache   HPI  Patient is a 64-year-old female who presents here with persistent fevers.  Past  medical history significant for anxiety, depression, hypertension, lumbar degenerative disc disease status post L4-L5 lumbar fusion, and other past medical history as outlined below.  Patient reports that she was in [facility] and started developing a fever 9 days ago while she was there.  She was seen at the urgent care and felt that she probably had the flu.  Only supportive cares with PRN ibuprofen, antitussives, and vitamin C was recommended.  Since arriving back, patient had persistent fevers along with a headache and was seen at urgent care here in the  and empirically started on a course of azithromycin.  She has had 4 days worth.  Because her fever persisted, patient presented to the ED for further evaluation.  No obvious localizing symptoms with the exception of a generalized headache.  Patient denies any history of chronic headaches.  In regards to her fever, she denies any tick bite, unusual ingestion, or sick contact exposure.  I was asked to admit her for further inpatient cares to work-up her fever.      In the ED, patient did receive a dose of ceftriaxone and acyclovir given concerns that with the only symptoms of a generalized headache, they wanted to cover her for possible meningitis.  She denies any nuchal rigidity however.  Labs were remarkable for a sodium 130.  Urinalysis was negative.  Blood cultures were drawn and a malaria and parasitic stain was also ordered.  Chest x-ray and lumbar spine was otherwise negative.  I did discuss the case in detail with the ED physician.     Past Medical History:     Past Medical History:   Diagnosis Date     Anxiety     Gets panic attacks     Depressive disorder, not elsewhere classified      Essential hypertension, benign     No cardiologist     Irritable bowel syndrome     has had neg colonoscopy & EGD w/u     Lumbar degenerative disc disease 1996    Had PT, traction, no surgery     Other chronic pain     JOint pain for many years.     PONV  (postoperative nausea and vomiting)      Sciatica 3/06    R thigh;  MRI = R L2-3 disc      Unspecified glaucoma(365.9)      Past Surgical History:     Past Surgical History:   Procedure Laterality Date     EYE SURGERY Bilateral     Treatment of glaucoma     HYSTERECTOMY       HYSTERECTOMY VAGINAL       LAPAROSCOPIC CHOLECYSTECTOMY      with repeat operation because of bile leak     Left shoulder decomp surgery for impingement  approx 1993     OPTICAL TRACKING SYSTEM FUSION POSTERIOR SPINE LUMBAR N/A 8/3/2017    Procedure: OPTICAL TRACKING SYSTEM FUSION SPINE POSTERIOR LUMBAR ONE LEVEL;  1. L4 Ang-Meyers osteotomy with exposure of the L4 and L5 roots  2. L4-5 complete discectomy with arthrodesis  3. Placement of Globus Creo GOMEZ pedicle screws bilaterally from L4 to L5  4. L4 - L5 posterior lateral fusion with placement of [employer]onic Magnifuse and locally harvested autologous bone  5. Use of Stealth and O     WRIST SURGERY Left      Social History:     Social History     Tobacco Use     Smoking status: Former Smoker     Packs/day: 0.50     Years: 25.00     Pack years: 12.50     Last attempt to quit: 10/1/1997     Years since quittin.6     Smokeless tobacco: Never Used     Tobacco comment: quit    Substance Use Topics     Alcohol use: Yes     Alcohol/week: 1.0 oz     Types: 2 Glasses of wine per week     Comment: 2 glasses of wine daily     Drug use: No     Family History:  Family history reviewed. NO pertinent family history     Allergies:     Allergies   Allergen Reactions     Erythromycin Shortness Of Breath     Msir [Morphine Sulfate] Nausea     Intollerant to Morphine Sulfate: Nausea,vomiting     Dilaudid [Hydromorphone] Visual Disturbance     Hallucinations     Lisinopril      Cough       Losartan      Tongue felt weird       Penicillins Itching     Reglan Other (See Comments)     Muscle spasms in throat     Medications:     Medications Prior to Admission   Medication Sig Dispense Refill Last Dose      ALPRAZolam (XANAX) 0.25 MG tablet Take 1 tablet (0.25 mg) by mouth 3 times daily AS NEEDED FOR ANXIETY 30 tablet 1 Taking     amLODIPine (NORVASC) 5 MG tablet Take 1 tablet (5 mg) by mouth daily 90 tablet 3 Taking     atenolol (TENORMIN) 50 MG tablet TAKE 1 TABLET (50 MG) BY MOUTH 2 TIMES DAILY 180 tablet 3 Taking     azithromycin (ZITHROMAX) 250 MG tablet Take 2 tablets (500 mg) by mouth daily for 1 day, THEN 1 tablet (250 mg) daily for 4 days. 6 tablet 0      benzonatate (TESSALON) 200 MG capsule Take 1 capsule (200 mg) by mouth 3 times daily as needed for cough 30 capsule 0      guaiFENesin-codeine (ROBITUSSIN AC) 100-10 MG/5ML solution Take 5-10 mLs by mouth every 4 hours as needed for cough 118 mL 0      hydrALAZINE (APRESOLINE) 25 MG tablet Take 1 tablet (25 mg) by mouth 2 times daily 60 tablet 1 Taking     latanoprost (XALATAN) 0.005 % ophthalmic solution Place 1 drop into both eyes At Bedtime   Taking     PARoxetine (PAXIL) 10 MG tablet Take 1 tablet (10 mg) by mouth every morning (Patient not taking: Reported on 5/23/2019) 30 tablet 1 Not Taking     traZODone (DESYREL) 100 MG tablet TAKE 1 TABLET BY MOUTH NIGHTLY AS NEEDED FOR SLEEP 90 tablet 1 Taking       Review of Systems:   A Comprehensive greater than 10 system review of systems was carried out.  Pertinent positives and negatives are noted above.  Otherwise negative for contributory information.        Physical Exam:  Blood pressure 110/43, pulse 81, temperature 99.8  F (37.7  C), temperature source Oral, resp. rate 20, weight 64.4 kg (141 lb 14.4 oz), SpO2 94 %, not currently breastfeeding.  Gen: Pleasant, appears ill and a bit diaphragmatic. Otherwise, in no acute distress.  HEENT: NCAT. EOMI. PERRL.  Neck: Normal inspection. No bruit, JVD or thyromegaly.  Lungs: Normal respiratory effort. Clear to auscultation bilaterally with no crackles or wheezes.  Card: N s1s2. RRR. No M/R/G.  Peripheral pulses present and symmetric.   Abd: Soft NT ND. No  mass. Normal bowel sounds.  Skin: No rash. Warm to the touch  Extr: No edema. CMS intact  Psychiatric: Patient alert oriented ×3.  Normal affect  Neurologic: Cranial nerves II-XII are intact.  Sensation normal.  Motor strength 5/5    Data:     Recent Labs   Lab 05/27/19 2217 05/23/19 2019   WBC 10.6 7.2   HGB 11.7  --    HCT 34.4*  --    MCV 96  --      --      Recent Labs   Lab 05/27/19 2217 05/23/19 2019   * 130*   POTASSIUM 3.8 4.2   CHLORIDE 96 95   CO2 29 28   ANIONGAP 5 7   * 105*   BUN 14 15   CR 0.72 0.74   GFRESTIMATED 88 85   GFRESTBLACK >90 >90   MARLON 9.1 10.1   PROTTOTAL 6.8 6.6*   ALBUMIN 3.3* 3.2*   BILITOTAL 0.4 0.3   ALKPHOS 97 101   AST 25 50*   ALT 44 79*     Recent Labs   Lab 05/27/19 2217 05/23/19 2019   COLOR Light Yellow Yellow   APPEARANCE Clear Clear   URINEGLC Negative Negative   URINEBILI Negative Negative   URINEKETONE Negative Negative   SG 1.012 1.010   UBLD Negative Negative   URINEPH 6.5 5.5   PROTEIN Negative Negative   UROBILINOGEN  --  1.0   NITRITE Negative Negative   LEUKEST Negative Negative   RBCU <1  --    WBCU <1  --        Imaging:   Recent Results (from the past 24 hour(s))   CT Head w/o Contrast    Narrative    CT SCAN OF THE HEAD WITHOUT CONTRAST   5/27/2019 10:35 PM     HISTORY: Headache, fever.    TECHNIQUE:  Axial images of the head and coronal reformations without  IV contrast material. Radiation dose for this scan was reduced using  automated exposure control, adjustment of the mA and/or kV according  to patient size, or iterative reconstruction technique.    COMPARISON: 8/21/2013.    FINDINGS: There is no evidence of intracranial hemorrhage, mass, acute  infarct or anomaly. The ventricles are normal in size, shape and  configuration. The brain parenchyma and subarachnoid spaces are  normal.     The visualized portions of the sinuses and mastoids appear normal. The  bony calvarium and bones of the skull base appear intact.       Impression     IMPRESSION:   No evidence of acute intracranial hemorrhage, mass, or  herniation.    CHRISTIANO CASTRO MD   XR Chest 2 Views    Narrative    XR CHEST 2 VW 5/27/2019 10:41 PM     HISTORY: cough      Impression    IMPRESSION: Negative exam.    BINA ORTIZ MD   Lumbar spine XR, 2-3 views    Narrative    XR LUMBAR SPINE 2-3 VIEWS  5/27/2019 11:33 PM     HISTORY: Status post fusion, fever.    COMPARISON: 6/18/2018.       Impression    IMPRESSION: No acute fracture or malalignment. Stable appearance of  the L4-L5 fusion. Degenerative disc disease at L5-S1.       Lisandra Coello MD Pager [phone]

## 2019-05-28 NOTE — DISCHARGE SUMMARY
"[facility]  Discharge Summary  Name: Denise Ralph    MRN: [id number]  YOB: 1955    Age: 64 year old  Date of Discharge:  5/28/2019  Date of Admission: 5/27/2019  Primary Care Provider: Juan F Smallwood  Discharge Physician:  Yash Lyon MD  Discharging Service:  Hospitalist      Hospital Course/Discharge Diagnoses:  Please see H&P from Dr. Coello this AM:     \"Patient is a 64-year-old female who presents here with persistent fevers.  Past medical history significant for anxiety, depression, hypertension, lumbar degenerative disc disease status post L4-L5 lumbar fusion, and other past medical history as outlined below.  Patient reports that she was in [facility] and started developing a fever 9 days ago while she was there.  She was seen at the urgent care and felt that she probably had the flu.  Only supportive cares with PRN ibuprofen, antitussives, and vitamin C was recommended.  Since arriving back, patient had persistent fevers along with a headache and was seen at urgent care here in the  and empirically started on a course of azithromycin.  She has had 4 days worth.  Because her fever persisted, patient presented to the ED for further evaluation.  No obvious localizing symptoms with the exception of a generalized headache.  Patient denies any history of chronic headaches.  In regards to her fever, she denies any tick bite, unusual ingestion, or sick contact exposure.  I was asked to admit her for further inpatient cares to work-up her fever.       In the ED, patient did receive a dose of ceftriaxone and acyclovir given concerns that with the only symptoms of a generalized headache, they wanted to cover her for possible meningitis.  She denies any nuchal rigidity however.  Labs were remarkable for a sodium 130.  Urinalysis was negative.  Blood cultures were drawn and a malaria and parasitic stain was also ordered.  Chest x-ray and lumbar spine was otherwise negative. " "     Problem list/Plan:  1. Fever: No obvious source of infection.  Given her complaint of headache, will empirically continue her on IV ceftriaxone 2 g IV every 12 hours until a lumbar puncture could be performed.  The ED physician was not comfortable with attempting lumbar puncture as patient has had an L4-L5 lumbar disc fusion and should get imaging guidance for LP with interventional radiology.  Will order LP.  We will also request infectious disease consultation to assist with diagnosis and treatment.  Again, malaria screen and parasitic stain was ordered in the ED although [facility] is not an endemic area for malaria.  She denies any tick bites.  For completeness, we will check an influenza and RSV swab with PCR although symptoms persisted a lot longer than would be expected and she may be out of the window for treatment.  Low suspicion for HSV so we will hold off on further antivirals for now.  Other than the headache, patient does not have any other symptoms that would suggest meningitis.  No nuchal rigidity.  If infectious work-up is negative, consider other work-up for autoimmune disease or occult malignancy although she does not report any weight loss or localizing symptoms.  Do note that she had a colonoscopy done in 2014 that only demonstrated a sigmoid polyp with pathology representing a benign leiomyoma.  2. Benign essential hypertension: We will resume her chronic amlodipine 5 mg daily, atenolol 50 mg daily, and hydralazine 25 mg p.o. twice daily with parameters  3. History of depression/anxiety: May continue the patient's chronic Paxil with Xanax  4. History of glaucoma: May continue the patient's chronic latanoprost\"        -Additional workup plan which will include lumbar puncture and infectious disease consultation          I did see Mrs. Ralph in follow-up today. she presented and was admitted early this morning with 2 weeks of symptoms including cough, high fevers, myalgias and evolving headache " which started when she was in [place].  No apparent mosquito or tick bites or other obvious exposures.  Fever has been downtrending and she is now starting to feel dramatically better today after IV hydration.  LP occurred this afternoon, negative for meningitis (4WBC).  ID consult appreciated, recommended awaiting further workup but not treating.  I was contacted tonight as Mrs. Ralph prefers to discharge home and follow up in clinic re: ongoing workup.  She is stable and afebrile and symptomatically dramatically improved at this time and agrees to seek care if she starts to feel more ill.  I advised her to follow up on her symptoms and all of her pending results in clinic later this week to which she is agreeable.          Discharge Disposition:  Discharged to home     Allergies:  Allergies   Allergen Reactions     Erythromycin Shortness Of Breath     Msir [Morphine Sulfate] Nausea     Intollerant to Morphine Sulfate: Nausea,vomiting     Dilaudid [Hydromorphone] Visual Disturbance     Hallucinations     Lisinopril      Cough       Losartan      Tongue felt weird       Penicillins Itching     Reglan Other (See Comments)     Muscle spasms in throat        Discharge Medications:        Review of your medicines      CONTINUE these medicines which have NOT CHANGED      Dose / Directions   ALPRAZolam 0.25 MG tablet  Commonly known as:  XANAX      Dose:  0.25 mg  Take 0.25 mg by mouth 3 times daily as needed for anxiety  Refills:  0     amLODIPine 5 MG tablet  Commonly known as:  NORVASC      Dose:  2.5 mg  Take 2.5 mg by mouth daily  Refills:  0     atenolol 50 MG tablet  Commonly known as:  TENORMIN      Dose:  50 mg  Take 50 mg by mouth 2 times daily  Refills:  0     guaiFENesin-codeine 100-10 MG/5ML solution  Commonly known as:  ROBITUSSIN AC  Used for:  Productive cough      Dose:  1-2 tsp.  Take 5-10 mLs by mouth every 4 hours as needed for cough  Quantity:  118 mL  Refills:  0     hydrALAZINE 25 MG  "tablet  Commonly known as:  APRESOLINE  Used for:  Hypertension goal BP (blood pressure) < 140/80      Dose:  25 mg  Take 1 tablet (25 mg) by mouth 2 times daily  Quantity:  60 tablet  Refills:  1     latanoprost 0.005 % ophthalmic solution  Commonly known as:  XALATAN      Dose:  1 drop  Place 1 drop into both eyes At Bedtime  Refills:  0     traZODone 100 MG tablet  Commonly known as:  DESYREL      Dose:  100 mg  Take 100 mg by mouth At Bedtime  Refills:  0            Condition on Discharge:  Discharge condition: Stable       Code status on discharge: Full Code     History of Illness:  See detailed admission note for full details.    Physical Exam:  Vital signs:  Temp: 97.4  F (36.3  C) Temp src: Oral BP: 143/76 Pulse: 70 Heart Rate: 65 Resp: 16 SpO2: 96 % O2 Device: None (Room air)     Weight: 64.4 kg (141 lb 14.4 oz)  Estimated body mass index is 23.61 kg/m  as calculated from the following:    Height as of 5/1/19: 1.651 m (5' 5\").    Weight as of this encounter: 64.4 kg (141 lb 14.4 oz).    Wt Readings from Last 1 Encounters:   05/28/19 64.4 kg (141 lb 14.4 oz)     General: Alert, awake, no acute distress.  HEENT: NC/AT, eyes anicteric, external occular movements intact, face symmetric.  Dentition WNL, MM moist.  Cardiac: RRR, S1, S2.  No murmurs appreciated.  Pulmonary: Normal chest rise, normal work of breathing.  Lungs CTA BL  Abdomen: soft, non-tender, non-distended.  Bowel Sounds Present.  No guarding.  Extremities: no deformities.  Warm, well perfused.  Skin: no rashes or lesions noted.  Warm and Dry.  Neuro: No focal deficits noted.  Speech clear.  Coordination and strength grossly normal.  Psych: Appropriate affect.    Procedures other than Imaging:  LP     Imaging:  Results for orders placed or performed during the hospital encounter of 05/27/19   CT Head w/o Contrast    Narrative    CT SCAN OF THE HEAD WITHOUT CONTRAST   5/27/2019 10:35 PM     HISTORY: Headache, fever.    TECHNIQUE:  Axial images of " the head and coronal reformations without  IV contrast material. Radiation dose for this scan was reduced using  automated exposure control, adjustment of the mA and/or kV according  to patient size, or iterative reconstruction technique.    COMPARISON: 8/21/2013.    FINDINGS: There is no evidence of intracranial hemorrhage, mass, acute  infarct or anomaly. The ventricles are normal in size, shape and  configuration. The brain parenchyma and subarachnoid spaces are  normal.     The visualized portions of the sinuses and mastoids appear normal. The  bony calvarium and bones of the skull base appear intact.       Impression    IMPRESSION:   No evidence of acute intracranial hemorrhage, mass, or  herniation.    CHRISTIANO CASTRO MD   XR Chest 2 Views    Narrative    XR CHEST 2 VW 5/27/2019 10:41 PM     HISTORY: cough      Impression    IMPRESSION: Negative exam.    BINA ORTIZ MD   Lumbar spine XR, 2-3 views    Narrative    XR LUMBAR SPINE 2-3 VIEWS  5/27/2019 11:33 PM     HISTORY: Status post fusion, fever.    COMPARISON: 6/18/2018.       Impression    IMPRESSION: No acute fracture or malalignment. Stable appearance of  the L4-L5 fusion. Degenerative disc disease at L5-S1.    ALONDRA ALEXIS MD        Consultations:  Consultation during this admission received from infectious disease.       Recent Lab Results:  Recent Labs   Lab 05/28/19  0629 05/27/19  2217 05/23/19 2019   WBC 7.3 10.6 7.2   HGB 10.0* 11.7  --    HCT 29.5* 34.4*  --    MCV 95 96  --     431  --           Lab Results   Component Value Date     05/28/2019     05/27/2019     05/23/2019    Lab Results   Component Value Date    CHLORIDE 101 05/28/2019    CHLORIDE 96 05/27/2019    CHLORIDE 95 05/23/2019    Lab Results   Component Value Date    BUN 10 05/28/2019    BUN 14 05/27/2019    BUN 15 05/23/2019      Lab Results   Component Value Date    POTASSIUM 4.0 05/28/2019    POTASSIUM 3.8 05/27/2019    POTASSIUM 4.2 05/23/2019    Lab  Results   Component Value Date    CO2 29 05/28/2019    CO2 29 05/27/2019    CO2 28 05/23/2019    Lab Results   Component Value Date    CR 0.65 05/28/2019    CR 0.72 05/27/2019    CR 0.74 05/23/2019           Results for ROSAILA DAY (MRN [id number]) as of 5/28/2019 18:01   Ref. Range 5/28/2019 14:30 5/28/2019 14:30   Specimen Description Unknown Cerebrospinal fluid Cerebrospinal fluid   Culture Micro Unknown PENDING    CSF CULTURE AEROBIC BACTERIAL Unknown Rpt    ENTEROVIRUS PCR CSF Unknown Rpt    GRAM STAIN Unknown Rpt    HSV TYPES 1 AND 2 QUALITATIVE PCR CSF Unknown Rpt    RBC CSF Latest Ref Range: 0 - 2 /uL 0 Test not performe...   WBC CSF Latest Ref Range: 0 - 5 /uL 4 Test not performe...   Glucose CSF Latest Ref Range: 40 - 70 mg/dL 51    Protein Total CSF Latest Ref Range: 15 - 60 mg/dL 35      Pending Results:    Unresulted Labs Ordered in the Past 30 Days of this Admission     Date and Time Order Name Status Description    5/28/2019 0232 West Nile Virus IgG and IgM CSF: In process     5/28/2019 0232 Lyme IgG and IgM CSF Immunoblot: In process     5/28/2019 0232 Enterovirus PCR CSF In process     5/28/2019 0232 HSV Types 1 and 2 Qualitative PCR CSF: In process     5/28/2019 0232 CSF Culture Aerobic Bacterial Tube 2 Preliminary     5/28/2019 0232 Gram stain CSF Tube 2 Preliminary     5/27/2019 2205 Blood culture Preliminary     5/27/2019 2203 Blood culture Preliminary            Discharge Instructions and Follow-Up:   Discharge Procedure Orders   Follow-up and recommended labs and tests    Order Comments: Follow-up with your primary care doctor within the next few days to follow-up on any remaining lab results and recheck your symptoms.  If you develop high fevers again or become more ill do not hesitate to return to the ER.     Activity   Order Comments: Your activity upon discharge: activity as tolerated     Order Specific Question Answer Comments   Is discharge order? Yes      Reason for your hospital  stay   Order Comments: You were admitted for headaches, body aches and weakness with recent high fevers.  You underwent an extensive infectious work-up, some of which is still in process.  As discussed, thankfully you feel much better and do not appear to have meningitis.  As you preferred, you will discharge home tonight with close follow-up with your primary care physician.  Please do not hesitate to return to the ER or be seen in clinic immediately if he start to become more ill.     Diet   Order Comments: Follow this diet upon discharge: Orders Placed This Encounter      Combination Diet Regular Diet Adult     Order Specific Question Answer Comments   Is discharge order? Yes        Total time spent in face to face contact with the patient and coordinating discharge was:  30 Minutes.

## 2019-05-28 NOTE — CONSULTS
ID consult dictated IMP 1 65 yo female fever near 2 weeks sl cough(neg CXR), ongoing HA(neg LP 4 WBC just done)     REC no tx, watch, still likely viral but large ddx

## 2019-05-28 NOTE — CONSULTS
Consult Date:  05/28/2019      INFECTIOUS DISEASE CONSULTATION      REFERRING PHYSICIAN:  Dr. Reyes       IMPRESSION:   1.  A 64-year-old female with 12 days of unexplained fever, initially started in [place], where she had been for 2 days when it occurred.  No other obvious travel or exposures.  Significant ongoing headaches, some achiness, minimal respiratory symptoms, etiology unclear, several features suggest viral illness, but workup so far negative.  Lumbar puncture just completed has eliminated meningitis as a diagnosis.   2.  Prior spinal fusion surgery, no signs of issues related to that.   3.  History of glaucoma.   4.  History of anxiety and depression.      RECOMMENDATIONS:   1.  Lumbar puncture unremarkable for white cells, 0 red cells, discontinue antimicrobials and watch.   2.  Etiology unclear, but still likely viral, appears fever actually is resolving, probably watch another day, but if blood cultures are negative and fevers resolve, send home with symptomatic treatment.  If she has ongoing unexplained fever, expand FUO workup further at that point.      HISTORY OF PRESENT ILLNESS:  This 64-year-old female is seen in consultation due to fever.  The patient has a history of generally being healthy, had an episode of pneumonia 4 years ago, but does not get antibiotics regularly and has had no major infections.  She traveled to [place] to a tourist area in [facility], where they left on the 14th and she became ill less than 48 hours after getting there.  She had no specific exposures or people she was around that were ill.  She maybe had a slight bit of respiratory complaint initially and minimal cough, but mostly without focal symptoms.  Definite headache occurring.  In [place], they were not taking her temperature regularly, but she was noted to be warm and felt to be febrile.  As this persisted, she saw the house doctor there, who thought it was a flu-like illness, gave symptomatic treatments.  She  really did not feel better the whole time, tried to do some activities, but could not do much.  When she got back on the 21st on the plane, felt further ill, sought medical attention.  Diagnosis was unclear.  In the urgent care, was put on Zithromax.  The chest x-ray was negative and other studies were unremarkable.  The headache persisted into the weekend.  She sought medical attention based on concern for the headache.  She has now had a lumbar puncture done, which had 0 red cells, 4 white cells.  Cultures are so far negative.  No other focal symptoms.  No other travel history, has not been anywhere else.  No animal exposure of significance.  No one else in the family has been ill.      PAST MEDICAL HISTORY:  Prior pneumonia, otherwise without major infection problems, history of hypertension, history of depression, anxiety, prior history of glaucoma.  No other major infections.      ALLERGIES:  ERYTHROMYCIN AND LISTED PENICILLIN.      MEDICATIONS:  Ceftriaxone and acyclovir.      SOCIAL AND FAMILY HISTORY:  Travel history as above.  No other exposure.      MEDICATIONS:  As listed.      REVIEW OF SYSTEMS:  Largely as above.  No other focal symptoms.  Headache is ongoing.  Other symptoms have improved.  Fever has been down today.      PHYSICAL EXAMINATION:   GENERAL:  The patient appears her stated age.  Her cognition is intact.   HEENT:  No thrush or intraoral lesions.  Pupils reactive.  No facial skin rashes.  No sinus tenderness.   NECK:  Supple and nontender.  No lymphadenopathy.  Not able to move much because she just got done with lumbar puncture.   HEART:  Regular rhythm, no particular murmur.   LUNGS:  Clear bilaterally.   ABDOMEN:  Soft and nontender.   EXTREMITIES:  No rash or skin lesions.      LABORATORY DATA:  CBC fairly unremarkable.  Cerebrospinal fluid just back, 4 white cells only.  Procalcitonin 0.3.  Blood cultures so far negative.  Urine unremarkable.      Thank you very much for the  consultation.  We will follow the patient with you.         ELIZABETH LION MD             D: 2019   T: 2019   MT: YASH      Name:     ROSALIA DAY   MRN:      [id number]        Account:       [id number]   :      1955           Consult Date:  2019      Document: [id number]       cc: JuanF Smallwood MD

## 2019-05-28 NOTE — ED PROVIDER NOTES
"  History     Chief Complaint:  Fever    HPI   Denise Ralph is a 64 year old female with a history of anxiety, depression, hypertension, and tension headaches among others who presents to the emergency department today for evaluation of a fever. The patient reports that she developed flu like symptoms including a high fever, headache, chills, nausea, constipation, and cough on 5/16 while in [facility]. She was subsequently evaluated and provided Theraflu, ibuprofen, and vitamin C. Three days after returning to [place] (5/23) she presented to urgent care due to persistence of her symptoms, where she underwent laboratory and imaging studies, noted below, and she was discharged on azithromycin and robitussin for suspected pneumonia. However, the patient states that azithromycin \"does not work for her\" based on a previous infection. As her symptoms have persisted aside from a cough which has subsided, she presents today for evaluation and treatment. Here the patient endorses taking her azithromycin as prescribed, as well as utilizing ibuprofen, last at 1540, for symptom management. She denies any back or chest pain, shortness of breath, emesis, or urinary symptoms.     XR Chest 2 VW: 5/24/19  Negative    Laboratory Studies: 5/24/19  WBC with Diff: Absolute Lymphocytes 0.7 (L), o/w WNL (WBC 7.7)  CMP: Sodium 130 ((L), Glucose 105 (H), Albumin 3.2 (L), Protein Total 6.6 (L), ALT 79 (H), AST 50 (H), o/w WNL  UA Reflex to Microscopic:  WNL    Allergies:  Erythromycin  Msir  Dilaudid   Lisinopril  Losartan  Penicillins  Reglan     Medications:    Xanax  Norvasc  Tenormin  Apresoline  Xalatan  Paxil  Desyrel  Zithromax  Tessalon  Robitussin  Ibuprofen    Past Medical History:    Anxiety  Depressive disorder  Hypertension   Irritable bowel syndrome  Lumbar degenerative disc disease  Chronic pain  Sciatica  Glaucoma  Basal cell carcinoma  Squamous cell carcinoma  Gastroesophageal reflux disease  Tension " headache  Frequent UTI    Past Surgical History:    Glaucoma  Hysterectomy  Laparoscopic cholecystectomy  Left shoulder decompression  Optical tracking system fusion posterior spine lumbar  Wrist surgery    Family History:    Mother: MI, breast cancer  Father: pulmonary fibrosis  Brother: MI, cocaine use    Social History:  The patient was accompanied to the ED by her .  Smoking Status: Former Smoker   Packs/day: 0.5   Years: 25  Smokeless Tobacco: Never Used  Alcohol Use: Positive  Drug Use: Negative  PCP: Juan F Smallwood  Marital Status:        Review of Systems   Constitutional: Positive for chills and fever.   Respiratory: Positive for cough (resolved). Negative for shortness of breath.    Cardiovascular: Negative for chest pain.   Gastrointestinal: Positive for constipation and nausea. Negative for vomiting.   Genitourinary: Negative for difficulty urinating, dysuria, frequency, hematuria and urgency.   Neurological: Positive for headaches.   All other systems reviewed and are negative.      Physical Exam     Patient Vitals for the past 24 hrs:   BP Temp Temp src Pulse Heart Rate Resp SpO2 Weight   05/27/19 2315 128/78 -- -- -- -- -- 95 % --   05/27/19 2300 138/81 -- -- 81 -- -- 96 % --   05/27/19 2248 -- -- -- -- -- -- 96 % --   05/27/19 2245 141/88 -- -- -- -- -- -- --   05/27/19 2123 141/84 100.2  F (37.9  C) Temporal 76 76 16 99 % 62.1 kg (137 lb)     Physical Exam  General: Patient is alert and interactive when I enter the room  Head:  The scalp, face, and head appear normal  Eyes:  Conjunctivae are normal  ENT:    The nose is normal    Pinnae are normal    External acoustic canals are normal  Neck:  Trachea midline, good ROM  CV:  Pulses are normal, RRR.    Resp:  No respiratory distress, CTAB, no crackles   Abdomen:      Soft, non-tender, non-distended  Musc:  Normal muscular tone    No major joint effusions    No asymmetric leg swelling  Skin:  No rash or lesions noted  Neuro: Speech is  normal and fluent. Face is symmetric.     Moving all extremities well.   Psych:  Awake. Alert.  Normal affect.  Appropriate interactions.    Emergency Department Course     Imaging:  Radiology findings were communicated with the patient who voiced understanding of the findings.    Lumbar spine XR, 2-3 views  No acute fracture or malalignment. Stable appearance of the L4-L5 fusion. Degenerative disc disease at L5-S1.  Reading per radiology    XR Chest 2 Views  Negative exam.  BINA ORTIZ MD  Reading per radiology    CT Head w/o Contrast  No evidence of acute intracranial hemorrhage, mass, or herniation.  CHRISTIANO CASTRO MD  Reading per radiology    Laboratory:  Laboratory findings were communicated with the patient who voiced understanding of the findings.    Malaria Screen Rapid: pending  Parasite Strain: pending  Blood Culture: pending x2  CBC: WBC 10.6, HGB 11.7,   BMP:  (L), Glucose 100 (H) o/w WNL (Creatinine 0.72)  CRP Inflammation: 11.1 (H)  Erythrocyte Sedimentation Rate Auto: 22  UA with Microscopic Reflex to Culture: WNL  Lactic Acid (Resulted: 2217): 1.2  Hepatic Panel: Albumin 3.3 (L), o/w WNL    Interventions:  2247 Tylenol 500 mg Oral  2354 Compazine 5 mg IV  2354 Benadryl 25 mg IV  0050 Rocephin 2 g IV  0117 Zovirax 600 mg IV    Emergency Department Course:    2151 Nursing notes and vitals reviewed.    2155 I performed an exam of the patient as documented above.     2217 IV was inserted and blood was drawn for laboratory testing, results above. The patient provided a urine sample here in the emergency department. This was sent for laboratory testing, findings above.    2229 The patient was sent for a CT of the head while in the emergency department, results above.     2234  The patient was sent for a chest xray while in the emergency department, results above.     2326 The patient was sent for an xray of the lumbar spine while in the emergency department, results above.     0025 I spoke  with Dr. Coello of the hospitalist service from [facility] regarding patient's presentation, findings, and plan of care.    0127 I personally reviewed the laboratory and imaging results with the patient and answered all related questions prior to admission.    Impression & Plan      Medical Decision Making:  Denise Ralph is a 64 year old female who presents to the emergency department today for evaluation of fever. Patient has had a fever for 11 days, and it seems like her only symptom seems to be headache. She has no rashes or other findings on examination. She has no significant neck pain. Clinically she appears quite well, so I think meningitis is less likely. We did do blood cultures and blood work. She has already been on azithromycin. Her lactate and white count were normal. Platelets and hepatic function were also normal. We did do a malaria, although she was never in a concerning area. Though with the travel history, we did this. Chest xray revealed no pneumonia. CT of the head was normal. I talked to her about doing an LP, however given her bad experience with a steroid epidural injection in the past, she, after discussion, would prefer to have radiology do this. We will empirically cover with ceftriaxone and acyclovir. However, we will admit her to medicine for continued workup and diagnostic therapies for her fever. Patient admitted in stable condition.     Diagnosis:    ICD-10-CM    1. Fever, unspecified fever cause R50.9      Disposition:   The patient is admitted into the care of Dr. Coello.    Scribe Disclosure:  I, Bruna Lin, am serving as a scribe at 9:54 PM on 5/27/2019 to document services personally performed by Fely Power MD based on my observations and the provider's statements to me.    [facility] EMERGENCY DEPARTMENT       Fely Power MD  05/28/19 9045

## 2019-05-28 NOTE — ED TRIAGE NOTES
"Diagnosed with influenza 8 days ago, is concerned she has something else going on as  she had traveled to [place] and has headache, she believes there is \"something blocked in her head\"  "

## 2019-05-28 NOTE — PROGRESS NOTES
Update:    Patient seen in follow-up, she presented and was admitted early this morning with 2 weeks of symptoms including cough, high fevers, myalgias and evolving headache which started when she was in [place].  No apparent mosquito or tick bites or other obvious exposures.  Fever has been downtrending and she is now starting to feel better today after IV hydration.  LP is planned for this afternoon.  ID consult is also pending.  Pending LP results and ID input its possible she could discharge home this evening versus tomorrow with outpatient follow-up.    Niels Lyon MD

## 2019-05-28 NOTE — PLAN OF CARE
Pt to D/C to home.  Pt provided with d/c instructions, including new medications, when medications were last given, and when to take them again.  Pt also informed to f/u with primary in 7 days.  Pt verbalized understanding of all d/c and f/u instructions.  All questions were answered at this time.  Copy of paperwork sent with pt. No new medications sent with pt.   to provide transport.  All personal belongings sent with pt. No concerns at this time.

## 2019-05-28 NOTE — PLAN OF CARE
Arrived to floor at 0130. Alert and oriented x4. Up with SBA. Tmax of 99.8. Tylenol given for headache. Droplet precautions in place. Plan for lumbar puncture today.

## 2019-05-28 NOTE — PLAN OF CARE
Patient alert and oriented x4. Up independent, ambulated in halls x1. C/o of headache, Tylenol given, patient reported decrease in pain. Denies nausea. Tolerating diet, good appetite. Temps 99.5 and 98.0. Continuing IV antibiotics, 1x dose PO antibiotic given this AM. Respiratory panel negative, droplet and contact isolation discontinued. LP at 1330 today. Discharge pending.     Patient back from LP at 1445, has to lay flat for 1 hour.

## 2019-05-29 LAB
B BURGDOR IGG CSF QL IB: NEGATIVE
B BURGDOR IGM CSF QL IB: NEGATIVE
HSV1 DNA CSF QL NAA+PROBE: NOT DETECTED
HSV2 DNA CSF QL NAA+PROBE: NOT DETECTED
MICROBIOLOGIST REVIEW: NORMAL
WNV IGG CSF-ACNC: 0.01 IV
WNV IGM CSF-ACNC: 0.02 IV

## 2019-05-30 ENCOUNTER — HOSPITAL ENCOUNTER (OUTPATIENT)
Dept: MAMMOGRAPHY | Facility: CLINIC | Age: 64
Discharge: HOME | End: 2019-05-30
Attending: OBSTETRICS & GYNECOLOGY | Admitting: OBSTETRICS & GYNECOLOGY
Payer: MEDICARE

## 2019-05-30 DIAGNOSIS — Z12.31 VISIT FOR SCREENING MAMMOGRAM: ICD-10-CM

## 2019-05-30 PROCEDURE — 77063 BREAST TOMOSYNTHESIS BI: CPT

## 2019-05-31 ENCOUNTER — OFFICE VISIT (OUTPATIENT)
Dept: INTERNAL MEDICINE | Facility: CLINIC | Age: 64
End: 2019-05-31
Payer: MEDICARE

## 2019-05-31 ENCOUNTER — TELEPHONE (OUTPATIENT)
Dept: INTERNAL MEDICINE | Facility: CLINIC | Age: 64
End: 2019-05-31

## 2019-05-31 VITALS
DIASTOLIC BLOOD PRESSURE: 72 MMHG | WEIGHT: 137 LBS | RESPIRATION RATE: 16 BRPM | SYSTOLIC BLOOD PRESSURE: 118 MMHG | HEART RATE: 77 BPM | HEIGHT: 65 IN | BODY MASS INDEX: 22.82 KG/M2 | OXYGEN SATURATION: 95 % | TEMPERATURE: 98.7 F

## 2019-05-31 DIAGNOSIS — F41.1 ANXIETY STATE: ICD-10-CM

## 2019-05-31 DIAGNOSIS — R53.83 FATIGUE, UNSPECIFIED TYPE: ICD-10-CM

## 2019-05-31 DIAGNOSIS — Z09 HOSPITAL DISCHARGE FOLLOW-UP: Primary | ICD-10-CM

## 2019-05-31 DIAGNOSIS — B34.9 VIRAL ILLNESS: ICD-10-CM

## 2019-05-31 DIAGNOSIS — I10 ESSENTIAL HYPERTENSION, BENIGN: ICD-10-CM

## 2019-05-31 PROCEDURE — 99214 OFFICE O/P EST MOD 30 MIN: CPT | Performed by: INTERNAL MEDICINE

## 2019-05-31 ASSESSMENT — MIFFLIN-ST. JEOR: SCORE: 1167.31

## 2019-05-31 NOTE — PROGRESS NOTES
Subjective     Denise Ralph is a 64 year old female who presents to clinic today for the following health issues:    HPI   ED/UC Followup:    Facility:  [facility]  Date of visit: 05/27/19  Reason for visit: 05/28/19  Current Status: Fever       Patient is seen for a follow up visit.  Recently hospitalized for fever, cough, HA, flu like syndrome. Started initially in [place] on vacation, treated with Tamiflu. Symptoms persisted, got worse, felt sick, went to ED and was admitted for 2 days. Negative lab work, imaging, cultures. Took Z pack, no change in symptoms. Eventually now is feeling better after 3 weeks. Mild fatigue persists. No other symptoms.   Has h/o HTN. on medical treatment. BP has been controlled. No side effects from medications. No CP, HA, dizziness. good compliance with medications and low salt diet. Started on Hydralazine in addition to Amlodipine. Has mild feet edema past weeks.   Has h/o anxiety , on treatment, no side effects. No worsening of symptoms.           Patient Active Problem List   Diagnosis     Irritable bowel syndrome     Anxiety state     Essential hypertension, benign     Disorder of bone and cartilage     Basal cell carcinoma     Squamous cell carcinoma     Advanced directives, counseling/discussion     GERD (gastroesophageal reflux disease)     Glaucoma     Hypertension goal BP (blood pressure) < 140/80     S/P lumbar fusion     Controlled substance agreement signed     Tension headache     Hyponatremia     Frequent UTI     FUO (fever of unknown origin)     Past Surgical History:   Procedure Laterality Date     EYE SURGERY Bilateral     Treatment of glaucoma     HYSTERECTOMY       HYSTERECTOMY VAGINAL       LAPAROSCOPIC CHOLECYSTECTOMY  2002    with repeat operation because of bile leak     Left shoulder decomp surgery for impingement  approx 1993     OPTICAL TRACKING SYSTEM FUSION POSTERIOR SPINE LUMBAR N/A 8/3/2017    Procedure: OPTICAL TRACKING SYSTEM FUSION SPINE POSTERIOR LUMBAR  ONE LEVEL;  1. L4 Ang-Meyers osteotomy with exposure of the L4 and L5 roots  2. L4-5 complete discectomy with arthrodesis  3. Placement of Globus Creo GOMEZ pedicle screws bilaterally from L4 to L5  4. L4 - L5 posterior lateral fusion with placement of Medtronic Magnifuse and locally harvested autologous bone  5. Use of Stealth and O     WRIST SURGERY Left        Social History     Tobacco Use     Smoking status: Former Smoker     Packs/day: 0.50     Years: 25.00     Pack years: 12.50     Last attempt to quit: 10/1/1997     Years since quittin.6     Smokeless tobacco: Never Used     Tobacco comment: quit    Substance Use Topics     Alcohol use: Yes     Alcohol/week: 1.0 oz     Types: 2 Glasses of wine per week     Comment: 2 glasses of wine daily     Family History   Problem Relation Age of Onset     Cardiovascular Mother         / mi     Breast Cancer Mother         diagnosed age 60's     Respiratory Father         pulmonary fibrosis.     Cardiovascular Brother          of MI age 34 2nd to Cocaine Use     Cancer - colorectal No family hx of          Current Outpatient Medications   Medication Sig Dispense Refill     ALPRAZolam (XANAX) 0.25 MG tablet Take 0.25 mg by mouth 3 times daily as needed for anxiety       amLODIPine (NORVASC) 5 MG tablet Take 2.5 mg by mouth daily       atenolol (TENORMIN) 50 MG tablet Take 50 mg by mouth 2 times daily       guaiFENesin-codeine (ROBITUSSIN AC) 100-10 MG/5ML solution Take 5-10 mLs by mouth every 4 hours as needed for cough 118 mL 0     latanoprost (XALATAN) 0.005 % ophthalmic solution Place 1 drop into both eyes At Bedtime       traZODone (DESYREL) 100 MG tablet Take 100 mg by mouth At Bedtime           Reviewed and updated as needed this visit by Provider         Review of Systems   ROS COMP: Constitutional, HEENT, cardiovascular, pulmonary, GI, , musculoskeletal, neuro, skin, endocrine and psych systems are negative, except as otherwise noted.       Objective    There were no vitals taken for this visit.  There is no height or weight on file to calculate BMI.  Physical Exam   GENERAL: healthy, alert and no distress  EYES: Eyes grossly normal to inspection, PERRL and conjunctivae and sclerae normal  HENT: ear canals and TM's normal, nose and mouth without ulcers or lesions  NECK: no adenopathy, no asymmetry, masses, or scars and thyroid normal to palpation  RESP: lungs clear to auscultation - no rales, rhonchi or wheezes  CV: regular rate and rhythm, normal S1 S2, no S3 or S4, no murmur, click or rub, no peripheral edema and peripheral pulses strong  ABDOMEN: soft, nontender, no hepatosplenomegaly, no masses and bowel sounds normal  MS: no gross musculoskeletal defects noted, no edema    Diagnostic Test Results:  Labs reviewed in Epic  Results for orders placed or performed during the hospital encounter of 05/30/19   MA Screen Bilateral w/Sumeet    Narrative    Examination: Bilateral digital screening mammography with computer  aided detection including digital breast tomosynthesis, 5/30/2019 2:00  PM.    Comparison: 05/03/2018, 04/27/2015    History: No current breast concerns. Mother with breast cancer.    BREAST DENSITY: Scattered fibroglandular densities.    COMMENTS:  No suspicious finding.      Impression    IMPRESSION: BI-RADS CATEGORY: 1 -  NEGATIVE.    RECOMMENDED FOLLOW-UP: Annual Mammography.      The patient will be notified of the results.     LAURA XIONG MD           Assessment & Plan   Problem List Items Addressed This Visit     Anxiety state    Essential hypertension, benign      Other Visit Diagnoses     Hospital discharge follow-up    -  Primary    Viral illness        Fatigue, unspecified type             Improved , likely viral illness   Cont treatment   Trial of Chlorthalidone for HTN, has mild LE edema, stop Hydralazine   Monitor lab work       See Patient Instructions  Return in about 6 months (around 11/30/2019) for Physical  Exam.    Juan F Smallwood MD  [facility] [address]

## 2019-05-31 NOTE — TELEPHONE ENCOUNTER
IP F/U    Date: 5/28/19  Diagnosis: Fever, Unspecified Fever Cause  Is patient active in care coordination? No  Was patient in TCU? No    Next 5 appointments (look out 90 days)    May 31, 2019  3:20 PM CDT  [facility] F [facility]wood [facility] [address] [place]  [facility] [address]  [phone]   Jun 11, 2019  1:40 PM CDT  Office Visit with Juan F Smallwood MD  [facility] [address]  [facility] [address]  [phone]

## 2019-05-31 NOTE — TELEPHONE ENCOUNTER
ED / Discharge Outreach Protocol    Patient Contact    Attempt # 1    Was call answered?  Yes, patient at follow up visit at clinic now and will call back.

## 2019-05-31 NOTE — NURSING NOTE
"Vital signs:  Temp: 98.7  F (37.1  C) Temp src: Oral BP: 118/72 Pulse: 77   Resp: 16 SpO2: 95 %     Height: 165.1 cm (5' 5\") Weight: 62.1 kg (137 lb)  Estimated body mass index is 22.8 kg/m  as calculated from the following:    Height as of this encounter: 1.651 m (5' 5\").    Weight as of this encounter: 62.1 kg (137 lb).          "

## 2019-06-01 ENCOUNTER — HOSPITAL ENCOUNTER (EMERGENCY)
Facility: CLINIC | Age: 64
Discharge: HOME | End: 2019-06-01
Attending: PHYSICIAN ASSISTANT | Admitting: PHYSICIAN ASSISTANT
Payer: MEDICARE

## 2019-06-01 VITALS
HEART RATE: 67 BPM | OXYGEN SATURATION: 96 % | SYSTOLIC BLOOD PRESSURE: 132 MMHG | BODY MASS INDEX: 22.89 KG/M2 | DIASTOLIC BLOOD PRESSURE: 84 MMHG | TEMPERATURE: 99.3 F | WEIGHT: 137.57 LBS | RESPIRATION RATE: 18 BRPM

## 2019-06-01 DIAGNOSIS — R50.9 FEVER: ICD-10-CM

## 2019-06-01 LAB
ALBUMIN UR-MCNC: NEGATIVE MG/DL
ANION GAP SERPL CALCULATED.3IONS-SCNC: 6 MMOL/L (ref 3–14)
APPEARANCE UR: CLEAR
BASOPHILS # BLD AUTO: 0.1 10E9/L (ref 0–0.2)
BASOPHILS NFR BLD AUTO: 0.9 %
BILIRUB UR QL STRIP: NEGATIVE
BUN SERPL-MCNC: 14 MG/DL (ref 7–30)
CALCIUM SERPL-MCNC: 8.9 MG/DL (ref 8.5–10.1)
CHLORIDE SERPL-SCNC: 100 MMOL/L (ref 94–109)
CO2 SERPL-SCNC: 27 MMOL/L (ref 20–32)
COLOR UR AUTO: ABNORMAL
CREAT SERPL-MCNC: 0.66 MG/DL (ref 0.52–1.04)
CRP SERPL-MCNC: <2.9 MG/L (ref 0–8)
DIFFERENTIAL METHOD BLD: ABNORMAL
EOSINOPHIL # BLD AUTO: 0.5 10E9/L (ref 0–0.7)
EOSINOPHIL NFR BLD AUTO: 7.8 %
ERYTHROCYTE [DISTWIDTH] IN BLOOD BY AUTOMATED COUNT: 12.3 % (ref 10–15)
ERYTHROCYTE [SEDIMENTATION RATE] IN BLOOD BY WESTERGREN METHOD: 15 MM/H (ref 0–30)
GFR SERPL CREATININE-BSD FRML MDRD: >90 ML/MIN/{1.73_M2}
GLUCOSE SERPL-MCNC: 94 MG/DL (ref 70–99)
GLUCOSE UR STRIP-MCNC: NEGATIVE MG/DL
HCT VFR BLD AUTO: 30.4 % (ref 35–47)
HGB BLD-MCNC: 10.2 G/DL (ref 11.7–15.7)
HGB UR QL STRIP: NEGATIVE
IMM GRANULOCYTES # BLD: 0.1 10E9/L (ref 0–0.4)
IMM GRANULOCYTES NFR BLD: 0.9 %
KETONES UR STRIP-MCNC: NEGATIVE MG/DL
LEUKOCYTE ESTERASE UR QL STRIP: NEGATIVE
LYMPHOCYTES # BLD AUTO: 1.1 10E9/L (ref 0.8–5.3)
LYMPHOCYTES NFR BLD AUTO: 16.5 %
MCH RBC QN AUTO: 32.4 PG (ref 26.5–33)
MCHC RBC AUTO-ENTMCNC: 33.6 G/DL (ref 31.5–36.5)
MCV RBC AUTO: 97 FL (ref 78–100)
MONOCYTES # BLD AUTO: 0.5 10E9/L (ref 0–1.3)
MONOCYTES NFR BLD AUTO: 8.3 %
MUCOUS THREADS #/AREA URNS LPF: PRESENT /LPF
NEUTROPHILS # BLD AUTO: 4.2 10E9/L (ref 1.6–8.3)
NEUTROPHILS NFR BLD AUTO: 65.6 %
NITRATE UR QL: NEGATIVE
NRBC # BLD AUTO: 0 10*3/UL
NRBC BLD AUTO-RTO: 0 /100
PH UR STRIP: 6 PH (ref 5–7)
PLATELET # BLD AUTO: 419 10E9/L (ref 150–450)
POTASSIUM SERPL-SCNC: 3.8 MMOL/L (ref 3.4–5.3)
RBC # BLD AUTO: 3.15 10E12/L (ref 3.8–5.2)
RBC #/AREA URNS AUTO: <1 /HPF (ref 0–2)
SODIUM SERPL-SCNC: 133 MMOL/L (ref 133–144)
SOURCE: ABNORMAL
SP GR UR STRIP: 1.01 (ref 1–1.03)
UROBILINOGEN UR STRIP-MCNC: NORMAL MG/DL (ref 0–2)
WBC # BLD AUTO: 6.4 10E9/L (ref 4–11)
WBC #/AREA URNS AUTO: <1 /HPF (ref 0–5)

## 2019-06-01 PROCEDURE — 25000128 H RX IP 250 OP 636: Performed by: PHYSICIAN ASSISTANT

## 2019-06-01 PROCEDURE — A9270 NON-COVERED ITEM OR SERVICE: HCPCS | Mod: GY | Performed by: PHYSICIAN ASSISTANT

## 2019-06-01 PROCEDURE — 96360 HYDRATION IV INFUSION INIT: CPT

## 2019-06-01 PROCEDURE — 85025 COMPLETE CBC W/AUTO DIFF WBC: CPT | Performed by: PHYSICIAN ASSISTANT

## 2019-06-01 PROCEDURE — 85652 RBC SED RATE AUTOMATED: CPT | Performed by: PHYSICIAN ASSISTANT

## 2019-06-01 PROCEDURE — 96361 HYDRATE IV INFUSION ADD-ON: CPT

## 2019-06-01 PROCEDURE — 99284 EMERGENCY DEPT VISIT MOD MDM: CPT | Mod: 25

## 2019-06-01 PROCEDURE — 86140 C-REACTIVE PROTEIN: CPT | Performed by: PHYSICIAN ASSISTANT

## 2019-06-01 PROCEDURE — 36415 COLL VENOUS BLD VENIPUNCTURE: CPT | Performed by: PHYSICIAN ASSISTANT

## 2019-06-01 PROCEDURE — 81001 URINALYSIS AUTO W/SCOPE: CPT | Performed by: PHYSICIAN ASSISTANT

## 2019-06-01 PROCEDURE — 80048 BASIC METABOLIC PNL TOTAL CA: CPT | Performed by: PHYSICIAN ASSISTANT

## 2019-06-01 PROCEDURE — 25000132 ZZH RX MED GY IP 250 OP 250 PS 637: Mod: GY | Performed by: PHYSICIAN ASSISTANT

## 2019-06-01 RX ORDER — IBUPROFEN 600 MG/1
600 TABLET, FILM COATED ORAL ONCE
Status: COMPLETED | OUTPATIENT
Start: 2019-06-01 | End: 2019-06-01

## 2019-06-01 RX ADMIN — IBUPROFEN 600 MG: 600 TABLET ORAL at 17:07

## 2019-06-01 RX ADMIN — SODIUM CHLORIDE 1000 ML: 9 INJECTION, SOLUTION INTRAVENOUS at 16:51

## 2019-06-01 ASSESSMENT — ENCOUNTER SYMPTOMS
FEVER: 1
DYSURIA: 0
HEADACHES: 1
NUMBNESS: 0
COUGH: 0
ANAL BLEEDING: 0
SORE THROAT: 0
HEMATURIA: 0
ABDOMINAL PAIN: 0
DIARRHEA: 0
FATIGUE: 1
NAUSEA: 1
FLANK PAIN: 0
ARTHRALGIAS: 0
DIFFICULTY URINATING: 0
BLOOD IN STOOL: 0
NECK PAIN: 0
WEAKNESS: 0

## 2019-06-01 NOTE — ED PROVIDER NOTES
Emergency Department Attending Supervision Note  6/1/2019  5:33 PM    I evaluated this patient in conjunction with Luisana Waldrop PA-C.    Briefly, the patient presented with sixteen days of fever and fatigue, for which she has undergone multiple extensive workups during recent UC and ER visits, as well as a recent admission. Since then the patient reports fatigue and an intermittent fever. She explains that today is the first day that she had not taken ibuprofen, which has been adequately managing her symptoms, and adds that she subsequently developed a fever at 1530. As she has been concerned for a blood cancer based on a comment from Dr. Noland of infectious disease, she presents for a rule out. The patient denies any cough, sore throat, chest pain, shortness of breath, bloody sputum, ear pain, night sweats, abdominal pain, bloody stools, diarrhea, nausea, hematemesis, neck pain, numbness, weakness, or urinary symptoms.      Physical Exam:    General: Resting on the bed.  Head: No obvious trauma to head.  Ears, Nose, Throat:  External ears normal.  Nose normal.   Eyes:  Conjunctivae clear.  Pupils are equal, round, and reactive.   Neck: Normal range of motion.  Neck supple.  no nuchal rigidity.    CV: Regular rate and rhythm.  No murmurs.      Respiratory: Effort normal and breath sounds normal.  No wheezing or crackles.   Gastrointestinal: Soft.  No distension. There is no tenderness.  There is no rigidity, no rebound and no guarding.   Neuro: Alert. Moving all extremities appropriately.  Normal speech.    Skin: Skin is warm and dry.  No rash noted.   Psych: Normal mood and affect. Behavior is normal.  Anxious.       My impression is fever.  64-year-old female with a recent diagnosis of fever of unknown origin and weakness presents with recurrent fever.  Previously patient had been seen and evaluated.  She had a lengthy work-up performed including spinal tap, blood smear, blood cultures, etc.  Her temperature  was 104 at that time and clinically she felt much worse.  Today she presents as she is highly concerned about possible blood cancer.  Overall temperature is low-grade today.  She had a fever at home but did not have a fever here.  Vitals were otherwise unremarkable and stable here.  CBC shows no leukocytosis and chronic anemia.  Without abnormality and white count and previous work-up wnl do not suspect blood borne cancer.  BMP shows no acute electrolyte metabolic or renal dysfunction.  ESR and CRP are downtrending from previous, this is reassuring that the inflammation is likely improving.  UA shows no evidence of acute infectious etiology.  Her cough is better do not suspect pneumonia.  No rashes.  No acute intra-abdominal pain to suggest acute intra-abdominal infectious process.  At present time suspect most likely viral illness.  Given her extensive work-up with infectious disease offered to consult infectious disease but patient declined.  She felt reassured by blood work.  She was afebrile ER.  Overall do not suspect occult bacteremia, meningitis, encephalitis or other more sinister etiology of her fever.  Without fever in the ER and with reassuring vital signs felt comfortable with her discharging home.  Encourage that she follow-up with her primary doctor early next week.  Return precautions were discussed and she voiced understanding and wishes to go home at this time.    Diagnosis    ICD-10-CM    1. Fever R50.9        Scribe Disclosure:  I, Bruna Lin, am serving as a scribe at 5:33 PM on 6/1/2019 to document services personally performed by Jeanie Hand MD based on my observations and the provider's statements to me.           Jeanie Hand MD  06/02/19 0148

## 2019-06-01 NOTE — ED TRIAGE NOTES
Patient was admitted to the hospital on Monday and discharged on Tuesday after workup for fever and weakness.  Patient continues to feel weak, she reports frequent BMs, fever 100.8 last night.  ABCs intact.  Patient is alert and oriented x3.

## 2019-06-01 NOTE — ED PROVIDER NOTES
History     Chief Complaint:  Fever & Generalized Weakness    HPI   Denise Ralph is a 64 year old female with a history of anxiety, depression, and hypertension who presents with a fever and generalized weakness. The patient reports that she was in [facility] at a resort from 5/15-5/21. About 14 days ago she states she started to develop a fever, cough, headache, diaphoresis, and arthralgias(while in [place]). She was seen by a doctor at that resort while there and was given Tamiflu for suspected influenza. The patient states that she was sick the entire time she was in [place].  The patient was then seen at Urgent Care on 5/23 where she had labs and a chest X-ray completed; see results below. The patient was given a prescription for azithromycin at that time for fever and cough for multiple days at that time. The patient was then seen in the ED on 5/27 for persistent fever and headache and was subsequently admitted for further evaluation and work up; see results from 5/28 below. Prior to discharge on 5/28 the patient was seen by Dr. Noland of ID. Immediately following discharge the patient states she did feel somewhat improved and more energetic for 1-2 days, however then today she states she started to feel fatigued, generally weak and febrile again. She notes a fever of 100.8 at home today. Currently in the ED the patient states that her fever and fatigue are her main concerns along with a concern for cancer given a comment that was made during her admission. SHe notes her headache and cough are improved. She states that she has also having frequent bowel movements, but no diarrhea, or black/tarry/bloody stools, dysuria, or hemturia.  The patient otherwise denies any cough, sore throat, abdominal pain, arthralgias, neck pain, numbness, unilateral weakness, or any urinary symptoms. She states she may have had a sick contact at a nail salon prior to her trip to [place], but can't be certain of this. The  patient has not taken any analgesics yet today.     See patient's work up from Urgent Care visit on 5/23/19 below.    Laboratory  WBC and differential: Absolute Lymphocytes 0.7 (L), o/w AWNL (WBC 7.2)  CMP: Sodium 130 (L), Glucose 105 (H), o/w AWNL (Creatinine 0.74)  UA: Negative    XR Chest 2 VW  Negative.  As read by radiology.    See work up completed while in the ED on 5/27/19 below.     Hepatic Panel: Albumin 3.3 (L)  Lactic acid: 1.2  Procalcitonin: 0.31  Erythrocyte Sedimentation Rate: 22  CRP Inflammation 11.1 (H)  BMP: Sodium 130 (L), Glucose 100 (H), o/w AWNL (Creatinine 0.72)  CBC: RBC Count 3.60 (L), HCT 34.4 (L), o/w AWNL (WBC 10.6, HGB 11.7, )  Blood Culture x 2: Negative   UA: Negative    Parasite Stain: Giemsa stain of thin and thick blood smears reveals no malaria parasite forms. If highly   suspect malaria, consider additional specimen submission.   Elda Colón M.D., Assistant Medical Director   5/28/2019     Malaria Screen Rapid: Negative    XR Chest 2 VW  Negative exam.  BINA ORTIZ MD    Lumbar Spine 2-3 Views  No acute fracture or malalignment. Stable appearance of  the L4-L5 fusion. Degenerative disc disease at L5-S1.  ALONDRA ALEXIS MD    CT Head w/o Contrast  No evidence of acute intracranial hemorrhage, mass, or  herniation.  CHRISTIANO CASTRO MD    See patient's work up while admitted to the hospital on 5/28/19 below.    Influenza A and B and RSV PCR: Negative  BMP: Calcium 8.4 (L), o/w AWNL (Creatinine 0.65)  CBC: HGB 10.0 (L), RBC 3.10 (L), HCT 29.5 (L), o/w AWNL (WBC 7.3, )  Glucose CSF: 51  Protein Total CSF: 35  Cell count with Differential CSF: WBC CSF 4, RBC CSF 0, Tube Number 4, Color CSF Colorless, Appearance CSF Clear  CSF Gram stain: Negative  CSF Culture Aerobic Bacterial: Negative  HSV Types 1 and 2 Qualitative PCR CSF: Not Detected  Enterovirus CSF PCR: Negative  Lyme IgG CSF Immunoblot: Negative  West Nile IgG CSF: 0.01    Lumbar Puncture with  Fluoroscopic Guidance  Informed consent was obtained for the procedure with  discussion including possible risks of bleeding, infection, and spinal  headache.     FLUOROSCOPY TIME: 0.2 minutes.   SPOT IMAGES OR CINE RUNS: 1     Using 3 mL 1% lidocaine for local anesthesia and sterile technique, I  placed a 22 gauge spinal needle in the lumbar thecal sac.     I aspirated 9 mL of clear cerebrospinal fluid and sent it for the  laboratory studies that were ordered.  There were no complications.     MIA VERDUGO DO    MA Screen Bilateral w/ Sumeet  Negative    Allergies:  Erythromycin  Msir [Morphine Sulfate]  Dilaudid  Lisinopril  Losartan  Penicillins  Reglan    Medications:    Xanax  Norvasc  Tenormin  Robitussin AC  Apresoline  Xalatan  Desyrel    Past Medical History:    Anxiety  Depression  Hypertension  IBS  Lumbar degenerative disc disease  Chronic Pain  PONV  Sciatica  Glaucoma  Squamous cell carcinoma  Basal cell carcinoma  GERD  Tension Headache  Frequent UTI  Fever of unknown origin    Past Surgical History:    Glaucoma Surgery  Hysterectomy, vaginal  Laparoscopic Cholecystectomy  Left shoulder decompression surgery for impingement  Optical tracking system fusion posterior spine lumbar  Left wrist surgery    Family History:    Cardiovascular   Breast Cancer  Pulmonary Fibrosis  MI  Cocaine Use    Social History:  Smoking Status: Former Smoker  Alcohol Use: Yes  Patient presents with her .  Marital Status:       Review of Systems   Constitutional: Positive for fatigue and fever.   HENT: Positive for ear pain. Negative for sore throat.    Respiratory: Negative for cough.    Gastrointestinal: Positive for nausea. Negative for abdominal pain, anal bleeding, blood in stool and diarrhea.   Genitourinary: Negative for decreased urine volume, difficulty urinating, dysuria, flank pain, hematuria and urgency.   Musculoskeletal: Negative for arthralgias and neck pain.   Neurological: Positive for  headaches (resolved). Negative for weakness (unilateral) and numbness.        + Generalized weakness   All other systems reviewed and are negative.    Physical Exam     Patient Vitals for the past 24 hrs:   BP Temp Temp src Pulse Resp SpO2 Weight   06/01/19 1800 151/86 -- -- 67 -- 98 % --   06/01/19 1705 -- -- -- -- -- 98 % --   06/01/19 1614 149/82 99.3  F (37.4  C) Oral 77 18 98 % 62.4 kg (137 lb 9.1 oz)     Physical Exam  General: Resting comfortably.  Alert and oriented. Well appearing.   Head:  The scalp, face, and head appear normal   Eyes:  The pupils are equal, round, and reactive to light     Extraocular muscles are intact    Conjunctivae and sclerae are normal    ENT:    The oropharynx is normal    Uvula is in the midline     Moist mucous membranes   Neck:  Normal range of motion    No lymphadenopathy    No nuchal rigidity  CV:  Regular rate and rhythm     Normal S1/S2  Resp:  Lungs are clear to auscultation    Non-labored    No rales or wheezing   GI:  Abdomen is soft, non-distended    No abdominal tenderness   MS:  Normal muscular tone   Skin:  No rash or acute skin lesions noted   Neuro: Speech is normal and fluent.     Emergency Department Course     Laboratory:    BMP: AWNL (Creatinine 0.66)    CBC: HGB 10.2 (L), RBC 3.15 (L), HCT 30.4 (L), o/w AWNL (WBC 6.4, )    Erythrocyte sedimentation rate auto: 15    CRP inflammation: <2.9    UA: Mucous Urine Present (A), o/w Negative    Interventions:    1651: NS 1L IV Bolus  1707: Ibuprofen 600 mg PO    Emergency Department Course:  Past medical records, nursing notes, and vitals reviewed.  1624: I performed an exam of the patient and obtained history, as documented above.    IV inserted and blood drawn.    Dr. Hand agreed to staff the patient with me.     1815: I rechecked the patient. Findings and plan explained to the Patient and spouse. Patient discharged home with instructions regarding supportive care, medications, and reasons to return. The  "importance of close follow-up was reviewed.      Impression & Plan      Medical Decision Making:  Denise Ralph is a 64 year old female with a history of anxiety, depression, and hypertension who presents with a fever and generalized weakness.  Patient states she was in [facility] from 5/15-5/21.  She notes that she developed a fever approximately 48 hours after arrival.  T-max of 104, with associated symptoms including headache, cough, and arthralgias.  She states her fever continued throughout her stay and upon return, eventually ended up in the ED.  Patient was admitted on 5/27-5/28 and underwent LP, which was negative, and other various infectious disease tests as noted above(influenza, malaria, etc).  All of these tests returned negative.  Patient felt improved and fever improved patient was ultimately discharged.  She states her temperature is been very low-grade, but today did spike up to near 101.  She also notes she is concerned for possible cancer given a comment made during her admission.  She presents today to obtain a blood test to \"rule out cancer\".  She denies any night sweats, or weight loss, has been B type symptoms to suggest malignancy at this time.  Overall, this is low likelihood. I told the pateint there is not a blood test that can be performed that can rule out cancer. UA is normal without evidence of infection.  CBC and BMP unremarkable.  ESR and CRP are trending down from previous values.  Patient appears well overall feels much improved.  She states fever has trended down along with her headache and cough has improved. I did review infectious disease note who suggested the possibility of further fever of unknown origin work-up if her fever continues.  I did consider touching base with infectious disease, but upon talking with the patient, she does not want this.  She is reassured by her lab work here including a normal white blood cell count and improving ESR and CRP.  I did discuss this " cannot definitively rule out malignancy, but overall I believe this is low risk.  I do not know the exact cause of her fever at this time, but this is likely viral.  This could represent chikungunya virus Zika, but testing for this would not  at this time.  Overall, believe she safe to discharge home.  Again she is well-appearing and afebrile here.  She is in agreement with this plan.  She will follow-up with primary care doctor in 2 days for recheck.  She is asked return immediately for any high fever is not amendable to Tylenol/ibuprofen, vomiting, abdominal pain, black discharge is bloody stools, hematemesis, return of headache, or any other concerns.  All questions were answered prior to discharge.  The patient understands and agrees to this plan.    Diagnosis:    ICD-10-CM    1. Fever R50.9        Disposition:  Discharged to home.    Discharge Medications:  none    Chelsea Miller  6/1/2019   [facility] EMERGENCY DEPARTMENT  I, Chelsea Miller, am serving as a scribe at 4:24 PM on 6/1/2019 to document services personally performed by Luisana Waldrop PA-C based on my observations and the provider's statements to me.        Luisana Waldrop PA-C  06/01/19 2131

## 2019-06-01 NOTE — ED AVS SNAPSHOT
[facility] Emergency Department  [address]  [facility] [address]  Phone:  [phone]  Fax:  [phone]                                    Denise Ralph   MRN: [id number]    Department:  [facility] Emergency Department   Date of Visit:  6/1/2019           After Visit Summary Signature Page    I have received my discharge instructions, and my questions have been answered. I have discussed any challenges I see with this plan with the nurse or doctor.    ..........................................................................................................................................  Patient/Patient Representative Signature      ..........................................................................................................................................  Patient Representative Print Name and Relationship to Patient    ..................................................               ................................................  Date                                   Time    ..........................................................................................................................................  Reviewed by Signature/Title    ...................................................              ..............................................  Date                                               Time          22EPIC Rev 08/18

## 2019-06-02 LAB
BACTERIA SPEC CULT: NO GROWTH
Lab: NORMAL
SPECIMEN SOURCE: NORMAL

## 2019-06-03 ENCOUNTER — MYC MEDICAL ADVICE (OUTPATIENT)
Dept: INTERNAL MEDICINE | Facility: CLINIC | Age: 64
End: 2019-06-03

## 2019-06-03 DIAGNOSIS — I10 ESSENTIAL HYPERTENSION, BENIGN: Primary | ICD-10-CM

## 2019-06-03 LAB
BACTERIA SPEC CULT: NO GROWTH
BACTERIA SPEC CULT: NO GROWTH
Lab: NORMAL
Lab: NORMAL
SPECIMEN SOURCE: NORMAL
SPECIMEN SOURCE: NORMAL

## 2019-06-03 RX ORDER — CHLORTHALIDONE 25 MG/1
25 TABLET ORAL DAILY
Qty: 30 TABLET | Refills: 3 | Status: SHIPPED | OUTPATIENT
Start: 2019-06-03 | End: 2019-06-07

## 2019-06-03 NOTE — TELEPHONE ENCOUNTER
See her mychart message.     6/3, BP  140/90     BP Readings from Last 3 Encounters:   06/01/19 132/84   05/31/19 118/72   05/28/19 143/76

## 2019-06-03 NOTE — UTILIZATION REVIEW
"  Admission Status; Secondary Review Determination           As part of the [facility] Utilization review plan, a self-audit is done on Medicare inpatient admission with less than 2 midnights stay. The 2014 IPPS Final Rule allows outpatient billing in the event that a hospital determines that an inpatient admission was not medically necessary under utilization review process.      (x) Outpatient status would be Appropriate- Short Stay- Post discharge review.     RATIONALE FOR DETERMINATION   Patient presented to ED on 5/27 with  flu like symptoms including a high fever, headache, chills, nausea, constipation, and cough starting on 5/16 while in [facility]. Three days after returning to [place] (5/23) she presented to urgent care due to persistence of her symptoms, where she underwent laboratory and imaging studies and she was discharged on azithromycin and robitussin for suspected pneumonia. However, the patient states that azithromycin \"does not work for her\" based on a previous infection. Temp was 100.2 in ED. She was started on IV ceftriaxone 2 g IV every 12 hours and admitted with a dx of FUO. Admit order 5/28 at 0211. LP done and not indicative of meningitis. ID consult felt this was of viral etiology. Her fever subsided and she wanted to go home and follow up as an outpatient so was discharged to home on 5/28 - discharge time 1901. Patient did not meet criteria for fever of unknown origin nor sepsis. Patient was admitted and discharge after one night stay. Record was sent by  for  Medicare short stay review by Medical Director. Based on the  severity of illness, intensity of service provided, expected LOS and risk for adverse outcome make the care appropriate for further outpatient/observation; however, doesn't meet criteria for hospital inpatient admission.           The information on this document is developed by the utilization review team in order for the business office to ensure " compliance.  This only denotes the appropriateness of proper admission status and does not reflect the quality of care rendered.         The definitions of Inpatient Status and Observation Status used in making the determination above are those provided in the CMS Coverage Manual, Chapter 1 and Chapter 6, section 70.4.      Sincerely,       JESSICA DEL CID MD    System Medical Director  Utilization  Management  [facility].

## 2019-06-06 ENCOUNTER — MYC MEDICAL ADVICE (OUTPATIENT)
Dept: INTERNAL MEDICINE | Facility: CLINIC | Age: 64
End: 2019-06-06

## 2019-06-06 ENCOUNTER — HOSPITAL ENCOUNTER (EMERGENCY)
Facility: CLINIC | Age: 64
Discharge: HOME | End: 2019-06-06
Attending: PHYSICIAN ASSISTANT | Admitting: PHYSICIAN ASSISTANT
Payer: MEDICARE

## 2019-06-06 VITALS
HEART RATE: 57 BPM | RESPIRATION RATE: 18 BRPM | SYSTOLIC BLOOD PRESSURE: 145 MMHG | DIASTOLIC BLOOD PRESSURE: 85 MMHG | OXYGEN SATURATION: 96 % | TEMPERATURE: 98.8 F

## 2019-06-06 DIAGNOSIS — R11.0 NAUSEA: ICD-10-CM

## 2019-06-06 DIAGNOSIS — E87.1 HYPONATREMIA: ICD-10-CM

## 2019-06-06 LAB
ALBUMIN UR-MCNC: NEGATIVE MG/DL
ANION GAP SERPL CALCULATED.3IONS-SCNC: 9 MMOL/L (ref 3–14)
APPEARANCE UR: CLEAR
BASOPHILS # BLD AUTO: 0.1 10E9/L (ref 0–0.2)
BASOPHILS NFR BLD AUTO: 1 %
BILIRUB UR QL STRIP: NEGATIVE
BUN SERPL-MCNC: 13 MG/DL (ref 7–30)
CALCIUM SERPL-MCNC: 9.3 MG/DL (ref 8.5–10.1)
CHLORIDE SERPL-SCNC: 87 MMOL/L (ref 94–109)
CO2 SERPL-SCNC: 25 MMOL/L (ref 20–32)
COLOR UR AUTO: ABNORMAL
CREAT SERPL-MCNC: 0.72 MG/DL (ref 0.52–1.04)
DIFFERENTIAL METHOD BLD: ABNORMAL
EOSINOPHIL # BLD AUTO: 0.2 10E9/L (ref 0–0.7)
EOSINOPHIL NFR BLD AUTO: 2.4 %
ERYTHROCYTE [DISTWIDTH] IN BLOOD BY AUTOMATED COUNT: 11.9 % (ref 10–15)
GFR SERPL CREATININE-BSD FRML MDRD: 88 ML/MIN/{1.73_M2}
GLUCOSE SERPL-MCNC: 100 MG/DL (ref 70–99)
GLUCOSE UR STRIP-MCNC: NEGATIVE MG/DL
HCT VFR BLD AUTO: 33.6 % (ref 35–47)
HGB BLD-MCNC: 11.9 G/DL (ref 11.7–15.7)
HGB UR QL STRIP: NEGATIVE
IMM GRANULOCYTES # BLD: 0.1 10E9/L (ref 0–0.4)
IMM GRANULOCYTES NFR BLD: 0.6 %
KETONES UR STRIP-MCNC: NEGATIVE MG/DL
LEUKOCYTE ESTERASE UR QL STRIP: NEGATIVE
LYMPHOCYTES # BLD AUTO: 2 10E9/L (ref 0.8–5.3)
LYMPHOCYTES NFR BLD AUTO: 20.9 %
MAGNESIUM SERPL-MCNC: 1.5 MG/DL (ref 1.6–2.3)
MCH RBC QN AUTO: 32.7 PG (ref 26.5–33)
MCHC RBC AUTO-ENTMCNC: 35.4 G/DL (ref 31.5–36.5)
MCV RBC AUTO: 92 FL (ref 78–100)
MONOCYTES # BLD AUTO: 0.7 10E9/L (ref 0–1.3)
MONOCYTES NFR BLD AUTO: 7.5 %
MUCOUS THREADS #/AREA URNS LPF: PRESENT /LPF
NEUTROPHILS # BLD AUTO: 6.3 10E9/L (ref 1.6–8.3)
NEUTROPHILS NFR BLD AUTO: 67.6 %
NITRATE UR QL: NEGATIVE
NRBC # BLD AUTO: 0 10*3/UL
NRBC BLD AUTO-RTO: 0 /100
PH UR STRIP: 6.5 PH (ref 5–7)
PLATELET # BLD AUTO: 434 10E9/L (ref 150–450)
PLATELET # BLD EST: ABNORMAL 10*3/UL
POTASSIUM SERPL-SCNC: 3.5 MMOL/L (ref 3.4–5.3)
RBC # BLD AUTO: 3.64 10E12/L (ref 3.8–5.2)
RBC #/AREA URNS AUTO: <1 /HPF (ref 0–2)
RBC MORPH BLD: ABNORMAL
SODIUM SERPL-SCNC: 121 MMOL/L (ref 133–144)
SOURCE: ABNORMAL
SP GR UR STRIP: 1.01 (ref 1–1.03)
SQUAMOUS #/AREA URNS AUTO: <1 /HPF (ref 0–1)
TROPONIN I SERPL-MCNC: <0.015 UG/L (ref 0–0.04)
UROBILINOGEN UR STRIP-MCNC: NORMAL MG/DL (ref 0–2)
WBC # BLD AUTO: 9.3 10E9/L (ref 4–11)
WBC #/AREA URNS AUTO: <1 /HPF (ref 0–5)

## 2019-06-06 PROCEDURE — 96375 TX/PRO/DX INJ NEW DRUG ADDON: CPT

## 2019-06-06 PROCEDURE — 96365 THER/PROPH/DIAG IV INF INIT: CPT

## 2019-06-06 PROCEDURE — 83735 ASSAY OF MAGNESIUM: CPT | Performed by: PHYSICIAN ASSISTANT

## 2019-06-06 PROCEDURE — 85025 COMPLETE CBC W/AUTO DIFF WBC: CPT | Performed by: PHYSICIAN ASSISTANT

## 2019-06-06 PROCEDURE — 99284 EMERGENCY DEPT VISIT MOD MDM: CPT | Mod: 25

## 2019-06-06 PROCEDURE — 80048 BASIC METABOLIC PNL TOTAL CA: CPT | Performed by: PHYSICIAN ASSISTANT

## 2019-06-06 PROCEDURE — 96361 HYDRATE IV INFUSION ADD-ON: CPT

## 2019-06-06 PROCEDURE — 25000132 ZZH RX MED GY IP 250 OP 250 PS 637: Mod: GY | Performed by: PHYSICIAN ASSISTANT

## 2019-06-06 PROCEDURE — 81001 URINALYSIS AUTO W/SCOPE: CPT | Performed by: PHYSICIAN ASSISTANT

## 2019-06-06 PROCEDURE — 25000125 ZZHC RX 250: Performed by: PHYSICIAN ASSISTANT

## 2019-06-06 PROCEDURE — 25000128 H RX IP 250 OP 636: Performed by: PHYSICIAN ASSISTANT

## 2019-06-06 PROCEDURE — A9270 NON-COVERED ITEM OR SERVICE: HCPCS | Mod: GY | Performed by: PHYSICIAN ASSISTANT

## 2019-06-06 PROCEDURE — 84484 ASSAY OF TROPONIN QUANT: CPT | Performed by: PHYSICIAN ASSISTANT

## 2019-06-06 RX ORDER — ONDANSETRON 2 MG/ML
4 INJECTION INTRAMUSCULAR; INTRAVENOUS ONCE
Status: COMPLETED | OUTPATIENT
Start: 2019-06-06 | End: 2019-06-06

## 2019-06-06 RX ORDER — ACETAMINOPHEN 325 MG/1
975 TABLET ORAL ONCE
Status: COMPLETED | OUTPATIENT
Start: 2019-06-06 | End: 2019-06-06

## 2019-06-06 RX ORDER — ALPRAZOLAM 0.25 MG
0.25 TABLET ORAL ONCE
Status: COMPLETED | OUTPATIENT
Start: 2019-06-06 | End: 2019-06-06

## 2019-06-06 RX ADMIN — Medication 2 G: at 20:52

## 2019-06-06 RX ADMIN — ALPRAZOLAM 0.25 MG: 0.25 TABLET ORAL at 20:52

## 2019-06-06 RX ADMIN — SODIUM CHLORIDE 1000 ML: 9 INJECTION, SOLUTION INTRAVENOUS at 19:59

## 2019-06-06 RX ADMIN — ONDANSETRON 4 MG: 2 INJECTION INTRAMUSCULAR; INTRAVENOUS at 20:02

## 2019-06-06 RX ADMIN — ACETAMINOPHEN 975 MG: 325 TABLET, FILM COATED ORAL at 20:35

## 2019-06-06 ASSESSMENT — ENCOUNTER SYMPTOMS
VOMITING: 0
APPETITE CHANGE: 1
NAUSEA: 1
DIZZINESS: 1
LIGHT-HEADEDNESS: 1
RESPIRATORY NEGATIVE: 1
FEVER: 0

## 2019-06-06 NOTE — ED TRIAGE NOTES
Pt arrives with nausea starting this morning worsening. States started on water pill (chlorithiadone) 3 days ago and believes it is a reaction from that. Also states she is dehydrated. ABCs intact.

## 2019-06-06 NOTE — TELEPHONE ENCOUNTER
Patient calling to say that she is also having very bad stomach pain since she started the chlorthalidone.  Patient is not going to take this any more.

## 2019-06-07 LAB — INTERPRETATION ECG - MUSE: NORMAL

## 2019-06-07 NOTE — TELEPHONE ENCOUNTER
Spoke with patient.  Advised of message below.    States she will not take Hydralazine d/t causes her to grow whiskers.    States she will just continue Atenolol 50mg BID and Norvasc 5mg 1/2 tab every morning.    Hydralazine and Chlorthalidone DC'd off medication list.    Patient went to ER 6-6-19 and has a f/u appointment 6-11-19.

## 2019-06-07 NOTE — ED PROVIDER NOTES
History     Chief Complaint:  Nausea      HPI   Denise Ralph is a 64 year old female who presents with her  for evaluation of nausea, vomiting, and dizziness. She has had these symptoms for 3 days, since she was started on Chlorthalidone.  Ms. Ralph has been evaluated here for a fever of unknown origin and weakness with onset of fever recently. She does not report having a fever today however, she does report that today was a very miserable day for her. She had a transient abdominal pressure lasting about 5 minutes at around 1530 tonight. She has been feeling very dizzy and faint with a decreased appetite throughout the day today. Fortunately she has been able to eat, and she has not had any actual loss of consciousness today. She has not had any vomiting and she notes that she has been drinking Gatorades throughout the day to keep hydrated.  Ms. Ralph recalls a similar constellation of symptomatology the last time she was placed on diuretics. She has since stopped taking the diuretic. She denies any burning with urination or other urinary symptoms.      Allergies:  Erythromycin  Msir [Morphine Sulfate]  Dilaudid [Hydromorphone]  Lisinopril  Losartan  Penicillins  Reglan      Medications:    Alprazolam (xanax) 0.25 mg tablet  Amlodipine (Norvasc) 2.5 mg tablet (Changed from 5 mg about 6 days ago due to ankle swelling)  Atenolol (tenormin) 50 mg tablet  Chlorthalidone (hygroton) 25 mg tablet  Guaifenesin-codeine (robitussin ac) 100-10 mg/5ml solution  Hydralazine (apresoline) 25 mg tablet  Latanoprost (Xalatan) 0.005 % ophthalmic solution  Trazodone (Desyrel) 100 mg tablet     Past Medical History:    Irritable bowel syndrome  Anxiety state  Essential hypertension, benign  Disorder of bone and cartilage  Basal cell carcinoma  Squamous cell carcinoma  GERD (gastroesophageal reflux disease)  Glaucoma  Hypertension goal BP (blood pressure) < 140/80  S/P lumbar fusion  Tension headache  Hyponatremia  Frequent  UTI  FUO (fever of unknown origin)  Sciatica     Past Surgical History:    Laparoscopic cholecystectomy (2002);   Hysterectomy vaginal;  Left shoulder decomp surgery for impingement (approx 1993);   Hysterectomy;   Wrist surgery (Left);   Eye surgery (Bilateral);   Optical tracking system fusion spine posterior lumbar one level (8/3/2017).     Family History:    MI in mother  Breast cancer  Pulmonary fibrosis    Social History:  Ms. Denise Ralph is here with her  by the bedside. She reports that she quit smoking about 21 years ago. She has a 12.50 pack-year smoking history. She has never used smokeless tobacco. She reports that she drinks about 1.0 oz of alcohol per week. She reports that she does not use drugs.   Marital Status:   [2]     Review of Systems   Constitutional: Positive for appetite change. Negative for fever.   Respiratory: Negative.    Gastrointestinal: Positive for nausea. Negative for vomiting.   Genitourinary: Negative.    Neurological: Positive for dizziness and light-headedness. Negative for syncope.   All other systems reviewed and are negative.       Physical Exam     Vital signs  Patient Vitals for the past 24 hrs:   BP Temp Temp src Pulse Heart Rate Resp SpO2   06/06/19 2100 157/87 -- -- 60 -- -- 96 %   06/06/19 2040 (!) 165/92 -- -- 61 -- -- 97 %   06/06/19 2038 -- -- -- -- -- -- 98 %   06/06/19 1950 (!) 180/103 -- -- 69 -- -- --   06/06/19 1936 (!) 183/92 -- -- 68 -- -- --   06/06/19 1853 (!) 176/119 98.8  F (37.1  C) Temporal -- 70 18 100 %          Physical Exam  Constitutional: anxious, but well appearing, no acute distress.   Head: No external signs of trauma noted to head or face.   Eyes: Pupils are equal, round, and reactive to light. Conjunctiva normal.   ENT: MMM. Normal voice.   Neck: normal ROM.    Cardiovascular: Normal rate, regular rhythm, and intact distal pulses.    Respiratory: Effort normal. No respiratory distress. Lungs clear to auscultation bilaterally.    GI: Soft. Non-tender. No rebound or guarding.   Musculoskeletal: No deformities appreciated. Normal ROM. No edema noted.  Neurological: Alert and Oriented x 3. Speech normal. Moves all extremities equally. CN II-XII intact. Normal strength and sensation in upper and lower extremities. Gait normal.  Psychiatric: Appropriate mood, affect, and behavior.   Skin: Skin is warm and dry.       Emergency Department Course   Laboratory:  UA: Mucous present otherwise normal   CBC: RBC 3.64, HCT 33.6, otherwise normal     BMP: , Cl 87, Glucose 100, otherwise normal   Magnesium 1.5  Troponin I: <0.015    Interventions:  : NS 1L IV Bolus  : Zofran 4 mg IB  : Tylenol 975 mg PO  : Xanax 0.25 mg PO  : Mag Sulfate 2g infusion    Emergency Department Course:  Past medical records, nursing notes, and vitals reviewed.  : I performed an exam of the patient and obtained history, as documented above.    IV inserted and blood drawn for the above work up to be conducted.     : I rechecked the patient and updated her about the findings of her visit here today.     : Rechecked the patient, findings and plan explained to her. Patient discharged home, status improved, with instructions regarding supportive care, medications, and reasons to return as well as the importance of close follow-up was reviewed.    Impression & Plan      Medical Decision Makin year old female presenting with nausea, generalized weakness, dizziness x 3 days. She is afebrile on arrival. She is hypertensive, but otherwise vital signs are within normal limits. She has no concerning exam findings. Urinalysis does not reveal any evidence of infection. EKG does not reveal any evidence of ischemia or other concerning findings. Troponin is negative. The remainder of her labs are notable for a sodium of 121 and a magnesium of 1.5. Patient received IVF and IV magnesium. She is feeling significantly improved after this. I suspect these  findings are secondary to recent initiation of chlorthalidone. Baseline sodium appears to be 130, so this is not a significant change and she is overall asymptomatic at this time. Therefore, I do not feel she requires admission for further treatment. She was instructed to discontinue the chlorthalidone and can follow-up with PCP tomorrow for lab recheck and to discuss need for additional blood pressure management. She was instructed to return to the ED for any new or worsening symptoms.       Diagnosis:    ICD-10-CM    1. Hyponatremia E87.1    2. Nausea R11.0        Disposition:  discharged to home    Agatha MICHAEL, oz serving as a scribe at 10:01 PM on 6/6/2019 to document services personally performed by Tere Gutiérrez PA-C based on my observations and the provider's statements to me.    [facility] EMERGENCY DEPARTMENT       Tere Gutiérrez PA-C  06/07/19 0030

## 2019-06-08 DIAGNOSIS — F51.01 PRIMARY INSOMNIA: ICD-10-CM

## 2019-06-10 NOTE — TELEPHONE ENCOUNTER
"Requested Prescriptions   Pending Prescriptions Disp Refills     traZODone (DESYREL) 100 MG tablet [Pharmacy Med Name: TRAZODONE 100 MG TABLET] 90 tablet 1     Sig: TAKE 1 TABLET BY MOUTH NIGHTLY AS NEEDED FOR SLEEP   Last Written Prescription Date:  historic  Last Fill Quantity: n/a,  # refills: n/a   Last office visit: 5/31/2019 with prescribing provider:     Future Office Visit:   Next 5 appointments (look out 90 days)    Jun 11, 2019  1:00 PM CDT  [facility] [address]  [facility] [address]  [phone]           Serotonin Modulators Passed - 6/8/2019  8:48 AM        Passed - Recent (12 mo) or future (30 days) visit within the authorizing provider's specialty     Patient had office visit in the last 12 months or has a visit in the next 30 days with authorizing provider or within the authorizing provider's specialty.  See \"Patient Info\" tab in inbasket, or \"Choose Columns\" in Meds & Orders section of the refill encounter.              Passed - Medication is active on med list        Passed - Patient is age 18 or older        Passed - No active pregnancy on record        Passed - No positive pregnancy test in past 12 months        "

## 2019-06-10 NOTE — TELEPHONE ENCOUNTER
Routing refill request to provider for review/approval because:  Medication is reported/historical    Patient has appointment tomorrow.  Please fill at office visit.    Next 5 appointments (look out 90 days)    Jun 11, 2019  1:00 PM CDT  Office Visit with Raleigh Kruse MD  [facility] [address]  [facility] [address]  [phone]        Maria C Miller RN

## 2019-06-15 DIAGNOSIS — I10 HYPERTENSION GOAL BP (BLOOD PRESSURE) < 140/80: ICD-10-CM

## 2019-06-17 ENCOUNTER — MYC REFILL (OUTPATIENT)
Dept: INTERNAL MEDICINE | Facility: CLINIC | Age: 64
End: 2019-06-17

## 2019-06-17 DIAGNOSIS — F51.01 PRIMARY INSOMNIA: ICD-10-CM

## 2019-06-17 RX ORDER — TRAZODONE HYDROCHLORIDE 100 MG/1
100 TABLET ORAL
Qty: 90 TABLET | Refills: 1 | Status: CANCELLED | OUTPATIENT
Start: 2019-06-17

## 2019-06-17 RX ORDER — TRAZODONE HYDROCHLORIDE 100 MG/1
TABLET ORAL
Qty: 90 TABLET | Refills: 1 | Status: SHIPPED | OUTPATIENT
Start: 2019-06-17 | End: 2020-01-09

## 2019-06-17 NOTE — TELEPHONE ENCOUNTER
"Requested Prescriptions   Pending Prescriptions Disp Refills     hydrALAZINE (APRESOLINE) 25 MG tablet [Pharmacy Med Name: HYDRALAZINE 25 MG TABLET] 60 tablet 1     Sig: TAKE 1 TABLET (25 MG) BY MOUTH 2 TIMES DAILY   Last Written Prescription Date:  Not on meds list  Last Fill Quantity: n/a,  # refills: n/a   Last office visit: 5/31/2019 with prescribing provider:     Future Office Visit:      Vasodilators Failed - 6/15/2019 10:32 AM        Failed - Most recent BP less than 140/90 on record     BP Readings from Last 3 Encounters:   06/06/19 145/85   06/01/19 132/84   05/31/19 118/72                 Failed - Medication is active on med list        Passed - Most recent encounter is not a hospital encounter. Patient has recent (12 mos) or future (1 mos) visit with authorizing provider's specialty     Patient's most recent encounter is NOT a hospital encounter and has had an office visit in the last 12 months or has a visit in the next 30 days with authorizing provider or within the authorizing provider's specialty.      See \"Patient Info\" tab in inbasket, or \"Choose Columns\" in Meds & Orders section of the refill encounter.      If most recent encounter is a hospital encounter AND the patient does NOT have an appointment scheduled with the authorizing provider or authorizing provider's specialty within the next 30 days, forward refill to authorizing provider for medication review.          Passed - Patient is of age 18 years or older        Passed - Patient is not pregnant        Passed - Patient has not had a positive pregnancy test within the past 12 months        "

## 2019-06-18 RX ORDER — HYDRALAZINE HYDROCHLORIDE 25 MG/1
25 TABLET, FILM COATED ORAL 2 TIMES DAILY
Qty: 60 TABLET | Refills: 1 | Status: SHIPPED | OUTPATIENT
Start: 2019-06-18 | End: 2019-08-15

## 2019-06-19 NOTE — TELEPHONE ENCOUNTER
"Requested Prescriptions   Pending Prescriptions Disp Refills     traZODone (DESYREL) 100 MG tablet 90 tablet 1     Sig: Take 1 tablet (100 mg) by mouth nightly as needed       Serotonin Modulators Passed - 6/17/2019  7:46 AM        Passed - Recent (12 mo) or future (30 days) visit within the authorizing provider's specialty     Patient had office visit in the last 12 months or has a visit in the next 30 days with authorizing provider or within the authorizing provider's specialty.  See \"Patient Info\" tab in inbasket, or \"Choose Columns\" in Meds & Orders section of the refill encounter.              Passed - Medication is active on med list        Passed - Patient is age 18 or older        Passed - No active pregnancy on record        Passed - No positive pregnancy test in past 12 months        Duplicate encounter, approved on 6/17.  NAN Jones R.N.    "

## 2019-06-28 DIAGNOSIS — I10 HYPERTENSION GOAL BP (BLOOD PRESSURE) < 140/80: ICD-10-CM

## 2019-06-28 RX ORDER — AMLODIPINE BESYLATE 2.5 MG/1
TABLET ORAL
Qty: 90 TABLET | Refills: 1 | Status: SHIPPED | OUTPATIENT
Start: 2019-06-28 | End: 2019-12-19

## 2019-06-28 NOTE — TELEPHONE ENCOUNTER
"Requested Prescriptions   Pending Prescriptions Disp Refills     amLODIPine (NORVASC) 2.5 MG tablet [Pharmacy Med Name: AMLODIPINE BESYLATE 2.5 MG TAB] 90 tablet 3     Sig: TAKE 1 TABLET BY MOUTH EVERY DAY   Last Written Prescription Date:  historic  Last Fill Quantity: n/a,  # refills: n/a   Last office visit: 5/31/2019 with prescribing provider:     Future Office Visit:      Calcium Channel Blockers Protocol  Failed - 6/28/2019  9:42 AM        Failed - Blood pressure under 140/90 in past 12 months     BP Readings from Last 3 Encounters:   06/06/19 145/85   06/01/19 132/84   05/31/19 118/72                 Passed - Recent (12 mo) or future (30 days) visit within the authorizing provider's specialty     Patient had office visit in the last 12 months or has a visit in the next 30 days with authorizing provider or within the authorizing provider's specialty.  See \"Patient Info\" tab in inbasket, or \"Choose Columns\" in Meds & Orders section of the refill encounter.              Passed - Medication is active on med list        Passed - Patient is age 18 or older        Passed - No active pregnancy on record        Passed - Normal serum creatinine on file in past 12 months     Recent Labs   Lab Test 06/06/19 1958   CR 0.72             Passed - No positive pregnancy test in past 12 months        "

## 2019-06-28 NOTE — TELEPHONE ENCOUNTER
Amlodipine refill request. This is dose decrease from April. Was decreased in hospital on 5/28/19.     Last OV 5/31/19.     BP Readings from Last 3 Encounters:   06/06/19 145/85   06/01/19 132/84   05/31/19 118/72     Potassium   Date Value Ref Range Status   06/06/2019 3.5 3.4 - 5.3 mmol/L Final     Creatinine   Date Value Ref Range Status   06/06/2019 0.72 0.52 - 1.04 mg/dL Final

## 2019-07-27 DIAGNOSIS — F41.1 ANXIETY STATE: Primary | ICD-10-CM

## 2019-07-29 RX ORDER — ALPRAZOLAM 0.25 MG
TABLET ORAL
Qty: 30 TABLET | Refills: 1 | Status: SHIPPED | OUTPATIENT
Start: 2019-07-29 | End: 2019-10-17

## 2019-07-29 NOTE — TELEPHONE ENCOUNTER
Routing refill request to provider for review/approval because:  Drug not on the [facility] refill protocol   Medication is reported/historical    Primary care provider is out of the office, will route to covering provider

## 2019-07-29 NOTE — TELEPHONE ENCOUNTER
Requested Prescriptions   Pending Prescriptions Disp Refills     ALPRAZolam (XANAX) 0.25 MG tablet [Pharmacy Med Name: ALPRAZOLAM 0.25  Last Written Prescription Date:  historical  Last Fill Quantity: ,  # refills:    Last office visit: 5/31/2019 with prescribing provider:     Future Office Visit:   MG TABLET] 30 tablet 1     Sig: TAKE 1 TABLET BY MOUTH 3 TIMES DAILY AS NEEDED FOR ANXIETY       There is no refill protocol information for this order

## 2019-08-14 ENCOUNTER — TRANSFERRED RECORDS (OUTPATIENT)
Dept: HEALTH INFORMATION MANAGEMENT | Facility: CLINIC | Age: 64
End: 2019-08-14

## 2019-08-15 ENCOUNTER — OFFICE VISIT (OUTPATIENT)
Dept: INTERNAL MEDICINE | Facility: CLINIC | Age: 64
End: 2019-08-15
Payer: MEDICARE

## 2019-08-15 VITALS
BODY MASS INDEX: 22.91 KG/M2 | RESPIRATION RATE: 16 BRPM | OXYGEN SATURATION: 99 % | DIASTOLIC BLOOD PRESSURE: 74 MMHG | TEMPERATURE: 97.9 F | HEIGHT: 65 IN | WEIGHT: 137.5 LBS | HEART RATE: 62 BPM | SYSTOLIC BLOOD PRESSURE: 120 MMHG

## 2019-08-15 DIAGNOSIS — Z01.818 PREOP GENERAL PHYSICAL EXAM: Primary | ICD-10-CM

## 2019-08-15 DIAGNOSIS — S46.001D INJURY OF RIGHT ROTATOR CUFF, SUBSEQUENT ENCOUNTER: ICD-10-CM

## 2019-08-15 DIAGNOSIS — I10 ESSENTIAL HYPERTENSION, BENIGN: ICD-10-CM

## 2019-08-15 DIAGNOSIS — F41.1 ANXIETY STATE: ICD-10-CM

## 2019-08-15 LAB
ANION GAP SERPL CALCULATED.3IONS-SCNC: 9 MMOL/L (ref 3–14)
BUN SERPL-MCNC: 16 MG/DL (ref 7–30)
CALCIUM SERPL-MCNC: 9.4 MG/DL (ref 8.5–10.1)
CHLORIDE SERPL-SCNC: 102 MMOL/L (ref 94–109)
CO2 SERPL-SCNC: 25 MMOL/L (ref 20–32)
CREAT SERPL-MCNC: 0.59 MG/DL (ref 0.52–1.04)
GFR SERPL CREATININE-BSD FRML MDRD: >90 ML/MIN/{1.73_M2}
GLUCOSE SERPL-MCNC: 85 MG/DL (ref 70–99)
POTASSIUM SERPL-SCNC: 4.2 MMOL/L (ref 3.4–5.3)
SODIUM SERPL-SCNC: 136 MMOL/L (ref 133–144)

## 2019-08-15 PROCEDURE — 36415 COLL VENOUS BLD VENIPUNCTURE: CPT | Performed by: INTERNAL MEDICINE

## 2019-08-15 PROCEDURE — 99214 OFFICE O/P EST MOD 30 MIN: CPT | Performed by: INTERNAL MEDICINE

## 2019-08-15 PROCEDURE — 80048 BASIC METABOLIC PNL TOTAL CA: CPT | Performed by: INTERNAL MEDICINE

## 2019-08-15 ASSESSMENT — MIFFLIN-ST. JEOR: SCORE: 1169.58

## 2019-08-15 NOTE — LETTER
August 16, 2019      Denise Ralph  [address] [facility] [address]        Dear ,    We are writing to inform you of your test results.    Normal result reviewed.    Resulted Orders   Basic metabolic panel   Result Value Ref Range    Sodium 136 133 - 144 mmol/L      Comment:      Results confirmed by repeat test    Potassium 4.2 3.4 - 5.3 mmol/L    Chloride 102 94 - 109 mmol/L      Comment:      Results confirmed by repeat test    Carbon Dioxide 25 20 - 32 mmol/L    Anion Gap 9 3 - 14 mmol/L    Glucose 85 70 - 99 mg/dL    Urea Nitrogen 16 7 - 30 mg/dL    Creatinine 0.59 0.52 - 1.04 mg/dL    GFR Estimate >90 >60 mL/min/[1.73_m2]      Comment:      Non  GFR Calc  Starting 12/18/2018, serum creatinine based estimated GFR (eGFR) will be   calculated using the Chronic Kidney Disease Epidemiology Collaboration   (CKD-EPI) equation.      GFR Estimate If Black >90 >60 mL/min/[1.73_m2]      Comment:       GFR Calc  Starting 12/18/2018, serum creatinine based estimated GFR (eGFR) will be   calculated using the Chronic Kidney Disease Epidemiology Collaboration   (CKD-EPI) equation.      Calcium 9.4 8.5 - 10.1 mg/dL       If you have any questions or concerns, please call the clinic at the number listed above.       Sincerely,        Juan F Smallwood MD

## 2019-08-15 NOTE — PROGRESS NOTES
[place] [address]  [facility] [address]  [phone]  Dept: [phone]    PRE-OP EVALUATION:  Today's date: 8/15/2019    Denise Ralph (: 1955) presents for pre-operative evaluation.    Fax number for surgical facility: [phone]  Primary Physician: Juan F Smallwood  Type of Anesthesia Anticipated: General    Patient has a Health Care Directive or Living Will:  YES     Preop Questions 2019   Who is doing your surgery? [facility], Dr. Umesh Berman   What are you having done? Repair right rotator cuff tear.   Date of Surgery/Procedure:    Facility or Hospital where procedure/surgery will be performed: [facility], [place], [place].   1.  Do you have a history of Heart attack, stroke, stent, coronary bypass surgery, or other heart surgery? No   2.  Do you ever have any pain or discomfort in your chest? No   3.  Do you have a history of  Heart Failure? No   4.   Are you troubled by shortness of breath when:  walking on a level surface, or up a slight hill, or at night? No   5.  Do you currently have a cold, bronchitis or other respiratory infection? No   6.  Do you have a cough, shortness of breath, or wheezing? No   7.  Do you sometimes get pains in the calves of your legs when you walk? No   8. Do you or anyone in your family have previous history of blood clots? No   9.  Do you or does anyone in your family have a serious bleeding problem such as prolonged bleeding following surgeries or cuts? No   10. Have you ever had problems with anemia or been told to take iron pills? No   11. Have you had any abnormal blood loss such as black, tarry or bloody stools, or abnormal vaginal bleeding? No   12. Have you ever had a blood transfusion? No   13. Have you or any of your relatives ever had problems with anesthesia? No   14. Do you have sleep apnea, excessive snoring or daytime drowsiness? No   15. Do you have any prosthetic heart valves? No    16. Do you have prosthetic joints? No   17. Is there any chance that you may be pregnant? No         HPI:     HPI related to upcoming procedure: scheduled for right shoulder rotator cuff surgery.   No acute complaints, no medication change or new medical conditions.  Has h/o HTN. on medical treatment. BP has been controlled. No side effects from medications. No CP, HA, dizziness. good compliance with medications and low salt diet.  Has history of anxiety. On PRN anxiolytics. Controlled.       See problem list for active medical problems.  Problems all longstanding and stable, except as noted/documented.  See ROS for pertinent symptoms related to these conditions.      MEDICAL HISTORY:     Patient Active Problem List    Diagnosis Date Noted     FUO (fever of unknown origin) 05/28/2019     Priority: Medium     Frequent UTI 01/17/2018     Priority: Medium     Hyponatremia 11/01/2017     Priority: Medium     Controlled substance agreement signed 10/18/2017     Priority: Medium     Tension headache 10/18/2017     Priority: Medium     S/P lumbar fusion 08/03/2017     Priority: Medium     Hypertension goal BP (blood pressure) < 140/80 10/06/2015     Priority: Medium     Glaucoma 12/05/2014     Priority: Medium     GERD (gastroesophageal reflux disease) 09/05/2013     Priority: Medium     Advanced directives, counseling/discussion 10/16/2012     Priority: Medium     Discussed Advance Directive planning with patient; information given to patient to review.         Basal cell carcinoma 08/01/2009     Priority: Medium     Disorder of bone and cartilage 07/26/2007     Priority: Medium     Problem list name updated by automated process. Provider to review       Squamous cell carcinoma 04/01/2007     Priority: Medium     Essential hypertension, benign 09/07/2006     Priority: Medium     Irritable bowel syndrome 08/09/2004     Priority: Medium     Anxiety state 08/09/2004     Priority: Medium     Problem list name updated by  automated process. Provider to review        Past Medical History:   Diagnosis Date     Anxiety     Gets panic attacks     Depressive disorder, not elsewhere classified      Essential hypertension, benign     No cardiologist     Irritable bowel syndrome     has had neg colonoscopy & EGD w/u     Lumbar degenerative disc disease 1996    Had PT, traction, no surgery     Other chronic pain     JOint pain for many years.     PONV (postoperative nausea and vomiting)      Sciatica 3/06    R thigh;  MRI = R L2-3 disc      Unspecified glaucoma(365.9)      Past Surgical History:   Procedure Laterality Date     EYE SURGERY Bilateral     Treatment of glaucoma     HYSTERECTOMY       HYSTERECTOMY VAGINAL       LAPAROSCOPIC CHOLECYSTECTOMY  2002    with repeat operation because of bile leak     Left shoulder decomp surgery for impingement  approx 1993     OPTICAL TRACKING SYSTEM FUSION POSTERIOR SPINE LUMBAR N/A 8/3/2017    Procedure: OPTICAL TRACKING SYSTEM FUSION SPINE POSTERIOR LUMBAR ONE LEVEL;  1. L4 Ang-Meyers osteotomy with exposure of the L4 and L5 roots  2. L4-5 complete discectomy with arthrodesis  3. Placement of Globus Creo GOMEZ pedicle screws bilaterally from L4 to L5  4. L4 - L5 posterior lateral fusion with placement of Medtronic Magnifuse and locally harvested autologous bone  5. Use of Stealth and O     WRIST SURGERY Left      Current Outpatient Medications   Medication Sig Dispense Refill     ALPRAZolam (XANAX) 0.25 MG tablet TAKE 1 TABLET BY MOUTH 3 TIMES DAILY AS NEEDED FOR ANXIETY 30 tablet 1     amLODIPine (NORVASC) 2.5 MG tablet TAKE 1 TABLET BY MOUTH EVERY DAY 90 tablet 1     atenolol (TENORMIN) 50 MG tablet Take 50 mg by mouth 2 times daily       latanoprost (XALATAN) 0.005 % ophthalmic solution Place 1 drop into both eyes At Bedtime       traZODone (DESYREL) 100 MG tablet TAKE 1 TABLET BY MOUTH NIGHTLY AS NEEDED FOR SLEEP 90 tablet 1     OTC products: None, except as noted above    Allergies   Allergen  Reactions     Erythromycin Shortness Of Breath     Msir [Morphine Sulfate] Nausea     Intollerant to Morphine Sulfate: Nausea,vomiting     Dilaudid [Hydromorphone] Visual Disturbance     Hallucinations     Diuretic      Patient states she gets sick and loses weight from diuretics.     Lisinopril      Cough       Losartan      Tongue felt weird       Penicillins Itching     Reglan Other (See Comments)     Muscle spasms in throat      Latex Allergy: NO    Social History     Tobacco Use     Smoking status: Former Smoker     Packs/day: 0.50     Years: 25.00     Pack years: 12.50     Last attempt to quit: 10/1/1997     Years since quittin.8     Smokeless tobacco: Never Used     Tobacco comment: quit    Substance Use Topics     Alcohol use: Yes     Alcohol/week: 1.0 oz     Types: 2 Glasses of wine per week     Comment: 2 glasses of wine daily     History   Drug Use No       REVIEW OF SYSTEMS:   CONSTITUTIONAL: NEGATIVE for fever, chills, change in weight  INTEGUMENTARY/SKIN: NEGATIVE for worrisome rashes, moles or lesions  EYES: NEGATIVE for vision changes or irritation  ENT/MOUTH: NEGATIVE for ear, mouth and throat problems  RESP: NEGATIVE for significant cough or SOB  BREAST: NEGATIVE for masses, tenderness or discharge  CV: NEGATIVE for chest pain, palpitations or peripheral edema  GI: NEGATIVE for nausea, abdominal pain, heartburn, or change in bowel habits  : NEGATIVE for frequency, dysuria, or hematuria  MUSCULOSKELETAL: NEGATIVE for significant arthralgias or myalgia  NEURO: NEGATIVE for weakness, dizziness or paresthesias  ENDOCRINE: NEGATIVE for temperature intolerance, skin/hair changes  HEME: NEGATIVE for bleeding problems  PSYCHIATRIC: NEGATIVE for changes in mood or affect    EXAM:   There were no vitals taken for this visit.    GENERAL APPEARANCE: healthy, alert and no distress     EYES: EOMI, PERRL     HENT: ear canals and TM's normal and nose and mouth without ulcers or lesions     NECK: no  adenopathy, no asymmetry, masses, or scars and thyroid normal to palpation     RESP: lungs clear to auscultation - no rales, rhonchi or wheezes     CV: regular rates and rhythm, normal S1 S2, no S3 or S4 and no murmur, click or rub     ABDOMEN:  soft, nontender, no HSM or masses and bowel sounds normal     MS: extremities normal- no gross deformities noted, no evidence of inflammation in joints, FROM in all extremities.     SKIN: no suspicious lesions or rashes     NEURO: Normal strength and tone, sensory exam grossly normal, mentation intact and speech normal     PSYCH: mentation appears normal. and affect normal/bright     LYMPHATICS: No cervical adenopathy    DIAGNOSTICS:     EKG: appears normal, NSR, normal axis, normal intervals, no acute ST/T changes c/w ischemia, no LVH by voltage criteria, unchanged from previous tracings  Labs Drawn and in Process:   Unresulted Labs Ordered in the Past 30 Days of this Admission     No orders found from 7/16/2019 to 8/16/2019.          Recent Labs   Lab Test 06/06/19  1958 06/01/19  1706  07/19/17  1331  02/27/15  2243   HGB 11.9 10.2*   < > 12.9   < >  --     419   < > 235   < >  --    INR  --   --   --  0.95  --  0.83*   * 133   < >  --    < >  --    POTASSIUM 3.5 3.8   < >  --    < >  --    CR 0.72 0.66   < >  --    < >  --     < > = values in this interval not displayed.        IMPRESSION:   Reason for surgery/procedure: right rotator cuff syndrome   Diagnosis/reason for consult: preoperative evaluation/ clearance      The proposed surgical procedure is considered INTERMEDIATE risk.    REVISED CARDIAC RISK INDEX  The patient has the following serious cardiovascular risks for perioperative complications such as (MI, PE, VFib and 3  AV Block):  No serious cardiac risks  INTERPRETATION: 0 risks: Class I (very low risk - 0.4% complication rate)    The patient has the following additional risks for perioperative complications:  No identified additional  risks    No diagnosis found.    RECOMMENDATIONS:         --Patient is to take all scheduled medications on the day of surgery EXCEPT for modifications listed below.  Hold NSAID use for one week prior to surgery.     APPROVAL GIVEN to proceed with proposed procedure, without further diagnostic evaluation       Signed Electronically by: Juan F Smallwood MD    Copy of this evaluation report is provided to requesting physician.    [place] Preop Guidelines    Revised Cardiac Risk Index

## 2019-08-26 ENCOUNTER — TELEPHONE (OUTPATIENT)
Dept: INTERNAL MEDICINE | Facility: CLINIC | Age: 64
End: 2019-08-26

## 2019-08-26 NOTE — TELEPHONE ENCOUNTER
Terra from [facility] calls wondering if there are more recent hemoglobin labs and or an EKG since 6/6/19. Per chart there is not. TALHA on file, informed Kamla of this information.

## 2019-10-03 ENCOUNTER — TRANSFERRED RECORDS (OUTPATIENT)
Dept: HEALTH INFORMATION MANAGEMENT | Facility: CLINIC | Age: 64
End: 2019-10-03

## 2019-10-10 ENCOUNTER — ALLIED HEALTH/NURSE VISIT (OUTPATIENT)
Dept: NURSING | Facility: CLINIC | Age: 64
End: 2019-10-10
Payer: MEDICARE

## 2019-10-10 DIAGNOSIS — Z23 NEED FOR PROPHYLACTIC VACCINATION AND INOCULATION AGAINST INFLUENZA: ICD-10-CM

## 2019-10-10 DIAGNOSIS — Z23 NEED FOR VACCINATION: ICD-10-CM

## 2019-10-10 PROCEDURE — 90662 IIV NO PRSV INCREASED AG IM: CPT

## 2019-10-10 PROCEDURE — 99207 ZZC NO CHARGE NURSE ONLY: CPT

## 2019-10-10 PROCEDURE — G0009 ADMIN PNEUMOCOCCAL VACCINE: HCPCS

## 2019-10-10 PROCEDURE — G0008 ADMIN INFLUENZA VIRUS VAC: HCPCS

## 2019-10-10 PROCEDURE — 90670 PCV13 VACCINE IM: CPT

## 2019-10-17 DIAGNOSIS — F41.1 ANXIETY STATE: ICD-10-CM

## 2019-10-17 NOTE — TELEPHONE ENCOUNTER
Requested Prescriptions   Pending Prescriptions Disp Refills     ALPRAZolam (XANAX) 0.25 MG tablet [Pharmacy Med Name: ALPRAZOLAM 0.25  Last Written Prescription Date:  7/29/2019  Last Fill Quantity: 30,  # refills: 1   Last office visit: 8/15/2019 with prescribing provider:     Future Office Visit:   MG TABLET] 30 tablet 1     Sig: TAKE 1 TABLET BY MOUTH 3 TIMES A DAY AS NEEDED FOR ANXIETY       There is no refill protocol information for this order

## 2019-10-18 RX ORDER — ALPRAZOLAM 0.25 MG
TABLET ORAL
Qty: 30 TABLET | Refills: 1 | Status: SHIPPED | OUTPATIENT
Start: 2019-10-18 | End: 2019-12-31

## 2019-10-18 NOTE — TELEPHONE ENCOUNTER
Controlled Substance Refill Request for Alprazolam  Problem List Complete:  No     PROVIDER TO CONSIDER COMPLETION OF PROBLEM LIST AND OVERVIEW/CONTROLLED SUBSTANCE AGREEMENT    Last Written Prescription Date:  7-29-19  Last Fill Quantity: 30,   # refills: 1    THE MOST RECENT OFFICE VISIT MUST BE WITHIN THE PAST 3 MONTHS. AT LEAST ONE FACE TO FACE VISIT MUST OCCUR EVERY 6 MONTHS. ADDITIONAL VISITS CAN BE VIRTUAL.  (THIS STATEMENT SHOULD BE DELETED.)    [place] Office Visit with [facility] [place]: 8-15-19    Future Office visit:     Controlled substance agreement:   Encounter-Level CSA - 10/18/2017:    Controlled Substance Agreement - Scan on 10/28/2017  7:58 PM: CONTROLLED SUBSTANCE AGREEMENT     Patient-Level CSA:    There are no patient-level csa.         Last Urine Drug Screen: No results found for: CDAUT, No results found for: COMDAT, No results found for: THC13, PCP13, COC13, MAMP13, OPI13, AMP13, BZO13, TCA13, MTD13, BAR13, OXY13, PPX13, BUP13     Processing:  Rx to be electronically transmitted to pharmacy by provider      [email or web address]       checked in past 3 months?  No d/t access not granted by provider.    Please advise, thanks.

## 2019-11-06 ENCOUNTER — TRANSFERRED RECORDS (OUTPATIENT)
Dept: HEALTH INFORMATION MANAGEMENT | Facility: CLINIC | Age: 64
End: 2019-11-06

## 2019-11-14 ENCOUNTER — TRANSFERRED RECORDS (OUTPATIENT)
Dept: HEALTH INFORMATION MANAGEMENT | Facility: CLINIC | Age: 64
End: 2019-11-14

## 2019-12-18 DIAGNOSIS — I10 HYPERTENSION GOAL BP (BLOOD PRESSURE) < 140/80: ICD-10-CM

## 2019-12-18 NOTE — TELEPHONE ENCOUNTER
"Requested Prescriptions   Pending Prescriptions Disp Refills     amLODIPine (NORVASC) 2.5 MG tablet [Pharmacy Med Name: AMLODIPINE BESYLATE 2.5 MG TAB] 90 tablet 1     Sig: TAKE 1 TABLET BY MOUTH EVERY DAY   Last Written Prescription Date:  06/28/2019  Last Fill Quantity: 90,  # refills: 01   Last office visit: 8/15/2019 with prescribing provider:     Future Office Visit:      Calcium Channel Blockers Protocol  Passed - 12/18/2019  2:37 AM        Passed - Blood pressure under 140/90 in past 12 months     BP Readings from Last 3 Encounters:   08/15/19 120/74   06/06/19 145/85   06/01/19 132/84                 Passed - Recent (12 mo) or future (30 days) visit within the authorizing provider's specialty     Patient has had an office visit with the authorizing provider or a provider within the authorizing providers department within the previous 12 mos or has a future within next 30 days. See \"Patient Info\" tab in inbasket, or \"Choose Columns\" in Meds & Orders section of the refill encounter.              Passed - Medication is active on med list        Passed - Patient is age 18 or older        Passed - No active pregnancy on record        Passed - Normal serum creatinine on file in past 12 months     Recent Labs   Lab Test 08/15/19  1534   CR 0.59             Passed - No positive pregnancy test in past 12 months        atenolol (TENORMIN) 50 MG tablet [Pharmacy Med Name: ATENOLOL 50 MG TABLET] 180 tablet 3     Sig: TAKE 1 TABLET (50 MG) BY MOUTH 2 TIMES DAILY   Last Written Prescription Date:  historic  Last Fill Quantity: n/a,  # refills: n/a   Last office visit: 8/15/2019 with prescribing provider:     Future Office Visit:      Beta-Blockers Protocol Passed - 12/18/2019  2:37 AM        Passed - Blood pressure under 140/90 in past 12 months     BP Readings from Last 3 Encounters:   08/15/19 120/74   06/06/19 145/85   06/01/19 132/84                 Passed - Patient is age 6 or older        Passed - Recent (12 mo) or " "future (30 days) visit within the authorizing provider's specialty     Patient has had an office visit with the authorizing provider or a provider within the authorizing providers department within the previous 12 mos or has a future within next 30 days. See \"Patient Info\" tab in inbasket, or \"Choose Columns\" in Meds & Orders section of the refill encounter.              Passed - Medication is active on med list        "

## 2019-12-19 RX ORDER — AMLODIPINE BESYLATE 2.5 MG/1
TABLET ORAL
Qty: 90 TABLET | Refills: 0 | Status: SHIPPED | OUTPATIENT
Start: 2019-12-19 | End: 2020-03-12

## 2019-12-19 RX ORDER — ATENOLOL 50 MG/1
TABLET ORAL
Qty: 180 TABLET | Refills: 0 | Status: SHIPPED | OUTPATIENT
Start: 2019-12-19 | End: 2020-03-12

## 2019-12-19 NOTE — TELEPHONE ENCOUNTER
Atenolol  Routing refill request to provider for review/approval because:  Medication is reported/historical    Amlodipine  Prescription approved per [facility] [place].

## 2019-12-29 DIAGNOSIS — F41.1 ANXIETY STATE: ICD-10-CM

## 2019-12-30 NOTE — TELEPHONE ENCOUNTER
Requested Prescriptions   Pending Prescriptions Disp Refills     ALPRAZolam (XANAX) 0.25 MG tablet [Pharmacy Med Name: ALPRAZOLAM 0.25  Last Written Prescription Date:  10/18/2019  Last Fill Quantity: 30,  # refills: 1   Last office visit: 8/15/2019 with prescribing provider:     Future Office Visit:   MG TABLET] 30 tablet 1     Sig: TAKE 1 TABLET BY MOUTH THREE TIMES A DAY AS NEEDED FOR ANXIETY       There is no refill protocol information for this order

## 2019-12-31 RX ORDER — ALPRAZOLAM 0.25 MG
TABLET ORAL
Qty: 30 TABLET | Refills: 0 | Status: SHIPPED | OUTPATIENT
Start: 2019-12-31 | End: 2020-02-06

## 2019-12-31 NOTE — TELEPHONE ENCOUNTER
Alprazolam refill request    Last OV on 8/15/19     Last refill on 10/18/19 for #30 with 1 refill.     Routing refill request to provider for review/approval because:  Drug not on the [facility] refill protocol

## 2020-01-02 ENCOUNTER — TRANSFERRED RECORDS (OUTPATIENT)
Dept: HEALTH INFORMATION MANAGEMENT | Facility: CLINIC | Age: 65
End: 2020-01-02

## 2020-01-08 DIAGNOSIS — F51.01 PRIMARY INSOMNIA: ICD-10-CM

## 2020-01-08 RX ORDER — TRAZODONE HYDROCHLORIDE 100 MG/1
100 TABLET ORAL
Qty: 90 TABLET | Refills: 1 | Status: CANCELLED | OUTPATIENT
Start: 2020-01-08

## 2020-01-08 NOTE — TELEPHONE ENCOUNTER
"Requested Prescriptions   Pending Prescriptions Disp Refills     traZODone (DESYREL) 100 MG tablet Last Written Prescription Date:  6/17/2019  Last Fill Quantity: 90 tablet,  # refills: 1   Last office visit: 8/15/2019 with prescribing provider:  8/15/2019  Future Office Visit:   90 tablet 1     Sig: Take 1 tablet (100 mg) by mouth nightly as needed       Serotonin Modulators Passed - 1/8/2020 12:02 PM        Passed - Recent (12 mo) or future (30 days) visit within the authorizing provider's specialty     Patient has had an office visit with the authorizing provider or a provider within the authorizing providers department within the previous 12 mos or has a future within next 30 days. See \"Patient Info\" tab in inbasket, or \"Choose Columns\" in Meds & Orders section of the refill encounter.              Passed - Medication is active on med list        Passed - Patient is age 18 or older        Passed - No active pregnancy on record        Passed - No positive pregnancy test in past 12 months        "

## 2020-01-09 RX ORDER — TRAZODONE HYDROCHLORIDE 100 MG/1
100 TABLET ORAL
Qty: 90 TABLET | Refills: 0 | Status: SHIPPED | OUTPATIENT
Start: 2020-01-09 | End: 2020-03-17

## 2020-01-09 NOTE — TELEPHONE ENCOUNTER
Trazodone refill request.     Last OV on 8/15/19    Drug contraindication. Pt has allergy to diuretics.     Provider approval needed.

## 2020-01-09 NOTE — TELEPHONE ENCOUNTER
Patient is calling again - would like this medication refilled today. States has been taking this for 25 years.  Would like a call back at [phone].

## 2020-01-13 ENCOUNTER — TELEPHONE (OUTPATIENT)
Dept: INTERNAL MEDICINE | Facility: CLINIC | Age: 65
End: 2020-01-13

## 2020-01-13 NOTE — TELEPHONE ENCOUNTER
Patient would like to know why there were no refills on her traZODone (DESYREL) 100 MG tablet. Please advise. Ok to call and mikayla [phone]

## 2020-01-31 ENCOUNTER — OFFICE VISIT (OUTPATIENT)
Dept: INTERNAL MEDICINE | Facility: CLINIC | Age: 65
End: 2020-01-31
Payer: COMMERCIAL

## 2020-01-31 VITALS
SYSTOLIC BLOOD PRESSURE: 132 MMHG | OXYGEN SATURATION: 98 % | HEART RATE: 62 BPM | TEMPERATURE: 97.8 F | DIASTOLIC BLOOD PRESSURE: 78 MMHG | RESPIRATION RATE: 16 BRPM | BODY MASS INDEX: 23.32 KG/M2 | WEIGHT: 140 LBS | HEIGHT: 65 IN

## 2020-01-31 DIAGNOSIS — F41.1 ANXIETY STATE: ICD-10-CM

## 2020-01-31 DIAGNOSIS — F51.01 PRIMARY INSOMNIA: ICD-10-CM

## 2020-01-31 DIAGNOSIS — I10 ESSENTIAL HYPERTENSION, BENIGN: ICD-10-CM

## 2020-01-31 DIAGNOSIS — Z00.00 ENCOUNTER FOR PREVENTATIVE ADULT HEALTH CARE EXAMINATION: Primary | ICD-10-CM

## 2020-01-31 DIAGNOSIS — Z23 NEED FOR TD VACCINE: ICD-10-CM

## 2020-01-31 DIAGNOSIS — E87.1 HYPONATREMIA: ICD-10-CM

## 2020-01-31 LAB
ALBUMIN UR-MCNC: NEGATIVE MG/DL
APPEARANCE UR: CLEAR
BILIRUB UR QL STRIP: NEGATIVE
COLOR UR AUTO: YELLOW
ERYTHROCYTE [DISTWIDTH] IN BLOOD BY AUTOMATED COUNT: 11.6 % (ref 10–15)
GLUCOSE UR STRIP-MCNC: NEGATIVE MG/DL
HCT VFR BLD AUTO: 39.3 % (ref 35–47)
HGB BLD-MCNC: 13 G/DL (ref 11.7–15.7)
HGB UR QL STRIP: NEGATIVE
KETONES UR STRIP-MCNC: NEGATIVE MG/DL
LEUKOCYTE ESTERASE UR QL STRIP: NEGATIVE
MCH RBC QN AUTO: 31 PG (ref 26.5–33)
MCHC RBC AUTO-ENTMCNC: 33.1 G/DL (ref 31.5–36.5)
MCV RBC AUTO: 94 FL (ref 78–100)
NITRATE UR QL: NEGATIVE
PH UR STRIP: 7 PH (ref 5–7)
PLATELET # BLD AUTO: 259 10E9/L (ref 150–450)
RBC # BLD AUTO: 4.19 10E12/L (ref 3.8–5.2)
SOURCE: NORMAL
SP GR UR STRIP: 1.01 (ref 1–1.03)
UROBILINOGEN UR STRIP-ACNC: 0.2 EU/DL (ref 0.2–1)
WBC # BLD AUTO: 7.2 10E9/L (ref 4–11)

## 2020-01-31 PROCEDURE — 82306 VITAMIN D 25 HYDROXY: CPT | Performed by: INTERNAL MEDICINE

## 2020-01-31 PROCEDURE — 90714 TD VACC NO PRESV 7 YRS+ IM: CPT | Performed by: INTERNAL MEDICINE

## 2020-01-31 PROCEDURE — 99213 OFFICE O/P EST LOW 20 MIN: CPT | Mod: 25 | Performed by: INTERNAL MEDICINE

## 2020-01-31 PROCEDURE — 90471 IMMUNIZATION ADMIN: CPT | Performed by: INTERNAL MEDICINE

## 2020-01-31 PROCEDURE — 84443 ASSAY THYROID STIM HORMONE: CPT | Performed by: INTERNAL MEDICINE

## 2020-01-31 PROCEDURE — 80053 COMPREHEN METABOLIC PANEL: CPT | Performed by: INTERNAL MEDICINE

## 2020-01-31 PROCEDURE — 85027 COMPLETE CBC AUTOMATED: CPT | Performed by: INTERNAL MEDICINE

## 2020-01-31 PROCEDURE — 36415 COLL VENOUS BLD VENIPUNCTURE: CPT | Performed by: INTERNAL MEDICINE

## 2020-01-31 PROCEDURE — 81003 URINALYSIS AUTO W/O SCOPE: CPT | Performed by: INTERNAL MEDICINE

## 2020-01-31 PROCEDURE — 80061 LIPID PANEL: CPT | Performed by: INTERNAL MEDICINE

## 2020-01-31 PROCEDURE — 99397 PER PM REEVAL EST PAT 65+ YR: CPT | Mod: 25 | Performed by: INTERNAL MEDICINE

## 2020-01-31 RX ORDER — ALPRAZOLAM 0.25 MG
TABLET ORAL
Qty: 30 TABLET | Refills: 0 | Status: CANCELLED | OUTPATIENT
Start: 2020-01-31

## 2020-01-31 ASSESSMENT — PATIENT HEALTH QUESTIONNAIRE - PHQ9
SUM OF ALL RESPONSES TO PHQ QUESTIONS 1-9: 3
5. POOR APPETITE OR OVEREATING: SEVERAL DAYS

## 2020-01-31 ASSESSMENT — ENCOUNTER SYMPTOMS
DYSURIA: 0
FEVER: 0
ABDOMINAL PAIN: 0
PALPITATIONS: 0
HEARTBURN: 0
DIZZINESS: 0
PARESTHESIAS: 0
FREQUENCY: 0
SHORTNESS OF BREATH: 0
ARTHRALGIAS: 1
NERVOUS/ANXIOUS: 1
COUGH: 0
CONSTIPATION: 0
HEMATURIA: 0
DIARRHEA: 0
HEADACHES: 1
NAUSEA: 0
WEAKNESS: 0
CHILLS: 0
MYALGIAS: 1
HEMATOCHEZIA: 0
SORE THROAT: 0
BREAST MASS: 0
EYE PAIN: 0

## 2020-01-31 ASSESSMENT — MIFFLIN-ST. JEOR: SCORE: 1175.92

## 2020-01-31 ASSESSMENT — ACTIVITIES OF DAILY LIVING (ADL): CURRENT_FUNCTION: NO ASSISTANCE NEEDED

## 2020-01-31 ASSESSMENT — ANXIETY QUESTIONNAIRES
IF YOU CHECKED OFF ANY PROBLEMS ON THIS QUESTIONNAIRE, HOW DIFFICULT HAVE THESE PROBLEMS MADE IT FOR YOU TO DO YOUR WORK, TAKE CARE OF THINGS AT HOME, OR GET ALONG WITH OTHER PEOPLE: NOT DIFFICULT AT ALL
6. BECOMING EASILY ANNOYED OR IRRITABLE: NOT AT ALL
GAD7 TOTAL SCORE: 3
1. FEELING NERVOUS, ANXIOUS, OR ON EDGE: SEVERAL DAYS
2. NOT BEING ABLE TO STOP OR CONTROL WORRYING: NOT AT ALL
3. WORRYING TOO MUCH ABOUT DIFFERENT THINGS: SEVERAL DAYS
7. FEELING AFRAID AS IF SOMETHING AWFUL MIGHT HAPPEN: NOT AT ALL
5. BEING SO RESTLESS THAT IT IS HARD TO SIT STILL: NOT AT ALL

## 2020-01-31 NOTE — PROGRESS NOTES
"SUBJECTIVE:   Denise Ralph is a 65 year old female who presents for Preventive Visit.      Are you in the first 12 months of your Medicare coverage?  No, just part A ( Hospital)    Healthy Habits:     In general, how would you rate your overall health?  Excellent    Frequency of exercise:  1 day/week    Duration of exercise:  30-45 minutes    Do you usually eat at least 4 servings of fruit and vegetables a day, include whole grains    & fiber and avoid regularly eating high fat or \"junk\" foods?  Yes    Taking medications regularly:  Yes    Medication side effects:  Not applicable    Ability to successfully perform activities of daily living:  No assistance needed    Home Safety:  No safety concerns identified    Hearing Impairment:  No hearing concerns    In the past 6 months, have you been bothered by leaking of urine?  No    In general, how would you rate your overall mental or emotional health?  Excellent      PHQ-2 Total Score: 1    Additional concerns today:  No    Do you feel safe in your environment? Yes    Have you ever done Advance Care Planning? (For example, a Health Directive, POLST, or a discussion with a medical provider or your loved ones about your wishes): Yes, advance care planning is on file.      Fall risk  Fallen 2 or more times in the past year?: No  Any fall with injury in the past year?: No    Cognitive Screening   1) Repeat 3 items (Leader, Season, Table)    2) Clock draw: NORMAL  3) 3 item recall: Recalls 2 objects   Results: NORMAL clock, 2 items recalled: COGNITIVE IMPAIRMENT LESS LIKELY    Mini-CogTM Copyright [employer] Camp. Licensed by the author for use in [facility]; reprinted with permission ([email or web address]). All rights reserved.      Do you have sleep apnea, excessive snoring or daytime drowsiness?: no    Reviewed and updated as needed this visit by clinical staff  Tobacco  Allergies  Meds         Reviewed and updated as needed this visit by Provider        Social History "     Tobacco Use     Smoking status: Former Smoker     Packs/day: 0.50     Years: 25.00     Pack years: 12.50     Last attempt to quit: 10/1/1997     Years since quittin.3     Smokeless tobacco: Never Used     Tobacco comment: quit    Substance Use Topics     Alcohol use: Yes     Alcohol/week: 1.7 standard drinks     Types: 2 Glasses of wine per week     Comment: 2 glasses of wine daily     If you drink alcohol do you typically have >3 drinks per day or >7 drinks per week? No    Alcohol Use 2020   Prescreen: >3 drinks/day or >7 drinks/week? Yes   AUDIT SCORE  4     AUDIT - Alcohol Use Disorders Identification Test - Reproduced from the World Health Organization Audit 2001 (Second Edition) 2020   1.  How often do you have a drink containing alcohol? 4 or more times a week   2.  How many drinks containing alcohol do you have on a typical day when you are drinking? 1 or 2   3.  How often do you have five or more drinks on one occasion? Never   4.  How often during the last year have you found that you were not able to stop drinking once you had started? Never   5.  How often during the last year have you failed to do what was normally expected of you because of drinking? Never   6.  How often during the last year have you needed a first drink in the morning to get yourself going after a heavy drinking session? Never   7.  How often during the last year have you had a feeling of guilt or remorse after drinking? Never   8.  How often during the last year have you been unable to remember what happened the night before because of your drinking? Never   9.  Have you or someone else been injured because of your drinking? No   10. Has a relative, friend, doctor or other health care worker been concerned about your drinking or suggested you cut down? No   TOTAL SCORE 4           PROBLEMS TO ADD ON...  Has h/o HTN. on medical treatment. BP has been controlled. No side effects from medications. No CP, HA,  dizziness. good compliance with medications and low salt diet.  Has h/o anxiety. On PRN Xanax. No side effects. Helps with symptoms control.       Current providers sharing in care for this patient include:   Patient Care Team:  Juan F Smallwood MD as PCP - General (Internal Medicine)  Juan F Smallwood MD as Assigned PCP    The following health maintenance items are reviewed in Epic and correct as of today:  Health Maintenance   Topic Date Due     HEPATITIS C SCREENING  1955     ADVANCE CARE PLANNING  10/16/2017     DTAP/TDAP/TD IMMUNIZATION (2 - Td) 03/10/2019     MEDICARE ANNUAL WELLNESS VISIT  12/04/2019     PHQ-2  01/01/2020     FALL RISK ASSESSMENT  08/15/2020     PNEUMOCOCCAL IMMUNIZATION 65+ LOW/MEDIUM RISK (2 of 2 - PPSV23) 10/10/2020     MAMMO SCREENING  05/30/2021     LIPID  12/04/2023     COLONOSCOPY  12/23/2024     DEXA  Completed     INFLUENZA VACCINE  Completed     ZOSTER IMMUNIZATION  Completed     IPV IMMUNIZATION  Aged Out     MENINGITIS IMMUNIZATION  Aged Out     Lab work is in process  Labs reviewed in EPIC  Pneumonia Vaccine:Adults age 65+ who received Pneumovax (PPSV23) at 65 years or older: Should be given PCV13 > 1 year after their most recent PPSV23    Review of Systems   Constitutional: Negative for chills and fever.   HENT: Negative for congestion, ear pain, hearing loss and sore throat.    Eyes: Negative for pain and visual disturbance.   Respiratory: Negative for cough and shortness of breath.    Cardiovascular: Negative for chest pain, palpitations and peripheral edema.   Gastrointestinal: Negative for abdominal pain, constipation, diarrhea, heartburn, hematochezia and nausea.   Breasts:  Negative for tenderness, breast mass and discharge.   Genitourinary: Negative for dysuria, frequency, genital sores, hematuria, pelvic pain, urgency, vaginal bleeding and vaginal discharge.   Musculoskeletal: Positive for arthralgias and myalgias.   Skin: Negative for rash.   Neurological:  "Positive for headaches. Negative for dizziness, weakness and paresthesias.   Psychiatric/Behavioral: Negative for mood changes. The patient is nervous/anxious.          OBJECTIVE:   Ht 1.651 m (5' 5\")   BMI 22.88 kg/m   Estimated body mass index is 22.88 kg/m  as calculated from the following:    Height as of this encounter: 1.651 m (5' 5\").    Weight as of 8/15/19: 62.4 kg (137 lb 8 oz).  Physical Exam  GENERAL: healthy, alert and no distress  EYES: Eyes grossly normal to inspection, PERRL and conjunctivae and sclerae normal  HENT: ear canals and TM's normal, nose and mouth without ulcers or lesions  NECK: no adenopathy, no asymmetry, masses, or scars and thyroid normal to palpation  RESP: lungs clear to auscultation - no rales, rhonchi or wheezes  CV: regular rate and rhythm, normal S1 S2, no S3 or S4, no murmur, click or rub, no peripheral edema and peripheral pulses strong  ABDOMEN: soft, nontender, no hepatosplenomegaly, no masses and bowel sounds normal  MS: no gross musculoskeletal defects noted, no edema  SKIN: no suspicious lesions or rashes  NEURO: Normal strength and tone, mentation intact and speech normal  PSYCH: mentation appears normal, affect normal/bright    Diagnostic Test Results:  Labs reviewed in Epic    ASSESSMENT / PLAN:       ICD-10-CM    1. Encounter for preventative adult health care examination Z00.00 CBC with platelets     Comprehensive metabolic panel     Lipid panel reflex to direct LDL Fasting     TSH with free T4 reflex     *UA reflex to Microscopic     Vitamin D Deficiency   2. Anxiety state F41.1 OFFICE/OUTPT VISIT,EST,LEVL III   3. Primary insomnia F51.01 OFFICE/OUTPT VISIT,EST,LEVL III   4. Essential hypertension, benign I10 CBC with platelets     Comprehensive metabolic panel     Lipid panel reflex to direct LDL Fasting     TSH with free T4 reflex     *UA reflex to Microscopic     Vitamin D Deficiency     OFFICE/OUTPT VISIT,EST,LEVL III   5. Need for Td vaccine Z23 C TD PRSERV " "FREE >=7 YRS ADS IM       COUNSELING:  Reviewed preventive health counseling, as reflected in patient instructions       Regular exercise       Healthy diet/nutrition       Vision screening       Hearing screening       Colon cancer screening    Estimated body mass index is 22.88 kg/m  as calculated from the following:    Height as of this encounter: 1.651 m (5' 5\").    Weight as of 8/15/19: 62.4 kg (137 lb 8 oz).         reports that she quit smoking about 22 years ago. She has a 12.50 pack-year smoking history. She has never used smokeless tobacco.      Appropriate preventive services were discussed with this patient, including applicable screening as appropriate for cardiovascular disease, diabetes, osteopenia/osteoporosis, and glaucoma.  As appropriate for age/gender, discussed screening for colorectal cancer, prostate cancer, breast cancer, and cervical cancer. Checklist reviewing preventive services available has been given to the patient.    Reviewed patients plan of care and provided an AVS. The Intermediate Care Plan ( asthma action plan, low back pain action plan, and migraine action plan) for Denise meets the Care Plan requirement. This Care Plan has been established and reviewed with the Patient.    Counseling Resources:  ATP IV Guidelines  Pooled Cohorts Equation Calculator  Breast Cancer Risk Calculator  FRAX Risk Assessment  ICSI Preventive Guidelines  Dietary Guidelines for Americans, 2010  [facility] [employer]'s MyPlate  ASA Prophylaxis  Lung CA Screening    Juan F Smallwood MD  [facility]    Identified Health Risks:  "

## 2020-01-31 NOTE — NURSING NOTE
"Vital signs:  Temp: 97.8  F (36.6  C) Temp src: Oral BP: 132/78 Pulse: 62   Resp: 16 SpO2: 98 %     Height: 165.1 cm (5' 5\") Weight: 63.5 kg (140 lb)  Estimated body mass index is 23.3 kg/m  as calculated from the following:    Height as of this encounter: 1.651 m (5' 5\").    Weight as of this encounter: 63.5 kg (140 lb).          "

## 2020-02-01 LAB
ALBUMIN SERPL-MCNC: 4.4 G/DL (ref 3.4–5)
ALP SERPL-CCNC: 65 U/L (ref 40–150)
ALT SERPL W P-5'-P-CCNC: 32 U/L (ref 0–50)
ANION GAP SERPL CALCULATED.3IONS-SCNC: 5 MMOL/L (ref 3–14)
AST SERPL W P-5'-P-CCNC: 30 U/L (ref 0–45)
BILIRUB SERPL-MCNC: 0.4 MG/DL (ref 0.2–1.3)
BUN SERPL-MCNC: 12 MG/DL (ref 7–30)
CALCIUM SERPL-MCNC: 9.7 MG/DL (ref 8.5–10.1)
CHLORIDE SERPL-SCNC: 97 MMOL/L (ref 94–109)
CHOLEST SERPL-MCNC: 180 MG/DL
CO2 SERPL-SCNC: 29 MMOL/L (ref 20–32)
CREAT SERPL-MCNC: 0.69 MG/DL (ref 0.52–1.04)
GFR SERPL CREATININE-BSD FRML MDRD: >90 ML/MIN/{1.73_M2}
GLUCOSE SERPL-MCNC: 88 MG/DL (ref 70–99)
HDLC SERPL-MCNC: 92 MG/DL
LDLC SERPL CALC-MCNC: 74 MG/DL
NONHDLC SERPL-MCNC: 88 MG/DL
POTASSIUM SERPL-SCNC: 4.4 MMOL/L (ref 3.4–5.3)
PROT SERPL-MCNC: 7.4 G/DL (ref 6.8–8.8)
SODIUM SERPL-SCNC: 131 MMOL/L (ref 133–144)
TRIGL SERPL-MCNC: 72 MG/DL
TSH SERPL DL<=0.005 MIU/L-ACNC: 2.57 MU/L (ref 0.4–4)

## 2020-02-01 ASSESSMENT — ANXIETY QUESTIONNAIRES: GAD7 TOTAL SCORE: 3

## 2020-02-02 LAB — DEPRECATED CALCIDIOL+CALCIFEROL SERPL-MC: 36 UG/L (ref 20–75)

## 2020-02-06 DIAGNOSIS — F41.1 ANXIETY STATE: ICD-10-CM

## 2020-02-06 RX ORDER — ALPRAZOLAM 0.25 MG
TABLET ORAL
Qty: 30 TABLET | Refills: 1 | Status: SHIPPED | OUTPATIENT
Start: 2020-02-06 | End: 2020-04-17

## 2020-02-06 NOTE — TELEPHONE ENCOUNTER
Alprazolam refill request.     Last refill on 12/31/19 for #30 with no refills.     Last OV on 1/31/20     Routing refill request to provider for review/approval because:  Drug not on the [facility] refill protocol

## 2020-02-06 NOTE — TELEPHONE ENCOUNTER
Requested Prescriptions   Pending Prescriptions Disp Refills     ALPRAZolam (XANAX) 0.25 MG tablet [Pharmacy Med Name: ALPRAZOLAM 0.25 MG TABLET] 30 tablet 1     Sig: TAKE 1 TABLET BY MOUTH THREE TIMES A DAY AS NEEDED FOR ANXIETY       There is no refill protocol information for this order      Last Written Prescription Date:  12/31/2019  Last Fill Quantity: 30,  # refills: 0   Last office visit: 1/31/2020 with prescribing provider:     Future Office Visit:

## 2020-03-03 DIAGNOSIS — E87.1 HYPONATREMIA: ICD-10-CM

## 2020-03-03 PROCEDURE — 36415 COLL VENOUS BLD VENIPUNCTURE: CPT | Performed by: INTERNAL MEDICINE

## 2020-03-03 PROCEDURE — 80048 BASIC METABOLIC PNL TOTAL CA: CPT | Performed by: INTERNAL MEDICINE

## 2020-03-04 LAB
ANION GAP SERPL CALCULATED.3IONS-SCNC: 6 MMOL/L (ref 3–14)
BUN SERPL-MCNC: 14 MG/DL (ref 7–30)
CALCIUM SERPL-MCNC: 10 MG/DL (ref 8.5–10.1)
CHLORIDE SERPL-SCNC: 98 MMOL/L (ref 94–109)
CO2 SERPL-SCNC: 28 MMOL/L (ref 20–32)
CREAT SERPL-MCNC: 0.58 MG/DL (ref 0.52–1.04)
GFR SERPL CREATININE-BSD FRML MDRD: >90 ML/MIN/{1.73_M2}
GLUCOSE SERPL-MCNC: 92 MG/DL (ref 70–99)
POTASSIUM SERPL-SCNC: 4.6 MMOL/L (ref 3.4–5.3)
SODIUM SERPL-SCNC: 132 MMOL/L (ref 133–144)

## 2020-03-05 ENCOUNTER — TRANSFERRED RECORDS (OUTPATIENT)
Dept: HEALTH INFORMATION MANAGEMENT | Facility: CLINIC | Age: 65
End: 2020-03-05

## 2020-03-10 DIAGNOSIS — I10 HYPERTENSION GOAL BP (BLOOD PRESSURE) < 140/80: ICD-10-CM

## 2020-03-11 NOTE — TELEPHONE ENCOUNTER
"Requested Prescriptions   Pending Prescriptions Disp Refills     amLODIPine (NORVASC) 2.5 MG tablet [Pharmacy Med Name: AMLODIPINE BESYLATE 2.5 MG TAB] 90 tablet 0     Sig: TAKE 1 TABLET BY MOUTH EVERY DAY   Last Written Prescription Date:  12/19/2019  Last Fill Quantity: 90,  # refills: 0   Last office visit: 1/31/2020 with prescribing provider:     Future Office Visit:      Calcium Channel Blockers Protocol  Passed - 3/10/2020  8:57 PM        Passed - Blood pressure under 140/90 in past 12 months     BP Readings from Last 3 Encounters:   01/31/20 132/78   08/15/19 120/74   06/06/19 145/85                 Passed - Recent (12 mo) or future (30 days) visit within the authorizing provider's specialty     Patient has had an office visit with the authorizing provider or a provider within the authorizing providers department within the previous 12 mos or has a future within next 30 days. See \"Patient Info\" tab in inbasket, or \"Choose Columns\" in Meds & Orders section of the refill encounter.              Passed - Medication is active on med list        Passed - Patient is age 18 or older        Passed - No active pregnancy on record        Passed - Normal serum creatinine on file in past 12 months     Recent Labs   Lab Test 03/03/20  1324   CR 0.58             Passed - No positive pregnancy test in past 12 months           atenolol (TENORMIN) 50 MG tablet [Pharmacy Med Name: ATENOLOL 50 MG TABLET] 180 tablet 0     Sig: TAKE 1 TABLET BY MOUTH TWICE A DAY   Last Written Prescription Date:  12/19/2019  Last Fill Quantity: 180,  # refills: 0   Last office visit: 1/31/2020 with prescribing provider:     Future Office Visit:      Beta-Blockers Protocol Passed - 3/10/2020  8:57 PM        Passed - Blood pressure under 140/90 in past 12 months     BP Readings from Last 3 Encounters:   01/31/20 132/78   08/15/19 120/74   06/06/19 145/85                 Passed - Patient is age 6 or older        Passed - Recent (12 mo) or future (30 " "days) visit within the authorizing provider's specialty     Patient has had an office visit with the authorizing provider or a provider within the authorizing providers department within the previous 12 mos or has a future within next 30 days. See \"Patient Info\" tab in inbasket, or \"Choose Columns\" in Meds & Orders section of the refill encounter.              Passed - Medication is active on med list           "

## 2020-03-12 RX ORDER — AMLODIPINE BESYLATE 2.5 MG/1
TABLET ORAL
Qty: 90 TABLET | Refills: 3 | Status: SHIPPED | OUTPATIENT
Start: 2020-03-12 | End: 2021-02-25

## 2020-03-12 RX ORDER — ATENOLOL 50 MG/1
TABLET ORAL
Qty: 180 TABLET | Refills: 3 | Status: SHIPPED | OUTPATIENT
Start: 2020-03-12 | End: 2021-02-25

## 2020-03-13 DIAGNOSIS — F51.01 PRIMARY INSOMNIA: ICD-10-CM

## 2020-03-13 NOTE — TELEPHONE ENCOUNTER
"Requested Prescriptions   Pending Prescriptions Disp Refills     traZODone (DESYREL) 100 MG tablet [Pharmacy Med Name: TRAZODONE 100 MG TABLET] 90 tablet 0     Sig: TAKE 1 TABLET (100 MG) BY MOUTH NIGHTLY AS NEEDED FOR SLEEP   Last Written Prescription Date:  01/09/2020  Last Fill Quantity: 90,  # refills: 0   Last office visit: 1/31/2020 with prescribing provider:     Future Office Visit:      Serotonin Modulators Passed - 3/13/2020 11:57 AM        Passed - Recent (12 mo) or future (30 days) visit within the authorizing provider's specialty     Patient has had an office visit with the authorizing provider or a provider within the authorizing providers department within the previous 12 mos or has a future within next 30 days. See \"Patient Info\" tab in inbasket, or \"Choose Columns\" in Meds & Orders section of the refill encounter.              Passed - Medication is active on med list        Passed - Patient is age 18 or older        Passed - No active pregnancy on record        Passed - No positive pregnancy test in past 12 months           "

## 2020-03-17 ENCOUNTER — MYC MEDICAL ADVICE (OUTPATIENT)
Dept: INTERNAL MEDICINE | Facility: CLINIC | Age: 65
End: 2020-03-17

## 2020-03-17 RX ORDER — TRAZODONE HYDROCHLORIDE 100 MG/1
100 TABLET ORAL
Qty: 90 TABLET | Refills: 3 | Status: SHIPPED | OUTPATIENT
Start: 2020-03-17 | End: 2021-03-01

## 2020-03-19 ENCOUNTER — MYC REFILL (OUTPATIENT)
Dept: INTERNAL MEDICINE | Facility: CLINIC | Age: 65
End: 2020-03-19

## 2020-03-19 DIAGNOSIS — F51.01 PRIMARY INSOMNIA: ICD-10-CM

## 2020-03-19 NOTE — TELEPHONE ENCOUNTER
"Requested Prescriptions   Pending Prescriptions Disp Refills     traZODone (DESYREL) 100 MG tablet 90 tablet 3     Sig: Take 1 tablet (100 mg) by mouth nightly as needed for sleep   Duplicate Request  Last Written Prescription Date:  3/17/20  Last Fill Quantity: 90,  # refills: 3   Last office visit: 1/31/2020 with prescribing provider:     Future Office Visit:        Serotonin Modulators Passed - 3/19/2020  8:00 AM        Passed - Recent (12 mo) or future (30 days) visit within the authorizing provider's specialty     Patient has had an office visit with the authorizing provider or a provider within the authorizing providers department within the previous 12 mos or has a future within next 30 days. See \"Patient Info\" tab in inbasket, or \"Choose Columns\" in Meds & Orders section of the refill encounter.              Passed - Medication is active on med list        Passed - Patient is age 18 or older        Passed - No active pregnancy on record        Passed - No positive pregnancy test in past 12 months           Pasquale Loco , [facility] [place]  03/19/20 2:35 PM     "

## 2020-03-23 RX ORDER — TRAZODONE HYDROCHLORIDE 100 MG/1
100 TABLET ORAL
Qty: 90 TABLET | Refills: 3 | OUTPATIENT
Start: 2020-03-23

## 2020-03-31 ENCOUNTER — MYC MEDICAL ADVICE (OUTPATIENT)
Dept: INTERNAL MEDICINE | Facility: CLINIC | Age: 65
End: 2020-03-31

## 2020-04-10 ENCOUNTER — OFFICE VISIT (OUTPATIENT)
Dept: INTERNAL MEDICINE | Facility: CLINIC | Age: 65
End: 2020-04-10
Payer: COMMERCIAL

## 2020-04-10 VITALS
RESPIRATION RATE: 18 BRPM | WEIGHT: 143 LBS | BODY MASS INDEX: 23.82 KG/M2 | HEIGHT: 65 IN | SYSTOLIC BLOOD PRESSURE: 155 MMHG | DIASTOLIC BLOOD PRESSURE: 84 MMHG | OXYGEN SATURATION: 98 % | HEART RATE: 57 BPM

## 2020-04-10 DIAGNOSIS — N63.0 LUMP OR MASS IN BREAST: Primary | ICD-10-CM

## 2020-04-10 PROCEDURE — 99213 OFFICE O/P EST LOW 20 MIN: CPT | Performed by: NURSE PRACTITIONER

## 2020-04-10 ASSESSMENT — MIFFLIN-ST. JEOR: SCORE: 1189.52

## 2020-04-10 NOTE — NURSING NOTE
"Chief Complaint   Patient presents with     Mass     pt states found a lump left breast onset today     initial BP (!) 155/84   Pulse 57   Resp 18   Ht 1.651 m (5' 5\")   Wt 64.9 kg (143 lb)   SpO2 98%   BMI 23.80 kg/m   Estimated body mass index is 23.8 kg/m  as calculated from the following:    Height as of this encounter: 1.651 m (5' 5\").    Weight as of this encounter: 64.9 kg (143 lb)..  bp completed using cuff size regular  CHARANJIT URENA LPN  "

## 2020-04-10 NOTE — PROGRESS NOTES
.Subjective     Denise Ralph is a 65 year old female who presents to clinic today for the following health issues:    HPI     Noticed lump in left breast this morning while doing self breast exam.  States tender if I push on it.  Last physical 2020.  Last screening mammogram 2019 showed scattered fibroglandular densities.  FH mom with breast cancer (60's)    Patient Active Problem List   Diagnosis     Irritable bowel syndrome     Anxiety state     Essential hypertension, benign     Disorder of bone and cartilage     Basal cell carcinoma     Squamous cell carcinoma     Advanced directives, counseling/discussion     GERD (gastroesophageal reflux disease)     Glaucoma     Hypertension goal BP (blood pressure) < 140/80     S/P lumbar fusion     Controlled substance agreement signed     Tension headache     Hyponatremia     Frequent UTI     FUO (fever of unknown origin)     Past Surgical History:   Procedure Laterality Date     EYE SURGERY Bilateral     Treatment of glaucoma     HYSTERECTOMY       HYSTERECTOMY VAGINAL       LAPAROSCOPIC CHOLECYSTECTOMY      with repeat operation because of bile leak     Left shoulder decomp surgery for impingement  approx      OPTICAL TRACKING SYSTEM FUSION POSTERIOR SPINE LUMBAR N/A 8/3/2017    Procedure: OPTICAL TRACKING SYSTEM FUSION SPINE POSTERIOR LUMBAR ONE LEVEL;  1. L4 Ang-Meyers osteotomy with exposure of the L4 and L5 roots  2. L4-5 complete discectomy with arthrodesis  3. Placement of Globus Creo GOMEZ pedicle screws bilaterally from L4 to L5  4. L4 - L5 posterior lateral fusion with placement of Medtronic Magnifuse and locally harvested autologous bone  5. Use of Stealth and O     WRIST SURGERY Left        Social History     Tobacco Use     Smoking status: Former Smoker     Packs/day: 0.50     Years: 25.00     Pack years: 12.50     Last attempt to quit: 10/1/1997     Years since quittin.5     Smokeless tobacco: Never Used     Tobacco comment: quit  "   Substance Use Topics     Alcohol use: Yes     Alcohol/week: 1.7 standard drinks     Types: 2 Glasses of wine per week     Comment: 2 glasses of wine daily     Family History   Problem Relation Age of Onset     Cardiovascular Mother         / mi     Breast Cancer Mother         diagnosed age 60's     Respiratory Father         pulmonary fibrosis.     Cardiovascular Brother          of MI age 34 2nd to Cocaine Use     Cancer - colorectal No family hx of          Current Outpatient Medications   Medication Sig Dispense Refill     ALPRAZolam (XANAX) 0.25 MG tablet TAKE 1 TABLET BY MOUTH THREE TIMES A DAY AS NEEDED FOR ANXIETY 30 tablet 1     amLODIPine (NORVASC) 2.5 MG tablet TAKE 1 TABLET BY MOUTH EVERY DAY 90 tablet 3     atenolol (TENORMIN) 50 MG tablet TAKE 1 TABLET BY MOUTH TWICE A  tablet 3     latanoprost (XALATAN) 0.005 % ophthalmic solution Place 1 drop into both eyes At Bedtime       traZODone (DESYREL) 100 MG tablet TAKE 1 TABLET (100 MG) BY MOUTH NIGHTLY AS NEEDED FOR SLEEP 90 tablet 3     Allergies   Allergen Reactions     Erythromycin Shortness Of Breath     Msir [Morphine Sulfate] Nausea     Intollerant to Morphine Sulfate: Nausea,vomiting     Dilaudid [Hydromorphone] Visual Disturbance     Hallucinations     Diuretic      Patient states she gets sick and loses weight from diuretics.     Lisinopril      Cough       Losartan      Tongue felt weird       Penicillins Itching     Reglan Other (See Comments)     Muscle spasms in throat       Reviewed and updated as needed this visit by Provider  Tobacco  Allergies  Meds  Med Hx  Soc Hx        Review of Systems   ROS COMP: Constitutional, HEENT, cardiovascular, pulmonary, gi and gu systems are negative, except as otherwise noted.      Objective    BP (!) 155/84   Pulse 57   Resp 18   Ht 1.651 m (5' 5\")   Wt 64.9 kg (143 lb)   SpO2 98%   BMI 23.80 kg/m    Body mass index is 23.8 kg/m .     Physical Exam   GENERAL: alert and no " distress  NECK: no adenopathy, no asymmetry, masses, or scars and thyroid normal to palpation  BREAST: right breast normal without masses, tenderness or nipple discharge with left breast at 6 o'clock small moveable nodule with slight tenderness with pressure; and no palpable axillary masses or adenopathy  SKIN: no suspicious lesions or rashes      Diagnostic Test Results:  Labs reviewed in Epic        Assessment & Plan     1. Lump or mass in breast with FH breast cancer  - Ordered diagnostic mammogram with US left breast    MA Diagnostic Digital Bilateral; Future       Patient Instructions   Ordered diagnostic mammogram with ultrasound of left breast.       Return in about 6 months (around 10/10/2020) for Routine Visit.    Maria Guadalupe Rene CNP  [facility]

## 2020-04-13 ENCOUNTER — HOSPITAL ENCOUNTER (OUTPATIENT)
Dept: MAMMOGRAPHY | Facility: CLINIC | Age: 65
End: 2020-04-13
Attending: NURSE PRACTITIONER
Payer: COMMERCIAL

## 2020-04-13 ENCOUNTER — HOSPITAL ENCOUNTER (OUTPATIENT)
Dept: ULTRASOUND IMAGING | Facility: CLINIC | Age: 65
End: 2020-04-13
Attending: NURSE PRACTITIONER
Payer: COMMERCIAL

## 2020-04-13 DIAGNOSIS — N63.0 LUMP OR MASS IN BREAST: ICD-10-CM

## 2020-04-13 PROCEDURE — 77066 DX MAMMO INCL CAD BI: CPT

## 2020-04-13 PROCEDURE — 76642 ULTRASOUND BREAST LIMITED: CPT | Mod: LT

## 2020-04-16 DIAGNOSIS — F41.1 ANXIETY STATE: ICD-10-CM

## 2020-04-17 RX ORDER — ALPRAZOLAM 0.25 MG
TABLET ORAL
Qty: 30 TABLET | Refills: 1 | Status: SHIPPED | OUTPATIENT
Start: 2020-04-17 | End: 2020-08-05

## 2020-04-17 NOTE — TELEPHONE ENCOUNTER
Requested Prescriptions   Pending Prescriptions Disp Refills     ALPRAZolam (XANAX) 0.25 MG tablet [Pharmacy Med Name: ALPRAZOLAM 0.25 MG TABLET] 30 tablet 1     Sig: TAKE 1 TABLET BY MOUTH THREE TIMES A DAY AS NEEDED FOR ANXIETY       There is no refill protocol information for this order        Routing refill request to provider for review/approval because:  Drug not on the [facility] refill protocol   Last Written Prescription Date:  02/06/20  Last Fill Quantity: 30,  # refills: 1  Last office visit: 4/10/2020 with prescribing provider:  Maria Guadalupe Rene CNP  Future Office Visit:    None  Noemy Becker RN.

## 2020-08-04 DIAGNOSIS — F41.1 ANXIETY STATE: ICD-10-CM

## 2020-08-05 RX ORDER — ALPRAZOLAM 0.25 MG
TABLET ORAL
Qty: 30 TABLET | Refills: 1 | Status: SHIPPED | OUTPATIENT
Start: 2020-08-05 | End: 2020-11-11

## 2020-08-05 NOTE — TELEPHONE ENCOUNTER
Alprazolam refill request.     Last OV on 4/10/20    Last refill on 4/17/20 for #30 with 1 refill.     Routing refill request to provider for review/approval because:  Drug not on the [facility] refill protocol

## 2020-08-27 ENCOUNTER — MYC MEDICAL ADVICE (OUTPATIENT)
Dept: INTERNAL MEDICINE | Facility: CLINIC | Age: 65
End: 2020-08-27

## 2020-08-27 DIAGNOSIS — G25.81 RESTLESS LEGS SYNDROME (RLS): Primary | ICD-10-CM

## 2020-08-27 RX ORDER — ROPINIROLE 0.25 MG/1
0.25 TABLET, FILM COATED ORAL AT BEDTIME
Qty: 30 TABLET | Refills: 0 | Status: SHIPPED | OUTPATIENT
Start: 2020-08-27 | End: 2020-09-19

## 2020-08-27 NOTE — TELEPHONE ENCOUNTER
Set up a VV for pt.First available for Dr. Smallwood was 9-8-2020. Pt would like medication in the mean time while she waits for appointment. Pt states very painful when this restless leg occurs.  Thanks .CHARANJIT URENA LPN

## 2020-08-27 NOTE — TELEPHONE ENCOUNTER
Restless leg syndrome is usually uncomfortable , but not painful.   Will try Requip, if not better will reassess.

## 2020-08-27 NOTE — TELEPHONE ENCOUNTER
See her mychart message. Would a VV be sufficient or does she need OV?     Last OV on 1/31/20

## 2020-09-19 DIAGNOSIS — G25.81 RESTLESS LEGS SYNDROME (RLS): ICD-10-CM

## 2020-09-19 RX ORDER — ROPINIROLE 0.25 MG/1
0.25 TABLET, FILM COATED ORAL AT BEDTIME
Qty: 30 TABLET | Refills: 0 | Status: SHIPPED | OUTPATIENT
Start: 2020-09-19 | End: 2020-09-29

## 2020-09-19 NOTE — TELEPHONE ENCOUNTER
Medication is being filled for 1 time refill only due to:  Patient needs to be seen because need recheck.   Marleni Escobar RN

## 2020-09-28 DIAGNOSIS — G25.81 RESTLESS LEGS SYNDROME (RLS): ICD-10-CM

## 2020-09-29 RX ORDER — ROPINIROLE 0.25 MG/1
0.25 TABLET, FILM COATED ORAL AT BEDTIME
Qty: 30 TABLET | Refills: 0 | Status: SHIPPED | OUTPATIENT
Start: 2020-09-29 | End: 2020-09-30

## 2020-09-29 NOTE — TELEPHONE ENCOUNTER
Routing refill request to provider for review/approval because:  Radha given x1 and patient did not follow up, please advise  BP elevated > [facility] protocol for RN refill

## 2020-09-30 DIAGNOSIS — G25.81 RESTLESS LEGS SYNDROME (RLS): ICD-10-CM

## 2020-09-30 RX ORDER — ROPINIROLE 0.25 MG/1
0.25 TABLET, FILM COATED ORAL AT BEDTIME
Qty: 30 TABLET | Refills: 0 | Status: ON HOLD | OUTPATIENT
Start: 2020-09-30 | End: 2020-10-03

## 2020-10-01 ENCOUNTER — TRANSFERRED RECORDS (OUTPATIENT)
Dept: HEALTH INFORMATION MANAGEMENT | Facility: CLINIC | Age: 65
End: 2020-10-01

## 2020-10-01 ENCOUNTER — VIRTUAL VISIT (OUTPATIENT)
Dept: INTERNAL MEDICINE | Facility: CLINIC | Age: 65
End: 2020-10-01
Payer: COMMERCIAL

## 2020-10-01 ENCOUNTER — HOSPITAL ENCOUNTER (OUTPATIENT)
Dept: LAB | Facility: CLINIC | Age: 65
Discharge: HOME | End: 2020-10-01
Attending: ORTHOPAEDIC SURGERY | Admitting: ORTHOPAEDIC SURGERY
Payer: COMMERCIAL

## 2020-10-01 ENCOUNTER — MYC MEDICAL ADVICE (OUTPATIENT)
Dept: INTERNAL MEDICINE | Facility: CLINIC | Age: 65
End: 2020-10-01

## 2020-10-01 ENCOUNTER — MEDICAL CORRESPONDENCE (OUTPATIENT)
Dept: HEALTH INFORMATION MANAGEMENT | Facility: CLINIC | Age: 65
End: 2020-10-01

## 2020-10-01 ENCOUNTER — HOSPITAL LABORATORY (OUTPATIENT)
Dept: OTHER | Facility: CLINIC | Age: 65
End: 2020-10-01

## 2020-10-01 VITALS — SYSTOLIC BLOOD PRESSURE: 125 MMHG | WEIGHT: 140 LBS | BODY MASS INDEX: 23.3 KG/M2 | DIASTOLIC BLOOD PRESSURE: 78 MMHG

## 2020-10-01 DIAGNOSIS — F33.1 MODERATE EPISODE OF RECURRENT MAJOR DEPRESSIVE DISORDER (H): ICD-10-CM

## 2020-10-01 DIAGNOSIS — Z47.89 AFTERCARE FOLLOWING SURGERY OF THE MUSCULOSKELETAL SYSTEM: Primary | ICD-10-CM

## 2020-10-01 DIAGNOSIS — G47.00 INSOMNIA, UNSPECIFIED TYPE: ICD-10-CM

## 2020-10-01 DIAGNOSIS — M25.511 RIGHT SHOULDER PAIN: ICD-10-CM

## 2020-10-01 DIAGNOSIS — F41.1 ANXIETY STATE: Primary | ICD-10-CM

## 2020-10-01 LAB
APPEARANCE FLD: NORMAL
COLOR FLD: YELLOW
CRP SERPL-MCNC: 53.2 MG/L (ref 0–8)
CRYSTALS SNV MICRO: NORMAL
ERYTHROCYTE [DISTWIDTH] IN BLOOD BY AUTOMATED COUNT: 11.3 % (ref 10–15)
ERYTHROCYTE [SEDIMENTATION RATE] IN BLOOD BY WESTERGREN METHOD: 21 MM/H (ref 0–30)
GRAM STN SPEC: NORMAL
HCT VFR BLD AUTO: 37.5 % (ref 35–47)
HGB BLD-MCNC: 12.7 G/DL (ref 11.7–15.7)
LYMPHOCYTES NFR FLD MANUAL: 2 %
Lab: NORMAL
MCH RBC QN AUTO: 32.6 PG (ref 26.5–33)
MCHC RBC AUTO-ENTMCNC: 33.9 G/DL (ref 31.5–36.5)
MCV RBC AUTO: 96 FL (ref 78–100)
NEUTS BAND NFR FLD MANUAL: 98 %
PLATELET # BLD AUTO: 298 10E9/L (ref 150–450)
RBC # BLD AUTO: 3.89 10E12/L (ref 3.8–5.2)
SPECIMEN SOURCE FLD: NORMAL
SPECIMEN SOURCE SNV: NORMAL
SPECIMEN SOURCE: NORMAL
WBC # BLD AUTO: 8.1 10E9/L (ref 4–11)
WBC # FLD AUTO: NORMAL /UL

## 2020-10-01 PROCEDURE — 85652 RBC SED RATE AUTOMATED: CPT | Performed by: ORTHOPAEDIC SURGERY

## 2020-10-01 PROCEDURE — 86140 C-REACTIVE PROTEIN: CPT | Performed by: ORTHOPAEDIC SURGERY

## 2020-10-01 PROCEDURE — 99214 OFFICE O/P EST MOD 30 MIN: CPT | Mod: 95 | Performed by: NURSE PRACTITIONER

## 2020-10-01 PROCEDURE — 36415 COLL VENOUS BLD VENIPUNCTURE: CPT | Performed by: ORTHOPAEDIC SURGERY

## 2020-10-01 PROCEDURE — 85027 COMPLETE CBC AUTOMATED: CPT | Performed by: ORTHOPAEDIC SURGERY

## 2020-10-01 RX ORDER — PAROXETINE 20 MG/1
20 TABLET, FILM COATED ORAL EVERY MORNING
Qty: 90 TABLET | Refills: 1 | Status: SHIPPED | OUTPATIENT
Start: 2020-10-01 | End: 2021-04-12

## 2020-10-01 ASSESSMENT — ANXIETY QUESTIONNAIRES
7. FEELING AFRAID AS IF SOMETHING AWFUL MIGHT HAPPEN: MORE THAN HALF THE DAYS
3. WORRYING TOO MUCH ABOUT DIFFERENT THINGS: NEARLY EVERY DAY
4. TROUBLE RELAXING: NEARLY EVERY DAY
5. BEING SO RESTLESS THAT IT IS HARD TO SIT STILL: SEVERAL DAYS
7. FEELING AFRAID AS IF SOMETHING AWFUL MIGHT HAPPEN: MORE THAN HALF THE DAYS
1. FEELING NERVOUS, ANXIOUS, OR ON EDGE: NEARLY EVERY DAY
6. BECOMING EASILY ANNOYED OR IRRITABLE: NEARLY EVERY DAY
GAD7 TOTAL SCORE: 17
GAD7 TOTAL SCORE: 17
2. NOT BEING ABLE TO STOP OR CONTROL WORRYING: MORE THAN HALF THE DAYS

## 2020-10-01 ASSESSMENT — PATIENT HEALTH QUESTIONNAIRE - PHQ9
SUM OF ALL RESPONSES TO PHQ QUESTIONS 1-9: 19
10. IF YOU CHECKED OFF ANY PROBLEMS, HOW DIFFICULT HAVE THESE PROBLEMS MADE IT FOR YOU TO DO YOUR WORK, TAKE CARE OF THINGS AT HOME, OR GET ALONG WITH OTHER PEOPLE: SOMEWHAT DIFFICULT
SUM OF ALL RESPONSES TO PHQ QUESTIONS 1-9: 19

## 2020-10-01 NOTE — NURSING NOTE
"Chief Complaint   Patient presents with     Insomnia     pt unable to shut her mind off at noc and cannot sleep onset for a while.      initial /78   Wt 63.5 kg (140 lb)   BMI 23.30 kg/m   Estimated body mass index is 23.3 kg/m  as calculated from the following:    Height as of 4/10/20: 1.651 m (5' 5\").    Weight as of this encounter: 63.5 kg (140 lb)..  bp completed using cuff size NA (Not Taken)  CHARANJIT URENA LPN  "

## 2020-10-01 NOTE — PROGRESS NOTES
".Denise Ralph is a 65 year old female who is being evaluated via a billable video visit.      The patient has been notified of following:     \"This video visit will be conducted via a call between you and your physician/provider. We have found that certain health care needs can be provided without the need for an in-person physical exam.  This service lets us provide the care you need with a video conversation.  If a prescription is necessary we can send it directly to your pharmacy.  If lab work is needed we can place an order for that and you can then stop by our lab to have the test done at a later time.    Video visits are billed at different rates depending on your insurance coverage.  Please reach out to your insurance provider with any questions.    If during the course of the call the physician/provider feels a video visit is not appropriate, you will not be charged for this service.\"    Patient has given verbal consent for Video visit? Yes  How would you like to obtain your AVS? MyChart  If you are dropped from the video visit, the video invite should be resent to: Text to cell phone: [phone]  Will anyone else be joining your video visit? No    Subjective     Denise Ralph is a 65 year old female who presents today via video visit for the following health issues:    HPI   Chief Complaint   Patient presents with     Insomnia     pt unable to shut her mind off at noc and cannot sleep onset for a while.         On review of chart has laquita boooft   effexor   lyrica  paxil  Gabapentin  lexapro  Xanax   Trazodone     Currently on xanax and trazodone 100 mg at bedtime     Pain depression and anxiety   Happens at night - she wakes up - more anxiety  Stressed with  losing job and having to navigate this   Also pain in shoulder right- seeing [facility]     paxil in past really helped - stopped because she was feeling better   Stopped also because she does not like to take medication     Seeing [facility] right " shoulder pain related to right shoulder rotator cuff pain- previously repaired -Dr Berman at 330 pm     [facility]  - he was an    They are seeing financial counselor from [place] and this helps them feel less stressed   They also have appointment with a  for health care to help them choose wisely a product for health insuance     Video Start Time: 11:22 AM        Review of Systems   Constitutional, HEENT, cardiovascular, pulmonary, GI, , musculoskeletal, neuro, skin, endocrine and psych systems are negative, except as otherwise noted.      Objective           Vitals:  No vitals were obtained today due to virtual visit.    Physical Exam     GENERAL:alert and no distress  EYES: Eyes grossly normal to inspection.  No discharge or erythema, or obvious scleral/conjunctival abnormalities.  RESP: No audible wheeze, cough, or visible cyanosis.  No visible retractions or increased work of breathing.    SKIN: Visible skin clear. No significant rash, abnormal pigmentation or lesions.  NEURO: Cranial nerves grossly intact.  Mentation and speech appropriate for age.  PSYCH: Mentation appears normal, affect normal/bright, judgement and insight intact, normal speech and appearance well-groomed.      PHQ 12/4/2018 1/31/2020 10/1/2020   PHQ-9 Total Score 8 3 19   Q9: Thoughts of better off dead/self-harm past 2 weeks Not at all Not at all Several days   F/U: Thoughts of suicide or self-harm - - No   F/U: Safety concerns - - Yes     MURALI-7 SCORE 5/1/2019 1/31/2020 10/1/2020   Total Score - - -   Total Score - - 17 (severe anxiety)   Total Score 12 3 17               Assessment & Plan     Anxiety state  Needs improvement   Has taken paxil in past with good effect - stopped because she felt better   Used in 2003 2017 and 2019 by review of prescription   Willing to try again     Moderate episode of recurrent major depressive disorder (H)  With loss income from  job, pain in  shoulder, inability to sleep   paxil waas helpful in past - restart     Insomnia, unspecified type  Taking trazodone - will try 150-200 mg at bedtime if needed   Once on paxil may need less sleeping medication           Patient Instructions   Paxil once daily for anxiety / depression     Trazodone 100-200 mg at bedtime if needed   If you are taking the higher dose for a long time, let us  Know so we fill it for the correct amount       Return in about 4 weeks (around 10/29/2020).    IRENE Epstein CNP  [facility]      Video-Visit Details    Type of service:  Video Visit    Video End Time:11:57 AM    Originating Location (pt. Location): Home    Distant Location (provider location):  [facility]     Platform used for Video Visit: Deep-[employer]

## 2020-10-01 NOTE — PATIENT INSTRUCTIONS
Paxil once daily for anxiety / depression     Trazodone 100-200 mg at bedtime if needed   If you are taking the higher dose for a long time, let us  Know so we fill it for the correct amount

## 2020-10-02 ENCOUNTER — ANESTHESIA EVENT (OUTPATIENT)
Dept: SURGERY | Facility: CLINIC | Age: 65
DRG: 502 | End: 2020-10-02
Payer: COMMERCIAL

## 2020-10-02 ENCOUNTER — ANESTHESIA (OUTPATIENT)
Dept: SURGERY | Facility: CLINIC | Age: 65
DRG: 502 | End: 2020-10-02
Payer: COMMERCIAL

## 2020-10-02 ENCOUNTER — HOSPITAL ENCOUNTER (INPATIENT)
Facility: CLINIC | Age: 65
LOS: 3 days | Discharge: HOME HEALTH | DRG: 502 | End: 2020-10-05
Attending: ORTHOPAEDIC SURGERY | Admitting: ORTHOPAEDIC SURGERY
Payer: COMMERCIAL

## 2020-10-02 DIAGNOSIS — M00.9 INFECTION OF SHOULDER (H): Primary | ICD-10-CM

## 2020-10-02 LAB
CREAT SERPL-MCNC: 0.62 MG/DL (ref 0.52–1.04)
GFR SERPL CREATININE-BSD FRML MDRD: >90 ML/MIN/{1.73_M2}
LABORATORY COMMENT REPORT: NORMAL
POTASSIUM SERPL-SCNC: 4.6 MMOL/L (ref 3.4–5.3)
SARS-COV-2 RNA SPEC QL NAA+PROBE: NEGATIVE
SARS-COV-2 RNA SPEC QL NAA+PROBE: NORMAL
SPECIMEN SOURCE: NORMAL
SPECIMEN SOURCE: NORMAL

## 2020-10-02 PROCEDURE — 272N000001 HC OR GENERAL SUPPLY STERILE: Performed by: ORTHOPAEDIC SURGERY

## 2020-10-02 PROCEDURE — 999N000136 HC STATISTIC PRE PROC ASSESS II: Performed by: ORTHOPAEDIC SURGERY

## 2020-10-02 PROCEDURE — 370N000002 HC ANESTHESIA TECHNICAL FEE, EACH ADDTL 15 MIN: Performed by: ORTHOPAEDIC SURGERY

## 2020-10-02 PROCEDURE — 258N000003 HC RX IP 258 OP 636: Performed by: NURSE ANESTHETIST, CERTIFIED REGISTERED

## 2020-10-02 PROCEDURE — 36415 COLL VENOUS BLD VENIPUNCTURE: CPT | Performed by: ANESTHESIOLOGY

## 2020-10-02 PROCEDURE — 87040 BLOOD CULTURE FOR BACTERIA: CPT | Performed by: ORTHOPAEDIC SURGERY

## 2020-10-02 PROCEDURE — 93010 ELECTROCARDIOGRAM REPORT: CPT | Performed by: INTERNAL MEDICINE

## 2020-10-02 PROCEDURE — 250N000011 HC RX IP 250 OP 636: Performed by: NURSE ANESTHETIST, CERTIFIED REGISTERED

## 2020-10-02 PROCEDURE — 36415 COLL VENOUS BLD VENIPUNCTURE: CPT | Performed by: ORTHOPAEDIC SURGERY

## 2020-10-02 PROCEDURE — 250N000009 HC RX 250: Performed by: PHYSICIAN ASSISTANT

## 2020-10-02 PROCEDURE — 82565 ASSAY OF CREATININE: CPT | Performed by: ANESTHESIOLOGY

## 2020-10-02 PROCEDURE — 250N000013 HC RX MED GY IP 250 OP 250 PS 637: Performed by: PHYSICIAN ASSISTANT

## 2020-10-02 PROCEDURE — 761N000002 HC RECOVERY PHASE 1 LEVEL 1 EA ADDTL HR: Performed by: ORTHOPAEDIC SURGERY

## 2020-10-02 PROCEDURE — 258N000003 HC RX IP 258 OP 636: Performed by: ORTHOPAEDIC SURGERY

## 2020-10-02 PROCEDURE — U0003 INFECTIOUS AGENT DETECTION BY NUCLEIC ACID (DNA OR RNA); SEVERE ACUTE RESPIRATORY SYNDROME CORONAVIRUS 2 (SARS-COV-2) (CORONAVIRUS DISEASE [COVID-19]), AMPLIFIED PROBE TECHNIQUE, MAKING USE OF HIGH THROUGHPUT TECHNOLOGIES AS DESCRIBED BY CMS-2020-01-R: HCPCS | Performed by: ORTHOPAEDIC SURGERY

## 2020-10-02 PROCEDURE — 0MB14ZZ EXCISION OF RIGHT SHOULDER BURSA AND LIGAMENT, PERCUTANEOUS ENDOSCOPIC APPROACH: ICD-10-PCS | Performed by: ORTHOPAEDIC SURGERY

## 2020-10-02 PROCEDURE — 250N000011 HC RX IP 250 OP 636: Performed by: ORTHOPAEDIC SURGERY

## 2020-10-02 PROCEDURE — 87075 CULTR BACTERIA EXCEPT BLOOD: CPT | Performed by: ORTHOPAEDIC SURGERY

## 2020-10-02 PROCEDURE — 87070 CULTURE OTHR SPECIMN AEROBIC: CPT | Performed by: ORTHOPAEDIC SURGERY

## 2020-10-02 PROCEDURE — 250N000009 HC RX 250: Performed by: NURSE ANESTHETIST, CERTIFIED REGISTERED

## 2020-10-02 PROCEDURE — 360N000021 HC SURGERY LEVEL 3 EA 15 ADDTL MIN: Performed by: ORTHOPAEDIC SURGERY

## 2020-10-02 PROCEDURE — 370N000001 HC ANESTHESIA TECHNICAL FEE, 1ST 30 MIN: Performed by: ORTHOPAEDIC SURGERY

## 2020-10-02 PROCEDURE — 258N000001 HC RX 258: Performed by: ORTHOPAEDIC SURGERY

## 2020-10-02 PROCEDURE — 360N000020 HC SURGERY LEVEL 3 1ST 30 MIN: Performed by: ORTHOPAEDIC SURGERY

## 2020-10-02 PROCEDURE — 250N000013 HC RX MED GY IP 250 OP 250 PS 637: Performed by: ORTHOPAEDIC SURGERY

## 2020-10-02 PROCEDURE — 250N000011 HC RX IP 250 OP 636: Performed by: PHYSICIAN ASSISTANT

## 2020-10-02 PROCEDURE — 120N000001 HC R&B MED SURG/OB

## 2020-10-02 PROCEDURE — 761N000001 HC RECOVERY PHASE 1 LEVEL 1 FIRST HR: Performed by: ORTHOPAEDIC SURGERY

## 2020-10-02 PROCEDURE — 84132 ASSAY OF SERUM POTASSIUM: CPT | Performed by: ANESTHESIOLOGY

## 2020-10-02 PROCEDURE — 250N000009 HC RX 250: Performed by: ORTHOPAEDIC SURGERY

## 2020-10-02 PROCEDURE — 250N000011 HC RX IP 250 OP 636: Performed by: ANESTHESIOLOGY

## 2020-10-02 RX ORDER — ACETAMINOPHEN 325 MG/1
975 TABLET ORAL ONCE
Status: COMPLETED | OUTPATIENT
Start: 2020-10-02 | End: 2020-10-02

## 2020-10-02 RX ORDER — HYDROMORPHONE HYDROCHLORIDE 1 MG/ML
.3-.5 INJECTION, SOLUTION INTRAMUSCULAR; INTRAVENOUS; SUBCUTANEOUS EVERY 10 MIN PRN
Status: DISCONTINUED | OUTPATIENT
Start: 2020-10-02 | End: 2020-10-02 | Stop reason: HOSPADM

## 2020-10-02 RX ORDER — ONDANSETRON 2 MG/ML
4 INJECTION INTRAMUSCULAR; INTRAVENOUS EVERY 6 HOURS PRN
Status: DISCONTINUED | OUTPATIENT
Start: 2020-10-02 | End: 2020-10-05 | Stop reason: HOSPADM

## 2020-10-02 RX ORDER — FENTANYL CITRATE 50 UG/ML
25-50 INJECTION, SOLUTION INTRAMUSCULAR; INTRAVENOUS
Status: DISCONTINUED | OUTPATIENT
Start: 2020-10-02 | End: 2020-10-02 | Stop reason: HOSPADM

## 2020-10-02 RX ORDER — ACETAMINOPHEN 325 MG/1
650 TABLET ORAL EVERY 4 HOURS PRN
Status: DISCONTINUED | OUTPATIENT
Start: 2020-10-05 | End: 2020-10-05 | Stop reason: HOSPADM

## 2020-10-02 RX ORDER — PROPOFOL 10 MG/ML
INJECTION, EMULSION INTRAVENOUS PRN
Status: DISCONTINUED | OUTPATIENT
Start: 2020-10-02 | End: 2020-10-02

## 2020-10-02 RX ORDER — LIDOCAINE 40 MG/G
CREAM TOPICAL
Status: DISCONTINUED | OUTPATIENT
Start: 2020-10-02 | End: 2020-10-05 | Stop reason: HOSPADM

## 2020-10-02 RX ORDER — BUPIVACAINE HYDROCHLORIDE 2.5 MG/ML
INJECTION, SOLUTION INFILTRATION; PERINEURAL PRN
Status: DISCONTINUED | OUTPATIENT
Start: 2020-10-02 | End: 2020-10-02

## 2020-10-02 RX ORDER — PROPOFOL 10 MG/ML
INJECTION, EMULSION INTRAVENOUS CONTINUOUS PRN
Status: DISCONTINUED | OUTPATIENT
Start: 2020-10-02 | End: 2020-10-02

## 2020-10-02 RX ORDER — ATENOLOL 50 MG/1
50 TABLET ORAL DAILY
Status: DISCONTINUED | OUTPATIENT
Start: 2020-10-03 | End: 2020-10-05 | Stop reason: HOSPADM

## 2020-10-02 RX ORDER — DEXAMETHASONE SODIUM PHOSPHATE 4 MG/ML
INJECTION, SOLUTION INTRA-ARTICULAR; INTRALESIONAL; INTRAMUSCULAR; INTRAVENOUS; SOFT TISSUE PRN
Status: DISCONTINUED | OUTPATIENT
Start: 2020-10-02 | End: 2020-10-02

## 2020-10-02 RX ORDER — SODIUM CHLORIDE, SODIUM LACTATE, POTASSIUM CHLORIDE, CALCIUM CHLORIDE 600; 310; 30; 20 MG/100ML; MG/100ML; MG/100ML; MG/100ML
INJECTION, SOLUTION INTRAVENOUS CONTINUOUS
Status: DISCONTINUED | OUTPATIENT
Start: 2020-10-02 | End: 2020-10-05 | Stop reason: HOSPADM

## 2020-10-02 RX ORDER — MEPERIDINE HYDROCHLORIDE 25 MG/ML
12.5 INJECTION INTRAMUSCULAR; INTRAVENOUS; SUBCUTANEOUS
Status: DISCONTINUED | OUTPATIENT
Start: 2020-10-02 | End: 2020-10-02 | Stop reason: HOSPADM

## 2020-10-02 RX ORDER — LABETALOL 20 MG/4 ML (5 MG/ML) INTRAVENOUS SYRINGE
10
Status: DISCONTINUED | OUTPATIENT
Start: 2020-10-02 | End: 2020-10-02 | Stop reason: HOSPADM

## 2020-10-02 RX ORDER — OXYCODONE HYDROCHLORIDE 5 MG/1
5-10 TABLET ORAL
Status: DISCONTINUED | OUTPATIENT
Start: 2020-10-02 | End: 2020-10-05 | Stop reason: HOSPADM

## 2020-10-02 RX ORDER — TRAZODONE HYDROCHLORIDE 100 MG/1
100 TABLET ORAL
Status: DISCONTINUED | OUTPATIENT
Start: 2020-10-02 | End: 2020-10-05 | Stop reason: HOSPADM

## 2020-10-02 RX ORDER — ONDANSETRON 2 MG/ML
INJECTION INTRAMUSCULAR; INTRAVENOUS PRN
Status: DISCONTINUED | OUTPATIENT
Start: 2020-10-02 | End: 2020-10-02

## 2020-10-02 RX ORDER — LIDOCAINE HYDROCHLORIDE 10 MG/ML
INJECTION, SOLUTION INFILTRATION; PERINEURAL PRN
Status: DISCONTINUED | OUTPATIENT
Start: 2020-10-02 | End: 2020-10-02

## 2020-10-02 RX ORDER — ALPRAZOLAM 0.25 MG
0.25 TABLET ORAL 3 TIMES DAILY PRN
Status: DISCONTINUED | OUTPATIENT
Start: 2020-10-02 | End: 2020-10-05 | Stop reason: HOSPADM

## 2020-10-02 RX ORDER — ROPINIROLE 0.25 MG/1
0.25 TABLET, FILM COATED ORAL AT BEDTIME
Status: DISCONTINUED | OUTPATIENT
Start: 2020-10-02 | End: 2020-10-05

## 2020-10-02 RX ORDER — ACETAMINOPHEN 325 MG/1
975 TABLET ORAL ONCE
Status: DISCONTINUED | OUTPATIENT
Start: 2020-10-02 | End: 2020-10-02 | Stop reason: HOSPADM

## 2020-10-02 RX ORDER — SODIUM CHLORIDE, SODIUM LACTATE, POTASSIUM CHLORIDE, CALCIUM CHLORIDE 600; 310; 30; 20 MG/100ML; MG/100ML; MG/100ML; MG/100ML
INJECTION, SOLUTION INTRAVENOUS CONTINUOUS
Status: DISCONTINUED | OUTPATIENT
Start: 2020-10-02 | End: 2020-10-02 | Stop reason: HOSPADM

## 2020-10-02 RX ORDER — CEFAZOLIN SODIUM 1 G/50ML
1 INJECTION, SOLUTION INTRAVENOUS EVERY 8 HOURS
Status: DISCONTINUED | OUTPATIENT
Start: 2020-10-02 | End: 2020-10-03

## 2020-10-02 RX ORDER — SODIUM CHLORIDE, SODIUM LACTATE, POTASSIUM CHLORIDE, CALCIUM CHLORIDE 600; 310; 30; 20 MG/100ML; MG/100ML; MG/100ML; MG/100ML
INJECTION, SOLUTION INTRAVENOUS CONTINUOUS PRN
Status: DISCONTINUED | OUTPATIENT
Start: 2020-10-02 | End: 2020-10-02

## 2020-10-02 RX ORDER — PAROXETINE 20 MG/1
20 TABLET, FILM COATED ORAL EVERY MORNING
Status: DISCONTINUED | OUTPATIENT
Start: 2020-10-03 | End: 2020-10-05 | Stop reason: HOSPADM

## 2020-10-02 RX ORDER — KETOROLAC TROMETHAMINE 15 MG/ML
15 INJECTION, SOLUTION INTRAMUSCULAR; INTRAVENOUS EVERY 6 HOURS
Status: COMPLETED | OUTPATIENT
Start: 2020-10-02 | End: 2020-10-03

## 2020-10-02 RX ORDER — ONDANSETRON 4 MG/1
4 TABLET, ORALLY DISINTEGRATING ORAL EVERY 30 MIN PRN
Status: DISCONTINUED | OUTPATIENT
Start: 2020-10-02 | End: 2020-10-02 | Stop reason: HOSPADM

## 2020-10-02 RX ORDER — GLYCOPYRROLATE 0.2 MG/ML
INJECTION, SOLUTION INTRAMUSCULAR; INTRAVENOUS PRN
Status: DISCONTINUED | OUTPATIENT
Start: 2020-10-02 | End: 2020-10-02

## 2020-10-02 RX ORDER — TRANEXAMIC ACID 10 MG/ML
1 INJECTION, SOLUTION INTRAVENOUS ONCE
Status: COMPLETED | OUTPATIENT
Start: 2020-10-02 | End: 2020-10-02

## 2020-10-02 RX ORDER — LIDOCAINE 40 MG/G
CREAM TOPICAL
Status: DISCONTINUED | OUTPATIENT
Start: 2020-10-02 | End: 2020-10-02 | Stop reason: HOSPADM

## 2020-10-02 RX ORDER — HYDROXYZINE HYDROCHLORIDE 10 MG/1
10 TABLET, FILM COATED ORAL EVERY 6 HOURS PRN
Status: DISCONTINUED | OUTPATIENT
Start: 2020-10-02 | End: 2020-10-05 | Stop reason: HOSPADM

## 2020-10-02 RX ORDER — FENTANYL CITRATE 50 UG/ML
10-20 INJECTION, SOLUTION INTRAMUSCULAR; INTRAVENOUS
Status: DISCONTINUED | OUTPATIENT
Start: 2020-10-02 | End: 2020-10-05 | Stop reason: HOSPADM

## 2020-10-02 RX ORDER — CARBOXYMETHYLCELLULOSE SODIUM 5 MG/ML
2 SOLUTION/ DROPS OPHTHALMIC
Status: DISCONTINUED | OUTPATIENT
Start: 2020-10-02 | End: 2020-10-05 | Stop reason: HOSPADM

## 2020-10-02 RX ORDER — NALOXONE HYDROCHLORIDE 0.4 MG/ML
.1-.4 INJECTION, SOLUTION INTRAMUSCULAR; INTRAVENOUS; SUBCUTANEOUS
Status: DISCONTINUED | OUTPATIENT
Start: 2020-10-02 | End: 2020-10-05 | Stop reason: HOSPADM

## 2020-10-02 RX ORDER — TRANEXAMIC ACID 10 MG/ML
1 INJECTION, SOLUTION INTRAVENOUS ONCE
Status: DISCONTINUED | OUTPATIENT
Start: 2020-10-02 | End: 2020-10-02 | Stop reason: HOSPADM

## 2020-10-02 RX ORDER — CEFAZOLIN SODIUM 1 G/3ML
1 INJECTION, POWDER, FOR SOLUTION INTRAMUSCULAR; INTRAVENOUS SEE ADMIN INSTRUCTIONS
Status: DISCONTINUED | OUTPATIENT
Start: 2020-10-02 | End: 2020-10-02 | Stop reason: HOSPADM

## 2020-10-02 RX ORDER — FENTANYL CITRATE 50 UG/ML
INJECTION, SOLUTION INTRAMUSCULAR; INTRAVENOUS PRN
Status: DISCONTINUED | OUTPATIENT
Start: 2020-10-02 | End: 2020-10-02

## 2020-10-02 RX ORDER — NEOSTIGMINE METHYLSULFATE 1 MG/ML
VIAL (ML) INJECTION PRN
Status: DISCONTINUED | OUTPATIENT
Start: 2020-10-02 | End: 2020-10-02

## 2020-10-02 RX ORDER — LATANOPROST 50 UG/ML
1 SOLUTION/ DROPS OPHTHALMIC AT BEDTIME
Status: DISCONTINUED | OUTPATIENT
Start: 2020-10-02 | End: 2020-10-05 | Stop reason: HOSPADM

## 2020-10-02 RX ORDER — AMLODIPINE BESYLATE 2.5 MG/1
2.5 TABLET ORAL DAILY
Status: DISCONTINUED | OUTPATIENT
Start: 2020-10-03 | End: 2020-10-05 | Stop reason: HOSPADM

## 2020-10-02 RX ORDER — ONDANSETRON 4 MG/1
4 TABLET, ORALLY DISINTEGRATING ORAL EVERY 6 HOURS PRN
Status: DISCONTINUED | OUTPATIENT
Start: 2020-10-02 | End: 2020-10-05 | Stop reason: HOSPADM

## 2020-10-02 RX ORDER — CEFAZOLIN SODIUM 2 G/100ML
2 INJECTION, SOLUTION INTRAVENOUS
Status: COMPLETED | OUTPATIENT
Start: 2020-10-02 | End: 2020-10-02

## 2020-10-02 RX ORDER — NALOXONE HYDROCHLORIDE 0.4 MG/ML
.1-.4 INJECTION, SOLUTION INTRAMUSCULAR; INTRAVENOUS; SUBCUTANEOUS
Status: DISCONTINUED | OUTPATIENT
Start: 2020-10-02 | End: 2020-10-02 | Stop reason: HOSPADM

## 2020-10-02 RX ORDER — ONDANSETRON 2 MG/ML
4 INJECTION INTRAMUSCULAR; INTRAVENOUS EVERY 30 MIN PRN
Status: DISCONTINUED | OUTPATIENT
Start: 2020-10-02 | End: 2020-10-02 | Stop reason: HOSPADM

## 2020-10-02 RX ORDER — ACETAMINOPHEN 325 MG/1
975 TABLET ORAL EVERY 8 HOURS
Status: COMPLETED | OUTPATIENT
Start: 2020-10-02 | End: 2020-10-05

## 2020-10-02 RX ORDER — TRAMADOL HYDROCHLORIDE 50 MG/1
50 TABLET ORAL EVERY 6 HOURS PRN
Status: DISCONTINUED | OUTPATIENT
Start: 2020-10-02 | End: 2020-10-05 | Stop reason: HOSPADM

## 2020-10-02 RX ADMIN — FENTANYL CITRATE 50 MCG: 50 INJECTION, SOLUTION INTRAMUSCULAR; INTRAVENOUS at 15:30

## 2020-10-02 RX ADMIN — Medication 2 MG: at 16:26

## 2020-10-02 RX ADMIN — DEXAMETHASONE SODIUM PHOSPHATE 4 MG: 4 INJECTION, SOLUTION INTRA-ARTICULAR; INTRALESIONAL; INTRAMUSCULAR; INTRAVENOUS; SOFT TISSUE at 15:06

## 2020-10-02 RX ADMIN — FENTANYL CITRATE 25 MCG: 50 INJECTION, SOLUTION INTRAMUSCULAR; INTRAVENOUS at 17:06

## 2020-10-02 RX ADMIN — FENTANYL CITRATE 25 MCG: 50 INJECTION, SOLUTION INTRAMUSCULAR; INTRAVENOUS at 17:16

## 2020-10-02 RX ADMIN — ROCURONIUM BROMIDE 40 MG: 10 INJECTION INTRAVENOUS at 15:06

## 2020-10-02 RX ADMIN — CEFAZOLIN SODIUM 2 G: 2 INJECTION, SOLUTION INTRAVENOUS at 15:35

## 2020-10-02 RX ADMIN — LIDOCAINE HYDROCHLORIDE 30 MG: 10 INJECTION, SOLUTION INFILTRATION; PERINEURAL at 15:06

## 2020-10-02 RX ADMIN — KETOROLAC TROMETHAMINE 15 MG: 15 INJECTION, SOLUTION INTRAMUSCULAR; INTRAVENOUS at 19:39

## 2020-10-02 RX ADMIN — CARBOXYMETHYLCELLULOSE SODIUM 2 DROP: 5 SOLUTION/ DROPS OPHTHALMIC at 21:33

## 2020-10-02 RX ADMIN — PROPOFOL 160 MG: 10 INJECTION, EMULSION INTRAVENOUS at 15:06

## 2020-10-02 RX ADMIN — SODIUM CHLORIDE, POTASSIUM CHLORIDE, SODIUM LACTATE AND CALCIUM CHLORIDE: 600; 310; 30; 20 INJECTION, SOLUTION INTRAVENOUS at 16:13

## 2020-10-02 RX ADMIN — SODIUM CHLORIDE, POTASSIUM CHLORIDE, SODIUM LACTATE AND CALCIUM CHLORIDE: 600; 310; 30; 20 INJECTION, SOLUTION INTRAVENOUS at 14:34

## 2020-10-02 RX ADMIN — ONDANSETRON HYDROCHLORIDE 4 MG: 2 INJECTION, SOLUTION INTRAVENOUS at 16:13

## 2020-10-02 RX ADMIN — CEFAZOLIN SODIUM 1 G: 1 INJECTION, SOLUTION INTRAVENOUS at 23:18

## 2020-10-02 RX ADMIN — GLYCOPYRROLATE 0.2 MG: 0.2 INJECTION, SOLUTION INTRAMUSCULAR; INTRAVENOUS at 16:26

## 2020-10-02 RX ADMIN — GLYCOPYRROLATE 0.2 MG: 0.2 INJECTION, SOLUTION INTRAMUSCULAR; INTRAVENOUS at 15:06

## 2020-10-02 RX ADMIN — GLYCOPYRROLATE 0.2 MG: 0.2 INJECTION, SOLUTION INTRAMUSCULAR; INTRAVENOUS at 16:33

## 2020-10-02 RX ADMIN — TRANEXAMIC ACID 1 G: 10 INJECTION, SOLUTION INTRAVENOUS at 16:13

## 2020-10-02 RX ADMIN — OXYCODONE HYDROCHLORIDE 5 MG: 5 TABLET ORAL at 23:18

## 2020-10-02 RX ADMIN — ASPIRIN 325 MG: 325 TABLET, COATED ORAL at 21:32

## 2020-10-02 RX ADMIN — OXYCODONE HYDROCHLORIDE 5 MG: 5 TABLET ORAL at 19:40

## 2020-10-02 RX ADMIN — FENTANYL CITRATE 100 MCG: 50 INJECTION, SOLUTION INTRAMUSCULAR; INTRAVENOUS at 15:06

## 2020-10-02 RX ADMIN — ACETAMINOPHEN 975 MG: 325 TABLET, FILM COATED ORAL at 19:40

## 2020-10-02 RX ADMIN — LATANOPROST 1 DROP: 50 SOLUTION OPHTHALMIC at 23:18

## 2020-10-02 RX ADMIN — PROPOFOL 50 MCG/KG/MIN: 10 INJECTION, EMULSION INTRAVENOUS at 15:10

## 2020-10-02 RX ADMIN — FENTANYL CITRATE 50 MCG: 50 INJECTION, SOLUTION INTRAMUSCULAR; INTRAVENOUS at 15:38

## 2020-10-02 RX ADMIN — FENTANYL CITRATE 50 MCG: 50 INJECTION, SOLUTION INTRAMUSCULAR; INTRAVENOUS at 17:24

## 2020-10-02 RX ADMIN — MIDAZOLAM 2 MG: 1 INJECTION INTRAMUSCULAR; INTRAVENOUS at 14:58

## 2020-10-02 RX ADMIN — TRANEXAMIC ACID 1 G: 10 INJECTION, SOLUTION INTRAVENOUS at 15:13

## 2020-10-02 RX ADMIN — ACETAMINOPHEN 975 MG: 325 TABLET, FILM COATED ORAL at 12:24

## 2020-10-02 RX ADMIN — FENTANYL CITRATE 25 MCG: 50 INJECTION, SOLUTION INTRAMUSCULAR; INTRAVENOUS at 16:26

## 2020-10-02 RX ADMIN — FENTANYL CITRATE 50 MCG: 50 INJECTION, SOLUTION INTRAMUSCULAR; INTRAVENOUS at 17:55

## 2020-10-02 RX ADMIN — SODIUM CHLORIDE, POTASSIUM CHLORIDE, SODIUM LACTATE AND CALCIUM CHLORIDE: 600; 310; 30; 20 INJECTION, SOLUTION INTRAVENOUS at 22:07

## 2020-10-02 RX ADMIN — FENTANYL CITRATE 25 MCG: 50 INJECTION, SOLUTION INTRAMUSCULAR; INTRAVENOUS at 16:27

## 2020-10-02 SDOH — HEALTH STABILITY: MENTAL HEALTH: CURRENT SMOKER: 0

## 2020-10-02 ASSESSMENT — ANXIETY QUESTIONNAIRES: GAD7 TOTAL SCORE: 17

## 2020-10-02 ASSESSMENT — ACTIVITIES OF DAILY LIVING (ADL): ADLS_ACUITY_SCORE: 11

## 2020-10-02 ASSESSMENT — PATIENT HEALTH QUESTIONNAIRE - PHQ9: SUM OF ALL RESPONSES TO PHQ QUESTIONS 1-9: 19

## 2020-10-02 ASSESSMENT — MIFFLIN-ST. JEOR: SCORE: 1179.09

## 2020-10-02 NOTE — ANESTHESIA CARE TRANSFER NOTE
Patient: Denise Ralph    Procedure(s):  ARTHROSCOPIC IRRIGATION AND DEBRIDEMENT SHOULDER    Diagnosis: Infection of shoulder (H) [M00.9]  Diagnosis Additional Information: No value filed.    Anesthesia Type:   General     Note:  Airway :Face Mask  Patient transferred to:PACU  Comments: VSS, report to RN, moaning but denies painHandoff Report: Identifed the Patient, Identified the Reponsible Provider, Reviewed the pertinent medical history, Discussed the surgical course, Reviewed Intra-OP anesthesia mangement and issues during anesthesia and Allowed opportunity for questions and acknowledgement of understanding      Vitals: (Last set prior to Anesthesia Care Transfer)    CRNA VITALS  10/2/2020 1621 - 10/2/2020 1658      10/2/2020             NIBP:  151/66    NIBP Mean:  113                Electronically Signed By: IRENE Verduzco CRNA  October 2, 2020  4:58 PM

## 2020-10-02 NOTE — ANESTHESIA PREPROCEDURE EVALUATION
Anesthesia Pre-Procedure Evaluation    Patient: Denise Ralph   MRN: [id number] : 1955          Preoperative Diagnosis: Infection of shoulder (H) [M00.9]    Procedure(s):  ARTHROSCOPIC IRRIGATION AND DEBRIDEMENT SHOULDER    Past Medical History:   Diagnosis Date     Anxiety     Gets panic attacks     Depressive disorder, not elsewhere classified      Essential hypertension, benign     No cardiologist     Irritable bowel syndrome     has had neg colonoscopy & EGD w/u     Lumbar degenerative disc disease     Had PT, traction, no surgery     Other chronic pain     JOint pain for many years.     PONV (postoperative nausea and vomiting)      Sciatica 3/06    R thigh;  MRI = R L2-3 disc      Unspecified glaucoma(365.9)      Past Surgical History:   Procedure Laterality Date     EYE SURGERY Bilateral     Treatment of glaucoma     HYSTERECTOMY       HYSTERECTOMY VAGINAL       LAPAROSCOPIC CHOLECYSTECTOMY      with repeat operation because of bile leak     Left shoulder decomp surgery for impingement  approx      OPTICAL TRACKING SYSTEM FUSION POSTERIOR SPINE LUMBAR N/A 8/3/2017    Procedure: OPTICAL TRACKING SYSTEM FUSION SPINE POSTERIOR LUMBAR ONE LEVEL;  1. L4 Ang-Meyers osteotomy with exposure of the L4 and L5 roots  2. L4-5 complete discectomy with arthrodesis  3. Placement of Globus Creo GOMEZ pedicle screws bilaterally from L4 to L5  4. L4 - L5 posterior lateral fusion with placement of Medtronic Magnifuse and locally harvested autologous bone  5. Use of Stealth and O     WRIST SURGERY Left      Anesthesia Evaluation     . Pt has had prior anesthetic. Type: General    History of anesthetic complications   - PONV        ROS/MED HX    ENT/Pulmonary:  - neg pulmonary ROS     Neurologic:  - neg neurologic ROS     Cardiovascular:     (+) hypertension----. : . . . :. .       METS/Exercise Tolerance:     Hematologic:  - neg hematologic  ROS       Musculoskeletal:   (+) arthritis,  other musculoskeletal-   "     GI/Hepatic:     (+) GERD Asymptomatic on medication,       Renal/Genitourinary:  - ROS Renal section negative       Endo:  - neg endo ROS       Psychiatric:     (+) psychiatric history anxiety and depression      Infectious Disease:  - neg infectious disease ROS       Malignancy:         Other:    (+) H/O Chronic Pain,                        Physical Exam  Normal systems: cardiovascular, pulmonary and dental    Airway   Mallampati: II  TM distance: >3 FB  Neck ROM: full    Dental     Cardiovascular       Pulmonary             Lab Results   Component Value Date    WBC 8.1 10/01/2020    HGB 12.7 10/01/2020    HCT 37.5 10/01/2020     10/01/2020    CRP 53.2 (H) 10/01/2020    SED 21 10/01/2020     (L) 03/03/2020    POTASSIUM 4.6 03/03/2020    CHLORIDE 98 03/03/2020    CO2 28 03/03/2020    BUN 14 03/03/2020    CR 0.58 03/03/2020    GLC 92 03/03/2020    MARLON 10.0 03/03/2020    MAG 1.5 (L) 06/06/2019    ALBUMIN 4.4 01/31/2020    PROTTOTAL 7.4 01/31/2020    ALT 32 01/31/2020    AST 30 01/31/2020    ALKPHOS 65 01/31/2020    BILITOTAL 0.4 01/31/2020    LIPASE 239 10/31/2017    AMYLASE 121 (H) 09/06/2005    PTT 27 02/27/2015    INR 0.95 07/19/2017    TSH 2.57 01/31/2020       Preop Vitals  BP Readings from Last 3 Encounters:   10/02/20 (!) 152/87   10/01/20 125/78   04/10/20 (!) 155/84    Pulse Readings from Last 3 Encounters:   10/02/20 72   04/10/20 57   01/31/20 62      Resp Readings from Last 3 Encounters:   10/02/20 16   04/10/20 18   01/31/20 16    SpO2 Readings from Last 3 Encounters:   10/02/20 97%   04/10/20 98%   01/31/20 98%      Temp Readings from Last 1 Encounters:   10/02/20 98.7  F (37.1  C) (Temporal)    Ht Readings from Last 1 Encounters:   10/02/20 1.651 m (5' 5\")      Wt Readings from Last 1 Encounters:   10/02/20 63.8 kg (140 lb 11.2 oz)    Estimated body mass index is 23.41 kg/m  as calculated from the following:    Height as of this encounter: 1.651 m (5' 5\").    Weight as of this " encounter: 63.8 kg (140 lb 11.2 oz).       Anesthesia Plan      History & Physical Review      ASA Status:  2 .    NPO Status:  > 8 hours    Plan for General with Intravenous induction. Maintenance will be Balanced.    PONV prophylaxis:  Ondansetron (or other 5HT-3) and Dexamethasone or Solumedrol    The patient is not a current smoker      Postoperative Care  Postoperative pain management:  IV analgesics, Oral pain medications and Multi-modal analgesia.      Consents  Anesthetic plan, risks, benefits and alternatives discussed with:  Patient..                 Niels Ramsay MD                    .

## 2020-10-02 NOTE — ANESTHESIA POSTPROCEDURE EVALUATION
Patient: Denise Ralph    Procedure(s):  ARTHROSCOPIC IRRIGATION AND DEBRIDEMENT SHOULDER    Diagnosis:Infection of shoulder (H) [M00.9]  Diagnosis Additional Information: No value filed.    Anesthesia Type:  General    Note:  Anesthesia Post Evaluation    Patient location during evaluation: PACU  Patient participation: Able to fully participate in evaluation  Level of consciousness: awake  Pain management: adequate  Airway patency: patent  Cardiovascular status: acceptable  Respiratory status: acceptable  Hydration status: acceptable  PONV: controlled     Anesthetic complications: None          Last vitals:  Vitals:    10/02/20 1705 10/02/20 1715 10/02/20 1730   BP: (!) 158/85 (!) 156/81 (!) 156/81   Pulse: 64 62 63   Resp: 17 19 18   Temp:      SpO2: 100% 100% 100%         Electronically Signed By: Roosevelt Hoffman DO  October 2, 2020  5:47 PM

## 2020-10-03 ENCOUNTER — APPOINTMENT (OUTPATIENT)
Dept: OCCUPATIONAL THERAPY | Facility: CLINIC | Age: 65
DRG: 502 | End: 2020-10-03
Attending: ORTHOPAEDIC SURGERY
Payer: COMMERCIAL

## 2020-10-03 DIAGNOSIS — Z47.89 AFTERCARE FOLLOWING SURGERY OF THE MUSCULOSKELETAL SYSTEM: Primary | ICD-10-CM

## 2020-10-03 DIAGNOSIS — M25.511 RIGHT SHOULDER PAIN: ICD-10-CM

## 2020-10-03 LAB — GLUCOSE SERPL-MCNC: 109 MG/DL (ref 70–99)

## 2020-10-03 PROCEDURE — 36415 COLL VENOUS BLD VENIPUNCTURE: CPT | Performed by: ORTHOPAEDIC SURGERY

## 2020-10-03 PROCEDURE — 250N000011 HC RX IP 250 OP 636: Performed by: INTERNAL MEDICINE

## 2020-10-03 PROCEDURE — 97110 THERAPEUTIC EXERCISES: CPT | Mod: GO | Performed by: REHABILITATION PRACTITIONER

## 2020-10-03 PROCEDURE — 82947 ASSAY GLUCOSE BLOOD QUANT: CPT | Performed by: ORTHOPAEDIC SURGERY

## 2020-10-03 PROCEDURE — 97165 OT EVAL LOW COMPLEX 30 MIN: CPT | Mod: GO | Performed by: REHABILITATION PRACTITIONER

## 2020-10-03 PROCEDURE — 97535 SELF CARE MNGMENT TRAINING: CPT | Mod: GO | Performed by: REHABILITATION PRACTITIONER

## 2020-10-03 PROCEDURE — 272N000034 HC KIT VALVED SINGLE LUMEN

## 2020-10-03 PROCEDURE — 120N000001 HC R&B MED SURG/OB

## 2020-10-03 PROCEDURE — 36569 INSJ PICC 5 YR+ W/O IMAGING: CPT

## 2020-10-03 PROCEDURE — 250N000013 HC RX MED GY IP 250 OP 250 PS 637: Performed by: ORTHOPAEDIC SURGERY

## 2020-10-03 PROCEDURE — 250N000011 HC RX IP 250 OP 636: Performed by: ORTHOPAEDIC SURGERY

## 2020-10-03 RX ORDER — ASPIRIN 325 MG
325 TABLET, DELAYED RELEASE (ENTERIC COATED) ORAL DAILY
Qty: 45 TABLET | Refills: 0 | Status: SHIPPED | OUTPATIENT
Start: 2020-10-04 | End: 2021-04-12

## 2020-10-03 RX ORDER — TRAMADOL HYDROCHLORIDE 50 MG/1
50 TABLET ORAL EVERY 6 HOURS PRN
Qty: 25 TABLET | Refills: 0 | Status: SHIPPED | OUTPATIENT
Start: 2020-10-03 | End: 2021-04-12

## 2020-10-03 RX ORDER — CEFTRIAXONE 2 G/1
2 INJECTION, POWDER, FOR SOLUTION INTRAMUSCULAR; INTRAVENOUS EVERY 24 HOURS
Status: DISCONTINUED | OUTPATIENT
Start: 2020-10-03 | End: 2020-10-03

## 2020-10-03 RX ORDER — ACETAMINOPHEN 160 MG
4000 TABLET,DISINTEGRATING ORAL DAILY
COMMUNITY
End: 2021-04-12

## 2020-10-03 RX ORDER — ERTAPENEM 1 G/1
1 INJECTION, POWDER, LYOPHILIZED, FOR SOLUTION INTRAMUSCULAR; INTRAVENOUS EVERY 24 HOURS
Qty: 280 ML | Refills: 0 | Status: SHIPPED | OUTPATIENT
Start: 2020-10-04 | End: 2020-10-05

## 2020-10-03 RX ORDER — AMOXICILLIN 250 MG
2 CAPSULE ORAL DAILY
Status: DISCONTINUED | OUTPATIENT
Start: 2020-10-03 | End: 2020-10-05 | Stop reason: HOSPADM

## 2020-10-03 RX ORDER — HYDROXYZINE HYDROCHLORIDE 10 MG/1
10 TABLET, FILM COATED ORAL EVERY 6 HOURS PRN
Qty: 20 TABLET | Refills: 1 | Status: SHIPPED | OUTPATIENT
Start: 2020-10-03 | End: 2020-10-12

## 2020-10-03 RX ORDER — IBUPROFEN 200 MG
400 TABLET ORAL EVERY 6 HOURS PRN
COMMUNITY
End: 2020-10-12

## 2020-10-03 RX ORDER — DIPHENHYDRAMINE HCL 25 MG
25 CAPSULE ORAL EVERY 6 HOURS PRN
Status: DISCONTINUED | OUTPATIENT
Start: 2020-10-03 | End: 2020-10-05 | Stop reason: HOSPADM

## 2020-10-03 RX ORDER — ERTAPENEM 1 G/1
1 INJECTION, POWDER, LYOPHILIZED, FOR SOLUTION INTRAMUSCULAR; INTRAVENOUS EVERY 24 HOURS
Status: DISCONTINUED | OUTPATIENT
Start: 2020-10-03 | End: 2020-10-05 | Stop reason: HOSPADM

## 2020-10-03 RX ORDER — LACTOBACILLUS RHAMNOSUS GG 10B CELL
1 CAPSULE ORAL DAILY
COMMUNITY
End: 2020-10-12

## 2020-10-03 RX ORDER — OXYCODONE HYDROCHLORIDE 5 MG/1
5-10 TABLET ORAL
Qty: 6 TABLET | Refills: 0 | Status: SHIPPED | OUTPATIENT
Start: 2020-10-03 | End: 2020-10-12

## 2020-10-03 RX ADMIN — TRAZODONE HYDROCHLORIDE 100 MG: 100 TABLET ORAL at 22:12

## 2020-10-03 RX ADMIN — KETOROLAC TROMETHAMINE 15 MG: 15 INJECTION, SOLUTION INTRAMUSCULAR; INTRAVENOUS at 07:58

## 2020-10-03 RX ADMIN — HYDROXYZINE HYDROCHLORIDE 10 MG: 10 TABLET, FILM COATED ORAL at 18:59

## 2020-10-03 RX ADMIN — HYDROXYZINE HYDROCHLORIDE 10 MG: 10 TABLET, FILM COATED ORAL at 02:07

## 2020-10-03 RX ADMIN — CEFAZOLIN SODIUM 1 G: 1 INJECTION, SOLUTION INTRAVENOUS at 09:36

## 2020-10-03 RX ADMIN — LATANOPROST 1 DROP: 50 SOLUTION OPHTHALMIC at 20:39

## 2020-10-03 RX ADMIN — ACETAMINOPHEN 975 MG: 325 TABLET, FILM COATED ORAL at 11:59

## 2020-10-03 RX ADMIN — OXYCODONE HYDROCHLORIDE 5 MG: 5 TABLET ORAL at 12:21

## 2020-10-03 RX ADMIN — DOCUSATE SODIUM AND SENNOSIDES 2 TABLET: 8.6; 5 TABLET ORAL at 13:48

## 2020-10-03 RX ADMIN — ACETAMINOPHEN 975 MG: 325 TABLET, FILM COATED ORAL at 20:37

## 2020-10-03 RX ADMIN — ASPIRIN 325 MG: 325 TABLET, COATED ORAL at 07:58

## 2020-10-03 RX ADMIN — AMLODIPINE BESYLATE 2.5 MG: 2.5 TABLET ORAL at 07:58

## 2020-10-03 RX ADMIN — OXYCODONE HYDROCHLORIDE 5 MG: 5 TABLET ORAL at 04:23

## 2020-10-03 RX ADMIN — ERTAPENEM 1 G: 1 INJECTION INTRAMUSCULAR; INTRAVENOUS at 11:58

## 2020-10-03 RX ADMIN — KETOROLAC TROMETHAMINE 15 MG: 15 INJECTION, SOLUTION INTRAMUSCULAR; INTRAVENOUS at 02:07

## 2020-10-03 RX ADMIN — KETOROLAC TROMETHAMINE 15 MG: 15 INJECTION, SOLUTION INTRAMUSCULAR; INTRAVENOUS at 13:48

## 2020-10-03 RX ADMIN — ACETAMINOPHEN 975 MG: 325 TABLET, FILM COATED ORAL at 04:23

## 2020-10-03 RX ADMIN — PAROXETINE HYDROCHLORIDE 20 MG: 20 TABLET, FILM COATED ORAL at 07:58

## 2020-10-03 RX ADMIN — TRAMADOL HYDROCHLORIDE 50 MG: 50 TABLET, FILM COATED ORAL at 20:37

## 2020-10-03 ASSESSMENT — ACTIVITIES OF DAILY LIVING (ADL)
ADLS_ACUITY_SCORE: 13
ADLS_ACUITY_SCORE: 11
PREVIOUS_RESPONSIBILITIES: MEAL PREP;HOUSEKEEPING;LAUNDRY;SHOPPING;MEDICATION MANAGEMENT;FINANCES;DRIVING
ADLS_ACUITY_SCORE: 13

## 2020-10-03 NOTE — PLAN OF CARE
Pt A&Ox4. VSS and afebrile. Ax1 w/ GB. Pain managed with PO Oxycodone (5mg) PRN, Tylenol, and IV Toradol. CMS intact. Voiding. Dressing CDI, CMS intact. Hemovac patent. Regular diet. Pt felt itchy at 0130 - Pt felt like she was having a reaction to the abx Ancef. No hives or rash noted - educated Pt PRN Atarax given with relief. Will continue POC. Plan of discharge is home with  pending consults.

## 2020-10-03 NOTE — PROGRESS NOTES
10/03/20 1540   Quick Adds   Type of Visit Initial Occupational Therapy Evaluation       Present no   Living Environment   People in home child(harjeet), adult;spouse   Current Living Arrangements house   Home Accessibility stairs to enter home;stairs within home   Transportation Anticipated family or friend will provide   Living Environment Comments Pt reported to live in 3 level home.   Self-Care   Usual Activity Tolerance good   Current Activity Tolerance moderate   Regular Exercise No   Equipment Currently Used at Home none   Activity/Exercise/Self-Care Comment Pt independent in ADLs, IADLs, and mobility without AD at baseline.   Disability/Function   Hearing Difficulty or Deaf no   Wear Glasses or Blind yes   Concentrating, Remembering or Making Decisions Difficulty no   Difficulty Communicating no   Difficulty Eating/Swallowing no   Walking or Climbing Stairs Difficulty no   Dressing/Bathing Difficulty no   Toileting no   Doing Errands Independently Difficulty (such as shopping) no   Fall history within last six months no   General Information   Onset of Illness/Injury or Date of Surgery 10/02/20   Referring Physician Dr. Umesh Berman   Patient/Family Therapy Goal Statement (OT) return to home   Additional Occupational Profile Info/Pertinent History of Current Problem Pt is POD #1 s/p Right shoulder arthroscopic extensive debridement of the glenohumeral joint and subacromial space for infection   Existing Precautions/Restrictions shoulder   Right Upper Extremity (Weight-bearing Status) non weight-bearing (NWB)   General Observations and Info activity:  up with assist, AROM elbows/wrists, passive pendulum to shoulder with flexion to 90, no external rotation past neutral   Cognitive Status Examination   Orientation Status orientation to person, place and time   Affect/Mental Status (Cognitive) WNL   Follows Commands WNL   Visual Perception   Visual Impairment/Limitations corrective lenses  full-time   Sensory   Sensory Quick Adds No deficits were identified   Pain Assessment   Patient Currently in Pain Yes, see Vital Sign flowsheet   Integumentary/Edema   Integumentary/Edema other (describe)   Integumentary/Edema Comments R shoulder   Posture   Posture not impaired   Range of Motion Comprehensive   General Range of Motion other (see comments)   Comment, General Range of Motion L UE AROM WNL.  R UE NT secondary to precautions.   Muscle Tone Assessment   Muscle Tone Quick Adds No deficits were identified   Coordination   Upper Extremity Coordination No deficits were identified   Bed Mobility   Comment (Bed Mobility) independent   Transfers   Transfer Comments SBA   Activities of Daily Living   BADL Assessment/Intervention upper body dressing;lower body dressing;grooming;toileting   Upper Body Dressing Assessment/Training   [place] Level (Upper Body Dressing) minimum assist (75% patient effort)   Lower Body Dressing Assessment/Training   [place] Level (Lower Body Dressing) minimum assist (75% patient effort)   Grooming Assessment/Training   [place] Level (Grooming) supervision;verbal cues   Toileting   [place] Level (Toileting) supervision;minimum assist (75% patient effort)   Instrumental Activities of Daily Living (IADL)   Previous Responsibilities meal prep;housekeeping;laundry;shopping;medication management;finances;driving   Clinical Impression   Criteria for Skilled Therapeutic Interventions Met (OT) yes   OT Diagnosis decreased ADL performance   OT Problem List-Impairments impacting ADL pain;post-surgical precautions   Assessment of Occupational Performance 5 or more Performance Deficits   Identified Performance Deficits Pt with decreased ability to manage dressing, bathing, toileting, cooking, homemaking, driving   Planned Therapy Interventions (OT) ADL retraining;transfer training;ROM   Clinical Decision Making Complexity (OT) low complexity   Therapy Frequency (OT) Other  (see comments)   Predicted Duration of Therapy eval and 1 treat   Risks and Benefits of Treatment have been explained. Yes   Patient, Family & other staff in agreement with plan of care Yes   Comment-Clinical Impression Pt ambulated with SBA and performed stairs.  No skilled PT needs identified at this time.   OT Discharge Planning    OT Discharge Recommendation (DC Rec) Home with assist   OT Rationale for DC Rec Anticipate Pt will do well with assist of  for ADLs/IADLs.   OT Brief overview of current status  SBA for dressing, toileting.  Min A for bathing with shower chair in place.   able to complete cooking, laundry, driving.  Pt educated in R UE Pendulum and elbow/wrist ROM exercises.

## 2020-10-03 NOTE — OP NOTE
Procedure Date: 10/02/2020      PREOPERATIVE DIAGNOSIS:  Spontaneous septic right glenohumeral and subacromial joint.      POSTOPERATIVE DIAGNOSIS:  Spontaneous septic right glenohumeral and subacromial joint.      PROCEDURE PERFORMED:  Right shoulder arthroscopic extensive debridement of the glenohumeral joint and subacromial space for infection.      SURGEON:  Umesh Berman MD      FIRST ASSISTANT:  Kateryna Rubalcava PA-C.  A skilled first assistant was necessary throughout portions of this case in order to assist with patient positioning, wound closure, dressing and sling application.      ANESTHESIA:  General.      ESTIMATED BLOOD LOSS:  Less than 10 mL      PREOPERATIVE ANTIBIOTICS:  1 gram of Ancef IV was held until a joint aspiration was obtained along with 1 gram of tranexamic acid.      DRAINS:  Hemovac x 2.      COMPLICATIONS:  None noted.      INDICATIONS:  Denise is a pleasant 65-year-old woman who is 13 months ago underwent an uncomplicated rotator cuff repair of her supraspinatus tendon by myself.  Her postoperative course was unremarkable.  Her wounds healed up just fine.  She had slow but steady improvement.  She was last seen after her 6-month check.  She had near full range of motion and good strength.  She was approximately 90% improved.  She had done well throughout the summer over the last month, however, she noticed some slowly increasing pain in her shoulder, particularly got worse over the last few days to the point where she presented to me yesterday in my clinic.  She was feeling slightly febrile, temperature of a little over 100.  She did appear diaphoretic.  On examination, she had excellent range of motion of the shoulder passively with external rotation to 60 and elevation to 150 without too much pain.  Her active elevation was painful and ultrasound in the office demonstrated a significant amount of fluid in the subacromial space.  This was aspirated under ultrasound guidance and  obtained approximately 10 mL of turbid, straw-colored fluid, worrisome for infection.  It was sent off for Gram stain and cell count.  The Gram stain demonstrated many WBCs, but no organisms as well as cell count demonstrated 88,000 white blood cells with 98% neutrophils.  She also obtained a CBC, sed rate and C-reactive protein.  Her white blood cell count was 8.1, her hemoglobin was 12.7.  She had 298,000 platelets while her sedimentation rate was 21 and her C-reactive protein was over 50.  In light of what appeared to clinically be an infection, I recommended we proceed to the operating room for arthroscopic extensive debridement.  The surgery, potential risks, benefits, complications as well as expected postoperative course and recovery were discussed in detail and informed consent was obtained.      DESCRIPTION OF PROCEDURE:  Denise was taken to the operating room where general anesthetic was obtained, no preoperative block was used.  She was placed in the left lateral decubitus position with the right arm in 10 pounds of longitudinal traction at 30 degrees of abduction, 10 degrees of forward elevation.  The area was prepped and draped in standard sterile fashion.      After timeout was performed, I recreated a posterior arthroscopic portal and placed the arthroscope in the subacromial space.  I did aspirate another 7 mL of turbid fluid, which was sent for another set of cultures.  Her cultures as of this morning had been negative.  I have also recreated 2 lateral portals, as well as her anterior portal.  With the arthroscope in the subacromial  space, she had diffuse bursitis; however, her rotator cuff appeared grossly intact.  There were obvious full-thickness defects.  The arthroscope was placed.  A shaver was brought in.  The bursa was debrided.  There was some suture from her previous repair which was visible laterally and these were removed.  I would say her tendon was attenuated and maybe she had some  small ventral tears, but again there was no obvious full-thickness defect.  I then performed an aggressive debridement of the subacromial bursa resecting it with a combination of the shaver and the Arthrocare wand while running multiple liters of normal saline through the shoulder.  Once this was completed, I went into the glenohumeral joint.  The joint itself appeared to also be involved with diffuse synovitis, but the articular cartilage of the humeral head and glenoid were intact.  The subscapularis was intact.  There was a visible nonabsorbable suture from a repair which was also removed.  The biceps tendon and anchor were intact.  The rotator cuff repair appeared to be intact, but also attenuated, not inconsistent with what I would anticipate following rotator cuff repair.  The infraspinatus and teres minor were otherwise intact.  I debrided the glenohumeral joint extensively as well, running several liters of fluid through the joint.  After I ran 6 liters of fluid through the shoulder, I placed a solution of 3 liters of normal saline, injected with 66 mL of sterile Betadine.  This was run through the shoulder joint and then the subacromial space and then I ran another 3 liters of normal saline followed by 2 liters of double antibiotic solution.  There was a total of 15 liters run through the shoulder joint.  I then placed a Hemovac drain in the subacromial space and another one in the glenohumeral joint.  The portals were closed with 3-0 Prolene and then the drains were taped to the dressing.  She was placed in a simple sling and awoken and transferred to recovery room in stable condition.      POSTOPERATIVE PLAN:  Will be to admit her to the hospital.  We will continue IV Ancef now.  I have asked Infectious Disease to consult to assist us with antibiotic choice and length of treatment.  I am anticipating around 4 weeks.  There was no remaining hardware that I could see.  Her anchors were deep in the bone and  not exposed.  Any visible suture had been removed.  Of note, she had some dental work done approximately 6 weeks ago including a crown.  I am uncertain as to the connection of this with her infection.  We will wait and see if we were able to obtain the cultures on the source of the infection.  I would like to obtain placement of a PICC line tomorrow if possible.  I did order blood cultures to be performed in the recovery room.         LARISA SOLORZANO MD             D: 10/02/2020   T: 10/03/2020   MT:       Name:     ROSALIA DAY   MRN:      [id number]        Account:        [id number]   :      1955           Procedure Date: 10/02/2020      Document: [id number]

## 2020-10-03 NOTE — PLAN OF CARE
PT: Orders received. Chart reviewed and discussed with OT, no PT needs identified at this time following OT evaluation. Pt ambulating SBA without assistive device >100 ft and able to perform stairs SBA without any LOB. Will complete orders.

## 2020-10-03 NOTE — PLAN OF CARE
Evening RN    Pt arrived to unit from PACU around 1845.  Transferred from cart to bed with hovermat.  Tolerated well.  VSS and afebrile.  Capnography reinitiated.  Rates pain a 4-5/10 - requesting pain medication intervention when available.  Orientated to room, call light, and staff.  Will continue to monitor.    Patient vital signs are at baseline: Yes  Patient able to ambulate as they were prior to admission or with assist devices provided by therapies during their stay:  Yes  Patient MUST void prior to discharge:  No,  Reason:  Due to void, voided about 10ml with bladder scan for 280.  Patient able to tolerate oral intake:  No,  Reason:  Full liquid diet thus far, tolerating well.  Pain has adequate pain control using Oral analgesics:  Yes    Pt A/O x4 - a bit sedated upon arrival to unit and frequently sleeping in between cares throughout shift.  VSS and afebrile.  Pain managed adequately with scheduled PO tylenol, IV toradol; PRN PO oxycodone (5mg).  CMS intact.  Dressing CDI - sling in place at all times.  Hemovac wdl with output of 5ml this shift.  Up A1 with gait belt.  Voided a very small amount with residual of 280 - still due to void.  Tolerating full liquid diet well.  Plan is home with spouse on discharge.  Will continue to monitor.

## 2020-10-03 NOTE — CONSULTS
Care Management Initial Consult    General Information:  Patient's communication style: spoken language (English or Bilingual)  Hearing Difficulty or Deaf: no Wear Glasses or Blind: yes  Cognitive/Neuro/Behavioral: WDL                 Advance Care Planning:         Living Environment:   People in home: spouse  Current living Arrangements: house          Family/Social Support:  Care provided by: self  Provides care for: no one  Description of Support System: Supportive, Involved  Description of Support System: Supportive, Involved       Lifestyle & Psychosocial Needs:        Socioeconomic History     Marital status:      Spouse name: Not on file     Number of children: Not on file     Years of education: Not on file     Highest education level: Not on file   Occupational History     Occupation:      Tobacco Use     Smoking status: Former Smoker     Packs/day: 0.50     Years: 25.00     Pack years: 12.50     Quit date: 10/1/1997     Years since quittin.0     Smokeless tobacco: Never Used     Tobacco comment: quit    Substance and Sexual Activity     Alcohol use: Yes     Alcohol/week: 1.7 standard drinks     Types: 2 Glasses of wine per week     Comment: 2 glasses of wine daily     Drug use: No     Sexual activity: Yes     Partners: Male       Functional Status:  Prior to admission patient needed assistance:     none     Current Resources:   Skilled Home Care Services:  none  Community Resources:  none  Equipment currently used at home: none  Supplies currently used at home:  none    Employment:  Employment Status: retired     Financial/Environmental Concerns: unemployed  (patient is retired, her  was furloughed and recently job was elimenated    Values/Beliefs:  Spiritual, Cultural Beliefs, Holiness Practices, Values that affect care:                 Additional Information:  CTS met with patient at bedside and included  is discussion via phone.  Awaiting culture results to  finalize abx plan.  Medical team anticipation IV abx at discharge.  I reviewed options for outpatient therapy: IC or home infusion.  Patient requested referral be sent for home infusion fo benefit check- completed.     Shanthi Zavala, RN    [phone]    Addendum:  After multiple attempts, [facility] was unable to get insurance quote for home infusion. They did provide private pay quote of Ertapenem 1gm Q24 would be $310.97 per day for drugs and supplies. Cost per RN Visit would be $90. Patient would have to be acknowledge that if there any issues with the insurance, she would be responsible for any out of pocket costs related to her therapy needs. [facility] had verifed that [facility] would be able to see patient tomorrow at home for teaching.   Also pursue private pay quote from [facility].  Please see Connie Rome's note for details.  Patient expressed major concerns about discharging prior to insurance benefit check.  [facility] collaborated with bedside nurse, CN and leadership and agreed that for a safe discharge, patient should stay IP until benefits can be checked. [facility] spoke with patient who was relieved to hear that she could stay until most affordable and safe discharge plan could be established.

## 2020-10-03 NOTE — PROGRESS NOTES
Received self pay quote from [facility] for IV Ertapenem 1 gm Daily X 28 days  7 Day Weekly Cost $1,155 (medication and nursing per stephani visit)   Daily IV Dose Cost $150    Total 28 Day Cost $4,200 (Medication) + $420 (Per Stephani Nursing Visits)    Will follow up on Monday when able to check insurance benefits and follow up with patient choice.    Connie Rome MA/RN Case Manager  Inpatient Care Coordination  [facility]   [phone]

## 2020-10-03 NOTE — PROGRESS NOTES
"Ortho Rounding Note    S: awake, up in chair.  Voices no concerns other than some general itching.  No sob/cp/nv.  Itching has improved with atarax    O:  Vital signs:   Blood pressure 122/53, pulse 54, temperature 97.4  F (36.3  C), temperature source Oral, resp. rate 16, height 1.651 m (5' 5\"), weight 63.8 kg (140 lb 11.2 oz), SpO2 97 %, not currently breastfeeding.  Estimated body mass index is 23.41 kg/m  as calculated from the following:    Height as of this encounter: 1.651 m (5' 5\").    Weight as of this encounter: 63.8 kg (140 lb 11.2 oz).      Intake/Output Summary (Last 24 hours) at 10/3/2020 1009  Last data filed at 10/3/2020 0948  Gross per 24 hour   Intake 2660 ml   Output 520 ml   Net 2140 ml         Drain intact R shoulder  Dressings c/d/i  5/5 motor and SPLT RUE    A:  POD #1 s/p Right shoulder arthroscopic extensive debridement of the glenohumeral joint and subacromial space for infection    P:  General: floor cares  Pain: po  Act: up ad stephanie, with therapy  ID: ID consult in place  Dispo: floor cares, await ID recs  Appreciate Medicine/ID consult for medical management    Varun Hollingsworth MD    "

## 2020-10-03 NOTE — PHARMACY-ADMISSION MEDICATION HISTORY
Admission medication history interview status for this patient is complete. See [facility] admission navigator for allergy information, prior to admission medications and immunization status.     Medication history interview done via telephone during Covid-19 pandemic, indicate source(s): Patient  Medication history resources (including written lists, pill bottles, clinic record): SureScripts and Care Everywhere  Pharmacy: [facility]    Changes made to PTA medication list:  Added: probiotic, vitamin D3, ibuprofen  Deleted: ropinirole  Changed: none    Actions taken by pharmacist (provider contacted, etc): Called pt to verify pre admitting medications.     Additional medication history information: Pt started paroxetine 10/1 and started with taking half a tablet (10mg) once daily on her own, but wants to start taking the full tablet (20mg) once daily now as prescribed. Pt would like to take probiotic while inpatient.    Medication reconciliation/reorder completed by provider prior to medication history?  Y   (Y/N)       Prior to Admission medications    Medication Sig Last Dose Taking? Auth Provider   ALPRAZolam (XANAX) 0.25 MG tablet TAKE 1 TABLET BY MOUTH THREE TIMES A DAY AS NEEDED FOR ANXIETY Past Week at Unknown time Yes Vicky Adame NP   amLODIPine (NORVASC) 2.5 MG tablet TAKE 1 TABLET BY MOUTH EVERY DAY 10/2/2020 at Unknown time Yes Juan F Smallwood MD   atenolol (TENORMIN) 50 MG tablet TAKE 1 TABLET BY MOUTH TWICE A DAY 10/2/2020 at Unknown time Yes Juan F Smallwood MD   Cholecalciferol (VITAMIN D3) 50 MCG (2000 UT) CAPS Take 4,000 Units by mouth daily 10/2/2020 at Unknown time Yes Unknown, Entered By History   ibuprofen (ADVIL/MOTRIN) 200 MG tablet Take 400 mg by mouth every 6 hours as needed for mild pain Past Month at Unknown time Yes Unknown, Entered By History   lactobacillus rhamnosus, GG, (CULTURELL) capsule Take 1 capsule by mouth daily 10/2/2020 at Unknown time Yes  Unknown, Entered By History   latanoprost (XALATAN) 0.005 % ophthalmic solution Place 1 drop into both eyes At Bedtime 10/1/2020 at Unknown time Yes Unknown, Entered By History   PARoxetine (PAXIL) 20 MG tablet Take 1 tablet (20 mg) by mouth every morning 10/2/2020 at Unknown time Yes Alesha Rose APRN CNP   traZODone (DESYREL) 100 MG tablet TAKE 1 TABLET (100 MG) BY MOUTH NIGHTLY AS NEEDED FOR SLEEP 10/1/2020 at Unknown time Yes Juan F Smallwood MD

## 2020-10-03 NOTE — PROGRESS NOTES
[facility]  Daily Orthopedic Post-Op Note         Assessment and Plan:    Assessment:   Post-operative day #1  Procedure: right shoulder I and D   Doing well.  No immediate surgical complications identified.  No excessive bleeding  Pain well-controlled.  Pain: 4/10  PICC placed      Plan:   -Ambulate  -Pain control measures  -Advance diet as tolerated  -Routine wound care  -will discharge to home on Ertapenim q day  Appreciate Dr. Amaya's input  Follow up Thursday  Physical Therapy       Assessment:  Stable post op I and D shoulder  Plan:  Mobilize  Disposition: Home  Anticipated date of discharge: today or tomorrow         Interval History:   Doing well.  Continues to improve.  Pain is well-controlled.  No fevers.                   Physical Exam:   122/53, 97.4, 54, 16  Dressing clean, dry and intact  Calves soft and non tender  Distal neurovascular exam normal           Data:      Hemoglobin:   Hemoglobin   Date Value Ref Range Status   10/01/2020 12.7 11.7 - 15.7 g/dL Final   01/31/2020 13.0 11.7 - 15.7 g/dL Final       Umesh Berman MD   [phone]

## 2020-10-03 NOTE — CONSULTS
[facility]    Infectious Disease Consultation     Date of Admission:  10/2/2020  Date of Consult (When I saw the patient): 10/03/20    Assessment & Plan   Denise Ralph is a 65 year old female who was admitted on 10/2/2020.     Impression:  1. 65 y.o female with anxiety.   2. History of right rotator cuff repair more than an year ago.   3. Presented now with gradual worsening pain in the shoulder and more acute fever.   4. Aspiration of the area grossly suggestive of infection.   5. S/p: Right shoulder arthroscopic extensive debridement of the glenohumeral joint and subacromial space for infection.   6. Cultures pending.     Recommendations:   Getting perioperative ancef. But complaining of itching, has itching listed to PCN   Start ertapenem, watch for rash or itching.   Can use benadryl PRN   Follow up on the cultures.       Ian Contreras MD    Reason for Consult   Reason for consult: I was asked to evaluate this patient for septic shoulder     Primary Care Physician   Juan F Smallwood    Chief Complaint   Shoulder pain     History is obtained from the patient and medical records    History of Present Illness   Denise Ralph is a 65 year old female  who 13 months ago underwent an uncomplicated rotator cuff repair of her supraspinatus tendon.  Her postoperative course was unremarkable.  Her wounds healed up just fine.  Over the last month she noticed some slowly increasing pain in her shoulder, particularly got worse over the last few days to the point where she presented to ortho clinic yesterday in pain and febrile.   The aspiration on the shoulder joint concerning for infection. S/P: Right shoulder arthroscopic extensive debridement of the glenohumeral joint and subacromial space for infection    Past Medical History   I have reviewed this patient's medical history and updated it with pertinent information if needed.   Past Medical History:   Diagnosis Date     Anxiety     Gets panic  attacks     Depressive disorder, not elsewhere classified      Essential hypertension, benign     No cardiologist     Irritable bowel syndrome     has had neg colonoscopy & EGD w/u     Lumbar degenerative disc disease 1996    Had PT, traction, no surgery     Other chronic pain     JOint pain for many years.     PONV (postoperative nausea and vomiting)      Sciatica 3/06    R thigh;  MRI = R L2-3 disc      Unspecified glaucoma(365.9)        Past Surgical History   I have reviewed this patient's surgical history and updated it with pertinent information if needed.  Past Surgical History:   Procedure Laterality Date     EYE SURGERY Bilateral     Treatment of glaucoma     HYSTERECTOMY       HYSTERECTOMY VAGINAL       LAPAROSCOPIC CHOLECYSTECTOMY  2002    with repeat operation because of bile leak     Left shoulder decomp surgery for impingement  approx 1993     OPTICAL TRACKING SYSTEM FUSION POSTERIOR SPINE LUMBAR N/A 8/3/2017    Procedure: OPTICAL TRACKING SYSTEM FUSION SPINE POSTERIOR LUMBAR ONE LEVEL;  1. L4 Ang-Meyers osteotomy with exposure of the L4 and L5 roots  2. L4-5 complete discectomy with arthrodesis  3. Placement of Globus Creo GOMEZ pedicle screws bilaterally from L4 to L5  4. L4 - L5 posterior lateral fusion with placement of Medtronic Magnifuse and locally harvested autologous bone  5. Use of Stealth and O     WRIST SURGERY Left        Prior to Admission Medications   Prior to Admission Medications   Prescriptions Last Dose Informant Patient Reported? Taking?   ALPRAZolam (XANAX) 0.25 MG tablet Past Week at Unknown time  No Yes   Sig: TAKE 1 TABLET BY MOUTH THREE TIMES A DAY AS NEEDED FOR ANXIETY   Cholecalciferol (VITAMIN D3) 50 MCG (2000 UT) CAPS 10/2/2020 at Unknown time  Yes Yes   Sig: Take 4,000 Units by mouth daily   PARoxetine (PAXIL) 20 MG tablet 10/2/2020 at Unknown time  No Yes   Sig: Take 1 tablet (20 mg) by mouth every morning   amLODIPine (NORVASC) 2.5 MG tablet 10/2/2020 at Unknown time   No Yes   Sig: TAKE 1 TABLET BY MOUTH EVERY DAY   atenolol (TENORMIN) 50 MG tablet 10/2/2020 at Unknown time  No Yes   Sig: TAKE 1 TABLET BY MOUTH TWICE A DAY   ibuprofen (ADVIL/MOTRIN) 200 MG tablet Past Month at Unknown time  Yes Yes   Sig: Take 400 mg by mouth every 6 hours as needed for mild pain   lactobacillus rhamnosus, GG, (CULTURELL) capsule 10/2/2020 at Unknown time  Yes Yes   Sig: Take 1 capsule by mouth daily   latanoprost (XALATAN) 0.005 % ophthalmic solution 10/1/2020 at Unknown time  Yes Yes   Sig: Place 1 drop into both eyes At Bedtime   traZODone (DESYREL) 100 MG tablet 10/1/2020 at Unknown time  No Yes   Sig: TAKE 1 TABLET (100 MG) BY MOUTH NIGHTLY AS NEEDED FOR SLEEP      Facility-Administered Medications: None     Allergies   Allergies   Allergen Reactions     Erythromycin Shortness Of Breath     Msir [Morphine Sulfate] Nausea     Intollerant to Morphine Sulfate: Nausea,vomiting     Dilaudid [Hydromorphone] Visual Disturbance     Hallucinations     Diuretic      Patient states she gets sick and loses weight from diuretics.     Lisinopril      Cough       Losartan      Tongue felt weird       Penicillins Itching     Reglan Other (See Comments)     Muscle spasms in throat       Immunization History   Immunization History   Administered Date(s) Administered     HEPA 02/17/2006, 10/06/2006     Influenza (High Dose) 3 valent vaccine 10/10/2019     Influenza (IIV3) PF 01/06/2016     Influenza Vaccine IM > 6 months Valent IIV4 11/07/2017     Pneumo Conj 13-V (2010&after) 10/10/2019     TD (ADULT, 7+) 01/31/2020     TDAP Vaccine (Adacel) 03/10/2009     Td (Adult), Adsorbed 03/27/1998, 07/21/2005     Zoster vaccine recombinant adjuvanted (SHINGRIX) 04/30/2018, 06/26/2018     Zoster vaccine, live 10/07/2014       Social History   I have reviewed this patient's social history and updated it with pertinent information if needed. Denise Ralph  reports that she quit smoking about 23 years ago. She has a 12.50  pack-year smoking history. She has never used smokeless tobacco. She reports current alcohol use of about 1.7 standard drinks of alcohol per week. She reports that she does not use drugs.    Family History   I have reviewed this patient's family history and updated it with pertinent information if needed.   Family History   Problem Relation Age of Onset     Cardiovascular Mother         / mi     Breast Cancer Mother         diagnosed age 60's     Respiratory Father         pulmonary fibrosis.     Cardiovascular Brother          of MI age 34 2nd to Cocaine Use     Cancer - colorectal No family hx of        Review of Systems   The 10 point Review of Systems is negative other than noted in the HPI or here.     Physical Exam   Temp: 97.4  F (36.3  C) Temp src: Oral BP: 122/53 Pulse: 54   Resp: 16 SpO2: 97 % O2 Device: None (Room air) Oxygen Delivery: 1 LPM  Vital Signs with Ranges  Temp:  [97.2  F (36.2  C)-98.7  F (37.1  C)] 97.4  F (36.3  C)  Pulse:  [54-72] 54  Resp:  [7-19] 16  BP: (118-165)/(53-95) 122/53  SpO2:  [92 %-100 %] 97 %  140 lbs 11.2 oz  Body mass index is 23.41 kg/m .    GENERAL APPEARANCE:  alert and no distress  EYES: Eyes grossly normal to inspection, PERRL and conjunctivae and sclerae normal  HENT: ear canals and TM's normal and nose and mouth without ulcers or lesions  NECK: no adenopathy, no asymmetry, masses, or scars and thyroid normal to palpation  RESP: lungs clear to auscultation - no rales, rhonchi or wheezes  CV: regular rates and rhythm, normal S1 S2, no S3 or S4 and no murmur, click or rub  LYMPHATICS: normal ant/post cervical and supraclavicular nodes  ABDOMEN: soft, nontender, without hepatosplenomegaly or masses and bowel sounds normal  MS: extremities normal- no gross deformities noted  SKIN: no suspicious lesions or rashes      Data   Lab Results   Component Value Date    WBC 8.1 10/01/2020    HGB 12.7 10/01/2020    HCT 37.5 10/01/2020     10/01/2020     (L)  03/03/2020    POTASSIUM 4.6 10/02/2020    CHLORIDE 98 03/03/2020    CO2 28 03/03/2020    BUN 14 03/03/2020    CR 0.62 10/02/2020     (H) 10/03/2020    SED 21 10/01/2020    DD <0.2 03/17/2005    TROPONIN 0.00 10/31/2017    TROPI <0.015 06/06/2019    AST 30 01/31/2020    ALT 32 01/31/2020    ALKPHOS 65 01/31/2020    BILITOTAL 0.4 01/31/2020    INR 0.95 07/19/2017     Recent Labs   Lab 10/02/20  1851 10/02/20  1543 10/01/20  1630   CULT No growth after 10 hours Culture negative monitoring continues  PENDING Culture negative monitoring continues  Culture negative monitoring continues     Recent Labs   Lab Test 10/02/20  1851 10/02/20  1543 10/01/20  1630 05/28/19  1430 05/27/19  2224 05/27/19  2217 06/23/16  0955 02/12/16  1715 02/12/16  1700   CULT No growth after 10 hours Culture negative monitoring continues  PENDING Culture negative monitoring continues  Culture negative monitoring continues No growth No growth No growth No Beta Streptococcus isolated No growth No growth       Amount of time performed on this consult: 45 minutes. This includes face to face assessment and care coordination with the primary team.

## 2020-10-03 NOTE — PLAN OF CARE
Occupational Therapy Discharge Summary    Reason for therapy discharge:    All goals and outcomes met, no further needs identified.    Progress towards therapy goal(s). See goals on Care Plan in [facility] electronic health record for goal details.  Goals met    Therapy recommendation(s):    No further therapy is recommended.

## 2020-10-03 NOTE — DISCHARGE INSTRUCTIONS
A video appointment was scheduled for you with Dr. Smallwood at the [facility] clinic on October 20, 2020 at 9:20. Please have your hospital discharge paperwork, ID and insurance information available to review if needed. Please call the clinic at [phone] you need to reschedule.     -  If you have a smartphone, we recommend the [employer] Mobile bernardo which you can download from your bernardo store.    For questions please refer to  website [email or web address] and/or phone number to the [employer] Help team at [phone] if they would like assistance preparing for their video visit.

## 2020-10-03 NOTE — PROCEDURES
[facility]    Single Lumen PICC Placement    Date/Time: 10/3/2020 9:10 AM  Performed by: Mara Mace RN  Authorized by: Umesh Berman MD   Indications: vascular access    UNIVERSAL PROTOCOL   Site Marked: No  Prior Images Obtained and Reviewed:  No  Required items: Required blood products, implants, devices and special equipment available    Patient identity confirmed:  Verbally with patient, arm band and hospital-assigned identification number  NA - No sedation, light sedation, or local anesthesia  Confirmation Checklist:  Patient's identity using two indicators, relevant allergies, procedure was appropriate and matched the consent or emergent situation and correct equipment/implants were available  Time out: Immediately prior to the procedure a time out was called (with Nela URBINA)    Universal Protocol: the Joint Commission Universal Protocol was followed    Preparation: Patient was prepped and draped in usual sterile fashion    ESBL (mL):  1         ANESTHESIA    Anesthesia: Local infiltration  Local Anesthetic:  Lidocaine 1% without epinephrine  Anesthetic Total (mL):  1      SEDATION    Patient Sedated: No        Preparation: skin prepped with ChloraPrep  Skin prep agent: skin prep agent completely dried prior to procedure  Sterile barriers: maximum sterile barriers were used: cap, mask, sterile gown, sterile gloves, and large sterile sheet  Hand hygiene: hand hygiene performed prior to central venous catheter insertion  Type of line used: Power PICC  Catheter type: single lumen  Lumen type: valved  Catheter size: 4 Fr  Brand: [employer]  Lot number: [id number]  Placement method: venipuncture, MST, ultrasound and tip confirmation system  Number of attempts: 1  Successful placement: yes  Orientation: left  Location: brachial vein (medial)  Arm circumference: adults 10 cm  Extremity circumference: 28.5  Dressing and securement: blood cleaned with CHG, dressing applied, chlorhexidine  patch applied, occlusive dressing applied, sterile dressing applied and securement device  Post procedure assessment: blood return through all ports and free fluid flow  PROCEDURE   Patient Tolerance:  Patient tolerated the procedure well with no immediate complications  Describe Procedure: ecg tip confirmation. Line is CAJ and good to use

## 2020-10-03 NOTE — PLAN OF CARE
[id number]: Pt resting comfortably. A&O. VSS. Oxycodone, Toradol, and ice packs given for pain. Did not report itchiness no hives observed after AM dose of Ancef. Denies N/V. R shoulder dressing CDI. CMS intact. On Invanz. PICC placed. T pump applied per POC. Transfers with SBA. AUO. PO intake encouraged. Pt got very upset, anxious and tearful over potential costs/insurance coverage regarding home infusion. Pt now staying until Monday for discharge planning/insurance coverage planning. Will continue to monitor and provide cares.

## 2020-10-04 PROCEDURE — 250N000013 HC RX MED GY IP 250 OP 250 PS 637: Performed by: ORTHOPAEDIC SURGERY

## 2020-10-04 PROCEDURE — 999N000040 HC STATISTIC CONSULT NO CHARGE VASC ACCESS

## 2020-10-04 PROCEDURE — 250N000011 HC RX IP 250 OP 636: Performed by: INTERNAL MEDICINE

## 2020-10-04 PROCEDURE — 120N000001 HC R&B MED SURG/OB

## 2020-10-04 RX ADMIN — DOCUSATE SODIUM AND SENNOSIDES 2 TABLET: 8.6; 5 TABLET ORAL at 07:42

## 2020-10-04 RX ADMIN — ACETAMINOPHEN 975 MG: 325 TABLET, FILM COATED ORAL at 05:54

## 2020-10-04 RX ADMIN — TRAMADOL HYDROCHLORIDE 50 MG: 50 TABLET, FILM COATED ORAL at 13:07

## 2020-10-04 RX ADMIN — ACETAMINOPHEN 975 MG: 325 TABLET, FILM COATED ORAL at 14:34

## 2020-10-04 RX ADMIN — TRAZODONE HYDROCHLORIDE 100 MG: 100 TABLET ORAL at 21:52

## 2020-10-04 RX ADMIN — AMLODIPINE BESYLATE 2.5 MG: 2.5 TABLET ORAL at 07:43

## 2020-10-04 RX ADMIN — ACETAMINOPHEN 975 MG: 325 TABLET, FILM COATED ORAL at 21:53

## 2020-10-04 RX ADMIN — ERTAPENEM 1 G: 1 INJECTION INTRAMUSCULAR; INTRAVENOUS at 10:49

## 2020-10-04 RX ADMIN — TRAMADOL HYDROCHLORIDE 50 MG: 50 TABLET, FILM COATED ORAL at 05:54

## 2020-10-04 RX ADMIN — PAROXETINE HYDROCHLORIDE 20 MG: 20 TABLET, FILM COATED ORAL at 07:43

## 2020-10-04 RX ADMIN — LATANOPROST 1 DROP: 50 SOLUTION OPHTHALMIC at 21:52

## 2020-10-04 RX ADMIN — ASPIRIN 325 MG: 325 TABLET, COATED ORAL at 07:43

## 2020-10-04 RX ADMIN — ATENOLOL 50 MG: 50 TABLET ORAL at 07:42

## 2020-10-04 ASSESSMENT — ACTIVITIES OF DAILY LIVING (ADL)
ADLS_ACUITY_SCORE: 15

## 2020-10-04 NOTE — PLAN OF CARE
Pt A&Ox4. VSS afebrile RA. PICC in place and WNL. IV Ertapenem Abx. Cms intact. Dressing c/d/I. Up independently in room now. Steady gait. Voiding adequate amounts. Pain managed with Tramadol. Pt feels it works better than Oxycodone. +BS/flatus.LS clear. Plan is to discharge home tomorrow with home abx infusions. Will continue to monitor.

## 2020-10-04 NOTE — PLAN OF CARE
AOx4, anxious but cooperative, needs lots of reassurance. Continues to worry about insurance coverage despite getting answers yesterday that she is covered for outpatient infusions. Up SBA, no orthosis, dressing CDI, no drain. Voiding, passing gas, no BM. PICC placed in L arm, okay to use. Pt would prefer to stay away from Oxy so she was started on Tramadol and Atarax. After first dose of Tramadol pt c/o no pain relief and wanted to go back to Oxy, pt only rating pain at a 4 I told her we should give Tramadol a few more tries before switching and back and forth. Gave prn Trazadone 100mg at bedtime.

## 2020-10-04 NOTE — PROGRESS NOTES
"Ortho Rounding Note    S: awake in bed  Questions about insurance (deferred)  Voices no complaints otherwise    O:  Vital signs:   Blood pressure 130/63, pulse 72, temperature 98.4  F (36.9  C), temperature source Temporal, resp. rate 16, height 1.651 m (5' 5\"), weight 63.8 kg (140 lb 11.2 oz), SpO2 93 %, not currently breastfeeding.  Estimated body mass index is 23.41 kg/m  as calculated from the following:    Height as of this encounter: 1.651 m (5' 5\").    Weight as of this encounter: 63.8 kg (140 lb 11.2 oz).      Intake/Output Summary (Last 24 hours) at 10/4/2020 0941  Last data filed at 10/4/2020 0730  Gross per 24 hour   Intake 180 ml   Output 1200 ml   Net -1020 ml       Dressings c/d/i  5/5 motor and SPLT RUE     A:  POD #2 s/p Right shoulder arthroscopic extensive debridement of the glenohumeral joint and subacromial space for infection     P:  Plan: (per Dr. Umesh Berman note from yesterday)    -Ambulate  -Pain control measures  -Advance diet as tolerated  -Routine wound care  -will discharge to home on Ertapenim q day  Appreciate Dr. Amaya's input  Follow up Thursday  Physical Therapy       Assessment:  Stable post op I and D shoulder  Plan:  Mobilize  Disposition: Home  Anticipated date of discharge: today    Varun Hollingsworth MD    "

## 2020-10-04 NOTE — PROGRESS NOTES
[facility]    Infectious Disease Progress Note    Date of Service (when I saw the patient): 10/04/2020     Assessment & Plan   Denise Ralph is a 65 year old female who was admitted on 10/2/2020.        Impression:  1. 65 y.o female with anxiety.   2. History of right rotator cuff repair more than an year ago.   3. Presented now with gradual worsening pain in the shoulder and more acute fever.   4. Aspiration of the area grossly suggestive of infection.   5. S/p: Right shoulder arthroscopic extensive debridement of the glenohumeral joint and subacromial space for infection.   6. Cultures pending.      Recommendations:   Was on ancef  complained of itching, has itching listed to PCN   On ertapenem tolerating it well.   Orders in I will plan to follow up on the cultures.  Appreciate CC helping with disposition    Ian Contreras MD    Interval History   No data in the cultures yet   Feels fine     Physical Exam   Temp: 98.4  F (36.9  C) Temp src: Temporal BP: 130/63 Pulse: 72   Resp: 16 SpO2: 93 % O2 Device: None (Room air)    Vitals:    10/02/20 1146   Weight: 63.8 kg (140 lb 11.2 oz)     Vital Signs with Ranges  Temp:  [97.7  F (36.5  C)-98.4  F (36.9  C)] 98.4  F (36.9  C)  Pulse:  [61-72] 72  Resp:  [16-18] 16  BP: (130-156)/(62-67) 130/63  SpO2:  [93 %-97 %] 93 %    Constitutional: Awake, alert, cooperative, no apparent distress  Lungs: Clear to auscultation bilaterally, no crackles or wheezing  Cardiovascular: Regular rate and rhythm, normal S1 and S2, and no murmur noted  Abdomen: Normal bowel sounds, soft, non-distended, non-tender  Skin: No rashes, no cyanosis, no edema  Other:    Medications     lactated ringers 100 mL/hr at 10/02/20 2207       acetaminophen  975 mg Oral Q8H     amLODIPine  2.5 mg Oral Daily     aspirin  325 mg Oral Daily     atenolol  50 mg Oral Daily     ertapenem (INVanz) IV  1 g Intravenous Q24H     latanoprost  1 drop Both Eyes At Bedtime     PARoxetine  20 mg Oral  QAM     rOPINIRole  0.25 mg Oral At Bedtime     senna-docusate  2 tablet Oral Daily     sodium chloride (PF)  10 mL Intracatheter Q7 Days     sodium chloride (PF)  3 mL Intracatheter Q8H       Data   All microbiology laboratory data reviewed.  Recent Labs   Lab Test 10/01/20  1735 01/31/20  1517 06/06/19  1958   WBC 8.1 7.2 9.3   HGB 12.7 13.0 11.9   HCT 37.5 39.3 33.6*   MCV 96 94 92    259 434     Recent Labs   Lab Test 10/02/20  1220 03/03/20  1324 01/31/20  1517   CR 0.62 0.58 0.69     Recent Labs   Lab Test 10/01/20  1735   SED 21     Recent Labs   Lab Test 10/02/20  1851 10/02/20  1543 10/01/20  1630 05/28/19  1430 05/27/19  2224 05/27/19  2217 06/23/16  0955 02/12/16  1715 02/12/16  1700   CULT No growth after 2 days Culture negative monitoring continues  Culture negative monitoring continues Culture negative monitoring continues  Culture negative monitoring continues No growth No growth No growth No Beta Streptococcus isolated No growth No growth       Attestation:  Total time on the floor involved in the patient's care: 35 minutes. Total time spent in counseling/care coordination: >50%

## 2020-10-05 ENCOUNTER — HOME INFUSION (PRE-WILLOW HOME INFUSION) (OUTPATIENT)
Dept: PHARMACY | Facility: CLINIC | Age: 65
End: 2020-10-05

## 2020-10-05 VITALS
WEIGHT: 140.7 LBS | RESPIRATION RATE: 16 BRPM | SYSTOLIC BLOOD PRESSURE: 160 MMHG | HEIGHT: 65 IN | OXYGEN SATURATION: 94 % | TEMPERATURE: 98.2 F | BODY MASS INDEX: 23.44 KG/M2 | DIASTOLIC BLOOD PRESSURE: 85 MMHG | HEART RATE: 69 BPM

## 2020-10-05 LAB — INTERPRETATION ECG - MUSE: NORMAL

## 2020-10-05 PROCEDURE — 250N000013 HC RX MED GY IP 250 OP 250 PS 637: Performed by: ORTHOPAEDIC SURGERY

## 2020-10-05 PROCEDURE — 250N000011 HC RX IP 250 OP 636: Performed by: INTERNAL MEDICINE

## 2020-10-05 RX ORDER — ACETAMINOPHEN 500 MG
500-1000 TABLET ORAL EVERY 6 HOURS PRN
COMMUNITY
Start: 2020-10-05 | End: 2021-04-12

## 2020-10-05 RX ADMIN — PAROXETINE HYDROCHLORIDE 20 MG: 20 TABLET, FILM COATED ORAL at 09:04

## 2020-10-05 RX ADMIN — ACETAMINOPHEN 975 MG: 325 TABLET, FILM COATED ORAL at 13:41

## 2020-10-05 RX ADMIN — ASPIRIN 325 MG: 325 TABLET, COATED ORAL at 09:04

## 2020-10-05 RX ADMIN — ACETAMINOPHEN 975 MG: 325 TABLET, FILM COATED ORAL at 06:38

## 2020-10-05 RX ADMIN — AMLODIPINE BESYLATE 2.5 MG: 2.5 TABLET ORAL at 09:04

## 2020-10-05 RX ADMIN — ERTAPENEM 1 G: 1 INJECTION INTRAMUSCULAR; INTRAVENOUS at 11:32

## 2020-10-05 RX ADMIN — ATENOLOL 50 MG: 50 TABLET ORAL at 09:04

## 2020-10-05 RX ADMIN — TRAMADOL HYDROCHLORIDE 50 MG: 50 TABLET, FILM COATED ORAL at 09:15

## 2020-10-05 RX ADMIN — DOCUSATE SODIUM AND SENNOSIDES 2 TABLET: 8.6; 5 TABLET ORAL at 09:00

## 2020-10-05 ASSESSMENT — ACTIVITIES OF DAILY LIVING (ADL)
ADLS_ACUITY_SCORE: 15

## 2020-10-05 NOTE — PROGRESS NOTES
[facility]    Infectious Disease Progress Note    Date of Service (when I saw the patient): 10/05/2020     Assessment & Plan   Denise Ralph is a 65 year old female who was admitted on 10/2/2020.        Impression:  1. 65 y.o female with anxiety.   2. History of right rotator cuff repair more than an year ago.   3. Presented now with gradual worsening pain in the shoulder and more acute fever.   4. Aspiration of the area grossly suggestive of infection.   5. S/p: Right shoulder arthroscopic extensive debridement of the glenohumeral joint and subacromial space for infection.   6. Cultures pending.      Recommendations:     Now On ertapenem tolerating it well.   Orders in I will plan to follow up on the cultures. Still neg  Home coverage noted    De Noland MD    Interval History   No data in the cultures yet   Feels fine     Physical Exam   Temp: 98.2  F (36.8  C) Temp src: Temporal BP: (!) 160/85 Pulse: 69   Resp: 16 SpO2: 94 % O2 Device: None (Room air)    Vitals:    10/02/20 1146   Weight: 63.8 kg (140 lb 11.2 oz)     Vital Signs with Ranges  Temp:  [98.2  F (36.8  C)-99.1  F (37.3  C)] 98.2  F (36.8  C)  Pulse:  [67-69] 69  Resp:  [16] 16  BP: (136-160)/(56-85) 160/85  SpO2:  [94 %] 94 %    Constitutional: Awake, alert, cooperative, no apparent distress  Lungs: Clear to auscultation bilaterally, no crackles or wheezing  Cardiovascular: Regular rate and rhythm, normal S1 and S2, and no murmur noted  Abdomen: Normal bowel sounds, soft, non-distended, non-tender  Skin: No rashes, no cyanosis, no edema  Other:    Medications     lactated ringers 100 mL/hr at 10/02/20 2207       amLODIPine  2.5 mg Oral Daily     aspirin  325 mg Oral Daily     atenolol  50 mg Oral Daily     ertapenem (INVanz) IV  1 g Intravenous Q24H     latanoprost  1 drop Both Eyes At Bedtime     PARoxetine  20 mg Oral QAM     senna-docusate  2 tablet Oral Daily     sodium chloride (PF)  10 mL Intracatheter Q7 Days      sodium chloride (PF)  3 mL Intracatheter Q8H       Data   All microbiology laboratory data reviewed.  Recent Labs   Lab Test 10/01/20  1735 01/31/20  1517 06/06/19  1958   WBC 8.1 7.2 9.3   HGB 12.7 13.0 11.9   HCT 37.5 39.3 33.6*   MCV 96 94 92    259 434     Recent Labs   Lab Test 10/02/20  1220 03/03/20  1324 01/31/20  1517   CR 0.62 0.58 0.69     Recent Labs   Lab Test 10/01/20  1735   SED 21     Recent Labs   Lab Test 10/02/20  1851 10/02/20  1543 10/01/20  1630 05/28/19  1430 05/27/19  2224 05/27/19  2217 06/23/16  0955 02/12/16  1715 02/12/16  1700   CULT No growth after 3 days Culture negative monitoring continues  Culture negative monitoring continues Culture negative monitoring continues  Culture negative monitoring continues No growth No growth No growth No Beta Streptococcus isolated No growth No growth       Attestation:  Total time on the floor involved in the patient's care: 35 minutes. Total time spent in counseling/care coordination: >50%

## 2020-10-05 NOTE — PROGRESS NOTES
[facility]    Received referral for IV abx.  Benefits verified. Denise has IV abx coverage through her [employer] plan, ded $1500 (met $0), then 80/20 coverage until max out of pocket max of  $4000 (met $3.54).     UPDATED 1400: I met with patient to introduce home infusion services, review benefits and offer choice of providers. Denise would like to proceed with [facility] for IV needs.     Thank you for the referral.    Mara Desir RN  [facility] Liaison  [phone] (Mon thru Fri 8am - 5pm)  [phone] Office

## 2020-10-05 NOTE — PROGRESS NOTES
Discharge Planner   Discharge Plans in progress: yes  Barriers to discharge plan: insurance authorization for home infusion.    CTS emailed [facility] at 0730 to ask for benefit check.  CTS called at 11 and was told that they are in the process of benefit check.  CTS updated patient and staff about progress.  Follow up plan: will continue to follow for discharge planning.  Shanthi Zavala RN, BSN, PHN, CTS  Care Coordinator  [facility]  [phone]           Entered by: Shanthi Zavala 10/05/2020 12:07 PM

## 2020-10-05 NOTE — PLAN OF CARE
Pt A&Ox4. BP started hypertensive at 160/85 am meds given BP down to 137/71 other VSS afebrile. RA. LS clear. +BS/flatus BM today per pt. Tolerated regular diet. Cms intact. Dressing changed to island dressing. PICC in place. IV Ertapenem continued. Pain managed with PRN Ultram and scheduled Tylenol. Pt walked in [place]. Social work consulted about insurance questions. Pt to discharge home with . [facility] for Abx infusions.  picked up escript discharge meds already. All belongings taken with patient discharge instructions done with questions answered. Form signed and on chart. Pt left floor at 1535 with  and support staff

## 2020-10-05 NOTE — PROGRESS NOTES
Home Infusion  Denise will be discharging today and going home on IV ertapenem.  I met with Natali and her ,  Romie at bedside and instructed in IV administration via home pump with SAS flushing.   Had Denise and Romie perform hands on with practice equipment and teaching sheets. Provided information about supplies and supply delivery, storage of medication, checking of label, dosing times, plan for SNV and 24/7 availability of [facility] staff.   Denise and Romie verbalized understanding of information given. They demonstrated very good technique with practice and verbalized good understanding of process.  Stated they feel comfortable with administering abx starting tomorrow. Dosing schedule: pt plans to dose around 1200 daily. Delivery: medication and supplies will be delivered to the home tonight.   Denise is ready for discharge from [facility] perspective.    Mara Desir RN  [facility] Liaison  [phone] (M-F 8a-5p)  [phone] Office

## 2020-10-05 NOTE — PLAN OF CARE
Pt is alert and oriented, up ad stephanie in the room. Pt's right shoulder dressing dry and intact. Pt's only complaint is constipation, prune juice given. Pt should discharge later today.

## 2020-10-05 NOTE — PLAN OF CARE
Pt resting comfortably at the end of the shift. Pt reluctant to take pain medications as she is worried that she may get constipated from it. Pt encouraged to drink more fluids, had 8oz of prune juice. Pt requesting to resume her home regiment of atenolol 50 bid buts she did not want writer to page orthopedic on call providers as she is not under the hospitalist team. /71 hr 67 upon request. PICC intact, voiding adequate amounts with good appetite. Dressing CDI, denies numbness or tingling, no signs of anxiety or distress noted. Will keep monitoring.

## 2020-10-06 ENCOUNTER — TELEPHONE (OUTPATIENT)
Dept: INTERNAL MEDICINE | Facility: CLINIC | Age: 65
End: 2020-10-06

## 2020-10-06 LAB
BACTERIA SPEC CULT: NO GROWTH
Lab: NORMAL
SPECIMEN SOURCE: NORMAL

## 2020-10-06 NOTE — PROGRESS NOTES
This is a recent snapshot of the patient's [facility] medical record.  For current drug dose and complete information and questions, call [phone] or In [facility] pool, fv home infusion ([id number])  CSN Number:  [id number]

## 2020-10-06 NOTE — TELEPHONE ENCOUNTER
"IP F/U    Date: 10/5/20  Diagnosis: Infection Of Shoulder (H)  Is patient active in care coordination? No  Was patient in TCU? No    Next 5 appointments (look out 90 days)    Nov 02, 2020  1:30 PM  Nurse Only with [facility] IM NURSE  [facility] ([facility]) [address]  [facility] [address]  [phone]        Hospital/TCU/ED for chronic condition Discharge Protocol    \"Hi, my name is Noemy Moreno RN, a registered nurse, and I am calling from [facility] ([facility]).  I am calling to follow up and see how things are going for you after your recent emergency visit/hospital/TCU stay.\"    Tell me how you are doing now that you are home?\" home infusion      Discharge Instructions    \"Let's review your discharge instructions.  What is/are the follow-up recommendations?  Pt. Response: f/u with surgery    \"Has an appointment with your primary care provider been scheduled?\"   No (schedule appointment)    \"When you see the provider, I would recommend that you bring your medications with you.\"    Medications    \"Tell me what changed about your medicines when you discharged?\"    Changes to chronic meds?    0-1    \"What questions do you have about your medications?\"    None     New diagnoses of heart failure, COPD, diabetes, or MI?    No              Post Discharge Medication Reconciliation Status: discharge medications reconciled, continue medications without change.    Was MTM referral placed (*Make sure to put transitions as reason for referral)?   No    Call Summary    \"What questions or concerns do you have about your recent visit and your follow-up care?\"     none    \"If you have questions or things don't continue to improve, we encourage you contact us through the main clinic number (give number).  Even if the clinic is not open, triage nurses are available 24/7 to help you.     We would like you to know that our clinic has extended hours (provide information).  We also " "have urgent care (provide details on closest location and hours/contact info)\"      \"Thank you for your time and take care!\"             "

## 2020-10-07 LAB
BACTERIA SPEC CULT: NO GROWTH
SPECIMEN SOURCE: NORMAL

## 2020-10-08 ENCOUNTER — HOME INFUSION (PRE-WILLOW HOME INFUSION) (OUTPATIENT)
Dept: PHARMACY | Facility: CLINIC | Age: 65
End: 2020-10-08

## 2020-10-08 ENCOUNTER — TRANSFERRED RECORDS (OUTPATIENT)
Dept: HEALTH INFORMATION MANAGEMENT | Facility: CLINIC | Age: 65
End: 2020-10-08

## 2020-10-08 ENCOUNTER — MEDICAL CORRESPONDENCE (OUTPATIENT)
Dept: HEALTH INFORMATION MANAGEMENT | Facility: CLINIC | Age: 65
End: 2020-10-08

## 2020-10-08 LAB
AST SERPL W P-5'-P-CCNC: 33 U/L (ref 0–45)
BACTERIA SPEC CULT: NO GROWTH
BASOPHILS # BLD AUTO: 0.1 10E9/L (ref 0–0.2)
BASOPHILS NFR BLD AUTO: 0.8 %
BUN SERPL-MCNC: 14 MG/DL (ref 7–30)
CREAT SERPL-MCNC: 0.61 MG/DL (ref 0.52–1.04)
CRP SERPL-MCNC: 13.6 MG/L (ref 0–8)
DIFFERENTIAL METHOD BLD: ABNORMAL
EOSINOPHIL # BLD AUTO: 0.3 10E9/L (ref 0–0.7)
EOSINOPHIL NFR BLD AUTO: 3.9 %
ERYTHROCYTE [DISTWIDTH] IN BLOOD BY AUTOMATED COUNT: 11.1 % (ref 10–15)
ERYTHROCYTE [SEDIMENTATION RATE] IN BLOOD BY WESTERGREN METHOD: 29 MM/H (ref 0–30)
GFR SERPL CREATININE-BSD FRML MDRD: >90 ML/MIN/{1.73_M2}
HCT VFR BLD AUTO: 33.8 % (ref 35–47)
HGB BLD-MCNC: 10.9 G/DL (ref 11.7–15.7)
IMM GRANULOCYTES # BLD: 0 10E9/L (ref 0–0.4)
IMM GRANULOCYTES NFR BLD: 0.5 %
LYMPHOCYTES # BLD AUTO: 1.1 10E9/L (ref 0.8–5.3)
LYMPHOCYTES NFR BLD AUTO: 13.7 %
MCH RBC QN AUTO: 31.7 PG (ref 26.5–33)
MCHC RBC AUTO-ENTMCNC: 32.2 G/DL (ref 31.5–36.5)
MCV RBC AUTO: 98 FL (ref 78–100)
MONOCYTES # BLD AUTO: 0.5 10E9/L (ref 0–1.3)
MONOCYTES NFR BLD AUTO: 7 %
NEUTROPHILS # BLD AUTO: 5.8 10E9/L (ref 1.6–8.3)
NEUTROPHILS NFR BLD AUTO: 74.1 %
NRBC # BLD AUTO: 0 10*3/UL
NRBC BLD AUTO-RTO: 0 /100
PLATELET # BLD AUTO: 384 10E9/L (ref 150–450)
RBC # BLD AUTO: 3.44 10E12/L (ref 3.8–5.2)
SPECIMEN SOURCE: NORMAL
WBC # BLD AUTO: 7.8 10E9/L (ref 4–11)

## 2020-10-08 PROCEDURE — 84520 ASSAY OF UREA NITROGEN: CPT | Performed by: INTERNAL MEDICINE

## 2020-10-08 PROCEDURE — 82565 ASSAY OF CREATININE: CPT | Performed by: INTERNAL MEDICINE

## 2020-10-08 PROCEDURE — 85025 COMPLETE CBC W/AUTO DIFF WBC: CPT | Performed by: INTERNAL MEDICINE

## 2020-10-08 PROCEDURE — 85652 RBC SED RATE AUTOMATED: CPT | Performed by: INTERNAL MEDICINE

## 2020-10-08 PROCEDURE — 86140 C-REACTIVE PROTEIN: CPT | Performed by: INTERNAL MEDICINE

## 2020-10-08 PROCEDURE — 84450 TRANSFERASE (AST) (SGOT): CPT | Performed by: INTERNAL MEDICINE

## 2020-10-09 ENCOUNTER — HOME INFUSION (PRE-WILLOW HOME INFUSION) (OUTPATIENT)
Dept: PHARMACY | Facility: CLINIC | Age: 65
End: 2020-10-09

## 2020-10-09 LAB
BACTERIA SPEC CULT: NORMAL
Lab: NORMAL
SPECIMEN SOURCE: NORMAL

## 2020-10-09 NOTE — DISCHARGE SUMMARY
[facility] Discharge Summary    Denise Ralph MRN# [id number]   Age: 65 year old YOB: 1955     Date of Admission:  10/2/2020  Date of Discharge::  10/5/2020  3:38 PM  Admitting Physician:  Umesh Berman MD  Discharge Physician:  Kateryna Rubalcava PA-C               Admission Diagnoses:   Infection of shoulder (H) [M00.9]          Discharge Diagnosis:   Right shoulder infection s/p Irrigation and debridement of right shoulder          Procedures:   Procedure(s): Irrigation and debridement of right shoulder       No other procedures performed during this admission           Medications Prior to Admission:     No medications prior to admission.             Discharge Medications:     Discharge Medication List as of 10/5/2020  1:53 PM      START taking these medications    Details   acetaminophen (TYLENOL) 500 MG tablet Take 1-2 tablets (500-1,000 mg) by mouth every 6 hours as needed for mild pain, OTC      aspirin (ASA) 325 MG EC tablet Take 1 tablet (325 mg) by mouth daily, Disp-45 tablet, R-0, E-Prescribe      hydrOXYzine (ATARAX) 10 MG tablet Take 1 tablet (10 mg) by mouth every 6 hours as needed for itching or anxiety, Disp-20 tablet, R-1, E-Prescribe      oxyCODONE (ROXICODONE) 5 MG tablet Take 1-2 tablets (5-10 mg) by mouth every 3 hours as needed for moderate to severe pain, Disp-6 tablet, R-0, E-Prescribe      traMADol (ULTRAM) 50 MG tablet Take 1 tablet (50 mg) by mouth every 6 hours as needed for moderate pain, Disp-25 tablet, R-0, E-Prescribe         CONTINUE these medications which have CHANGED    Details   ertapenem (INVANZ) 1 GM injection Inject 1 g into the vein every 24 hours for 21 days CBC with differential, creatinine, SGOT weekly while on this medication to be faxed to Dr. Noland office., Disp-210 mL, R-0, Transitional         CONTINUE these medications which have NOT CHANGED    Details   ALPRAZolam (XANAX) 0.25 MG tablet TAKE 1 TABLET BY MOUTH THREE TIMES A  DAY AS NEEDED FOR ANXIETY, Disp-30 tablet,R-1, E-PrescribeNot to exceed 4 additional fills before 10/14/2020      amLODIPine (NORVASC) 2.5 MG tablet TAKE 1 TABLET BY MOUTH EVERY DAY, Disp-90 tablet,R-3, E-Prescribe      atenolol (TENORMIN) 50 MG tablet TAKE 1 TABLET BY MOUTH TWICE A DAY, Disp-180 tablet,R-3, E-Prescribe      Cholecalciferol (VITAMIN D3) 50 MCG (2000 UT) CAPS Take 4,000 Units by mouth daily, Historical      ibuprofen (ADVIL/MOTRIN) 200 MG tablet Take 400 mg by mouth every 6 hours as needed for mild pain, Historical      lactobacillus rhamnosus, GG, (CULTURELL) capsule Take 1 capsule by mouth daily, Historical      latanoprost (XALATAN) 0.005 % ophthalmic solution Place 1 drop into both eyes At Bedtime, Historical      PARoxetine (PAXIL) 20 MG tablet Take 1 tablet (20 mg) by mouth every morning, Disp-90 tablet, R-1, E-Prescribe      traZODone (DESYREL) 100 MG tablet TAKE 1 TABLET (100 MG) BY MOUTH NIGHTLY AS NEEDED FOR SLEEP, Disp-90 tablet,R-3, E-PrescribeDX Code Needed  .                   Consultations:   Consultation during this admission received from infectious disease          Brief History of Illness:   Reason for admission requiring a surgical or invasive procedure:   Infection of right shoudler   The patient underwent the following procedure(s):   Irrigation and debridement of right shoulder   There were no immediate complications during this procedure.    Please refer to the full operative summary for details.             Hospital Course:    Unremarkable. Patient had PICC line placed and started IV antibiotics (Ancef then switched to Invanz).           Discharge Instructions and Follow-Up:   Discharge diet: Regular   Discharge activity: Activity as tolerated   Discharge follow-up: Follow up with Dr. Berman on 10/8/20 in [place] at 3:00.   Wound care: May get incision wet in shower but do not soak or scrub  Leave steri-strips intact until post-op appointment.  Apply ice 30 minutes at a  time, 3-4 times per day           Discharge Disposition:   Discharged to home      Attestation:  I have reviewed today's vital signs, notes, medications, labs and imaging.    Kateryna Rubalcava PA-C

## 2020-10-09 NOTE — PROGRESS NOTES
This is a recent snapshot of the patient's [facility] medical record.  For current drug dose and complete information and questions, call [phone] or In Basket pool, fv home infusion ([id number])  CSN Number:  [id number]

## 2020-10-10 ENCOUNTER — HOME INFUSION (PRE-WILLOW HOME INFUSION) (OUTPATIENT)
Dept: PHARMACY | Facility: CLINIC | Age: 65
End: 2020-10-10

## 2020-10-12 ENCOUNTER — VIRTUAL VISIT (OUTPATIENT)
Dept: INTERNAL MEDICINE | Facility: CLINIC | Age: 65
End: 2020-10-12
Payer: COMMERCIAL

## 2020-10-12 ENCOUNTER — E-VISIT (OUTPATIENT)
Dept: INTERNAL MEDICINE | Facility: CLINIC | Age: 65
End: 2020-10-12

## 2020-10-12 DIAGNOSIS — Z09 HOSPITAL DISCHARGE FOLLOW-UP: Primary | ICD-10-CM

## 2020-10-12 DIAGNOSIS — I10 ESSENTIAL HYPERTENSION, BENIGN: ICD-10-CM

## 2020-10-12 DIAGNOSIS — M00.9 INFECTION OF SHOULDER (H): ICD-10-CM

## 2020-10-12 DIAGNOSIS — F41.1 ANXIETY STATE: Primary | ICD-10-CM

## 2020-10-12 PROCEDURE — 99207 PR NON-BILLABLE SERV PER CHARTING: CPT | Performed by: INTERNAL MEDICINE

## 2020-10-12 PROCEDURE — 99214 OFFICE O/P EST MOD 30 MIN: CPT | Mod: 95 | Performed by: INTERNAL MEDICINE

## 2020-10-12 NOTE — PROGRESS NOTES
This is a recent snapshot of the patient's [facility] medical record.  For current drug dose and complete information and questions, call [phone] or In Basket pool, fv home infusion ([id number])  CSN Number:  [id number]

## 2020-10-12 NOTE — PROGRESS NOTES
This is a recent snapshot of the patient's [facility] medical record.  For current drug dose and complete information and questions, call [phone] or In Basket pool, fv home infusion ([id number])  CSN Number:  [id number]

## 2020-10-12 NOTE — PROGRESS NOTES
"Denise Ralph is a 65 year old female who is being evaluated via a billable video visit.      The patient has been notified of following:     \"This video visit will be conducted via a call between you and your physician/provider. We have found that certain health care needs can be provided without the need for an in-person physical exam.  This service lets us provide the care you need with a video conversation.  If a prescription is necessary we can send it directly to your pharmacy.  If lab work is needed we can place an order for that and you can then stop by our lab to have the test done at a later time.    Video visits are billed at different rates depending on your insurance coverage.  Please reach out to your insurance provider with any questions.    If during the course of the call the physician/provider feels a video visit is not appropriate, you will not be charged for this service.\"    Patient has given verbal consent for Video visit? Yes  How would you like to obtain your AVS? MyChart  If you are dropped from the video visit, the video invite should be resent to: Send to e-mail at: [email or web address]  Will anyone else be joining your video visit? No    Subjective     Denise Ralph is a 65 year old female who presents today via video visit for the following health issues:    HPI     Patient is seen for a follow up visit.  Recently hospitalized for right shoulder infection, needed debridement, irrigation, now on IV antibiotics for 1 month.   Doing well. No fever, chills. Still has some pain from the surgery.   Follows with ID and ortho.   Has home infusions of Invanz daily.   Feels tired.      Hypertension Follow-up  Has h/o HTN. on medical treatment. BP has been controlled. No side effects from medications. No CP, HA, dizziness. good compliance with medications and low salt diet.      Do you check your blood pressure regularly outside of the clinic? Yes     Are you following a low salt diet? Yes    Are " your blood pressures ever more than 140 on the top number (systolic) OR more   than 90 on the bottom number (diastolic), for example 140/90? No      How many servings of fruits and vegetables do you eat daily?  2-3    On average, how many sweetened beverages do you drink each day (Examples: soda, juice, sweet tea, etc.  Do NOT count diet or artificially sweetened beverages)?   0    How many days per week do you exercise enough to make your heart beat faster? 4    How many minutes a day do you exercise enough to make your heart beat faster? 20 - 29    How many days per week do you miss taking your medication? 0         Video Start Time: 1:00 PM        Review of Systems   Constitutional, HEENT, cardiovascular, pulmonary, gi and gu systems are negative, except as otherwise noted.      Objective           Vitals:  No vitals were obtained today due to virtual visit.    Physical Exam     GENERAL: Healthy, alert and no distress  EYES: Eyes grossly normal to inspection.  No discharge or erythema, or obvious scleral/conjunctival abnormalities.  RESP: No audible wheeze, cough, or visible cyanosis.  No visible retractions or increased work of breathing.    SKIN: Visible skin clear. No significant rash, abnormal pigmentation or lesions.  NEURO: Cranial nerves grossly intact.  Mentation and speech appropriate for age.  PSYCH: Mentation appears normal, affect normal/bright, judgement and insight intact, normal speech and appearance well-groomed.              Assessment & Plan   Problem List Items Addressed This Visit     Essential hypertension, benign    Infection of shoulder (H)    Hospital discharge follow-up - Primary                  See Patient Instructions  Return in about 3 months (around 1/12/2021) for Routine Visit.     Continue treatment  Follow up with ortho and ID     Juan F Smallwood MD  [facility]      Video-Visit Details    Type of service:  Video Visit    Video End Time:1:11  PM    Originating Location (pt. Location): Home    Distant Location (provider location):  [facility]     Platform used for Video Visit: Erin

## 2020-10-14 ENCOUNTER — HOME INFUSION (PRE-WILLOW HOME INFUSION) (OUTPATIENT)
Dept: PHARMACY | Facility: CLINIC | Age: 65
End: 2020-10-14

## 2020-10-15 ENCOUNTER — HOME INFUSION (PRE-WILLOW HOME INFUSION) (OUTPATIENT)
Dept: PHARMACY | Facility: CLINIC | Age: 65
End: 2020-10-15

## 2020-10-15 LAB
AST SERPL W P-5'-P-CCNC: 27 U/L (ref 0–45)
BACTERIA SPEC CULT: NORMAL
BASOPHILS # BLD AUTO: 0.1 10E9/L (ref 0–0.2)
BASOPHILS NFR BLD AUTO: 1.3 %
BUN SERPL-MCNC: 11 MG/DL (ref 7–30)
CREAT SERPL-MCNC: 0.58 MG/DL (ref 0.52–1.04)
CRP SERPL-MCNC: 6 MG/L (ref 0–8)
DIFFERENTIAL METHOD BLD: ABNORMAL
EOSINOPHIL # BLD AUTO: 0.2 10E9/L (ref 0–0.7)
EOSINOPHIL NFR BLD AUTO: 3.3 %
ERYTHROCYTE [DISTWIDTH] IN BLOOD BY AUTOMATED COUNT: 11.5 % (ref 10–15)
ERYTHROCYTE [SEDIMENTATION RATE] IN BLOOD BY WESTERGREN METHOD: 17 MM/H (ref 0–30)
GFR SERPL CREATININE-BSD FRML MDRD: >90 ML/MIN/{1.73_M2}
HCT VFR BLD AUTO: 33.9 % (ref 35–47)
HGB BLD-MCNC: 11.2 G/DL (ref 11.7–15.7)
IMM GRANULOCYTES # BLD: 0 10E9/L (ref 0–0.4)
IMM GRANULOCYTES NFR BLD: 0.3 %
LYMPHOCYTES # BLD AUTO: 1.4 10E9/L (ref 0.8–5.3)
LYMPHOCYTES NFR BLD AUTO: 19.5 %
Lab: NORMAL
MCH RBC QN AUTO: 31.8 PG (ref 26.5–33)
MCHC RBC AUTO-ENTMCNC: 33 G/DL (ref 31.5–36.5)
MCV RBC AUTO: 96 FL (ref 78–100)
MONOCYTES # BLD AUTO: 0.4 10E9/L (ref 0–1.3)
MONOCYTES NFR BLD AUTO: 5.9 %
NEUTROPHILS # BLD AUTO: 4.9 10E9/L (ref 1.6–8.3)
NEUTROPHILS NFR BLD AUTO: 69.7 %
NRBC # BLD AUTO: 0 10*3/UL
NRBC BLD AUTO-RTO: 0 /100
PLATELET # BLD AUTO: 406 10E9/L (ref 150–450)
RBC # BLD AUTO: 3.52 10E12/L (ref 3.8–5.2)
SPECIMEN SOURCE: NORMAL
WBC # BLD AUTO: 7 10E9/L (ref 4–11)

## 2020-10-15 PROCEDURE — 85025 COMPLETE CBC W/AUTO DIFF WBC: CPT | Performed by: INTERNAL MEDICINE

## 2020-10-15 PROCEDURE — 82565 ASSAY OF CREATININE: CPT | Performed by: INTERNAL MEDICINE

## 2020-10-15 PROCEDURE — 85652 RBC SED RATE AUTOMATED: CPT | Performed by: INTERNAL MEDICINE

## 2020-10-15 PROCEDURE — 86140 C-REACTIVE PROTEIN: CPT | Performed by: INTERNAL MEDICINE

## 2020-10-15 PROCEDURE — 84450 TRANSFERASE (AST) (SGOT): CPT | Performed by: INTERNAL MEDICINE

## 2020-10-15 PROCEDURE — 84520 ASSAY OF UREA NITROGEN: CPT | Performed by: INTERNAL MEDICINE

## 2020-10-15 NOTE — PROGRESS NOTES
This is a recent snapshot of the patient's [facility] medical record.  For current drug dose and complete information and questions, call [phone] or In Basket pool, fv home infusion ([id number])  CSN Number:  [id number]

## 2020-10-16 ENCOUNTER — TELEPHONE (OUTPATIENT)
Dept: INTERNAL MEDICINE | Facility: CLINIC | Age: 65
End: 2020-10-16

## 2020-10-16 ENCOUNTER — HOME INFUSION (PRE-WILLOW HOME INFUSION) (OUTPATIENT)
Dept: PHARMACY | Facility: CLINIC | Age: 65
End: 2020-10-16

## 2020-10-16 DIAGNOSIS — B37.31 YEAST INFECTION OF THE VAGINA: Primary | ICD-10-CM

## 2020-10-16 RX ORDER — FLUCONAZOLE 150 MG/1
150 TABLET ORAL DAILY
Qty: 3 TABLET | Refills: 0 | Status: SHIPPED | OUTPATIENT
Start: 2020-10-16 | End: 2020-10-19

## 2020-10-16 NOTE — PROGRESS NOTES
This is a recent snapshot of the patient's [facility] medical record.  For current drug dose and complete information and questions, call [phone] or In Basket pool, fv home infusion ([id number])  CSN Number:  [id number]

## 2020-10-16 NOTE — TELEPHONE ENCOUNTER
Patient calls with symptoms of a vaginal yeast infection she has had for 4 to 5 days. She is currently on an antibiotic for a shoulder infection. She is requesting medication be sent to [facility] in [place].

## 2020-10-19 NOTE — PROGRESS NOTES
This is a recent snapshot of the patient's [facility] medical record.  For current drug dose and complete information and questions, call [phone] or In Basket pool, fv home infusion ([id number])  CSN Number:  [id number]

## 2020-10-22 ENCOUNTER — HOME INFUSION (PRE-WILLOW HOME INFUSION) (OUTPATIENT)
Dept: PHARMACY | Facility: CLINIC | Age: 65
End: 2020-10-22

## 2020-10-22 LAB
AST SERPL W P-5'-P-CCNC: 35 U/L (ref 0–45)
BASOPHILS # BLD AUTO: 0.1 10E9/L (ref 0–0.2)
BASOPHILS NFR BLD AUTO: 1.2 %
BUN SERPL-MCNC: 12 MG/DL (ref 7–30)
CREAT SERPL-MCNC: 0.59 MG/DL (ref 0.52–1.04)
CRP SERPL-MCNC: <2.9 MG/L (ref 0–8)
DIFFERENTIAL METHOD BLD: ABNORMAL
EOSINOPHIL # BLD AUTO: 0.3 10E9/L (ref 0–0.7)
EOSINOPHIL NFR BLD AUTO: 5 %
ERYTHROCYTE [DISTWIDTH] IN BLOOD BY AUTOMATED COUNT: 11.9 % (ref 10–15)
ERYTHROCYTE [SEDIMENTATION RATE] IN BLOOD BY WESTERGREN METHOD: 10 MM/H (ref 0–30)
GFR SERPL CREATININE-BSD FRML MDRD: >90 ML/MIN/{1.73_M2}
HCT VFR BLD AUTO: 35.7 % (ref 35–47)
HGB BLD-MCNC: 11.6 G/DL (ref 11.7–15.7)
IMM GRANULOCYTES # BLD: 0 10E9/L (ref 0–0.4)
IMM GRANULOCYTES NFR BLD: 0.4 %
LYMPHOCYTES # BLD AUTO: 1.5 10E9/L (ref 0.8–5.3)
LYMPHOCYTES NFR BLD AUTO: 29.2 %
MCH RBC QN AUTO: 31.5 PG (ref 26.5–33)
MCHC RBC AUTO-ENTMCNC: 32.5 G/DL (ref 31.5–36.5)
MCV RBC AUTO: 97 FL (ref 78–100)
MONOCYTES # BLD AUTO: 0.3 10E9/L (ref 0–1.3)
MONOCYTES NFR BLD AUTO: 6.3 %
NEUTROPHILS # BLD AUTO: 2.9 10E9/L (ref 1.6–8.3)
NEUTROPHILS NFR BLD AUTO: 57.9 %
NRBC # BLD AUTO: 0 10*3/UL
NRBC BLD AUTO-RTO: 0 /100
PLATELET # BLD AUTO: 350 10E9/L (ref 150–450)
RBC # BLD AUTO: 3.68 10E12/L (ref 3.8–5.2)
WBC # BLD AUTO: 5 10E9/L (ref 4–11)

## 2020-10-22 PROCEDURE — 86140 C-REACTIVE PROTEIN: CPT | Performed by: INTERNAL MEDICINE

## 2020-10-22 PROCEDURE — 82565 ASSAY OF CREATININE: CPT | Performed by: INTERNAL MEDICINE

## 2020-10-22 PROCEDURE — 84520 ASSAY OF UREA NITROGEN: CPT | Performed by: INTERNAL MEDICINE

## 2020-10-22 PROCEDURE — 84450 TRANSFERASE (AST) (SGOT): CPT | Performed by: INTERNAL MEDICINE

## 2020-10-22 PROCEDURE — 85652 RBC SED RATE AUTOMATED: CPT | Performed by: INTERNAL MEDICINE

## 2020-10-22 PROCEDURE — 85025 COMPLETE CBC W/AUTO DIFF WBC: CPT | Performed by: INTERNAL MEDICINE

## 2020-10-23 ENCOUNTER — HOME INFUSION (PRE-WILLOW HOME INFUSION) (OUTPATIENT)
Dept: PHARMACY | Facility: CLINIC | Age: 65
End: 2020-10-23

## 2020-10-23 NOTE — PROGRESS NOTES
This is a recent snapshot of the patient's [facility] medical record.  For current drug dose and complete information and questions, call [phone] or In Basket pool, [facility] home infusion ([id number])  CSN Number:  [id number]

## 2020-10-26 NOTE — PROGRESS NOTES
This is a recent snapshot of the patient's [facility] medical record.  For current drug dose and complete information and questions, call [phone] or In Basket pool, fv home infusion ([id number])  CSN Number:  [id number]

## 2020-10-29 ENCOUNTER — HOME INFUSION (PRE-WILLOW HOME INFUSION) (OUTPATIENT)
Dept: PHARMACY | Facility: CLINIC | Age: 65
End: 2020-10-29

## 2020-10-29 ENCOUNTER — TRANSFERRED RECORDS (OUTPATIENT)
Dept: HEALTH INFORMATION MANAGEMENT | Facility: CLINIC | Age: 65
End: 2020-10-29

## 2020-10-29 LAB
AST SERPL W P-5'-P-CCNC: 47 U/L (ref 0–45)
BASOPHILS # BLD AUTO: 0.1 10E9/L (ref 0–0.2)
BASOPHILS NFR BLD AUTO: 1 %
BUN SERPL-MCNC: 9 MG/DL (ref 7–30)
CREAT SERPL-MCNC: 0.61 MG/DL (ref 0.52–1.04)
CRP SERPL-MCNC: <2.9 MG/L (ref 0–8)
DIFFERENTIAL METHOD BLD: ABNORMAL
EOSINOPHIL # BLD AUTO: 0.2 10E9/L (ref 0–0.7)
EOSINOPHIL NFR BLD AUTO: 5 %
ERYTHROCYTE [DISTWIDTH] IN BLOOD BY AUTOMATED COUNT: 12.1 % (ref 10–15)
ERYTHROCYTE [SEDIMENTATION RATE] IN BLOOD BY WESTERGREN METHOD: 8 MM/H (ref 0–30)
GFR SERPL CREATININE-BSD FRML MDRD: >90 ML/MIN/{1.73_M2}
HCT VFR BLD AUTO: 35.6 % (ref 35–47)
HGB BLD-MCNC: 11.4 G/DL (ref 11.7–15.7)
IMM GRANULOCYTES # BLD: 0 10E9/L (ref 0–0.4)
IMM GRANULOCYTES NFR BLD: 0.2 %
LYMPHOCYTES # BLD AUTO: 1.2 10E9/L (ref 0.8–5.3)
LYMPHOCYTES NFR BLD AUTO: 25.6 %
MCH RBC QN AUTO: 31.1 PG (ref 26.5–33)
MCHC RBC AUTO-ENTMCNC: 32 G/DL (ref 31.5–36.5)
MCV RBC AUTO: 97 FL (ref 78–100)
MONOCYTES # BLD AUTO: 0.4 10E9/L (ref 0–1.3)
MONOCYTES NFR BLD AUTO: 8.2 %
NEUTROPHILS # BLD AUTO: 2.9 10E9/L (ref 1.6–8.3)
NEUTROPHILS NFR BLD AUTO: 60 %
NRBC # BLD AUTO: 0 10*3/UL
NRBC BLD AUTO-RTO: 0 /100
PLATELET # BLD AUTO: 221 10E9/L (ref 150–450)
RBC # BLD AUTO: 3.67 10E12/L (ref 3.8–5.2)
WBC # BLD AUTO: 4.8 10E9/L (ref 4–11)

## 2020-10-29 PROCEDURE — 82565 ASSAY OF CREATININE: CPT | Performed by: INTERNAL MEDICINE

## 2020-10-29 PROCEDURE — 84450 TRANSFERASE (AST) (SGOT): CPT | Performed by: INTERNAL MEDICINE

## 2020-10-29 PROCEDURE — 85025 COMPLETE CBC W/AUTO DIFF WBC: CPT | Performed by: INTERNAL MEDICINE

## 2020-10-29 PROCEDURE — 84520 ASSAY OF UREA NITROGEN: CPT | Performed by: INTERNAL MEDICINE

## 2020-10-29 PROCEDURE — 85652 RBC SED RATE AUTOMATED: CPT | Performed by: INTERNAL MEDICINE

## 2020-10-29 PROCEDURE — 86140 C-REACTIVE PROTEIN: CPT | Performed by: INTERNAL MEDICINE

## 2020-11-02 ENCOUNTER — HOME INFUSION (PRE-WILLOW HOME INFUSION) (OUTPATIENT)
Dept: PHARMACY | Facility: CLINIC | Age: 65
End: 2020-11-02

## 2020-11-02 ENCOUNTER — ALLIED HEALTH/NURSE VISIT (OUTPATIENT)
Dept: NURSING | Facility: CLINIC | Age: 65
End: 2020-11-02
Payer: COMMERCIAL

## 2020-11-02 DIAGNOSIS — Z23 NEED FOR PROPHYLACTIC VACCINATION AND INOCULATION AGAINST INFLUENZA: Primary | ICD-10-CM

## 2020-11-02 PROCEDURE — 90662 IIV NO PRSV INCREASED AG IM: CPT

## 2020-11-02 PROCEDURE — G0008 ADMIN INFLUENZA VIRUS VAC: HCPCS

## 2020-11-02 PROCEDURE — G0009 ADMIN PNEUMOCOCCAL VACCINE: HCPCS

## 2020-11-02 PROCEDURE — 90732 PPSV23 VACC 2 YRS+ SUBQ/IM: CPT

## 2020-11-02 NOTE — PROGRESS NOTES
This is a recent snapshot of the patient's [facility] medical record.  For current drug dose and complete information and questions, call [phone] or In Basket pool, fv home infusion ([id number])  CSN Number:  [id number]

## 2020-11-17 DIAGNOSIS — F41.1 ANXIETY STATE: ICD-10-CM

## 2020-11-19 RX ORDER — ALPRAZOLAM 0.25 MG
TABLET ORAL
Qty: 30 TABLET | Refills: 1 | Status: SHIPPED | OUTPATIENT
Start: 2020-11-19 | End: 2021-03-10

## 2020-11-19 NOTE — TELEPHONE ENCOUNTER
Pharmacy calls stating they never received the Alprazolam refill.   It looks like the 11/11 Rx was printed but there is no documentation in the encounter, so may have been lost or discarded.   Provider was working virtually that day, so was probably lost.

## 2020-12-10 ENCOUNTER — TRANSFERRED RECORDS (OUTPATIENT)
Dept: HEALTH INFORMATION MANAGEMENT | Facility: CLINIC | Age: 65
End: 2020-12-10

## 2021-02-24 DIAGNOSIS — I10 HYPERTENSION GOAL BP (BLOOD PRESSURE) < 140/80: ICD-10-CM

## 2021-02-25 RX ORDER — ATENOLOL 50 MG/1
TABLET ORAL
Qty: 180 TABLET | Refills: 3 | Status: SHIPPED | OUTPATIENT
Start: 2021-02-25 | End: 2021-09-23

## 2021-02-25 RX ORDER — AMLODIPINE BESYLATE 2.5 MG/1
TABLET ORAL
Qty: 90 TABLET | Refills: 3 | Status: SHIPPED | OUTPATIENT
Start: 2021-02-25 | End: 2021-11-16

## 2021-02-25 NOTE — TELEPHONE ENCOUNTER
Pending Prescriptions:                       Disp   Refills    atenolol (TENORMIN) 50 MG tablet [Pharmacy*180 ta*3        Sig: TAKE 1 TABLET BY MOUTH TWICE A DAY    amLODIPine (NORVASC) 2.5 MG tablet [Pharma*90 tab*3        Sig: TAKE 1 TABLET BY MOUTH EVERY DAY    Routing refill request to provider for review/approval because:  Blood pressures out of range      BP Readings from Last 3 Encounters:   10/05/20 (!) 160/85   10/01/20 125/78   04/10/20 (!) 155/84

## 2021-02-27 DIAGNOSIS — F51.01 PRIMARY INSOMNIA: ICD-10-CM

## 2021-03-01 RX ORDER — TRAZODONE HYDROCHLORIDE 100 MG/1
100 TABLET ORAL
Qty: 90 TABLET | Refills: 3 | Status: SHIPPED | OUTPATIENT
Start: 2021-03-01 | End: 2021-12-29

## 2021-03-01 NOTE — TELEPHONE ENCOUNTER
Pending Prescriptions:                       Disp   Refills    traZODone (DESYREL) 100 MG tablet [Pharmac*90 tab*3        Sig: TAKE 1 TABLET (100 MG) BY MOUTH NIGHTLY AS NEEDED FOR           SLEEP    Routing refill request to provider for review/approval because:  Allergy listed

## 2021-03-07 DIAGNOSIS — F41.1 ANXIETY STATE: ICD-10-CM

## 2021-03-10 RX ORDER — ALPRAZOLAM 0.25 MG
TABLET ORAL
Qty: 30 TABLET | Refills: 1 | Status: SHIPPED | OUTPATIENT
Start: 2021-03-10 | End: 2021-06-29

## 2021-03-10 NOTE — TELEPHONE ENCOUNTER
Xanax refill request.     Last OV on 10/12/20    Last refill on 11/19/20 for #30 with 1 refill.     Routing refill request to provider for review/approval because:  Drug not on the [facility] refill protocol

## 2021-03-13 ENCOUNTER — OFFICE VISIT (OUTPATIENT)
Dept: URGENT CARE | Facility: URGENT CARE | Age: 66
End: 2021-03-13
Payer: COMMERCIAL

## 2021-03-13 VITALS
DIASTOLIC BLOOD PRESSURE: 80 MMHG | HEART RATE: 59 BPM | TEMPERATURE: 97.8 F | SYSTOLIC BLOOD PRESSURE: 162 MMHG | OXYGEN SATURATION: 97 % | RESPIRATION RATE: 12 BRPM

## 2021-03-13 DIAGNOSIS — R09.89 RUNNY NOSE: ICD-10-CM

## 2021-03-13 DIAGNOSIS — R09.81 CONGESTION OF PARANASAL SINUS: ICD-10-CM

## 2021-03-13 DIAGNOSIS — H92.01 RIGHT EAR PAIN: Primary | ICD-10-CM

## 2021-03-13 PROCEDURE — 99214 OFFICE O/P EST MOD 30 MIN: CPT | Performed by: FAMILY MEDICINE

## 2021-03-13 RX ORDER — FLUTICASONE PROPIONATE 50 MCG
1 SPRAY, SUSPENSION (ML) NASAL DAILY
Qty: 9.9 ML | Refills: 0 | Status: SHIPPED | OUTPATIENT
Start: 2021-03-13 | End: 2021-05-04

## 2021-03-13 NOTE — PROGRESS NOTES
Chief Complaint   Patient presents with     Urgent Care     Ear Problem     Right ear pain-popping with swallowing and sneezing       Medical Decision Making:    ASSESMENT AND PLAN     Denise was seen today for urgent care and ear problem.    Diagnoses and all orders for this visit:    Right ear pain    Congestion of paranasal sinus    Runny nose        Tylenol, Fluids, Rest and Saline nasal spray  flonase to help with the nasal congestion   Reviewed with patient symptoms related to ET dysfunction suggested to continue doing saline nasal spray followed by Flonase can do antiallergic medication  Avoid pseudoephedrine as she has history of glaucoma.      Differential Diagnosis:  URI Adult/Peds:  Acute right otitis media, Allergic rhinitis, Influenza, Tonsilitis, Viral pharyngitis, Viral syndrome and Viral upper respiratory illness  ET dysfunction , impacted WAX     See orders in Epic  Pt verbalized and agreed with the plan and is aware of the worsening symptoms for which would need to follow up .  Pt was stable during time of discharge from the clinic     X-Ray was not done.    Time  spent on the date of the encounter doing chart review, patient visit and documentation     If not improving or if condition worsens, follow up with your Primary Care Provider    SUBJECTIVE     Denise Ralph is a 66 year old female presenting with a chief complaint of    Chief Complaint   Patient presents with     Urgent Care     Ear Problem     Right ear pain-popping with swallowing and sneezing           Denise Ralph is a 66 year old female presenting with a chief complaint of runny nose, stuffy nose and ear pain right. She is an established patient of [facility].  Onset of symptoms was 2 day(s) ago.  Course of illness is worsening.    Severity moderate  Current and Associated symptoms: runny nose, stuffy nose and ear pain right  Treatment measures tried include None tried.  Predisposing factors include None.    Past Medical History:    Diagnosis Date     Anxiety     Gets panic attacks     Depressive disorder, not elsewhere classified      Essential hypertension, benign     No cardiologist     Irritable bowel syndrome     has had neg colonoscopy & EGD w/u     Lumbar degenerative disc disease     Had PT, traction, no surgery     Other chronic pain     JOint pain for many years.     PONV (postoperative nausea and vomiting)      Sciatica 3/06    R thigh;  MRI = R L2-3 disc      Unspecified glaucoma(365.9)      Current Outpatient Medications   Medication Sig Dispense Refill     acetaminophen (TYLENOL) 500 MG tablet Take 1-2 tablets (500-1,000 mg) by mouth every 6 hours as needed for mild pain       ALPRAZolam (XANAX) 0.25 MG tablet TAKE 1 TABLET BY MOUTH THREE TIMES A DAY AS NEEDED FOR ANXIETY 30 tablet 1     amLODIPine (NORVASC) 2.5 MG tablet TAKE 1 TABLET BY MOUTH EVERY DAY 90 tablet 3     atenolol (TENORMIN) 50 MG tablet TAKE 1 TABLET BY MOUTH TWICE A  tablet 3     Cholecalciferol (VITAMIN D3) 50 MCG (2000 UT) CAPS Take 4,000 Units by mouth daily       latanoprost (XALATAN) 0.005 % ophthalmic solution Place 1 drop into both eyes At Bedtime       traZODone (DESYREL) 100 MG tablet TAKE 1 TABLET (100 MG) BY MOUTH NIGHTLY AS NEEDED FOR SLEEP 90 tablet 3     aspirin (ASA) 325 MG EC tablet Take 1 tablet (325 mg) by mouth daily (Patient not taking: Reported on 3/13/2021) 45 tablet 0     PARoxetine (PAXIL) 20 MG tablet Take 1 tablet (20 mg) by mouth every morning 90 tablet 1     traMADol (ULTRAM) 50 MG tablet Take 1 tablet (50 mg) by mouth every 6 hours as needed for moderate pain 25 tablet 0     Social History     Tobacco Use     Smoking status: Former Smoker     Packs/day: 0.50     Years: 25.00     Pack years: 12.50     Quit date: 10/1/1997     Years since quittin.4     Smokeless tobacco: Never Used     Tobacco comment: quit    Substance Use Topics     Alcohol use: Yes     Alcohol/week: 1.7 standard drinks     Types: 2 Glasses of  wine per week     Comment: 2 glasses of wine daily     Family History   Problem Relation Age of Onset     Cardiovascular Mother         / mi     Breast Cancer Mother         diagnosed age 60's     Respiratory Father         pulmonary fibrosis.     Cardiovascular Brother          of MI age 34 2nd to Cocaine Use     Cancer - colorectal No family hx of          ROS:    10 point ROS of systems including Constitutional, Eyes, Respiratory, Cardiovascular, Gastroenterology, Genitourinary, Integumentary, Muscularskeletal, Psychiatric ,neurological were all negative except for pertinent positives noted in my HPI         OBJECTIVE:    BP (!) 162/80   Pulse 59   Temp 97.8  F (36.6  C) (Tympanic)   Resp 12   SpO2 97%   Breastfeeding No   GENERAL APPEARANCE: healthy, alert and no distress  EYES: EOMI,  PERRL, conjunctiva clear  HENT: rt ear canals and TM's normal after wax was cleared from rt ear by nurse , scarring noted in the rt TM but no redness noted   Nose and mouth without ulcers, erythema or lesions  NECK: supple, nontender, no lymphadenopathy  RESP: lungs clear to auscultation - no rales, rhonchi or wheezes  CV: regular rates and rhythm, normal S1 S2, no murmur noted  SKIN: no suspicious lesions or rashes  PSYCH: mentation appears normal  Physical Exam      (Note was completed, in part, with [employer] voice-recognition software. Documentation reviewed, but some grammatical, spelling, and word errors may remain.)  Paola Pitts MD

## 2021-03-21 ENCOUNTER — HEALTH MAINTENANCE LETTER (OUTPATIENT)
Age: 66
End: 2021-03-21

## 2021-04-09 ENCOUNTER — HOSPITAL ENCOUNTER (OUTPATIENT)
Dept: MAMMOGRAPHY | Facility: CLINIC | Age: 66
Discharge: HOME | End: 2021-04-09
Attending: INTERNAL MEDICINE | Admitting: INTERNAL MEDICINE
Payer: COMMERCIAL

## 2021-04-09 DIAGNOSIS — Z12.31 VISIT FOR SCREENING MAMMOGRAM: ICD-10-CM

## 2021-04-09 PROCEDURE — 77063 BREAST TOMOSYNTHESIS BI: CPT

## 2021-04-09 ASSESSMENT — ENCOUNTER SYMPTOMS
DIZZINESS: 1
ARTHRALGIAS: 1
BREAST MASS: 0
NERVOUS/ANXIOUS: 1

## 2021-04-09 ASSESSMENT — ACTIVITIES OF DAILY LIVING (ADL): CURRENT_FUNCTION: NO ASSISTANCE NEEDED

## 2021-04-12 ENCOUNTER — OFFICE VISIT (OUTPATIENT)
Dept: INTERNAL MEDICINE | Facility: CLINIC | Age: 66
End: 2021-04-12
Payer: COMMERCIAL

## 2021-04-12 VITALS
HEIGHT: 65 IN | SYSTOLIC BLOOD PRESSURE: 130 MMHG | BODY MASS INDEX: 22.82 KG/M2 | HEART RATE: 56 BPM | DIASTOLIC BLOOD PRESSURE: 78 MMHG | WEIGHT: 137 LBS | OXYGEN SATURATION: 98 % | RESPIRATION RATE: 16 BRPM

## 2021-04-12 DIAGNOSIS — F41.1 ANXIETY STATE: ICD-10-CM

## 2021-04-12 DIAGNOSIS — I10 ESSENTIAL HYPERTENSION, BENIGN: ICD-10-CM

## 2021-04-12 DIAGNOSIS — Z00.00 ENCOUNTER FOR PREVENTATIVE ADULT HEALTH CARE EXAMINATION: Primary | ICD-10-CM

## 2021-04-12 DIAGNOSIS — Z98.1 S/P LUMBAR FUSION: ICD-10-CM

## 2021-04-12 LAB
ALBUMIN UR-MCNC: NEGATIVE MG/DL
APPEARANCE UR: CLEAR
BILIRUB UR QL STRIP: NEGATIVE
COLOR UR AUTO: YELLOW
ERYTHROCYTE [DISTWIDTH] IN BLOOD BY AUTOMATED COUNT: 12.1 % (ref 10–15)
GLUCOSE UR STRIP-MCNC: NEGATIVE MG/DL
HCT VFR BLD AUTO: 37.9 % (ref 35–47)
HGB BLD-MCNC: 12.6 G/DL (ref 11.7–15.7)
HGB UR QL STRIP: NEGATIVE
KETONES UR STRIP-MCNC: NEGATIVE MG/DL
LEUKOCYTE ESTERASE UR QL STRIP: NEGATIVE
MCH RBC QN AUTO: 32.2 PG (ref 26.5–33)
MCHC RBC AUTO-ENTMCNC: 33.2 G/DL (ref 31.5–36.5)
MCV RBC AUTO: 97 FL (ref 78–100)
NITRATE UR QL: NEGATIVE
PH UR STRIP: 7 PH (ref 5–7)
PLATELET # BLD AUTO: 232 10E9/L (ref 150–450)
RBC # BLD AUTO: 3.91 10E12/L (ref 3.8–5.2)
SOURCE: NORMAL
SP GR UR STRIP: 1.02 (ref 1–1.03)
UROBILINOGEN UR STRIP-ACNC: 0.2 EU/DL (ref 0.2–1)
WBC # BLD AUTO: 5.5 10E9/L (ref 4–11)

## 2021-04-12 PROCEDURE — 80053 COMPREHEN METABOLIC PANEL: CPT | Performed by: INTERNAL MEDICINE

## 2021-04-12 PROCEDURE — 85027 COMPLETE CBC AUTOMATED: CPT | Performed by: INTERNAL MEDICINE

## 2021-04-12 PROCEDURE — 80061 LIPID PANEL: CPT | Performed by: INTERNAL MEDICINE

## 2021-04-12 PROCEDURE — 84443 ASSAY THYROID STIM HORMONE: CPT | Performed by: INTERNAL MEDICINE

## 2021-04-12 PROCEDURE — 36415 COLL VENOUS BLD VENIPUNCTURE: CPT | Performed by: INTERNAL MEDICINE

## 2021-04-12 PROCEDURE — G0438 PPPS, INITIAL VISIT: HCPCS | Performed by: INTERNAL MEDICINE

## 2021-04-12 PROCEDURE — 81003 URINALYSIS AUTO W/O SCOPE: CPT | Performed by: INTERNAL MEDICINE

## 2021-04-12 ASSESSMENT — ENCOUNTER SYMPTOMS
DIZZINESS: 1
NERVOUS/ANXIOUS: 1
ARTHRALGIAS: 1
BREAST MASS: 0

## 2021-04-12 ASSESSMENT — ACTIVITIES OF DAILY LIVING (ADL): CURRENT_FUNCTION: NO ASSISTANCE NEEDED

## 2021-04-12 ASSESSMENT — MIFFLIN-ST. JEOR: SCORE: 1157.31

## 2021-04-12 NOTE — LETTER
April 14, 2021      Denise Ralph  [address] [facility] [address] [facility] [address]        Dear ,    We are writing to inform you of your test results.    Normal result reviewed.    Resulted Orders   CBC with platelets   Result Value Ref Range    WBC 5.5 4.0 - 11.0 10e9/L    RBC Count 3.91 3.8 - 5.2 10e12/L    Hemoglobin 12.6 11.7 - 15.7 g/dL    Hematocrit 37.9 35.0 - 47.0 %    MCV 97 78 - 100 fl    MCH 32.2 26.5 - 33.0 pg    MCHC 33.2 31.5 - 36.5 g/dL    RDW 12.1 10.0 - 15.0 %    Platelet Count 232 150 - 450 10e9/L   Comprehensive metabolic panel   Result Value Ref Range    Sodium 133 133 - 144 mmol/L    Potassium 4.8 3.4 - 5.3 mmol/L    Chloride 100 94 - 109 mmol/L    Carbon Dioxide 28 20 - 32 mmol/L    Anion Gap 5 3 - 14 mmol/L    Glucose 76 70 - 99 mg/dL      Comment:      Fasting specimen    Urea Nitrogen 16 7 - 30 mg/dL    Creatinine 0.65 0.52 - 1.04 mg/dL    GFR Estimate >90 >60 mL/min/[1.73_m2]      Comment:      Non  GFR Calc  Starting 12/18/2018, serum creatinine based estimated GFR (eGFR) will be   calculated using the Chronic Kidney Disease Epidemiology Collaboration   (CKD-EPI) equation.      GFR Estimate If Black >90 >60 mL/min/[1.73_m2]      Comment:       GFR Calc  Starting 12/18/2018, serum creatinine based estimated GFR (eGFR) will be   calculated using the Chronic Kidney Disease Epidemiology Collaboration   (CKD-EPI) equation.      Calcium 9.9 8.5 - 10.1 mg/dL    Bilirubin Total 0.7 0.2 - 1.3 mg/dL    Albumin 4.2 3.4 - 5.0 g/dL    Protein Total 7.0 6.8 - 8.8 g/dL    Alkaline Phosphatase 66 40 - 150 U/L    ALT 29 0 - 50 U/L    AST 30 0 - 45 U/L   Lipid panel reflex to direct LDL Non-fasting   Result Value Ref Range    Cholesterol 167 <200 mg/dL    Triglycerides 82 <150 mg/dL      Comment:      Fasting specimen    HDL Cholesterol 92 >49 mg/dL    LDL Cholesterol Calculated 59 <100 mg/dL      Comment:      Desirable:       <100 mg/dl    Non HDL Cholesterol 75 <130  mg/dL   TSH with free T4 reflex   Result Value Ref Range    TSH 1.69 0.40 - 4.00 mU/L   *UA reflex to Microscopic   Result Value Ref Range    Color Urine Yellow     Appearance Urine Clear     Glucose Urine Negative NEG^Negative mg/dL    Bilirubin Urine Negative NEG^Negative    Ketones Urine Negative NEG^Negative mg/dL    Specific Gravity Urine 1.020 1.003 - 1.035    Blood Urine Negative NEG^Negative    pH Urine 7.0 5.0 - 7.0 pH    Protein Albumin Urine Negative NEG^Negative mg/dL    Urobilinogen Urine 0.2 0.2 - 1.0 EU/dL    Nitrite Urine Negative NEG^Negative    Leukocyte Esterase Urine Negative NEG^Negative    Source Midstream Urine        If you have any questions or concerns, please call the clinic at the number listed above.       Sincerely,      Juan F Smallwood MD

## 2021-04-12 NOTE — PROGRESS NOTES
"SUBJECTIVE:   Denise Ralph is a 66 year old female who presents for Preventive Visit.      Patient has been advised of split billing requirements and indicates understanding: Yes   Are you in the first 12 months of your Medicare coverage?  No    Healthy Habits:     In general, how would you rate your overall health?  Good    Frequency of exercise:  4-5 days/week    Duration of exercise:  45-60 minutes    Do you usually eat at least 4 servings of fruit and vegetables a day, include whole grains    & fiber and avoid regularly eating high fat or \"junk\" foods?  Yes    Taking medications regularly:  Yes    Medication side effects:  None    Ability to successfully perform activities of daily living:  No assistance needed    Home Safety:  No safety concerns identified    Hearing Impairment:  No hearing concerns    In the past 6 months, have you been bothered by leaking of urine?  No    In general, how would you rate your overall mental or emotional health?  Good      PHQ-2 Total Score: 0    Additional concerns today:  No    Do you feel safe in your environment? Yes    Have you ever done Advance Care Planning? (For example, a Health Directive, POLST, or a discussion with a medical provider or your loved ones about your wishes): Yes, advance care planning is on file.       Fall risk       Fallen two or more times in the last year   No   Any fall with an injury in the last year  No    Cognitive Screening   1) Repeat 3 items (Leader, Season, Table)      2) Clock draw:   NORMAL  3) 3 item recall:   Recalls 3 objects  Results: NORMAL clock, 1-2 items recalled: COGNITIVE IMPAIRMENT LESS LIKELY    Mini-CogTM Copyright [employer]. Licensed by the author for use in [facility]; reprinted with permission ([email or web address]). All rights reserved.      Do you have sleep apnea, excessive snoring or daytime drowsiness?: no    Reviewed and updated as needed this visit by clinical staff   Allergies               Reviewed and updated " as needed this visit by Provider                Social History     Tobacco Use     Smoking status: Former Smoker     Packs/day: 0.50     Years: 25.00     Pack years: 12.50     Quit date: 10/1/1997     Years since quittin.5     Smokeless tobacco: Never Used     Tobacco comment: quit    Substance Use Topics     Alcohol use: Yes     Alcohol/week: 1.7 standard drinks     Types: 2 Glasses of wine per week     Comment: 2 glasses of wine daily         Alcohol Use 2021   Prescreen: >3 drinks/day or >7 drinks/week? No   Prescreen: >3 drinks/day or >7 drinks/week? -   AUDIT SCORE  -           Hypertension Follow-up      Do you check your blood pressure regularly outside of the clinic? Yes     Are you following a low salt diet? Yes    Are your blood pressures ever more than 140 on the top number (systolic) OR more   than 90 on the bottom number (diastolic), for example 140/90? No      Current providers sharing in care for this patient include:   Patient Care Team:  Juan F Smallwood MD as PCP - General (Internal Medicine)  Juan F Smallwood MD as Assigned PCP    The following health maintenance items are reviewed in Epic and correct as of today:  Health Maintenance Due   Topic Date Due     HEPATITIS C SCREENING  Never done     MEDICARE ANNUAL WELLNESS VISIT  2021     FALL RISK ASSESSMENT  2021     COVID-19 Vaccine (2 - Pfizer 2-dose series) 2021     PHQ-9  2021     Lab work is in process  Labs reviewed in [facility]  Mammogram Screening: Mammogram Screening: Recommended mammography every 1-2 years with patient discussion and risk factor consideration  Any new diagnosis of family breast, ovarian, or bowel cancer? No    FSH-7: No flowsheet data found.      Pertinent mammograms are reviewed under the imaging tab.    Review of Systems   Breasts:  Negative for tenderness, breast mass and discharge.   Genitourinary: Negative for pelvic pain, vaginal bleeding and vaginal discharge.  "  Musculoskeletal: Positive for arthralgias.   Neurological: Positive for dizziness.   Psychiatric/Behavioral: The patient is nervous/anxious.          OBJECTIVE:   There were no vitals taken for this visit. Estimated body mass index is 23.41 kg/m  as calculated from the following:    Height as of 10/2/20: 1.651 m (5' 5\").    Weight as of 10/2/20: 63.8 kg (140 lb 11.2 oz).  Physical Exam  GENERAL: healthy, alert and no distress  EYES: Eyes grossly normal to inspection, PERRL and conjunctivae and sclerae normal  HENT: ear canals and TM's normal, nose and mouth without ulcers or lesions  NECK: no adenopathy, no asymmetry, masses, or scars and thyroid normal to palpation  RESP: lungs clear to auscultation - no rales, rhonchi or wheezes  CV: regular rate and rhythm, normal S1 S2, no S3 or S4, no murmur, click or rub, no peripheral edema and peripheral pulses strong  ABDOMEN: soft, nontender, no hepatosplenomegaly, no masses and bowel sounds normal  MS: no gross musculoskeletal defects noted, no edema  SKIN: no suspicious lesions or rashes  NEURO: Normal strength and tone, mentation intact and speech normal  PSYCH: mentation appears normal, affect normal/bright    Diagnostic Test Results:  Labs reviewed in Epic    ASSESSMENT / PLAN:       ICD-10-CM    1. Encounter for preventative adult health care examination  Z00.00 CBC with platelets     Comprehensive metabolic panel     Lipid panel reflex to direct LDL Non-fasting     TSH with free T4 reflex     *UA reflex to Microscopic   2. Essential hypertension, benign  I10 CBC with platelets     Comprehensive metabolic panel     Lipid panel reflex to direct LDL Non-fasting     TSH with free T4 reflex     *UA reflex to Microscopic   3. Anxiety state  F41.1    4. S/P lumbar fusion  Z98.1        Patient has been advised of split billing requirements and indicates understanding: Yes  COUNSELING:  Reviewed preventive health counseling, as reflected in patient instructions       " "Regular exercise       Healthy diet/nutrition       Vision screening       Hearing screening       Dental care       Colon cancer screening    Estimated body mass index is 23.41 kg/m  as calculated from the following:    Height as of 10/2/20: 1.651 m (5' 5\").    Weight as of 10/2/20: 63.8 kg (140 lb 11.2 oz).        She reports that she quit smoking about 23 years ago. She has a 12.50 pack-year smoking history. She has never used smokeless tobacco.      Appropriate preventive services were discussed with this patient, including applicable screening as appropriate for cardiovascular disease, diabetes, osteopenia/osteoporosis, and glaucoma.  As appropriate for age/gender, discussed screening for colorectal cancer, prostate cancer, breast cancer, and cervical cancer. Checklist reviewing preventive services available has been given to the patient.    Reviewed patients plan of care and provided an AVS. The Intermediate Care Plan ( asthma action plan, low back pain action plan, and migraine action plan) for Denise meets the Care Plan requirement. This Care Plan has been established and reviewed with the Patient.    Counseling Resources:  ATP IV Guidelines  Pooled Cohorts Equation Calculator  Breast Cancer Risk Calculator  Breast Cancer: Medication to Reduce Risk  FRAX Risk Assessment  ICSI Preventive Guidelines  Dietary Guidelines for Americans, 2010  [email or web address]'s MyPlate  ASA Prophylaxis  Lung CA Screening    Juan F Smallwood MD  [facility]    Identified Health Risks:  "

## 2021-04-13 LAB
ALBUMIN SERPL-MCNC: 4.2 G/DL (ref 3.4–5)
ALP SERPL-CCNC: 66 U/L (ref 40–150)
ALT SERPL W P-5'-P-CCNC: 29 U/L (ref 0–50)
ANION GAP SERPL CALCULATED.3IONS-SCNC: 5 MMOL/L (ref 3–14)
AST SERPL W P-5'-P-CCNC: 30 U/L (ref 0–45)
BILIRUB SERPL-MCNC: 0.7 MG/DL (ref 0.2–1.3)
BUN SERPL-MCNC: 16 MG/DL (ref 7–30)
CALCIUM SERPL-MCNC: 9.9 MG/DL (ref 8.5–10.1)
CHLORIDE SERPL-SCNC: 100 MMOL/L (ref 94–109)
CHOLEST SERPL-MCNC: 167 MG/DL
CO2 SERPL-SCNC: 28 MMOL/L (ref 20–32)
CREAT SERPL-MCNC: 0.65 MG/DL (ref 0.52–1.04)
GFR SERPL CREATININE-BSD FRML MDRD: >90 ML/MIN/{1.73_M2}
GLUCOSE SERPL-MCNC: 76 MG/DL (ref 70–99)
HDLC SERPL-MCNC: 92 MG/DL
LDLC SERPL CALC-MCNC: 59 MG/DL
NONHDLC SERPL-MCNC: 75 MG/DL
POTASSIUM SERPL-SCNC: 4.8 MMOL/L (ref 3.4–5.3)
PROT SERPL-MCNC: 7 G/DL (ref 6.8–8.8)
SODIUM SERPL-SCNC: 133 MMOL/L (ref 133–144)
TRIGL SERPL-MCNC: 82 MG/DL
TSH SERPL DL<=0.005 MIU/L-ACNC: 1.69 MU/L (ref 0.4–4)

## 2021-05-04 ENCOUNTER — MYC MEDICAL ADVICE (OUTPATIENT)
Dept: INTERNAL MEDICINE | Facility: CLINIC | Age: 66
End: 2021-05-04

## 2021-05-04 ENCOUNTER — TRANSFERRED RECORDS (OUTPATIENT)
Dept: HEALTH INFORMATION MANAGEMENT | Facility: CLINIC | Age: 66
End: 2021-05-04

## 2021-05-04 DIAGNOSIS — R09.89 RUNNY NOSE: ICD-10-CM

## 2021-05-04 DIAGNOSIS — R09.81 CONGESTION OF PARANASAL SINUS: ICD-10-CM

## 2021-05-04 RX ORDER — FLUTICASONE PROPIONATE 50 MCG
SPRAY, SUSPENSION (ML) NASAL
Qty: 16 ML | Refills: 0 | Status: SHIPPED | OUTPATIENT
Start: 2021-05-04 | End: 2021-06-25

## 2021-05-12 ENCOUNTER — TRANSFERRED RECORDS (OUTPATIENT)
Dept: HEALTH INFORMATION MANAGEMENT | Facility: CLINIC | Age: 66
End: 2021-05-12

## 2021-05-19 DIAGNOSIS — Z11.59 ENCOUNTER FOR SCREENING FOR OTHER VIRAL DISEASES: ICD-10-CM

## 2021-05-24 ENCOUNTER — OFFICE VISIT (OUTPATIENT)
Dept: INTERNAL MEDICINE | Facility: CLINIC | Age: 66
End: 2021-05-24
Payer: COMMERCIAL

## 2021-05-24 VITALS
OXYGEN SATURATION: 98 % | SYSTOLIC BLOOD PRESSURE: 145 MMHG | HEART RATE: 59 BPM | BODY MASS INDEX: 23.66 KG/M2 | HEIGHT: 65 IN | TEMPERATURE: 98.2 F | WEIGHT: 142 LBS | DIASTOLIC BLOOD PRESSURE: 84 MMHG

## 2021-05-24 DIAGNOSIS — I10 ESSENTIAL HYPERTENSION, BENIGN: ICD-10-CM

## 2021-05-24 DIAGNOSIS — F41.1 ANXIETY STATE: ICD-10-CM

## 2021-05-24 DIAGNOSIS — M51.369 DDD (DEGENERATIVE DISC DISEASE), LUMBAR: ICD-10-CM

## 2021-05-24 DIAGNOSIS — Z01.818 PREOP GENERAL PHYSICAL EXAM: Primary | ICD-10-CM

## 2021-05-24 PROBLEM — M00.9 INFECTION OF SHOULDER (H): Status: RESOLVED | Noted: 2020-10-02 | Resolved: 2021-05-24

## 2021-05-24 PROCEDURE — 93000 ELECTROCARDIOGRAM COMPLETE: CPT | Performed by: INTERNAL MEDICINE

## 2021-05-24 PROCEDURE — 99214 OFFICE O/P EST MOD 30 MIN: CPT | Performed by: INTERNAL MEDICINE

## 2021-05-24 ASSESSMENT — MIFFLIN-ST. JEOR: SCORE: 1179.99

## 2021-05-24 NOTE — PROGRESS NOTES
[place] [address]  [facility] [address]  Phone: [phone]  Primary Provider: Jeanette Smallwood  Pre-op Performing Provider: JEANETTE SMALLWOOD      PREOPERATIVE EVALUATION:  Today's date: 5/24/2021    Denise Ralph is a 66 year old female who presents for a preoperative evaluation.    Surgical Information:  Surgery/Procedure: Lumbar 3 to Lumbar 4 adjacent level transforaminal lumbar interbody fusion  Surgery Location: [facility]  Surgeon: Dr. Dee  Surgery Date: 06/11/21  Time of Surgery: 12:15 PM  Where patient plans to recover: At home with family  Fax number for surgical facility: Note does not need to be faxed, will be available electronically in Epic.    Type of Anesthesia Anticipated: General    Assessment & Plan     The proposed surgical procedure is considered INTERMEDIATE risk.    Problem List Items Addressed This Visit     Anxiety state    Essential hypertension, benign      Other Visit Diagnoses     Preop general physical exam    -  Primary    Relevant Orders    EKG 12-lead complete w/read - Clinics (Completed)    DDD (degenerative disc disease), lumbar                   Risks and Recommendations:  The patient has the following additional risks and recommendations for perioperative complications:   - No identified additional risk factors other than previously addressed    Medication Instructions:   - ibuprofen (Advil, Motrin): HOLD 1 day before surgery.     RECOMMENDATION:  APPROVAL GIVEN to proceed with proposed procedure, without further diagnostic evaluation.        Subjective     HPI related to upcoming procedure: patient with LS spine DDD, spinal stenosis is scheduled for lumbar fusion surgery L3 L4.   No acute complaints, no medication change or new medical conditions.  Has chronic LBP, radiating to the LE, causing weakness and impaired ambulation.   Has h/o HTN. on medical treatment. BP has been controlled. No side effects from medications. No CP, HA,  dizziness. good compliance with medications and low salt diet.  Has h/o anxiety, on PRN treatment, controlled symptoms.     Preop Questions 5/24/2021   1. Have you ever had a heart attack or stroke? No   2. Have you ever had surgery on your heart or blood vessels, such as a stent placement, a coronary artery bypass, or surgery on an artery in your head, neck, heart, or legs? No   3. Do you have chest pain with activity? No   4. Do you have a history of  heart failure? No   5. Do you currently have a cold, bronchitis or symptoms of other infection? No   6. Do you have a cough, shortness of breath, or wheezing? No   7. Do you or anyone in your family have previous history of blood clots? No   8. Do you or does anyone in your family have a serious bleeding problem such as prolonged bleeding following surgeries or cuts? No   9. Have you ever had problems with anemia or been told to take iron pills? No   10. Have you had any abnormal blood loss such as black, tarry or bloody stools, or abnormal vaginal bleeding? No   11. Have you ever had a blood transfusion? No   12. Are you willing to have a blood transfusion if it is medically needed before, during, or after your surgery? Yes   13. Have you or any of your relatives ever had problems with anesthesia? No   14. Do you have sleep apnea, excessive snoring or daytime drowsiness? No   15. Do you have any artifical heart valves or other implanted medical devices like a pacemaker, defibrillator, or continuous glucose monitor? No   16. Do you have artificial joints? No   17. Are you allergic to latex? No   18. Is there any chance that you may be pregnant? -       Health Care Directive:  Patient has a Health Care Directive on file      Preoperative Review of :   reviewed - no record of controlled substances prescribed.      Status of Chronic Conditions:  See problem list for active medical problems.  Problems all longstanding and stable, except as noted/documented.  See  ROS for pertinent symptoms related to these conditions.      Review of Systems  CONSTITUTIONAL: NEGATIVE for fever, chills, change in weight  INTEGUMENTARY/SKIN: NEGATIVE for worrisome rashes, moles or lesions  EYES: NEGATIVE for vision changes or irritation  ENT/MOUTH: NEGATIVE for ear, mouth and throat problems  RESP: NEGATIVE for significant cough or SOB  BREAST: NEGATIVE for masses, tenderness or discharge  CV: NEGATIVE for chest pain, palpitations or peripheral edema  GI: NEGATIVE for nausea, abdominal pain, heartburn, or change in bowel habits  : NEGATIVE for frequency, dysuria, or hematuria  MUSCULOSKELETAL: NEGATIVE for significant arthralgias or myalgia, + for LBP   NEURO: NEGATIVE for weakness, dizziness or paresthesias  ENDOCRINE: NEGATIVE for temperature intolerance, skin/hair changes  HEME: NEGATIVE for bleeding problems  PSYCHIATRIC: NEGATIVE for changes in mood or affect    Patient Active Problem List    Diagnosis Date Noted     Hospital discharge follow-up 10/12/2020     Priority: Medium     Infection of shoulder (H) 10/02/2020     Priority: Medium     FUO (fever of unknown origin) 05/28/2019     Priority: Medium     Frequent UTI 01/17/2018     Priority: Medium     Hyponatremia 11/01/2017     Priority: Medium     Controlled substance agreement signed 10/18/2017     Priority: Medium     Tension headache 10/18/2017     Priority: Medium     S/P lumbar fusion 08/03/2017     Priority: Medium     Hypertension goal BP (blood pressure) < 140/80 10/06/2015     Priority: Medium     Glaucoma 12/05/2014     Priority: Medium     GERD (gastroesophageal reflux disease) 09/05/2013     Priority: Medium     Advanced directives, counseling/discussion 10/16/2012     Priority: Medium     Discussed Advance Directive planning with patient; information given to patient to review.         Basal cell carcinoma 08/01/2009     Priority: Medium     Disorder of bone and cartilage 07/26/2007     Priority: Medium     Problem list  name updated by automated process. Provider to review       Squamous cell carcinoma 04/01/2007     Priority: Medium     Essential hypertension, benign 09/07/2006     Priority: Medium     Irritable bowel syndrome 08/09/2004     Priority: Medium     Anxiety state 08/09/2004     Priority: Medium     Problem list name updated by automated process. Provider to review        Past Medical History:   Diagnosis Date     Anxiety     Gets panic attacks     Depressive disorder, not elsewhere classified      Essential hypertension, benign     No cardiologist     Irritable bowel syndrome     has had neg colonoscopy & EGD w/u     Lumbar degenerative disc disease 1996    Had PT, traction, no surgery     Other chronic pain     JOint pain for many years.     PONV (postoperative nausea and vomiting)      Sciatica 3/06    R thigh;  MRI = R L2-3 disc      Unspecified glaucoma(365.9)      Past Surgical History:   Procedure Laterality Date     EYE SURGERY Bilateral     Treatment of glaucoma     HYSTERECTOMY       HYSTERECTOMY VAGINAL       IRRIGATION AND DEBRIDEMENT UPPER EXTREMITY, COMBINED Right 10/2/2020    Procedure: Right shoulder arthroscopic extensive debridement of the glenohumeral joint and subacromial space for infection.;  Surgeon: Umesh Berman MD;  Location: [facility] OR     LAPAROSCOPIC CHOLECYSTECTOMY  2002    with repeat operation because of bile leak     Left shoulder decomp surgery for impingement  approx 1993     OPTICAL TRACKING SYSTEM FUSION POSTERIOR SPINE LUMBAR N/A 8/3/2017    Procedure: OPTICAL TRACKING SYSTEM FUSION SPINE POSTERIOR LUMBAR ONE LEVEL;  1. L4 Ang-Meyers osteotomy with exposure of the L4 and L5 roots  2. L4-5 complete discectomy with arthrodesis  3. Placement of Globus Creo GOMEZ pedicle screws bilaterally from L4 to L5  4. L4 - L5 posterior lateral fusion with placement of Medtronic Magnifuse and locally harvested autologous bone  5. Use of Stealth and O     WRIST SURGERY Left      Current Outpatient  Medications   Medication Sig Dispense Refill     ALPRAZolam (XANAX) 0.25 MG tablet TAKE 1 TABLET BY MOUTH THREE TIMES A DAY AS NEEDED FOR ANXIETY 30 tablet 1     amLODIPine (NORVASC) 2.5 MG tablet TAKE 1 TABLET BY MOUTH EVERY DAY 90 tablet 3     atenolol (TENORMIN) 50 MG tablet TAKE 1 TABLET BY MOUTH TWICE A  tablet 3     fluticasone (FLONASE) 50 MCG/ACT nasal spray INSTILL 1 SPRAY INTO BOTH NOSTRILS DAILY 16 mL 0     latanoprost (XALATAN) 0.005 % ophthalmic solution Place 1 drop into both eyes At Bedtime       traZODone (DESYREL) 100 MG tablet TAKE 1 TABLET (100 MG) BY MOUTH NIGHTLY AS NEEDED FOR SLEEP 90 tablet 3       Allergies   Allergen Reactions     Erythromycin Shortness Of Breath     Msir [Morphine Sulfate] Nausea     Intollerant to Morphine Sulfate: Nausea,vomiting     Dilaudid [Hydromorphone] Visual Disturbance     Hallucinations     Diuretic      Patient states she gets sick and loses weight from diuretics.     Lisinopril      Cough       Losartan      Tongue felt weird       Penicillins Itching     Reglan Other (See Comments)     Muscle spasms in throat        Social History     Tobacco Use     Smoking status: Former Smoker     Packs/day: 0.50     Years: 25.00     Pack years: 12.50     Quit date: 10/1/1997     Years since quittin.6     Smokeless tobacco: Never Used     Tobacco comment: quit    Substance Use Topics     Alcohol use: Yes     Alcohol/week: 1.7 standard drinks     Types: 2 Glasses of wine per week     Comment: 2 glasses of wine daily     Family History   Problem Relation Age of Onset     Cardiovascular Mother         / mi     Breast Cancer Mother         diagnosed age 60's     Respiratory Father         pulmonary fibrosis.     Cardiovascular Brother          of MI age 34 2nd to Cocaine Use     Cancer - colorectal No family hx of      History   Drug Use No         Objective     BP (!) 145/84 (BP Location: Left arm, Patient Position: Sitting, Cuff Size: Adult  "Regular)   Pulse 59   Temp 98.2  F (36.8  C) (Oral)   Ht 1.651 m (5' 5\")   Wt 64.4 kg (142 lb)   SpO2 98%   BMI 23.63 kg/m      Physical Exam    GENERAL APPEARANCE: healthy, alert and no distress     EYES: EOMI, PERRL     HENT: ear canals and TM's normal and nose and mouth without ulcers or lesions     NECK: no adenopathy, no asymmetry, masses, or scars and thyroid normal to palpation     RESP: lungs clear to auscultation - no rales, rhonchi or wheezes     CV: regular rates and rhythm, normal S1 S2, no S3 or S4 and no murmur, click or rub     ABDOMEN:  soft, nontender, no HSM or masses and bowel sounds normal     MS: extremities normal- no gross deformities noted, no evidence of inflammation in joints, FROM in all extremities.     SKIN: no suspicious lesions or rashes     NEURO: Normal strength and tone, sensory exam grossly normal, mentation intact and speech normal     PSYCH: mentation appears normal. and affect normal/bright     LYMPHATICS: No cervical adenopathy    Recent Labs   Lab Test 04/12/21  1536 10/29/20  1020 10/02/20  1220 10/02/20  1220 03/03/20  1324 03/03/20  1324   HGB 12.6 11.4*   < >  --    < >  --     221   < >  --    < >  --      --   --   --   --  132*   POTASSIUM 4.8  --   --  4.6  --  4.6   CR 0.65 0.61   < > 0.62  --  0.58    < > = values in this interval not displayed.        Diagnostics:     EKG: appears normal, NSR, normal axis, normal intervals, no acute ST/T changes c/w ischemia, no LVH by voltage criteria, unchanged from previous tracings    Revised Cardiac Risk Index (RCRI):  The patient has the following serious cardiovascular risks for perioperative complications:   - No serious cardiac risks = 0 points     RCRI Interpretation: 0 points: Class I (very low risk - 0.4% complication rate)           Signed Electronically by: Juan F Smallwood MD  Copy of this evaluation report is provided to requesting physician.      "

## 2021-06-07 NOTE — PHARMACY-ADMISSION MEDICATION HISTORY
PTA meds completed by pre-admitting nurse, Afia Holland, and reviewed by pharmacy     Prior to Admission medications    Medication Sig Last Dose Taking? Auth Provider   ALPRAZolam (XANAX) 0.25 MG tablet TAKE 1 TABLET BY MOUTH THREE TIMES A DAY AS NEEDED FOR ANXIETY  Yes Juan F Smallwood MD   amLODIPine (NORVASC) 2.5 MG tablet TAKE 1 TABLET BY MOUTH EVERY DAY  Yes Juan F Smallwood MD   atenolol (TENORMIN) 50 MG tablet TAKE 1 TABLET BY MOUTH TWICE A DAY  Yes Juan F Smallwood MD   fluticasone (FLONASE) 50 MCG/ACT nasal spray INSTILL 1 SPRAY INTO BOTH NOSTRILS DAILY  Yes Javier Moreno, LOLI   latanoprost (XALATAN) 0.005 % ophthalmic solution Place 1 drop into both eyes At Bedtime  Yes Unknown, Entered By History   Probiotic Product (PROBIOTIC DAILY PO) Take by mouth daily Probiotic 60 billion units with Fiber 1 PO daily  Yes Reported, Patient   timolol hemihydrate (BETIMOL) 0.25 % ophthalmic solution Place 1 drop Into the left eye every morning  Yes Juan F Smallwood MD   traZODone (DESYREL) 100 MG tablet TAKE 1 TABLET (100 MG) BY MOUTH NIGHTLY AS NEEDED FOR SLEEP  Yes Juan F Smallwood MD

## 2021-06-08 DIAGNOSIS — Z11.59 ENCOUNTER FOR SCREENING FOR OTHER VIRAL DISEASES: ICD-10-CM

## 2021-06-08 LAB
SARS-COV-2 RNA RESP QL NAA+PROBE: NORMAL
SPECIMEN SOURCE: NORMAL

## 2021-06-08 PROCEDURE — U0005 INFEC AGEN DETEC AMPLI PROBE: HCPCS | Performed by: NEUROLOGICAL SURGERY

## 2021-06-08 PROCEDURE — U0003 INFECTIOUS AGENT DETECTION BY NUCLEIC ACID (DNA OR RNA); SEVERE ACUTE RESPIRATORY SYNDROME CORONAVIRUS 2 (SARS-COV-2) (CORONAVIRUS DISEASE [COVID-19]), AMPLIFIED PROBE TECHNIQUE, MAKING USE OF HIGH THROUGHPUT TECHNOLOGIES AS DESCRIBED BY CMS-2020-01-R: HCPCS | Performed by: NEUROLOGICAL SURGERY

## 2021-06-09 LAB
LABORATORY COMMENT REPORT: NORMAL
SARS-COV-2 RNA RESP QL NAA+PROBE: NEGATIVE
SPECIMEN SOURCE: NORMAL

## 2021-06-11 ENCOUNTER — HOSPITAL ENCOUNTER (INPATIENT)
Facility: CLINIC | Age: 66
LOS: 2 days | Discharge: HOME | DRG: 454 | End: 2021-06-13
Attending: NEUROLOGICAL SURGERY | Admitting: NEUROLOGICAL SURGERY
Payer: COMMERCIAL

## 2021-06-11 ENCOUNTER — ANESTHESIA EVENT (OUTPATIENT)
Dept: SURGERY | Facility: CLINIC | Age: 66
DRG: 454 | End: 2021-06-11
Payer: COMMERCIAL

## 2021-06-11 ENCOUNTER — APPOINTMENT (OUTPATIENT)
Dept: GENERAL RADIOLOGY | Facility: CLINIC | Age: 66
DRG: 454 | End: 2021-06-11
Attending: NEUROLOGICAL SURGERY
Payer: COMMERCIAL

## 2021-06-11 ENCOUNTER — ANESTHESIA (OUTPATIENT)
Dept: SURGERY | Facility: CLINIC | Age: 66
DRG: 454 | End: 2021-06-11
Payer: COMMERCIAL

## 2021-06-11 DIAGNOSIS — Z98.1 S/P LUMBAR FUSION: Primary | ICD-10-CM

## 2021-06-11 LAB
ABO + RH BLD: NORMAL
ABO + RH BLD: NORMAL
BLD GP AB SCN SERPL QL: NORMAL
BLOOD BANK CMNT PATIENT-IMP: NORMAL
CREAT SERPL-MCNC: 0.65 MG/DL (ref 0.52–1.04)
GFR SERPL CREATININE-BSD FRML MDRD: >90 ML/MIN/{1.73_M2}
HGB BLD-MCNC: 12.5 G/DL (ref 11.7–15.7)
POTASSIUM SERPL-SCNC: 4.7 MMOL/L (ref 3.4–5.3)
SPECIMEN EXP DATE BLD: NORMAL

## 2021-06-11 PROCEDURE — 0SG00AJ FUSION OF LUMBAR VERTEBRAL JOINT WITH INTERBODY FUSION DEVICE, POSTERIOR APPROACH, ANTERIOR COLUMN, OPEN APPROACH: ICD-10-PCS | Performed by: NEUROLOGICAL SURGERY

## 2021-06-11 PROCEDURE — 250N000011 HC RX IP 250 OP 636: Performed by: NURSE PRACTITIONER

## 2021-06-11 PROCEDURE — 250N000011 HC RX IP 250 OP 636: Performed by: NURSE ANESTHETIST, CERTIFIED REGISTERED

## 2021-06-11 PROCEDURE — 0ST20ZZ RESECTION OF LUMBAR VERTEBRAL DISC, OPEN APPROACH: ICD-10-PCS | Performed by: NEUROLOGICAL SURGERY

## 2021-06-11 PROCEDURE — 84132 ASSAY OF SERUM POTASSIUM: CPT | Performed by: ANESTHESIOLOGY

## 2021-06-11 PROCEDURE — 250N000013 HC RX MED GY IP 250 OP 250 PS 637: Performed by: NURSE PRACTITIONER

## 2021-06-11 PROCEDURE — 250N000009 HC RX 250: Performed by: NEUROLOGICAL SURGERY

## 2021-06-11 PROCEDURE — 360N000085 HC SURGERY LEVEL 5 W/ FLUORO, PER MIN: Performed by: NEUROLOGICAL SURGERY

## 2021-06-11 PROCEDURE — 0SG0071 FUSION OF LUMBAR VERTEBRAL JOINT WITH AUTOLOGOUS TISSUE SUBSTITUTE, POSTERIOR APPROACH, POSTERIOR COLUMN, OPEN APPROACH: ICD-10-PCS | Performed by: NEUROLOGICAL SURGERY

## 2021-06-11 PROCEDURE — 999N000141 HC STATISTIC PRE-PROCEDURE NURSING ASSESSMENT: Performed by: NEUROLOGICAL SURGERY

## 2021-06-11 PROCEDURE — 8E0WXBF COMPUTER ASSISTED PROCEDURE OF TRUNK REGION, WITH FLUOROSCOPY: ICD-10-PCS | Performed by: NEUROLOGICAL SURGERY

## 2021-06-11 PROCEDURE — 86850 RBC ANTIBODY SCREEN: CPT | Performed by: ANESTHESIOLOGY

## 2021-06-11 PROCEDURE — 86901 BLOOD TYPING SEROLOGIC RH(D): CPT | Performed by: ANESTHESIOLOGY

## 2021-06-11 PROCEDURE — 250N000011 HC RX IP 250 OP 636: Performed by: NEUROLOGICAL SURGERY

## 2021-06-11 PROCEDURE — 250N000005 HC OR RX SURGIFLO HEMOSTATIC MATRIX 10ML 199102S OPNP: Performed by: NEUROLOGICAL SURGERY

## 2021-06-11 PROCEDURE — 258N000003 HC RX IP 258 OP 636: Performed by: NURSE PRACTITIONER

## 2021-06-11 PROCEDURE — C1713 ANCHOR/SCREW BN/BN,TIS/BN: HCPCS | Performed by: NEUROLOGICAL SURGERY

## 2021-06-11 PROCEDURE — 250N000013 HC RX MED GY IP 250 OP 250 PS 637: Performed by: ANESTHESIOLOGY

## 2021-06-11 PROCEDURE — 999N000179 XR SURGERY CARM FLUORO LESS THAN 5 MIN W STILLS: Mod: TC

## 2021-06-11 PROCEDURE — 250N000009 HC RX 250: Performed by: NURSE ANESTHETIST, CERTIFIED REGISTERED

## 2021-06-11 PROCEDURE — 120N000001 HC R&B MED SURG/OB

## 2021-06-11 PROCEDURE — 01NB0ZZ RELEASE LUMBAR NERVE, OPEN APPROACH: ICD-10-PCS | Performed by: NEUROLOGICAL SURGERY

## 2021-06-11 PROCEDURE — 999N000063 XR LUMBAR SPINE PORT 1  VIEW

## 2021-06-11 PROCEDURE — 36415 COLL VENOUS BLD VENIPUNCTURE: CPT | Performed by: ANESTHESIOLOGY

## 2021-06-11 PROCEDURE — 250N000009 HC RX 250: Performed by: NURSE PRACTITIONER

## 2021-06-11 PROCEDURE — 710N000010 HC RECOVERY PHASE 1, LEVEL 2, PER MIN: Performed by: NEUROLOGICAL SURGERY

## 2021-06-11 PROCEDURE — 85018 HEMOGLOBIN: CPT | Performed by: ANESTHESIOLOGY

## 2021-06-11 PROCEDURE — 272N000001 HC OR GENERAL SUPPLY STERILE: Performed by: NEUROLOGICAL SURGERY

## 2021-06-11 PROCEDURE — 250N000011 HC RX IP 250 OP 636: Performed by: ANESTHESIOLOGY

## 2021-06-11 PROCEDURE — 258N000003 HC RX IP 258 OP 636: Performed by: ANESTHESIOLOGY

## 2021-06-11 PROCEDURE — 370N000017 HC ANESTHESIA TECHNICAL FEE, PER MIN: Performed by: NEUROLOGICAL SURGERY

## 2021-06-11 PROCEDURE — 82565 ASSAY OF CREATININE: CPT | Performed by: ANESTHESIOLOGY

## 2021-06-11 PROCEDURE — 86900 BLOOD TYPING SEROLOGIC ABO: CPT | Performed by: ANESTHESIOLOGY

## 2021-06-11 DEVICE — IMPLANTABLE DEVICE: Type: IMPLANTABLE DEVICE | Site: SPINE LUMBAR | Status: FUNCTIONAL

## 2021-06-11 RX ORDER — DEXAMETHASONE SODIUM PHOSPHATE 4 MG/ML
INJECTION, SOLUTION INTRA-ARTICULAR; INTRALESIONAL; INTRAMUSCULAR; INTRAVENOUS; SOFT TISSUE PRN
Status: DISCONTINUED | OUTPATIENT
Start: 2021-06-11 | End: 2021-06-11

## 2021-06-11 RX ORDER — TIMOLOL MALEATE 2.5 MG/ML
1 SOLUTION/ DROPS OPHTHALMIC EVERY MORNING
Status: DISCONTINUED | OUTPATIENT
Start: 2021-06-12 | End: 2021-06-13 | Stop reason: HOSPADM

## 2021-06-11 RX ORDER — ACETAMINOPHEN 325 MG/1
975 TABLET ORAL ONCE
Status: COMPLETED | OUTPATIENT
Start: 2021-06-11 | End: 2021-06-11

## 2021-06-11 RX ORDER — OXYCODONE HYDROCHLORIDE 5 MG/1
5 TABLET ORAL EVERY 4 HOURS PRN
Status: DISCONTINUED | OUTPATIENT
Start: 2021-06-11 | End: 2021-06-12

## 2021-06-11 RX ORDER — GLYCOPYRROLATE 0.2 MG/ML
INJECTION, SOLUTION INTRAMUSCULAR; INTRAVENOUS PRN
Status: DISCONTINUED | OUTPATIENT
Start: 2021-06-11 | End: 2021-06-11

## 2021-06-11 RX ORDER — MAGNESIUM HYDROXIDE 1200 MG/15ML
LIQUID ORAL PRN
Status: DISCONTINUED | OUTPATIENT
Start: 2021-06-11 | End: 2021-06-11 | Stop reason: HOSPADM

## 2021-06-11 RX ORDER — AMLODIPINE BESYLATE 2.5 MG/1
2.5 TABLET ORAL DAILY
Status: DISCONTINUED | OUTPATIENT
Start: 2021-06-12 | End: 2021-06-13 | Stop reason: HOSPADM

## 2021-06-11 RX ORDER — LIDOCAINE HYDROCHLORIDE 40 MG/ML
SOLUTION TOPICAL PRN
Status: DISCONTINUED | OUTPATIENT
Start: 2021-06-11 | End: 2021-06-11

## 2021-06-11 RX ORDER — ATENOLOL 50 MG/1
50 TABLET ORAL DAILY
Status: DISCONTINUED | OUTPATIENT
Start: 2021-06-12 | End: 2021-06-13 | Stop reason: HOSPADM

## 2021-06-11 RX ORDER — BUPIVACAINE HYDROCHLORIDE AND EPINEPHRINE 5; 5 MG/ML; UG/ML
INJECTION, SOLUTION EPIDURAL; INTRACAUDAL; PERINEURAL PRN
Status: DISCONTINUED | OUTPATIENT
Start: 2021-06-11 | End: 2021-06-11 | Stop reason: HOSPADM

## 2021-06-11 RX ORDER — ONDANSETRON 2 MG/ML
INJECTION INTRAMUSCULAR; INTRAVENOUS PRN
Status: DISCONTINUED | OUTPATIENT
Start: 2021-06-11 | End: 2021-06-11

## 2021-06-11 RX ORDER — AMOXICILLIN 250 MG
2 CAPSULE ORAL 2 TIMES DAILY
Status: DISCONTINUED | OUTPATIENT
Start: 2021-06-11 | End: 2021-06-11

## 2021-06-11 RX ORDER — METHOCARBAMOL 750 MG/1
750 TABLET, FILM COATED ORAL EVERY 6 HOURS PRN
Status: DISCONTINUED | OUTPATIENT
Start: 2021-06-11 | End: 2021-06-13 | Stop reason: HOSPADM

## 2021-06-11 RX ORDER — CELECOXIB 200 MG/1
400 CAPSULE ORAL ONCE
Status: COMPLETED | OUTPATIENT
Start: 2021-06-11 | End: 2021-06-11

## 2021-06-11 RX ORDER — EPHEDRINE SULFATE 50 MG/ML
INJECTION, SOLUTION INTRAMUSCULAR; INTRAVENOUS; SUBCUTANEOUS PRN
Status: DISCONTINUED | OUTPATIENT
Start: 2021-06-11 | End: 2021-06-11

## 2021-06-11 RX ORDER — CEFAZOLIN SODIUM 2 G/100ML
2 INJECTION, SOLUTION INTRAVENOUS SEE ADMIN INSTRUCTIONS
Status: DISCONTINUED | OUTPATIENT
Start: 2021-06-11 | End: 2021-06-11 | Stop reason: HOSPADM

## 2021-06-11 RX ORDER — AMOXICILLIN 250 MG
1 CAPSULE ORAL 2 TIMES DAILY
Status: DISCONTINUED | OUTPATIENT
Start: 2021-06-11 | End: 2021-06-13 | Stop reason: HOSPADM

## 2021-06-11 RX ORDER — ACETAMINOPHEN 325 MG/1
650 TABLET ORAL EVERY 4 HOURS PRN
Status: DISCONTINUED | OUTPATIENT
Start: 2021-06-14 | End: 2021-06-13 | Stop reason: HOSPADM

## 2021-06-11 RX ORDER — NALOXONE HYDROCHLORIDE 0.4 MG/ML
0.4 INJECTION, SOLUTION INTRAMUSCULAR; INTRAVENOUS; SUBCUTANEOUS
Status: DISCONTINUED | OUTPATIENT
Start: 2021-06-11 | End: 2021-06-11 | Stop reason: HOSPADM

## 2021-06-11 RX ORDER — HYDROMORPHONE HYDROCHLORIDE 1 MG/ML
.3-.5 INJECTION, SOLUTION INTRAMUSCULAR; INTRAVENOUS; SUBCUTANEOUS EVERY 10 MIN PRN
Status: DISCONTINUED | OUTPATIENT
Start: 2021-06-11 | End: 2021-06-11 | Stop reason: HOSPADM

## 2021-06-11 RX ORDER — CLINDAMYCIN PHOSPHATE 900 MG/50ML
900 INJECTION, SOLUTION INTRAVENOUS EVERY 8 HOURS
Status: DISCONTINUED | OUTPATIENT
Start: 2021-06-11 | End: 2021-06-13 | Stop reason: HOSPADM

## 2021-06-11 RX ORDER — HYDROMORPHONE HCL IN WATER/PF 6 MG/30 ML
0.4 PATIENT CONTROLLED ANALGESIA SYRINGE INTRAVENOUS
Status: DISCONTINUED | OUTPATIENT
Start: 2021-06-11 | End: 2021-06-13 | Stop reason: HOSPADM

## 2021-06-11 RX ORDER — LATANOPROST 50 UG/ML
1 SOLUTION/ DROPS OPHTHALMIC AT BEDTIME
Status: DISCONTINUED | OUTPATIENT
Start: 2021-06-11 | End: 2021-06-13 | Stop reason: HOSPADM

## 2021-06-11 RX ORDER — ONDANSETRON 2 MG/ML
4 INJECTION INTRAMUSCULAR; INTRAVENOUS EVERY 6 HOURS PRN
Status: DISCONTINUED | OUTPATIENT
Start: 2021-06-11 | End: 2021-06-11

## 2021-06-11 RX ORDER — ALBUTEROL SULFATE 0.83 MG/ML
2.5 SOLUTION RESPIRATORY (INHALATION) EVERY 4 HOURS PRN
Status: DISCONTINUED | OUTPATIENT
Start: 2021-06-11 | End: 2021-06-11 | Stop reason: HOSPADM

## 2021-06-11 RX ORDER — NEOSTIGMINE METHYLSULFATE 1 MG/ML
VIAL (ML) INJECTION PRN
Status: DISCONTINUED | OUTPATIENT
Start: 2021-06-11 | End: 2021-06-11

## 2021-06-11 RX ORDER — CEFAZOLIN SODIUM 2 G/100ML
2 INJECTION, SOLUTION INTRAVENOUS
Status: COMPLETED | OUTPATIENT
Start: 2021-06-11 | End: 2021-06-11

## 2021-06-11 RX ORDER — FENTANYL CITRATE 50 UG/ML
25-50 INJECTION, SOLUTION INTRAMUSCULAR; INTRAVENOUS
Status: DISCONTINUED | OUTPATIENT
Start: 2021-06-11 | End: 2021-06-11 | Stop reason: HOSPADM

## 2021-06-11 RX ORDER — MEPERIDINE HYDROCHLORIDE 25 MG/ML
12.5 INJECTION INTRAMUSCULAR; INTRAVENOUS; SUBCUTANEOUS
Status: DISCONTINUED | OUTPATIENT
Start: 2021-06-11 | End: 2021-06-11 | Stop reason: HOSPADM

## 2021-06-11 RX ORDER — NALOXONE HYDROCHLORIDE 0.4 MG/ML
0.4 INJECTION, SOLUTION INTRAMUSCULAR; INTRAVENOUS; SUBCUTANEOUS
Status: DISCONTINUED | OUTPATIENT
Start: 2021-06-11 | End: 2021-06-13 | Stop reason: HOSPADM

## 2021-06-11 RX ORDER — METHOCARBAMOL 500 MG/1
500 TABLET, FILM COATED ORAL 3 TIMES DAILY PRN
Qty: 40 TABLET | Refills: 0 | Status: SHIPPED | OUTPATIENT
Start: 2021-06-11 | End: 2021-08-13

## 2021-06-11 RX ORDER — ONDANSETRON 2 MG/ML
4 INJECTION INTRAMUSCULAR; INTRAVENOUS EVERY 6 HOURS PRN
Status: DISCONTINUED | OUTPATIENT
Start: 2021-06-11 | End: 2021-06-13 | Stop reason: HOSPADM

## 2021-06-11 RX ORDER — LIDOCAINE 40 MG/G
CREAM TOPICAL
Status: DISCONTINUED | OUTPATIENT
Start: 2021-06-11 | End: 2021-06-13 | Stop reason: HOSPADM

## 2021-06-11 RX ORDER — AMOXICILLIN 250 MG
1 CAPSULE ORAL 2 TIMES DAILY
Status: DISCONTINUED | OUTPATIENT
Start: 2021-06-11 | End: 2021-06-11

## 2021-06-11 RX ORDER — OXYCODONE HYDROCHLORIDE 5 MG/1
10 TABLET ORAL EVERY 4 HOURS PRN
Status: DISCONTINUED | OUTPATIENT
Start: 2021-06-11 | End: 2021-06-12

## 2021-06-11 RX ORDER — HYDROMORPHONE HCL IN WATER/PF 6 MG/30 ML
0.2 PATIENT CONTROLLED ANALGESIA SYRINGE INTRAVENOUS
Status: DISCONTINUED | OUTPATIENT
Start: 2021-06-11 | End: 2021-06-13 | Stop reason: HOSPADM

## 2021-06-11 RX ORDER — NALOXONE HYDROCHLORIDE 0.4 MG/ML
0.2 INJECTION, SOLUTION INTRAMUSCULAR; INTRAVENOUS; SUBCUTANEOUS
Status: DISCONTINUED | OUTPATIENT
Start: 2021-06-11 | End: 2021-06-11 | Stop reason: HOSPADM

## 2021-06-11 RX ORDER — FLUTICASONE PROPIONATE 50 MCG
1 SPRAY, SUSPENSION (ML) NASAL DAILY
Status: DISCONTINUED | OUTPATIENT
Start: 2021-06-12 | End: 2021-06-13 | Stop reason: HOSPADM

## 2021-06-11 RX ORDER — NALOXONE HYDROCHLORIDE 0.4 MG/ML
0.2 INJECTION, SOLUTION INTRAMUSCULAR; INTRAVENOUS; SUBCUTANEOUS
Status: DISCONTINUED | OUTPATIENT
Start: 2021-06-11 | End: 2021-06-13 | Stop reason: HOSPADM

## 2021-06-11 RX ORDER — DIPHENHYDRAMINE HCL 12.5MG/5ML
12.5 LIQUID (ML) ORAL EVERY 6 HOURS PRN
Status: DISCONTINUED | OUTPATIENT
Start: 2021-06-11 | End: 2021-06-12

## 2021-06-11 RX ORDER — VANCOMYCIN HYDROCHLORIDE 1 G/20ML
INJECTION, POWDER, LYOPHILIZED, FOR SOLUTION INTRAVENOUS PRN
Status: DISCONTINUED | OUTPATIENT
Start: 2021-06-11 | End: 2021-06-11 | Stop reason: HOSPADM

## 2021-06-11 RX ORDER — HYDRALAZINE HYDROCHLORIDE 20 MG/ML
2.5-5 INJECTION INTRAMUSCULAR; INTRAVENOUS EVERY 10 MIN PRN
Status: DISCONTINUED | OUTPATIENT
Start: 2021-06-11 | End: 2021-06-11 | Stop reason: HOSPADM

## 2021-06-11 RX ORDER — ONDANSETRON 4 MG/1
4 TABLET, ORALLY DISINTEGRATING ORAL EVERY 30 MIN PRN
Status: DISCONTINUED | OUTPATIENT
Start: 2021-06-11 | End: 2021-06-11 | Stop reason: HOSPADM

## 2021-06-11 RX ORDER — PROPOFOL 10 MG/ML
INJECTION, EMULSION INTRAVENOUS CONTINUOUS PRN
Status: DISCONTINUED | OUTPATIENT
Start: 2021-06-11 | End: 2021-06-11

## 2021-06-11 RX ORDER — ONDANSETRON 2 MG/ML
4 INJECTION INTRAMUSCULAR; INTRAVENOUS EVERY 30 MIN PRN
Status: DISCONTINUED | OUTPATIENT
Start: 2021-06-11 | End: 2021-06-11 | Stop reason: HOSPADM

## 2021-06-11 RX ORDER — DIPHENHYDRAMINE HYDROCHLORIDE 50 MG/ML
12.5 INJECTION INTRAMUSCULAR; INTRAVENOUS EVERY 6 HOURS PRN
Status: DISCONTINUED | OUTPATIENT
Start: 2021-06-11 | End: 2021-06-12

## 2021-06-11 RX ORDER — PROPOFOL 10 MG/ML
INJECTION, EMULSION INTRAVENOUS PRN
Status: DISCONTINUED | OUTPATIENT
Start: 2021-06-11 | End: 2021-06-11

## 2021-06-11 RX ORDER — POLYETHYLENE GLYCOL 3350 17 G/17G
17 POWDER, FOR SOLUTION ORAL DAILY
Status: DISCONTINUED | OUTPATIENT
Start: 2021-06-12 | End: 2021-06-13 | Stop reason: HOSPADM

## 2021-06-11 RX ORDER — HYDROXYZINE HYDROCHLORIDE 10 MG/1
10 TABLET, FILM COATED ORAL EVERY 6 HOURS PRN
Status: DISCONTINUED | OUTPATIENT
Start: 2021-06-11 | End: 2021-06-12

## 2021-06-11 RX ORDER — DOCUSATE SODIUM 100 MG/1
100 CAPSULE, LIQUID FILLED ORAL 2 TIMES DAILY
Status: DISCONTINUED | OUTPATIENT
Start: 2021-06-11 | End: 2021-06-13 | Stop reason: HOSPADM

## 2021-06-11 RX ORDER — TRAZODONE HYDROCHLORIDE 100 MG/1
100 TABLET ORAL
Status: DISCONTINUED | OUTPATIENT
Start: 2021-06-11 | End: 2021-06-13 | Stop reason: HOSPADM

## 2021-06-11 RX ORDER — BISACODYL 10 MG
10 SUPPOSITORY, RECTAL RECTAL DAILY PRN
Status: DISCONTINUED | OUTPATIENT
Start: 2021-06-11 | End: 2021-06-13 | Stop reason: HOSPADM

## 2021-06-11 RX ORDER — LIDOCAINE HYDROCHLORIDE 10 MG/ML
INJECTION, SOLUTION INFILTRATION; PERINEURAL PRN
Status: DISCONTINUED | OUTPATIENT
Start: 2021-06-11 | End: 2021-06-11

## 2021-06-11 RX ORDER — ALPRAZOLAM 0.25 MG
0.25 TABLET ORAL 3 TIMES DAILY PRN
Status: DISCONTINUED | OUTPATIENT
Start: 2021-06-11 | End: 2021-06-13 | Stop reason: HOSPADM

## 2021-06-11 RX ORDER — ACETAMINOPHEN 325 MG/1
975 TABLET ORAL EVERY 8 HOURS
Status: DISCONTINUED | OUTPATIENT
Start: 2021-06-11 | End: 2021-06-13 | Stop reason: HOSPADM

## 2021-06-11 RX ORDER — FAMOTIDINE 20 MG/1
20 TABLET, FILM COATED ORAL 2 TIMES DAILY
Status: DISCONTINUED | OUTPATIENT
Start: 2021-06-11 | End: 2021-06-13 | Stop reason: HOSPADM

## 2021-06-11 RX ORDER — POLYETHYLENE GLYCOL 3350 17 G/17G
17 POWDER, FOR SOLUTION ORAL DAILY
Status: DISCONTINUED | OUTPATIENT
Start: 2021-06-12 | End: 2021-06-11

## 2021-06-11 RX ORDER — SODIUM CHLORIDE, SODIUM LACTATE, POTASSIUM CHLORIDE, CALCIUM CHLORIDE 600; 310; 30; 20 MG/100ML; MG/100ML; MG/100ML; MG/100ML
INJECTION, SOLUTION INTRAVENOUS CONTINUOUS
Status: DISCONTINUED | OUTPATIENT
Start: 2021-06-11 | End: 2021-06-11 | Stop reason: HOSPADM

## 2021-06-11 RX ORDER — FENTANYL CITRATE 50 UG/ML
INJECTION, SOLUTION INTRAMUSCULAR; INTRAVENOUS PRN
Status: DISCONTINUED | OUTPATIENT
Start: 2021-06-11 | End: 2021-06-11

## 2021-06-11 RX ORDER — LIDOCAINE 40 MG/G
CREAM TOPICAL
Status: DISCONTINUED | OUTPATIENT
Start: 2021-06-11 | End: 2021-06-11 | Stop reason: HOSPADM

## 2021-06-11 RX ORDER — SODIUM CHLORIDE 9 MG/ML
INJECTION, SOLUTION INTRAVENOUS CONTINUOUS
Status: DISCONTINUED | OUTPATIENT
Start: 2021-06-11 | End: 2021-06-13

## 2021-06-11 RX ORDER — ONDANSETRON 4 MG/1
4 TABLET, ORALLY DISINTEGRATING ORAL EVERY 6 HOURS PRN
Status: DISCONTINUED | OUTPATIENT
Start: 2021-06-11 | End: 2021-06-11

## 2021-06-11 RX ORDER — LABETALOL HYDROCHLORIDE 5 MG/ML
10 INJECTION, SOLUTION INTRAVENOUS
Status: DISCONTINUED | OUTPATIENT
Start: 2021-06-11 | End: 2021-06-11 | Stop reason: HOSPADM

## 2021-06-11 RX ORDER — PROCHLORPERAZINE MALEATE 5 MG
5 TABLET ORAL EVERY 6 HOURS PRN
Status: DISCONTINUED | OUTPATIENT
Start: 2021-06-11 | End: 2021-06-13 | Stop reason: HOSPADM

## 2021-06-11 RX ORDER — OXYCODONE HYDROCHLORIDE 5 MG/1
5-10 TABLET ORAL EVERY 6 HOURS PRN
Qty: 40 TABLET | Refills: 0 | Status: SHIPPED | OUTPATIENT
Start: 2021-06-11 | End: 2021-06-13

## 2021-06-11 RX ORDER — ONDANSETRON 4 MG/1
4 TABLET, ORALLY DISINTEGRATING ORAL EVERY 6 HOURS PRN
Status: DISCONTINUED | OUTPATIENT
Start: 2021-06-11 | End: 2021-06-13 | Stop reason: HOSPADM

## 2021-06-11 RX ADMIN — FENTANYL CITRATE 50 MCG: 50 INJECTION, SOLUTION INTRAMUSCULAR; INTRAVENOUS at 13:39

## 2021-06-11 RX ADMIN — FENTANYL CITRATE 50 MCG: 50 INJECTION, SOLUTION INTRAMUSCULAR; INTRAVENOUS at 12:49

## 2021-06-11 RX ADMIN — SODIUM CHLORIDE, POTASSIUM CHLORIDE, SODIUM LACTATE AND CALCIUM CHLORIDE: 600; 310; 30; 20 INJECTION, SOLUTION INTRAVENOUS at 11:35

## 2021-06-11 RX ADMIN — FENTANYL CITRATE 50 MCG: 50 INJECTION, SOLUTION INTRAMUSCULAR; INTRAVENOUS at 16:08

## 2021-06-11 RX ADMIN — PROPOFOL 75 MCG/KG/MIN: 10 INJECTION, EMULSION INTRAVENOUS at 12:35

## 2021-06-11 RX ADMIN — ROCURONIUM BROMIDE 50 MG: 10 INJECTION INTRAVENOUS at 12:24

## 2021-06-11 RX ADMIN — Medication 5 MG: at 13:03

## 2021-06-11 RX ADMIN — FENTANYL CITRATE 50 MCG: 50 INJECTION, SOLUTION INTRAMUSCULAR; INTRAVENOUS at 15:34

## 2021-06-11 RX ADMIN — ONDANSETRON HYDROCHLORIDE 4 MG: 2 INJECTION, SOLUTION INTRAVENOUS at 15:12

## 2021-06-11 RX ADMIN — FENTANYL CITRATE 100 MCG: 50 INJECTION, SOLUTION INTRAMUSCULAR; INTRAVENOUS at 15:55

## 2021-06-11 RX ADMIN — SODIUM CHLORIDE, POTASSIUM CHLORIDE, SODIUM LACTATE AND CALCIUM CHLORIDE: 600; 310; 30; 20 INJECTION, SOLUTION INTRAVENOUS at 14:55

## 2021-06-11 RX ADMIN — GLYCOPYRROLATE 0.4 MG: 0.2 INJECTION, SOLUTION INTRAMUSCULAR; INTRAVENOUS at 15:45

## 2021-06-11 RX ADMIN — CLINDAMYCIN PHOSPHATE 900 MG: 900 INJECTION, SOLUTION INTRAVENOUS at 20:55

## 2021-06-11 RX ADMIN — HYDROMORPHONE HYDROCHLORIDE 0.5 MG: 1 INJECTION, SOLUTION INTRAMUSCULAR; INTRAVENOUS; SUBCUTANEOUS at 16:35

## 2021-06-11 RX ADMIN — DEXAMETHASONE SODIUM PHOSPHATE 4 MG: 4 INJECTION, SOLUTION INTRA-ARTICULAR; INTRALESIONAL; INTRAMUSCULAR; INTRAVENOUS; SOFT TISSUE at 12:24

## 2021-06-11 RX ADMIN — Medication 10 MG: at 12:27

## 2021-06-11 RX ADMIN — PROPOFOL 120 MG: 10 INJECTION, EMULSION INTRAVENOUS at 12:24

## 2021-06-11 RX ADMIN — DOCUSATE SODIUM 100 MG: 100 CAPSULE, LIQUID FILLED ORAL at 19:41

## 2021-06-11 RX ADMIN — LATANOPROST 1 DROP: 50 SOLUTION OPHTHALMIC at 21:00

## 2021-06-11 RX ADMIN — CELECOXIB 400 MG: 200 CAPSULE ORAL at 11:11

## 2021-06-11 RX ADMIN — NEOSTIGMINE METHYLSULFATE 3 MG: 1 INJECTION, SOLUTION INTRAVENOUS at 15:45

## 2021-06-11 RX ADMIN — FENTANYL CITRATE 50 MCG: 50 INJECTION, SOLUTION INTRAMUSCULAR; INTRAVENOUS at 17:10

## 2021-06-11 RX ADMIN — MIDAZOLAM 2 MG: 1 INJECTION INTRAMUSCULAR; INTRAVENOUS at 12:09

## 2021-06-11 RX ADMIN — HYDRALAZINE HYDROCHLORIDE 5 MG: 20 INJECTION INTRAMUSCULAR; INTRAVENOUS at 17:11

## 2021-06-11 RX ADMIN — Medication 10 MG: at 12:39

## 2021-06-11 RX ADMIN — ONDANSETRON 4 MG: 4 TABLET, ORALLY DISINTEGRATING ORAL at 23:56

## 2021-06-11 RX ADMIN — ROCURONIUM BROMIDE 20 MG: 10 INJECTION INTRAVENOUS at 13:46

## 2021-06-11 RX ADMIN — HYDROMORPHONE HYDROCHLORIDE 0.5 MG: 1 INJECTION, SOLUTION INTRAMUSCULAR; INTRAVENOUS; SUBCUTANEOUS at 16:23

## 2021-06-11 RX ADMIN — FENTANYL CITRATE 150 MCG: 50 INJECTION, SOLUTION INTRAMUSCULAR; INTRAVENOUS at 12:24

## 2021-06-11 RX ADMIN — LIDOCAINE HYDROCHLORIDE 50 MG: 10 INJECTION, SOLUTION INFILTRATION; PERINEURAL at 12:24

## 2021-06-11 RX ADMIN — HYDROMORPHONE HYDROCHLORIDE 0.5 MG: 1 INJECTION, SOLUTION INTRAMUSCULAR; INTRAVENOUS; SUBCUTANEOUS at 16:50

## 2021-06-11 RX ADMIN — ACETAMINOPHEN 975 MG: 325 TABLET, FILM COATED ORAL at 11:11

## 2021-06-11 RX ADMIN — LIDOCAINE HYDROCHLORIDE 4 ML: 40 SOLUTION TOPICAL at 12:25

## 2021-06-11 RX ADMIN — FAMOTIDINE 20 MG: 20 TABLET ORAL at 19:41

## 2021-06-11 RX ADMIN — HYDRALAZINE HYDROCHLORIDE 5 MG: 20 INJECTION INTRAMUSCULAR; INTRAVENOUS at 17:00

## 2021-06-11 RX ADMIN — CEFAZOLIN SODIUM 2 G: 2 INJECTION, SOLUTION INTRAVENOUS at 12:10

## 2021-06-11 RX ADMIN — FENTANYL CITRATE 100 MCG: 50 INJECTION, SOLUTION INTRAMUSCULAR; INTRAVENOUS at 14:28

## 2021-06-11 RX ADMIN — SODIUM CHLORIDE: 9 INJECTION, SOLUTION INTRAVENOUS at 18:36

## 2021-06-11 RX ADMIN — Medication: at 18:32

## 2021-06-11 RX ADMIN — FENTANYL CITRATE 50 MCG: 50 INJECTION, SOLUTION INTRAMUSCULAR; INTRAVENOUS at 16:14

## 2021-06-11 RX ADMIN — DOCUSATE SODIUM AND SENNOSIDES 1 TABLET: 8.6; 5 TABLET, FILM COATED ORAL at 19:41

## 2021-06-11 ASSESSMENT — LIFESTYLE VARIABLES: TOBACCO_USE: 1

## 2021-06-11 ASSESSMENT — ACTIVITIES OF DAILY LIVING (ADL): ADLS_ACUITY_SCORE: 14

## 2021-06-11 ASSESSMENT — MIFFLIN-ST. JEOR: SCORE: 1170.01

## 2021-06-11 NOTE — ANESTHESIA PROCEDURE NOTES
Airway       Patient location during procedure: OR       Procedure Start/Stop Times: 6/11/2021 12:25 PM  Staff -        Anesthesiologist:  Igor Olmedo MD       CRNA: Lu Motta APRN CRNA       Performed By: CRNA  Consent for Airway        Urgency: elective  Indications and Patient Condition       Indications for airway management: jack-procedural       Induction type:intravenous       Mask difficulty assessment: 1 - vent by mask    Final Airway Details       Final airway type: endotracheal airway       Successful airway: ETT - single  Endotracheal Airway Details        ETT size (mm): 7.0       Cuffed: yes       Cuff volume (mL): 4       Successful intubation technique: direct laryngoscopy       DL Blade Type: Murrell 2       Grade View of Cords: 1       Adjucts: stylet       Position: Right       Measured from: lips       Secured at (cm): 21       Bite block used: None    Post intubation assessment        Placement verified by: capnometry, equal breath sounds and chest rise        Number of attempts at approach: 1       Number of other approaches attempted: 0       Secured with: other (comment) (silicone tape)       Ease of procedure: easy       Dentition: Intact and Unchanged    Medication(s) Administered   Medication Administration Time: 6/11/2021 12:25 PM

## 2021-06-11 NOTE — ANESTHESIA PREPROCEDURE EVALUATION
Anesthesia Pre-Procedure Evaluation    Patient: Denise Ralph   MRN: [id number] : 1955        Preoperative Diagnosis: Pseudoclaudication syndrome [M48.062]  Lumbar radiculopathy [M54.16]   Procedure : Procedure(s):  Lumbar 3 to Lumbar 4 adjacent level transforaminal lumbar interbody fusion     Past Medical History:   Diagnosis Date     Anxiety     Gets panic attacks     Complication of anesthesia      Depressive disorder, not elsewhere classified      Essential hypertension, benign     No cardiologist     Irritable bowel syndrome     has had neg colonoscopy & EGD w/u     Lumbar degenerative disc disease     Had PT, traction, no surgery     Other chronic pain     JOint pain for many years.     PONV (postoperative nausea and vomiting)      Sciatica 3/06    R thigh;  MRI = R L2-3 disc      Unspecified glaucoma(365.9)       Past Surgical History:   Procedure Laterality Date     EYE SURGERY Bilateral     Treatment of glaucoma     HYSTERECTOMY       HYSTERECTOMY VAGINAL       IRRIGATION AND DEBRIDEMENT UPPER EXTREMITY, COMBINED Right 10/2/2020    Procedure: Right shoulder arthroscopic extensive debridement of the glenohumeral joint and subacromial space for infection.;  Surgeon: Umesh Berman MD;  Location: [facility] OR     LAPAROSCOPIC CHOLECYSTECTOMY      with repeat operation because of bile leak     Left shoulder decomp surgery for impingement  approx      OPTICAL TRACKING SYSTEM FUSION POSTERIOR SPINE LUMBAR N/A 8/3/2017    Procedure: OPTICAL TRACKING SYSTEM FUSION SPINE POSTERIOR LUMBAR ONE LEVEL;  1. L4 Ang-Meyers osteotomy with exposure of the L4 and L5 roots  2. L4-5 complete discectomy with arthrodesis  3. Placement of Globus Creo GOMEZ pedicle screws bilaterally from L4 to L5  4. L4 - L5 posterior lateral fusion with placement of Medtronic Magnifuse and locally harvested autologous bone  5. Use of Stealth and O     ORTHOPEDIC SURGERY Right 10/2020    I & D shoulder     WRIST SURGERY Left        Allergies   Allergen Reactions     Erythromycin Shortness Of Breath     Msir [Morphine Sulfate] Nausea     Intollerant to Morphine Sulfate: Nausea,vomiting     Dilaudid [Hydromorphone] Visual Disturbance     Hallucinations     Diuretic      Patient states she gets sick and loses weight from diuretics.     Lisinopril      Cough       Losartan      Tongue felt weird       Penicillins Itching     Reglan Other (See Comments)     Muscle spasms in throat      Social History     Tobacco Use     Smoking status: Former Smoker     Packs/day: 0.50     Years: 25.00     Pack years: 12.50     Quit date: 10/1/1997     Years since quittin.7     Smokeless tobacco: Never Used     Tobacco comment: quit    Substance Use Topics     Alcohol use: Yes     Alcohol/week: 1.7 standard drinks     Types: 2 Glasses of wine per week     Comment: 2 glasses of wine daily      Wt Readings from Last 1 Encounters:   21 63.4 kg (139 lb 12.8 oz)        Anesthesia Evaluation   Pt has had prior anesthetic. Type: General.    History of anesthetic complications  - PONV.      ROS/MED HX  ENT/Pulmonary:     (+) tobacco use, Past use,     Neurologic:     (+) peripheral neuropathy,     Cardiovascular:     (+) hypertension-----    METS/Exercise Tolerance:     Hematologic:  - neg hematologic  ROS     Musculoskeletal:   (+) arthritis,     GI/Hepatic:     (+) GERD, Asymptomatic on medication,     Renal/Genitourinary:  - neg Renal ROS     Endo:  - neg endo ROS     Psychiatric/Substance Use:     (+) psychiatric history anxiety     Infectious Disease:  - neg infectious disease ROS     Malignancy:  - neg malignancy ROS     Other:      (+) , H/O Chronic Pain,        Physical Exam    Airway        Mallampati: I   TM distance: > 3 FB   Neck ROM: full   Mouth opening: > 3 cm    Respiratory Devices and Support         Dental  no notable dental history         Cardiovascular   cardiovascular exam normal          Pulmonary   pulmonary exam normal                 OUTSIDE LABS:  CBC:   Lab Results   Component Value Date    WBC 5.5 04/12/2021    WBC 4.8 10/29/2020    HGB 12.5 06/11/2021    HGB 12.6 04/12/2021    HCT 37.9 04/12/2021    HCT 35.6 10/29/2020     04/12/2021     10/29/2020     BMP:   Lab Results   Component Value Date     04/12/2021     (L) 03/03/2020    POTASSIUM 4.7 06/11/2021    POTASSIUM 4.8 04/12/2021    CHLORIDE 100 04/12/2021    CHLORIDE 98 03/03/2020    CO2 28 04/12/2021    CO2 28 03/03/2020    BUN 16 04/12/2021    BUN 9 10/29/2020    CR 0.65 06/11/2021    CR 0.65 04/12/2021    GLC 76 04/12/2021     (H) 10/03/2020     COAGS:   Lab Results   Component Value Date    PTT 27 02/27/2015    INR 0.95 07/19/2017     POC:   Lab Results   Component Value Date    BGM 98 08/05/2017     HEPATIC:   Lab Results   Component Value Date    ALBUMIN 4.2 04/12/2021    PROTTOTAL 7.0 04/12/2021    ALT 29 04/12/2021    AST 30 04/12/2021    ALKPHOS 66 04/12/2021    BILITOTAL 0.7 04/12/2021     OTHER:   Lab Results   Component Value Date    LACT 1.2 05/27/2019    MARLON 9.9 04/12/2021    MAG 1.5 (L) 06/06/2019    LIPASE 239 10/31/2017    AMYLASE 121 (H) 09/06/2005    TSH 1.69 04/12/2021    CRP <2.9 10/29/2020    SED 8 10/29/2020       Anesthesia Plan    ASA Status:  2   NPO Status:  NPO Appropriate    Anesthesia Type: General.     - Airway: ETT   Induction: Intravenous.   Maintenance: Balanced.        Consents    Anesthesia Plan(s) and associated risks, benefits, and realistic alternatives discussed. Questions answered and patient/representative(s) expressed understanding.     - Discussed with:  Patient      - Extended Intubation/Ventilatory Support Discussed: No.      - Patient is DNR/DNI Status: No    Use of blood products discussed: No .     Postoperative Care    Pain management: IV analgesics, Oral pain medications, Multi-modal analgesia.   PONV prophylaxis: Ondansetron (or other 5HT-3), Dexamethasone or Solumedrol, Background Propofol  Infusion     Comments:                Igor Olmedo MD

## 2021-06-11 NOTE — ANESTHESIA CARE TRANSFER NOTE
Patient: Denise Ralph    Procedure(s):  Lumbar 3 to Lumbar 4 adjacent level transforaminal lumbar interbody fusion    Diagnosis: Pseudoclaudication syndrome [M48.062]  Lumbar radiculopathy [M54.16]  Diagnosis Additional Information: No value filed.    Anesthesia Type:   General     Note:    Oropharynx: spontaneously breathing  Level of Consciousness: awake  Oxygen Supplementation: face mask  Level of Supplemental Oxygen (L/min / FiO2): 6  Independent Airway: airway patency satisfactory and stable  Dentition: dentition unchanged  Vital Signs Stable: post-procedure vital signs reviewed and stable  Report to RN Given: handoff report given  Patient transferred to: PACU    Handoff Report: Identifed the Patient, Identified the Reponsible Provider, Reviewed the pertinent medical history, Discussed the surgical course, Reviewed Intra-OP anesthesia mangement and issues during anesthesia, Set expectations for post-procedure period and Allowed opportunity for questions and acknowledgement of understanding      Vitals: (Last set prior to Anesthesia Care Transfer)  CRNA VITALS  6/11/2021 1522 - 6/11/2021 1558      6/11/2021             Pulse:  61    SpO2:  99 %    Resp Rate (observed):  8        Electronically Signed By: IRENE May CRNA  June 11, 2021  3:58 PM

## 2021-06-11 NOTE — OP NOTE
OPERATIVE NOTE      Pre op Diagnosis:   1. Previous L4-5 TLIF by me in August 2017  2. 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 Low back pain  4. Lumbar radiculopathy  5. Neurogenic claudication      Post op Diagnosis: Same      Procedure: Redo L3-4 adjacent level decompression with L3-4 TLIF  1. Reopening of previous incision with L3-4 decompressive laminectomies with facetectomies and decompression of the L3 exiting and L4 traversing roots bilaterally  2. Exploration and removel of the previously placed hardware from L4-5  3. L3-4 complete discectomy with arthrodesis and placement of a 9 x 28 mm Globus Caliber expandable interbody cage with locally harvested autologous bone placed centrally and anterior to the graft  4. Placement/replacment of Globus Creo pedicle screws from L3-5 bilaterally   5. L3-4 posterior lateral fusion with locally harvested autologous bone*   6. Use of portable X-ray, Stealth and O-arm and intraoperative microscope       Surgeon: Jay Dee MD, MS, FAANS    Assistant: Marleni Singer CNP and ST Bobo whose assistance was required throughout the case for positioning, exposure, retraction/suctioning during decompression, implant hardware placement, wound closure and transferred to postoperative bed.      Indication for procedure: The patient is a 66 year old female that presented to my clinic with the above clinical and radiographic findings. She had a previous L4-5 TLIF by Dr. Dee in 8/2017. After reviewing the patient's imaging studies and examination, the decision was made to proceed with the above procedure. The patient understood the risks and benefits of surgery and wanted to proceed.      Description of Procedure: The patient was seen in the pre op area and the procedure was discussed with them and spouse once again and all questions were answered. The consent was then signed and the patients lumbar spine was marked with a marker.  The patient was then transferred to the operating suite on a stretcher and received general endotracheal anesthesia and a villarreal catheter placed and the patient was placed on the operating room table in the prone position with all pressure points padded. The back was then prepped and draped in a normal sterile fashion and the planned incision was marked and injected with local anesthesia. The incision was then reopened from L3-5 and dissection continued down through the subcutaneous tissue to the fascia. The fascia was then reopened and extended to the L3-4 interspaces with the Bovie cautery. Subperiosteal dissection was used to expose the spinous processes, bilateral lamina facet joints, transverse processes and previously placed hardware from L4-5. The Versa trac retractor was then placed and the exposure was complete and levels were verified. The previously placed hardware was then removed from L4-5 and the Stealth and O-arm were then brought in the operating room and the reference frame was placed on the spinous process of L3. The O-arm was then brought into the field and the images were taken from L3-S1 and transferred to the Stealth navigation system. Attention was then turned to the pedicle screw placement where the pedicle screws were placed at L3 and replaced at L4 and L5 using the normal technique of making a  hole with the Midas brenden drill under navigation, the navigated thoracic probe was used to penetrate the pedicles and the pedicle screws were placed down the pedicles of L3, L4 and L5 bilaterally under navigation.     Once the pedicle screws were placed, the operating microscope was brought into the field and attention then turned to the decompression. I then performed L3-4 complete bilateral laminectomies with facetectomies were performed using the Midas Brenden drill, Kerrison rongeurs, pituitary rongeurs to expose the exiting L3 and traversing L4 roots in Kambin's triangle. The L3-4 disk was removed  from the spinal canal and the roots were noted to be free by palpation with the Brown ball hook. The size 7 to 9 mm endplate beronica and pituitary rongeurs were used to remove the disk in the interspace at L3-4 and to clean the endplates. The funnel was then used to place locally harvested autologous bone in the L3-4 interspace.  I then placed the 9 x 28 mm Globus Caliber interbody expandable cage in the L3-4 interspace and expanded to the appropriate size.       The wound was then irrigated with saline and hemostasis was obtained with bipolar cautery, gelfoam and Surgiflo. The connecting rods were then placed in the screws heads from L3-5 and the locking caps were the placed and secured with the counter torque system. Decortication of the lateral facet joints and transverse processes was performed from L3-5 with the Midas Brenden drill and locally harvested autologous bone was placed for the posterior lateral fusion after another round of irrigation with saline. Two KAYE drains were placed subfascially and Vancomycin powder was then placed in the wound subfascially and the fascia was closed with 0 vicryl. Additional Vancomycin powder was then placed in the suprafascial wound and the subcutaneous tissue was closed with 2-0 and 3-0 vicryl and the skin was closed with Dermabond. The wound was then dressed appropriately and the patient was transferred back the the stretch, extubated and sent to the recovery room.      At the end of the case all counts were correct      Complications -  None      EBL - 50 ml    IV fluid - please see Anesthesia report      The patient received Ancef preoperatively and Vancomycin in the subfascial and suprafascial areas of the wound

## 2021-06-11 NOTE — ANESTHESIA POSTPROCEDURE EVALUATION
Patient: Denise Ralph    Procedure(s):  Lumbar 3 to Lumbar 4 adjacent level transforaminal lumbar interbody fusion    Diagnosis:Pseudoclaudication syndrome [M48.062]  Lumbar radiculopathy [M54.16]  Diagnosis Additional Information: No value filed.    Anesthesia Type:  General    Note:  Disposition: Outpatient   Postop Pain Control: Uneventful            Sign Out: Well controlled pain   PONV: No   Neuro/Psych: Uneventful            Sign Out: Acceptable/Baseline neuro status   Airway/Respiratory: Uneventful            Sign Out: Acceptable/Baseline resp. status   CV/Hemodynamics: Uneventful            Sign Out: Acceptable CV status   Other NRE: NONE   DID A NON-ROUTINE EVENT OCCUR? No           Last vitals:  Vitals:    06/11/21 1610 06/11/21 1615 06/11/21 1620   BP: (!) 155/95 (!) 144/90 (!) 151/74   Pulse: 76 70 67   Resp: 8 9 9   Temp:      SpO2: 100% 100% 99%       Last vitals prior to Anesthesia Care Transfer:  CRNA VITALS  6/11/2021 1522 - 6/11/2021 1622      6/11/2021             Pulse:  61    SpO2:  99 %    Resp Rate (observed):  8          Electronically Signed By: Igor Olmedo MD  June 11, 2021  4:25 PM

## 2021-06-12 ENCOUNTER — APPOINTMENT (OUTPATIENT)
Dept: PHYSICAL THERAPY | Facility: CLINIC | Age: 66
DRG: 454 | End: 2021-06-12
Attending: NEUROLOGICAL SURGERY
Payer: COMMERCIAL

## 2021-06-12 ENCOUNTER — APPOINTMENT (OUTPATIENT)
Dept: OCCUPATIONAL THERAPY | Facility: CLINIC | Age: 66
DRG: 454 | End: 2021-06-12
Attending: NURSE PRACTITIONER
Payer: COMMERCIAL

## 2021-06-12 LAB
GLUCOSE SERPL-MCNC: 97 MG/DL (ref 70–99)
HGB BLD-MCNC: 10.9 G/DL (ref 11.7–15.7)

## 2021-06-12 PROCEDURE — 250N000009 HC RX 250: Performed by: NURSE PRACTITIONER

## 2021-06-12 PROCEDURE — 250N000013 HC RX MED GY IP 250 OP 250 PS 637: Performed by: ORTHOPAEDIC SURGERY

## 2021-06-12 PROCEDURE — 82947 ASSAY GLUCOSE BLOOD QUANT: CPT | Performed by: NEUROLOGICAL SURGERY

## 2021-06-12 PROCEDURE — 97530 THERAPEUTIC ACTIVITIES: CPT | Mod: GO | Performed by: OCCUPATIONAL THERAPIST

## 2021-06-12 PROCEDURE — 97530 THERAPEUTIC ACTIVITIES: CPT | Mod: GP | Performed by: PHYSICAL THERAPIST

## 2021-06-12 PROCEDURE — 97535 SELF CARE MNGMENT TRAINING: CPT | Mod: GO | Performed by: OCCUPATIONAL THERAPIST

## 2021-06-12 PROCEDURE — 250N000013 HC RX MED GY IP 250 OP 250 PS 637: Performed by: NURSE PRACTITIONER

## 2021-06-12 PROCEDURE — 120N000001 HC R&B MED SURG/OB

## 2021-06-12 PROCEDURE — 99221 1ST HOSP IP/OBS SF/LOW 40: CPT | Performed by: HOSPITALIST

## 2021-06-12 PROCEDURE — 36416 COLLJ CAPILLARY BLOOD SPEC: CPT | Performed by: NEUROLOGICAL SURGERY

## 2021-06-12 PROCEDURE — 250N000013 HC RX MED GY IP 250 OP 250 PS 637: Performed by: NEUROLOGICAL SURGERY

## 2021-06-12 PROCEDURE — 99207 PR CONSULT E&M CHANGED TO INITIAL LEVEL: CPT | Performed by: HOSPITALIST

## 2021-06-12 PROCEDURE — 97116 GAIT TRAINING THERAPY: CPT | Mod: GP | Performed by: PHYSICAL THERAPIST

## 2021-06-12 PROCEDURE — 97161 PT EVAL LOW COMPLEX 20 MIN: CPT | Mod: GP | Performed by: PHYSICAL THERAPIST

## 2021-06-12 PROCEDURE — 250N000011 HC RX IP 250 OP 636: Performed by: NURSE PRACTITIONER

## 2021-06-12 PROCEDURE — 97165 OT EVAL LOW COMPLEX 30 MIN: CPT | Mod: GO | Performed by: OCCUPATIONAL THERAPIST

## 2021-06-12 PROCEDURE — 85018 HEMOGLOBIN: CPT | Performed by: NEUROLOGICAL SURGERY

## 2021-06-12 RX ORDER — DIPHENHYDRAMINE HYDROCHLORIDE 50 MG/ML
12.5 INJECTION INTRAMUSCULAR; INTRAVENOUS EVERY 6 HOURS PRN
Status: DISCONTINUED | OUTPATIENT
Start: 2021-06-12 | End: 2021-06-13 | Stop reason: HOSPADM

## 2021-06-12 RX ORDER — DIPHENHYDRAMINE HCL 12.5 MG/5ML
12.5 SOLUTION ORAL EVERY 6 HOURS PRN
Status: DISCONTINUED | OUTPATIENT
Start: 2021-06-12 | End: 2021-06-13 | Stop reason: HOSPADM

## 2021-06-12 RX ORDER — HYDROCODONE BITARTRATE AND ACETAMINOPHEN 5; 325 MG/1; MG/1
1-2 TABLET ORAL EVERY 6 HOURS PRN
Status: DISCONTINUED | OUTPATIENT
Start: 2021-06-12 | End: 2021-06-13

## 2021-06-12 RX ADMIN — LATANOPROST 1 DROP: 50 SOLUTION OPHTHALMIC at 21:00

## 2021-06-12 RX ADMIN — OXYCODONE HYDROCHLORIDE 5 MG: 5 TABLET ORAL at 09:42

## 2021-06-12 RX ADMIN — TIMOLOL MALEATE 1 DROP: 2.5 SOLUTION/ DROPS OPHTHALMIC at 09:52

## 2021-06-12 RX ADMIN — CLINDAMYCIN PHOSPHATE 900 MG: 900 INJECTION, SOLUTION INTRAVENOUS at 12:00

## 2021-06-12 RX ADMIN — CLINDAMYCIN PHOSPHATE 900 MG: 900 INJECTION, SOLUTION INTRAVENOUS at 19:53

## 2021-06-12 RX ADMIN — HYDROCODONE BITARTRATE AND ACETAMINOPHEN 1 TABLET: 5; 325 TABLET ORAL at 23:53

## 2021-06-12 RX ADMIN — AMLODIPINE BESYLATE 2.5 MG: 2.5 TABLET ORAL at 09:49

## 2021-06-12 RX ADMIN — HYDROCODONE BITARTRATE AND ACETAMINOPHEN 1 TABLET: 5; 325 TABLET ORAL at 18:10

## 2021-06-12 RX ADMIN — FAMOTIDINE 20 MG: 20 TABLET ORAL at 09:46

## 2021-06-12 RX ADMIN — ONDANSETRON 4 MG: 4 TABLET, ORALLY DISINTEGRATING ORAL at 15:29

## 2021-06-12 RX ADMIN — DOCUSATE SODIUM 100 MG: 100 CAPSULE, LIQUID FILLED ORAL at 09:46

## 2021-06-12 RX ADMIN — METHOCARBAMOL 750 MG: 750 TABLET ORAL at 19:53

## 2021-06-12 RX ADMIN — POLYETHYLENE GLYCOL 3350 17 G: 17 POWDER, FOR SOLUTION ORAL at 09:45

## 2021-06-12 RX ADMIN — ATENOLOL 50 MG: 50 TABLET ORAL at 09:46

## 2021-06-12 RX ADMIN — ACETAMINOPHEN 975 MG: 325 TABLET, FILM COATED ORAL at 09:42

## 2021-06-12 RX ADMIN — OXYCODONE HYDROCHLORIDE 5 MG: 5 TABLET ORAL at 14:17

## 2021-06-12 RX ADMIN — FAMOTIDINE 20 MG: 20 TABLET ORAL at 19:49

## 2021-06-12 RX ADMIN — DIPHENHYDRAMINE HYDROCHLORIDE 12.5 MG: 12.5 LIQUID ORAL at 03:44

## 2021-06-12 RX ADMIN — DOCUSATE SODIUM AND SENNOSIDES 1 TABLET: 8.6; 5 TABLET, FILM COATED ORAL at 19:49

## 2021-06-12 RX ADMIN — DOCUSATE SODIUM 100 MG: 100 CAPSULE, LIQUID FILLED ORAL at 19:49

## 2021-06-12 RX ADMIN — DOCUSATE SODIUM AND SENNOSIDES 1 TABLET: 8.6; 5 TABLET, FILM COATED ORAL at 09:45

## 2021-06-12 RX ADMIN — CLINDAMYCIN PHOSPHATE 900 MG: 900 INJECTION, SOLUTION INTRAVENOUS at 03:44

## 2021-06-12 ASSESSMENT — ACTIVITIES OF DAILY LIVING (ADL)
ADLS_ACUITY_SCORE: 14
PREVIOUS_RESPONSIBILITIES: MEAL PREP;HOUSEKEEPING;LAUNDRY;YARDWORK;SHOPPING;MEDICATION MANAGEMENT;FINANCES;DRIVING

## 2021-06-12 NOTE — PLAN OF CARE
Patient vital signs are at baseline: Yes  Patient able to ambulate as they were prior to admission or with assist devices provided by therapies during their stay:  Yes with gait belt and brace when ambulating with SBA  Patient MUST void prior to discharge:  Yes  Patient able to tolerate oral intake:  Yes  Pain has adequate pain control using Oral analgesics:  Yes PCA of Dilaudid discontinued and PO Oxycodone started along with scheduled Tylenol is managing her pain well.     Pt A&Ox4. VSS afebrile RA. Tolerated regular diet. Hypoactive BS passing gas.  Denies nausea. SOB. CMS intact. Dressing c/d/I. Pt would like to discharge home today but KAYE drains are still in and will be discontinued when they are POD 2 putting out <30 ml. R KAYE 50 ml out. Left 30 ML out.  Will continue to monitor. POC reviewed with pt.     5420 page to Ortho MD pt would like change in pain medication from Oxycodone to Norco.     Dr. Short called back. Gave verbal order forPO  Norco 1-2 tabs every 6 hours as needed for pain.

## 2021-06-12 NOTE — PLAN OF CARE
End of Shift Note  See flowsheets for vital signs and complete assessments.    Pertinent Assessments: A&Ox4. Denies pain, nausea, SOB, numbness & tingling. PCA pump in use, total of 0.4mg dilaudid used this shift. Dressing CDI. Voiding fine, all PVRs under 500mL. Up Ax1 with walker, GB, and back brace to bedside commode.    Major Shift Events: Uneventful.    Treatment Plan: Cleocin, pain management.    Bedside RN: Lisa Brown

## 2021-06-12 NOTE — PROGRESS NOTES
"   06/12/21 0947   Quick Adds   Type of Visit Initial PT Evaluation   Living Environment   People in home spouse   Current Living Arrangements house   Home Accessibility stairs to enter home;stairs within home   Number of Stairs, Main Entrance 2   Stair Railings, Main Entrance railings on both sides of stairs   Number of Stairs, Within Home, Primary   (16)   Stair Railings, Within Home, Primary railings on both sides of stairs   Transportation Anticipated family or friend will provide   Living Environment Comments patient reports she needs to access upper level for bedroom and bathroom; laundry in basement   Self-Care   Usual Activity Tolerance good   Current Activity Tolerance moderate   Regular Exercise No   Equipment Currently Used at Home none   Activity/Exercise/Self-Care Comment reports she does not have access to any assistive device at this time. Did purchase a raised toilet seat and shower chair   Disability/Function   Fall history within last six months no   General Information   Onset of Illness/Injury or Date of Surgery 06/11/21   Referring Physician Marleni Singer, APRN CNP    Patient/Family Therapy Goals Statement (PT) return home    Pertinent History of Current Problem (include personal factors and/or comorbidities that impact the POC) per chart review, patient is POD 1 for a \"Redo L3-4 adjacent level decompression with L3-4 TLIF\"   Existing Precautions/Restrictions fall;brace worn when out of bed;spinal   General Observations patient received in bed and agreeable to PT evaluation.    Cognition   Orientation Status (Cognition) oriented x 4   Pain Assessment   Patient Currently in Pain Yes, see Vital Sign flowsheet  (RN present at session beginning administering meds)   Range of Motion (ROM)   ROM Comment BLEs WFL based on functional mobility   Strength   Strength Comments BLEs grossly WFL based on functional mobility   Bed Mobility   Bed Mobility supine-sit   Supine-Sit [place] (Bed " Mobility) supervision   Comment (Bed Mobility) patient reports she has an adjustable bed at home. patient able to complete bed mobility with head of bed raised but maintaining spinal precautions. does utilize bed rail minimally.    Transfers   Transfers sit-stand transfer   Sit-Stand Transfer   Sit-Stand [place] (Transfers) supervision   Assistive Device (Sit-Stand Transfers) walker, front-wheeled   Sit/Stand Transfer Comments patient dons back brace in seated with supervision as therapist was present; no concerns. patient able to transfer from sit to stand with standby assist, no concerns noted.    Gait/Stairs (Locomotion)   [place] Level (Gait) supervision   Assistive Device (Gait) walker, front-wheeled   Distance in Feet (Required for LE Total Joints) 40 feet   Clinical Impression   Criteria for Skilled Therapeutic Intervention yes, treatment indicated   PT Diagnosis (PT) impaired functional mobility   Influenced by the following impairments deficits in balance, functional mobility tolerance   Functional limitations due to impairments gait, stairs   Clinical Presentation Stable/Uncomplicated   Clinical Presentation Rationale PT eval   Clinical Decision Making (Complexity) low complexity   Therapy Frequency (PT) 2x/day   Predicted Duration of Therapy Intervention (days/wks) 2 days   Planned Therapy Interventions (PT) balance training;bed mobility training;cryotherapy;gait training;patient/family education;neuromuscular re-education;home exercise program;stair training;transfer training;postural re-education   Anticipated Equipment Needs at Discharge (PT) walker, rolling   Risk & Benefits of therapy have been explained evaluation/treatment results reviewed;care plan/treatment goals reviewed;risks/benefits reviewed;patient   PT Discharge Planning    PT Discharge Recommendation (DC Rec) home with assist   PT Rationale for DC Rec patient is mobilizing at a level that supports discharge home with medically  appropriate. patient is currently supervision for bed mobility, transfers, and gait with a FWW, and standby assist for stairs with bilateral handrails. Recommend assist at home for daily tasks such as laundry.    PT Brief overview of current status  recommend nursing utilize assist x1 with FWW    Total Evaluation Time   Total Evaluation Time (Minutes) 8

## 2021-06-12 NOTE — CONSULTS
[facility]    Hospitalist Consultation    Date of Admission:  6/11/2021      Assessment and Plan:      Denise Ralph is a 66 year old female with past medical history significant for anxiety, depression, hypertension, lumbar degenerative disc disease status post L4-L5 lumbar fusion who now presents for L3-4 adjacent level fusion.  Postop doing well other than constipation and bloating.  Otherwise minimal complaints.  Vitals temperature 98 pulse 57 blood pressure 113/43, 7% on room air.  Labs hemoglobin 10.9 blood glucose of 97.    POD #1L3-4 adjacent level fusion  -Postoperative care and pain management to neurosurgery    Hypertension  -Home hypertensive medications have been resumed  -Currently on atenolol 50 mg daily and amlodipine 2.5 mg daily    Constipation  -Bowel regimen has been ordered  -if not improved or wosening may consider further imaging    Chronic issues:  depression/anxiety  Glaucoma        Nevin Warren MD  Text Page (7am - 6pm, M-F)        Nevin Warren MD    Reason for Consult   Reason for consult: I was asked by Javier Don to evaluate this patient for post operative medical management    Primary Care Physician   Juan F Smallwood    Chief Complaint       History is obtained from the patient    History of Present Illness     66 year old female with past medical history significant for anxiety, depression, hypertension, lumbar degenerative disc disease status post L4-L5 lumbar fusion who now presents for L3-4 adjacent level fusion.  Postop doing well other than constipation and bloating.  Patient her last bowel movement was Thursday.  Otherwise denies any cough fevers chills shortness of breath or chest pain.     Past Medical History    I have reviewed this patient's medical history and updated it with pertinent information if needed.   Past Medical History:   Diagnosis Date     Anxiety     Gets panic attacks     Complication of anesthesia      Depressive disorder, not  elsewhere classified      Essential hypertension, benign     No cardiologist     Irritable bowel syndrome     has had neg colonoscopy & EGD w/u     Lumbar degenerative disc disease 1996    Had PT, traction, no surgery     Other chronic pain     JOint pain for many years.     PONV (postoperative nausea and vomiting)      Sciatica 3/06    R thigh;  MRI = R L2-3 disc      Unspecified glaucoma(365.9)        Past Surgical History   I have reviewed this patient's surgical history and updated it with pertinent information if needed.  Past Surgical History:   Procedure Laterality Date     EYE SURGERY Bilateral     Treatment of glaucoma     HYSTERECTOMY       HYSTERECTOMY VAGINAL       IRRIGATION AND DEBRIDEMENT UPPER EXTREMITY, COMBINED Right 10/2/2020    Procedure: Right shoulder arthroscopic extensive debridement of the glenohumeral joint and subacromial space for infection.;  Surgeon: Umesh Berman MD;  Location: [facility] OR     LAPAROSCOPIC CHOLECYSTECTOMY  2002    with repeat operation because of bile leak     Left shoulder decomp surgery for impingement  approx 1993     OPTICAL TRACKING SYSTEM FUSION POSTERIOR SPINE LUMBAR N/A 8/3/2017    Procedure: OPTICAL TRACKING SYSTEM FUSION SPINE POSTERIOR LUMBAR ONE LEVEL;  1. L4 Ang-Meyers osteotomy with exposure of the L4 and L5 roots  2. L4-5 complete discectomy with arthrodesis  3. Placement of Globus Creo GOMEZ pedicle screws bilaterally from L4 to L5  4. L4 - L5 posterior lateral fusion with placement of Medtronic Magnifuse and locally harvested autologous bone  5. Use of Stealth and O     ORTHOPEDIC SURGERY Right 10/2020    I & D shoulder     WRIST SURGERY Left        Prior to Admission Medications   Prior to Admission Medications   Prescriptions Last Dose Informant Patient Reported? Taking?   ALPRAZolam (XANAX) 0.25 MG tablet Past Week at Unknown time  No Yes   Sig: TAKE 1 TABLET BY MOUTH THREE TIMES A DAY AS NEEDED FOR ANXIETY   Probiotic Product (PROBIOTIC DAILY PO)  6/10/2021 at 0800  Yes Yes   Sig: Take by mouth daily Probiotic 60 billion units with Fiber 1 PO daily   amLODIPine (NORVASC) 2.5 MG tablet 6/11/2021 at 0630  No Yes   Sig: TAKE 1 TABLET BY MOUTH EVERY DAY   atenolol (TENORMIN) 50 MG tablet 6/11/2021 at 0630  No Yes   Sig: TAKE 1 TABLET BY MOUTH TWICE A DAY   fluticasone (FLONASE) 50 MCG/ACT nasal spray 6/11/2021 at 0630  No Yes   Sig: INSTILL 1 SPRAY INTO BOTH NOSTRILS DAILY   latanoprost (XALATAN) 0.005 % ophthalmic solution 6/10/2021 at 2100  Yes Yes   Sig: Place 1 drop into both eyes At Bedtime   timolol hemihydrate (BETIMOL) 0.25 % ophthalmic solution 6/11/2021 at 0630  Yes Yes   Sig: Place 1 drop Into the left eye every morning   traZODone (DESYREL) 100 MG tablet 6/10/2021 at 2200  No Yes   Sig: TAKE 1 TABLET (100 MG) BY MOUTH NIGHTLY AS NEEDED FOR SLEEP      Facility-Administered Medications: None     Allergies   Allergies   Allergen Reactions     Erythromycin Shortness Of Breath     Msir [Morphine Sulfate] Nausea     Intollerant to Morphine Sulfate: Nausea,vomiting     Dilaudid [Hydromorphone] Visual Disturbance     Hallucinations     Diuretic      Patient states she gets sick and loses weight from diuretics.     Lisinopril      Cough       Losartan      Tongue felt weird       Penicillins Itching     Reglan Other (See Comments)     Muscle spasms in throat       Social History   I have reviewed this patient's social history and updated it with pertinent information if needed. Denise BILLY Ramo  reports that she quit smoking about 23 years ago. She has a 12.50 pack-year smoking history. She has never used smokeless tobacco. She reports current alcohol use of about 1.7 standard drinks of alcohol per week. She reports that she does not use drugs.    Family History   Family history reviewed with patient and is noncontributory.    ROS   a 12 point review of system discussed with patient negative as per HPI    Physical Exam   Temp: 98  F (36.7  C) Temp src: Temporal  BP: 113/43 Pulse: 57   Resp: 16 SpO2: 94 % O2 Device: None (Room air) Oxygen Delivery: 1 LPM  Vital Signs with Ranges  Temp:  [97  F (36.1  C)-98  F (36.7  C)] 98  F (36.7  C)  Pulse:  [54-79] 57  Resp:  [8-27] 16  BP: (113-181)/() 113/43  SpO2:  [94 %-100 %] 94 %  139 lbs 12.8 oz    General: Pt in NAD, normal appearance  HEENT: PERRLA, EOMI, normocephalic / atraumatic no cervical LAD, no bruit, no pallor, WNL oropharynx, neck supple  Cardiac: +S1, S2, RRR, no MRG, no edema  Lungs: Clear to Auscultation Bilateral, normal breathing without accessory muscle usage, no wheezing, rhonchi or crackles  GI: soft NT/ND +bowel sounds all quadrants, hypoactive  Psych: normal mood and affect, A&Ox3  Neurological: A&O x3, CN II-XII grossly intact, sensation intact normal gait  Skin: warm, dry, normal turgor, no rash    Data   Data reviewed today:  I personally reviewed no images or EKG's today.  Recent Labs   Lab 06/12/21  0813 06/11/21  1034   HGB 10.9* 12.5   POTASSIUM  --  4.7   CR  --  0.65   GLC 97  --        Recent Results (from the past 24 hour(s))   XR Lumbar Spine Port 1 View    Narrative    XR PORTABLE LUMBAR SPINE ONE VIEW   6/11/2021 3:12 PM     HISTORY: Intraoperative    COMPARISON: 5/27/2019      Impression    IMPRESSION: Portable intraoperative view demonstrates new anterior and  posterior fusion procedure at the L3-L4 level extending the prior  fusion such that it is now from L3 to L5. Posterior alignment appears  normal. Vertebral body heights are maintained.    FAZAL LOMAS MD

## 2021-06-12 NOTE — PLAN OF CARE
"Patient vital signs are at baseline: No,  Reason: 2L O2  Patient able to ambulate as they were prior to admission or with assist devices provided by therapies during their stay:  Yes  Patient MUST void prior to discharge:  No,  Reason: Retention  Patient able to tolerate oral intake:  Yes  Pain has adequate pain control using Oral analgesics:  No,  Reason: PCA pump    /67 (BP Location: Left arm)   Pulse 60   Temp 97.4  F (36.3  C) (Temporal)   Resp 12   Ht 1.651 m (5' 5\")   Wt 63.4 kg (139 lb 12.8 oz)   SpO2 100%   BMI 23.26 kg/m    Orientation: A&Ox4  Resp: LS Clear, 2L O2/Capno  Pain: Managed with PCA dilaudid  CMS: Intact, denies N/T  Dressing: CDI  Activity: A1 walker/gb/brace when OOB.  Drains: KAYE patent  Diet: Tolerated regular  Voiding: retention  Discharge Plan: TBD      "

## 2021-06-12 NOTE — PROGRESS NOTES
06/12/21 1600   Quick Adds   Type of Visit Initial Occupational Therapy Evaluation   Living Environment   People in home spouse   Current Living Arrangements house   Home Accessibility stairs to enter home;stairs within home   Number of Stairs, Main Entrance 2   Stair Railings, Main Entrance railings on both sides of stairs   Number of Stairs, Within Home, Primary other (see comments)  (16)   Stair Railings, Within Home, Primary railings on both sides of stairs   Transportation Anticipated family or friend will provide   Living Environment Comments Pt reports bedroom is on upper level and bathroom with shower. Laundry is in lower level and spouse can asisst with it.    Self-Care   Usual Activity Tolerance good   Current Activity Tolerance moderate   Regular Exercise No   Equipment Currently Used at Home none   Activity/Exercise/Self-Care Comment Pt reports she obtained DME prior to surgery of RTS with arms, shower chair, 2 reachers, LHSH, long handle brush. Also has [facility].    Disability/Function   Hearing Difficulty or Deaf no   Wear Glasses or Blind yes   Vision Management glasses   Concentrating, Remembering or Making Decisions Difficulty no   Difficulty Communicating other (see comments)   Difficulty Eating/Swallowing other (see comments)   Walking or Climbing Stairs Difficulty yes   Walking or Climbing Stairs   (attributes difficulty to pain prior )   Dressing/Bathing Difficulty no   Toileting issues no   Doing Errands Independently Difficulty (such as shopping) yes   Fall history within last six months no   General Information   Onset of Illness/Injury or Date of Surgery 06/11/21   Referring Physician Marleni Singer APRN CNP   Patient/Family Therapy Goal Statement (OT) Return home with spouse assist   Additional Occupational Profile Info/Pertinent History of Current Problem Pt is a 67yo female s/p Redo L3-4 adjacent level decompression with L3-4 TLIF.    Existing Precautions/Restrictions fall;brace  worn when out of bed;spinal   General Observations and Info Pt resting in bed when OT entered    Cognitive Status Examination   Orientation Status orientation to person, place and time   Affect/Mental Status (Cognitive) WFL;flat/blunted affect   Follows Commands follows one-step commands;75-90% accuracy   Safety Deficit safety precautions awareness   Attention Deficit focused/sustained attention   Cognitive Status Comments Pt reports had pain meds 1 hour prior and feeling sleepy initially having difficulty sustaining attention.    Pain Assessment   Patient Currently in Pain Yes, see Vital Sign flowsheet   Range of Motion Comprehensive   Comment, General Range of Motion Limited trunk ROM due to post op precautions impacts ADL management, limited end ROM B shoulders overhead due to back pain.    Strength Comprehensive (MMT)   Comment, General Manual Muscle Testing (MMT) Assessment MMT of UEs NT due to post op precautions, demonstrates at least 3/5 strength with functional tasks.    Coordination   Upper Extremity Coordination No deficits were identified   Bed Mobility   Comment (Bed Mobility) Pt needs cues to follow spine precautions and incorporate logrolling strategies.     Transfers   Transfer Comments SBA with sit to stand transfers using FWW.    Balance   Balance Comments PT reports feels less steady and tired this pm after pain meds.    Activities of Daily Living   BADL Assessment/Intervention upper body dressing;lower body dressing;toileting  (see daily progress note for progression of activity)   Upper Body Dressing Assessment/Training   Comment (Upper Body Dressing) Pt needs cues for body mechanics strategies with brace management while seated EOB   Lower Body Dressing Assessment/Training   Comment (Lower Body Dressing) Min A with LB dressing,.    Toileting   Comment (Toileting) SBA and cues to avoid twisting needing elevated toilet. Pt has RTS with arms for home use.    Instrumental Activities of Daily Living  (IADL)   Previous Responsibilities meal prep;housekeeping;laundry;yardwork;shopping;medication management;finances;driving   Clinical Impression   Criteria for Skilled Therapeutic Interventions Met (OT) yes;meets criteria;skilled treatment is necessary   OT Diagnosis Decreased independence with ADLs and functional mobility   OT Problem List-Impairments impacting ADL activity tolerance impaired;balance;pain;post-surgical precautions;range of motion (ROM)   Assessment of Occupational Performance 3-5 Performance Deficits   Identified Performance Deficits Bathing, dressing, toileting, graooming, functional transfers   Planned Therapy Interventions (OT) ADL retraining;progressive activity/exercise;transfer training   Clinical Decision Making Complexity (OT) low complexity   Therapy Frequency (OT) Daily   Predicted Duration of Therapy 2 days   Risk & Benefits of therapy have been explained evaluation/treatment results reviewed;care plan/treatment goals reviewed;risks/benefits reviewed;participants voiced agreement with care plan   OT Discharge Planning    OT Discharge Recommendation (DC Rec) Home with assist   OT Rationale for DC Rec Pt is below functional baseline with ADLs and functional mobility due to pain, limited ROM, impaired balance, limited activity tolerance. Pt reports spouse able to assist at home and she is completing ADLS and mobility with assist x1.    OT Brief overview of current status  Pt is completing ADLs with assist x1 providing SBA-min A for ADL management with use of DME, modifying set up and cues to incorporate strategies to protect spine.    Total Evaluation Time (Minutes)   Total Evaluation Time (Minutes) 10

## 2021-06-12 NOTE — PROGRESS NOTES
"Ortho    Pain controlled but itching with Dilaudid.  No acute events.    /50 (BP Location: Right arm)   Pulse 62   Temp 97.3  F (36.3  C) (Temporal)   Resp 16   Ht 1.651 m (5' 5\")   Wt 63.4 kg (139 lb 12.8 oz)   SpO2 96%   BMI 23.26 kg/m    A&O, NAD.  CMS intact    Drain 65/37 last shift    A/P: POD #1L3-4 adjacent level fusion  - pain control with PO.  Discontinue PCA  - mobilize with PT  [facility]  - drains removed when <30cc/shift  - possibly home this afternoon    Javier Don MD  [phone]    "

## 2021-06-13 ENCOUNTER — APPOINTMENT (OUTPATIENT)
Dept: OCCUPATIONAL THERAPY | Facility: CLINIC | Age: 66
DRG: 454 | End: 2021-06-13
Attending: NEUROLOGICAL SURGERY
Payer: COMMERCIAL

## 2021-06-13 ENCOUNTER — APPOINTMENT (OUTPATIENT)
Dept: GENERAL RADIOLOGY | Facility: CLINIC | Age: 66
DRG: 454 | End: 2021-06-13
Attending: HOSPITALIST
Payer: COMMERCIAL

## 2021-06-13 ENCOUNTER — APPOINTMENT (OUTPATIENT)
Dept: PHYSICAL THERAPY | Facility: CLINIC | Age: 66
DRG: 454 | End: 2021-06-13
Attending: NEUROLOGICAL SURGERY
Payer: COMMERCIAL

## 2021-06-13 VITALS
WEIGHT: 139.8 LBS | SYSTOLIC BLOOD PRESSURE: 139 MMHG | BODY MASS INDEX: 23.29 KG/M2 | TEMPERATURE: 97.9 F | RESPIRATION RATE: 16 BRPM | HEART RATE: 65 BPM | DIASTOLIC BLOOD PRESSURE: 71 MMHG | OXYGEN SATURATION: 98 % | HEIGHT: 65 IN

## 2021-06-13 LAB
ANION GAP SERPL CALCULATED.3IONS-SCNC: 4 MMOL/L (ref 3–14)
BUN SERPL-MCNC: 9 MG/DL (ref 7–30)
CALCIUM SERPL-MCNC: 9.2 MG/DL (ref 8.5–10.1)
CHLORIDE SERPL-SCNC: 99 MMOL/L (ref 94–109)
CO2 SERPL-SCNC: 26 MMOL/L (ref 20–32)
CREAT SERPL-MCNC: 0.55 MG/DL (ref 0.52–1.04)
ERYTHROCYTE [DISTWIDTH] IN BLOOD BY AUTOMATED COUNT: 11.8 % (ref 10–15)
GFR SERPL CREATININE-BSD FRML MDRD: >90 ML/MIN/{1.73_M2}
GLUCOSE BLDC GLUCOMTR-MCNC: 102 MG/DL (ref 70–99)
GLUCOSE SERPL-MCNC: 99 MG/DL (ref 70–99)
HCT VFR BLD AUTO: 34.5 % (ref 35–47)
HGB BLD-MCNC: 11.4 G/DL (ref 11.7–15.7)
MCH RBC QN AUTO: 32.5 PG (ref 26.5–33)
MCHC RBC AUTO-ENTMCNC: 33 G/DL (ref 31.5–36.5)
MCV RBC AUTO: 98 FL (ref 78–100)
PLATELET # BLD AUTO: 198 10E9/L (ref 150–450)
POTASSIUM SERPL-SCNC: 4.1 MMOL/L (ref 3.4–5.3)
RBC # BLD AUTO: 3.51 10E12/L (ref 3.8–5.2)
SODIUM SERPL-SCNC: 129 MMOL/L (ref 133–144)
WBC # BLD AUTO: 7.6 10E9/L (ref 4–11)

## 2021-06-13 PROCEDURE — 250N000013 HC RX MED GY IP 250 OP 250 PS 637: Performed by: ORTHOPAEDIC SURGERY

## 2021-06-13 PROCEDURE — 80048 BASIC METABOLIC PNL TOTAL CA: CPT | Performed by: HOSPITALIST

## 2021-06-13 PROCEDURE — 97116 GAIT TRAINING THERAPY: CPT | Mod: GP | Performed by: PHYSICAL THERAPIST

## 2021-06-13 PROCEDURE — 999N001017 HC STATISTIC GLUCOSE BY METER IP

## 2021-06-13 PROCEDURE — 99232 SBSQ HOSP IP/OBS MODERATE 35: CPT | Performed by: HOSPITALIST

## 2021-06-13 PROCEDURE — 85027 COMPLETE CBC AUTOMATED: CPT | Performed by: HOSPITALIST

## 2021-06-13 PROCEDURE — 99207 PR CDG-MDM COMPONENT: MEETS LOW - DOWN CODED: CPT | Performed by: HOSPITALIST

## 2021-06-13 PROCEDURE — 250N000013 HC RX MED GY IP 250 OP 250 PS 637: Performed by: PHYSICIAN ASSISTANT

## 2021-06-13 PROCEDURE — 250N000013 HC RX MED GY IP 250 OP 250 PS 637: Performed by: NURSE PRACTITIONER

## 2021-06-13 PROCEDURE — 250N000009 HC RX 250: Performed by: NURSE PRACTITIONER

## 2021-06-13 PROCEDURE — 97535 SELF CARE MNGMENT TRAINING: CPT | Mod: GO | Performed by: OCCUPATIONAL THERAPIST

## 2021-06-13 PROCEDURE — 74018 RADEX ABDOMEN 1 VIEW: CPT

## 2021-06-13 PROCEDURE — 36415 COLL VENOUS BLD VENIPUNCTURE: CPT | Performed by: HOSPITALIST

## 2021-06-13 PROCEDURE — 250N000013 HC RX MED GY IP 250 OP 250 PS 637: Performed by: HOSPITALIST

## 2021-06-13 RX ORDER — SODIUM CHLORIDE 9 MG/ML
INJECTION, SOLUTION INTRAVENOUS CONTINUOUS
Status: DISCONTINUED | OUTPATIENT
Start: 2021-06-13 | End: 2021-06-13 | Stop reason: HOSPADM

## 2021-06-13 RX ORDER — HYDROCODONE BITARTRATE AND ACETAMINOPHEN 5; 325 MG/1; MG/1
1 TABLET ORAL EVERY 6 HOURS PRN
Status: DISCONTINUED | OUTPATIENT
Start: 2021-06-13 | End: 2021-06-13 | Stop reason: HOSPADM

## 2021-06-13 RX ORDER — HYDROCODONE BITARTRATE AND ACETAMINOPHEN 5; 325 MG/1; MG/1
1 TABLET ORAL EVERY 6 HOURS PRN
Qty: 30 TABLET | Refills: 0 | Status: SHIPPED | OUTPATIENT
Start: 2021-06-13 | End: 2021-06-16

## 2021-06-13 RX ADMIN — HYDROCODONE BITARTRATE AND ACETAMINOPHEN 1 TABLET: 5; 325 TABLET ORAL at 05:53

## 2021-06-13 RX ADMIN — TIMOLOL MALEATE 1 DROP: 2.5 SOLUTION/ DROPS OPHTHALMIC at 08:50

## 2021-06-13 RX ADMIN — DOCUSATE SODIUM 100 MG: 100 CAPSULE, LIQUID FILLED ORAL at 08:48

## 2021-06-13 RX ADMIN — POLYETHYLENE GLYCOL 3350 17 G: 17 POWDER, FOR SOLUTION ORAL at 08:48

## 2021-06-13 RX ADMIN — DOCUSATE SODIUM 286 ML: 50 LIQUID ORAL at 13:53

## 2021-06-13 RX ADMIN — DOCUSATE SODIUM AND SENNOSIDES 1 TABLET: 8.6; 5 TABLET, FILM COATED ORAL at 08:49

## 2021-06-13 RX ADMIN — ATENOLOL 50 MG: 50 TABLET ORAL at 08:49

## 2021-06-13 RX ADMIN — CLINDAMYCIN PHOSPHATE 900 MG: 900 INJECTION, SOLUTION INTRAVENOUS at 03:54

## 2021-06-13 RX ADMIN — AMLODIPINE BESYLATE 2.5 MG: 2.5 TABLET ORAL at 08:48

## 2021-06-13 RX ADMIN — CLINDAMYCIN PHOSPHATE 900 MG: 900 INJECTION, SOLUTION INTRAVENOUS at 11:30

## 2021-06-13 RX ADMIN — ACETAMINOPHEN 975 MG: 325 TABLET, FILM COATED ORAL at 11:08

## 2021-06-13 RX ADMIN — ACETAMINOPHEN 975 MG: 325 TABLET, FILM COATED ORAL at 02:04

## 2021-06-13 RX ADMIN — METHOCARBAMOL 750 MG: 750 TABLET ORAL at 08:49

## 2021-06-13 RX ADMIN — FAMOTIDINE 20 MG: 20 TABLET ORAL at 08:49

## 2021-06-13 RX ADMIN — HYDROCODONE BITARTRATE AND ACETAMINOPHEN 1 TABLET: 5; 325 TABLET ORAL at 12:14

## 2021-06-13 RX ADMIN — METHOCARBAMOL 750 MG: 750 TABLET ORAL at 02:05

## 2021-06-13 ASSESSMENT — ACTIVITIES OF DAILY LIVING (ADL)
ADLS_ACUITY_SCORE: 14

## 2021-06-13 NOTE — DISCHARGE SUMMARY
Occupational Therapy Discharge Summary    Reason for therapy discharge:    All goals and outcomes met, no further needs identified.    Progress towards therapy goal(s). See goals on Care Plan in [facility] electronic health record for goal details.  Goals met    Therapy recommendation(s):    No further therapy is recommended. Pt to discharge home with spouse assist. Pt reports spouse is able to assist with bathing, home management and IADLs as needed. Pt has recommended DME for home use for safe ADL management.

## 2021-06-13 NOTE — PROGRESS NOTES
Orthopedic Surgery  Denise Ralph  2021  Admit Date:  2021  POD # 2 s/p L3-4 adjacent level fusion    Denise Ralph is a 65 y/o female whom was rounded on 2 days s/p L3-4 adjacent fusion.  Pain controlled.  Tolerating oral intake.  No events overnight. The patient denies chest pain, SOB, calf pain.    Alert and orient to person, place, and time.  Vital Sign Ranges  Temperature Temp  Av.5  F (36.4  C)  Min: 97  F (36.1  C)  Max: 98  F (36.7  C)   Blood pressure Systolic (24hrs), Av , Min:113 , Max:138        Diastolic (24hrs), Av, Min:43, Max:67      Pulse Pulse  Av  Min: 54  Max: 64   Respirations Resp  Av  Min: 16  Max: 16   Pulse oximetry SpO2  Av.4 %  Min: 94 %  Max: 100 %       Lumbar dressing intact without drainage  Able to activley move bilateral LE  Calves NTTP  2+ Dp/PT pulses    Labs:  Recent Labs   Lab Test 21  1034 21  1536 10/02/20  1220   POTASSIUM 4.7 4.8 4.6     Recent Labs   Lab Test 21  0813 21  1034 21  1536   HGB 10.9* 12.5 12.6     Recent Labs   Lab Test 17  1331 02/27/15  2243 02/27/15  1840   INR 0.95 0.83* 0.88     Recent Labs   Lab Test 21  1536 10/29/20  1020 10/22/20  0915    221 350       A/P  1. POD 2 s/p L3-4 adjacent fusion   Continue [facility] for DVT prophylaxis.     Mobilize with PT/OT WBAT with brace on.     Continue current pain regiment.   Ok to put drain to gravity and pull if <30cc    2. Disposition   Anticipate d/c to home today once drain removed which can change to gravity simpson and pull drain if <30cc today.   Ben Marie PA-C  [phone]

## 2021-06-13 NOTE — PROGRESS NOTES
[facility]    Hospitalist Progress Note             Date of Admission:  6/11/2021                   Day of hospitalization: 2    Assessment and Plan:   Denise Ralph is a 66 year old female with past medical history significant for anxiety, depression, hypertension, lumbar degenerative disc disease status post L4-L5 lumbar fusion who now presents for L3-4 adjacent level fusion.  Postop doing well other than constipation and bloating.  Otherwise minimal complaints.  Vitals temperature 98 pulse 57 blood pressure 113/43, 7% on room air.  Labs hemoglobin 10.9 blood glucose of 97.     Post op L3-4 adjacent level fusion  -Postoperative care and pain management to neurosurgery     Hypertension  -Home hypertensive medications have been resumed  -Currently on atenolol 50 mg daily and amlodipine 2.5 mg daily     Constipation, abdominal pain  - still with 4/10 abdominal pain, X-ray showing ileus versus bowel obstruction, initially a CT abdomen pelvis was ordered to evaluate for obstruction.  However patient had large loose bowel movement after her enema and her pain and bloating has resolved.  I will hold off her CT scan since clinically she is improving, however if her symptoms worsen she develops abdominal pain I will order her CT scan abdomen pelvis  -Bowel regimen has been ordered  - recommend patient stay in hospital, until these issues are resolved.     Mild hyponatremia noted  - started on gentle hydration    Chronic issues:  depression/anxiety  Glaucoma   Nevin Warren MD  Text Page (7am - 6pm, M-F)               Subjective   Chief Complaint:  Back pain  Subjective: patient complains of back pain, she states she is not straining during bowel movements to not exacerbate her back pain. Otherwise she states her abdominal pain is mild, 4/10, but she has not had bowel movement yet, +flatus.     Objective   /59 (BP Location: Right arm)   Pulse 63   Temp 98  F (36.7  C) (Temporal)   Resp 16    "Ht 1.651 m (5' 5\")   Wt 63.4 kg (139 lb 12.8 oz)   SpO2 96%   BMI 23.26 kg/m       Physical Exam  General: Pt in NAD, normal appearance  HEENT: OP clear MMM, no JVD  Lungs: Clear to Auscultation Bilateral, normal breathing  without accessory muscle usage, no wheezing, rhonchi or crackles  Cardiac: +S1, S2, RRR, no MRG, no edema  Abdominal: normal bowel sounds, +epigastric tenderness, +distension  Skin: warm, dry, normal turgor, no rash  Psyche: A& O x3, appropriate affect             Intake/Output Summary (Last 24 hours) at 6/13/2021 1348  Last data filed at 6/13/2021 1023  Gross per 24 hour   Intake 880 ml   Output 660 ml   Net 220 ml           Labs and Imaging Results:      Recent Labs   Lab 06/12/21  0813 06/11/21  1034   HGB 10.9* 12.5      No results for input(s): NA, CO2, BUN, CREATININE in the last 168 hours.    Invalid input(s): K, GLU   No results for input(s): INR, PTT in the last 168 hours.   No results for input(s): CKMB in the last 168 hours.    Invalid input(s): TROPONINT   No results for input(s): ALBUMIN, AST, ALT, ALKPHOS, BILITOT in the last 168 hours.     Micro:     Radio:  XR Abdomen 1 View   Preliminary Result   IMPRESSION: A few small bowel loops are mildly distended with gas and   fluid. This could relate to ileus versus obstruction. No free air.   Clear lung bases. L3-L5 fusion changes.      XR Lumbar Spine Port 1 View   Final Result   IMPRESSION: Portable intraoperative view demonstrates new anterior and   posterior fusion procedure at the L3-L4 level extending the prior   fusion such that it is now from L3 to L5. Posterior alignment appears   normal. Vertebral body heights are maintained.      FAZAL LOMAS MD      XR Surgery OMAYRA L/T 5 Min Fluoro w Stills   Final Result      CT Abdomen Pelvis w Contrast    (Results Pending)           Medications:      Scheduled Meds:      acetaminophen  975 mg Oral Q8H     amLODIPine  2.5 mg Oral Daily     atenolol  50 mg Oral Daily     clindamycin  900 " mg Intravenous Q8H     docusate sodium  100 mg Oral BID     famotidine  20 mg Oral BID    Or     famotidine  20 mg Intravenous BID     fluticasone  1 spray Both Nostrils Daily     latanoprost  1 drop Both Eyes At Bedtime     pink lady enema  286 mL Rectal Once     polyethylene glycol  17 g Oral Daily     senna-docusate  1 tablet Oral BID     sodium chloride (PF)  3 mL Intracatheter Q8H     timolol maleate  1 drop Left Eye QAM     Continuous Infusions:    PRN Meds:  [START ON 6/14/2021] acetaminophen, ALPRAZolam, sore throat lozenge, bisacodyl, diphenhydrAMINE, diphenhydrAMINE, HYDROcodone-acetaminophen, HYDROmorphone **OR** HYDROmorphone, lidocaine 4%, lidocaine (buffered or not buffered), magnesium hydroxide, methocarbamol, naloxone **OR** naloxone **OR** naloxone **OR** naloxone, ondansetron **OR** ondansetron, prochlorperazine **OR** prochlorperazine, sodium chloride (PF), sodium phosphate, traZODone

## 2021-06-13 NOTE — PLAN OF CARE
Pt A&Ox4 VSS Afebrile. RA. LS clear. KAYE drain had 30 ml out before placed to gravity at 1100. Will monitor output to see if can discontinue the it with less than 30 ml out. Tolerated regular diet. NO c/o SOB or nausea.  Cms intact. Dressing c/d/I up with SBA and brace. +BS, small amount of flatus but as day went on pt had increased gas/abdominal pain. MD ordered abdominal xray and pink enema.     1423 page to Dr. Warren  Pt had large loose stool from the pink enema. She feels the gas and pressure is gone. She wants to know if the CT of the abdomen needs to be done. She is leaving for it now.    1425 MD Ok with cancelling CT for now. We will continue to monitor pt tonight and plan for discharge tomorrow.     Plan is now to keep KAYE drain to gravity 8 hours until 1900 and discontinue it if it is less thank 30 ML. IV Cleocin if it remains in.     POC reviewed with pt. Discharge home with  tomorrow if no gas pains return and KAYE is discontinued.

## 2021-06-13 NOTE — PLAN OF CARE
Physical Therapy Discharge Summary    Reason for therapy discharge:    All goals and outcomes met, no further needs identified.    Progress towards therapy goal(s). See goals on Care Plan in Epic electronic health record for goal details.  Goals met    Therapy recommendation(s):    patient is mobilizing at a sufficient level to be able to discharge home, completing transfes, gait, and stairs at a supervision to independent level. No safety concerns noted and patient denies any mobility concerns upon discharge. recommend assist at home for household tasks such as laundry to maintain spinal precautions and supervision on stairs for initial pass upon return home. No other inpatient PT needs identified; will discharge patient.

## 2021-06-13 NOTE — PLAN OF CARE
Patient vital signs are at baseline: Yes  Patient able to ambulate as they were prior to admission or with assist devices provided by therapies during their stay:  Yes  Patient MUST void prior to discharge:  Yes  Patient able to tolerate oral intake:  Yes  Pain has adequate pain control using Oral analgesics:  Yes    Pt A/O x 4, VSS, afebrile, on RA. LS clear, denies SOB. Up SBA, brace when OOB. Hypo bowels, regular diet. Voiding frequently in adequate amounts. KAYE drains x 2, R KAYE 50mL output, L KAYE 5mL output - L KAYE removed. Dressing CDI, CMS intact. +2 pulses, strong dorsi/planter and . Pain rated 7-8/10, managed w/Norco x1 tab., robaxin and scheduled tylenol.

## 2021-06-13 NOTE — PLAN OF CARE
"Patient vital signs are at baseline: Yes  Patient able to ambulate as they were prior to admission or with assist devices provided by therapies during their stay:  Yes  Patient MUST void prior to discharge:  Yes  Patient able to tolerate oral intake:  Yes  Pain has adequate pain control using Oral analgesics:  Yes    /52 (BP Location: Right arm)   Pulse 59   Temp 97.5  F (36.4  C) (Temporal)   Resp 16   Ht 1.651 m (5' 5\")   Wt 63.4 kg (139 lb 12.8 oz)   SpO2 97%   BMI 23.26 kg/m    Orientation: A&Ox4  Resp: LS Clear, RA  Pain: Managed with oral Norco and Robaxin.  CMS: Intact, denies N/T  Dressing: CDI  Activity: SBA, Brace when OOB.  Drains: KAYE patent  Diet: tolerated regular, mild nausea treated with zofran x1.  Voiding: spontaneously without difficulty  Discharge Plan: Home tomorrow.      "

## 2021-06-14 ENCOUNTER — TELEPHONE (OUTPATIENT)
Dept: INTERNAL MEDICINE | Facility: CLINIC | Age: 66
End: 2021-06-14

## 2021-06-14 NOTE — PLAN OF CARE
Pt requested writer to page MD to ask to be discharged, MD was paged and writer expressed to MD why pt felt ready to discharge, MD did not feel comfortable discharging pt and stated if she is to leave it would be AMA, writer explained to pt that the MD would like pt to stay one more night, Pt was upset about this and demanded to speak to the MD, MD was paged but did not respond. The pt decided to leave AMA. Drain and IV were removed, belongings packed, discharge paperwork gone over with pt and meds given to pt in a sealed package.

## 2021-06-14 NOTE — DISCHARGE SUMMARY
[facility] Discharge Summary    Denise Ralph MRN# [id number]   Age: 66 year old YOB: 1955     Date of Admission:  6/11/2021  Date of Discharge::  6/13/2021  7:17 PM   Admitting Physician:  Jay Dee MD  Discharge Physician:  Jay Dee MD    Home clinic: [facility]          Admission Diagnoses:   Pseudoclaudication syndrome [M48.062]  Lumbar radiculopathy [M54.16]  S/P lumbar fusion [Z98.1]          Discharge Diagnosis:     Patient Active Problem List   Diagnosis     Irritable bowel syndrome     Anxiety state     Essential hypertension, benign     Disorder of bone and cartilage     Basal cell carcinoma     Squamous cell carcinoma     Advanced directives, counseling/discussion     GERD (gastroesophageal reflux disease)     Glaucoma     Hypertension goal BP (blood pressure) < 140/80     S/P lumbar fusion     Controlled substance agreement signed     Tension headache     Hyponatremia     Frequent UTI     FUO (fever of unknown origin)     Hospital discharge follow-up             Procedures:   Procedure(s):  Lumbar 3 to Lumbar 4 adjacent level transforaminal lumbar interbody fusion          Medications Prior to Admission:     No medications prior to admission.             Discharge Medications:     Discharge Medication List as of 6/13/2021  6:23 PM      START taking these medications    Details   HYDROcodone-acetaminophen (NORCO) 5-325 MG tablet Take 1 tablet by mouth every 6 hours as needed for pain, Disp-30 tablet, R-0, E-Prescribe      methocarbamol (ROBAXIN) 500 MG tablet Take 1 tablet (500 mg) by mouth 3 times daily as needed for muscle spasms, Disp-40 tablet, R-0, E-Prescribe         CONTINUE these medications which have NOT CHANGED    Details   ALPRAZolam (XANAX) 0.25 MG tablet TAKE 1 TABLET BY MOUTH THREE TIMES A DAY AS NEEDED FOR ANXIETY, Disp-30 tablet, R-1, E-PrescribeNot to exceed 4 additional fills before 05/18/2021      amLODIPine (NORVASC) 2.5  MG tablet TAKE 1 TABLET BY MOUTH EVERY DAY, Disp-90 tablet, R-3, E-Prescribe      atenolol (TENORMIN) 50 MG tablet TAKE 1 TABLET BY MOUTH TWICE A DAY, Disp-180 tablet, R-3, E-Prescribe      fluticasone (FLONASE) 50 MCG/ACT nasal spray INSTILL 1 SPRAY INTO BOTH NOSTRILS DAILY, Disp-16 mL, R-0, E-Prescribe      latanoprost (XALATAN) 0.005 % ophthalmic solution Place 1 drop into both eyes At Bedtime, Historical      Probiotic Product (PROBIOTIC DAILY PO) Take by mouth daily Probiotic 60 billion units with Fiber 1 PO daily, Historical      timolol hemihydrate (BETIMOL) 0.25 % ophthalmic solution Place 1 drop Into the left eye every morning, Historical      traZODone (DESYREL) 100 MG tablet TAKE 1 TABLET (100 MG) BY MOUTH NIGHTLY AS NEEDED FOR SLEEP, Disp-90 tablet, R-3, E-Prescribe                   Consultations:   PT  OT  Hospitalist           Brief History of Illness:    Denise Ralph is a 66 year old female that is 2 Days Post-Op s/p Procedure(s):  Lumbar 3 to Lumbar 4 adjacent level transforaminal lumbar interbody fusion.          Hospital Course:   The patient did well post op and was able to work with PT and OT. The patient's pain was controlled and ambulation was stable. She was stable for discharge home.          Discharge Instructions and Follow-Up:     Discharge diet: Regular normal diet   Discharge activity: Lifting restricted to 10 pounds until follow up  No lifting or strenuous exercise for 6 week(s)  No heavy lifting, pushing, pulling for 6 week(s)  Do not submerge your incision underwater as in hot tub, pool or lake water for 6 weeks. Ok to take showers and bathe in water below your incision.   Discharge follow-up:   Schedule Neurosurgery follow up appointment with either Marleni Singer CNP and Dr. Jay Dee at [facility] by calling the Spine Line at [phone] or [phone] for [facility] general number in 4-6 weeks.    If sutures or staples were placed, please schedule an appointment  for wound check and staple/suture removal in 10-14 days by calling the Spine Line at [phone] or by calling the general [facility] line at [phone].            Discharge Disposition:     Discharged to home      Attestation:  I have reviewed today's vital signs, notes, medications, labs and imaging.  Amount of time performed on this discharge summary: 10 minutes.    Jay Dee MD

## 2021-06-14 NOTE — TELEPHONE ENCOUNTER
IP F/U    Date: 6/13/2021  Diagnosis: Pseudoclaudication Syndrome, S/P Lumbar Fusion, S/P Lumbar Fusion, Lumbar Radiculopathy  Is patient active in care coordination? No  Was patient in TCU? No

## 2021-06-15 NOTE — TELEPHONE ENCOUNTER
"Hospital/TCU/ED for chronic condition Discharge Protocol    \"Hi, my name is Steven Perea RN, a registered nurse, and I am calling from [facility].  I am calling to follow up and see how things are going for you after your recent emergency visit/hospital/TCU stay.\"    Tell me how you are doing now that you are home?\" doing well      Discharge Instructions    \"Let's review your discharge instructions.  What is/are the follow-up recommendations?  Pt. Response: follow up with surgeon    \"Has an appointment with your primary care provider been scheduled?\"   not needed    \"When you see the provider, I would recommend that you bring your medications with you.\"    Medications    \"Tell me what changed about your medicines when you discharged?\"    Changes to chronic meds?    0-1    \"What questions do you have about your medications?\"    None     New diagnoses of heart failure, COPD, diabetes, or MI?    No              Post Discharge Medication Reconciliation Status: discharge medications reconciled, continue medications without change.    Was MTM referral placed (*Make sure to put transitions as reason for referral)?   No    Call Summary    \"What questions or concerns do you have about your recent visit and your follow-up care?\"     none    \"If you have questions or things don't continue to improve, we encourage you contact us through the main clinic number (give number).  Even if the clinic is not open, triage nurses are available 24/7 to help you.     We would like you to know that our clinic has extended hours (provide information).  We also have urgent care (provide details on closest location and hours/contact info)\"      \"Thank you for your time and take care!\"         Steven Perea RN, BSN      "

## 2021-06-25 DIAGNOSIS — R09.81 CONGESTION OF PARANASAL SINUS: ICD-10-CM

## 2021-06-25 DIAGNOSIS — R09.89 RUNNY NOSE: ICD-10-CM

## 2021-06-25 RX ORDER — FLUTICASONE PROPIONATE 50 MCG
SPRAY, SUSPENSION (ML) NASAL
Qty: 16 ML | Refills: 0 | Status: SHIPPED | OUTPATIENT
Start: 2021-06-25 | End: 2021-08-17

## 2021-06-26 DIAGNOSIS — F41.1 ANXIETY STATE: ICD-10-CM

## 2021-06-28 NOTE — TELEPHONE ENCOUNTER
Routing refill request to provider for review/approval because:  Drug not on the [facility] refill protocol   Steven Perea RN, BSN

## 2021-06-29 RX ORDER — ALPRAZOLAM 0.25 MG
TABLET ORAL
Qty: 30 TABLET | Refills: 1 | Status: SHIPPED | OUTPATIENT
Start: 2021-06-29 | End: 2021-09-23

## 2021-07-20 ENCOUNTER — TRANSFERRED RECORDS (OUTPATIENT)
Dept: HEALTH INFORMATION MANAGEMENT | Facility: CLINIC | Age: 66
End: 2021-07-20

## 2021-08-13 ENCOUNTER — APPOINTMENT (OUTPATIENT)
Dept: GENERAL RADIOLOGY | Facility: CLINIC | Age: 66
DRG: 554 | End: 2021-08-13
Attending: EMERGENCY MEDICINE
Payer: COMMERCIAL

## 2021-08-13 ENCOUNTER — HOSPITAL ENCOUNTER (INPATIENT)
Facility: CLINIC | Age: 66
LOS: 3 days | Discharge: HOME | DRG: 554 | End: 2021-08-16
Attending: EMERGENCY MEDICINE | Admitting: INTERNAL MEDICINE
Payer: COMMERCIAL

## 2021-08-13 DIAGNOSIS — M25.511 ACUTE PAIN OF RIGHT SHOULDER: ICD-10-CM

## 2021-08-13 LAB
ALBUMIN SERPL-MCNC: 3.7 G/DL (ref 3.4–5)
ALBUMIN UR-MCNC: NEGATIVE MG/DL
ALP SERPL-CCNC: 86 U/L (ref 40–150)
ALT SERPL W P-5'-P-CCNC: 27 U/L (ref 0–50)
ANION GAP SERPL CALCULATED.3IONS-SCNC: 5 MMOL/L (ref 3–14)
APPEARANCE FLD: ABNORMAL
APPEARANCE UR: CLEAR
AST SERPL W P-5'-P-CCNC: 30 U/L (ref 0–45)
BACTERIA #/AREA URNS HPF: ABNORMAL /HPF
BASOPHILS # BLD AUTO: 0 10E3/UL (ref 0–0.2)
BASOPHILS NFR BLD AUTO: 1 %
BILIRUB SERPL-MCNC: 0.4 MG/DL (ref 0.2–1.3)
BILIRUB UR QL STRIP: NEGATIVE
BUN SERPL-MCNC: 8 MG/DL (ref 7–30)
CALCIUM SERPL-MCNC: 9.7 MG/DL (ref 8.5–10.1)
CHLORIDE BLD-SCNC: 102 MMOL/L (ref 94–109)
CO2 SERPL-SCNC: 29 MMOL/L (ref 20–32)
COLOR FLD: YELLOW
COLOR UR AUTO: ABNORMAL
CREAT SERPL-MCNC: 0.61 MG/DL (ref 0.52–1.04)
CRP SERPL-MCNC: 31.1 MG/L (ref 0–8)
CRYSTALS SNV MICRO: NORMAL
EOSINOPHIL # BLD AUTO: 0.1 10E3/UL (ref 0–0.7)
EOSINOPHIL NFR BLD AUTO: 1 %
ERYTHROCYTE [DISTWIDTH] IN BLOOD BY AUTOMATED COUNT: 11.8 % (ref 10–15)
GFR SERPL CREATININE-BSD FRML MDRD: >90 ML/MIN/1.73M2
GLUCOSE BLD-MCNC: 94 MG/DL (ref 70–99)
GLUCOSE UR STRIP-MCNC: NEGATIVE MG/DL
HCT VFR BLD AUTO: 37.8 % (ref 35–47)
HGB BLD-MCNC: 12.4 G/DL (ref 11.7–15.7)
HGB UR QL STRIP: NEGATIVE
IMM GRANULOCYTES # BLD: 0 10E3/UL
IMM GRANULOCYTES NFR BLD: 0 %
KETONES UR STRIP-MCNC: NEGATIVE MG/DL
LACTATE SERPL-SCNC: 0.7 MMOL/L (ref 0.7–2)
LEUKOCYTE ESTERASE UR QL STRIP: NEGATIVE
LYMPHOCYTES # BLD AUTO: 1 10E3/UL (ref 0.8–5.3)
LYMPHOCYTES NFR BLD AUTO: 17 %
LYMPHOCYTES NFR FLD MANUAL: 6 %
MCH RBC QN AUTO: 32.1 PG (ref 26.5–33)
MCHC RBC AUTO-ENTMCNC: 32.8 G/DL (ref 31.5–36.5)
MCV RBC AUTO: 98 FL (ref 78–100)
MONOCYTES # BLD AUTO: 0.5 10E3/UL (ref 0–1.3)
MONOCYTES NFR BLD AUTO: 8 %
MONOS+MACROS NFR FLD MANUAL: 13 %
NEUTROPHILS # BLD AUTO: 4.1 10E3/UL (ref 1.6–8.3)
NEUTROPHILS NFR BLD AUTO: 73 %
NEUTS BAND NFR FLD MANUAL: 81 %
NITRATE UR QL: NEGATIVE
NRBC # BLD AUTO: 0 10E3/UL
NRBC BLD AUTO-RTO: 0 /100
PH UR STRIP: 7.5 [PH] (ref 5–7)
PLATELET # BLD AUTO: 272 10E3/UL (ref 150–450)
POTASSIUM BLD-SCNC: 4 MMOL/L (ref 3.4–5.3)
PROT SERPL-MCNC: 7 G/DL (ref 6.8–8.8)
RBC # BLD AUTO: 3.86 10E6/UL (ref 3.8–5.2)
RBC URINE: <1 /HPF
SARS-COV-2 RNA RESP QL NAA+PROBE: NEGATIVE
SODIUM SERPL-SCNC: 136 MMOL/L (ref 133–144)
SP GR UR STRIP: 1.01 (ref 1–1.03)
UROBILINOGEN UR STRIP-MCNC: NORMAL MG/DL
WBC # BLD AUTO: 5.6 10E3/UL (ref 4–11)
WBC # FLD AUTO: ABNORMAL /UL
WBC URINE: <1 /HPF

## 2021-08-13 PROCEDURE — 0R9J3ZX DRAINAGE OF RIGHT SHOULDER JOINT, PERCUTANEOUS APPROACH, DIAGNOSTIC: ICD-10-PCS | Performed by: PHYSICIAN ASSISTANT

## 2021-08-13 PROCEDURE — 250N000011 HC RX IP 250 OP 636: Performed by: EMERGENCY MEDICINE

## 2021-08-13 PROCEDURE — 36415 COLL VENOUS BLD VENIPUNCTURE: CPT | Performed by: EMERGENCY MEDICINE

## 2021-08-13 PROCEDURE — 89050 BODY FLUID CELL COUNT: CPT | Performed by: EMERGENCY MEDICINE

## 2021-08-13 PROCEDURE — 20610 DRAIN/INJ JOINT/BURSA W/O US: CPT

## 2021-08-13 PROCEDURE — 258N000003 HC RX IP 258 OP 636: Performed by: EMERGENCY MEDICINE

## 2021-08-13 PROCEDURE — 87801 DETECT AGNT MULT DNA AMPLI: CPT | Performed by: INTERNAL MEDICINE

## 2021-08-13 PROCEDURE — 99285 EMERGENCY DEPT VISIT HI MDM: CPT | Mod: 25

## 2021-08-13 PROCEDURE — 87040 BLOOD CULTURE FOR BACTERIA: CPT | Performed by: EMERGENCY MEDICINE

## 2021-08-13 PROCEDURE — 89060 EXAM SYNOVIAL FLUID CRYSTALS: CPT | Mod: TC | Performed by: PHYSICIAN ASSISTANT

## 2021-08-13 PROCEDURE — 96376 TX/PRO/DX INJ SAME DRUG ADON: CPT

## 2021-08-13 PROCEDURE — 83605 ASSAY OF LACTIC ACID: CPT | Performed by: EMERGENCY MEDICINE

## 2021-08-13 PROCEDURE — 87116 MYCOBACTERIA CULTURE: CPT | Performed by: INTERNAL MEDICINE

## 2021-08-13 PROCEDURE — 250N000013 HC RX MED GY IP 250 OP 250 PS 637: Performed by: INTERNAL MEDICINE

## 2021-08-13 PROCEDURE — 85025 COMPLETE CBC W/AUTO DIFF WBC: CPT | Performed by: EMERGENCY MEDICINE

## 2021-08-13 PROCEDURE — 20610 DRAIN/INJ JOINT/BURSA W/O US: CPT | Mod: RT

## 2021-08-13 PROCEDURE — 86140 C-REACTIVE PROTEIN: CPT | Performed by: EMERGENCY MEDICINE

## 2021-08-13 PROCEDURE — 250N000011 HC RX IP 250 OP 636: Performed by: INTERNAL MEDICINE

## 2021-08-13 PROCEDURE — 99223 1ST HOSP IP/OBS HIGH 75: CPT | Mod: AI | Performed by: PHYSICIAN ASSISTANT

## 2021-08-13 PROCEDURE — 87205 SMEAR GRAM STAIN: CPT | Performed by: EMERGENCY MEDICINE

## 2021-08-13 PROCEDURE — 87102 FUNGUS ISOLATION CULTURE: CPT | Performed by: INTERNAL MEDICINE

## 2021-08-13 PROCEDURE — 120N000001 HC R&B MED SURG/OB

## 2021-08-13 PROCEDURE — 250N000013 HC RX MED GY IP 250 OP 250 PS 637: Performed by: PHYSICIAN ASSISTANT

## 2021-08-13 PROCEDURE — 96365 THER/PROPH/DIAG IV INF INIT: CPT

## 2021-08-13 PROCEDURE — 73030 X-RAY EXAM OF SHOULDER: CPT | Mod: RT

## 2021-08-13 PROCEDURE — 89051 BODY FLUID CELL COUNT: CPT | Performed by: EMERGENCY MEDICINE

## 2021-08-13 PROCEDURE — 87075 CULTR BACTERIA EXCEPT BLOOD: CPT | Performed by: PHYSICIAN ASSISTANT

## 2021-08-13 PROCEDURE — 96366 THER/PROPH/DIAG IV INF ADDON: CPT

## 2021-08-13 PROCEDURE — C9803 HOPD COVID-19 SPEC COLLECT: HCPCS

## 2021-08-13 PROCEDURE — 71046 X-RAY EXAM CHEST 2 VIEWS: CPT

## 2021-08-13 PROCEDURE — 81001 URINALYSIS AUTO W/SCOPE: CPT | Performed by: EMERGENCY MEDICINE

## 2021-08-13 PROCEDURE — 87206 SMEAR FLUORESCENT/ACID STAI: CPT | Performed by: INTERNAL MEDICINE

## 2021-08-13 PROCEDURE — 250N000011 HC RX IP 250 OP 636: Performed by: PHYSICIAN ASSISTANT

## 2021-08-13 PROCEDURE — 87635 SARS-COV-2 COVID-19 AMP PRB: CPT | Performed by: EMERGENCY MEDICINE

## 2021-08-13 PROCEDURE — 80053 COMPREHEN METABOLIC PANEL: CPT | Performed by: EMERGENCY MEDICINE

## 2021-08-13 PROCEDURE — 258N000003 HC RX IP 258 OP 636: Performed by: PHYSICIAN ASSISTANT

## 2021-08-13 PROCEDURE — 96375 TX/PRO/DX INJ NEW DRUG ADDON: CPT

## 2021-08-13 PROCEDURE — 250N000013 HC RX MED GY IP 250 OP 250 PS 637: Performed by: EMERGENCY MEDICINE

## 2021-08-13 RX ORDER — NALOXONE HYDROCHLORIDE 0.4 MG/ML
0.4 INJECTION, SOLUTION INTRAMUSCULAR; INTRAVENOUS; SUBCUTANEOUS
Status: DISCONTINUED | OUTPATIENT
Start: 2021-08-13 | End: 2021-08-16 | Stop reason: HOSPADM

## 2021-08-13 RX ORDER — NALOXONE HYDROCHLORIDE 0.4 MG/ML
0.2 INJECTION, SOLUTION INTRAMUSCULAR; INTRAVENOUS; SUBCUTANEOUS
Status: DISCONTINUED | OUTPATIENT
Start: 2021-08-13 | End: 2021-08-16 | Stop reason: HOSPADM

## 2021-08-13 RX ORDER — FENTANYL CITRATE 50 UG/ML
50 INJECTION, SOLUTION INTRAMUSCULAR; INTRAVENOUS ONCE
Status: COMPLETED | OUTPATIENT
Start: 2021-08-13 | End: 2021-08-13

## 2021-08-13 RX ORDER — FENTANYL CITRATE 50 UG/ML
12.5-25 INJECTION, SOLUTION INTRAMUSCULAR; INTRAVENOUS EVERY 4 HOURS PRN
Status: DISCONTINUED | OUTPATIENT
Start: 2021-08-13 | End: 2021-08-16 | Stop reason: HOSPADM

## 2021-08-13 RX ORDER — ALPRAZOLAM 0.25 MG
0.25 TABLET ORAL 3 TIMES DAILY PRN
Status: DISCONTINUED | OUTPATIENT
Start: 2021-08-13 | End: 2021-08-16 | Stop reason: HOSPADM

## 2021-08-13 RX ORDER — ONDANSETRON 4 MG/1
4 TABLET, ORALLY DISINTEGRATING ORAL EVERY 6 HOURS PRN
Status: DISCONTINUED | OUTPATIENT
Start: 2021-08-13 | End: 2021-08-16 | Stop reason: HOSPADM

## 2021-08-13 RX ORDER — VANCOMYCIN HYDROCHLORIDE 1 G/200ML
1000 INJECTION, SOLUTION INTRAVENOUS EVERY 12 HOURS
Status: DISCONTINUED | OUTPATIENT
Start: 2021-08-14 | End: 2021-08-16

## 2021-08-13 RX ORDER — LIDOCAINE HYDROCHLORIDE 10 MG/ML
INJECTION, SOLUTION EPIDURAL; INFILTRATION; INTRACAUDAL; PERINEURAL
Status: DISCONTINUED
Start: 2021-08-13 | End: 2021-08-13 | Stop reason: HOSPADM

## 2021-08-13 RX ORDER — LIDOCAINE 40 MG/G
CREAM TOPICAL
Status: DISCONTINUED | OUTPATIENT
Start: 2021-08-13 | End: 2021-08-16 | Stop reason: HOSPADM

## 2021-08-13 RX ORDER — TIMOLOL MALEATE 5 MG/ML
1 SOLUTION/ DROPS OPHTHALMIC EVERY MORNING
COMMUNITY

## 2021-08-13 RX ORDER — AMOXICILLIN 250 MG
2 CAPSULE ORAL 2 TIMES DAILY PRN
Status: DISCONTINUED | OUTPATIENT
Start: 2021-08-13 | End: 2021-08-16 | Stop reason: HOSPADM

## 2021-08-13 RX ORDER — SODIUM CHLORIDE, SODIUM LACTATE, POTASSIUM CHLORIDE, CALCIUM CHLORIDE 600; 310; 30; 20 MG/100ML; MG/100ML; MG/100ML; MG/100ML
INJECTION, SOLUTION INTRAVENOUS CONTINUOUS
Status: DISCONTINUED | OUTPATIENT
Start: 2021-08-13 | End: 2021-08-16

## 2021-08-13 RX ORDER — ACETAMINOPHEN 500 MG
1000 TABLET ORAL ONCE
Status: COMPLETED | OUTPATIENT
Start: 2021-08-13 | End: 2021-08-13

## 2021-08-13 RX ORDER — TIMOLOL MALEATE 5 MG/ML
1 SOLUTION/ DROPS OPHTHALMIC EVERY MORNING
Status: DISCONTINUED | OUTPATIENT
Start: 2021-08-14 | End: 2021-08-16 | Stop reason: HOSPADM

## 2021-08-13 RX ORDER — AMLODIPINE BESYLATE 2.5 MG/1
2.5 TABLET ORAL DAILY
Status: DISCONTINUED | OUTPATIENT
Start: 2021-08-14 | End: 2021-08-16 | Stop reason: HOSPADM

## 2021-08-13 RX ORDER — ONDANSETRON 2 MG/ML
4 INJECTION INTRAMUSCULAR; INTRAVENOUS EVERY 6 HOURS PRN
Status: DISCONTINUED | OUTPATIENT
Start: 2021-08-13 | End: 2021-08-16 | Stop reason: HOSPADM

## 2021-08-13 RX ORDER — OXYCODONE HYDROCHLORIDE 5 MG/1
5-10 TABLET ORAL EVERY 4 HOURS PRN
Status: DISCONTINUED | OUTPATIENT
Start: 2021-08-13 | End: 2021-08-14

## 2021-08-13 RX ORDER — LATANOPROST 50 UG/ML
1 SOLUTION/ DROPS OPHTHALMIC AT BEDTIME
Status: DISCONTINUED | OUTPATIENT
Start: 2021-08-13 | End: 2021-08-16 | Stop reason: HOSPADM

## 2021-08-13 RX ORDER — ATENOLOL 50 MG/1
50 TABLET ORAL 2 TIMES DAILY
Status: DISCONTINUED | OUTPATIENT
Start: 2021-08-13 | End: 2021-08-16 | Stop reason: HOSPADM

## 2021-08-13 RX ORDER — CEFTRIAXONE 2 G/1
2 INJECTION, POWDER, FOR SOLUTION INTRAMUSCULAR; INTRAVENOUS ONCE
Status: DISCONTINUED | OUTPATIENT
Start: 2021-08-13 | End: 2021-08-13

## 2021-08-13 RX ORDER — TRAZODONE HYDROCHLORIDE 100 MG/1
100 TABLET ORAL
Status: DISCONTINUED | OUTPATIENT
Start: 2021-08-13 | End: 2021-08-16 | Stop reason: HOSPADM

## 2021-08-13 RX ORDER — KETOROLAC TROMETHAMINE 15 MG/ML
15 INJECTION, SOLUTION INTRAMUSCULAR; INTRAVENOUS EVERY 6 HOURS PRN
Status: DISCONTINUED | OUTPATIENT
Start: 2021-08-13 | End: 2021-08-13

## 2021-08-13 RX ORDER — AMOXICILLIN 250 MG
1 CAPSULE ORAL 2 TIMES DAILY PRN
Status: DISCONTINUED | OUTPATIENT
Start: 2021-08-13 | End: 2021-08-16 | Stop reason: HOSPADM

## 2021-08-13 RX ORDER — POLYETHYLENE GLYCOL 3350 17 G/17G
17 POWDER, FOR SOLUTION ORAL DAILY
Status: DISCONTINUED | OUTPATIENT
Start: 2021-08-13 | End: 2021-08-16 | Stop reason: HOSPADM

## 2021-08-13 RX ORDER — FLUTICASONE PROPIONATE 50 MCG
1 SPRAY, SUSPENSION (ML) NASAL DAILY
Status: DISCONTINUED | OUTPATIENT
Start: 2021-08-13 | End: 2021-08-16 | Stop reason: HOSPADM

## 2021-08-13 RX ADMIN — OXYCODONE HYDROCHLORIDE 5 MG: 5 TABLET ORAL at 21:53

## 2021-08-13 RX ADMIN — VANCOMYCIN HYDROCHLORIDE 1500 MG: 10 INJECTION, POWDER, LYOPHILIZED, FOR SOLUTION INTRAVENOUS at 16:21

## 2021-08-13 RX ADMIN — KETOROLAC TROMETHAMINE 15 MG: 15 INJECTION, SOLUTION INTRAMUSCULAR; INTRAVENOUS at 14:58

## 2021-08-13 RX ADMIN — LATANOPROST 1 DROP: 50 SOLUTION OPHTHALMIC at 21:52

## 2021-08-13 RX ADMIN — DOCUSATE SODIUM AND SENNOSIDES 1 TABLET: 8.6; 5 TABLET, FILM COATED ORAL at 20:27

## 2021-08-13 RX ADMIN — POLYETHYLENE GLYCOL 3350 17 G: 17 POWDER, FOR SOLUTION ORAL at 20:27

## 2021-08-13 RX ADMIN — ACETAMINOPHEN 1000 MG: 500 TABLET, FILM COATED ORAL at 11:40

## 2021-08-13 RX ADMIN — FENTANYL CITRATE 50 MCG: 50 INJECTION, SOLUTION INTRAMUSCULAR; INTRAVENOUS at 14:58

## 2021-08-13 RX ADMIN — OXYCODONE HYDROCHLORIDE 5 MG: 5 TABLET ORAL at 23:23

## 2021-08-13 RX ADMIN — CEFEPIME HYDROCHLORIDE 2 G: 2 INJECTION, POWDER, FOR SOLUTION INTRAVENOUS at 20:38

## 2021-08-13 RX ADMIN — ATENOLOL 50 MG: 50 TABLET ORAL at 20:32

## 2021-08-13 RX ADMIN — FENTANYL CITRATE 50 MCG: 50 INJECTION, SOLUTION INTRAMUSCULAR; INTRAVENOUS at 11:40

## 2021-08-13 RX ADMIN — SODIUM CHLORIDE, POTASSIUM CHLORIDE, SODIUM LACTATE AND CALCIUM CHLORIDE: 600; 310; 30; 20 INJECTION, SOLUTION INTRAVENOUS at 19:14

## 2021-08-13 ASSESSMENT — ACTIVITIES OF DAILY LIVING (ADL)
CONCENTRATING,_REMEMBERING_OR_MAKING_DECISIONS_DIFFICULTY: NO
DIFFICULTY_COMMUNICATING: NO
WEAR_GLASSES_OR_BLIND: YES
VISION_MANAGEMENT: WEARS GLASSES
PATIENT_/_FAMILY_COMMUNICATION_STYLE: SPOKEN LANGUAGE (ENGLISH OR BILINGUAL)
DOING_ERRANDS_INDEPENDENTLY_DIFFICULTY: NO
TOILETING_ISSUES: NO
FALL_HISTORY_WITHIN_LAST_SIX_MONTHS: NO
ADLS_ACUITY_SCORE: 14
WALKING_OR_CLIMBING_STAIRS_DIFFICULTY: NO
DIFFICULTY_EATING/SWALLOWING: NO
DRESSING/BATHING_DIFFICULTY: NO
HEARING_DIFFICULTY_OR_DEAF: NO

## 2021-08-13 ASSESSMENT — ENCOUNTER SYMPTOMS
SHORTNESS OF BREATH: 0
ARTHRALGIAS: 1
COUGH: 0
NAUSEA: 0
HEADACHES: 0
BACK PAIN: 1
FEVER: 1
ABDOMINAL PAIN: 0
VOMITING: 0

## 2021-08-13 ASSESSMENT — MIFFLIN-ST. JEOR: SCORE: 1175.45

## 2021-08-13 NOTE — PROCEDURES
[facility]    Procedure: Right shoulder aspiration    Date/Time: 8/13/2021 3:28 PM  Performed by: Gertrude Harris PA-C  Authorized by: Gertrude Harris PA-C     UNIVERSAL PROTOCOL   Site Marked: Yes  Prior Images Obtained and Reviewed:  Yes  Required items: Required blood products, implants, devices and special equipment available    Patient identity confirmed:  Verbally with patient  NA - No sedation, light sedation, or local anesthesia  Confirmation Checklist:  Patient's identity using two indicators, relevant allergies, procedure was appropriate and matched the consent or emergent situation and correct equipment/implants were available  Time out: Immediately prior to the procedure a time out was called    Universal Protocol: the Joint Commission Universal Protocol was followed    Preparation: Patient was prepped and draped in usual sterile fashion           ANESTHESIA    Anesthesia: Local infiltration  Local Anesthetic:  Lidocaine 1% without epinephrine  Anesthetic Total (mL):  3      SEDATION    Patient Sedated: No    See dictated procedure note for full details.  PROCEDURE   Patient Tolerance:  Patient tolerated the procedure well with no immediate complications  Describe Procedure: Successful right shoulder aspiration under fluoroscopic guidance.     15mL of opaque cloudy lemonade yellow colored fluid aspirated and sent to lab for cell count, gram stain, aerobic and anaerobic cultures.   Length of time physician/provider present for 1:1 monitoring during sedation: 0

## 2021-08-13 NOTE — ED PROVIDER NOTES
History     Chief Complaint:  Fever, Shoulder Pain, and Back Pain      The history is provided by the patient.      Denise Ralph is a 66 year old female who presents with  fever, shoulder pain, and back pain. The patient has history of an infected right shoulder and she is concerned that it might have returned. She was previously hospitalized which resulted in her receiving a shoulder washout. She was then placed on IV antibiotics for 5-6 weeks. She reports 1 week of worsening right shoulder pain with onset of fever today. She denies urinary symptoms, cough, headache, or other complaints.    Review of Systems   Constitutional: Positive for fever.   Respiratory: Negative for cough and shortness of breath.    Cardiovascular: Negative for chest pain.   Gastrointestinal: Negative for abdominal pain, nausea and vomiting.   Musculoskeletal: Positive for arthralgias (Right shoulder) and back pain.   Neurological: Negative for headaches.   All other systems reviewed and are negative.        Allergies:  Erythromycin  Msir [Morphine Sulfate]  Dilaudid [Hydromorphone]  Diuretic  Lisinopril  Losartan  Penicillins  Reglan    Medications:    Alprazolam   Amlodipine   Atenolol   Latanoprost   Methocarbamol   Trazodone     Past Medical History:    Anxiety    Depressive disorder, not elsewhere classified   Essential hypertension, benign   Irritable bowel syndrome   Lumbar degenerative disc disease   Other chronic pain   Sciatica   Unspecified glaucoma  Frequent UTI  Hyponatremia   GERD  Basal cell carcinoma   Squamous cell carcinoma   Tension headache      Past Surgical History:    Hysterectomy   Irrigation and debridement of shoulder   Cholecystectomy   Wrist surgery     Family History:    Mother - Cardiovascular, breast cancer  Father - respiratory   Brother - MI, Drug/Alcohol    Social History:  Patient was unaccompanied to the ED.    Physical Exam     Patient Vitals for the past 24 hrs:   BP Temp Temp src Pulse Resp SpO2    08/13/21 1330 -- -- -- -- -- 93 %   08/13/21 1315 -- -- -- -- -- 95 %   08/13/21 1300 138/88 -- -- 58 -- 93 %   08/13/21 1200 (!) 162/95 -- -- 68 -- 96 %   08/13/21 1145 (!) 175/97 -- -- 71 -- 97 %   08/13/21 0833 (!) 160/93 (!) 100.6  F (38.1  C) Oral 67 18 98 %       Physical Exam  Head:  The scalp, face, and head appear normal  Eyes:  Conjunctivae are normal  CV:  Normal rate, regular rhythm. Normal radial pulses.   Resp:  No respiratory distress   Musc:  Normal muscular tone    Pain with minimal right shoulder ROM. Right shoulder warm to touch. No definitve erythema.   Skin:  No rash or lesions noted  Neuro: Speech is normal and fluent. Face is symmetric. Moving all extremities well.   Psych:  Awake. Alert.  Normal affect.  Appropriate interactions.      Emergency Department Course   Imaging:  XR Shoulder Right G/E 3 Views  Final Result  Impression:  1.  Right shoulder negative for fracture or erosion. Normal  glenohumeral joint alignment and maintained joint spacing. Normal AC  joint.   2.  Subcortical cystic change in the greater tuberosity appears  chronic, is well-corticated, of doubtful acute clinical significance.  CALIXTO VERDUGO MD   Per Radiology     Chest XR,  PA & LAT  Final Result  IMPRESSION:  There are no acute infiltrates. The cardiac silhouette is  not enlarged. Pulmonary vasculature is unremarkable.   JOSESITO ZALDIVAR MD   Per Radiology     XR Joint Aspiration Major Right   Results pending.     Laboratory:  UA with microscopic: pH 7.5 (H), Bacteria few o/w WNL     CMP: WNL (Creatinine 0.61)    Lactic Acid Whole Blood (Resulted: 1206): 0.7    CRP inflammation: 31.1 (H)    CBC: WBC 5.6, HGB 12.4,      Asymptomatic COVID-19 Virus (Coronavirus) by PCR Nasopharyngeal swab: negative    Emergency Department Course:    Reviewed:  I reviewed nursing notes, vitals, past history and care everywhere    Assessments:  1023 I obtained history and examined the patient as noted above.   1158 I  rechecked the patient and explained findings.     Consults:   1153 I consulted with Antonella from orthopedic services.   133 I consulted with Sonal Olmedo for Dr. De La Rosa from hospitalist services    Interventions:  1140 Sublimaze 50mcg IV  1140 Tylenol 1g PO  1458 Sublimaze 50 mcg IV     Disposition:  The patient was admitted to the hospital under the care of Dr. De La Rosa.    Impression & Plan    Medical Decision Making:  Patient presents with right shoulder pain and fever.  Patient has history of infected right shoulder fall of last year.  She is febrile on arrival and received Tylenol.  White count is normal.  CRP elevated.  No evidence of UTI or pneumonia.  She has significant pain with range of motion of her right shoulder.  Discussed with on-call orthopedic surgery who recommended admission, IV antibiotics, and aspiration of the right shoulder performed by IR.  Patient required multiple doses of narcotic pain medication to control pain.  Sonal Olmedo PA-C, from our hospitalist service accepts patient for admission.  Antibiotics will be started as soon as joint aspiration is completed.      Covid-19  Denise Ralph was evaluated during a global COVID-19 pandemic, which necessitated consideration that the patient might be at risk for infection with the SARS-CoV-2 virus that causes COVID-19.   Applicable protocols for evaluation were followed during the patient's care.   COVID-19 was considered as part of the patient's evaluation. The plan for testing is:  a test was obtained during this visit.    Diagnosis:    ICD-10-CM    1. Acute pain of right shoulder  M25.511        Scribe Disclosure:  I, Cr Salomon, am serving as a scribe at 9:56 AM on 8/13/2021 to document services personally performed by Yaw Zeng MD based on my observations and the provider's statements to me.      Yaw Zeng MD  08/13/21 [id number]

## 2021-08-13 NOTE — H&P
History and Physical     Denise Ralph MRN# [id number]   YOB: 1955 Age: 66 year old      Date of Admission:  8/13/2021    Primary care provider: Juan F Smallwood          Assessment and Plan:   Denise Ralph is a 66 year old female with a PMH significant for history of previous right shoulder rotator cuff procedure in 9/2019 complicated by spontaneous septic right glenohumeral and subacromial joint infection in 10/2020 requiring surgical debridement,  HTN and anxiety/depression who presents with right shoulder pain and low grade fever.     Patient was discussed with Dr. Zeng, who was provider in ED. Chart review of ED work up was reviewed as well as chart review of Care Everywhere, previous visits and admissions.     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 Hx of spontaneous septic right glenohumeral and subacromial joint in 10/2020 requiring washout and long term IV Ertapenem. In the ED temp 100.6, pulse 67, pressure 160/93 and no hypoxia. WBC and lactic acid normal. CRP elevated at 31.1. CXR negative, right shoulder x ray negative and blood cultures x 2 drawn. Patient went for IR arthrocentesis.  -Follow blood cultures  -Ortho consult  -NPO after midnight  -Start Vanco and cefepime after IR  -ID consult  -LR at 75 ml/hr  -Schedule Tylenol and Toradol  -Allergic to Morphine and Dilaudid so will order Fentanyl    #Hx of previous spontaneous septic right glenohumeral and subacromial joint in 10/2020  Initially had uncomplicated rotator cuff repair in 9/2019 that was complicated by a septic joint in 10/2020 that required washout and cultures taken. Cultures were negative and she received 1 month of Ertapenem with overall improvement.    #Recent L3-4 fusion 6/2021  Uncomplicated    #HTN  -Continue home Atenolol and Amlodipine on  parameters    #Depression/anxiety  -Continue Trazodone    Social: No concerns  Code: Discussed with patient and they have chosen full code  VTE prophylaxis: PCDs  Disposition: Inpatient                    Chief Complaint:   Right shoulder pain and fever         History of Present Illness:   Denise Ralph is a 66 year old female who presents with right shoulder pain and fever. She states that she developed right shoulder pain about 5 days ago and the pain has become increasingly more severe. She has not appreciated any spreading erythema but did have a fever to 101. She denies nausea, vomiting, diarrhea and constipation. Her breathing is stable without cough. Her pain is reminiscent of a similar infection in the same shoulder about 1 year ago. She denies smoking but drinks some wine daily.              Past Medical History:     Past Medical History:   Diagnosis Date     Anxiety     Gets panic attacks     Complication of anesthesia      Depressive disorder, not elsewhere classified      Essential hypertension, benign     No cardiologist     Irritable bowel syndrome     has had neg colonoscopy & EGD w/u     Lumbar degenerative disc disease 1996    Had PT, traction, no surgery     Other chronic pain     JOint pain for many years.     PONV (postoperative nausea and vomiting)      Sciatica 3/06    R thigh;  MRI = R L2-3 disc      Unspecified glaucoma(365.9)                Past Surgical History:     Past Surgical History:   Procedure Laterality Date     EYE SURGERY Bilateral     Treatment of glaucoma     HYSTERECTOMY       HYSTERECTOMY VAGINAL       IRRIGATION AND DEBRIDEMENT UPPER EXTREMITY, COMBINED Right 10/2/2020    Procedure: Right shoulder arthroscopic extensive debridement of the glenohumeral joint and subacromial space for infection.;  Surgeon: Umesh Berman MD;  Location: [facility] OR     LAPAROSCOPIC CHOLECYSTECTOMY  2002    with repeat operation because of bile leak     Left shoulder decomp surgery for impingement   approx 1993     OPTICAL TRACKING SYSTEM FUSION POSTERIOR SPINE LUMBAR N/A 8/3/2017    Procedure: OPTICAL TRACKING SYSTEM FUSION SPINE POSTERIOR LUMBAR ONE LEVEL;  1. L4 Ang-Meyers osteotomy with exposure of the L4 and L5 roots  2. L4-5 complete discectomy with arthrodesis  3. Placement of Globus Creo GOMEZ pedicle screws bilaterally from L4 to L5  4. L4 - L5 posterior lateral fusion with placement of Medtronic Magnifuse and locally harvested autologous bone  5. Use of Stealth and O     OPTICAL TRACKING SYSTEM FUSION POSTERIOR SPINE LUMBAR N/A 2021    Procedure: Reopening of previous incision with L3-4 decompressive laminectomies with facetectomies and decompression  L3-4 complete discectomy with arthrodesis and placement of a 9 x 28 mm Globus Caliber expandable interbody cage with locally harvested autologous bone placed centrally and anterior to the graft Placement/replacment of Globus Creo pedicle screws from L3-5 bilaterally  L3-4 posterior      ORTHOPEDIC SURGERY Right 10/2020    I & D shoulder     WRIST SURGERY Left                Social History:     Social History     Socioeconomic History     Marital status:      Spouse name: Not on file     Number of children: Not on file     Years of education: Not on file     Highest education level: Not on file   Occupational History     Occupation:    Tobacco Use     Smoking status: Former Smoker     Packs/day: 0.50     Years: 25.00     Pack years: 12.50     Quit date: 10/1/1997     Years since quittin.8     Smokeless tobacco: Never Used     Tobacco comment: quit    Substance and Sexual Activity     Alcohol use: Yes     Alcohol/week: 1.7 standard drinks     Types: 2 Glasses of wine per week     Comment: 2 glasses of wine daily     Drug use: No     Sexual activity: Yes     Partners: Male   Other Topics Concern     Parent/sibling w/ CABG, MI or angioplasty before 65F 55M? Not Asked   Social History Narrative     Not on file     Social  Determinants of Health     Financial Resource Strain:      Difficulty of Paying Living Expenses:    Food Insecurity:      Worried About Running Out of Food in the Last Year:      Ran Out of Food in the Last Year:    Transportation Needs:      Lack of Transportation (Medical):      Lack of Transportation (Non-Medical):    Physical Activity:      Days of Exercise per Week:      Minutes of Exercise per Session:    Stress:      Feeling of Stress :    Social Connections:      Frequency of Communication with Friends and Family:      Frequency of Social Gatherings with Friends and Family:      Attends Cheondo[employer] Services:      Active Member of Clubs or Organizations:      Attends Club or Organization Meetings:      Marital Status:    Intimate Partner Violence:      Fear of Current or Ex-Partner:      Emotionally Abused:      Physically Abused:      Sexually Abused:                Family History:     Family History   Problem Relation Age of Onset     Cardiovascular Mother         / mi     Breast Cancer Mother         diagnosed age 60's     Respiratory Father         pulmonary fibrosis.     Cardiovascular Brother          of MI age 34 2nd to Cocaine Use     Cancer - colorectal No family hx of               Allergies:      Allergies   Allergen Reactions     Erythromycin Shortness Of Breath     Msir [Morphine Sulfate] Nausea     Intollerant to Morphine Sulfate: Nausea,vomiting     Dilaudid [Hydromorphone] Visual Disturbance     Hallucinations     Diuretic      Patient states she gets sick and loses weight from diuretics.     Lisinopril      Cough       Losartan      Tongue felt weird       Penicillins Itching     Reglan Other (See Comments)     Muscle spasms in throat               Medications:     Prior to Admission medications    Medication Sig Last Dose Taking? Auth Provider   ALPRAZolam (XANAX) 0.25 MG tablet TAKE 1 TABLET BY MOUTH THREE TIMES A DAY AS NEEDED FOR ANXIETY prn Yes Juan F Smallwood MD    amLODIPine (NORVASC) 2.5 MG tablet TAKE 1 TABLET BY MOUTH EVERY DAY 8/13/2021 at am Yes Juan F Smallwood MD   atenolol (TENORMIN) 50 MG tablet TAKE 1 TABLET BY MOUTH TWICE A DAY 8/13/2021 at am Yes Juan F Smallwood MD   fluticasone (FLONASE) 50 MCG/ACT nasal spray INSTILL 1 SPRAY INTO BOTH NOSTRILS DAILY 8/12/2021 at am Yes Javier Moreno PA-C   latanoprost (XALATAN) 0.005 % ophthalmic solution Place 1 drop into both eyes At Bedtime 8/12/2021 at hs Yes Unknown, Entered By History   Probiotic Product (PROBIOTIC DAILY PO) Take by mouth daily Probiotic 90 billion units with Fiber 1 PO daily 8/12/2021 at am Yes Reported, Patient   timolol maleate (TIMOPTIC) 0.5 % ophthalmic solution Place 1 drop Into the left eye every morning 8/13/2021 at am Yes Unknown, Entered By History   traZODone (DESYREL) 100 MG tablet TAKE 1 TABLET (100 MG) BY MOUTH NIGHTLY AS NEEDED FOR SLEEP  Patient taking differently: Take 100 mg by mouth At Bedtime  8/12/2021 at hs Yes Juan F Smallwood MD              Review of Systems:   A Comprehensive greater than 10 system review of systems was carried out.  Pertinent positives and negatives are noted above.  Otherwise negative for contributory information.            Physical Exam:   Blood pressure (!) 135/93, pulse 61, temperature (!) 100.6  F (38.1  C), temperature source Oral, resp. rate 18, SpO2 96 %, not currently breastfeeding.  Exam:  GENERAL:  Comfortable.  PSYCH: pleasant, oriented, No acute distress.  HEENT:  PERRLA. Normal conjunctiva, normal hearing, nasal mucosa and Oropharynx are normal.  NECK:  Supple, no neck vein distention, adenopathy or bruits, normal thyroid.  HEART:  Normal S1, S2 with no murmur, no pericardial rub, gallops or S3 or S4.  LUNGS:  Clear to auscultation, normal Respiratory effort. No wheezing, rales or ronchi.  ABDOMEN:  Soft, no hepatosplenomegaly, normal bowel sounds. Non-tender, non distended.   EXTREMITIES:  Right shoulder slightly swollen and very  tender to touch. No erythema noticed.   SKIN:  Dry to touch, No rash, wound or ulcerations.  NEUROLOGIC:  CN 2-12 grossly intact, sensation is intact with no focal deficits.               Data:     Recent Labs   Lab 08/13/21  1133   WBC 5.6   HGB 12.4   HCT 37.8   MCV 98        Recent Labs   Lab 08/13/21  1133      POTASSIUM 4.0   CHLORIDE 102   CO2 29   ANIONGAP 5   GLC 94   BUN 8   CR 0.61   GFRESTIMATED >90   MARLON 9.7   PROTTOTAL 7.0   ALBUMIN 3.7   BILITOTAL 0.4   ALKPHOS 86   AST 30   ALT 27     Recent Labs   Lab 08/13/21  1133   CRP 31.1*         Recent Results (from the past 24 hour(s))   Chest XR,  PA & LAT    Narrative    CHEST TWO VIEWS 8/13/2021 12:19 PM     HISTORY: Fever.    COMPARISON: May 27, 2009       Impression    IMPRESSION:  There are no acute infiltrates. The cardiac silhouette is  not enlarged. Pulmonary vasculature is unremarkable.     JOSESITO ZALDIVAR MD         SYSTEM ID:  [id number]   XR Shoulder Right G/E 3 Views    Narrative    Examination:  XR SHOULDER RIGHT G/E 3 VIEWS    Date:  8/13/2021 12:20 PM     Clinical Information: Right shoulder pain, fever.    Comparison: none.      Impression    Impression:    1.  Right shoulder negative for fracture or erosion. Normal  glenohumeral joint alignment and maintained joint spacing. Normal AC  joint.     2.  Subcortical cystic change in the greater tuberosity appears  chronic, is well-corticated, of doubtful acute clinical significance.    CALIXTO VERDUGO MD         SYSTEM ID:  [id number]         Maria C Olmedo PA-C    This patient was seen and discussed with Dr. De La Rosa who agrees with the current plans as outlined above.

## 2021-08-13 NOTE — PHARMACY-ADMISSION MEDICATION HISTORY
Admission medication history interview status for this patient is complete. See [facility] admission navigator for allergy information, prior to admission medications and immunization status.     Medication history interview done, indicate source(s): Patient over the phone  Medication history resources (including written lists, pill bottles, clinic record): Care Everywhere records  Pharmacy:  [facility]    Changes made to PTA medication list:  Added:  Timolol 0.5% eye drops  Changed: Trazodone 100mg at bedtime prn ---> qhs  Reported as Not Taking:  None  Removed:  Timolol 0.25% eye drops, Robaxin    Actions taken by pharmacist (provider contacted, etc): Confirmed with [facility] which strength of Timolol pt is using.     Additional medication history information:None    Medication reconciliation/reorder completed by provider prior to medication history?  No      Prior to Admission medications    Medication Sig Last Dose Taking? Auth Provider   ALPRAZolam (XANAX) 0.25 MG tablet TAKE 1 TABLET BY MOUTH THREE TIMES A DAY AS NEEDED FOR ANXIETY prn Yes Juan F Smallwood MD   amLODIPine (NORVASC) 2.5 MG tablet TAKE 1 TABLET BY MOUTH EVERY DAY 8/13/2021 at am Yes Juan F Smallwood MD   atenolol (TENORMIN) 50 MG tablet TAKE 1 TABLET BY MOUTH TWICE A DAY 8/13/2021 at am Yes Juan F Smallwood MD   fluticasone (FLONASE) 50 MCG/ACT nasal spray INSTILL 1 SPRAY INTO BOTH NOSTRILS DAILY 8/12/2021 at am Yes Javier Moreno PA-C   latanoprost (XALATAN) 0.005 % ophthalmic solution Place 1 drop into both eyes At Bedtime 8/12/2021 at hs Yes Unknown, Entered By History   Probiotic Product (PROBIOTIC DAILY PO) Take by mouth daily Probiotic 90 billion units with Fiber 1 PO daily 8/12/2021 at am Yes Reported, Patient   timolol maleate (TIMOPTIC) 0.5 % ophthalmic solution Place 1 drop Into the left eye every morning 8/13/2021 at am Yes Unknown, Entered By History   traZODone (DESYREL) 100 MG tablet TAKE 1 TABLET (100 MG) BY MOUTH  NIGHTLY AS NEEDED FOR SLEEP  Patient taking differently: Take 100 mg by mouth At Bedtime  8/12/2021 at hs Yes Juan F Smallwood MD

## 2021-08-13 NOTE — ED TRIAGE NOTES
Pt arrives with c/o fever that started over night and right shoulder pain. Pt had back surgery 9 weeks ago. Last night pt reports right shoulder pain started and crosses her back and goes into left shoulder. ABCs intact.

## 2021-08-13 NOTE — ED NOTES
[facility]  ED Nurse Handoff Report    Denise Ralph is a 66 year old female   ED Chief complaint: Fever, Shoulder Pain, and Back Pain  . ED Diagnosis:   Final diagnoses:   Acute pain of right shoulder     Allergies:   Allergies   Allergen Reactions     Erythromycin Shortness Of Breath     Msir [Morphine Sulfate] Nausea     Intollerant to Morphine Sulfate: Nausea,vomiting     Dilaudid [Hydromorphone] Visual Disturbance     Hallucinations     Diuretic      Patient states she gets sick and loses weight from diuretics.     Lisinopril      Cough       Losartan      Tongue felt weird       Penicillins Itching     Reglan Other (See Comments)     Muscle spasms in throat       Code Status:   Activity level - Baseline/Home:  Independent. Activity Level - Current:   Stand by Assist. Lift room needed: No. Bariatric: No   Needed: No   Isolation: No. Infection: Not Applicable.     Vital Signs:   Vitals:    08/13/21 1200 08/13/21 1300 08/13/21 1315 08/13/21 1330   BP: (!) 162/95 138/88     Pulse: 68 58     Resp:       Temp:       TempSrc:       SpO2: 96% 93% 95% 93%       Cardiac Rhythm:  ,      Pain level:    Patient confused: No. Patient Falls Risk: Yes.   Elimination Status: Has voided   Patient Report - Initial Complaint: R shoulder pain. Focused Assessment: Pt arrives with c/o fever that started over night and right shoulder pain. Pt had back surgery 9 weeks ago. Last night pt reports right shoulder pain started and crosses her back and goes into left shoulder. ABCs intact.   Tests Performed: labs, cultures, ua, xray. Abnormal Results: R shoulder infection.   Treatments provided: meds  Family Comments: none at bedside  OBS brochure/video discussed/provided to patient:  N/A  ED Medications:   Medications   fentaNYL (PF) (SUBLIMAZE) injection 50 mcg (50 mcg Intravenous Given 8/13/21 1140)   acetaminophen (TYLENOL) tablet 1,000 mg (1,000 mg Oral Given 8/13/21 1140)     Drips infusing:  No  For the  majority of the shift, the patient's behavior Green. Interventions performed were .    Sepsis treatment initiated: No     Patient tested for COVID 19 prior to admission: YES    ED Nurse Name/Phone Number: Tori Terrell RN,   1:40 PM  RECEIVING UNIT ED HANDOFF REVIEW    Above ED Nurse Handoff Report was reviewed: yes  Reviewed by: Mallika Moreno on August 13, 2021 at 5:44 PM

## 2021-08-14 ENCOUNTER — ANESTHESIA EVENT (OUTPATIENT)
Dept: SURGERY | Facility: CLINIC | Age: 66
End: 2021-08-14

## 2021-08-14 ENCOUNTER — ANESTHESIA (OUTPATIENT)
Dept: SURGERY | Facility: CLINIC | Age: 66
End: 2021-08-14

## 2021-08-14 PROBLEM — M00.9 SEPTIC ARTHRITIS OF SHOULDER, RIGHT (H): Status: ACTIVE | Noted: 2021-08-14

## 2021-08-14 LAB
ANION GAP SERPL CALCULATED.3IONS-SCNC: 5 MMOL/L (ref 3–14)
BUN SERPL-MCNC: 10 MG/DL (ref 7–30)
CALCIUM SERPL-MCNC: 9.2 MG/DL (ref 8.5–10.1)
CHLORIDE BLD-SCNC: 103 MMOL/L (ref 94–109)
CO2 SERPL-SCNC: 27 MMOL/L (ref 20–32)
CREAT SERPL-MCNC: 0.47 MG/DL (ref 0.52–1.04)
ERYTHROCYTE [DISTWIDTH] IN BLOOD BY AUTOMATED COUNT: 11.7 % (ref 10–15)
GFR SERPL CREATININE-BSD FRML MDRD: >90 ML/MIN/1.73M2
GLUCOSE BLD-MCNC: 132 MG/DL (ref 70–99)
HCT VFR BLD AUTO: 35.9 % (ref 35–47)
HGB BLD-MCNC: 12 G/DL (ref 11.7–15.7)
MCH RBC QN AUTO: 32.3 PG (ref 26.5–33)
MCHC RBC AUTO-ENTMCNC: 33.4 G/DL (ref 31.5–36.5)
MCV RBC AUTO: 97 FL (ref 78–100)
PLATELET # BLD AUTO: 240 10E3/UL (ref 150–450)
POTASSIUM BLD-SCNC: 4.1 MMOL/L (ref 3.4–5.3)
PROCALCITONIN SERPL-MCNC: 0.05 NG/ML
PROCALCITONIN SERPL-MCNC: 0.06 NG/ML
RBC # BLD AUTO: 3.72 10E6/UL (ref 3.8–5.2)
SODIUM SERPL-SCNC: 135 MMOL/L (ref 133–144)
WBC # BLD AUTO: 6 10E3/UL (ref 4–11)

## 2021-08-14 PROCEDURE — 250N000011 HC RX IP 250 OP 636: Performed by: INTERNAL MEDICINE

## 2021-08-14 PROCEDURE — 80048 BASIC METABOLIC PNL TOTAL CA: CPT | Performed by: PHYSICIAN ASSISTANT

## 2021-08-14 PROCEDURE — 250N000011 HC RX IP 250 OP 636: Performed by: ORTHOPAEDIC SURGERY

## 2021-08-14 PROCEDURE — 250N000013 HC RX MED GY IP 250 OP 250 PS 637: Performed by: INTERNAL MEDICINE

## 2021-08-14 PROCEDURE — 36415 COLL VENOUS BLD VENIPUNCTURE: CPT | Performed by: PHYSICIAN ASSISTANT

## 2021-08-14 PROCEDURE — 85041 AUTOMATED RBC COUNT: CPT | Performed by: PHYSICIAN ASSISTANT

## 2021-08-14 PROCEDURE — 99232 SBSQ HOSP IP/OBS MODERATE 35: CPT | Performed by: INTERNAL MEDICINE

## 2021-08-14 PROCEDURE — 250N000009 HC RX 250: Performed by: PHYSICIAN ASSISTANT

## 2021-08-14 PROCEDURE — 250N000011 HC RX IP 250 OP 636: Performed by: PHYSICIAN ASSISTANT

## 2021-08-14 PROCEDURE — 250N000013 HC RX MED GY IP 250 OP 250 PS 637: Performed by: PHYSICIAN ASSISTANT

## 2021-08-14 PROCEDURE — 258N000003 HC RX IP 258 OP 636: Performed by: PHYSICIAN ASSISTANT

## 2021-08-14 PROCEDURE — 84145 PROCALCITONIN (PCT): CPT | Performed by: PHYSICIAN ASSISTANT

## 2021-08-14 PROCEDURE — 36415 COLL VENOUS BLD VENIPUNCTURE: CPT | Performed by: ORTHOPAEDIC SURGERY

## 2021-08-14 PROCEDURE — 84145 PROCALCITONIN (PCT): CPT | Performed by: ORTHOPAEDIC SURGERY

## 2021-08-14 PROCEDURE — 120N000001 HC R&B MED SURG/OB

## 2021-08-14 RX ORDER — ACETAMINOPHEN 325 MG/1
975 TABLET ORAL ONCE
Status: CANCELLED | OUTPATIENT
Start: 2021-08-14 | End: 2021-08-14

## 2021-08-14 RX ORDER — ONDANSETRON 4 MG/1
4 TABLET, ORALLY DISINTEGRATING ORAL EVERY 30 MIN PRN
Status: CANCELLED | OUTPATIENT
Start: 2021-08-14

## 2021-08-14 RX ORDER — SODIUM CHLORIDE, SODIUM LACTATE, POTASSIUM CHLORIDE, CALCIUM CHLORIDE 600; 310; 30; 20 MG/100ML; MG/100ML; MG/100ML; MG/100ML
INJECTION, SOLUTION INTRAVENOUS CONTINUOUS
Status: CANCELLED | OUTPATIENT
Start: 2021-08-14

## 2021-08-14 RX ORDER — LABETALOL 20 MG/4 ML (5 MG/ML) INTRAVENOUS SYRINGE
10
Status: CANCELLED | OUTPATIENT
Start: 2021-08-14

## 2021-08-14 RX ORDER — OXYCODONE HYDROCHLORIDE 5 MG/1
5 TABLET ORAL EVERY 4 HOURS PRN
Status: CANCELLED | OUTPATIENT
Start: 2021-08-14

## 2021-08-14 RX ORDER — FENTANYL CITRATE 50 UG/ML
50 INJECTION, SOLUTION INTRAMUSCULAR; INTRAVENOUS
Status: CANCELLED | OUTPATIENT
Start: 2021-08-14

## 2021-08-14 RX ORDER — CEFAZOLIN SODIUM 2 G/100ML
2 INJECTION, SOLUTION INTRAVENOUS SEE ADMIN INSTRUCTIONS
Status: CANCELLED | OUTPATIENT
Start: 2021-08-14

## 2021-08-14 RX ORDER — ONDANSETRON 2 MG/ML
4 INJECTION INTRAMUSCULAR; INTRAVENOUS EVERY 30 MIN PRN
Status: CANCELLED | OUTPATIENT
Start: 2021-08-14

## 2021-08-14 RX ORDER — ACETAMINOPHEN 325 MG/1
650 TABLET ORAL EVERY 4 HOURS PRN
Status: DISCONTINUED | OUTPATIENT
Start: 2021-08-14 | End: 2021-08-16 | Stop reason: HOSPADM

## 2021-08-14 RX ORDER — ACETAMINOPHEN 650 MG/1
650 SUPPOSITORY RECTAL EVERY 4 HOURS PRN
Status: DISCONTINUED | OUTPATIENT
Start: 2021-08-14 | End: 2021-08-16 | Stop reason: HOSPADM

## 2021-08-14 RX ORDER — MEPERIDINE HYDROCHLORIDE 25 MG/ML
12.5 INJECTION INTRAMUSCULAR; INTRAVENOUS; SUBCUTANEOUS
Status: CANCELLED | OUTPATIENT
Start: 2021-08-14

## 2021-08-14 RX ORDER — LORAZEPAM 2 MG/ML
.5-1 INJECTION INTRAMUSCULAR
Status: CANCELLED | OUTPATIENT
Start: 2021-08-14

## 2021-08-14 RX ORDER — LIDOCAINE 40 MG/G
CREAM TOPICAL
Status: CANCELLED | OUTPATIENT
Start: 2021-08-14

## 2021-08-14 RX ORDER — CELECOXIB 200 MG/1
400 CAPSULE ORAL ONCE
Status: CANCELLED | OUTPATIENT
Start: 2021-08-14 | End: 2021-08-14

## 2021-08-14 RX ORDER — HYDRALAZINE HYDROCHLORIDE 20 MG/ML
2.5-5 INJECTION INTRAMUSCULAR; INTRAVENOUS EVERY 10 MIN PRN
Status: CANCELLED | OUTPATIENT
Start: 2021-08-14

## 2021-08-14 RX ORDER — ALBUTEROL SULFATE 0.83 MG/ML
2.5 SOLUTION RESPIRATORY (INHALATION) EVERY 4 HOURS PRN
Status: CANCELLED | OUTPATIENT
Start: 2021-08-14

## 2021-08-14 RX ORDER — KETOROLAC TROMETHAMINE 15 MG/ML
15 INJECTION, SOLUTION INTRAMUSCULAR; INTRAVENOUS EVERY 6 HOURS PRN
Status: CANCELLED | OUTPATIENT
Start: 2021-08-14 | End: 2021-08-19

## 2021-08-14 RX ORDER — FENTANYL CITRATE 50 UG/ML
25-50 INJECTION, SOLUTION INTRAMUSCULAR; INTRAVENOUS EVERY 5 MIN PRN
Status: CANCELLED | OUTPATIENT
Start: 2021-08-14

## 2021-08-14 RX ORDER — KETOROLAC TROMETHAMINE 15 MG/ML
15 INJECTION, SOLUTION INTRAMUSCULAR; INTRAVENOUS EVERY 6 HOURS PRN
Status: DISCONTINUED | OUTPATIENT
Start: 2021-08-14 | End: 2021-08-16

## 2021-08-14 RX ORDER — HYDROMORPHONE HYDROCHLORIDE 1 MG/ML
0.5 INJECTION, SOLUTION INTRAMUSCULAR; INTRAVENOUS; SUBCUTANEOUS EVERY 5 MIN PRN
Status: CANCELLED | OUTPATIENT
Start: 2021-08-14

## 2021-08-14 RX ORDER — HYDROCODONE BITARTRATE AND ACETAMINOPHEN 5; 325 MG/1; MG/1
1-2 TABLET ORAL EVERY 6 HOURS PRN
Status: DISCONTINUED | OUTPATIENT
Start: 2021-08-14 | End: 2021-08-16 | Stop reason: HOSPADM

## 2021-08-14 RX ORDER — CEFAZOLIN SODIUM 2 G/100ML
2 INJECTION, SOLUTION INTRAVENOUS
Status: CANCELLED | OUTPATIENT
Start: 2021-08-14

## 2021-08-14 RX ADMIN — CEFEPIME HYDROCHLORIDE 2 G: 2 INJECTION, POWDER, FOR SOLUTION INTRAVENOUS at 20:05

## 2021-08-14 RX ADMIN — ATENOLOL 50 MG: 50 TABLET ORAL at 07:46

## 2021-08-14 RX ADMIN — CEFEPIME HYDROCHLORIDE 2 G: 2 INJECTION, POWDER, FOR SOLUTION INTRAVENOUS at 07:47

## 2021-08-14 RX ADMIN — OXYCODONE HYDROCHLORIDE 10 MG: 5 TABLET ORAL at 05:29

## 2021-08-14 RX ADMIN — ONDANSETRON 4 MG: 4 TABLET, ORALLY DISINTEGRATING ORAL at 05:30

## 2021-08-14 RX ADMIN — ACETAMINOPHEN 650 MG: 325 TABLET, FILM COATED ORAL at 19:07

## 2021-08-14 RX ADMIN — SODIUM CHLORIDE, POTASSIUM CHLORIDE, SODIUM LACTATE AND CALCIUM CHLORIDE: 600; 310; 30; 20 INJECTION, SOLUTION INTRAVENOUS at 07:50

## 2021-08-14 RX ADMIN — AMLODIPINE BESYLATE 2.5 MG: 2.5 TABLET ORAL at 07:46

## 2021-08-14 RX ADMIN — VANCOMYCIN HYDROCHLORIDE 1000 MG: 1 INJECTION, SOLUTION INTRAVENOUS at 21:26

## 2021-08-14 RX ADMIN — ATENOLOL 50 MG: 50 TABLET ORAL at 20:11

## 2021-08-14 RX ADMIN — KETOROLAC TROMETHAMINE 15 MG: 15 INJECTION, SOLUTION INTRAMUSCULAR; INTRAVENOUS at 21:40

## 2021-08-14 RX ADMIN — LATANOPROST 1 DROP: 50 SOLUTION OPHTHALMIC at 21:29

## 2021-08-14 RX ADMIN — TIMOLOL MALEATE 1 DROP: 5 SOLUTION/ DROPS OPHTHALMIC at 07:47

## 2021-08-14 RX ADMIN — VANCOMYCIN HYDROCHLORIDE 1000 MG: 1 INJECTION, SOLUTION INTRAVENOUS at 09:58

## 2021-08-14 RX ADMIN — FLUTICASONE PROPIONATE 1 SPRAY: 50 SPRAY, METERED NASAL at 07:47

## 2021-08-14 RX ADMIN — PROCHLORPERAZINE EDISYLATE 5 MG: 5 INJECTION INTRAMUSCULAR; INTRAVENOUS at 10:24

## 2021-08-14 ASSESSMENT — ACTIVITIES OF DAILY LIVING (ADL)
ADLS_ACUITY_SCORE: 12

## 2021-08-14 NOTE — PLAN OF CARE
End of Shift Summary  For vital signs and complete assessments, please see documentation flowsheets.     Pertinent assessments: Pain in right shoulder, oxycodone, ice pack, and positioning for comfort. Up with SBA. Afebrile. Swelling right shoulder and palm of right hand. Bandaid over aspiration site on R shoulder, CDI.   Treatment Plan: Antibiotics, Pain management.  Bedside Nurse: Nolvia BROWNE RN.

## 2021-08-14 NOTE — PROVIDER NOTIFICATION
Admission pager notified: Pt did not have Rocephin in ER. The order had been discontinued. Cefipime is ordered to start tomorrow but do you want her receiving Cefipime or Rocephin tonight? She only had Vanco in ER.

## 2021-08-14 NOTE — PROGRESS NOTES
[facility]  Hospitalist Progress Note  Ramy De La Rosa MD 08/14/2021    Reason for Stay (Diagnosis): suspected septic R shoulder         Assessment and Plan:      Summary of Stay: Denise Ralph is a 66 year old female with a PMH significant for history of previous right shoulder rotator cuff procedure in 9/2019 complicated by spontaneous septic right glenohumeral and subacromial joint infection in 10/2020 requiring surgical debridement,  HTN and anxiety/depression who presents with right shoulder pain and low grade fever.      IR performed an aspirate of R shoulder with findings c/w inflammatory arthritis; concern for septic joint.  Orthopedics is seeing the patient and planning on washout tomorrow.       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 Hx of spontaneous septic right glenohumeral and subacromial joint in 10/2020 requiring washout and long term IV Ertapenem (see below).   - continue IV cefepime/IV vanco  - ortho consult - to OR on 8/15  - ID consult  - pain mangement     #Hx of previous spontaneous septic right glenohumeral and subacromial joint in 10/2020  Initially had uncomplicated rotator cuff repair in 9/2019 that was complicated by a septic joint in 10/2020 that required washout and cultures taken. Cultures were negative and she received 1 month of Ertapenem with overall improvement.     #Recent L3-4 fusion 6/2021  Uncomplicated.  No worsening back pain in the setting of suspected septic joint and blood cx remain neg     #HTN  -Continue home Atenolol and Amlodipine on parameters     #Depression/anxiety  -Continue Trazodone     Social: No concerns  Code: Discussed with patient and they have chosen full code  VTE prophylaxis: PCDs  Disposition: anticipate another 3-5 days in the hospital for septic shoulder, surgery, antibiotics, cx monitoring         "Interval History (Subjective):      Continues to have pain of R shoulder.  No recent injury or trauma.  Had back surgery 2 months ago.  Back sx have been stable recently.                    Physical Exam:      Last Vital Signs:  BP (!) 149/79 (BP Location: Right arm)   Pulse 71   Temp 98.9  F (37.2  C) (Oral)   Resp 18   Ht 1.651 m (5' 5\")   Wt 64 kg (141 lb)   SpO2 97%   BMI 23.46 kg/m        Intake/Output Summary (Last 24 hours) at 8/14/2021 1604  Last data filed at 8/14/2021 1508  Gross per 24 hour   Intake 1557 ml   Output 1100 ml   Net 457 ml       Constitutional: Awake, alert, cooperative, no apparent distress   Respiratory: Clear to auscultation bilaterally, no crackles or wheezing   Cardiovascular: Regular rate and rhythm, normal S1 and S2, and no murmur noted   Abdomen: Normal bowel sounds, soft, non-distended, non-tender   Skin: No rashes, no cyanosis, dry to touch   Neuro: Alert and oriented x3, no weakness, numbness, memory loss   Extremities: No edema, normal range of motion   Other(s):        All other systems: Negative          Medications:      All current medications were reviewed with changes reflected in problem list.         Data:      All new lab and imaging data was reviewed.   Labs:  Recent Labs   Lab 08/14/21  0716 08/13/21  1133   WBC 6.0 5.6   HGB 12.0 12.4   HCT 35.9 37.8   MCV 97 98    272      Imaging:   No results found for this or any previous visit (from the past 24 hour(s)).   "

## 2021-08-14 NOTE — PROGRESS NOTES
Ortho brief:    Full consult note to follow    67 yo female suspected septic right shoulder    Aspiration 8/13:  Nucleated cell count: 88,610  Gram stain negative  Negative for crystals   Cultures pending   CRP 31    Discussed with Dr. Gagnon. Plan for OR tomorrow (8/15)   May have diet today  NPO p mn   Pain meds prn

## 2021-08-14 NOTE — PHARMACY-VANCOMYCIN DOSING SERVICE
Pharmacy Vancomycin Initial Note  Date of Service 2021  Patient's  1955  66 year old, female    Indication: Bone and Joint Infection    Current estimated CrCl = Estimated Creatinine Clearance: 90.4 mL/min (based on SCr of 0.61 mg/dL).    Creatinine for last 3 days  2021: 11:33 AM Creatinine 0.61 mg/dL    Recent Vancomycin Level(s) for last 3 days  No results found for requested labs within last 72 hours.      Vancomycin IV Administrations (past 72 hours)                   vancomycin 1500 mg in 0.9% NaCl 250 ml intermittent infusion 1,500 mg (mg) 1,500 mg New Bag 21 1621                Nephrotoxins and other renal medications (From now, onward)    Start     Dose/Rate Route Frequency Ordered Stop    21 1000  vancomycin (VANCOCIN) 1000 mg in dextrose 5% 200 mL PREMIX      1,000 mg  200 mL/hr over 1 Hours Intravenous EVERY 12 HOURS 21 2325            Contrast Orders - past 72 hours (72h ago, onward)    None          Loading dose: 1500 mg  Regimen: 1000 mg IV every 12 hours.  Start time: 10:00 on 2021  Exposure target: AUC24 (range)400-600 mg/L.hr   AUC24,ss: 551 mg/L.hr  Probability of AUC24 > 400: 81 %  Ctrough,ss: 16.9 mg/L  Probability of Ctrough,ss > 20: 37 %  Probability of nephrotoxicity (Lodise MARICRUZ ): 13 %        Plan:  1. Start vancomycin  1,000 mg IV q12h.   2. Vancomycin monitoring method: AUC  3. Vancomycin therapeutic monitoring goal: 400-600 mg*h/L  4. Pharmacy will check vancomycin levels as appropriate in 1-3 Days.    5. Serum creatinine levels will be ordered daily for the first week of therapy and at least twice weekly for subsequent weeks.      Sigrid Hoffman, [facility]

## 2021-08-14 NOTE — PLAN OF CARE
End of Shift Summary  For vital signs and complete assessments, please see documentation flowsheets.     Pertinent assessments: Pt c/o nausea d/t oxycodone, small emesis. NPO in am for surgery, but procedure deferred unitl tomorrow. Up with SBA. Afebrile. Swelling right shoulder and palm of right hand. Bandaid over aspiration site on R shoulder, CDI. Cultures pending. IV fluids infusing.     Treatment Plan: Antibiotics, Pain management. NPO at midnight for surgery.

## 2021-08-14 NOTE — PLAN OF CARE
End of Shift Summary  For vital signs and complete assessments, please see documentation flowsheets.     Pertinent assessments: Pain in right shoulder, oxycodone and positioning for comfort. Good appetite for dinner. Up with supervision, steady with transfers. Afebrile. Swelling right shoulder and palm of right hand. Bandaid over aspiration site on R shoulder, CDI.   Treatment Plan: Antibiotics, Pain management.

## 2021-08-14 NOTE — CONSULTS
[facility]    Orthopedics Consultation    Date of Admission:  8/13/2021    Assessment & Plan   Denise Ralph is a 66 year old female who was admitted on 8/13/2021 for right shoulder pain and fever. Shoulder aspiration on 8/13 shows nucleated cell count of 88,610, negative gram stain, negative for crystals, cultures pending, CRP 31. The patient has a previous right shoulder rotator cuff repair in September 2019 with a subsequent spontaneous septic right shoulder in October 2020 which required irrigation and debridement followed by 6 weeks of IV antibiotics.       Septic Right shoulder     1. OR today with Dr. Thomason for arthroscopic irrigation and debridement of right shoulder  2. NPO  3. Pain meds prn       Isaiah De Leon PA-C    Code Status    Full Code    Reason for Consult   Reason for consult: I was asked to see this patient by Maria C Olmedo PA-C for a possible septic right shoulder     Primary Care Physician   *Juan F Smallwood    History of Present Illness   Denise Ralph is a 66 year old female who presents with right shoulder pain and fever. The patient reports she developed right shoulder pain roughly one week ago and has increased in severity. She denies any injury or trauma. She denies edema or any erythema.    MEDS:   No current outpatient medications on file.       PAST MEDICAL HISTORY:   Past Medical History:   Diagnosis Date     Anxiety     Gets panic attacks     Complication of anesthesia      Depressive disorder, not elsewhere classified      Essential hypertension, benign     No cardiologist     Irritable bowel syndrome     has had neg colonoscopy & EGD w/u     Lumbar degenerative disc disease 1996    Had PT, traction, no surgery     Other chronic pain     JOint pain for many years.     PONV (postoperative nausea and vomiting)      Sciatica 3/06    R thigh;  MRI = R L2-3 disc      Unspecified glaucoma(365.9)        PAST SURGICAL HISTORY:   Past Surgical History:    Procedure Laterality Date     EYE SURGERY Bilateral     Treatment of glaucoma     HYSTERECTOMY       HYSTERECTOMY VAGINAL       IRRIGATION AND DEBRIDEMENT UPPER EXTREMITY, COMBINED Right 10/2/2020    Procedure: Right shoulder arthroscopic extensive debridement of the glenohumeral joint and subacromial space for infection.;  Surgeon: Umesh Berman MD;  Location: [facility] OR     LAPAROSCOPIC CHOLECYSTECTOMY      with repeat operation because of bile leak     Left shoulder decomp surgery for impingement  approx      OPTICAL TRACKING SYSTEM FUSION POSTERIOR SPINE LUMBAR N/A 8/3/2017    Procedure: OPTICAL TRACKING SYSTEM FUSION SPINE POSTERIOR LUMBAR ONE LEVEL;  1. L4 Ang-Meyers osteotomy with exposure of the L4 and L5 roots  2. L4-5 complete discectomy with arthrodesis  3. Placement of Globus Creo GOMEZ pedicle screws bilaterally from L4 to L5  4. L4 - L5 posterior lateral fusion with placement of Medtronic Magnifuse and locally harvested autologous bone  5. Use of Stealth and O     OPTICAL TRACKING SYSTEM FUSION POSTERIOR SPINE LUMBAR N/A 2021    Procedure: Reopening of previous incision with L3-4 decompressive laminectomies with facetectomies and decompression  L3-4 complete discectomy with arthrodesis and placement of a 9 x 28 mm Globus Caliber expandable interbody cage with locally harvested autologous bone placed centrally and anterior to the graft Placement/replacment of Globus Creo pedicle screws from L3-5 bilaterally  L3-4 posterior      ORTHOPEDIC SURGERY Right 10/2020    I & D shoulder     WRIST SURGERY Left        FAMILY HISTORY:   Family History   Problem Relation Age of Onset     Cardiovascular Mother         / mi     Breast Cancer Mother         diagnosed age 60's     Respiratory Father         pulmonary fibrosis.     Cardiovascular Brother          of MI age 34 2nd to Cocaine Use     Cancer - colorectal No family hx of        SOCIAL HISTORY:   Social History     Tobacco Use      Smoking status: Former Smoker     Packs/day: 0.50     Years: 25.00     Pack years: 12.50     Quit date: 10/1/1997     Years since quittin.8     Smokeless tobacco: Never Used     Tobacco comment: quit    Substance Use Topics     Alcohol use: Yes     Alcohol/week: 1.7 standard drinks     Types: 2 Glasses of wine per week     Comment: 2 glasses of wine daily       ALLERGIES:    Allergies   Allergen Reactions     Erythromycin Shortness Of Breath     Msir [Morphine Sulfate] Nausea     Intollerant to Morphine Sulfate: Nausea,vomiting     Dilaudid [Hydromorphone] Visual Disturbance     Hallucinations     Diuretic      Patient states she gets sick and loses weight from diuretics.     Lisinopril      Cough       Losartan      Tongue felt weird       Penicillins Itching     Reglan Other (See Comments)     Muscle spasms in throat       ROS:  10 point ROS neg other than the symptoms noted above in the HPI.      Physical Exam   Temp: 98.9  F (37.2  C) Temp src: Oral BP: (!) 149/79 Pulse: 71   Resp: 18 SpO2: 97 % O2 Device: None (Room air)    Vital Signs with Ranges  Temp:  [97.9  F (36.6  C)-98.9  F (37.2  C)] 98.9  F (37.2  C)  Pulse:  [58-73] 71  Resp:  [16-20] 18  BP: (125-175)/(56-97) 149/79  SpO2:  [91 %-98 %] 97 %  141 lbs 0 oz    Constitutional: Pleasant, alert, appropriate, following commands.  HEENT: Head atraumatic normocephalic. Pupils equal round and reactive to light.  Respiratory: Unlabored breathing no audible wheeze  Cardiovascular: Regular rate and rhythm  GI: Abdomen soft nontender nondistended.  Lymph/Hematologic: No lymphadenopathy in areas examined  Genitourinary:  No villarreal  Skin: No rashes, no cyanosis, no edema.  Musculoskeletal: Right shoulder:  No ecchymosis or gross bony abnormalities. No edema or erythema. Mild-moderate global TTP. AROM equal to contralateral side with pain and end range. Sensation intact to light touch a,r,m,u distributions. + radial pulse   Neurologic: normal without focal  findings, mental status, speech normal, alert and oriented x iii, GERMAIN  Neuropsychiatric: normal mood and affect       Data   Results for orders placed or performed during the hospital encounter of 08/13/21 (from the past 24 hour(s))   CBC + differential    Narrative    The following orders were created for panel order CBC + differential.  Procedure                               Abnormality         Status                     ---------                               -----------         ------                     CBC with platelets and d...[[id number]]                      Final result                 Please view results for these tests on the individual orders.   Comprehensive metabolic panel   Result Value Ref Range    Sodium 136 133 - 144 mmol/L    Potassium 4.0 3.4 - 5.3 mmol/L    Chloride 102 94 - 109 mmol/L    Carbon Dioxide (CO2) 29 20 - 32 mmol/L    Anion Gap 5 3 - 14 mmol/L    Urea Nitrogen 8 7 - 30 mg/dL    Creatinine 0.61 0.52 - 1.04 mg/dL    Calcium 9.7 8.5 - 10.1 mg/dL    Glucose 94 70 - 99 mg/dL    Alkaline Phosphatase 86 40 - 150 U/L    AST 30 0 - 45 U/L    ALT 27 0 - 50 U/L    Protein Total 7.0 6.8 - 8.8 g/dL    Albumin 3.7 3.4 - 5.0 g/dL    Bilirubin Total 0.4 0.2 - 1.3 mg/dL    GFR Estimate >90 >60 mL/min/1.73m2   Blood Culture Peripheral Blood    Specimen: Peripheral Blood   Result Value Ref Range    Culture No growth after 12 hours    UA with Microscopic reflex to Culture    Specimen: Urine, Clean Catch   Result Value Ref Range    Color Urine Light Yellow Colorless, Straw, Light Yellow, Yellow    Appearance Urine Clear Clear    Glucose Urine Negative Negative mg/dL    Bilirubin Urine Negative Negative    Ketones Urine Negative Negative mg/dL    Specific Gravity Urine 1.010 1.003 - 1.035    Blood Urine Negative Negative    pH Urine 7.5 (H) 5.0 - 7.0    Protein Albumin Urine Negative Negative mg/dL    Urobilinogen Urine Normal Normal, 2.0 mg/dL    Nitrite Urine Negative Negative    Leukocyte Esterase  Urine Negative Negative    Bacteria Urine Few (A) None Seen /HPF    RBC Urine <1 <=2 /HPF    WBC Urine <1 <=5 /HPF    Narrative    Urine Culture not indicated   Lactic acid whole blood   Result Value Ref Range    Lactic Acid 0.7 0.7 - 2.0 mmol/L   CRP inflammation   Result Value Ref Range    CRP Inflammation 31.1 (H) 0.0 - 8.0 mg/L   CBC with platelets and differential   Result Value Ref Range    WBC Count 5.6 4.0 - 11.0 10e3/uL    RBC Count 3.86 3.80 - 5.20 10e6/uL    Hemoglobin 12.4 11.7 - 15.7 g/dL    Hematocrit 37.8 35.0 - 47.0 %    MCV 98 78 - 100 fL    MCH 32.1 26.5 - 33.0 pg    MCHC 32.8 31.5 - 36.5 g/dL    RDW 11.8 10.0 - 15.0 %    Platelet Count 272 150 - 450 10e3/uL    % Neutrophils 73 %    % Lymphocytes 17 %    % Monocytes 8 %    % Eosinophils 1 %    % Basophils 1 %    % Immature Granulocytes 0 %    NRBCs per 100 WBC 0 <1 /100    Absolute Neutrophils 4.1 1.6 - 8.3 10e3/uL    Absolute Lymphocytes 1.0 0.8 - 5.3 10e3/uL    Absolute Monocytes 0.5 0.0 - 1.3 10e3/uL    Absolute Eosinophils 0.1 0.0 - 0.7 10e3/uL    Absolute Basophils 0.0 0.0 - 0.2 10e3/uL    Absolute Immature Granulocytes 0.0 <=0.0 10e3/uL    Absolute NRBCs 0.0 10e3/uL   Chest XR,  PA & LAT    Narrative    CHEST TWO VIEWS 8/13/2021 12:19 PM     HISTORY: Fever.    COMPARISON: May 27, 2009       Impression    IMPRESSION:  There are no acute infiltrates. The cardiac silhouette is  not enlarged. Pulmonary vasculature is unremarkable.     JOSESITO ZALDIVAR MD         SYSTEM ID:  [id number]   XR Shoulder Right G/E 3 Views    Narrative    Examination:  XR SHOULDER RIGHT G/E 3 VIEWS    Date:  8/13/2021 12:20 PM     Clinical Information: Right shoulder pain, fever.    Comparison: none.      Impression    Impression:    1.  Right shoulder negative for fracture or erosion. Normal  glenohumeral joint alignment and maintained joint spacing. Normal AC  joint.     2.  Subcortical cystic change in the greater tuberosity appears  chronic, is well-corticated, of  doubtful acute clinical significance.    CALIXTO VERDUGO MD         SYSTEM ID:  [id number]   Blood Culture Hand, Left    Specimen: Hand, Left; Blood   Result Value Ref Range    Culture No growth after 12 hours    Asymptomatic COVID-19 Virus (Coronavirus) by PCR Nasopharyngeal    Specimen: Nasopharyngeal; Swab    Narrative    The following orders were created for panel order Asymptomatic COVID-19 Virus (Coronavirus) by PCR Nasopharyngeal.  Procedure                               Abnormality         Status                     ---------                               -----------         ------                     SARS-COV2 (COVID-19) Vir...[[id number]]  Normal              Final result                 Please view results for these tests on the individual orders.   SARS-COV2 (COVID-19) Virus RT-PCR    Specimen: Nasopharyngeal; Swab   Result Value Ref Range    SARS CoV2 PCR Negative Negative    Narrative    Testing was performed using the preet  SARS-CoV-2 & Influenza A/B Assay on the preet  Pretty  System.  This test should be ordered for the detection of SARS-COV-2 in individuals who meet SARS-CoV-2 clinical and/or epidemiological criteria. Test performance is unknown in asymptomatic patients.  This test is for in vitro diagnostic use under the FDA EUA for laboratories certified under CLIA to perform moderate and/or high complexity testing. This test has not been FDA cleared or approved.  A negative test does not rule out the presence of PCR inhibitors in the specimen or target RNA in concentration below the limit of detection for the assay. The possibility of a false negative should be considered if the patient's recent exposure or clinical presentation suggests COVID-19.  [facility] are certified under the Clinical Laboratory Improvement Amendments of 1988 (CLIA-88) as qualified to perform moderate and/or high complexity laboratory testing.   right shoulder aspiration    Narrative    Gertrude Harris  LOLI Hahn     8/13/2021  3:29 PM  [facility]    Procedure: Right shoulder aspiration    Date/Time: 8/13/2021 3:28 PM  Performed by: Gertrude Harris PA-C  Authorized by: Gertrude Harris PA-C     UNIVERSAL PROTOCOL   Site Marked: Yes  Prior Images Obtained and Reviewed:  Yes  Required items: Required blood products, implants, devices and special   equipment available    Patient identity confirmed:  Verbally with patient  NA - No sedation, light sedation, or local anesthesia  Confirmation Checklist:  Patient's identity using two indicators, relevant   allergies, procedure was appropriate and matched the consent or emergent   situation and correct equipment/implants were available  Time out: Immediately prior to the procedure a time out was called    Universal Protocol: the Joint Commission Universal Protocol was followed    Preparation: Patient was prepped and draped in usual sterile fashion           ANESTHESIA    Anesthesia: Local infiltration  Local Anesthetic:  Lidocaine 1% without epinephrine  Anesthetic Total (mL):  3      SEDATION    Patient Sedated: No    See dictated procedure note for full details.  PROCEDURE   Patient Tolerance:  Patient tolerated the procedure well with no immediate   complications  Describe Procedure: Successful right shoulder aspiration under   fluoroscopic guidance.     15mL of opaque cloudy lemonade yellow colored fluid aspirated and sent to   lab for cell count, gram stain, aerobic and anaerobic cultures.   Length of time physician/provider present for 1:1 monitoring during   sedation: 0   Cell count with differential fluid    Narrative    The following orders were created for panel order Cell count with differential fluid.  Procedure                               Abnormality         Status                     ---------                               -----------         ------                     Cell Count Body Fluid[[id number]]        Abnormal             Final result               Differential Body Fluid[[id number]]                          Final result                 Please view results for these tests on the individual orders.   Synovial fluid Aerobic Bacterial Culture Routine with Gram Stain    Specimen: Shoulder, Right; Synovial fluid   Result Value Ref Range    Gram Stain Result No organisms seen     Gram Stain Result 3+ WBC seen    Cell Count Body Fluid   Result Value Ref Range    Color Yellow (A) Colorless    Clarity Cloudy (A) Clear    Total Nucleated Cells 88,610 /uL    Narrative    No reference ranges have been established.  This result  should be interpreted in the context of the patient's clinical condition and   compared to simultaneous measurement in the patient's blood.         Crystal ID Synovial Fluid   Result Value Ref Range    Crystals Analysis No clinically significant crystals seen. No clinically significant crystals seen.   Differential Body Fluid   Result Value Ref Range    % Neutrophils 81 %    % Lymphocytes 6 %    % Monocyte/Macrophages 13 %    Narrative    No reference ranges have been established.  This result   should be interpreted in the context of the patient's clinical condition and   compared to simultaneous measurement in the patient's blood.   Basic metabolic panel   Result Value Ref Range    Sodium 135 133 - 144 mmol/L    Potassium 4.1 3.4 - 5.3 mmol/L    Chloride 103 94 - 109 mmol/L    Carbon Dioxide (CO2) 27 20 - 32 mmol/L    Anion Gap 5 3 - 14 mmol/L    Urea Nitrogen 10 7 - 30 mg/dL    Creatinine 0.47 (L) 0.52 - 1.04 mg/dL    Calcium 9.2 8.5 - 10.1 mg/dL    Glucose 132 (H) 70 - 99 mg/dL    GFR Estimate >90 >60 mL/min/1.73m2   CBC with platelets   Result Value Ref Range    WBC Count 6.0 4.0 - 11.0 10e3/uL    RBC Count 3.72 (L) 3.80 - 5.20 10e6/uL    Hemoglobin 12.0 11.7 - 15.7 g/dL    Hematocrit 35.9 35.0 - 47.0 %    MCV 97 78 - 100 fL    MCH 32.3 26.5 - 33.0 pg    MCHC 33.4 31.5 - 36.5 g/dL    RDW 11.7 10.0 - 15.0 %     Platelet Count 240 150 - 450 10e3/uL       ATTESTATION: All clinical, radiographic, and lab findings have been reviewed with Dr. Thomason. Total time spent on consult: 20 minutes

## 2021-08-15 ENCOUNTER — APPOINTMENT (OUTPATIENT)
Dept: MRI IMAGING | Facility: CLINIC | Age: 66
DRG: 554 | End: 2021-08-15
Attending: ORTHOPAEDIC SURGERY
Payer: COMMERCIAL

## 2021-08-15 LAB
CREAT SERPL-MCNC: 0.52 MG/DL (ref 0.52–1.04)
GFR SERPL CREATININE-BSD FRML MDRD: >90 ML/MIN/1.73M2

## 2021-08-15 PROCEDURE — 82565 ASSAY OF CREATININE: CPT | Performed by: INTERNAL MEDICINE

## 2021-08-15 PROCEDURE — 250N000011 HC RX IP 250 OP 636: Performed by: INTERNAL MEDICINE

## 2021-08-15 PROCEDURE — 258N000003 HC RX IP 258 OP 636: Performed by: PHYSICIAN ASSISTANT

## 2021-08-15 PROCEDURE — 120N000001 HC R&B MED SURG/OB

## 2021-08-15 PROCEDURE — 99232 SBSQ HOSP IP/OBS MODERATE 35: CPT | Performed by: INTERNAL MEDICINE

## 2021-08-15 PROCEDURE — 250N000013 HC RX MED GY IP 250 OP 250 PS 637: Performed by: INTERNAL MEDICINE

## 2021-08-15 PROCEDURE — 73223 MRI JOINT UPR EXTR W/O&W/DYE: CPT | Mod: 26 | Performed by: RADIOLOGY

## 2021-08-15 PROCEDURE — 73223 MRI JOINT UPR EXTR W/O&W/DYE: CPT | Mod: RT

## 2021-08-15 PROCEDURE — 250N000011 HC RX IP 250 OP 636: Performed by: PHYSICIAN ASSISTANT

## 2021-08-15 PROCEDURE — A9585 GADOBUTROL INJECTION: HCPCS | Performed by: INTERNAL MEDICINE

## 2021-08-15 PROCEDURE — 250N000013 HC RX MED GY IP 250 OP 250 PS 637: Performed by: PHYSICIAN ASSISTANT

## 2021-08-15 PROCEDURE — 250N000011 HC RX IP 250 OP 636: Performed by: ORTHOPAEDIC SURGERY

## 2021-08-15 PROCEDURE — 36415 COLL VENOUS BLD VENIPUNCTURE: CPT | Performed by: INTERNAL MEDICINE

## 2021-08-15 PROCEDURE — 255N000002 HC RX 255 OP 636: Performed by: INTERNAL MEDICINE

## 2021-08-15 RX ORDER — GADOBUTROL 604.72 MG/ML
7.5 INJECTION INTRAVENOUS ONCE
Status: COMPLETED | OUTPATIENT
Start: 2021-08-15 | End: 2021-08-15

## 2021-08-15 RX ADMIN — ATENOLOL 50 MG: 50 TABLET ORAL at 20:48

## 2021-08-15 RX ADMIN — KETOROLAC TROMETHAMINE 15 MG: 15 INJECTION, SOLUTION INTRAMUSCULAR; INTRAVENOUS at 04:17

## 2021-08-15 RX ADMIN — POLYETHYLENE GLYCOL 3350 17 G: 17 POWDER, FOR SOLUTION ORAL at 13:02

## 2021-08-15 RX ADMIN — FLUTICASONE PROPIONATE 1 SPRAY: 50 SPRAY, METERED NASAL at 08:13

## 2021-08-15 RX ADMIN — ACETAMINOPHEN 650 MG: 325 TABLET, FILM COATED ORAL at 22:44

## 2021-08-15 RX ADMIN — LATANOPROST 1 DROP: 50 SOLUTION OPHTHALMIC at 20:53

## 2021-08-15 RX ADMIN — ATENOLOL 50 MG: 50 TABLET ORAL at 08:12

## 2021-08-15 RX ADMIN — ONDANSETRON 4 MG: 2 INJECTION INTRAMUSCULAR; INTRAVENOUS at 16:45

## 2021-08-15 RX ADMIN — SODIUM CHLORIDE, POTASSIUM CHLORIDE, SODIUM LACTATE AND CALCIUM CHLORIDE: 600; 310; 30; 20 INJECTION, SOLUTION INTRAVENOUS at 00:51

## 2021-08-15 RX ADMIN — VANCOMYCIN HYDROCHLORIDE 1000 MG: 1 INJECTION, SOLUTION INTRAVENOUS at 10:09

## 2021-08-15 RX ADMIN — VANCOMYCIN HYDROCHLORIDE 1000 MG: 1 INJECTION, SOLUTION INTRAVENOUS at 22:41

## 2021-08-15 RX ADMIN — GADOBUTROL 6.5 ML: 604.72 INJECTION INTRAVENOUS at 18:54

## 2021-08-15 RX ADMIN — HYDROCODONE BITARTRATE AND ACETAMINOPHEN 1 TABLET: 5; 325 TABLET ORAL at 16:44

## 2021-08-15 RX ADMIN — AMLODIPINE BESYLATE 2.5 MG: 2.5 TABLET ORAL at 08:12

## 2021-08-15 RX ADMIN — TIMOLOL MALEATE 1 DROP: 5 SOLUTION/ DROPS OPHTHALMIC at 08:13

## 2021-08-15 RX ADMIN — CEFEPIME HYDROCHLORIDE 2 G: 2 INJECTION, POWDER, FOR SOLUTION INTRAVENOUS at 20:43

## 2021-08-15 RX ADMIN — CEFEPIME HYDROCHLORIDE 2 G: 2 INJECTION, POWDER, FOR SOLUTION INTRAVENOUS at 08:12

## 2021-08-15 RX ADMIN — TRAZODONE HYDROCHLORIDE 100 MG: 100 TABLET ORAL at 20:48

## 2021-08-15 RX ADMIN — SODIUM CHLORIDE, POTASSIUM CHLORIDE, SODIUM LACTATE AND CALCIUM CHLORIDE: 600; 310; 30; 20 INJECTION, SOLUTION INTRAVENOUS at 16:02

## 2021-08-15 RX ADMIN — KETOROLAC TROMETHAMINE 15 MG: 15 INJECTION, SOLUTION INTRAMUSCULAR; INTRAVENOUS at 13:02

## 2021-08-15 ASSESSMENT — ACTIVITIES OF DAILY LIVING (ADL)
ADLS_ACUITY_SCORE: 12

## 2021-08-15 NOTE — PLAN OF CARE
End of Shift Summary  For vital signs and complete assessments, please see documentation flowsheets.     Pertinent assessments: No surgery, opted to manage with abx. Toradol given x1. Pt showered independently, up ad stephanie in room. Ice and heat applied to shoulder alternatively.      Major Shift Events: none    Treatment Plan: continue abx, possible discharge in am

## 2021-08-15 NOTE — PROGRESS NOTES
"Note Infectious Disease Consult Service Progress Note  Covering for [facility]   Pt Name Denise Ralph   Date 08/15/2021   MRN [id number]     Notes / labs / imaging test results and other data were reviewed    CHIEF COMPLAINT: REASON FOR VISIT    R shoulder pain    HPI    2019    R rotator cuff surgery  10/2020  R shoulder infection => surgical I & D  Course of iv broad spectrum antimicrobial agents  No (+) cultures to confirm pathogen  21   Admit w R shoulder pain  Arthrocentesis - 88,610 WBC / 81 % PMNs  no crystals seen    8/15/2021  INTERIM UPDATE    Nil new    Remainder of 14-point ROS was negative except as listed above    PFSH:  Personal / Family / Social Histories were reviewed and updated  nil new  Vital Signs: BP (!) 157/72 (BP Location: Right arm)   Pulse 67   Temp 99.5  F (37.5  C) (Oral)   Resp 18   Ht 1.651 m (5' 5\")   Wt 64 kg (141 lb)   SpO2 97%   BMI 23.46 kg/m      Temp (12hrs), Av.5  F (37.5  C), Min:99.5  F (37.5  C), Max:99.5  F (37.5  C)  Temp (48hrs), Av.7  F (37.1  C), Min:97.9  F (36.6  C), Max:99.5  F (37.5  C)     Data  Cultures          LABS  Lab Results   Component Value Date/Time    WBC 6.0 2021 07:16 AM    WBC 5.6 2021 11:33 AM    WBC 7.6 2021 02:18 PM    WBC 5.5 2021 03:36 PM    WBC 4.8 10/29/2020 10:20 AM    WBC 5.0 10/22/2020 09:15 AM    AST 30 2021 11:33 AM    AST 30 2021 03:36 PM    AST 47 (H) 10/29/2020 10:20 AM    AST 35 10/22/2020 09:15 AM    AST 27 10/15/2020 10:45 AM    ALT 27 2021 11:33 AM    ALT 29 2021 03:36 PM    ALT 32 2020 03:17 PM    ALT 44 2019 10:17 PM    ALT 79 (H) 2019 08:19 PM    ALKPHOS 86 2021 11:33 AM    ALKPHOS 66 2021 03:36 PM    ALKPHOS 65 2020 03:17 PM    ALKPHOS 97 2019 10:17 PM    ALKPHOS 101 2019 08:19 PM           ASSESSMENT & SUGGESTIONS  Patient Active Problem List   Diagnosis Code     Frequent UTI N39.0     FUO (fever of " "unknown origin) R50.9     Acute pain of right shoulder M25.511     Septic arthritis of shoulder, right (H) M00.9      Inflammatory synovitis R shoulder / presumed infectious  No clue to current systemic or \"extra articular\" infection  Also, no clue by history of recent illness thought might have seeded shoulder  80-85 % of time, GPC cause ... 5-10 % GNR  On broad spectrum antibiotics  Await OR for I & D - many tests ordered (cultures / PCR ...)             Jose G Rubalcava MD  Covering for [facility]  Infectious Disease service    CURRENT MED REVIEWD  Current Facility-Administered Medications   Medication     acetaminophen (TYLENOL) tablet 650 mg    Or     acetaminophen (TYLENOL) Suppository 650 mg     ALPRAZolam (XANAX) tablet 0.25 mg     amLODIPine (NORVASC) tablet 2.5 mg     atenolol (TENORMIN) tablet 50 mg     ceFEPIme (MAXIPIME) 2 g vial to attach to  ml bag for ADULTS or 50 ml bag for PEDS     fentaNYL (PF) (SUBLIMAZE) injection 12.5-25 mcg     fluticasone (FLONASE) 50 MCG/ACT spray 1 spray     HYDROcodone-acetaminophen (NORCO) 5-325 MG per tablet 1-2 tablet     ketorolac (TORADOL) injection 15 mg     lactated ringers infusion     latanoprost (XALATAN) 0.005 % ophthalmic solution 1 drop     lidocaine (LMX4) cream     lidocaine 1 % 0.1-1 mL     melatonin tablet 1 mg     naloxone (NARCAN) injection 0.2 mg    Or     naloxone (NARCAN) injection 0.4 mg    Or     naloxone (NARCAN) injection 0.2 mg    Or     naloxone (NARCAN) injection 0.4 mg     ondansetron (ZOFRAN-ODT) ODT tab 4 mg    Or     ondansetron (ZOFRAN) injection 4 mg     polyethylene glycol (MIRALAX) Packet 17 g     prochlorperazine (COMPAZINE) injection 5 mg     senna-docusate (SENOKOT-S/PERICOLACE) 8.6-50 MG per tablet 1 tablet    Or     senna-docusate (SENOKOT-S/PERICOLACE) 8.6-50 MG per tablet 2 tablet     sodium chloride (PF) 0.9% PF flush 3 mL     sodium chloride (PF) 0.9% PF flush 3 mL     timolol maleate (TIMOPTIC) 0.5 % ophthalmic " solution 1 drop     traZODone (DESYREL) tablet 100 mg     vancomycin (VANCOCIN) 1000 mg in dextrose 5% 200 mL PREMIX

## 2021-08-15 NOTE — PROGRESS NOTES
Ortho Update:    Procalcitonin 0.05 low likelyhood of infection.  WBC nml.  Culture neg from aspirate.  Previous I&D 2 years ago was culture negative.  Nucleated cell count 88,000, negative crystals from current aspirate.    Wondering if ID recs could be carried out on the right shoulder synovial fluid already aspirated from shoulder.  -- AFB culture  -- fungal culture  -- PCR for 16s rRNA and PCR for 28s rRNA      R shoulder seronegative arthropathy vs septic shoulder.    Continue antibiotics, follow serial crp, consider treating with anti-inflammatories.      NPO p MN

## 2021-08-15 NOTE — PLAN OF CARE
End of Shift Summary  For vital signs and complete assessments, please see documentation flowsheets.     Pertinent assessments: Pt having intermittent nausea but declines intervention. Anxious and tearful. Up to bathroom with supervision. Headache and Right shoulder pain, Tylenol for comfort. Poor appetite on clears. Swelling right shoulder unchanged from admission. Afebrile  Treatment Plan: Antibiotics, pain control and antiemetics prn.

## 2021-08-15 NOTE — PROGRESS NOTES
[facility]  Hospitalist Progress Note  Ramy De La Rosa MD 08/15/2021    Reason for Stay (Diagnosis): inflammatory arthritis R shoulder         Assessment and Plan:      Summary of Stay: Denise Ralph is a 66 year old female with a PMH significant for history of previous right shoulder rotator cuff procedure in 9/2019 complicated by spontaneous septic right glenohumeral and subacromial joint infection in 10/2020 requiring surgical debridement,  HTN and anxiety/depression who presents with right shoulder pain and low grade fever.      IR performed an aspirate of R shoulder with findings c/w inflammatory arthritis; concern for septic joint.  cx pending and remains on antibiotics.  Orthopedics is following; holding off on initial plans for washout as cx being monitored    Since admit R shoulder pain is significantly improved.     Plans today;  - I called [facility] micro lab and had them add on fungal cx, AFB cx, and PCR tests requested by ID to previous aspirate from R shoulder   - orthopedic surgery has decided not to take pt to OR for washout.    - continue antibiotics and monitor cx  - repeat WBC count and CRP in AM       #Recurrent inflammatory arthritis of right shoulder joint - r/o septic joint  #Intactable right shoulder pain/fever  - sx dramatically improved and pain nearly resolved  - follow cx  - remains on IV vanco and IV cefepime     #Hx of previous spontaneous suspected septic right glenohumeral and subacromial joint in 10/2020  Initially had uncomplicated rotator cuff repair in 9/2019 that was complicated by a suspected septic joint in 10/2020 that required washout and cultures taken. Cultures were negative and she received 1 month of Ertapenem with overall improvement.     #Recent L3-4 fusion 6/2021  Uncomplicated.  No worsening back pain in the setting of suspected septic joint and blood cx remain neg     #HTN  -Continue home Atenolol and Amlodipine on  "parameters     #Depression/anxiety  -Continue Trazodone     Social: No concerns  Code: Discussed with patient and they have chosen full code  VTE prophylaxis: PCDs  Disposition: home when OK with ID/ortho        Interval History (Subjective):      Feels well.  R shoulder pain significantly improved and almost completely resolved.                    Physical Exam:      Last Vital Signs:  BP (!) 157/72 (BP Location: Right arm)   Pulse 67   Temp 99.5  F (37.5  C) (Oral)   Resp 18   Ht 1.651 m (5' 5\")   Wt 64 kg (141 lb)   SpO2 97%   BMI 23.46 kg/m        Intake/Output Summary (Last 24 hours) at 8/15/2021 1302  Last data filed at 8/15/2021 0051  Gross per 24 hour   Intake 1340 ml   Output 1500 ml   Net -160 ml       Constitutional: Awake, alert, cooperative, no apparent distress   Respiratory: Clear to auscultation bilaterally, no crackles or wheezing   Cardiovascular: Regular rate and rhythm, normal S1 and S2, and no murmur noted   Abdomen: Normal bowel sounds, soft, non-distended, non-tender   Skin: No rashes, no cyanosis, dry to touch   Neuro: Alert and oriented x3, no weakness, numbness, memory loss   Extremities: No edema, normal range of motion   Other(s):        All other systems: Negative          Medications:      All current medications were reviewed with changes reflected in problem list.         Data:      All new lab and imaging data was reviewed.   Labs:  Recent Labs   Lab 08/14/21  0716   WBC 6.0   HGB 12.0   HCT 35.9   MCV 97         Imaging:   No results found for this or any previous visit (from the past 24 hour(s)).   "

## 2021-08-15 NOTE — PLAN OF CARE
End of Shift Summary  For vital signs and complete assessments, please see documentation flowsheets.     Pertinent assessments: Up to bathroom with supervision. NPO after midnight in prep for surgery. Swelling right shoulder unchanged from admission. PRN toradol given with good effect. Afebrile. Patient slept majority of shift.     Major Shift Events: none    Treatment Plan: Antibiotics, pain control and antiemetics prn.

## 2021-08-15 NOTE — PROGRESS NOTES
Orthopedic Surgery  Denise Ralph  8/15/2021  Admit Date:  2021  Right shoulder pain    Patient reports right shoulder pain and range of motion is much improved from yesterday  States Toradol has been helpful   Alert and orient to person, place, and time.    Vital Sign Ranges  Temperature Temp  Av  F (37.2  C)  Min: 98.7  F (37.1  C)  Max: 99.5  F (37.5  C)   Blood pressure Systolic (24hrs), Av , Min:140 , Max:162        Diastolic (24hrs), Av, Min:65, Max:82      Pulse Pulse  Av  Min: 60  Max: 67   Respirations Resp  Av  Min: 18  Max: 18   Pulse oximetry SpO2  Av.7 %  Min: 94 %  Max: 97 %       Right shoulder:  1+ edema without ecchymosis or erythema.  Mild global TTP right shoulder, improved from yesterday   Full painless AROM equal to contralateral side  NVI BUE a,r,m,u distributions  + radial pulse     Labs:  Recent Labs   Lab Test 21  0716 21  1133 21  1418   POTASSIUM 4.1 4.0 4.1     Recent Labs   Lab Test 21  0716 21  1133 21  1418   HGB 12.0 12.4 11.4*     Recent Labs   Lab Test 17  1331 02/27/15  2243 02/27/15  1840   INR 0.95 0.83* 0.88     Recent Labs   Lab Test 21  0716 21  1133 21  1418    272 198       A/P  1. Procalcitonin 0.05. low likelyhood of infection, cx negative to date   2. Plan for non-operative management with anti-inflammatories and abx while following cultures.       2. Disposition   Anticipate possible d/c home tomorrow pending progress     Isaiah De Leon PA-C

## 2021-08-16 VITALS
SYSTOLIC BLOOD PRESSURE: 173 MMHG | WEIGHT: 141 LBS | BODY MASS INDEX: 23.49 KG/M2 | HEIGHT: 65 IN | HEART RATE: 58 BPM | TEMPERATURE: 99 F | RESPIRATION RATE: 18 BRPM | DIASTOLIC BLOOD PRESSURE: 87 MMHG | OXYGEN SATURATION: 97 %

## 2021-08-16 DIAGNOSIS — R09.89 RUNNY NOSE: ICD-10-CM

## 2021-08-16 DIAGNOSIS — R09.81 CONGESTION OF PARANASAL SINUS: ICD-10-CM

## 2021-08-16 LAB
CREAT SERPL-MCNC: 0.57 MG/DL (ref 0.52–1.04)
CRP SERPL-MCNC: 12.8 MG/L (ref 0–8)
GFR SERPL CREATININE-BSD FRML MDRD: >90 ML/MIN/1.73M2
VANCOMYCIN SERPL-MCNC: 14.8 MG/L
WBC # BLD AUTO: 5.6 10E3/UL (ref 4–11)

## 2021-08-16 PROCEDURE — 80202 ASSAY OF VANCOMYCIN: CPT | Performed by: INTERNAL MEDICINE

## 2021-08-16 PROCEDURE — 86140 C-REACTIVE PROTEIN: CPT | Performed by: INTERNAL MEDICINE

## 2021-08-16 PROCEDURE — 258N000003 HC RX IP 258 OP 636: Performed by: PHYSICIAN ASSISTANT

## 2021-08-16 PROCEDURE — 250N000011 HC RX IP 250 OP 636: Performed by: ORTHOPAEDIC SURGERY

## 2021-08-16 PROCEDURE — 99232 SBSQ HOSP IP/OBS MODERATE 35: CPT | Performed by: INTERNAL MEDICINE

## 2021-08-16 PROCEDURE — 36415 COLL VENOUS BLD VENIPUNCTURE: CPT | Performed by: INTERNAL MEDICINE

## 2021-08-16 PROCEDURE — 250N000011 HC RX IP 250 OP 636: Performed by: INTERNAL MEDICINE

## 2021-08-16 PROCEDURE — 85048 AUTOMATED LEUKOCYTE COUNT: CPT | Performed by: INTERNAL MEDICINE

## 2021-08-16 PROCEDURE — 82565 ASSAY OF CREATININE: CPT | Performed by: INTERNAL MEDICINE

## 2021-08-16 PROCEDURE — 250N000013 HC RX MED GY IP 250 OP 250 PS 637: Performed by: PHYSICIAN ASSISTANT

## 2021-08-16 RX ORDER — CELECOXIB 100 MG/1
100 CAPSULE ORAL 2 TIMES DAILY
Status: DISCONTINUED | OUTPATIENT
Start: 2021-08-16 | End: 2021-08-16 | Stop reason: HOSPADM

## 2021-08-16 RX ORDER — IBUPROFEN 600 MG/1
600 TABLET, FILM COATED ORAL EVERY 6 HOURS PRN
Status: DISCONTINUED | OUTPATIENT
Start: 2021-08-16 | End: 2021-08-16

## 2021-08-16 RX ORDER — CELECOXIB 100 MG/1
100 CAPSULE ORAL 2 TIMES DAILY
Qty: 60 CAPSULE | Refills: 0 | Status: SHIPPED | OUTPATIENT
Start: 2021-08-16 | End: 2021-12-31

## 2021-08-16 RX ORDER — CEFTRIAXONE 2 G/1
2 INJECTION, POWDER, FOR SOLUTION INTRAMUSCULAR; INTRAVENOUS EVERY 24 HOURS
Status: DISCONTINUED | OUTPATIENT
Start: 2021-08-16 | End: 2021-08-16

## 2021-08-16 RX ORDER — ACETAMINOPHEN 325 MG/1
650 TABLET ORAL EVERY 6 HOURS PRN
COMMUNITY
Start: 2021-08-16 | End: 2023-09-25

## 2021-08-16 RX ORDER — PANTOPRAZOLE SODIUM 40 MG/1
40 TABLET, DELAYED RELEASE ORAL
Status: DISCONTINUED | OUTPATIENT
Start: 2021-08-16 | End: 2021-08-16 | Stop reason: HOSPADM

## 2021-08-16 RX ADMIN — TIMOLOL MALEATE 1 DROP: 5 SOLUTION/ DROPS OPHTHALMIC at 08:10

## 2021-08-16 RX ADMIN — ATENOLOL 50 MG: 50 TABLET ORAL at 08:08

## 2021-08-16 RX ADMIN — FLUTICASONE PROPIONATE 1 SPRAY: 50 SPRAY, METERED NASAL at 08:11

## 2021-08-16 RX ADMIN — KETOROLAC TROMETHAMINE 15 MG: 15 INJECTION, SOLUTION INTRAMUSCULAR; INTRAVENOUS at 06:37

## 2021-08-16 RX ADMIN — VANCOMYCIN HYDROCHLORIDE 1000 MG: 1 INJECTION, SOLUTION INTRAVENOUS at 09:34

## 2021-08-16 RX ADMIN — POLYETHYLENE GLYCOL 3350 17 G: 17 POWDER, FOR SOLUTION ORAL at 08:08

## 2021-08-16 RX ADMIN — CEFEPIME HYDROCHLORIDE 2 G: 2 INJECTION, POWDER, FOR SOLUTION INTRAVENOUS at 08:12

## 2021-08-16 RX ADMIN — CELECOXIB 100 MG: 100 CAPSULE ORAL at 12:00

## 2021-08-16 RX ADMIN — SODIUM CHLORIDE, POTASSIUM CHLORIDE, SODIUM LACTATE AND CALCIUM CHLORIDE: 600; 310; 30; 20 INJECTION, SOLUTION INTRAVENOUS at 08:12

## 2021-08-16 RX ADMIN — AMLODIPINE BESYLATE 2.5 MG: 2.5 TABLET ORAL at 08:08

## 2021-08-16 ASSESSMENT — ACTIVITIES OF DAILY LIVING (ADL)
ADLS_ACUITY_SCORE: 12

## 2021-08-16 NOTE — PROGRESS NOTES
[facility]  Hospitalist Progress Note  Ramy De La Rosa MD 08/16/2021    Reason for Stay (Diagnosis): inflammatory arthritis of R shoulder         Assessment and Plan:      Summary of Stay: Denise Ralph is a 66 year old female with a PMH significant for history of previous right shoulder rotator cuff procedure in 9/2019 complicated by spontaneous septic right glenohumeral and subacromial joint infection in 10/2020 requiring surgical debridement,  HTN and anxiety/depression who presents with right shoulder pain and low grade fever.      IR performed an aspirate of R shoulder with findings c/w inflammatory arthritis; concern for septic joint.  cx pending and remains on antibiotics.  Orthopedics is following; holding off on initial plans for washout as cx being monitored     Since admit R shoulder pain is significantly improved.      Right shoulder pain continues to improve.  CRP level 12 and WBC count remains normal.  Procal level 0.05.  No fever.  Ortho feels patient can discharge today.    Await ID input - options include discharge off antibiotics with close monitoring to see if potential infection declares itself vs discharge on prolonged antibiotic course with the assumption of septic joint     #Recurrent inflammatory arthritis of right shoulder joint - r/o septic joint  #Intactable right shoulder pain/fever  - sx dramatically improved and pain nearly resolved  - follow cx; cx remain NGTD  - remains on IV vanco and IV cefepime     #Hx of previous spontaneous suspected septic right glenohumeral and subacromial joint in 10/2020  Initially had uncomplicated rotator cuff repair in 9/2019 that was complicated by a suspected septic joint in 10/2020 that required washout and cultures taken. Cultures were negative and she received 1 month of Ertapenem with overall improvement.     #Recent L3-4 fusion 6/2021  Uncomplicated.  No worsening back pain in the setting of suspected septic joint and blood cx  "remain neg     #HTN  -Continue home Atenolol and Amlodipine on parameters     #Depression/anxiety  -Continue Trazodone     Social: No concerns  Code: Full  VTE prophylaxis: PCDs  Disposition: home when OK with ID/ortho           Interval History (Subjective):      R shoulder pain continues to improve.                    Physical Exam:      Last Vital Signs:  BP (!) 158/79 (BP Location: Left arm)   Pulse 59   Temp 98.8  F (37.1  C) (Oral)   Resp 18   Ht 1.651 m (5' 5\")   Wt 64 kg (141 lb)   SpO2 96%   BMI 23.46 kg/m        Intake/Output Summary (Last 24 hours) at 8/16/2021 1613  Last data filed at 8/15/2021 1947  Gross per 24 hour   Intake 240 ml   Output --   Net 240 ml       Constitutional: Awake, alert, cooperative, no apparent distress   Respiratory: Clear to auscultation bilaterally, no crackles or wheezing   Cardiovascular: Regular rate and rhythm, normal S1 and S2, and no murmur noted   Abdomen: Normal bowel sounds, soft, non-distended, non-tender   Skin: No rashes, no cyanosis, dry to touch   Neuro: Alert and oriented x3, no weakness, numbness, memory loss   Extremities: No edema, normal range of motion   Other(s): Able to easily raise RUE into the air without shoulder pain       All other systems: Negative          Medications:      All current medications were reviewed with changes reflected in problem list.         Data:      All new lab and imaging data was reviewed.   Labs:  Recent Labs   Lab 08/16/21  0549 08/14/21  0716   WBC 5.6 6.0   HGB  --  12.0   HCT  --  35.9   MCV  --  97   PLT  --  240      Imaging:   Recent Results (from the past 24 hour(s))   MR Shoulder Right w/o & w Contrast    Narrative    EXAM: MR right shoulder without  contrast 8/16/2021 9:17 AM    TECHNIQUE: Multiplanar, multisequence imaging of the right shoulder  were obtained without administration of intravenous or intra-articular  gadolinium contrast using routine protocol.    History: Osteomyelitis suspected, shoulder, " xray done; Procalcitonin  0.05 low likelyhood of infection.  WBC nml.  Culture neg from  aspirate.  Previous I&D 2 years ago was culture negative.   Nucleated cell count 88,000, negative crystals from current aspirate.   Pain improving with tordol and abx    Comparison: Shoulder radiograph dated 8/13/2021    Findings:    ROTATOR CUFF and ASSOCIATED STRUCTURES  Rotator cuff: There are postsurgical changes of rotator cuff repair.  Superimposed on high-grade tendinopathy, there is a tear of the far  superior fibers of the supraspinatus tendon with the biceps tendon  medially subluxed into the intrasubstance tear. The repair site for  the more inferior fibers remains intact. Moderate grade tendinopathy  of the supra and infraspinatus tendons with low-grade articular sided  and intrasubstance tear of the infraspinatus tendon. No significant  tendon retraction of the supra or infraspinatus tendon. The teres  minor tendon is intact.     Bursa: No subacromial or subdeltoid bursal fluid.    Musculature: Muscle bulk of rotator cuff is preserved.  Deltoid muscle  bulk is also preserved.  No muscle edema.    Acromioclavicular joint  There are moderate degenerative changes of the acromioclavicular  joint. Acromion is type 2 in sagittal morphology.  Coracoacromial  ligament is not thickened.    OSSEOUS STRUCTURES  No fracture, marrow contusion or marrow infiltration. Postsurgical  changes of rotator cuff repair with screws through the humeral head.  No other areas of marrow replacement.    LONG BICIPITAL TENDON  The long head of the biceps tendon is medially subluxed from the  bicipital groove. No complete or partial biceps tendon tear is  present.    GLENOHUMERAL JOINT  Joint fluid: There is a very large joint effusion with debris within,  the joint effusion is dissecting along the biceps tendon.    Cartilage and subarticular bone:  Cartilage thinning is noted, no  definite evidence of full-thickness articular cartilage loss.  No  Hill-Sachs, reverse Hill-Sachs, or bony Bankart lesions are seen.    Labrum: Limited assessment on this study with relative lack of joint  distention shows no labral tear.        Impression    Impression:  1. Very large joint effusion with debris within dissecting into the  bicipital groove. MRI is nonspecific and would not be able to identify  an underlying infection. There is no replacement of fatty marrow of  the humeral head or glenoid except along the tracts of the screws from  prior rotator cuff repair, as expected. No definite evidence of  osteomyelitis. Synovitis of the joint capsule.  2. Overall the rotator cuff repair remains intact, there is likely a  new intrasubstance tear of the far superior fibers of the  supraspinatus tendon superimposed on high-grade tendinopathy. No  significant muscle atrophy, fatty infiltration of the rotator cuff  musculature.  3. Moderate grade degenerative changes of the AC joint.    JUTTA ELLERMANN, MD         SYSTEM ID:  [id number]

## 2021-08-16 NOTE — CONSULTS
Care Management Discharge Note    Discharge Date: 08/16/2021       Discharge Disposition:  home    Discharge Services:  none    Discharge DME: none     Discharge Transportation:  family    Additional Information:  Pt identified as a SB#3. No needs or assessment needed at this time. Please consult CM/SW  if discharge needs should arise.        Gayatri Quinonez, RN  Sharyn Quinonez RN BSN   Inpatient Care Coordination  [facility]  [phone]

## 2021-08-16 NOTE — PHARMACY-VANCOMYCIN DOSING SERVICE
Pharmacy Vancomycin Note  Date of Service 2021  Patient's  1955   66 year old, female    Indication: Bone and Joint Infection  Day of Therapy: 4    Current vancomycin regimen:  1000 mg IV q12h  Current vancomycin monitoring method: AUC  Current vancomycin therapeutic monitoring goal: 400-600 mg*h/L    Current estimated CrCl = Estimated Creatinine Clearance: 96.8 mL/min (based on SCr of 0.57 mg/dL).    Creatinine for last 3 days  2021: 11:33 AM Creatinine 0.61 mg/dL  2021:  7:16 AM Creatinine 0.47 mg/dL  8/15/2021:  7:00 AM Creatinine 0.52 mg/dL  2021:  5:49 AM Creatinine 0.57 mg/dL    Recent Vancomycin Levels (past 3 days)  2021:  5:49 AM Vancomycin 14.8 mg/L    Vancomycin IV Administrations (past 72 hours)                   vancomycin (VANCOCIN) 1000 mg in dextrose 5% 200 mL PREMIX (mg) 1,000 mg New Bag 08/15/21 2241     1,000 mg New Bag  1009     1,000 mg New Bag 21 2126     1,000 mg New Bag  0958    vancomycin 1500 mg in 0.9% NaCl 250 ml intermittent infusion 1,500 mg (mg) 1,500 mg New Bag 21 1621                Nephrotoxins and other renal medications (From now, onward)    Start     Dose/Rate Route Frequency Ordered Stop    21  ketorolac (TORADOL) injection 15 mg      15 mg Intravenous EVERY 6 HOURS PRN 21 1000  vancomycin (VANCOCIN) 1000 mg in dextrose 5% 200 mL PREMIX      1,000 mg  200 mL/hr over 1 Hours Intravenous EVERY 12 HOURS 21 2325               Contrast Orders - past 72 hours (72h ago, onward)    Start     Dose/Rate Route Frequency Ordered Stop    08/15/21 1800  gadobutrol (GADAVIST) injection 7.5 mL      7.5 mL Intravenous ONCE 08/15/21 1750 08/15/21 1854          Interpretation of levels and current regimen:  Vancomycin level is reflective of -600    Has serum creatinine changed greater than 50% in last 72 hours: No    Urine output:  unable to determine    Renal Function: Stable      Regimen: 1000 mg  IV every 12 hours.  Exposure target: AUC24 (range)400-600 mg/L.hr   AUC24,ss: 479 mg/L.hr  Probability of AUC24 > 400: 85 %  Ctrough,ss: 13.9 mg/L  Probability of Ctrough,ss > 20: 7 %  Probability of nephrotoxicity (Lodise MARICRUZ 2009): 9 %      Plan:  1. Continue Current Dose  2. Vancomycin monitoring method: AUC  3. Vancomycin therapeutic monitoring goal: 400-600 mg*h/L  4. Pharmacy will check vancomycin levels as appropriate in 1-3 Days.  5. Serum creatinine levels will be ordered daily for the first week of therapy and at least twice weekly for subsequent weeks.    Charline Chavez, [facility]

## 2021-08-16 NOTE — PLAN OF CARE
"Pertinent assessments: VSS on room air. Afebrile. Tolerating a regular diet. Right shoulder pain. Patient is hoping to avoid surgery. Ortho following. ID following. PRN Norco given x1 for back pain related to lying in the, \"uncomfortable hospital bed.\" PRN Zofran given x1. Up independently. A&Ox4. PRN Tylenol given x1 for headache.     Major Shift Events: none    Treatment Plan: IV Vanco, IV Cefepime, ID, Ortho, iv fluids, pain management, and regular diet.  "

## 2021-08-16 NOTE — PROGRESS NOTES
[facility]    Infectious Disease Progress Note    Date of Service : 08/16/2021     Assessment:  1. Concern for right shoulder septic arthritis -Arthrocentesis - 88,610 WBC / 81 % PMNs, no crystals seen. Culture negative so far. 16S and 28 S ribosomal test added on, AFB/fungal cxs ordered and cx to be held for 14 days. Since no surgery is planned, and symptoms improved rapidly after toradol would suggest discontinuing antibiotics and monitoring closely. If infection, it will declare and antibiotics can be pursued following surgical debridement. Discussed signs and symptoms of infection and she is to return if symptoms recur. She is seeing her orthopedic provider soon and arthrocentesis can be repeated for further monitoring off antibiotics at that time . Certainly if cxs turn positive, then she will need additional surgical management .    Recommendations:  1. Stop all antibotics   2. Follow synovial fluid bacterial, fungal, AFb cxs and 16S/28D ribosomal test  3. NSAIDs  Follow with orthopedics out patient    Tamie Crowell MD    Interval History   Patient was seen and examined, chart reviewed.  Feels much better, right shoulder range of motion greatly improved after toradol injuection. Remains afebrile. No surgical debridement Is planned, remains on Cefepime and Vancomycin.    Physical Exam   Temp: 98.8  F (37.1  C) Temp src: Oral BP: (!) 158/79 Pulse: 59   Resp: 18 SpO2: 96 % O2 Device: None (Room air)    Vitals:    08/13/21 1831   Weight: 64 kg (141 lb)     Vital Signs with Ranges  Temp:  [97.5  F (36.4  C)-98.8  F (37.1  C)] 98.8  F (37.1  C)  Pulse:  [58-59] 59  Resp:  [16-18] 18  BP: (106-158)/(56-79) 158/79  SpO2:  [95 %-96 %] 96 %    Constitutional: Awake, alert, cooperative, no apparent distress  Lungs: Clear to auscultation bilaterally, no crackles or wheezing  Cardiovascular: Regular rate and rhythm, normal S1 and S2, and no murmur noted  Abdomen: Normal bowel sounds, soft,  non-distended, non-tender  Skin: No rashes, no cyanosis, no edema  MS : right shoulder ROM much improved. No pan/tenderness on palpation    Other:    Medications       amLODIPine  2.5 mg Oral Daily     atenolol  50 mg Oral BID     celecoxib  100 mg Oral BID     fluticasone  1 spray Both Nostrils Daily     latanoprost  1 drop Both Eyes At Bedtime     pantoprazole  40 mg Oral QAM AC     polyethylene glycol  17 g Oral Daily     sodium chloride (PF)  3 mL Intracatheter Q8H     timolol maleate  1 drop Left Eye QAM       Data   All microbiology laboratory data reviewed.  Recent Labs   Lab Test 08/16/21  0549 08/14/21  0716 08/13/21  1133 06/13/21  1418   WBC 5.6 6.0 5.6 7.6   HGB  --  12.0 12.4 11.4*   HCT  --  35.9 37.8 34.5*   MCV  --  97 98 98   PLT  --  240 272 198     Recent Labs   Lab Test 08/16/21  0549 08/15/21  0700 08/14/21  0716   CR 0.57 0.52 0.47*     Recent Labs   Lab Test 10/29/20  1020   SED 8     Recent Labs   Lab Test 10/02/20  1851 10/02/20  1543 10/01/20  1630 05/28/19  1430 05/27/19  2224 05/27/19  2217 06/23/16  0955 02/12/16  1715 02/12/16  1700   CULT No growth No anaerobes isolated  No growth No anaerobes isolated  No growth No growth No growth No growth No Beta Streptococcus isolated No growth No growth     Component      Latest Ref Rng & Units 8/13/2021   Color Fluid      Colorless Yellow (A)   Appearance Fluid      Clear Cloudy (A)   WBC Fluid      /uL 88,610   % Neutrophils Fluid      % 81   % Lymphocytes Fluid      % 6   % Mono/Macro Fluid      % 13   Crystal Analysis      No clinically significant crystals seen. No clinically significant crystals seen.

## 2021-08-16 NOTE — PLAN OF CARE
End of Shift Summary  For vital signs and complete assessments, please see documentation flowsheets.     Pertinent assessments: Tolerating a regular diet. Right shoulder pain, started celebrex. Ortho ok for discharge, await ID recs. Up independently.     Major Shift Events: none    Treatment Plan: IV Vanco, IV Cefepime, ID, Ortho, iv fluids, pain management, and regular diet.

## 2021-08-16 NOTE — PROGRESS NOTES
Pt to D/C to home.  Pt provided with d/c instructions, including new medications, when medications were last given, and when to take them again.  Pt also informed to f/u with ortho if symptoms worsening, also informed to f/u with primary in 1-2 weeks.  Pt verbalized understanding of all d/c and f/u instructions.  All questions were answered at this time.  Copy of paperwork sent with pt. Scripts  For celebrex sent to [facility] in [place] per pt request.   to provide transport.  All personal belongings sent with pt.     Gisele Ramsay RN on 8/16/2021 at 6:28 PM

## 2021-08-16 NOTE — PROGRESS NOTES
Ortho progress note    Patient continues to have improvement in Right shoulder     Ok for discharge per ortho    Discontinued ibuprofen and ordered celebrex    Follow-up PRN with Dr. Antonella Hogue PAC

## 2021-08-16 NOTE — PLAN OF CARE
End of Shift Summary  For vital signs and complete assessments, please see documentation flowsheets.     Pertinent assessments: Tolerating a regular diet. Right shoulder pain. Patient is hoping to avoid surgery. Ortho following. ID following. Up independently. Patient slept majority of shift.     Major Shift Events: none    Treatment Plan: IV Vanco, IV Cefepime, ID, Ortho, iv fluids, pain management, and regular diet.

## 2021-08-17 ENCOUNTER — NURSE TRIAGE (OUTPATIENT)
Dept: INTERNAL MEDICINE | Facility: CLINIC | Age: 66
End: 2021-08-17

## 2021-08-17 ENCOUNTER — TELEPHONE (OUTPATIENT)
Dept: INTERNAL MEDICINE | Facility: CLINIC | Age: 66
End: 2021-08-17

## 2021-08-17 ENCOUNTER — PATIENT OUTREACH (OUTPATIENT)
Dept: CARE COORDINATION | Facility: CLINIC | Age: 66
End: 2021-08-17

## 2021-08-17 DIAGNOSIS — R11.0 NAUSEA: Primary | ICD-10-CM

## 2021-08-17 RX ORDER — FLUTICASONE PROPIONATE 50 MCG
SPRAY, SUSPENSION (ML) NASAL
Qty: 16 ML | Refills: 0 | Status: SHIPPED | OUTPATIENT
Start: 2021-08-17 | End: 2021-09-10

## 2021-08-17 NOTE — TELEPHONE ENCOUNTER
Patient is calling to ask for a rx for zofran. She got this in the hospital because pain pills make her sick.

## 2021-08-17 NOTE — PROGRESS NOTES
Clinic Care Coordination Contact  [facility]/Voicemail       Clinical Data: Care Coordinator Outreach  Outreach attempted x 1.  Left message on patient's voicemail with call back information and requested return call.    Chart Review:  Referral from IP handoff. Admitted for R shoulder pain and fever.     Plan: Care Coordinator will try to reach patient again in 1-2 business days.    JENNIFER Moody  Clinic Care Coordination  [facility] : [place], [place], and [place]  Phone: [phone]    ______________________  Next Outreach:  08/18/21

## 2021-08-17 NOTE — DISCHARGE SUMMARY
Service Date: 08/16/2021  Discharge Date: 08/16/2021    PRINCIPAL FINAL DIAGNOSES:    1.  Acute inflammatory arthritis of the right shoulder, etiology unclear.  2.  History of previous episode of inflammatory arthritis of the right shoulder in 10/2020.  This episode was presumptively treated for possible septic arthritis with washout and extended IV antibiotic course, although synovial fluid cultures were without growth.    3.  History of a previous right rotator cuff surgery, 09/2019.  4.  History of hypertension.  5.  Anxiety.  6.  Depression.  7.  Recent L3 through L4 spinal fusion, 06/2021.    PRINCIPAL PROCEDURES THIS ADMISSION:    1.  Shoulder x-ray.  2.  Chest x-ray.  3.  Right shoulder joint aspiration by Interventional Radiology.  4.  Orthopedics consultation.  5.  MRI of the right shoulder, showing a large joint effusion.  6.  Blood cultures that are without growth.  7.  COVID-19 testing that was negative.  8.  Multiple studies and cultures of the right shoulder, including a bacterial culture that is without growth to date, acid fast bacilli culture and stain that shows no acid fast bacilli on stain with culture pending, fungal culture that is without growth after two days, and bacterial DNA 16S ribosomal and fungal DNA 28S ribosomal, both are pending.  9.  IV antibiotic therapy.  10.  Infectious Disease consultation.    REASON FOR ADMISSION:  Please see dictated history and physical.  In brief, Ms. Ralph is a 66-year-old female with the above medical history, which includes a previous rotator cuff procedure in 09/2019 of the right shoulder, which was complicated by an episode of inflammatory arthritis of the right shoulder, presumed to be septic in origin in 10/2020.  The patient had a washout at that point with cultures that were negative.  She was treated with a prolonged 1-month course of IV antibiotic.    The patient presented to the hospital on this occasion for right shoulder pain, similar to her  previous episode of inflammatory arthritis in 10/2020.  There was initial concern for possible septic joint.    HOSPITAL COURSE:  Inflammatory arthritis of the right shoulder:  Patient arrived to the emergency department with the above symptoms.  She was febrile.  There was concern about possible septic shoulder given her history.  Interventional Radiology was consulted and performed a thoracentesis of the right shoulder.  Fluid was sent for Gram stain and culture.  Fluid was notable for findings consistent with inflammatory arthritis with a white blood cell count of 88,000.  Gram stain was negative for organisms.  Culture remains no growth to date with multiple cultures and studies noted above that are pending.  The patient was empirically placed on IV antibiotics.  Orthopedics was consulted and initially, had discussed taking the patient to the operating room for a washout of her right shoulder, as she has had previously.  However, given a normal procalcitonin level, lack of recurrent fever, and significant improvement of her right shoulder pain while hospitalized, orthopedics opted instead to monitor her and follow up culture results.  The patient's pain dramatically improved in the hospital.  She responded well to NSAID medications.  By day of discharge, she was able to move her right upper extremity in all rotations of her right shoulder joint without any discomfort.  Symptoms quickly improved while hospitalized.  Clinically, this seemed less consistent with a septic arthritis.    Decision was made to discontinue antibiotics and follow her closely as an outpatient by orthopedic surgery.  If cultures return positive, the patient will require a washout by orthopedic surgery and a prolonged IV antibiotic course.     The patient was instructed to return to the hospital with recurrent fever or worsening shoulder pain.    DISCHARGE MEDICATIONS:    1.  Acetaminophen as needed for pain.  2.  Xanax 0.25 mg three times a  day as needed for anxiety.  3.  Amlodipine 2.5 mg daily.  4.  Atenolol 50 mg twice a day.  5.  Celebrex 100 mg twice a day.  6.  Xalatan eyedrops, one drop into both eyes at bedtime.  7.  Probiotic daily.  8.  Timoptic, one drop in left eye every morning.  9.  Trazodone 100 mg nightly as needed for sleep.    FOLLOWUP INSTRUCTIONS:    1.  Return to the ER for fever or worsening symptoms.  2.  Follow up with Dr. Berman with [facility] if shoulder pain persists.  3.  Consider outpatient rheumatology evaluation if shoulder pain persists.  4.  If cultures obtained this admission return positive, the patient will require a washout of right shoulder and extended IV antimicrobial course.  5.  Bacterial, fungal, and AFB cultures are all pending at the time of discharge.    Ramy De La Rosa MD        D: 2021   T: 2021   MT: SAIMA    Name:     ROSALIA DAY  MRN:      [id number]        Account:      [id number]   :      1955           Service Date: 2021                                  Discharge Date: 2021     Document: [id number]

## 2021-08-17 NOTE — TELEPHONE ENCOUNTER
Nurse Triage SBAR    Situation  : swelling in legs    Background  : Admitted to hospital for possible infection, was in IV fluids and kept NPO for several days in case they wanted to do surgery. Infection was ruled out and she was discharged yesterday. Today while cleaning the fireplace, she noted her legs were swollen.     Assessment : Slight discomfort in leg, but no other symptoms present. She does not have follow-up appointment until next week. She has gained 7 lbs since she was admitted to the hospital.    (See information below for more triage details.)    Recommendation : Recommended appointment tomorrow or Thursday at the latest, if no appointment available go to Urgent Care.    Protocol Recommended Disposition: Urgent office follow-up appointment     Sonya Del Real RN  [facility]     Reason for Disposition    MODERATE swelling of both ankles (e.g., swelling extends up to the knees) AND new onset or worsening    Additional Information    Negative: Sounds like a life-threatening emergency to the triager    Negative: Chest pain    Negative: Small area of swelling and followed an insect bite to the area    Negative: Followed a knee injury    Negative: Ankle or foot injury    Negative: Pregnant with leg swelling or edema    Negative: Difficulty breathing at rest    Negative: Entire foot is cool or blue in comparison to other side    Negative: SEVERE swelling (e.g., swelling extends above knee, entire leg is swollen, weeping fluid)    Negative: Thigh or calf pain and only 1 side and present > 1 hour    Negative: Thigh, calf, or ankle swelling in only one leg    Negative: Thigh, calf, or ankle swelling in both legs, but one side is definitely more swollen (Exception: longstanding difference between legs)    Negative: Cast on leg or ankle and has increasing pain    Negative: Can't walk or can barely stand (new onset)    Negative: Fever and red area (or area very tender to touch)     "Negative: Patient sounds very sick or weak to the triager    Negative: Swelling of face, arm or hands (Exception: slight puffiness of fingers during hot weather)    Negative: Pregnant > 20 weeks and sudden weight gain (i.e., > 2 lbs, 1 kg in one week)    Answer Assessment - Initial Assessment Questions  1. ONSET: \"When did the swelling start?\" (e.g., minutes, hours, days)      Noticed this today  2. LOCATION: \"What part of the leg is swollen?\"  \"Are both legs swollen or just one leg?\"      Both legs thighs down to feet are swollen  3. SEVERITY: \"How bad is the swelling?\" (e.g., localized; mild, moderate, severe)   - Localized - small area of swelling localized to one leg   - MILD pedal edema - swelling limited to foot and ankle, pitting edema < 1/4 inch (6 mm) deep, rest and elevation eliminate most or all swelling   - MODERATE edema - swelling of lower leg to knee, pitting edema > 1/4 inch (6 mm) deep, rest and elevation only partially reduce swelling   - SEVERE edema - swelling extends above knee, facial or hand swelling present       moderate  4. REDNESS: \"Does the swelling look red or infected?\"      no  5. PAIN: \"Is the swelling painful to touch?\" If so, ask: \"How painful is it?\"   (Scale 1-10; mild, moderate or severe)      Legs uncomfortable  6. FEVER: \"Do you have a fever?\" If so, ask: \"What is it, how was it measured, and when did it start?\"       no  7. CAUSE: \"What do you think is causing the leg swelling?\"      Was NPO in the hospital for several day thinking she may need surgery, she was given a lot of IV fluids while admitted  8. MEDICAL HISTORY: \"Do you have a history of heart failure, kidney disease, liver failure, or cancer?\"      no  9. RECURRENT SYMPTOM: \"Have you had leg swelling before?\" If so, ask: \"When was the last time?\" \"What happened that time?\"      no  10. OTHER SYMPTOMS: \"Do you have any other symptoms?\" (e.g., chest pain, difficulty breathing)        no  11. PREGNANCY: \"Is there any " "chance you are pregnant?\" \"When was your last menstrual period?\"        n/a    Protocols used: LEG SWELLING AND EDEMA-A-OH      "

## 2021-08-18 ENCOUNTER — PATIENT OUTREACH (OUTPATIENT)
Dept: NURSING | Facility: CLINIC | Age: 66
End: 2021-08-18
Payer: COMMERCIAL

## 2021-08-18 PROBLEM — N39.0 FREQUENT UTI: Status: RESOLVED | Noted: 2018-01-17 | Resolved: 2021-08-18

## 2021-08-18 LAB
BACTERIA BLD CULT: NO GROWTH
BACTERIA BLD CULT: NO GROWTH
BACTERIA SNV CULT: NO GROWTH
GRAM STAIN RESULT: NORMAL
GRAM STAIN RESULT: NORMAL

## 2021-08-18 RX ORDER — ONDANSETRON 4 MG/1
4 TABLET, FILM COATED ORAL EVERY 8 HOURS PRN
Qty: 20 TABLET | Refills: 0 | Status: SHIPPED | OUTPATIENT
Start: 2021-08-18 | End: 2021-09-27

## 2021-08-18 NOTE — PROGRESS NOTES
Clinic Care Coordination Contact  [facility]: Post-Discharge Note  SITUATION                                                      Admission:    Admission Date: 08/13/21   Reason for Admission: inflammation of right shoulder  Discharge:   Discharge Date: 08/16/21  Discharge Diagnosis: inflammatory arthritis of right shoulder  Discharge Plan: Follow up with [facility]    BACKGROUND                                                      Inflammatory arthritis of the right shoulder:  Patient arrived to the emergency department with the above symptoms.  She was febrile.  There was concern about possible septic shoulder given her history.  Interventional Radiology was consulted and performed a thoracentesis of the right shoulder.  Fluid was sent for Gram stain and culture.  Fluid was notable for findings consistent with inflammatory arthritis with a white blood cell count of 88,000.  Gram stain was negative for organisms.  Culture remains no growth to date with multiple cultures and studies noted above that are pending.  The patient was empirically placed on IV antibiotics.  Orthopedics was consulted and initially, had discussed taking the patient to the operating room for a washout of her right shoulder, as she has had previously.  However, given a normal procalcitonin level, lack of recurrent fever, and significant improvement of her right shoulder pain while hospitalized, orthopedics opted instead to monitor her and follow up culture results.  The patient's pain dramatically improved in the hospital.  She responded well to NSAID medications.  By day of discharge, she was able to move her right upper extremity in all rotations of her right shoulder joint without any discomfort.  Symptoms quickly improved while hospitalized.  Clinically, this seemed less consistent with a septic arthritis.     Decision was made to discontinue antibiotics and follow her closely as an outpatient by orthopedic surgery.  If cultures return  positive, the patient will require a washout by orthopedic surgery and a prolonged IV antibiotic course.      The patient was instructed to return to the hospital with recurrent fever or worsening shoulder pain.    ASSESSMENT      Discharge Assessment  How are you doing now that you are home?: Better, gained a lot weight during admission, even though she did not eat anything. However, it was all water weight that she was able to void.  How are your symptoms? (Red Flag symptoms escalate to triage hotline per guidelines): Improved  Do you feel your condition is stable enough to be safe at home until your provider visit?: Yes  Does the patient have their discharge instructions? : Yes  Does the patient have questions regarding their discharge instructions? : No  Were you started on any new medications or were there changes to any of your previous medications? : Yes - Schedule RNCC appt within 48 business hours  Does the patient have all of their medications?: Yes  Do you have questions regarding any of your medications? : No  Do you have all of your needed medical supplies or equipment (DME)?  (i.e. oxygen tank, CPAP, cane, etc.): Yes  Discharge follow-up appointment scheduled within 14 calendar days? : Yes  Discharge Follow Up Appointment Date: 08/19/21  Discharge Follow Up Appointment Scheduled with?: Primary Care Provider         Care Management   Community Health Worker Initial Outreach    CHW Initial Information Gathering:  Referral Source: IP Handoff  Preferred Hospital: [facility]  [phone]  Current living arrangement:: Not Assessed  No PCP office visit in Past Year: No  CHW Additional Questions  If ED/Hospital discharge, follow-up appointment scheduled as recommended?: Yes  Medication changes made following ED/Hospital discharge?: Yes, patient to be scheduled with CC RN/SW within approx 1 business day  Nahidt active?: Yes    Patient accepts CC: No    CHW introduced self and intent  of call regarding recent admission.  Patient reports that she's doing well and has no needs or concerns.  Patient is scheduled for F/U with PCP and [facility].   Patient understands that she can contact CC if anything is needed.    JENNIFER Moody  Clinic Care Coordination  [facility] : [facility], and [place]  Phone: [phone]      PLAN                                                      Outpatient Plan:  Scheduled with [facility]    Future Appointments   Date Time Provider Department Center   8/19/2021 10:20 AM Vicky Adame NP [facility]   8/23/2021  1:40 PM Vicky Adame NP [facility]         For any urgent concerns, please contact our 24 hour nurse triage line: [phone] ([phone])       JENNIFER Moody  Clinic Care Coordination  [facility] : [facility], and [place]  Phone: [phone]

## 2021-08-19 ENCOUNTER — TELEPHONE (OUTPATIENT)
Dept: INTERNAL MEDICINE | Facility: CLINIC | Age: 66
End: 2021-08-19

## 2021-08-19 ENCOUNTER — OFFICE VISIT (OUTPATIENT)
Dept: INTERNAL MEDICINE | Facility: CLINIC | Age: 66
End: 2021-08-19
Payer: COMMERCIAL

## 2021-08-19 VITALS
RESPIRATION RATE: 16 BRPM | OXYGEN SATURATION: 97 % | HEIGHT: 65 IN | TEMPERATURE: 98.1 F | WEIGHT: 145.6 LBS | SYSTOLIC BLOOD PRESSURE: 124 MMHG | BODY MASS INDEX: 24.26 KG/M2 | DIASTOLIC BLOOD PRESSURE: 82 MMHG | HEART RATE: 59 BPM

## 2021-08-19 DIAGNOSIS — M25.511 ACUTE PAIN OF RIGHT SHOULDER: Primary | ICD-10-CM

## 2021-08-19 DIAGNOSIS — R73.03 PREDIABETES: ICD-10-CM

## 2021-08-19 LAB
ERYTHROCYTE [DISTWIDTH] IN BLOOD BY AUTOMATED COUNT: 11.7 % (ref 10–15)
HBA1C MFR BLD: 5 % (ref 0–5.6)
HCT VFR BLD AUTO: 35.4 % (ref 35–47)
HGB BLD-MCNC: 11.5 G/DL (ref 11.7–15.7)
MCH RBC QN AUTO: 31.7 PG (ref 26.5–33)
MCHC RBC AUTO-ENTMCNC: 32.5 G/DL (ref 31.5–36.5)
MCV RBC AUTO: 98 FL (ref 78–100)
PLATELET # BLD AUTO: 315 10E3/UL (ref 150–450)
RBC # BLD AUTO: 3.63 10E6/UL (ref 3.8–5.2)
WBC # BLD AUTO: 6.6 10E3/UL (ref 4–11)

## 2021-08-19 PROCEDURE — 99213 OFFICE O/P EST LOW 20 MIN: CPT | Performed by: NURSE PRACTITIONER

## 2021-08-19 PROCEDURE — 36415 COLL VENOUS BLD VENIPUNCTURE: CPT | Performed by: NURSE PRACTITIONER

## 2021-08-19 PROCEDURE — 85027 COMPLETE CBC AUTOMATED: CPT | Performed by: NURSE PRACTITIONER

## 2021-08-19 PROCEDURE — 83036 HEMOGLOBIN GLYCOSYLATED A1C: CPT | Performed by: NURSE PRACTITIONER

## 2021-08-19 PROCEDURE — 80053 COMPREHEN METABOLIC PANEL: CPT | Performed by: NURSE PRACTITIONER

## 2021-08-19 ASSESSMENT — PAIN SCALES - GENERAL: PAINLEVEL: SEVERE PAIN (6)

## 2021-08-19 ASSESSMENT — PATIENT HEALTH QUESTIONNAIRE - PHQ9: SUM OF ALL RESPONSES TO PHQ QUESTIONS 1-9: 5

## 2021-08-19 ASSESSMENT — MIFFLIN-ST. JEOR: SCORE: 1196.32

## 2021-08-19 NOTE — PROGRESS NOTES
"    Assessment & Plan     Acute pain of right shoulder    - Comprehensive metabolic panel (BMP + Alb, Alk Phos, ALT, AST, Total. Bili, TP); Future  - CBC with platelets; Future    Prediabetes    - Hemoglobin A1c; Future    F/u pending labs  Continue ortho f/u shoulder pain             Vicky Adame NP  [facility] KAM Walker is a 66 year old who presents for the following health issues     HPI       Hospital Follow-up Visit:    Hospital/Nursing Home/IP Rehab Facility: [facility]  Date of Admission: 08/13/21  Date of Discharge: 08/16/21  Reason(s) for Admission:shoulder pain, new rotator cuff tear, had pedal edema first days home, now resolved      Was your hospitalization related to COVID-19? No   Problems taking medications regularly:  None  Medication changes since discharge: None  Problems adhering to non-medication therapy:  None    Summary of hospitalization:  [facility] discharge summary reviewed  Diagnostic Tests/Treatments reviewed.  Follow up needed: labs kidney function, glucose  Other Healthcare Providers Involved in Patient s Care:         Specialist appointment - TBA  Update since discharge: stable. Post Discharge Medication Reconciliation: discharge medications reconciled, continue medications without change.  Plan of care communicated with patient            Review of Systems   Constitutional, HEENT, cardiovascular, pulmonary, gi and gu systems are negative, except as otherwise noted.      Objective    /82   Pulse 59   Temp 98.1  F (36.7  C) (Oral)   Resp 16   Ht 1.651 m (5' 5\")   Wt 66 kg (145 lb 9.6 oz)   LMP  (LMP Unknown)   SpO2 97%   BMI 24.23 kg/m    Body mass index is 24.23 kg/m .  Physical Exam   GENERAL: healthy, alert and no distress  CV: no peripheral edema                "

## 2021-08-19 NOTE — TELEPHONE ENCOUNTER
Call to pt.   She already takes OTC Multivitamin with Iron 8 mg daily.   She has lidocaine patches at home and is going to try tonight for her shoulder pain. Will use Tylenol until her CMP is back.     Please advise further for HGB since already takes Vitamin daily.     OK to wait for CMP to return. She would like to start taking Advil.

## 2021-08-19 NOTE — TELEPHONE ENCOUNTER
HGB mildly low, OTC mutivitamins with iron would help.  Other labs still pending  Vicky Adame CNP

## 2021-08-19 NOTE — TELEPHONE ENCOUNTER
Patient calling for lab results from today.  She saw that her RBC and HGB was low.  She questions if this could be the cause of there fatigue.  She also questions what she can take for her shoulder pain as there was some concern previously about her kidney function.  CMP is still pending.  Please review results when available.  Patient would like to have a call back this afternoon if possible so she knows what to take for pain relief (Tylenol, ibuprofen, or norco).  She has follow up tomorrow with ortho.

## 2021-08-19 NOTE — NURSING NOTE
"[facility] 08//13/21-08/16/21 shoulder pain,patient sttes that the surgeons assistant wanted patient to come in to make sure that patient is not retaining water in her legs.  Vital signs:  Temp: 98.1  F (36.7  C) Temp src: Oral BP: 124/82 Pulse: 59   Resp: 16 SpO2: 97 %     Height: 165.1 cm (5' 5\") Weight: 66 kg (145 lb 9.6 oz)  Estimated body mass index is 24.23 kg/m  as calculated from the following:    Height as of this encounter: 1.651 m (5' 5\").    Weight as of this encounter: 66 kg (145 lb 9.6 oz).          "

## 2021-08-20 ENCOUNTER — TRANSFERRED RECORDS (OUTPATIENT)
Dept: HEALTH INFORMATION MANAGEMENT | Facility: CLINIC | Age: 66
End: 2021-08-20

## 2021-08-20 ENCOUNTER — MEDICAL CORRESPONDENCE (OUTPATIENT)
Dept: HEALTH INFORMATION MANAGEMENT | Facility: CLINIC | Age: 66
End: 2021-08-20

## 2021-08-20 DIAGNOSIS — M25.511 RIGHT SHOULDER PAIN: Primary | ICD-10-CM

## 2021-08-20 LAB
ALBUMIN SERPL-MCNC: 3.5 G/DL (ref 3.4–5)
ALP SERPL-CCNC: 174 U/L (ref 40–150)
ALT SERPL W P-5'-P-CCNC: 90 U/L (ref 0–50)
ANION GAP SERPL CALCULATED.3IONS-SCNC: 3 MMOL/L (ref 3–14)
AST SERPL W P-5'-P-CCNC: 38 U/L (ref 0–45)
BILIRUB SERPL-MCNC: 0.5 MG/DL (ref 0.2–1.3)
BUN SERPL-MCNC: 11 MG/DL (ref 7–30)
CALCIUM SERPL-MCNC: 9.7 MG/DL (ref 8.5–10.1)
CHLORIDE BLD-SCNC: 100 MMOL/L (ref 94–109)
CO2 SERPL-SCNC: 30 MMOL/L (ref 20–32)
CREAT SERPL-MCNC: 0.65 MG/DL (ref 0.52–1.04)
GFR SERPL CREATININE-BSD FRML MDRD: >90 ML/MIN/1.73M2
GLUCOSE BLD-MCNC: 80 MG/DL (ref 70–99)
POTASSIUM BLD-SCNC: 4.3 MMOL/L (ref 3.4–5.3)
PROT SERPL-MCNC: 6.5 G/DL (ref 6.8–8.8)
SODIUM SERPL-SCNC: 133 MMOL/L (ref 133–144)

## 2021-08-20 NOTE — TELEPHONE ENCOUNTER
Call received from patient regarding lab results. CMP is back. Patient states she was told not to take Advil because they were worried about her kidney function. States she had a lot of swelling at that time. Swelling has since resolved. Patient states she needs to take something more for pain. States she has an appointment at [facility] today and they will probably do an aspiration. Patient did get improvement in pain with an aspiration previously. Patient is very concerned about these numbers. States she has not been using Ibuprofen. Patient does drink a shot of Vodka and 2 glasses of wine each evening. Patient is using occasional Norco. Took one 3 days ago and will likely take another today. Please advise.

## 2021-08-23 ENCOUNTER — TRANSFERRED RECORDS (OUTPATIENT)
Dept: HEALTH INFORMATION MANAGEMENT | Facility: CLINIC | Age: 66
End: 2021-08-23

## 2021-08-23 ENCOUNTER — TELEPHONE (OUTPATIENT)
Dept: INTERNAL MEDICINE | Facility: CLINIC | Age: 66
End: 2021-08-23

## 2021-08-23 DIAGNOSIS — R79.89 ELEVATED LFTS: Primary | ICD-10-CM

## 2021-08-23 NOTE — TELEPHONE ENCOUNTER
Please notify pt normal kidney function, but abnormal LFT and mild anemia.  Advise avoid ETOH, NSAIDs and needs Abd RUQ US, ordered.  F/u pending US results.  Vicky Adame CNP

## 2021-08-23 NOTE — TELEPHONE ENCOUNTER
Call to pt. She states she had this happen before in 2015 when she was in the hospital before and was given a lot of pain medications.   She had retesting every few days and her liver numbers went down pretty quickly on their own.     She took 3 Advil this AM as she was in so much pain.     She doesn't mind doing the Ultrasound but thinks these numbers are just from being in the hospital and taking the pain medications.     She may go to [facility] today because her Left shoulder is really painful. She states it is not her Right shoulder.     Dr Berman ordered lab work for her to check for arthritis and she is going to have this done this week.

## 2021-08-24 LAB
SCANNED LAB RESULT: NORMAL
SCANNED LAB RESULT: NORMAL

## 2021-08-27 ENCOUNTER — LAB (OUTPATIENT)
Dept: LAB | Facility: CLINIC | Age: 66
End: 2021-08-27
Payer: COMMERCIAL

## 2021-08-27 DIAGNOSIS — M25.511 RIGHT SHOULDER PAIN: ICD-10-CM

## 2021-08-27 LAB
CRP SERPL-MCNC: <2.9 MG/L (ref 0–8)
ERYTHROCYTE [DISTWIDTH] IN BLOOD BY AUTOMATED COUNT: 11.5 % (ref 10–15)
ERYTHROCYTE [SEDIMENTATION RATE] IN BLOOD BY WESTERGREN METHOD: 15 MM/HR (ref 0–30)
HCT VFR BLD AUTO: 35.7 % (ref 35–47)
HGB BLD-MCNC: 12 G/DL (ref 11.7–15.7)
MCH RBC QN AUTO: 30.9 PG (ref 26.5–33)
MCHC RBC AUTO-ENTMCNC: 33.6 G/DL (ref 31.5–36.5)
MCV RBC AUTO: 92 FL (ref 78–100)
PLATELET # BLD AUTO: 468 10E3/UL (ref 150–450)
RBC # BLD AUTO: 3.88 10E6/UL (ref 3.8–5.2)
WBC # BLD AUTO: 8.4 10E3/UL (ref 4–11)

## 2021-08-27 PROCEDURE — 85652 RBC SED RATE AUTOMATED: CPT

## 2021-08-27 PROCEDURE — 85027 COMPLETE CBC AUTOMATED: CPT

## 2021-08-27 PROCEDURE — 86140 C-REACTIVE PROTEIN: CPT

## 2021-08-27 PROCEDURE — 36415 COLL VENOUS BLD VENIPUNCTURE: CPT

## 2021-08-28 ENCOUNTER — MYC MEDICAL ADVICE (OUTPATIENT)
Dept: INTERNAL MEDICINE | Facility: CLINIC | Age: 66
End: 2021-08-28

## 2021-08-28 LAB — BACTERIA SNV CULT: NORMAL

## 2021-09-04 ENCOUNTER — HEALTH MAINTENANCE LETTER (OUTPATIENT)
Age: 66
End: 2021-09-04

## 2021-09-12 ENCOUNTER — MYC MEDICAL ADVICE (OUTPATIENT)
Dept: INTERNAL MEDICINE | Facility: CLINIC | Age: 66
End: 2021-09-12

## 2021-09-13 ENCOUNTER — MYC MEDICAL ADVICE (OUTPATIENT)
Dept: INTERNAL MEDICINE | Facility: CLINIC | Age: 66
End: 2021-09-13

## 2021-09-13 LAB — BACTERIA SNV CULT: NO GROWTH

## 2021-09-14 ENCOUNTER — TRANSFERRED RECORDS (OUTPATIENT)
Dept: HEALTH INFORMATION MANAGEMENT | Facility: CLINIC | Age: 66
End: 2021-09-14

## 2021-09-21 DIAGNOSIS — F41.1 ANXIETY STATE: ICD-10-CM

## 2021-09-21 DIAGNOSIS — I10 HYPERTENSION GOAL BP (BLOOD PRESSURE) < 140/80: ICD-10-CM

## 2021-09-23 RX ORDER — ATENOLOL 50 MG/1
TABLET ORAL
Qty: 180 TABLET | Refills: 3 | Status: SHIPPED | OUTPATIENT
Start: 2021-09-23 | End: 2022-07-13

## 2021-09-23 RX ORDER — ALPRAZOLAM 0.25 MG
TABLET ORAL
Qty: 30 TABLET | Refills: 1 | Status: SHIPPED | OUTPATIENT
Start: 2021-09-23 | End: 2021-12-29

## 2021-09-27 ENCOUNTER — MYC MEDICAL ADVICE (OUTPATIENT)
Dept: INTERNAL MEDICINE | Facility: CLINIC | Age: 66
End: 2021-09-27

## 2021-10-10 LAB — ACID FAST STAIN (ARUP): NORMAL

## 2021-10-11 ENCOUNTER — TRANSFERRED RECORDS (OUTPATIENT)
Dept: HEALTH INFORMATION MANAGEMENT | Facility: CLINIC | Age: 66
End: 2021-10-11

## 2021-10-13 ENCOUNTER — TRANSFERRED RECORDS (OUTPATIENT)
Dept: HEALTH INFORMATION MANAGEMENT | Facility: CLINIC | Age: 66
End: 2021-10-13

## 2021-10-14 ENCOUNTER — TRANSFERRED RECORDS (OUTPATIENT)
Dept: HEALTH INFORMATION MANAGEMENT | Facility: CLINIC | Age: 66
End: 2021-10-14
Payer: COMMERCIAL

## 2021-10-29 ENCOUNTER — MYC MEDICAL ADVICE (OUTPATIENT)
Dept: INTERNAL MEDICINE | Facility: CLINIC | Age: 66
End: 2021-10-29

## 2021-11-03 ENCOUNTER — MYC MEDICAL ADVICE (OUTPATIENT)
Dept: INTERNAL MEDICINE | Facility: CLINIC | Age: 66
End: 2021-11-03
Payer: COMMERCIAL

## 2021-11-03 DIAGNOSIS — F41.9 ANXIETY: ICD-10-CM

## 2021-11-03 NOTE — TELEPHONE ENCOUNTER
Pt calling concerned about her BP reading in  144/90 she took it about 10 more times and got readings b/t 140-156/88-96.   She is anxious that it is elevated.     RN tried to reassure pt.     Scheduled her for NURSE only bp w/ home BP machine.     Vaishali COLBY RN

## 2021-11-04 ENCOUNTER — ALLIED HEALTH/NURSE VISIT (OUTPATIENT)
Dept: INTERNAL MEDICINE | Facility: CLINIC | Age: 66
End: 2021-11-04
Payer: COMMERCIAL

## 2021-11-04 DIAGNOSIS — I10 PRIMARY HYPERTENSION: Primary | ICD-10-CM

## 2021-11-04 PROCEDURE — 99207 PR NO CHARGE NURSE ONLY: CPT

## 2021-11-04 NOTE — PROGRESS NOTES
Can she check her BP and update us.   If still over 140/90 should increase the Amlodipine to 5 mg.   If unable to check, make a nurse appointment for check.

## 2021-11-04 NOTE — TELEPHONE ENCOUNTER
Patient was seen today for nurse only B/P check , result sent to provider for review .    WING Antoine LPN

## 2021-11-05 RX ORDER — PAROXETINE 10 MG/1
10 TABLET, FILM COATED ORAL EVERY MORNING
Qty: 30 TABLET | Refills: 1 | Status: SHIPPED | OUTPATIENT
Start: 2021-11-05 | End: 2021-11-10

## 2021-11-05 NOTE — TELEPHONE ENCOUNTER
Patient called reporting increased anxiety due to her high BP. She is hoping to go back on paxil.

## 2021-11-05 NOTE — TELEPHONE ENCOUNTER
Pt calling to report that insurance does not want to pay for Paxil due to pt's age. She stated the pharmacy will be contacting us.

## 2021-11-08 NOTE — TELEPHONE ENCOUNTER
Please see messages below.  Is there a different medication that patient could take.  Please advise, thanks.

## 2021-11-09 ENCOUNTER — MYC MEDICAL ADVICE (OUTPATIENT)
Dept: INTERNAL MEDICINE | Facility: CLINIC | Age: 66
End: 2021-11-09
Payer: COMMERCIAL

## 2021-11-09 ENCOUNTER — TELEPHONE (OUTPATIENT)
Dept: INTERNAL MEDICINE | Facility: CLINIC | Age: 66
End: 2021-11-09
Payer: COMMERCIAL

## 2021-11-09 DIAGNOSIS — F41.9 ANXIETY: ICD-10-CM

## 2021-11-09 DIAGNOSIS — I10 HYPERTENSION GOAL BP (BLOOD PRESSURE) < 140/80: ICD-10-CM

## 2021-11-09 NOTE — TELEPHONE ENCOUNTER
Prior Authorization Retail Medication Request    Medication/Dose: PARoxetine (PAXIL) 10 MG tablet  ICD code (if different than what is on RX):    Previously Tried and Failed:    Rationale:      Insurance Name:  [phone]  Insurance ID:  [id number]      Pharmacy Information (if different than what is on RX)  Name:  [facility]  Phone:

## 2021-11-10 RX ORDER — PAROXETINE 10 MG/1
10 TABLET, FILM COATED ORAL EVERY MORNING
Qty: 30 TABLET | Refills: 1 | Status: SHIPPED | OUTPATIENT
Start: 2021-11-10 | End: 2021-11-19

## 2021-11-10 NOTE — TELEPHONE ENCOUNTER
Per call to pharmacy, the Rx was cancelled for some reason.  They will need a new Rx for Paroxetine 10mg if patient is still wanting to fill this medication.  Routing back to clinic for review.     [facility] [id number] [place], [place] - [address] [address] [[id number]]

## 2021-11-10 NOTE — TELEPHONE ENCOUNTER
Prior Authorization Approval    Authorization Effective Date: 8/10/2021  Authorization Expiration Date: 11/10/2022  Medication: PARoxetine (PAXIL) 10 MG tablet  Approved Dose/Quantity:   Reference #:     Insurance Company: [employer] - Phone [phone] Fax [phone]  Expected CoPay:       CoPay Card Available:      Foundation Assistance Needed:    Which Pharmacy is filling the prescription (Not needed for infusion/clinic administered): [facility] [address] [facility] [address]  Pharmacy Notified: Yes  Patient Notified: Yes

## 2021-11-10 NOTE — TELEPHONE ENCOUNTER
Central Prior Authorization Team   Phone: [phone]    PA Initiation    Medication: PARoxetine (PAXIL) 10 MG tablet  Insurance Company: [employer] - Phone [phone] Fax [phone]  Pharmacy Filling the [facility] [id number] [place], [place] - [address]  Filling Pharmacy Phone: [phone]  Filling Pharmacy Fax:    Start Date: 11/10/2021

## 2021-11-16 RX ORDER — AMLODIPINE BESYLATE 5 MG/1
5 TABLET ORAL DAILY
Qty: 90 TABLET | Refills: 3 | Status: SHIPPED | OUTPATIENT
Start: 2021-11-16 | End: 2022-10-05

## 2021-11-18 DIAGNOSIS — F41.9 ANXIETY: ICD-10-CM

## 2021-11-19 RX ORDER — PAROXETINE 10 MG/1
10 TABLET, FILM COATED ORAL EVERY MORNING
Qty: 90 TABLET | Refills: 1 | Status: SHIPPED | OUTPATIENT
Start: 2021-11-19 | End: 2021-12-16

## 2021-12-02 ENCOUNTER — TRANSFERRED RECORDS (OUTPATIENT)
Dept: HEALTH INFORMATION MANAGEMENT | Facility: CLINIC | Age: 66
End: 2021-12-02
Payer: COMMERCIAL

## 2021-12-04 ENCOUNTER — OFFICE VISIT (OUTPATIENT)
Dept: URGENT CARE | Facility: URGENT CARE | Age: 66
End: 2021-12-04
Payer: COMMERCIAL

## 2021-12-04 VITALS
BODY MASS INDEX: 23.3 KG/M2 | RESPIRATION RATE: 16 BRPM | HEART RATE: 63 BPM | DIASTOLIC BLOOD PRESSURE: 85 MMHG | TEMPERATURE: 98.1 F | SYSTOLIC BLOOD PRESSURE: 130 MMHG | WEIGHT: 140 LBS | OXYGEN SATURATION: 97 %

## 2021-12-04 DIAGNOSIS — H61.23 BILATERAL IMPACTED CERUMEN: ICD-10-CM

## 2021-12-04 DIAGNOSIS — H60.391 INFECTIVE OTITIS EXTERNA, RIGHT: Primary | ICD-10-CM

## 2021-12-04 DIAGNOSIS — I10 HYPERTENSION GOAL BP (BLOOD PRESSURE) < 140/80: ICD-10-CM

## 2021-12-04 PROCEDURE — 99214 OFFICE O/P EST MOD 30 MIN: CPT | Performed by: PHYSICIAN ASSISTANT

## 2021-12-04 RX ORDER — MULTIPLE VITAMINS W/ MINERALS TAB 9MG-400MCG
1 TAB ORAL DAILY
COMMUNITY
Start: 2021-01-01

## 2021-12-04 RX ORDER — NEOMYCIN SULFATE, POLYMYXIN B SULFATE AND HYDROCORTISONE 10; 3.5; 1 MG/ML; MG/ML; [USP'U]/ML
3 SUSPENSION/ DROPS AURICULAR (OTIC) 4 TIMES DAILY
Qty: 5 ML | Refills: 0 | Status: SHIPPED | OUTPATIENT
Start: 2021-12-04 | End: 2021-12-11

## 2021-12-04 RX ORDER — CHOLECALCIFEROL (VITAMIN D3) 50 MCG
1 TABLET ORAL DAILY
COMMUNITY

## 2021-12-04 NOTE — PROGRESS NOTES
Hypertension goal BP (blood pressure) < 140/80  Blood pressure reviewed with patient. Patient was educated on BP goals and advised to monitor BP at home 2-3 times daily. Low salt diet recommended. Healthy diet and exercise reviewed.  Limit pop and juice intake.  Encourage exercise of at least 20 min per day.  Limit sedentary time to one hour a day. Mediterranean/pescatarian diet best for weight maintenance and cardiovascular health. Follow up with PCP in 1-3 months for any medication adjustments.     Bilateral impacted cerumen  Bilateral ear/s were irrigated with a warm water/hydrogen peroxide solution. Cerumen was removed without complication. Ear/s were re-examined. The left side was found to be normal and the right side showed signs of otitis externa.      (H60.391) Infective otitis externa, right  (primary encounter diagnosis)  Plan: neomycin-polymyxin-hydrocortisone (CORTISPORIN)        [id number] otic suspension    See Patient Instructions  Patient Instructions     Patient Education     Earwax Removal    The ear canal makes earwax from the canal s lining. The ears make wax to lubricate and protect the ear canal. The ear canal is the tube that connects the middle ear to the outside of the ear. The wax protects the ear from bacteria, infection, and damage from water or trauma.   The wax that forms in the canal naturally moves toward the outside of the ear and falls out. In some cases, the ear may make too much wax. If the wax causes problems or keeps the healthcare provider from seeing into the ear, the extra wax may be removed.   Too much wax can affect your hearing. It can cause itching. In rare cases, it can be painful. Earwax should not be removed unless it is causing a problem. You should not stick objects such as cotton swabs into your ear to remove wax unless told to do so by your healthcare provider.   Healthcare providers can remove earwax safely. Often flushing the earwax out with water (irrigation)  "and syringing will help. Sometimes devices or suction are used to remove the wax. It is important to stay still during the procedure to prevent damage to the ear canal. But removing earwax generally doesn t hurt. You won't need anesthesia or pain medicine when the provider removes the earwax.   A number of conditions lead to earwax buildup. These include some skin problems, a narrow ear canal, or ears that make too much earwax. Using cotton swabs in the canal pushes earwax deeper into the ear and helps lead to the buildup of earwax.   Home care    The healthcare provider may advise mineral oil or an over-the-counter (OTC) eardrop to use at home to soften the earwax. He or she may also advise a home irrigation or syringing kit. Use these products only if the provider advises them. Carefully follow the instructions given.    Don t use mineral oil or OTC eardrops if you might have an ear infection or a burst (ruptured) eardrum. Tell your provider right away if you have diabetes or an immune disorder.    Don t use cotton swabs in your ears. Cotton swabs may push wax deeper into the ear canal or damage the eardrum. Use cotton gauze or a wet washcloth to gently remove wax on the outside of the ear and around the opening to the ear canal.    Don't use any probing device or object such as cotton-tipped swabs or ben pins to clean the inside of your ears.    Don t use ear candles to clean your ears. Candling can be dangerous. It can burn the ear canal. It can also make the condition worse instead of better.    Don t use cold water to rinse the ear. This will make you dizzy. If your provider tells you to rinse your ear, use only warm water or follow his or her instructions.    Check the ear for signs of infection or irritation (listed below under \"When to get medical advice\").  Steps for using eardrops  1. Warm the medicine bottle by rubbing it between your hands for a few minutes.  2. Lie down on your side, with the " affected ear up.  3. Place the advised number of drops in the ear. Wet a cotton ball with the medicine. Gently put the cotton ball into the ear opening.    Follow-up care  Follow up with your healthcare provider, or as advised.   When to get medical advice   Call your healthcare provider right away if you have:     Ear pain that gets worse    Fever of 100.4F F (38 C) or higher, or as directed by your healthcare provider    Worsening wax buildup    Severe pain, dizziness, or nausea    Bleeding from the ear    Hearing problems    Signs of irritation from the eardrops, such as burning, stinging, or swelling and soreness    Foul-smelling fluid draining from the ear    Signs of infection such as outer ear swelling, redness, or soreness    Headache, neck pain, or stiff neck  [employer] last reviewed this educational content on 6/1/2020 2000-2021 [employer]. All rights reserved. This information is not intended as a substitute for professional medical care. Always follow your healthcare professional's instructions.               Javier Casiano PA-C  [facility]    Subjective   66 year old who presents to clinic today for the following health issues:    Ear Problem       HPI     Acute Illness  Acute illness concerns: bilateral ear pian  Onset/Duration: Yeterday  Symptoms:  Fever: no  Chills/Sweats: no  Headache (location?): no  Sinus Pressure: no  Conjunctivitis:  no  Ear Pain: YES  Rhinorrhea: no  Congestion: no  Sore Throat: no  Cough: no  Wheeze: no  Decreased Appetite: no  Nausea: no  Vomiting: no  Diarrhea: no  Dysuria/Freq.: no  Dysuria or Hematuria: no  Fatigue/Achiness: no  Sick/Strep Exposure: no  Therapies tried and outcome: None    Patient states that she uses Q-tips    Review of Systems   Review of Systems   See HPI     Objective    Temp: 98.1  F (36.7  C) Temp src: Oral BP: 133/83 Pulse: 63   Resp: 16 SpO2: 97 %       Physical Exam   Physical Exam  Constitutional:        General: She is not in acute distress.     Appearance: Normal appearance. She is normal weight. She is not ill-appearing, toxic-appearing or diaphoretic.   HENT:      Head: Normocephalic and atraumatic.      Right Ear: Tympanic membrane, ear canal and external ear normal. Drainage, swelling and tenderness present. There is impacted cerumen.      Left Ear: Tympanic membrane, ear canal and external ear normal. There is impacted cerumen.      Nose: Nose normal. No congestion or rhinorrhea.   Cardiovascular:      Rate and Rhythm: Normal rate and regular rhythm.      Pulses: Normal pulses.      Heart sounds: Normal heart sounds. No murmur heard.  No friction rub. No gallop.    Pulmonary:      Effort: Pulmonary effort is normal. No respiratory distress.      Breath sounds: Normal breath sounds. No stridor. No wheezing, rhonchi or rales.   Chest:      Chest wall: No tenderness.   Neurological:      General: No focal deficit present.      Mental Status: She is alert and oriented to person, place, and time. Mental status is at baseline.      Gait: Gait normal.   Psychiatric:         Mood and Affect: Mood normal.         Behavior: Behavior normal.         Thought Content: Thought content normal.         Judgment: Judgment normal.          No results found for this or any previous visit (from the past 24 hour(s)).

## 2021-12-04 NOTE — PATIENT INSTRUCTIONS
Patient Education     Earwax Removal    The ear canal makes earwax from the canal s lining. The ears make wax to lubricate and protect the ear canal. The ear canal is the tube that connects the middle ear to the outside of the ear. The wax protects the ear from bacteria, infection, and damage from water or trauma.   The wax that forms in the canal naturally moves toward the outside of the ear and falls out. In some cases, the ear may make too much wax. If the wax causes problems or keeps the healthcare provider from seeing into the ear, the extra wax may be removed.   Too much wax can affect your hearing. It can cause itching. In rare cases, it can be painful. Earwax should not be removed unless it is causing a problem. You should not stick objects such as cotton swabs into your ear to remove wax unless told to do so by your healthcare provider.   Healthcare providers can remove earwax safely. Often flushing the earwax out with water (irrigation) and syringing will help. Sometimes devices or suction are used to remove the wax. It is important to stay still during the procedure to prevent damage to the ear canal. But removing earwax generally doesn t hurt. You won't need anesthesia or pain medicine when the provider removes the earwax.   A number of conditions lead to earwax buildup. These include some skin problems, a narrow ear canal, or ears that make too much earwax. Using cotton swabs in the canal pushes earwax deeper into the ear and helps lead to the buildup of earwax.   Home care    The healthcare provider may advise mineral oil or an over-the-counter (OTC) eardrop to use at home to soften the earwax. He or she may also advise a home irrigation or syringing kit. Use these products only if the provider advises them. Carefully follow the instructions given.    Don t use mineral oil or OTC eardrops if you might have an ear infection or a burst (ruptured) eardrum. Tell your provider right away if you have  "diabetes or an immune disorder.    Don t use cotton swabs in your ears. Cotton swabs may push wax deeper into the ear canal or damage the eardrum. Use cotton gauze or a wet washcloth to gently remove wax on the outside of the ear and around the opening to the ear canal.    Don't use any probing device or object such as cotton-tipped swabs or ben pins to clean the inside of your ears.    Don t use ear candles to clean your ears. Candling can be dangerous. It can burn the ear canal. It can also make the condition worse instead of better.    Don t use cold water to rinse the ear. This will make you dizzy. If your provider tells you to rinse your ear, use only warm water or follow his or her instructions.    Check the ear for signs of infection or irritation (listed below under \"When to get medical advice\").  Steps for using eardrops  1. Warm the medicine bottle by rubbing it between your hands for a few minutes.  2. Lie down on your side, with the affected ear up.  3. Place the advised number of drops in the ear. Wet a cotton ball with the medicine. Gently put the cotton ball into the ear opening.    Follow-up care  Follow up with your healthcare provider, or as advised.   When to get medical advice   Call your healthcare provider right away if you have:     Ear pain that gets worse    Fever of 100.4F F (38 C) or higher, or as directed by your healthcare provider    Worsening wax buildup    Severe pain, dizziness, or nausea    Bleeding from the ear    Hearing problems    Signs of irritation from the eardrops, such as burning, stinging, or swelling and soreness    Foul-smelling fluid draining from the ear    Signs of infection such as outer ear swelling, redness, or soreness    Headache, neck pain, or stiff neck  [employer] last reviewed this educational content on 6/1/2020 2000-2021 [employer]. All rights reserved. This information is not intended as a substitute for professional medical care. Always " follow your healthcare professional's instructions.           Patient Education     External Ear Infection (Adult)    External otitis (also called  swimmer s ear ) is an infection in the ear canal. It's often caused by bacteria or fungus. It can occur a few days after water gets trapped in the ear canal (from swimming or bathing). It can also occur after cleaning too deeply in the ear canal with a cotton swab or other object. Sometimes, hair care products get into the ear canal and cause this problem.   Symptoms can include pain, fever, itching, redness, drainage, or swelling of the ear canal. Temporary hearing loss may also occur.   Home care    Don't try to clean the ear canal. This can push pus and bacteria deeper into the canal.    Use prescribed ear drops as directed. These help reduce swelling and fight the infection. If an ear wick was placed in the ear canal, apply drops right onto the end of the wick. The wick will draw the medicine into the ear canal even if it's swollen closed.    A cotton ball may be loosely placed in the outer ear to absorb any drainage.    You may use over-the-counter medicines to control pain as directed by the healthcare provider, unless another medicine was prescribed. Talk with your provider before using these medicines if you have chronic liver or kidney disease or ever had a stomach ulcer or digestive tract bleeding.    Don't allow water to get into your ear when bathing. Also don't swim until the infection has cleared.    Prevention    Keep your ears dry. This helps lower the risk of infection. Dry your ears with a towel or hair dryer after getting wet. Also, use ear plugs when swimming.    Don't stick any objects in the ear to remove wax.    Talk with your provider about using ear drops to prevent swimmer's ear in case you feel water trapped in your ear canal. You can get these drops over the counter at most drugstores. They work by removing water from the ear  canal.    Follow-up care  Follow up with your healthcare provider in 1 week, or as advised.   When to seek medical advice  Call your healthcare provider right away if any of these occur:     Ear pain becomes worse or doesn t improve after 3 days of treatment    Redness or swelling of the outer ear occurs or gets worse    Headache    Fever of 100.4 F (38 C) or higher, or as directed by your healthcare provider  Call 911  Call 911 or get immediate medical care if any of the following occur:     Seizure    Unusual drowsiness or confusion    Unusual painful or stiff neck    [employer] last reviewed this educational content on 8/1/2020 2000-2021 [employer]. All rights reserved. This information is not intended as a substitute for professional medical care. Always follow your healthcare professional's instructions.

## 2021-12-14 ENCOUNTER — TELEPHONE (OUTPATIENT)
Dept: INTERNAL MEDICINE | Facility: CLINIC | Age: 66
End: 2021-12-14
Payer: COMMERCIAL

## 2021-12-14 NOTE — TELEPHONE ENCOUNTER
Pt went to UC on 12/4/21 for otitis Externa, right and used the Cortisporin drops.  Not any better. Finished drops on Sunday. Still has ear pain and itching.   Please advise. Should she schedule with any provider in clinic?

## 2021-12-14 NOTE — TELEPHONE ENCOUNTER
Pt calling to report that her ear infection is not any better. Pt was seen at urgent care on 10/4/21 and prescribed drops. Pt completed those drops on Sunday and is still experiencing ear pain and itching. She would like to know what her next step is. She can be reached at [phone]. Please advise. Thanks.

## 2021-12-16 ENCOUNTER — OFFICE VISIT (OUTPATIENT)
Dept: INTERNAL MEDICINE | Facility: CLINIC | Age: 66
End: 2021-12-16
Payer: COMMERCIAL

## 2021-12-16 VITALS
WEIGHT: 145.5 LBS | OXYGEN SATURATION: 98 % | BODY MASS INDEX: 24.24 KG/M2 | HEIGHT: 65 IN | TEMPERATURE: 97.6 F | HEART RATE: 60 BPM | SYSTOLIC BLOOD PRESSURE: 143 MMHG | RESPIRATION RATE: 18 BRPM | DIASTOLIC BLOOD PRESSURE: 84 MMHG

## 2021-12-16 DIAGNOSIS — H60.391 INFECTIVE OTITIS EXTERNA, RIGHT: Primary | ICD-10-CM

## 2021-12-16 PROCEDURE — 99213 OFFICE O/P EST LOW 20 MIN: CPT | Performed by: FAMILY MEDICINE

## 2021-12-16 RX ORDER — CIPROFLOXACIN 500 MG/1
500 TABLET, FILM COATED ORAL 2 TIMES DAILY
Qty: 6 TABLET | Refills: 0 | Status: SHIPPED | OUTPATIENT
Start: 2021-12-16 | End: 2021-12-31

## 2021-12-16 RX ORDER — OFLOXACIN 3 MG/ML
5 SOLUTION AURICULAR (OTIC) 2 TIMES DAILY
Qty: 10 ML | Refills: 0 | Status: SHIPPED | OUTPATIENT
Start: 2021-12-16 | End: 2022-04-07

## 2021-12-16 ASSESSMENT — MIFFLIN-ST. JEOR: SCORE: 1195.86

## 2021-12-16 NOTE — PATIENT INSTRUCTIONS
Take prescribed medication and use drops as directed.    Antihistamine such as generic Claritin or Zyrtec.

## 2021-12-16 NOTE — PROGRESS NOTES
"  (H60.626) Infective otitis externa, right  (primary encounter diagnosis)  Comment:   Some exudate in right ear canal.  No significant wax buildup.  Plan: ofloxacin (FLOXIN) 0.3 % otic solution,         ciprofloxacin (CIPRO) 500 MG tablet                Subjective     Patient is being seen for an ear infection. Kimberley was seen at Urgent Care and received medication that is still not working. Patient states that she is still in pain.      HPI     Patient recently diagnosed with right otitis externa.  She was treated with Cortisporin suspension.  Ears were also irrigated a bit at that visit.    She complains of persistent right ear pain without drainage.  Not having a great deal of sinus congestion nor is she having sore throat.    She gives a history of having narrow ear canals.      Review of Systems     No fever.  No head congestion.  No cough.  No rash.        Objective    BP (!) 143/84   Pulse 60   Temp 97.6  F (36.4  C) (Tympanic)   Resp 18   Ht 1.651 m (5' 5\")   Wt 66 kg (145 lb 8 oz)   LMP  (LMP Unknown)   SpO2 98%   BMI 24.21 kg/m    There is no height or weight on file to calculate BMI.  Physical Exam     Left ear canal is open, not inflamed, tympanic membranes appears WNL.    Right ear canal is narrow, has some creamy appearing exudate, I can see the TM which does not look particularly inflamed.  External ear minimally tender.    No cervical adenopathy.        "

## 2021-12-27 DIAGNOSIS — F41.1 ANXIETY STATE: ICD-10-CM

## 2021-12-27 DIAGNOSIS — F51.01 PRIMARY INSOMNIA: ICD-10-CM

## 2021-12-29 RX ORDER — TRAZODONE HYDROCHLORIDE 100 MG/1
100 TABLET ORAL AT BEDTIME
Qty: 90 TABLET | Refills: 1 | Status: SHIPPED | OUTPATIENT
Start: 2021-12-29 | End: 2022-06-22

## 2021-12-29 RX ORDER — ALPRAZOLAM 0.25 MG
TABLET ORAL
Qty: 30 TABLET | Refills: 1 | Status: SHIPPED | OUTPATIENT
Start: 2021-12-29 | End: 2022-07-12

## 2021-12-29 NOTE — TELEPHONE ENCOUNTER
Pending Prescriptions:                       Disp   Refills    traZODone (DESYREL) 100 MG tablet [Pharmac*90 tab*3        Sig: TAKE 1 TABLET (100 MG) BY MOUTH NIGHTLY AS NEEDED FOR           SLEEP    ALPRAZolam (XANAX) 0.25 MG tablet [Pharmac*30 tab*1        Sig: TAKE 1 TABLET BY MOUTH THREE TIMES A DAY AS NEEDED           FOR ANXIETY  Routing refill request to provider for review/approval because:  Drug not on the [facility] refill protocol   Next 5 appointments (look out 90 days)      Dec 31, 2021  1:30 PM  (Arrive by 1:10 PM)  Pre-Operative Physical with Luis Miguel Cannon MD  [facility] ([facility] ) [address]  [facility] [address] [phone]

## 2021-12-31 ENCOUNTER — OFFICE VISIT (OUTPATIENT)
Dept: INTERNAL MEDICINE | Facility: CLINIC | Age: 66
End: 2021-12-31
Payer: COMMERCIAL

## 2021-12-31 VITALS
SYSTOLIC BLOOD PRESSURE: 142 MMHG | DIASTOLIC BLOOD PRESSURE: 82 MMHG | HEART RATE: 56 BPM | WEIGHT: 144.6 LBS | BODY MASS INDEX: 24.09 KG/M2 | RESPIRATION RATE: 12 BRPM | OXYGEN SATURATION: 98 % | HEIGHT: 65 IN | TEMPERATURE: 98.1 F

## 2021-12-31 DIAGNOSIS — M25.511 ACUTE PAIN OF RIGHT SHOULDER: ICD-10-CM

## 2021-12-31 DIAGNOSIS — Z01.818 PREOP GENERAL PHYSICAL EXAM: Primary | ICD-10-CM

## 2021-12-31 LAB
ANION GAP SERPL CALCULATED.3IONS-SCNC: 5 MMOL/L (ref 3–14)
BASOPHILS # BLD AUTO: 0 10E3/UL (ref 0–0.2)
BASOPHILS NFR BLD AUTO: 0 %
BUN SERPL-MCNC: 11 MG/DL (ref 7–30)
CALCIUM SERPL-MCNC: 10 MG/DL (ref 8.5–10.1)
CHLORIDE BLD-SCNC: 97 MMOL/L (ref 94–109)
CO2 SERPL-SCNC: 29 MMOL/L (ref 20–32)
CREAT SERPL-MCNC: 0.53 MG/DL (ref 0.52–1.04)
EOSINOPHIL # BLD AUTO: 0.1 10E3/UL (ref 0–0.7)
EOSINOPHIL NFR BLD AUTO: 1 %
ERYTHROCYTE [DISTWIDTH] IN BLOOD BY AUTOMATED COUNT: 11.8 % (ref 10–15)
GFR SERPL CREATININE-BSD FRML MDRD: >90 ML/MIN/1.73M2
GLUCOSE BLD-MCNC: 90 MG/DL (ref 70–99)
HCT VFR BLD AUTO: 38.9 % (ref 35–47)
HGB BLD-MCNC: 12.9 G/DL (ref 11.7–15.7)
LYMPHOCYTES # BLD AUTO: 1.6 10E3/UL (ref 0.8–5.3)
LYMPHOCYTES NFR BLD AUTO: 25 %
MCH RBC QN AUTO: 31.7 PG (ref 26.5–33)
MCHC RBC AUTO-ENTMCNC: 33.2 G/DL (ref 31.5–36.5)
MCV RBC AUTO: 96 FL (ref 78–100)
MONOCYTES # BLD AUTO: 0.4 10E3/UL (ref 0–1.3)
MONOCYTES NFR BLD AUTO: 7 %
NEUTROPHILS # BLD AUTO: 4.4 10E3/UL (ref 1.6–8.3)
NEUTROPHILS NFR BLD AUTO: 67 %
PLATELET # BLD AUTO: 268 10E3/UL (ref 150–450)
POTASSIUM BLD-SCNC: 4.6 MMOL/L (ref 3.4–5.3)
RBC # BLD AUTO: 4.07 10E6/UL (ref 3.8–5.2)
SODIUM SERPL-SCNC: 131 MMOL/L (ref 133–144)
WBC # BLD AUTO: 6.6 10E3/UL (ref 4–11)

## 2021-12-31 PROCEDURE — 93000 ELECTROCARDIOGRAM COMPLETE: CPT | Performed by: INTERNAL MEDICINE

## 2021-12-31 PROCEDURE — 80048 BASIC METABOLIC PNL TOTAL CA: CPT | Performed by: INTERNAL MEDICINE

## 2021-12-31 PROCEDURE — 36415 COLL VENOUS BLD VENIPUNCTURE: CPT | Performed by: INTERNAL MEDICINE

## 2021-12-31 PROCEDURE — 99214 OFFICE O/P EST MOD 30 MIN: CPT | Performed by: INTERNAL MEDICINE

## 2021-12-31 PROCEDURE — 85025 COMPLETE CBC W/AUTO DIFF WBC: CPT | Performed by: INTERNAL MEDICINE

## 2021-12-31 ASSESSMENT — ENCOUNTER SYMPTOMS
NEUROLOGICAL NEGATIVE: 1
RESPIRATORY NEGATIVE: 1
EYES NEGATIVE: 1
GASTROINTESTINAL NEGATIVE: 1
CONSTITUTIONAL NEGATIVE: 1
ARTHRALGIAS: 1
CARDIOVASCULAR NEGATIVE: 1

## 2021-12-31 ASSESSMENT — MIFFLIN-ST. JEOR: SCORE: 1191.78

## 2021-12-31 NOTE — NURSING NOTE
"BP (!) 142/82   Pulse 56   Temp 98.1  F (36.7  C) (Oral)   Resp 12   Ht 1.651 m (5' 5\")   Wt 65.6 kg (144 lb 9.6 oz)   LMP  (LMP Unknown)   SpO2 98%   BMI 24.06 kg/m    Patient in for Pre-Op.  "

## 2021-12-31 NOTE — PROGRESS NOTES
Addendum  CBC and basic metabolic panel are unremarkable.  Patient should be able to proceed with her surgery as currently scheduled.    [facility]  [address]  [facility] [address]  Phone: [phone]  Primary Provider: Juan F Smallwood  Pre-op Performing Provider: ALLEN ROCHE      PREOPERATIVE EVALUATION:  Today's date: 12/31/2021    Denise Ralph is a 66 year old female who presents for a preoperative evaluation.    Surgical Information:  Surgery/Procedure: Rt shoulder repair  Surgery Location: [facility]  Surgeon: Dr. Berman  Surgery Date: 1/3/22  Time of Surgery: 1400  Where patient plans to recover: At home with family  Fax number for surgical facility: [phone]    Type of Anesthesia Anticipated: General    Assessment & Plan     The proposed surgical procedure is considered INTERMEDIATE risk.    Preop general physical exam and shoulder pain  At this time, patient does have an unremarkable physical examination.  Her blood pressure is noted to be at an acceptable level.  He has previously tolerated general anesthesia for several procedures without issue.  She is also able to tolerate greater than 4 METS of physical activity.  EKG is unremarkable.  CBC and basic metabolic panel are pending.  I would consider patient to be an acceptable candidate to proceed with a noncardiac related surgery.  Assuming that her outstanding lab work has no abnormalities, she should be able to proceed with her surgery as currently scheduled.  Patient should continue her medications as currently prescribed.  She should also follow any additional instructions provided to her by her performing surgeon.  - EKG 12-lead complete w/read - Clinics  - Basic metabolic panel  (Ca, Cl, CO2, Creat, Gluc, K, Na, BUN)  - CBC with platelets and differential           Risks and Recommendations:  The patient has the following additional risks and recommendations for perioperative  complications:   - No identified additional risk factors other than previously addressed    Medication Instructions:  Patient is to take all scheduled medications on the day of surgery    RECOMMENDATION:  APPROVAL GIVEN to proceed with proposed procedure pending review of diagnostic evaluation.        30 minutes spent on the date of the encounter doing chart review, history and exam, documentation and further activities per the note      Subjective     HPI related to upcoming procedure: Patient is a 66-year-old  female who presents to the clinic for a preoperative examination.  She is currently scheduled to undergo right shoulder arthroscopy on January 3, 2022.  Patient has received general anesthesia on numerous occasions in the past.  She has had multiple surgeries that include a prior shoulder surgery, back surgery, and hysterectomy.  Patient did not have any complications related to anesthesia administration.  She is not aware of any family history of anesthesia intolerance or bleeding disorders.  Patient has no history of cardiac murmur.  She does take atenolol 50 mg twice per day and amlodipine 5 mg daily for management of her blood pressure.  Patient also takes trazodone for assistance with sleep.  She does report that she is able to walk up a flight of stairs without experiencing chest pain, shortness of breath, or syncopal episodes.  Patient has been dealing with persistent right shoulder pain for quite some time.  She does state that she has been seen by rheumatologist who told her that she does not have arthritis.  Patient does report that they may also manipulate the right bicep tendon as well.    Preop Questions 12/28/2021   1. Have you ever had a heart attack or stroke? No   2. Have you ever had surgery on your heart or blood vessels, such as a stent placement, a coronary artery bypass, or surgery on an artery in your head, neck, heart, or legs? No   3. Do you have chest pain with activity? No    4. Do you have a history of  heart failure? No   5. Do you currently have a cold, bronchitis or symptoms of other infection? No   6. Do you have a cough, shortness of breath, or wheezing? No   7. Do you or anyone in your family have previous history of blood clots? No   8. Do you or does anyone in your family have a serious bleeding problem such as prolonged bleeding following surgeries or cuts? No   9. Have you ever had problems with anemia or been told to take iron pills? No   10. Have you had any abnormal blood loss such as black, tarry or bloody stools, or abnormal vaginal bleeding? No   11. Have you ever had a blood transfusion? No   12. Are you willing to have a blood transfusion if it is medically needed before, during, or after your surgery? Yes   13. Have you or any of your relatives ever had problems with anesthesia? No   14. Do you have sleep apnea, excessive snoring or daytime drowsiness? No   15. Do you have any artifical heart valves or other implanted medical devices like a pacemaker, defibrillator, or continuous glucose monitor? No   16. Do you have artificial joints? No   17. Are you allergic to latex? No   18. Is there any chance that you may be pregnant? -       Health Care Directive:  Patient has a Health Care Directive on file      Preoperative Review of :   reviewed - no record of controlled substances prescribed.          Review of Systems   Constitutional: Negative.    HENT: Negative.    Eyes: Negative.    Respiratory: Negative.    Cardiovascular: Negative.    Gastrointestinal: Negative.    Genitourinary: Negative.    Musculoskeletal: Positive for arthralgias.   Neurological: Negative.          Patient Active Problem List    Diagnosis Date Noted     Septic arthritis of shoulder, right (H) 08/14/2021     Priority: High     Acute pain of right shoulder 08/13/2021     Priority: Medium     Controlled substance agreement signed 10/18/2017     Priority: Medium     Tension headache  10/18/2017     Priority: Medium     S/P lumbar fusion 08/03/2017     Priority: Medium     Hypertension goal BP (blood pressure) < 140/80 10/06/2015     Priority: Medium     Glaucoma 12/05/2014     Priority: Medium     Advanced directives, counseling/discussion 10/16/2012     Priority: Medium     Discussed Advance Directive planning with patient; information given to patient to review.         Basal cell carcinoma 08/01/2009     Priority: Medium     Squamous cell carcinoma 04/01/2007     Priority: Medium     Essential hypertension, benign 09/07/2006     Priority: Medium     Anxiety state 08/09/2004     Priority: Medium      Past Medical History:   Diagnosis Date     Anxiety     Gets panic attacks     Complication of anesthesia      Depressive disorder, not elsewhere classified      Essential hypertension, benign     No cardiologist     Irritable bowel syndrome     has had neg colonoscopy & EGD w/u     Lumbar degenerative disc disease 1996    Had PT, traction, no surgery     Other chronic pain     JOint pain for many years.     PONV (postoperative nausea and vomiting)      Sciatica 3/06    R thigh;  MRI = R L2-3 disc      Septic arthritis of shoulder, right (H) 8/14/2021     Unspecified glaucoma(365.9)      Past Surgical History:   Procedure Laterality Date     EYE SURGERY Bilateral     Treatment of glaucoma     HYSTERECTOMY       HYSTERECTOMY VAGINAL       IRRIGATION AND DEBRIDEMENT UPPER EXTREMITY, COMBINED Right 10/2/2020    Procedure: Right shoulder arthroscopic extensive debridement of the glenohumeral joint and subacromial space for infection.;  Surgeon: Umesh Berman MD;  Location: [facility] OR     LAPAROSCOPIC CHOLECYSTECTOMY  2002    with repeat operation because of bile leak     Left shoulder decomp surgery for impingement  approx 1993     OPTICAL TRACKING SYSTEM FUSION POSTERIOR SPINE LUMBAR N/A 8/3/2017    Procedure: OPTICAL TRACKING SYSTEM FUSION SPINE POSTERIOR LUMBAR ONE LEVEL;  1. L4 Smith-Meyers  osteotomy with exposure of the L4 and L5 roots  2. L4-5 complete discectomy with arthrodesis  3. Placement of Globus Creo GOMEZ pedicle screws bilaterally from L4 to L5  4. L4 - L5 posterior lateral fusion with placement of Medtronic Magnifuse and locally harvested autologous bone  5. Use of Stealth and O     OPTICAL TRACKING SYSTEM FUSION POSTERIOR SPINE LUMBAR N/A 6/11/2021    Procedure: Reopening of previous incision with L3-4 decompressive laminectomies with facetectomies and decompression  L3-4 complete discectomy with arthrodesis and placement of a 9 x 28 mm Globus Caliber expandable interbody cage with locally harvested autologous bone placed centrally and anterior to the graft Placement/replacment of Globus Creo pedicle screws from L3-5 bilaterally  L3-4 posterior      ORTHOPEDIC SURGERY Right 10/2020    I & D shoulder     WRIST SURGERY Left      Current Outpatient Medications   Medication Sig Dispense Refill     acetaminophen (TYLENOL) 325 MG tablet Take 2 tablets (650 mg) by mouth every 6 hours as needed for mild pain       ALPRAZolam (XANAX) 0.25 MG tablet TAKE 1 TABLET BY MOUTH THREE TIMES A DAY AS NEEDED FOR ANXIETY 30 tablet 1     amLODIPine (NORVASC) 5 MG tablet Take 1 tablet (5 mg) by mouth daily 90 tablet 3     atenolol (TENORMIN) 50 MG tablet TAKE 1 TABLET BY MOUTH TWICE A  tablet 3     celecoxib (CELEBREX) 100 MG capsule Take 1 capsule (100 mg) by mouth 2 times daily 60 capsule 0     ciprofloxacin (CIPRO) 500 MG tablet Take 1 tablet (500 mg) by mouth 2 times daily 6 tablet 0     fluticasone (FLONASE) 50 MCG/ACT nasal spray INSTILL 1 SPRAY INTO BOTH NOSTRILS DAILY 16 mL 4     latanoprost (XALATAN) 0.005 % ophthalmic solution Place 1 drop into both eyes At Bedtime       multivitamin w/minerals (MULTI-VITAMIN) tablet        ofloxacin (FLOXIN) 0.3 % otic solution Place 5 drops into the right ear 2 times daily 10 mL 0     Probiotic Product (PROBIOTIC DAILY PO) Take by mouth daily Probiotic 90  "billion units with Fiber 1 PO daily       timolol maleate (TIMOPTIC) 0.5 % ophthalmic solution Place 1 drop Into the left eye every morning       traZODone (DESYREL) 100 MG tablet Take 1 tablet (100 mg) by mouth At Bedtime 90 tablet 1     vitamin D3 (CHOLECALCIFEROL) 50 mcg (2000 units) tablet Take 1 tablet by mouth daily         Allergies   Allergen Reactions     Erythromycin Shortness Of Breath     Msir [Morphine Sulfate] Nausea     Intollerant to Morphine Sulfate: Nausea,vomiting     Dilaudid [Hydromorphone] Visual Disturbance     Hallucinations     Diuretic      Patient states she gets sick and loses weight from diuretics.     Lisinopril      Cough       Losartan      Tongue felt weird       Penicillins Itching     Reglan Other (See Comments)     Muscle spasms in throat        Social History     Tobacco Use     Smoking status: Former Smoker     Packs/day: 0.50     Years: 25.00     Pack years: 12.50     Quit date: 10/1/1997     Years since quittin.2     Smokeless tobacco: Never Used     Tobacco comment: quit    Substance Use Topics     Alcohol use: Yes     Alcohol/week: 1.7 standard drinks     Types: 2 Glasses of wine per week     Comment: 2 glasses of wine daily       History   Drug Use No         Objective     Ht 1.651 m (5' 5\")   LMP  (LMP Unknown)   BMI 24.21 kg/m      Physical Exam  Vitals and nursing note reviewed.   Constitutional:       Appearance: Normal appearance.   HENT:      Head: Normocephalic and atraumatic.      Right Ear: Tympanic membrane, ear canal and external ear normal.      Left Ear: Tympanic membrane, ear canal and external ear normal.      Mouth/Throat:      Mouth: Mucous membranes are moist.      Pharynx: Oropharynx is clear.   Eyes:      Extraocular Movements: Extraocular movements intact.      Conjunctiva/sclera: Conjunctivae normal.      Pupils: Pupils are equal, round, and reactive to light.   Cardiovascular:      Rate and Rhythm: Normal rate and regular rhythm.      " Pulses: Normal pulses.      Heart sounds: Normal heart sounds.   Pulmonary:      Effort: Pulmonary effort is normal.      Breath sounds: Normal breath sounds.   Abdominal:      General: Abdomen is flat. Bowel sounds are normal.      Palpations: Abdomen is soft.   Skin:     General: Skin is warm.      Capillary Refill: Capillary refill takes less than 2 seconds.   Neurological:      General: No focal deficit present.      Mental Status: She is alert and oriented to person, place, and time. Mental status is at baseline.           Recent Labs   Lab Test 08/27/21  1050 08/19/21  1117 08/16/21  0549 08/15/21  0700 08/14/21  0716   HGB 12.0 11.5*  --   --  12.0   * 315  --   --  240   NA  --  133  --   --  135   POTASSIUM  --  4.3  --   --  4.1   CR  --  0.65 0.57   < > 0.47*   A1C  --  5.0  --   --   --     < > = values in this interval not displayed.        Diagnostics:  Labs pending at this time.  Results will be reviewed when available.   EKG: sinus bradycardia, normal axis, normal intervals, no acute ST/T changes c/w ischemia, no LVH by voltage criteria, unchanged from previous tracings    Revised Cardiac Risk Index (RCRI):  The patient has the following serious cardiovascular risks for perioperative complications:   - No serious cardiac risks = 0 points     RCRI Interpretation: 0 points: Class I (very low risk - 0.4% complication rate)           Signed Electronically by: Luis Miguel Cannon MD  Copy of this evaluation report is provided to requesting physician.

## 2022-01-03 ENCOUNTER — LAB REQUISITION (OUTPATIENT)
Dept: LAB | Facility: CLINIC | Age: 67
End: 2022-01-03

## 2022-01-03 PROCEDURE — 87176 TISSUE HOMOGENIZATION CULTR: CPT | Mod: ORL | Performed by: ORTHOPAEDIC SURGERY

## 2022-01-03 PROCEDURE — 87077 CULTURE AEROBIC IDENTIFY: CPT | Mod: ORL | Performed by: ORTHOPAEDIC SURGERY

## 2022-01-09 LAB — BACTERIA TISS BX CULT: NO GROWTH

## 2022-01-10 LAB
BACTERIA TISS BX CULT: ABNORMAL

## 2022-01-11 ENCOUNTER — TRANSFERRED RECORDS (OUTPATIENT)
Dept: HEALTH INFORMATION MANAGEMENT | Facility: CLINIC | Age: 67
End: 2022-01-11
Payer: COMMERCIAL

## 2022-01-11 LAB
BACTERIA TISS BX CULT: ABNORMAL
BACTERIA TISS BX CULT: ABNORMAL

## 2022-03-08 ENCOUNTER — TRANSFERRED RECORDS (OUTPATIENT)
Dept: HEALTH INFORMATION MANAGEMENT | Facility: CLINIC | Age: 67
End: 2022-03-08
Payer: COMMERCIAL

## 2022-03-10 ENCOUNTER — TRANSFERRED RECORDS (OUTPATIENT)
Dept: HEALTH INFORMATION MANAGEMENT | Facility: CLINIC | Age: 67
End: 2022-03-10
Payer: COMMERCIAL

## 2022-04-07 ENCOUNTER — OFFICE VISIT (OUTPATIENT)
Dept: INTERNAL MEDICINE | Facility: CLINIC | Age: 67
End: 2022-04-07
Payer: COMMERCIAL

## 2022-04-07 VITALS
TEMPERATURE: 98.2 F | HEART RATE: 71 BPM | BODY MASS INDEX: 23.99 KG/M2 | DIASTOLIC BLOOD PRESSURE: 60 MMHG | WEIGHT: 144 LBS | OXYGEN SATURATION: 97 % | RESPIRATION RATE: 16 BRPM | HEIGHT: 65 IN | SYSTOLIC BLOOD PRESSURE: 120 MMHG

## 2022-04-07 DIAGNOSIS — M00.9 INFECTION OF SHOULDER (H): ICD-10-CM

## 2022-04-07 DIAGNOSIS — G25.81 RESTLESS LEGS SYNDROME (RLS): ICD-10-CM

## 2022-04-07 DIAGNOSIS — Z96.60 PRESENCE OF UNSPECIFIED ORTHOPEDIC JOINT IMPLANT: ICD-10-CM

## 2022-04-07 DIAGNOSIS — Z78.0 MENOPAUSE: ICD-10-CM

## 2022-04-07 DIAGNOSIS — Z00.00 ENCOUNTER FOR PREVENTATIVE ADULT HEALTH CARE EXAMINATION: Primary | ICD-10-CM

## 2022-04-07 DIAGNOSIS — I10 ESSENTIAL HYPERTENSION, BENIGN: ICD-10-CM

## 2022-04-07 LAB
ALBUMIN UR-MCNC: NEGATIVE MG/DL
APPEARANCE UR: CLEAR
BILIRUB UR QL STRIP: NEGATIVE
COLOR UR AUTO: YELLOW
ERYTHROCYTE [DISTWIDTH] IN BLOOD BY AUTOMATED COUNT: 12.3 % (ref 10–15)
GLUCOSE UR STRIP-MCNC: NEGATIVE MG/DL
HCT VFR BLD AUTO: 38.2 % (ref 35–47)
HGB BLD-MCNC: 12.6 G/DL (ref 11.7–15.7)
HGB UR QL STRIP: NEGATIVE
KETONES UR STRIP-MCNC: NEGATIVE MG/DL
LEUKOCYTE ESTERASE UR QL STRIP: NEGATIVE
MCH RBC QN AUTO: 30.7 PG (ref 26.5–33)
MCHC RBC AUTO-ENTMCNC: 33 G/DL (ref 31.5–36.5)
MCV RBC AUTO: 93 FL (ref 78–100)
NITRATE UR QL: NEGATIVE
PH UR STRIP: 8 [PH] (ref 5–7)
PLATELET # BLD AUTO: 251 10E3/UL (ref 150–450)
RBC # BLD AUTO: 4.1 10E6/UL (ref 3.8–5.2)
RBC #/AREA URNS AUTO: NORMAL /HPF
SP GR UR STRIP: 1.01 (ref 1–1.03)
UROBILINOGEN UR STRIP-ACNC: 0.2 E.U./DL
WBC # BLD AUTO: 6.5 10E3/UL (ref 4–11)
WBC #/AREA URNS AUTO: NORMAL /HPF

## 2022-04-07 PROCEDURE — 84443 ASSAY THYROID STIM HORMONE: CPT | Performed by: INTERNAL MEDICINE

## 2022-04-07 PROCEDURE — 99397 PER PM REEVAL EST PAT 65+ YR: CPT | Performed by: INTERNAL MEDICINE

## 2022-04-07 PROCEDURE — 80061 LIPID PANEL: CPT | Performed by: INTERNAL MEDICINE

## 2022-04-07 PROCEDURE — 80053 COMPREHEN METABOLIC PANEL: CPT | Performed by: INTERNAL MEDICINE

## 2022-04-07 PROCEDURE — 81001 URINALYSIS AUTO W/SCOPE: CPT | Performed by: INTERNAL MEDICINE

## 2022-04-07 PROCEDURE — 85027 COMPLETE CBC AUTOMATED: CPT | Performed by: INTERNAL MEDICINE

## 2022-04-07 PROCEDURE — 36415 COLL VENOUS BLD VENIPUNCTURE: CPT | Performed by: INTERNAL MEDICINE

## 2022-04-07 PROCEDURE — 82728 ASSAY OF FERRITIN: CPT | Performed by: INTERNAL MEDICINE

## 2022-04-07 PROCEDURE — 99214 OFFICE O/P EST MOD 30 MIN: CPT | Mod: 25 | Performed by: INTERNAL MEDICINE

## 2022-04-07 RX ORDER — DOXYCYCLINE 100 MG/1
CAPSULE ORAL
COMMUNITY
Start: 2022-01-22 | End: 2023-11-07

## 2022-04-07 RX ORDER — ROPINIROLE 0.25 MG/1
0.25 TABLET, FILM COATED ORAL
Qty: 30 TABLET | Refills: 1 | Status: SHIPPED | OUTPATIENT
Start: 2022-04-07 | End: 2023-03-10

## 2022-04-07 ASSESSMENT — ENCOUNTER SYMPTOMS
HEADACHES: 1
ABDOMINAL PAIN: 0
JOINT SWELLING: 0
BREAST MASS: 0
NAUSEA: 0
HEMATURIA: 0
CHILLS: 0
WEAKNESS: 0
ARTHRALGIAS: 1
FREQUENCY: 0
EYE PAIN: 0
HEMATOCHEZIA: 0
DIZZINESS: 0
SORE THROAT: 0
HEARTBURN: 0
COUGH: 0
DIARRHEA: 0
PARESTHESIAS: 0
MYALGIAS: 1
FEVER: 0
CONSTIPATION: 0
NERVOUS/ANXIOUS: 1
SHORTNESS OF BREATH: 0
DYSURIA: 0
PALPITATIONS: 0

## 2022-04-07 ASSESSMENT — ACTIVITIES OF DAILY LIVING (ADL): CURRENT_FUNCTION: NO ASSISTANCE NEEDED

## 2022-04-07 NOTE — PROGRESS NOTES
SUBJECTIVE:   Denise Ralph is a 67 year old female who presents for Preventive Visit.        Patient has been advised of split billing requirements and indicates understanding: Yes  Are you in the first 12 months of your Medicare coverage?  No    HPI  Do you feel safe in your environment? Yes    Have you ever done Advance Care Planning? (For example, a Health Directive, POLST, or a discussion with a medical provider or your loved ones about your wishes): Yes, advance care planning is on file.       Fall risk  Fallen 2 or more times in the past year?: No  Any fall with injury in the past year?: No  click delete button to remove this line now  Cognitive Screening   1) Repeat 3 items (Leader, Season, Table)    2) Clock draw: NORMAL  3) 3 item recall:   Recalls 3 objects  Results: 3 items recalled: COGNITIVE IMPAIRMENT LESS LIKELY    Mini-CogTM Copyright S Zoë. Licensed by the author for use in [facility]; reprinted with permission ([email or web address]@[facility]). All rights reserved.      Do you have sleep apnea, excessive snoring or daytime drowsiness?: yes    Reviewed and updated as needed this visit by clinical staff   Tobacco  Allergies  Meds   Med Hx  Surg Hx  Fam Hx  Soc Hx        Reviewed and updated as needed this visit by Provider                 Social History     Tobacco Use     Smoking status: Former Smoker     Packs/day: 0.50     Years: 25.00     Pack years: 12.50     Quit date: 10/1/1997     Years since quittin.5     Smokeless tobacco: Never Used     Tobacco comment: quit    Substance Use Topics     Alcohol use: Yes     Alcohol/week: 1.7 standard drinks     Types: 2 Glasses of wine per week     Comment: 2 glasses of wine daily         Alcohol Use 2022   Prescreen: >3 drinks/day or >7 drinks/week? No   Prescreen: >3 drinks/day or >7 drinks/week? -   AUDIT SCORE  -     AUDIT - Alcohol Use Disorders Identification Test - Reproduced from the World Health Organization Audit 2001 (Second  Edition) 1/31/2020   1.  How often do you have a drink containing alcohol? 4 or more times a week   2.  How many drinks containing alcohol do you have on a typical day when you are drinking? 1 or 2   3.  How often do you have five or more drinks on one occasion? Never   4.  How often during the last year have you found that you were not able to stop drinking once you had started? Never   5.  How often during the last year have you failed to do what was normally expected of you because of drinking? Never   6.  How often during the last year have you needed a first drink in the morning to get yourself going after a heavy drinking session? Never   7.  How often during the last year have you had a feeling of guilt or remorse after drinking? Never   8.  How often during the last year have you been unable to remember what happened the night before because of your drinking? Never   9.  Have you or someone else been injured because of your drinking? No   10. Has a relative, friend, doctor or other health care worker been concerned about your drinking or suggested you cut down? No   TOTAL SCORE 4           PROBLEMS TO ADD ON...  Has h/o HTN. on medical treatment. BP has been controlled. No side effects from medications. No CP, HA, dizziness. good compliance with medications and low salt diet.  Has h/o anxiety , on PRN anxiolytic , symptoms are controlled.   Concern for symptoms of LE restless ness, moving, uncomfortable at night, on and off, occasionally.   Has infection of the right shoulder, on suppressive treatment with Doxycycline. Has pain, unable to do lifting. Follows with ortho.       Current providers sharing in care for this patient include:   Patient Care Team:  Juan F Smallwood MD as PCP - General (Internal Medicine)  Juan F Smallwood MD as Assigned PCP    The following health maintenance items are reviewed in Epic and correct as of today:  Health Maintenance Due   Topic Date Due     ANNUAL REVIEW OF   "ORDERS  Never done     MEDICARE ANNUAL WELLNESS VISIT  04/12/2022     Lab work is in process  [facility]abs [facility] in [facility]:   Breast CA Risk Assessment (S-7) 12/28/2021 4/1/2022 4/7/2022   Did any of your first-degree relatives have breast or ovarian cancer? Unknown Yes Yes   Did any of your relatives have bilateral breast cancer? Unknown Unknown Unknown   Did any man in your family have breast cancer? No Unknown Unknown   Did any woman in your family have breast and ovarian cancer? Yes Yes Yes   Did any woman in your family have breast cancer before age 50 y? No No No   Do you have 2 or more relatives with breast and/or ovarian cancer? No No No   Do you have 2 or more relatives with breast and/or bowel cancer? No No No     click delete button to remove this line now    Pertinent mammograms are reviewed under the imaging tab.    Review of Systems  Constitutional, HEENT, cardiovascular, pulmonary, GI, , musculoskeletal, neuro, skin, endocrine and psych systems are negative, except as otherwise noted.    OBJECTIVE:   LMP  (LMP Unknown)  Estimated body mass index is 24.06 kg/m  as calculated from the following:    Height as of 12/31/21: 1.651 m (5' 5\").    Weight as of 12/31/21: 65.6 kg (144 lb 9.6 oz).  Physical Exam  GENERAL: healthy, alert and no distress  EYES: Eyes grossly normal to inspection, PERRL and conjunctivae and sclerae normal  HENT: ear canals and TM's normal, nose and mouth without ulcers or lesions  NECK: no adenopathy, no asymmetry, masses, or scars and thyroid normal to palpation  RESP: lungs clear to auscultation - no rales, rhonchi or wheezes  CV: regular rate and rhythm, normal S1 S2, no S3 or S4, no murmur, click or rub, no peripheral edema and peripheral pulses strong  ABDOMEN: soft, nontender, no hepatosplenomegaly, no masses and bowel sounds normal  MS: no gross musculoskeletal defects noted, no edema  SKIN: no suspicious lesions or rashes  NEURO: Normal strength and tone, mentation " "intact and speech normal  PSYCH: mentation appears normal, affect normal/bright    Diagnostic Test Results:  Labs reviewed in Epic    ASSESSMENT / PLAN:       ICD-10-CM    1. Encounter for preventative adult health care examination  Z00.00 Lipid panel reflex to direct LDL Fasting     CBC with platelets     Comprehensive metabolic panel (BMP + Alb, Alk Phos, ALT, AST, Total. Bili, TP)     TSH with free T4 reflex     UA with Microscopic reflex to Culture - lab collect     Ferritin   2. Infection of shoulder (H)  M00.9    3. Restless legs syndrome (RLS)  G25.81 rOPINIRole (REQUIP) 0.25 MG tablet     Ferritin     OFFICE/OUTPT VISIT,EST,LEVL III   4. Menopause  Z78.0 DX Hip/Pelvis/Spine   5. Essential hypertension, benign  I10 Lipid panel reflex to direct LDL Fasting     CBC with platelets     Comprehensive metabolic panel (BMP + Alb, Alk Phos, ALT, AST, Total. Bili, TP)     TSH with free T4 reflex     UA with Microscopic reflex to Culture - lab collect     OFFICE/OUTPT VISIT,EST,LEVL III   6. Presence of unspecified orthopedic joint implant   Z96.60 Ferritin     Assess lab work   Cont treatment, controlled HTN  Assess lab for RLS, start treatment PRN     Patient has been advised of split billing requirements and indicates understanding: Yes    COUNSELING:  Reviewed preventive health counseling, as reflected in patient instructions       Regular exercise       Healthy diet/nutrition       Vision screening       Hearing screening       Colon cancer screening    Estimated body mass index is 24.06 kg/m  as calculated from the following:    Height as of 12/31/21: 1.651 m (5' 5\").    Weight as of 12/31/21: 65.6 kg (144 lb 9.6 oz).        She reports that she quit smoking about 24 years ago. She has a 12.50 pack-year smoking history. She has never used smokeless tobacco.      Appropriate preventive services were discussed with this patient, including applicable screening as appropriate for cardiovascular disease, diabetes, " osteopenia/osteoporosis, and glaucoma.  As appropriate for age/gender, discussed screening for colorectal cancer, prostate cancer, breast cancer, and cervical cancer. Checklist reviewing preventive services available has been given to the patient.    Reviewed patients plan of care and provided an AVS. The Intermediate Care Plan ( asthma action plan, low back pain action plan, and migraine action plan) for Denise meets the Care Plan requirement. This Care Plan has been established and reviewed with the Patient.    Counseling Resources:  ATP IV Guidelines  Pooled Cohorts Equation Calculator  Breast Cancer Risk Calculator  Breast Cancer: Medication to Reduce Risk  FRAX Risk Assessment  ICSI Preventive Guidelines  Dietary Guidelines for Americans, 2010  USDA's MyPlate  ASA Prophylaxis  Lung CA Screening    Juan F Smallwood MD  [facility]    Identified Health Risks:

## 2022-04-07 NOTE — LETTER
April 8, 2022      Denise Ralph  [address] [place] [address]          Dear Denise,    Normal result reviewed        Sincerely,      Juan F Smallwood MD      Results for orders placed or performed in visit on 04/07/22   Lipid panel reflex to direct LDL Fasting     Status: None   Result Value Ref Range    Cholesterol 146 <200 mg/dL    Triglycerides 73 <150 mg/dL    Direct Measure HDL 75 >=50 mg/dL    LDL Cholesterol Calculated 56 <=100 mg/dL    Non HDL Cholesterol 71 <130 mg/dL    Patient Fasting > 8hrs? No     Narrative    Cholesterol  Desirable:  <200 mg/dL    Triglycerides  Normal:  Less than 150 mg/dL  Borderline High:  150-199 mg/dL  High:  200-499 mg/dL  Very High:  Greater than or equal to 500 mg/dL    Direct Measure HDL  Female:  Greater than or equal to 50 mg/dL   Male:  Greater than or equal to 40 mg/dL    LDL Cholesterol  Desirable:  <100mg/dL  Above Desirable:  100-129 mg/dL   Borderline High:  130-159 mg/dL   High:  160-189 mg/dL   Very High:  >= 190 mg/dL    Non HDL Cholesterol  Desirable:  130 mg/dL  Above Desirable:  130-159 mg/dL  Borderline High:  160-189 mg/dL  High:  190-219 mg/dL  Very High:  Greater than or equal to 220 mg/dL   CBC with platelets     Status: Normal   Result Value Ref Range    WBC Count 6.5 4.0 - 11.0 10e3/uL    RBC Count 4.10 3.80 - 5.20 10e6/uL    Hemoglobin 12.6 11.7 - 15.7 g/dL    Hematocrit 38.2 35.0 - 47.0 %    MCV 93 78 - 100 fL    MCH 30.7 26.5 - 33.0 pg    MCHC 33.0 31.5 - 36.5 g/dL    RDW 12.3 10.0 - 15.0 %    Platelet Count 251 150 - 450 10e3/uL   Comprehensive metabolic panel (BMP + Alb, Alk Phos, ALT, AST, Total. Bili, TP)     Status: Normal   Result Value Ref Range    Sodium 133 133 - 144 mmol/L    Potassium 4.2 3.4 - 5.3 mmol/L    Chloride 100 94 - 109 mmol/L    Carbon Dioxide (CO2) 24 20 - 32 mmol/L    Anion Gap 9 3 - 14 mmol/L    Urea Nitrogen 17 7 - 30 mg/dL    Creatinine 0.55 0.52 - 1.04 mg/dL    Calcium 9.9 8.5 - 10.1 mg/dL    Glucose 94 70 - 99  mg/dL    Alkaline Phosphatase 86 40 - 150 U/L    AST 31 0 - 45 U/L    ALT 31 0 - 50 U/L    Protein Total 7.0 6.8 - 8.8 g/dL    Albumin 3.9 3.4 - 5.0 g/dL    Bilirubin Total 0.5 0.2 - 1.3 mg/dL    GFR Estimate >90 >60 mL/min/1.73m2   TSH with free T4 reflex     Status: Normal   Result Value Ref Range    TSH 1.54 0.40 - 4.00 mU/L   UA with Microscopic reflex to Culture - lab collect     Status: Abnormal    Specimen: Urine, Midstream   Result Value Ref Range    Color Urine Yellow Colorless, Straw, Light Yellow, Yellow    Appearance Urine Clear Clear    Glucose Urine Negative Negative mg/dL    Bilirubin Urine Negative Negative    Ketones Urine Negative Negative mg/dL    Specific Gravity Urine 1.010 1.003 - 1.035    Blood Urine Negative Negative    pH Urine 8.0 (H) 5.0 - 7.0    Protein Albumin Urine Negative Negative mg/dL    Urobilinogen Urine 0.2 0.2, 1.0 E.U./dL    Nitrite Urine Negative Negative    Leukocyte Esterase Urine Negative Negative   Ferritin     Status: Normal   Result Value Ref Range    Ferritin 58 8 - 252 ng/mL   Urine Microscopic     Status: Normal   Result Value Ref Range    RBC Urine None Seen 0-2 /HPF /HPF    WBC Urine 0-5 0-5 /HPF /HPF    Narrative    Urine Culture not indicated

## 2022-04-08 LAB
ALBUMIN SERPL-MCNC: 3.9 G/DL (ref 3.4–5)
ALP SERPL-CCNC: 86 U/L (ref 40–150)
ALT SERPL W P-5'-P-CCNC: 31 U/L (ref 0–50)
ANION GAP SERPL CALCULATED.3IONS-SCNC: 9 MMOL/L (ref 3–14)
AST SERPL W P-5'-P-CCNC: 31 U/L (ref 0–45)
BILIRUB SERPL-MCNC: 0.5 MG/DL (ref 0.2–1.3)
BUN SERPL-MCNC: 17 MG/DL (ref 7–30)
CALCIUM SERPL-MCNC: 9.9 MG/DL (ref 8.5–10.1)
CHLORIDE BLD-SCNC: 100 MMOL/L (ref 94–109)
CHOLEST SERPL-MCNC: 146 MG/DL
CO2 SERPL-SCNC: 24 MMOL/L (ref 20–32)
CREAT SERPL-MCNC: 0.55 MG/DL (ref 0.52–1.04)
FASTING STATUS PATIENT QL REPORTED: NO
FERRITIN SERPL-MCNC: 58 NG/ML (ref 8–252)
GFR SERPL CREATININE-BSD FRML MDRD: >90 ML/MIN/1.73M2
GLUCOSE BLD-MCNC: 94 MG/DL (ref 70–99)
HDLC SERPL-MCNC: 75 MG/DL
LDLC SERPL CALC-MCNC: 56 MG/DL
NONHDLC SERPL-MCNC: 71 MG/DL
POTASSIUM BLD-SCNC: 4.2 MMOL/L (ref 3.4–5.3)
PROT SERPL-MCNC: 7 G/DL (ref 6.8–8.8)
SODIUM SERPL-SCNC: 133 MMOL/L (ref 133–144)
TRIGL SERPL-MCNC: 73 MG/DL
TSH SERPL DL<=0.005 MIU/L-ACNC: 1.54 MU/L (ref 0.4–4)

## 2022-04-11 ENCOUNTER — HOSPITAL ENCOUNTER (OUTPATIENT)
Dept: MAMMOGRAPHY | Facility: CLINIC | Age: 67
Discharge: HOME | End: 2022-04-11
Attending: INTERNAL MEDICINE | Admitting: INTERNAL MEDICINE
Payer: COMMERCIAL

## 2022-04-11 DIAGNOSIS — Z12.31 VISIT FOR SCREENING MAMMOGRAM: ICD-10-CM

## 2022-04-11 PROCEDURE — 77067 SCR MAMMO BI INCL CAD: CPT

## 2022-04-13 ENCOUNTER — ANCILLARY PROCEDURE (OUTPATIENT)
Dept: BONE DENSITY | Facility: CLINIC | Age: 67
End: 2022-04-13
Attending: INTERNAL MEDICINE
Payer: COMMERCIAL

## 2022-04-13 DIAGNOSIS — Z78.0 MENOPAUSE: ICD-10-CM

## 2022-06-20 DIAGNOSIS — F51.01 PRIMARY INSOMNIA: ICD-10-CM

## 2022-06-22 RX ORDER — TRAZODONE HYDROCHLORIDE 100 MG/1
100 TABLET ORAL AT BEDTIME
Qty: 90 TABLET | Refills: 1 | Status: SHIPPED | OUTPATIENT
Start: 2022-06-22 | End: 2022-11-08

## 2022-06-22 NOTE — TELEPHONE ENCOUNTER
Pending Prescriptions:                       Disp   Refills    traZODone (DESYREL) 100 MG tablet          90 tab*1        Sig: Take 1 tablet (100 mg) by mouth At Bedtime    Routing refill request to provider for review/approval because:  Allergy listed

## 2022-06-29 ENCOUNTER — TRANSFERRED RECORDS (OUTPATIENT)
Dept: HEALTH INFORMATION MANAGEMENT | Facility: CLINIC | Age: 67
End: 2022-06-29

## 2022-07-11 DIAGNOSIS — I10 HYPERTENSION GOAL BP (BLOOD PRESSURE) < 140/80: ICD-10-CM

## 2022-07-11 DIAGNOSIS — F41.1 ANXIETY STATE: ICD-10-CM

## 2022-07-13 RX ORDER — ALPRAZOLAM 0.25 MG
TABLET ORAL
Qty: 30 TABLET | Refills: 1 | Status: SHIPPED | OUTPATIENT
Start: 2022-07-13 | End: 2022-12-15

## 2022-07-13 RX ORDER — ATENOLOL 50 MG/1
TABLET ORAL
Qty: 180 TABLET | Refills: 3 | Status: SHIPPED | OUTPATIENT
Start: 2022-07-13 | End: 2023-07-10

## 2022-09-15 ENCOUNTER — TRANSFERRED RECORDS (OUTPATIENT)
Dept: HEALTH INFORMATION MANAGEMENT | Facility: CLINIC | Age: 67
End: 2022-09-15

## 2022-10-05 ENCOUNTER — MYC MEDICAL ADVICE (OUTPATIENT)
Dept: INTERNAL MEDICINE | Facility: CLINIC | Age: 67
End: 2022-10-05

## 2022-10-05 DIAGNOSIS — Z87.898 H/O MOTION SICKNESS: Primary | ICD-10-CM

## 2022-10-06 RX ORDER — SCOLOPAMINE TRANSDERMAL SYSTEM 1 MG/1
1 PATCH, EXTENDED RELEASE TRANSDERMAL
Qty: 5 PATCH | Refills: 0 | Status: SHIPPED | OUTPATIENT
Start: 2022-10-06 | End: 2023-09-25

## 2022-10-22 ENCOUNTER — HEALTH MAINTENANCE LETTER (OUTPATIENT)
Age: 67
End: 2022-10-22

## 2022-11-16 ENCOUNTER — TRANSFERRED RECORDS (OUTPATIENT)
Dept: INTERNAL MEDICINE | Facility: CLINIC | Age: 67
End: 2022-11-16

## 2022-12-15 DIAGNOSIS — F41.1 ANXIETY STATE: ICD-10-CM

## 2022-12-15 RX ORDER — ALPRAZOLAM 0.25 MG
TABLET ORAL
Qty: 30 TABLET | Refills: 1 | Status: SHIPPED | OUTPATIENT
Start: 2022-12-15 | End: 2023-04-24

## 2023-03-09 DIAGNOSIS — G25.81 RESTLESS LEGS SYNDROME (RLS): ICD-10-CM

## 2023-03-10 RX ORDER — ROPINIROLE 0.25 MG/1
TABLET, FILM COATED ORAL
Qty: 30 TABLET | Refills: 1 | Status: SHIPPED | OUTPATIENT
Start: 2023-03-10 | End: 2023-03-29

## 2023-03-10 NOTE — TELEPHONE ENCOUNTER
Pending Prescriptions:                       Disp   Refills    rOPINIRole (REQUIP) 0.25 MG tablet [Pharma*30 tab*1        Sig: TAKE 1 TABLET BY MOUTH NIGHTLY AS NEEDED FOR RESTLESS           LEGS    Routing refill request to provider for review/approval because:  Needs provider review

## 2023-03-17 DIAGNOSIS — R09.81 CONGESTION OF PARANASAL SINUS: ICD-10-CM

## 2023-03-17 DIAGNOSIS — R09.89 RUNNY NOSE: ICD-10-CM

## 2023-03-21 RX ORDER — FLUTICASONE PROPIONATE 50 MCG
SPRAY, SUSPENSION (ML) NASAL
Qty: 16 ML | Refills: 0 | Status: SHIPPED | OUTPATIENT
Start: 2023-03-21 | End: 2023-07-10

## 2023-03-21 NOTE — TELEPHONE ENCOUNTER
Medication is being filled for 1 time refill only due to:  Patient needs to be seen because ..    Due in April for Physical.  Please call patient and get her scheduled.

## 2023-03-28 DIAGNOSIS — R09.81 CONGESTION OF PARANASAL SINUS: ICD-10-CM

## 2023-03-28 DIAGNOSIS — R09.89 RUNNY NOSE: ICD-10-CM

## 2023-03-28 DIAGNOSIS — G25.81 RESTLESS LEGS SYNDROME (RLS): ICD-10-CM

## 2023-03-29 RX ORDER — ROPINIROLE 0.25 MG/1
TABLET, FILM COATED ORAL
Qty: 30 TABLET | Refills: 0 | Status: SHIPPED | OUTPATIENT
Start: 2023-03-29 | End: 2023-04-26

## 2023-03-29 RX ORDER — FLUTICASONE PROPIONATE 50 MCG
SPRAY, SUSPENSION (ML) NASAL
Qty: 16 ML | Refills: 0 | OUTPATIENT
Start: 2023-03-29

## 2023-03-30 ENCOUNTER — TRANSFERRED RECORDS (OUTPATIENT)
Dept: HEALTH INFORMATION MANAGEMENT | Facility: CLINIC | Age: 68
End: 2023-03-30

## 2023-03-30 NOTE — ED NOTES
"Needing assist x 2 to [facility] for urine.  Patient states is \"scared\" to get up, feels \"so weak\".  Stating \"please don't let me die\".  BP's 180/90's.  A/O x 4.  Requesting something for anxiety.  Takes xanax at home, has not taken today yet.  Also c/o of headache 8/10. Provider notified  "
Patient feeling markedly better from arrival.  Ambulated to lobby with .    
Normal

## 2023-04-12 ENCOUNTER — HOSPITAL ENCOUNTER (OUTPATIENT)
Dept: MAMMOGRAPHY | Facility: CLINIC | Age: 68
Discharge: HOME | End: 2023-04-12
Attending: INTERNAL MEDICINE | Admitting: INTERNAL MEDICINE
Payer: COMMERCIAL

## 2023-04-12 DIAGNOSIS — Z12.31 VISIT FOR SCREENING MAMMOGRAM: ICD-10-CM

## 2023-04-12 PROCEDURE — 77067 SCR MAMMO BI INCL CAD: CPT

## 2023-04-19 ASSESSMENT — ACTIVITIES OF DAILY LIVING (ADL): CURRENT_FUNCTION: NO ASSISTANCE NEEDED

## 2023-04-19 ASSESSMENT — ENCOUNTER SYMPTOMS
NERVOUS/ANXIOUS: 1
ABDOMINAL PAIN: 0
MYALGIAS: 1
FREQUENCY: 0
DIZZINESS: 0
BREAST MASS: 0
COUGH: 0
CONSTIPATION: 0
EYE PAIN: 0
DYSURIA: 0
SHORTNESS OF BREATH: 0
DIARRHEA: 0
HEADACHES: 1
HEMATURIA: 0
PALPITATIONS: 0
JOINT SWELLING: 0
NAUSEA: 0
WEAKNESS: 0
CHILLS: 0
PARESTHESIAS: 1
FEVER: 0
HEARTBURN: 0
HEMATOCHEZIA: 0
SORE THROAT: 0
ARTHRALGIAS: 1

## 2023-04-24 DIAGNOSIS — I10 HYPERTENSION GOAL BP (BLOOD PRESSURE) < 140/80: ICD-10-CM

## 2023-04-24 DIAGNOSIS — F41.1 ANXIETY STATE: ICD-10-CM

## 2023-04-24 RX ORDER — ALPRAZOLAM 0.25 MG
TABLET ORAL
Qty: 30 TABLET | Refills: 1 | Status: SHIPPED | OUTPATIENT
Start: 2023-04-24 | End: 2023-10-03

## 2023-04-26 ENCOUNTER — OFFICE VISIT (OUTPATIENT)
Dept: INTERNAL MEDICINE | Facility: CLINIC | Age: 68
End: 2023-04-26
Payer: COMMERCIAL

## 2023-04-26 VITALS
TEMPERATURE: 98.6 F | WEIGHT: 148.2 LBS | OXYGEN SATURATION: 98 % | DIASTOLIC BLOOD PRESSURE: 80 MMHG | HEIGHT: 65 IN | BODY MASS INDEX: 24.69 KG/M2 | SYSTOLIC BLOOD PRESSURE: 132 MMHG | RESPIRATION RATE: 20 BRPM | HEART RATE: 59 BPM

## 2023-04-26 DIAGNOSIS — Z00.00 ENCOUNTER FOR MEDICARE ANNUAL WELLNESS EXAM: ICD-10-CM

## 2023-04-26 DIAGNOSIS — M00.9 INFECTION OF SHOULDER (H): ICD-10-CM

## 2023-04-26 DIAGNOSIS — F41.1 ANXIETY STATE: ICD-10-CM

## 2023-04-26 DIAGNOSIS — Z00.00 ENCOUNTER FOR PREVENTATIVE ADULT HEALTH CARE EXAMINATION: Primary | ICD-10-CM

## 2023-04-26 DIAGNOSIS — I10 ESSENTIAL HYPERTENSION, BENIGN: ICD-10-CM

## 2023-04-26 LAB
ALBUMIN SERPL BCG-MCNC: 4.7 G/DL (ref 3.5–5.2)
ALBUMIN UR-MCNC: NEGATIVE MG/DL
ALP SERPL-CCNC: 68 U/L (ref 35–104)
ALT SERPL W P-5'-P-CCNC: 20 U/L (ref 10–35)
ANION GAP SERPL CALCULATED.3IONS-SCNC: 14 MMOL/L (ref 7–15)
APPEARANCE UR: CLEAR
AST SERPL W P-5'-P-CCNC: 33 U/L (ref 10–35)
BILIRUB SERPL-MCNC: 0.4 MG/DL
BILIRUB UR QL STRIP: NEGATIVE
BUN SERPL-MCNC: 14 MG/DL (ref 8–23)
CALCIUM SERPL-MCNC: 10.2 MG/DL (ref 8.8–10.2)
CHLORIDE SERPL-SCNC: 100 MMOL/L (ref 98–107)
CHOLEST SERPL-MCNC: 171 MG/DL
COLOR UR AUTO: YELLOW
CREAT SERPL-MCNC: 0.65 MG/DL (ref 0.51–0.95)
DEPRECATED HCO3 PLAS-SCNC: 21 MMOL/L (ref 22–29)
ERYTHROCYTE [DISTWIDTH] IN BLOOD BY AUTOMATED COUNT: 12.1 % (ref 10–15)
GFR SERPL CREATININE-BSD FRML MDRD: >90 ML/MIN/1.73M2
GLUCOSE SERPL-MCNC: 92 MG/DL (ref 70–99)
GLUCOSE UR STRIP-MCNC: NEGATIVE MG/DL
HCT VFR BLD AUTO: 37.7 % (ref 35–47)
HDLC SERPL-MCNC: 74 MG/DL
HGB BLD-MCNC: 12.8 G/DL (ref 11.7–15.7)
HGB UR QL STRIP: NEGATIVE
KETONES UR STRIP-MCNC: NEGATIVE MG/DL
LDLC SERPL CALC-MCNC: 87 MG/DL
LEUKOCYTE ESTERASE UR QL STRIP: NEGATIVE
MCH RBC QN AUTO: 31.8 PG (ref 26.5–33)
MCHC RBC AUTO-ENTMCNC: 34 G/DL (ref 31.5–36.5)
MCV RBC AUTO: 94 FL (ref 78–100)
NITRATE UR QL: NEGATIVE
NONHDLC SERPL-MCNC: 97 MG/DL
PH UR STRIP: 5.5 [PH] (ref 5–7)
PLATELET # BLD AUTO: 220 10E3/UL (ref 150–450)
POTASSIUM SERPL-SCNC: 4.2 MMOL/L (ref 3.4–5.3)
PROT SERPL-MCNC: 6.9 G/DL (ref 6.4–8.3)
RBC # BLD AUTO: 4.02 10E6/UL (ref 3.8–5.2)
RBC #/AREA URNS AUTO: NORMAL /HPF
SODIUM SERPL-SCNC: 135 MMOL/L (ref 136–145)
SP GR UR STRIP: 1.01 (ref 1–1.03)
TRIGL SERPL-MCNC: 51 MG/DL
TSH SERPL DL<=0.005 MIU/L-ACNC: 0.85 UIU/ML (ref 0.3–4.2)
UROBILINOGEN UR STRIP-ACNC: 0.2 E.U./DL
WBC # BLD AUTO: 5.3 10E3/UL (ref 4–11)
WBC #/AREA URNS AUTO: NORMAL /HPF

## 2023-04-26 PROCEDURE — 80053 COMPREHEN METABOLIC PANEL: CPT | Performed by: INTERNAL MEDICINE

## 2023-04-26 PROCEDURE — G0439 PPPS, SUBSEQ VISIT: HCPCS | Performed by: INTERNAL MEDICINE

## 2023-04-26 PROCEDURE — 99213 OFFICE O/P EST LOW 20 MIN: CPT | Mod: 25 | Performed by: INTERNAL MEDICINE

## 2023-04-26 PROCEDURE — 80061 LIPID PANEL: CPT | Performed by: INTERNAL MEDICINE

## 2023-04-26 PROCEDURE — 85027 COMPLETE CBC AUTOMATED: CPT | Performed by: INTERNAL MEDICINE

## 2023-04-26 PROCEDURE — 36415 COLL VENOUS BLD VENIPUNCTURE: CPT | Performed by: INTERNAL MEDICINE

## 2023-04-26 PROCEDURE — 81001 URINALYSIS AUTO W/SCOPE: CPT | Performed by: INTERNAL MEDICINE

## 2023-04-26 PROCEDURE — 84443 ASSAY THYROID STIM HORMONE: CPT | Performed by: INTERNAL MEDICINE

## 2023-04-26 RX ORDER — AMLODIPINE BESYLATE 5 MG/1
5 TABLET ORAL DAILY
Qty: 90 TABLET | Refills: 3 | Status: SHIPPED | OUTPATIENT
Start: 2023-04-26 | End: 2024-02-23

## 2023-04-26 ASSESSMENT — ENCOUNTER SYMPTOMS
MYALGIAS: 1
FEVER: 0
WEAKNESS: 0
SHORTNESS OF BREATH: 0
HEADACHES: 1
DYSURIA: 0
PALPITATIONS: 0
JOINT SWELLING: 0
DIARRHEA: 0
NERVOUS/ANXIOUS: 1
FREQUENCY: 0
HEMATURIA: 0
CONSTIPATION: 0
ABDOMINAL PAIN: 0
BREAST MASS: 0
ARTHRALGIAS: 1
COUGH: 0
PARESTHESIAS: 1
NAUSEA: 0
DIZZINESS: 0
EYE PAIN: 0
HEMATOCHEZIA: 0
SORE THROAT: 0
CHILLS: 0
HEARTBURN: 0

## 2023-04-26 ASSESSMENT — PAIN SCALES - GENERAL: PAINLEVEL: NO PAIN (0)

## 2023-04-26 ASSESSMENT — ACTIVITIES OF DAILY LIVING (ADL): CURRENT_FUNCTION: NO ASSISTANCE NEEDED

## 2023-04-26 NOTE — PROGRESS NOTES
"SUBJECTIVE:   Denise is a 68 year old who presents for Preventive Visit.      4/26/2023     2:15 PM   Additional Questions   Roomed by Kaial   Accompanied by self         4/26/2023     2:15 PM   Patient Reported Additional Medications   Patient reports taking the following new medications no     Patient has been advised of split billing requirements and indicates understanding: Yes  Are you in the first 12 months of your Medicare coverage?  No    Healthy Habits:     In general, how would you rate your overall health?  Excellent    Frequency of exercise:  2-3 days/week    Duration of exercise:  15-30 minutes    Do you usually eat at least 4 servings of fruit and vegetables a day, include whole grains    & fiber and avoid regularly eating high fat or \"junk\" foods?  Yes    Taking medications regularly:  Yes    Medication side effects:  Not applicable    Ability to successfully perform activities of daily living:  No assistance needed    Home Safety:  No safety concerns identified    Hearing Impairment:  No hearing concerns    In the past 6 months, have you been bothered by leaking of urine?  No    In general, how would you rate your overall mental or emotional health?  Good      PHQ-2 Total Score: 1    Additional concerns today:  No      Have you ever done Advance Care Planning? (For example, a Health Directive, POLST, or a discussion with a medical provider or your loved ones about your wishes): Yes, advance care planning is on file.       Fall risk  Fallen 2 or more times in the past year?: No  Any fall with injury in the past year?: No    Cognitive Screening   1) Repeat 3 items (Leader, Season, Table)    2) Clock draw: NORMAL  3) 3 item recall: Recalls 3 objects  Results: 3 items recalled: COGNITIVE IMPAIRMENT LESS LIKELY    Mini-CogTM Copyright [employer]. Licensed by the author for use in [facility]; reprinted with permission ([email or web address]). All rights reserved.      Do you have sleep apnea, excessive " snoring or daytime drowsiness?: no    Reviewed and updated as needed this visit by clinical staff   Tobacco  Allergies  Meds              Reviewed and updated as needed this visit by Provider                 Social History     Tobacco Use     Smoking status: Former     Packs/day: 0.50     Years: 25.00     Pack years: 12.50     Types: Cigarettes     Quit date: 10/1/1997     Years since quittin.5     Smokeless tobacco: Never     Tobacco comments:     quit    Vaping Use     Vaping status: Never Used     Passive vaping exposure: Yes   Substance Use Topics     Alcohol use: Yes     Alcohol/week: 1.7 standard drinks of alcohol     Types: 2 Glasses of wine per week     Comment: 2 glasses of wine daily             2023     9:11 AM   Alcohol Use   Prescreen: >3 drinks/day or >7 drinks/week? No     Do you have a current opioid prescription? No  Do you use any other controlled substances or medications that are not prescribed by a provider? None          PROBLEMS TO ADD ON...  Has h/o HTN. on medical treatment. BP has been controlled. No side effects from medications. No CP, HA, dizziness. good compliance with medications and low salt diet.  Has h/o anxiety, insomnia, on PRN Xanax and Trazodone, helping with symptoms, no side effects.   Has h/o chronic shoulder infection, on suppressive treatment with Doxycycline. Follows with ortho.     Current providers sharing in care for this patient include:   Patient Care Team:  Juan F Smallwood MD as PCP - General (Internal Medicine)  Juan F Smallwood MD as Assigned PCP   DR Isaiah Ha [facility] (glaucoma) opthalmologist    The following health maintenance items are reviewed in Epic and correct as of today:  Health Maintenance   Topic Date Due     LUNG CANCER SCREENING  Never done     PHQ-9  2022     ANNUAL REVIEW OF HM ORDERS  2023     MEDICARE ANNUAL WELLNESS VISIT  2023     FALL RISK ASSESSMENT  2024     COLORECTAL CANCER  SCREENING  12/23/2024     MAMMO SCREENING  04/12/2025     LIPID  04/07/2027     ADVANCE CARE PLANNING  04/07/2027     DTAP/TDAP/TD IMMUNIZATION (3 - Td or Tdap) 01/31/2030     DEXA  04/13/2037     DEPRESSION ACTION PLAN  Completed     INFLUENZA VACCINE  Completed     Pneumococcal Vaccine: 65+ Years  Completed     ZOSTER IMMUNIZATION  Completed     COVID-19 Vaccine  Completed     IPV IMMUNIZATION  Aged Out     MENINGITIS IMMUNIZATION  Aged Out     HEPATITIS C SCREENING  Discontinued     Lab work is in process  Labs reviewed in [facility]  Mammogram Screening: Mammogram Screening: Recommended mammography every 1-2 years with patient discussion and risk factor consideration    FHS-7:       12/28/2021     9:44 AM 4/1/2022     8:54 AM 4/7/2022     3:15 PM 4/11/2022     1:25 PM 4/12/2023    12:45 PM 4/19/2023     9:12 AM   Breast CA Risk Assessment (FHS-7)   Did any of your first-degree relatives have breast or ovarian cancer? Unknown Yes Yes Yes Yes Yes   Did any of your relatives have bilateral breast cancer? Unknown Unknown Unknown No No Unknown   Did any man in your family have breast cancer? No Unknown Unknown No No No   Did any woman in your family have breast and ovarian cancer? Yes Yes Yes No No Yes   Did any woman in your family have breast cancer before age 50 y? No No No No No No   Do you have 2 or more relatives with breast and/or ovarian cancer? No No No No No No   Do you have 2 or more relatives with breast and/or bowel cancer? No No No No No No     click delete button to remove this line now  Mammogram Screening: Recommended mammography every 1-2 years with patient discussion and risk factor consideration  Pertinent mammograms are reviewed under the imaging tab.    Review of Systems   Constitutional: Negative for chills and fever.   HENT: Negative for congestion, ear pain, hearing loss and sore throat.    Eyes: Negative for pain and visual disturbance.   Respiratory: Negative for cough and shortness of breath.   "  Cardiovascular: Negative for chest pain, palpitations and peripheral edema.   Gastrointestinal: Negative for abdominal pain, constipation, diarrhea, heartburn, hematochezia and nausea.   Breasts:  Negative for tenderness, breast mass and discharge.   Genitourinary: Negative for dysuria, frequency, genital sores, hematuria, pelvic pain, urgency, vaginal bleeding and vaginal discharge.   Musculoskeletal: Positive for arthralgias and myalgias. Negative for joint swelling.   Skin: Negative for rash.   Neurological: Positive for headaches and paresthesias. Negative for dizziness and weakness.   Psychiatric/Behavioral: Negative for mood changes. The patient is nervous/anxious.          OBJECTIVE:   BP (!) 140/78 (BP Location: Left arm, Patient Position: Sitting, Cuff Size: Adult Regular)   Pulse 59   Temp 98.6  F (37  C) (Oral)   Resp 20   Ht 1.645 m (5' 4.75\")   Wt 67.2 kg (148 lb 3.2 oz)   LMP  (LMP Unknown)   SpO2 98%   BMI 24.85 kg/m   Estimated body mass index is 24.85 kg/m  as calculated from the following:    Height as of this encounter: 1.645 m (5' 4.75\").    Weight as of this encounter: 67.2 kg (148 lb 3.2 oz).  Physical Exam  GENERAL: healthy, alert and no distress  EYES: Eyes grossly normal to inspection, PERRL and conjunctivae and sclerae normal  HENT: ear canals and TM's normal, nose and mouth without ulcers or lesions  NECK: no adenopathy, no asymmetry, masses, or scars and thyroid normal to palpation  RESP: lungs clear to auscultation - no rales, rhonchi or wheezes  CV: regular rate and rhythm, normal S1 S2, no S3 or S4, no murmur, click or rub, no peripheral edema and peripheral pulses strong  ABDOMEN: soft, nontender, no hepatosplenomegaly, no masses and bowel sounds normal  MS: no gross musculoskeletal defects noted, no edema  SKIN: no suspicious lesions or rashes  NEURO: Normal strength and tone, mentation intact and speech normal  PSYCH: mentation appears normal, affect " normal/bright    Diagnostic Test Results:  Labs reviewed in Epic    ASSESSMENT / PLAN:       ICD-10-CM    1. Encounter for preventative adult health care examination  Z00.00 Lipid panel reflex to direct LDL Fasting     CBC with platelets     Comprehensive metabolic panel (BMP + Alb, Alk Phos, ALT, AST, Total. Bili, TP)     TSH with free T4 reflex     UA with Microscopic reflex to Culture - lab collect      2. Essential hypertension, benign  I10 Lipid panel reflex to direct LDL Fasting     CBC with platelets     Comprehensive metabolic panel (BMP + Alb, Alk Phos, ALT, AST, Total. Bili, TP)     TSH with free T4 reflex     UA with Microscopic reflex to Culture - lab collect      3. Anxiety state  F41.1         Assess lab work   Continue treatment  Controlled HTN  Controlled anxiety     Patient has been advised of split billing requirements and indicates understanding: Yes      COUNSELING:  Reviewed preventive health counseling, as reflected in patient instructions       Regular exercise       Healthy diet/nutrition       Vision screening       Hearing screening       Dental care       Colon cancer screening        She reports that she quit smoking about 25 years ago. She has a 12.50 pack-year smoking history. She has never used smokeless tobacco.      Appropriate preventive services were discussed with this patient, including applicable screening as appropriate for cardiovascular disease, diabetes, osteopenia/osteoporosis, and glaucoma.  As appropriate for age/gender, discussed screening for colorectal cancer, prostate cancer, breast cancer, and cervical cancer. Checklist reviewing preventive services available has been given to the patient.    Reviewed patients plan of care and provided an AVS. The Intermediate Care Plan ( asthma action plan, low back pain action plan, and migraine action plan) for Denise meets the Care Plan requirement. This Care Plan has been established and reviewed with the Patient.          Juan F  LASHA Smallwood MD  [facility]    Identified Health Risks:    I have reviewed Opioid Use Disorder and Substance Use Disorder risk factors and made any needed referrals.

## 2023-04-26 NOTE — PATIENT INSTRUCTIONS
Patient Education   Personalized Prevention Plan  You are due for the preventive services outlined below.  Your care team is available to assist you in scheduling these services.  If you have already completed any of these items, please share that information with your care team to update in your medical record.  Health Maintenance Due   Topic Date Due     LUNG CANCER SCREENING  Never done     Depression Assessment  02/19/2022

## 2023-04-27 ENCOUNTER — MYC MEDICAL ADVICE (OUTPATIENT)
Dept: INTERNAL MEDICINE | Facility: CLINIC | Age: 68
End: 2023-04-27
Payer: COMMERCIAL

## 2023-05-27 DIAGNOSIS — F51.01 PRIMARY INSOMNIA: ICD-10-CM

## 2023-05-31 RX ORDER — TRAZODONE HYDROCHLORIDE 100 MG/1
TABLET ORAL
Qty: 90 TABLET | Refills: 1 | Status: SHIPPED | OUTPATIENT
Start: 2023-05-31 | End: 2023-11-02

## 2023-05-31 NOTE — TELEPHONE ENCOUNTER
Pending Prescriptions:                       Disp   Refills    traZODone (DESYREL) 100 MG tablet [Pharmac*90 tab*1        Sig: TAKE 1 TABLET BY MOUTH EVERYDAY AT BEDTIME    Routing refill request to provider for review/approval because:  Prescription is in Vicky Adame's name and she is no longer within the organization.  Per the medication refill policy, the refill request is to be sent to the covering provider for review and recommendation.  Most recent office visit was with Dr. Smallwood.    Please advise, thanks.

## 2023-07-07 DIAGNOSIS — R09.81 CONGESTION OF PARANASAL SINUS: ICD-10-CM

## 2023-07-07 DIAGNOSIS — R09.89 RUNNY NOSE: ICD-10-CM

## 2023-07-07 DIAGNOSIS — I10 HYPERTENSION GOAL BP (BLOOD PRESSURE) < 140/80: ICD-10-CM

## 2023-07-10 RX ORDER — ATENOLOL 50 MG/1
50 TABLET ORAL 2 TIMES DAILY
Qty: 180 TABLET | Refills: 3 | Status: SHIPPED | OUTPATIENT
Start: 2023-07-10 | End: 2024-09-05

## 2023-07-10 RX ORDER — FLUTICASONE PROPIONATE 50 MCG
SPRAY, SUSPENSION (ML) NASAL
Qty: 16 ML | Refills: 11 | Status: SHIPPED | OUTPATIENT
Start: 2023-07-10 | End: 2023-11-07

## 2023-07-13 ENCOUNTER — NURSE TRIAGE (OUTPATIENT)
Dept: INTERNAL MEDICINE | Facility: CLINIC | Age: 68
End: 2023-07-13
Payer: COMMERCIAL

## 2023-07-13 NOTE — TELEPHONE ENCOUNTER
"Call received from patient stating she has been having \"cramps\" in her right breast. Pain is near the nipple. Pain started yesterday and is increased today. Denies tenderness to touch, visible abnormalities and discharge. Patient had shoulder surgery on that same side last was in 1/2022. Patient had a mammogram last in April. Advised appointment. Scheduled.     Reason for Disposition    Breast pain and cause is not known    Additional Information    Negative: Chest pain    Negative: Breastfeeding questions about baby    Negative: Breastfeeding questions about mother (breast symptoms or feeling sick)    Negative: Breastfeeding questions about mother's medicines and diet    Negative: Postpartum breast pain and swelling, not breastfeeding    Negative: Small spot, skin growth or mole    Negative: SEVERE breast pain and fever > 103 F  (39.4 C)    Negative: Patient sounds very sick or weak to the triager    Negative: Breast looks infected (spreading redness, feels hot or painful to touch) and fever    Negative: Breast looks infected (spreading redness, feels hot or painful to touch) and no fever    Negative: Painful rash and multiple small blisters grouped together (i.e., dermatomal distribution or \"band\" or \"stripe\")    Negative: Cuts, burns, or bruises of breasts and suspicious history for the injury    Negative: Breast lump    Negative: Nipple discharge and bloody    Negative: Nipple is inverted (i.e., points inward)  (Exception: long-term physical characteristic, present for many years)    Negative: Dry flaking-peeling skin of nipple    Negative: Change in shape or appearance of breast    Negative: Dimpling of skin of breast (i.e., skin looks like the outside of an orange peel)    Negative: Patient wants to be seen    Negative: Breast tenderness and fullness and pregnant    Negative: Nipple discharge and not bloody (e.g., clear, white, yellow, brown, green)    Negative: Breast pain or tenderness that occurs monthly " before menstrual period, and has NOT been evaluated by a doctor (or NP/PA)    Negative: Red, moist, irritated skin underneath breasts (in women with larger breasts)    Negative: Breast implants and pain, asymmetry, or other concerns    Negative: Breast tenderness and fullness and pregnancy suspected or possible    Negative: Breast pain or tenderness and occurs monthly before menstrual period    Negative: Screening tests for breast cancer, questions about    Protocols used: BREAST SYMPTOMS-A-[place]

## 2023-08-27 NOTE — INTERVAL H&P NOTE
I have reviewed the surgical (or preoperative) H&P that is linked to this encounter, and examined the patient. There are no significant changes   (2) cough or sneeze

## 2023-08-30 NOTE — TELEPHONE ENCOUNTER
How many days on the cruise?   Arava Counseling:  Patient counseled regarding adverse effects of Arava including but not limited to nausea, vomiting, abnormalities in liver function tests. Patients may develop mouth sores, rash, diarrhea, and abnormalities in blood counts. The patient understands that monitoring is required including LFTs and blood counts.  There is a rare possibility of scarring of the liver and lung problems that can occur when taking methotrexate. Persistent nausea, loss of appetite, pale stools, dark urine, cough, and shortness of breath should be reported immediately. Patient advised to discontinue Arava treatment and consult with a physician prior to attempting conception. The patient will have to undergo a treatment to eliminate Arava from the body prior to conception.

## 2023-09-19 ENCOUNTER — TELEPHONE (OUTPATIENT)
Dept: INTERNAL MEDICINE | Facility: CLINIC | Age: 68
End: 2023-09-19
Payer: COMMERCIAL

## 2023-09-19 ENCOUNTER — NURSE TRIAGE (OUTPATIENT)
Dept: INTERNAL MEDICINE | Facility: CLINIC | Age: 68
End: 2023-09-19
Payer: COMMERCIAL

## 2023-09-19 NOTE — TELEPHONE ENCOUNTER
Pt already spoke with Triage nurse this AM. See the other encounter.   Tested Positive this AM.   Symptoms started yesterday.   Temperature, 101 was the highest, this AM was 100. Taking Tylenol and Advil.       Will route to COVID team.

## 2023-09-19 NOTE — TELEPHONE ENCOUNTER
Patient tested positive to covid on a home test today  Symptoms are body aches, fever, sinus drainage, cough  Patient uses [facility] [address] in [facility] to call and lm [phone]

## 2023-09-19 NOTE — CONFIDENTIAL NOTE
"Rn called patient.  Patient declined triage for paxlovid protocol.  Patient stated they will \"Just  some Delsym\".    RN provided patient with Scheduling Line should patient change their mind.  RN Informed patient that PCP stated patient would require a Virtual Visit to receive Paxlovid Rx.  RN Informed patient about 5 day window for receiving Paxlovid.    Ernesto Gonzalez RN  "

## 2023-09-19 NOTE — TELEPHONE ENCOUNTER
The patient states she has a cough and has body aches.     Patient is asking what kind of cough syrup she can take with her history of high blood pressure     Advised delsym- has to be labeled BP safe- can ask pharmacist.   Home covid test was negative yesterday.     Advised to reset every 24 hours for Covid if + call back if she would like antiviral treatment.     No SOB   cough is nonproductive   No chest pain     Advised to call triage line for new, persistent or worsening symptoms.

## 2023-09-20 ENCOUNTER — VIRTUAL VISIT (OUTPATIENT)
Dept: URGENT CARE | Facility: CLINIC | Age: 68
End: 2023-09-20
Payer: COMMERCIAL

## 2023-09-20 DIAGNOSIS — U07.1 COVID-19: Primary | ICD-10-CM

## 2023-09-20 PROCEDURE — 99213 OFFICE O/P EST LOW 20 MIN: CPT | Mod: 95

## 2023-09-20 NOTE — PROGRESS NOTES
Denise is a 68 year old who is being evaluated via a billable telephone visit.      What phone number would you like to be contacted at? [phone]  How would you like to obtain your AVS? Ran    Distant Location (provider location):  Off-site    Assessment & Plan     (U07.1) COVID-19  (primary encounter diagnosis)    PLAN:  Rx paxlovid  Normal gfr  1/2 amlodipine    [facility]  Virtual Urgent Care  [facility] VIRTUAL URGENT CARE    Subjective   Denise is a 68 year old, presenting for the following health issues:  COVID TX    HPI     Dx covid yesterday  Sx started 2 days ago and consist of sore throat aches chills fatigue  No sob wheezing chest pain  Hydrated  Taking advil      Objective           Vitals:  No vitals were obtained today due to virtual visit.    Physical Exam   healthy, alert, and no distress  PSYCH: Alert and oriented times 3; coherent speech, normal   rate and volume, able to articulate logical thoughts, able   to abstract reason, no tangential thoughts, no hallucinations   or delusions  Her affect is normal  RESP: No cough, no audible wheezing, able to talk in full sentences  Remainder of exam unable to be completed due to telephone visits      Phone call duration: 25 minutes

## 2023-09-22 ENCOUNTER — TELEPHONE (OUTPATIENT)
Dept: INTERNAL MEDICINE | Facility: CLINIC | Age: 68
End: 2023-09-22
Payer: COMMERCIAL

## 2023-09-22 NOTE — TELEPHONE ENCOUNTER
Patient calls to say that she is having severe nausea with her covid and wondering if something could be sent for her. Patient has not tried anything for nausea. Please advise.    Thank you,  Eliud Tesfaye, Triage RN [facility]  4:24 PM 9/22/2023

## 2023-09-25 ENCOUNTER — E-VISIT (OUTPATIENT)
Dept: INTERNAL MEDICINE | Facility: CLINIC | Age: 68
End: 2023-09-25
Payer: COMMERCIAL

## 2023-09-25 DIAGNOSIS — J06.9 VIRAL URI WITH COUGH: Primary | ICD-10-CM

## 2023-09-25 PROCEDURE — 99421 OL DIG E/M SVC 5-10 MIN: CPT | Performed by: INTERNAL MEDICINE

## 2023-09-25 RX ORDER — BENZONATATE 200 MG/1
200 CAPSULE ORAL 3 TIMES DAILY PRN
Qty: 30 CAPSULE | Refills: 0 | Status: SHIPPED | OUTPATIENT
Start: 2023-09-25 | End: 2023-11-07

## 2023-09-25 RX ORDER — CODEINE PHOSPHATE AND GUAIFENESIN 10; 100 MG/5ML; MG/5ML
1-2 SOLUTION ORAL EVERY 4 HOURS PRN
Qty: 118 ML | Refills: 0 | Status: SHIPPED | OUTPATIENT
Start: 2023-09-25 | End: 2023-11-07

## 2023-09-25 NOTE — TELEPHONE ENCOUNTER
Call to patient to relay provider message. Patient says nausea is better. Patient says she is having the lingering cough, but doesn't want Codeine medication. Patient says she doesn't want to give anyone COVID so she doesn't want to go in a clinic to get others sick. Advised to patient she do a virtual visit or evisit. Sent patient evisit link in [facility].    Thank you,  Eliud Tesfaye, Triage RN [facility]  3:17 PM 9/25/2023

## 2023-09-25 NOTE — PATIENT INSTRUCTIONS
Thank you for choosing us for your care. I have placed an order for a prescription so that you can start treatment. View your full visit summary for details by clicking on the link below. Your pharmacist will able to address any questions you may have about the medication.     If you're not feeling better within 5-7 days, please schedule an appointment.  You can schedule an appointment right here in [facility], or call [phone]  If the visit is for the same symptoms as your eVisit, we'll refund the cost of your eVisit if seen within seven days.

## 2023-10-03 DIAGNOSIS — F41.1 ANXIETY STATE: ICD-10-CM

## 2023-10-03 RX ORDER — ALPRAZOLAM 0.25 MG
TABLET ORAL
Qty: 30 TABLET | Refills: 1 | Status: SHIPPED | OUTPATIENT
Start: 2023-10-03 | End: 2024-05-07

## 2023-10-05 ENCOUNTER — TRANSFERRED RECORDS (OUTPATIENT)
Dept: HEALTH INFORMATION MANAGEMENT | Facility: CLINIC | Age: 68
End: 2023-10-05
Payer: COMMERCIAL

## 2023-10-26 ENCOUNTER — TRANSFERRED RECORDS (OUTPATIENT)
Dept: HEALTH INFORMATION MANAGEMENT | Facility: CLINIC | Age: 68
End: 2023-10-26
Payer: COMMERCIAL

## 2023-10-31 ENCOUNTER — MEDICAL CORRESPONDENCE (OUTPATIENT)
Dept: HEALTH INFORMATION MANAGEMENT | Facility: CLINIC | Age: 68
End: 2023-10-31
Payer: COMMERCIAL

## 2023-11-02 DIAGNOSIS — F51.01 PRIMARY INSOMNIA: ICD-10-CM

## 2023-11-02 RX ORDER — TRAZODONE HYDROCHLORIDE 100 MG/1
TABLET ORAL
Qty: 90 TABLET | Refills: 1 | Status: SHIPPED | OUTPATIENT
Start: 2023-11-02 | End: 2024-05-08

## 2023-11-07 ENCOUNTER — OFFICE VISIT (OUTPATIENT)
Dept: INTERNAL MEDICINE | Facility: CLINIC | Age: 68
End: 2023-11-07
Payer: COMMERCIAL

## 2023-11-07 VITALS
OXYGEN SATURATION: 97 % | RESPIRATION RATE: 14 BRPM | SYSTOLIC BLOOD PRESSURE: 132 MMHG | HEART RATE: 66 BPM | HEIGHT: 65 IN | WEIGHT: 150 LBS | BODY MASS INDEX: 24.99 KG/M2 | TEMPERATURE: 98.1 F | DIASTOLIC BLOOD PRESSURE: 78 MMHG

## 2023-11-07 DIAGNOSIS — M25.511 CHRONIC RIGHT SHOULDER PAIN: ICD-10-CM

## 2023-11-07 DIAGNOSIS — Z01.818 PRE-OP EXAM: Primary | ICD-10-CM

## 2023-11-07 DIAGNOSIS — G89.29 CHRONIC RIGHT SHOULDER PAIN: ICD-10-CM

## 2023-11-07 DIAGNOSIS — I10 HYPERTENSION GOAL BP (BLOOD PRESSURE) < 140/80: ICD-10-CM

## 2023-11-07 PROCEDURE — 99214 OFFICE O/P EST MOD 30 MIN: CPT | Mod: 25 | Performed by: INTERNAL MEDICINE

## 2023-11-07 PROCEDURE — 93000 ELECTROCARDIOGRAM COMPLETE: CPT | Performed by: INTERNAL MEDICINE

## 2023-11-07 RX ORDER — RESPIRATORY SYNCYTIAL VIRUS VACCINE 120MCG/0.5
0.5 KIT INTRAMUSCULAR ONCE
Qty: 1 EACH | Refills: 0 | Status: CANCELLED | OUTPATIENT
Start: 2023-11-07 | End: 2023-11-07

## 2023-11-07 NOTE — PROGRESS NOTES
[place] [address] [address]  [address]  [facility] [address]  Phone: [phone]  Primary Provider: Juan F Smallwood  Pre-op Performing Provider: YOBANI CROOKS      PREOPERATIVE EVALUATION:  Today's date: 11/7/2023    Denise is a 68 year old female who presents for a preoperative evaluation.      11/7/2023    10:35 AM   Additional Questions   Roomed by Jaz DIMAS CMA       Surgical Information:  Surgery/Procedure: right reverse total shoulder arthroplasty  Surgery Location: [facility]  Surgeon: Umesh Berman  Surgery Date: 12/05/2023  Time of Surgery: 1310  Where patient plans to recover: At home with family  Fax number for surgical facility: Note does not need to be faxed, will be available electronically in Epic.    Assessment & Plan     The proposed surgical procedure is considered INTERMEDIATE risk.    (Z01.818) Pre-op exam  (primary encounter diagnosis)  Comment:  Satisfactory operative candidate with anesthesia as required.   Plan: EKG 12-lead complete w/read - Clinics          (M25.511,  G89.29) Chronic right shoulder pain  Comment: Reason for surgery.     (I10) Hypertension goal BP (blood pressure) < 140/80  Comment: BP at target. Continue current meds.       RECOMMENDATION:  APPROVAL GIVEN to proceed with proposed procedure, without further diagnostic evaluation.    Patient Instructions   Everything looks fine to go ahead with surgery as planned.   Take usual meds (amlodipine and atenolol) with sips of water on the morning of surgery.      See Dr Smallwood as planned in around May 2024 for Annual Wellness Visit.      Subjective       HPI related to upcoming procedure: Had Covid-19 in September 2023, has recovered completely. No more recent acute illness symptoms.         11/3/2023    10:18 AM   Preop Questions   1. Have you ever had a heart attack or stroke? No   2. Have you ever had surgery on your heart or blood vessels, such as a stent placement, a  coronary artery bypass, or surgery on an artery in your head, neck, heart, or legs? No   3. Do you have chest pain with activity? No   4. Do you have a history of  heart failure? No   5. Do you currently have a cold, bronchitis or symptoms of other infection? No   6. Do you have a cough, shortness of breath, or wheezing? No   7. Do you or anyone in your family have previous history of blood clots? No   8. Do you or does anyone in your family have a serious bleeding problem such as prolonged bleeding following surgeries or cuts? No   9. Have you ever had problems with anemia or been told to take iron pills? No   10. Have you had any abnormal blood loss such as black, tarry or bloody stools, or abnormal vaginal bleeding? No   11. Have you ever had a blood transfusion? No   12. Are you willing to have a blood transfusion if it is medically needed before, during, or after your surgery? Yes   13. Have you or any of your relatives ever had problems with anesthesia? No   14. Do you have sleep apnea, excessive snoring or daytime drowsiness? No   15. Do you have any artifical heart valves or other implanted medical devices like a pacemaker, defibrillator, or continuous glucose monitor? No   16. Do you have artificial joints? No   17. Are you allergic to latex? No       Health Care Directive:  Patient has a Health Care Directive on file      Preoperative Review of :   reviewed - controlled substances prescribed by other outside provider(s).      Review of Systems  REVIEW OF SYSTEMS: The following systems have been completely reviewed and are negative except as noted above:   Constitutional, HEENT, respiratory, cardiovascular, gastrointestinal, genitourinary, musculoskeletal, dermatologic, hematologic, endocrine, psychiatric, and neurologic systems.      Patient Active Problem List    Diagnosis Date Noted    Septic arthritis of shoulder, right (H) 08/14/2021     Priority: High    Acute pain of right shoulder 08/13/2021      Priority: Medium    Controlled substance agreement signed 10/18/2017     Priority: Medium    Tension headache 10/18/2017     Priority: Medium    S/P lumbar fusion 08/03/2017     Priority: Medium    Glaucoma 12/05/2014     Priority: Medium    Advanced directives, counseling/discussion 10/16/2012     Priority: Medium     Discussed Advance Directive planning with patient; information given to patient to review.        Basal cell carcinoma 08/01/2009     Priority: Medium    Squamous cell carcinoma 04/01/2007     Priority: Medium    Essential hypertension, benign 09/07/2006     Priority: Medium    Anxiety state 08/09/2004     Priority: Medium      Past Medical History:   Diagnosis Date    Anxiety     Gets panic attacks    Complication of anesthesia     Depressive disorder, not elsewhere classified     Essential hypertension, benign     No cardiologist    Irritable bowel syndrome     has had neg colonoscopy & EGD w/u    Lumbar degenerative disc disease 1996    Had PT, traction, no surgery    Other chronic pain     JOint pain for many years.    PONV (postoperative nausea and vomiting)     Sciatica 03/2006    R thigh;  MRI = R L2-3 disc     Septic arthritis of shoulder, right (H) 08/14/2021    Unspecified glaucoma(365.9)      Past Surgical History:   Procedure Laterality Date    BIOPSY  1/2022    infection in right shoulder    COLONOSCOPY  2014    Clear    EYE SURGERY Bilateral     Treatment of glaucoma    HYSTERECTOMY      HYSTERECTOMY VAGINAL      IRRIGATION AND DEBRIDEMENT UPPER EXTREMITY, COMBINED Right 10/02/2020    Procedure: Right shoulder arthroscopic extensive debridement of the glenohumeral joint and subacromial space for infection.;  Surgeon: Umesh Berman MD;  Location: [facility] OR    LAPAROSCOPIC CHOLECYSTECTOMY  2002    with repeat operation because of bile leak    Left shoulder decomp surgery for impingement  approx 1993    OPTICAL TRACKING SYSTEM FUSION POSTERIOR SPINE LUMBAR N/A 08/03/2017    Procedure:  OPTICAL TRACKING SYSTEM FUSION SPINE POSTERIOR LUMBAR ONE LEVEL;  1. L4 Ang-Meyers osteotomy with exposure of the L4 and L5 roots  2. L4-5 complete discectomy with arthrodesis  3. Placement of Globus Creo GOMEZ pedicle screws bilaterally from L4 to L5  4. L4 - L5 posterior lateral fusion with placement of Medtronic Magnifuse and locally harvested autologous bone  5. Use of Stealth and O    OPTICAL TRACKING SYSTEM FUSION POSTERIOR SPINE LUMBAR N/A 06/11/2021    Procedure: Reopening of previous incision with L3-4 decompressive laminectomies with facetectomies and decompression  L3-4 complete discectomy with arthrodesis and placement of a 9 x 28 mm Globus Caliber expandable interbody cage with locally harvested autologous bone placed centrally and anterior to the graft Placement/replacment of Globus Creo pedicle screws from L3-5 bilaterally  L3-4 posterior     ORTHOPEDIC SURGERY Right 10/2020    I & D shoulder    WRIST SURGERY Left      Current Outpatient Medications   Medication Sig Dispense Refill    ALPRAZolam (XANAX) 0.25 MG tablet TAKE 1 TABLET BY MOUTH THREE TIMES A DAY AS NEEDED FOR ANXIETY 30 tablet 1    amLODIPine (NORVASC) 5 MG tablet Take 1 tablet (5 mg) by mouth daily 90 tablet 3    atenolol (TENORMIN) 50 MG tablet Take 1 tablet (50 mg) by mouth 2 times daily 180 tablet 3    latanoprost (XALATAN) 0.005 % ophthalmic solution Place 1 drop into both eyes At Bedtime      multivitamin w/minerals (MULTI-VITAMIN) tablet       Probiotic Product (PROBIOTIC DAILY PO) Take by mouth daily Probiotic 90 billion units with Fiber 1 PO daily      timolol maleate (TIMOPTIC) 0.5 % ophthalmic solution Place 1 drop Into the left eye every morning      traZODone (DESYREL) 100 MG tablet TAKE 1 TABLET BY MOUTH EVERYDAY AT BEDTIME 90 tablet 1    vitamin D3 (CHOLECALCIFEROL) 50 mcg (2000 units) tablet Take 1 tablet by mouth daily      benzonatate (TESSALON) 200 MG capsule Take 1 capsule (200 mg) by mouth 3 times daily as needed for  "cough 30 capsule 0    doxycycline hyclate (VIBRAMYCIN) 100 MG capsule TAKE 1 CAPSULE BY MOUTH EVERY 12 HOURS      fluticasone (FLONASE) 50 MCG/ACT nasal spray INSTILL 1 SPRAY INTO BOTH NOSTRILS DAILY Strength: 50 MCG/ACT 16 mL 11    guaiFENesin-codeine (ROBITUSSIN AC) 100-10 MG/5ML solution Take 5-10 mLs by mouth every 4 hours as needed for cough 118 mL 0       Allergies   Allergen Reactions    Erythromycin Shortness Of Breath    Msir [Morphine Sulfate] Nausea     Intollerant to Morphine Sulfate: Nausea,vomiting    Buchu-Cornsilk-Ch Grass-Hydran      Patient states she gets sick and loses weight from diuretics.    Dilaudid [Hydromorphone] Visual Disturbance     Hallucinations    Lisinopril      Cough      Losartan      Tongue felt weird      Metoclopramide Hcl Other (See Comments)     Muscle spasms in throat    Penicillins Itching        Social History     Tobacco Use    Smoking status: Former     Packs/day: 0.50     Years: 23.00     Additional pack years: 0.00     Total pack years: 11.50     Types: Cigarettes     Start date: 3/1/1972     Quit date: 10/1/1997     Years since quittin.1    Smokeless tobacco: Never    Tobacco comments:     quit    Substance Use Topics    Alcohol use: Yes     Alcohol/week: 1.7 standard drinks of alcohol     Types: 2 Glasses of wine per week     Comment: 2 glasses of wine daily     Family History   Problem Relation Age of Onset    Cardiovascular Mother         / mi    Breast Cancer Mother         diagnosed age 60's    Diabetes Mother     Hypertension Mother     Hyperlipidemia Mother     Respiratory Father         pulmonary fibrosis.    Cardiovascular Brother          of MI age 34 2nd to Cocaine Use    Cancer - colorectal No family hx of      History   Drug Use No         Objective     /78 (BP Location: Right arm, Patient Position: Sitting, Cuff Size: Adult Large)   Pulse 66   Temp 98.1  F (36.7  C) (Oral)   Resp 14   Ht 1.645 m (5' 4.76\")   Wt 68 kg (150 " lb)   LMP  (LMP Unknown)   SpO2 97%   Breastfeeding No   BMI 25.14 kg/m      Physical Exam  GENERAL APPEARANCE: healthy, alert and no distress  HENT: nose and mouth without ulcers or lesions and wax in both canals, TM's obscured  RESP: lungs clear to auscultation - no rales, rhonchi or wheezes  CV: regular rate and rhythm, normal S1 S2, no S3 or S4 and no murmur, click or rub   ABDOMEN: soft, nontender, no HSM or masses and bowel sounds normal  MS: extremities normal- no gross deformities noted  NEURO: Normal strength and tone, sensory exam grossly normal, mentation intact and speech normal  PSYCH: mentation appears normal and affect normal/bright    Recent Labs   Lab Test 04/26/23  1514 04/07/22  1618   HGB 12.8 12.6    251   * 133   POTASSIUM 4.2 4.2   CR 0.65 0.55        Diagnostics:  No labs were ordered during this visit.   EKG: sinus bradycardia, normal axis, normal intervals, no acute ST/T changes c/w ischemia, no LVH by voltage criteria, unchanged from previous tracings    Revised Cardiac Risk Index (RCRI):  The patient has the following serious cardiovascular risks for perioperative complications:   - No serious cardiac risks = 0 points     RCRI Interpretation: 0 points: Class I (very low risk - 0.4% complication rate)         Signed Electronically by: Israel Camargo MD,   Copy of this evaluation report is provided to requesting physician.

## 2023-11-07 NOTE — PATIENT INSTRUCTIONS
Everything looks fine to go ahead with surgery as planned.   Take usual meds (amlodipine and atenolol) with sips of water on the morning of surgery.      See Dr Smallwood as planned in around May 2024 for Annual Wellness Visit.

## 2023-11-09 DIAGNOSIS — M00.9 INFECTION OF SHOULDER (H): ICD-10-CM

## 2023-11-09 DIAGNOSIS — Z47.89 AFTERCARE FOLLOWING SURGERY OF THE MUSCULOSKELETAL SYSTEM: Primary | ICD-10-CM

## 2023-11-28 RX ORDER — CYCLOBENZAPRINE HCL 5 MG
TABLET ORAL
COMMUNITY
Start: 2023-10-13 | End: 2024-04-29

## 2023-11-28 RX ORDER — HYDROCODONE BITARTRATE AND ACETAMINOPHEN 5; 325 MG/1; MG/1
TABLET ORAL
Status: ON HOLD | COMMUNITY
Start: 2023-10-13 | End: 2023-12-05

## 2023-11-28 RX ORDER — ONDANSETRON 4 MG/1
TABLET, FILM COATED ORAL
COMMUNITY
Start: 2023-10-13 | End: 2024-04-29

## 2023-11-29 ENCOUNTER — LAB (OUTPATIENT)
Dept: LAB | Facility: CLINIC | Age: 68
End: 2023-11-29
Attending: ORTHOPAEDIC SURGERY
Payer: COMMERCIAL

## 2023-11-29 ENCOUNTER — HOSPITAL ENCOUNTER (OUTPATIENT)
Dept: GENERAL RADIOLOGY | Facility: CLINIC | Age: 68
Discharge: HOME | End: 2023-11-29
Attending: ORTHOPAEDIC SURGERY
Payer: COMMERCIAL

## 2023-11-29 VITALS — OXYGEN SATURATION: 98 % | HEART RATE: 62 BPM | DIASTOLIC BLOOD PRESSURE: 82 MMHG | SYSTOLIC BLOOD PRESSURE: 166 MMHG

## 2023-11-29 DIAGNOSIS — M00.9 INFECTION OF SHOULDER (H): ICD-10-CM

## 2023-11-29 DIAGNOSIS — Z47.89 AFTERCARE FOLLOWING SURGERY OF THE MUSCULOSKELETAL SYSTEM: ICD-10-CM

## 2023-11-29 LAB
ACANTHOCYTES BLD QL SMEAR: NORMAL
APPEARANCE FLD: ABNORMAL
AUER BODIES BLD QL SMEAR: NORMAL
BASO STIPL BLD QL SMEAR: NORMAL
BASOPHILS # BLD AUTO: 0 10E3/UL (ref 0–0.2)
BASOPHILS NFR BLD AUTO: 1 %
BITE CELLS BLD QL SMEAR: NORMAL
BLISTER CELLS BLD QL SMEAR: NORMAL
BURR CELLS BLD QL SMEAR: NORMAL
CELL COUNT BODY FLUID SOURCE: ABNORMAL
COLOR FLD: YELLOW
CRP SERPL-MCNC: <3 MG/L
DACRYOCYTES BLD QL SMEAR: NORMAL
ELLIPTOCYTES BLD QL SMEAR: NORMAL
EOSINOPHIL # BLD AUTO: 0.1 10E3/UL (ref 0–0.7)
EOSINOPHIL NFR BLD AUTO: 2 %
ERYTHROCYTE [DISTWIDTH] IN BLOOD BY AUTOMATED COUNT: 12.7 % (ref 10–15)
ERYTHROCYTE [SEDIMENTATION RATE] IN BLOOD BY WESTERGREN METHOD: 8 MM/HR (ref 0–30)
FRAGMENTS BLD QL SMEAR: NORMAL
HCT VFR BLD AUTO: 38.2 % (ref 35–47)
HGB BLD-MCNC: 12.6 G/DL (ref 11.7–15.7)
HGB C CRYSTALS: NORMAL
HOWELL-JOLLY BOD BLD QL SMEAR: NORMAL
IMM GRANULOCYTES # BLD: 0 10E3/UL
IMM GRANULOCYTES NFR BLD: 0 %
LYMPHOCYTES # BLD AUTO: 1.2 10E3/UL (ref 0.8–5.3)
LYMPHOCYTES NFR BLD AUTO: 23 %
LYMPHOCYTES NFR FLD MANUAL: 3 %
MCH RBC QN AUTO: 32.1 PG (ref 26.5–33)
MCHC RBC AUTO-ENTMCNC: 33 G/DL (ref 31.5–36.5)
MCV RBC AUTO: 97 FL (ref 78–100)
MONOCYTES # BLD AUTO: 0.4 10E3/UL (ref 0–1.3)
MONOCYTES NFR BLD AUTO: 8 %
MONOS+MACROS NFR FLD MANUAL: 6 %
NEUTROPHILS # BLD AUTO: 3.5 10E3/UL (ref 1.6–8.3)
NEUTROPHILS NFR BLD AUTO: 66 %
NEUTS BAND NFR FLD MANUAL: 91 %
NEUTS HYPERSEG BLD QL SMEAR: NORMAL
NRBC # BLD AUTO: 0 10E3/UL
NRBC BLD AUTO-RTO: 0 /100
PLAT MORPH BLD: NORMAL
PLATELET # BLD AUTO: 230 10E3/UL (ref 150–450)
POLYCHROMASIA BLD QL SMEAR: NORMAL
RBC # BLD AUTO: 3.92 10E6/UL (ref 3.8–5.2)
RBC AGGLUT BLD QL: NORMAL
RBC MORPH BLD: NORMAL
ROULEAUX BLD QL SMEAR: NORMAL
SICKLE CELLS BLD QL SMEAR: NORMAL
SMUDGE CELLS BLD QL SMEAR: NORMAL
SPHEROCYTES BLD QL SMEAR: NORMAL
STOMATOCYTES BLD QL SMEAR: NORMAL
TARGETS BLD QL SMEAR: NORMAL
TOXIC GRANULES BLD QL SMEAR: NORMAL
VARIANT LYMPHS BLD QL SMEAR: NORMAL
WBC # BLD AUTO: 5.2 10E3/UL (ref 4–11)
WBC # FLD AUTO: 8410 /UL

## 2023-11-29 PROCEDURE — 77002 NEEDLE LOCALIZATION BY XRAY: CPT

## 2023-11-29 PROCEDURE — 87205 SMEAR GRAM STAIN: CPT | Performed by: ORTHOPAEDIC SURGERY

## 2023-11-29 PROCEDURE — 89051 BODY FLUID CELL COUNT: CPT | Performed by: ORTHOPAEDIC SURGERY

## 2023-11-29 PROCEDURE — 85652 RBC SED RATE AUTOMATED: CPT

## 2023-11-29 PROCEDURE — 36415 COLL VENOUS BLD VENIPUNCTURE: CPT

## 2023-11-29 PROCEDURE — 250N000009 HC RX 250

## 2023-11-29 PROCEDURE — 87075 CULTR BACTERIA EXCEPT BLOOD: CPT | Performed by: ORTHOPAEDIC SURGERY

## 2023-11-29 PROCEDURE — 85004 AUTOMATED DIFF WBC COUNT: CPT

## 2023-11-29 PROCEDURE — 87077 CULTURE AEROBIC IDENTIFY: CPT | Performed by: ORTHOPAEDIC SURGERY

## 2023-11-29 PROCEDURE — 86140 C-REACTIVE PROTEIN: CPT

## 2023-11-29 RX ORDER — LIDOCAINE HYDROCHLORIDE 10 MG/ML
INJECTION, SOLUTION EPIDURAL; INFILTRATION; INTRACAUDAL; PERINEURAL
Status: COMPLETED
Start: 2023-11-29 | End: 2023-11-29

## 2023-11-29 RX ADMIN — LIDOCAINE HYDROCHLORIDE 50 MG: 10 INJECTION, SOLUTION EPIDURAL; INFILTRATION; INTRACAUDAL; PERINEURAL at 14:03

## 2023-11-29 NOTE — PROGRESS NOTES
Pt was in Radiology today for a right shoulder joint aspiration. Pt tolerated ortho procedure well. .Procedure was completed by FRED MORELAND. There were no complications during this procedure. Pt verbalized understanding of written and verbal instructions and left department in stable and satisfactory condition with . There is no evidence of bleeding or any other complications upon discharge.

## 2023-11-29 NOTE — PROGRESS NOTES
RADIOLOGY PROCEDURE NOTE  Patient name: Denise Ralph  MRN: [id number]  : 1955    Pre-procedure diagnosis: Right shoulder pain with an effusion per MRI  Post-procedure diagnosis: Same    Procedure Date/Time: 2023  2:02 PM  Procedure: Right shoulder aspiration  Estimated blood loss: None  Specimen(s) collected with description: 4 ml of slightly cloudy yellow synovial fluid  The patient tolerated the procedure well with no immediate complications.  Significant findings:none    See imaging dictation for procedural details.    Provider name: Pro Spring PA-C  Assistant(s):None

## 2023-12-03 LAB
BACTERIA SNV CULT: ABNORMAL
GRAM STAIN RESULT: ABNORMAL
GRAM STAIN RESULT: ABNORMAL

## 2023-12-04 ENCOUNTER — TRANSFERRED RECORDS (OUTPATIENT)
Dept: HEALTH INFORMATION MANAGEMENT | Facility: CLINIC | Age: 68
End: 2023-12-04
Payer: COMMERCIAL

## 2023-12-04 NOTE — PHARMACY-ADMISSION MEDICATION HISTORY
Med rec completed by pre-admitting RN, RUKHSANA Melo.    [facility] scripts shows recent fill of doxycycline 100mg for 90 days supply.  Phoned pt to confirm-she is NOT on this medication any longer.    Prior to Admission medications    Medication Sig Last Dose Taking? Auth Provider Long Term End Date   ALPRAZolam (XANAX) 0.25 MG tablet TAKE 1 TABLET BY MOUTH THREE TIMES A DAY AS NEEDED FOR ANXIETY  Yes Juan F Smallwood MD     amLODIPine (NORVASC) 5 MG tablet Take 1 tablet (5 mg) by mouth daily  Yes Juan F Smallwood MD Yes    atenolol (TENORMIN) 50 MG tablet Take 1 tablet (50 mg) by mouth 2 times daily  Yes Juan F Smallwood MD Yes    cyclobenzaprine (FLEXERIL) 5 MG tablet TAKE 1 TO 2 TABLETS BY MOUTH 3 TIMES A DAY AS NEEDED  Yes Reported, Patient     HYDROcodone-acetaminophen (NORCO) 5-325 MG tablet take 1 tablet by mouth every 4 to 6 hours as needed  Yes Reported, Patient No    latanoprost (XALATAN) 0.005 % ophthalmic solution Place 1 drop into both eyes At Bedtime  Yes Unknown, Entered By History     multivitamin w/minerals (MULTI-VITAMIN) tablet Take 1 tablet by mouth daily  Yes Reported, Patient     ondansetron (ZOFRAN) 4 MG tablet TAKE 1 TABLET BY MOUTH EVERY 6-8 HOURS AS NEEDED NAUSEA  Yes Reported, Patient     Probiotic Product (PROBIOTIC DAILY PO) Take 1 tablet by mouth daily Probiotic 90 billion units with Fiber 1 PO daily  Yes Reported, Patient     timolol maleate (TIMOPTIC) 0.5 % ophthalmic solution Place 1 drop Into the left eye every morning  Yes Unknown, Entered By History     traZODone (DESYREL) 100 MG tablet TAKE 1 TABLET BY MOUTH EVERYDAY AT BEDTIME  Yes Juan F Smallwood MD Yes    vitamin D3 (CHOLECALCIFEROL) 50 mcg (2000 units) tablet Take 1 tablet by mouth daily  Yes Reported, Patient

## 2023-12-05 ENCOUNTER — ANESTHESIA EVENT (OUTPATIENT)
Dept: SURGERY | Facility: CLINIC | Age: 68
End: 2023-12-05
Payer: COMMERCIAL

## 2023-12-05 ENCOUNTER — APPOINTMENT (OUTPATIENT)
Dept: GENERAL RADIOLOGY | Facility: CLINIC | Age: 68
End: 2023-12-05
Attending: ORTHOPAEDIC SURGERY
Payer: COMMERCIAL

## 2023-12-05 ENCOUNTER — ANESTHESIA (OUTPATIENT)
Dept: SURGERY | Facility: CLINIC | Age: 68
End: 2023-12-05
Payer: COMMERCIAL

## 2023-12-05 ENCOUNTER — HOSPITAL ENCOUNTER (OUTPATIENT)
Facility: CLINIC | Age: 68
Discharge: HOME | End: 2023-12-06
Attending: ORTHOPAEDIC SURGERY | Admitting: ORTHOPAEDIC SURGERY
Payer: COMMERCIAL

## 2023-12-05 DIAGNOSIS — Z96.611 S/P REVERSE TOTAL SHOULDER ARTHROPLASTY, RIGHT: Primary | ICD-10-CM

## 2023-12-05 PROCEDURE — 250N000011 HC RX IP 250 OP 636: Mod: JZ | Performed by: ANESTHESIOLOGY

## 2023-12-05 PROCEDURE — 258N000001 HC RX 258: Performed by: ORTHOPAEDIC SURGERY

## 2023-12-05 PROCEDURE — 88305 TISSUE EXAM BY PATHOLOGIST: CPT | Mod: 26 | Performed by: PATHOLOGY

## 2023-12-05 PROCEDURE — 87075 CULTR BACTERIA EXCEPT BLOOD: CPT | Performed by: ORTHOPAEDIC SURGERY

## 2023-12-05 PROCEDURE — 88331 PATH CONSLTJ SURG 1 BLK 1SPC: CPT | Mod: 26 | Performed by: PATHOLOGY

## 2023-12-05 PROCEDURE — 999N000179 XR SURGERY CARM FLUORO LESS THAN 5 MIN W STILLS: Mod: TC

## 2023-12-05 PROCEDURE — C1776 JOINT DEVICE (IMPLANTABLE): HCPCS | Performed by: ORTHOPAEDIC SURGERY

## 2023-12-05 PROCEDURE — 250N000011 HC RX IP 250 OP 636: Performed by: PHYSICIAN ASSISTANT

## 2023-12-05 PROCEDURE — 258N000003 HC RX IP 258 OP 636: Performed by: PHYSICIAN ASSISTANT

## 2023-12-05 PROCEDURE — 999N000141 HC STATISTIC PRE-PROCEDURE NURSING ASSESSMENT: Performed by: ORTHOPAEDIC SURGERY

## 2023-12-05 PROCEDURE — 250N000009 HC RX 250: Performed by: ORTHOPAEDIC SURGERY

## 2023-12-05 PROCEDURE — 258N000003 HC RX IP 258 OP 636: Performed by: ANESTHESIOLOGY

## 2023-12-05 PROCEDURE — 250N000013 HC RX MED GY IP 250 OP 250 PS 637: Performed by: PHYSICIAN ASSISTANT

## 2023-12-05 PROCEDURE — 250N000009 HC RX 250: Performed by: ANESTHESIOLOGY

## 2023-12-05 PROCEDURE — 87070 CULTURE OTHR SPECIMN AEROBIC: CPT | Performed by: ORTHOPAEDIC SURGERY

## 2023-12-05 PROCEDURE — 88331 PATH CONSLTJ SURG 1 BLK 1SPC: CPT | Mod: TC | Performed by: ORTHOPAEDIC SURGERY

## 2023-12-05 PROCEDURE — 272N000001 HC OR GENERAL SUPPLY STERILE: Performed by: ORTHOPAEDIC SURGERY

## 2023-12-05 PROCEDURE — 360N000084 HC SURGERY LEVEL 4 W/ FLUORO, PER MIN: Performed by: ORTHOPAEDIC SURGERY

## 2023-12-05 PROCEDURE — C1713 ANCHOR/SCREW BN/BN,TIS/BN: HCPCS | Performed by: ORTHOPAEDIC SURGERY

## 2023-12-05 PROCEDURE — 710N000009 HC RECOVERY PHASE 1, LEVEL 1, PER MIN: Performed by: ORTHOPAEDIC SURGERY

## 2023-12-05 PROCEDURE — 370N000017 HC ANESTHESIA TECHNICAL FEE, PER MIN: Performed by: ORTHOPAEDIC SURGERY

## 2023-12-05 PROCEDURE — 250N000011 HC RX IP 250 OP 636: Performed by: ANESTHESIOLOGY

## 2023-12-05 PROCEDURE — 250N000011 HC RX IP 250 OP 636: Performed by: ORTHOPAEDIC SURGERY

## 2023-12-05 PROCEDURE — 250N000025 HC SEVOFLURANE, PER MIN: Performed by: ORTHOPAEDIC SURGERY

## 2023-12-05 PROCEDURE — 88305 TISSUE EXAM BY PATHOLOGIST: CPT | Mod: TC | Performed by: ORTHOPAEDIC SURGERY

## 2023-12-05 DEVICE — IMPLANTABLE DEVICE
Type: IMPLANTABLE DEVICE | Site: SHOULDER | Status: FUNCTIONAL
Brand: COMPREHENSIVE® REVERSE SHOULDER

## 2023-12-05 DEVICE — STIMULAN® RAPID CURE PROVIDED STERILE FOR SINGLE PATIENT USE. STIMULAN® RAPID CURE CONTAINS CALCIUM SULFATE POWDER AND MIXING SOLUTION IN PRE-MEASURED QUANTITIES SO THAT WHEN MIXED TOGETHER IN A STERILE MIXING BOWL, THE RESULTANT PASTE IS TO BE DIGITALLY PACKED INTO OPEN BONE VOID/GAP TO SET INSITU OR PLACED INTO THE MOULD PROVIDED, THE MIXTURE SETS TO FORM BEADS. THE BIODEGRADABLE, RADIOPAQUE BEADS ARE RESORBED IN APPROXIMATELY 30 – 60 DAYS WHEN USED IN ACCORDANCE WITH THE DEVICE LABELLING. STIMULAN® RAPID CURE IS MANUFACTURED FROM SYNTHETIC IMPLANT GRADE CALCIUM SULFATE DIHYDRATE(CASO4.2H2O) THAT RESORBS AND IS REPLACED WITH BONE DURING THE HEALING PROCESS. ALSO, AS THE BONE VOID FILLER BEADS ARE BIODEGRADABLE AND BIOCOMPATIBLE, THEY MAY BE USED AT AN INFECTED SITE.
Type: IMPLANTABLE DEVICE | Site: SHOULDER | Status: FUNCTIONAL
Brand: STIMULAN® RAPID CURE

## 2023-12-05 DEVICE — IMPLANTABLE DEVICE
Type: IMPLANTABLE DEVICE | Site: SHOULDER | Status: FUNCTIONAL
Brand: COMPREHENSIVE® SHOULDER SYSTEM

## 2023-12-05 DEVICE — IMPLANTABLE DEVICE
Type: IMPLANTABLE DEVICE | Site: SHOULDER | Status: FUNCTIONAL
Brand: COMPREHENSIVE REVERSE SHOULDER

## 2023-12-05 DEVICE — IMPLANTABLE DEVICE
Type: IMPLANTABLE DEVICE | Site: SHOULDER | Status: FUNCTIONAL
Brand: COMPREHENSIVE®

## 2023-12-05 RX ORDER — NALOXONE HYDROCHLORIDE 0.4 MG/ML
0.2 INJECTION, SOLUTION INTRAMUSCULAR; INTRAVENOUS; SUBCUTANEOUS
Status: DISCONTINUED | OUTPATIENT
Start: 2023-12-05 | End: 2023-12-06 | Stop reason: HOSPADM

## 2023-12-05 RX ORDER — FENTANYL CITRATE 50 UG/ML
50 INJECTION, SOLUTION INTRAMUSCULAR; INTRAVENOUS EVERY 5 MIN PRN
Status: DISCONTINUED | OUTPATIENT
Start: 2023-12-05 | End: 2023-12-05 | Stop reason: HOSPADM

## 2023-12-05 RX ORDER — OXYCODONE HYDROCHLORIDE 5 MG/1
5-10 TABLET ORAL EVERY 4 HOURS PRN
Qty: 30 TABLET | Refills: 0 | Status: SHIPPED | OUTPATIENT
Start: 2023-12-05 | End: 2023-12-26

## 2023-12-05 RX ORDER — ALPRAZOLAM 0.25 MG
0.25 TABLET ORAL 3 TIMES DAILY PRN
Status: DISCONTINUED | OUTPATIENT
Start: 2023-12-05 | End: 2023-12-06 | Stop reason: HOSPADM

## 2023-12-05 RX ORDER — SODIUM CHLORIDE, SODIUM LACTATE, POTASSIUM CHLORIDE, CALCIUM CHLORIDE 600; 310; 30; 20 MG/100ML; MG/100ML; MG/100ML; MG/100ML
INJECTION, SOLUTION INTRAVENOUS CONTINUOUS
Status: DISCONTINUED | OUTPATIENT
Start: 2023-12-05 | End: 2023-12-05 | Stop reason: HOSPADM

## 2023-12-05 RX ORDER — LIDOCAINE 40 MG/G
CREAM TOPICAL
Status: DISCONTINUED | OUTPATIENT
Start: 2023-12-05 | End: 2023-12-05 | Stop reason: HOSPADM

## 2023-12-05 RX ORDER — PROPOFOL 10 MG/ML
INJECTION, EMULSION INTRAVENOUS PRN
Status: DISCONTINUED | OUTPATIENT
Start: 2023-12-05 | End: 2023-12-05

## 2023-12-05 RX ORDER — SODIUM CHLORIDE, SODIUM LACTATE, POTASSIUM CHLORIDE, CALCIUM CHLORIDE 600; 310; 30; 20 MG/100ML; MG/100ML; MG/100ML; MG/100ML
INJECTION, SOLUTION INTRAVENOUS CONTINUOUS
Status: DISCONTINUED | OUTPATIENT
Start: 2023-12-05 | End: 2023-12-06 | Stop reason: HOSPADM

## 2023-12-05 RX ORDER — NALOXONE HYDROCHLORIDE 0.4 MG/ML
0.4 INJECTION, SOLUTION INTRAMUSCULAR; INTRAVENOUS; SUBCUTANEOUS
Status: DISCONTINUED | OUTPATIENT
Start: 2023-12-05 | End: 2023-12-06 | Stop reason: HOSPADM

## 2023-12-05 RX ORDER — ASPIRIN 81 MG/1
81 TABLET ORAL 2 TIMES DAILY
Status: DISCONTINUED | OUTPATIENT
Start: 2023-12-05 | End: 2023-12-06 | Stop reason: HOSPADM

## 2023-12-05 RX ORDER — PROPOFOL 10 MG/ML
INJECTION, EMULSION INTRAVENOUS CONTINUOUS PRN
Status: DISCONTINUED | OUTPATIENT
Start: 2023-12-05 | End: 2023-12-05

## 2023-12-05 RX ORDER — ACETAMINOPHEN 325 MG/1
975 TABLET ORAL 3 TIMES DAILY
Status: DISCONTINUED | OUTPATIENT
Start: 2023-12-05 | End: 2023-12-06 | Stop reason: HOSPADM

## 2023-12-05 RX ORDER — OXYCODONE HYDROCHLORIDE 5 MG/1
5 TABLET ORAL EVERY 4 HOURS PRN
Status: DISCONTINUED | OUTPATIENT
Start: 2023-12-05 | End: 2023-12-06 | Stop reason: HOSPADM

## 2023-12-05 RX ORDER — TOBRAMYCIN 1.2 G/30ML
INJECTION, POWDER, LYOPHILIZED, FOR SOLUTION INTRAVENOUS PRN
Status: DISCONTINUED | OUTPATIENT
Start: 2023-12-05 | End: 2023-12-05 | Stop reason: HOSPADM

## 2023-12-05 RX ORDER — TRANEXAMIC ACID 650 MG/1
1950 TABLET ORAL ONCE
Status: COMPLETED | OUTPATIENT
Start: 2023-12-05 | End: 2023-12-05

## 2023-12-05 RX ORDER — IBUPROFEN 600 MG/1
600 TABLET, FILM COATED ORAL EVERY 6 HOURS PRN
Status: DISCONTINUED | OUTPATIENT
Start: 2023-12-05 | End: 2023-12-06 | Stop reason: HOSPADM

## 2023-12-05 RX ORDER — AMLODIPINE BESYLATE 5 MG/1
5 TABLET ORAL DAILY
Status: DISCONTINUED | OUTPATIENT
Start: 2023-12-06 | End: 2023-12-06 | Stop reason: HOSPADM

## 2023-12-05 RX ORDER — KETOROLAC TROMETHAMINE 15 MG/ML
15 INJECTION, SOLUTION INTRAMUSCULAR; INTRAVENOUS
Status: COMPLETED | OUTPATIENT
Start: 2023-12-05 | End: 2023-12-05

## 2023-12-05 RX ORDER — HYDROMORPHONE HCL IN WATER/PF 6 MG/30 ML
0.2 PATIENT CONTROLLED ANALGESIA SYRINGE INTRAVENOUS
Status: DISCONTINUED | OUTPATIENT
Start: 2023-12-05 | End: 2023-12-06 | Stop reason: HOSPADM

## 2023-12-05 RX ORDER — ONDANSETRON 2 MG/ML
4 INJECTION INTRAMUSCULAR; INTRAVENOUS EVERY 30 MIN PRN
Status: DISCONTINUED | OUTPATIENT
Start: 2023-12-05 | End: 2023-12-05 | Stop reason: HOSPADM

## 2023-12-05 RX ORDER — DEXAMETHASONE SODIUM PHOSPHATE 4 MG/ML
INJECTION, SOLUTION INTRA-ARTICULAR; INTRALESIONAL; INTRAMUSCULAR; INTRAVENOUS; SOFT TISSUE PRN
Status: DISCONTINUED | OUTPATIENT
Start: 2023-12-05 | End: 2023-12-05

## 2023-12-05 RX ORDER — LIDOCAINE HYDROCHLORIDE 20 MG/ML
INJECTION, SOLUTION INFILTRATION; PERINEURAL PRN
Status: DISCONTINUED | OUTPATIENT
Start: 2023-12-05 | End: 2023-12-05

## 2023-12-05 RX ORDER — HYDROXYZINE HYDROCHLORIDE 10 MG/1
10 TABLET, FILM COATED ORAL EVERY 6 HOURS PRN
Qty: 30 TABLET | Refills: 0 | Status: SHIPPED | OUTPATIENT
Start: 2023-12-05 | End: 2024-04-29

## 2023-12-05 RX ORDER — AMOXICILLIN 250 MG
1-2 CAPSULE ORAL 2 TIMES DAILY
Qty: 30 TABLET | Refills: 0 | Status: SHIPPED | OUTPATIENT
Start: 2023-12-05 | End: 2023-12-26

## 2023-12-05 RX ORDER — METOPROLOL TARTRATE 1 MG/ML
1-2 INJECTION, SOLUTION INTRAVENOUS EVERY 5 MIN PRN
Status: DISCONTINUED | OUTPATIENT
Start: 2023-12-05 | End: 2023-12-05 | Stop reason: HOSPADM

## 2023-12-05 RX ORDER — ONDANSETRON 4 MG/1
4 TABLET, FILM COATED ORAL EVERY 8 HOURS PRN
Status: DISCONTINUED | OUTPATIENT
Start: 2023-12-05 | End: 2023-12-05

## 2023-12-05 RX ORDER — CEFAZOLIN SODIUM/WATER 2 G/20 ML
2 SYRINGE (ML) INTRAVENOUS SEE ADMIN INSTRUCTIONS
Status: DISCONTINUED | OUTPATIENT
Start: 2023-12-05 | End: 2023-12-05 | Stop reason: HOSPADM

## 2023-12-05 RX ORDER — OXYCODONE HYDROCHLORIDE 10 MG/1
10 TABLET ORAL EVERY 4 HOURS PRN
Status: DISCONTINUED | OUTPATIENT
Start: 2023-12-05 | End: 2023-12-06 | Stop reason: HOSPADM

## 2023-12-05 RX ORDER — VANCOMYCIN HYDROCHLORIDE 1 G/20ML
INJECTION, POWDER, LYOPHILIZED, FOR SOLUTION INTRAVENOUS PRN
Status: DISCONTINUED | OUTPATIENT
Start: 2023-12-05 | End: 2023-12-05 | Stop reason: HOSPADM

## 2023-12-05 RX ORDER — CYCLOBENZAPRINE HCL 5 MG
5 TABLET ORAL AT BEDTIME
Status: DISCONTINUED | OUTPATIENT
Start: 2023-12-05 | End: 2023-12-06 | Stop reason: HOSPADM

## 2023-12-05 RX ORDER — PROCHLORPERAZINE MALEATE 5 MG
5 TABLET ORAL EVERY 6 HOURS PRN
Status: DISCONTINUED | OUTPATIENT
Start: 2023-12-05 | End: 2023-12-06 | Stop reason: HOSPADM

## 2023-12-05 RX ORDER — POLYETHYLENE GLYCOL 3350 17 G/17G
17 POWDER, FOR SOLUTION ORAL DAILY
Status: DISCONTINUED | OUTPATIENT
Start: 2023-12-06 | End: 2023-12-06 | Stop reason: HOSPADM

## 2023-12-05 RX ORDER — HYDROMORPHONE HCL IN WATER/PF 6 MG/30 ML
0.4 PATIENT CONTROLLED ANALGESIA SYRINGE INTRAVENOUS
Status: DISCONTINUED | OUTPATIENT
Start: 2023-12-05 | End: 2023-12-06 | Stop reason: HOSPADM

## 2023-12-05 RX ORDER — ONDANSETRON 2 MG/ML
INJECTION INTRAMUSCULAR; INTRAVENOUS PRN
Status: DISCONTINUED | OUTPATIENT
Start: 2023-12-05 | End: 2023-12-05

## 2023-12-05 RX ORDER — ACETAMINOPHEN 325 MG/1
975 TABLET ORAL ONCE
Status: COMPLETED | OUTPATIENT
Start: 2023-12-05 | End: 2023-12-05

## 2023-12-05 RX ORDER — DOXYCYCLINE 100 MG/1
100 CAPSULE ORAL 2 TIMES DAILY
Qty: 60 CAPSULE | Refills: 0 | Status: SHIPPED | OUTPATIENT
Start: 2023-12-05 | End: 2024-07-29

## 2023-12-05 RX ORDER — ONDANSETRON 2 MG/ML
4 INJECTION INTRAMUSCULAR; INTRAVENOUS EVERY 6 HOURS PRN
Status: DISCONTINUED | OUTPATIENT
Start: 2023-12-05 | End: 2023-12-06 | Stop reason: HOSPADM

## 2023-12-05 RX ORDER — HYDROXYZINE HYDROCHLORIDE 10 MG/1
10 TABLET, FILM COATED ORAL EVERY 6 HOURS PRN
Status: DISCONTINUED | OUTPATIENT
Start: 2023-12-05 | End: 2023-12-06 | Stop reason: HOSPADM

## 2023-12-05 RX ORDER — BUPIVACAINE HYDROCHLORIDE AND EPINEPHRINE 5; 5 MG/ML; UG/ML
INJECTION, SOLUTION PERINEURAL
Status: COMPLETED | OUTPATIENT
Start: 2023-12-05 | End: 2023-12-05

## 2023-12-05 RX ORDER — BISACODYL 10 MG
10 SUPPOSITORY, RECTAL RECTAL DAILY PRN
Status: DISCONTINUED | OUTPATIENT
Start: 2023-12-05 | End: 2023-12-06 | Stop reason: HOSPADM

## 2023-12-05 RX ORDER — LIDOCAINE 40 MG/G
CREAM TOPICAL
Status: DISCONTINUED | OUTPATIENT
Start: 2023-12-05 | End: 2023-12-06 | Stop reason: HOSPADM

## 2023-12-05 RX ORDER — SODIUM CHLORIDE, SODIUM LACTATE, POTASSIUM CHLORIDE, CALCIUM CHLORIDE 600; 310; 30; 20 MG/100ML; MG/100ML; MG/100ML; MG/100ML
INJECTION, SOLUTION INTRAVENOUS CONTINUOUS PRN
Status: DISCONTINUED | OUTPATIENT
Start: 2023-12-05 | End: 2023-12-05

## 2023-12-05 RX ORDER — GLYCOPYRROLATE 0.2 MG/ML
INJECTION, SOLUTION INTRAMUSCULAR; INTRAVENOUS PRN
Status: DISCONTINUED | OUTPATIENT
Start: 2023-12-05 | End: 2023-12-05

## 2023-12-05 RX ORDER — ONDANSETRON 4 MG/1
4 TABLET, ORALLY DISINTEGRATING ORAL EVERY 30 MIN PRN
Status: DISCONTINUED | OUTPATIENT
Start: 2023-12-05 | End: 2023-12-05 | Stop reason: HOSPADM

## 2023-12-05 RX ORDER — AMOXICILLIN 250 MG
1 CAPSULE ORAL 2 TIMES DAILY
Status: DISCONTINUED | OUTPATIENT
Start: 2023-12-05 | End: 2023-12-06 | Stop reason: HOSPADM

## 2023-12-05 RX ORDER — CEFAZOLIN SODIUM/WATER 2 G/20 ML
2 SYRINGE (ML) INTRAVENOUS
Status: COMPLETED | OUTPATIENT
Start: 2023-12-05 | End: 2023-12-05

## 2023-12-05 RX ORDER — HYDRALAZINE HYDROCHLORIDE 20 MG/ML
2.5-5 INJECTION INTRAMUSCULAR; INTRAVENOUS EVERY 10 MIN PRN
Status: DISCONTINUED | OUTPATIENT
Start: 2023-12-05 | End: 2023-12-05 | Stop reason: HOSPADM

## 2023-12-05 RX ORDER — EPHEDRINE SULFATE 50 MG/ML
INJECTION, SOLUTION INTRAMUSCULAR; INTRAVENOUS; SUBCUTANEOUS PRN
Status: DISCONTINUED | OUTPATIENT
Start: 2023-12-05 | End: 2023-12-05

## 2023-12-05 RX ORDER — ASPIRIN 81 MG/1
81 TABLET ORAL 2 TIMES DAILY
Qty: 60 TABLET | Refills: 0 | Status: SHIPPED | OUTPATIENT
Start: 2023-12-05 | End: 2024-04-29

## 2023-12-05 RX ORDER — ACETAMINOPHEN 325 MG/1
650 TABLET ORAL EVERY 4 HOURS PRN
Qty: 100 TABLET | Refills: 0 | Status: SHIPPED | OUTPATIENT
Start: 2023-12-05

## 2023-12-05 RX ORDER — ATENOLOL 50 MG/1
50 TABLET ORAL 2 TIMES DAILY
Status: DISCONTINUED | OUTPATIENT
Start: 2023-12-05 | End: 2023-12-06 | Stop reason: HOSPADM

## 2023-12-05 RX ORDER — IBUPROFEN 600 MG/1
600 TABLET, FILM COATED ORAL EVERY 6 HOURS PRN
Qty: 30 TABLET | Refills: 0 | Status: SHIPPED | OUTPATIENT
Start: 2023-12-05

## 2023-12-05 RX ORDER — CEFAZOLIN SODIUM 1 G/3ML
1 INJECTION, POWDER, FOR SOLUTION INTRAMUSCULAR; INTRAVENOUS EVERY 8 HOURS
Qty: 10 ML | Refills: 0 | Status: COMPLETED | OUTPATIENT
Start: 2023-12-05 | End: 2023-12-06

## 2023-12-05 RX ORDER — ONDANSETRON 4 MG/1
4 TABLET, ORALLY DISINTEGRATING ORAL EVERY 6 HOURS PRN
Status: DISCONTINUED | OUTPATIENT
Start: 2023-12-05 | End: 2023-12-06 | Stop reason: HOSPADM

## 2023-12-05 RX ORDER — FENTANYL CITRATE 50 UG/ML
25 INJECTION, SOLUTION INTRAMUSCULAR; INTRAVENOUS EVERY 5 MIN PRN
Status: DISCONTINUED | OUTPATIENT
Start: 2023-12-05 | End: 2023-12-05 | Stop reason: HOSPADM

## 2023-12-05 RX ORDER — ACETAMINOPHEN 325 MG/1
650 TABLET ORAL EVERY 4 HOURS PRN
Status: DISCONTINUED | OUTPATIENT
Start: 2023-12-08 | End: 2023-12-06 | Stop reason: HOSPADM

## 2023-12-05 RX ORDER — FENTANYL CITRATE 50 UG/ML
INJECTION, SOLUTION INTRAMUSCULAR; INTRAVENOUS PRN
Status: DISCONTINUED | OUTPATIENT
Start: 2023-12-05 | End: 2023-12-05

## 2023-12-05 RX ADMIN — ACETAMINOPHEN 975 MG: 325 TABLET, FILM COATED ORAL at 20:30

## 2023-12-05 RX ADMIN — ATENOLOL 50 MG: 50 TABLET ORAL at 20:30

## 2023-12-05 RX ADMIN — ASPIRIN 81 MG: 81 TABLET, COATED ORAL at 20:30

## 2023-12-05 RX ADMIN — LIDOCAINE HYDROCHLORIDE 40 MG: 20 INJECTION, SOLUTION INFILTRATION; PERINEURAL at 11:45

## 2023-12-05 RX ADMIN — SUGAMMADEX 200 MG: 100 INJECTION, SOLUTION INTRAVENOUS at 14:06

## 2023-12-05 RX ADMIN — TRANEXAMIC ACID 1950 MG: 650 TABLET ORAL at 10:13

## 2023-12-05 RX ADMIN — BUPIVACAINE HYDROCHLORIDE AND EPINEPHRINE BITARTRATE 30 ML: 5; .005 INJECTION, SOLUTION PERINEURAL at 10:58

## 2023-12-05 RX ADMIN — KETOROLAC TROMETHAMINE 15 MG: 15 INJECTION, SOLUTION INTRAMUSCULAR; INTRAVENOUS at 15:17

## 2023-12-05 RX ADMIN — GLYCOPYRROLATE 0.2 MG: 0.2 INJECTION, SOLUTION INTRAMUSCULAR; INTRAVENOUS at 11:45

## 2023-12-05 RX ADMIN — ACETAMINOPHEN 975 MG: 325 TABLET, FILM COATED ORAL at 10:13

## 2023-12-05 RX ADMIN — SODIUM CHLORIDE, POTASSIUM CHLORIDE, SODIUM LACTATE AND CALCIUM CHLORIDE: 600; 310; 30; 20 INJECTION, SOLUTION INTRAVENOUS at 17:42

## 2023-12-05 RX ADMIN — DEXAMETHASONE SODIUM PHOSPHATE 4 MG: 4 INJECTION, SOLUTION INTRA-ARTICULAR; INTRALESIONAL; INTRAMUSCULAR; INTRAVENOUS; SOFT TISSUE at 11:45

## 2023-12-05 RX ADMIN — FENTANYL CITRATE 100 MCG: 50 INJECTION INTRAMUSCULAR; INTRAVENOUS at 11:50

## 2023-12-05 RX ADMIN — Medication 5 MG: at 12:18

## 2023-12-05 RX ADMIN — MIDAZOLAM 1 MG: 1 INJECTION INTRAMUSCULAR; INTRAVENOUS at 11:43

## 2023-12-05 RX ADMIN — ROCURONIUM BROMIDE 40 MG: 50 INJECTION, SOLUTION INTRAVENOUS at 11:45

## 2023-12-05 RX ADMIN — Medication 5 MG: at 13:49

## 2023-12-05 RX ADMIN — CEFAZOLIN 1 G: 1 INJECTION, POWDER, FOR SOLUTION INTRAMUSCULAR; INTRAVENOUS at 20:20

## 2023-12-05 RX ADMIN — SODIUM CHLORIDE, POTASSIUM CHLORIDE, SODIUM LACTATE AND CALCIUM CHLORIDE: 600; 310; 30; 20 INJECTION, SOLUTION INTRAVENOUS at 12:16

## 2023-12-05 RX ADMIN — Medication 5 MG: at 12:41

## 2023-12-05 RX ADMIN — MIDAZOLAM 2 MG: 1 INJECTION INTRAMUSCULAR; INTRAVENOUS at 10:53

## 2023-12-05 RX ADMIN — SENNOSIDES AND DOCUSATE SODIUM 1 TABLET: 8.6; 5 TABLET ORAL at 20:31

## 2023-12-05 RX ADMIN — Medication 5 MG: at 13:03

## 2023-12-05 RX ADMIN — SODIUM CHLORIDE, POTASSIUM CHLORIDE, SODIUM LACTATE AND CALCIUM CHLORIDE: 600; 310; 30; 20 INJECTION, SOLUTION INTRAVENOUS at 11:38

## 2023-12-05 RX ADMIN — Medication 5 MG: at 13:22

## 2023-12-05 RX ADMIN — PROPOFOL 150 MG: 10 INJECTION, EMULSION INTRAVENOUS at 11:45

## 2023-12-05 RX ADMIN — ONDANSETRON 4 MG: 2 INJECTION INTRAMUSCULAR; INTRAVENOUS at 11:45

## 2023-12-05 RX ADMIN — PHENYLEPHRINE HYDROCHLORIDE 100 MCG: 10 INJECTION INTRAVENOUS at 13:34

## 2023-12-05 RX ADMIN — PROPOFOL 50 MCG/KG/MIN: 10 INJECTION, EMULSION INTRAVENOUS at 11:45

## 2023-12-05 RX ADMIN — Medication 2 G: at 11:48

## 2023-12-05 ASSESSMENT — ACTIVITIES OF DAILY LIVING (ADL)
ADLS_ACUITY_SCORE: 18
ADLS_ACUITY_SCORE: 22
ADLS_ACUITY_SCORE: 20
ADLS_ACUITY_SCORE: 18
ADLS_ACUITY_SCORE: 20
ADLS_ACUITY_SCORE: 20
ADLS_ACUITY_SCORE: 18

## 2023-12-05 NOTE — ANESTHESIA PROCEDURE NOTES
Airway       Patient location during procedure: OR       Procedure Start/Stop Times: 12/5/2023 11:52 AM  Staff -        Anesthesiologist:  Jabari Irizarry MD       CRNA: Litzy Landaverde APRN CRNA       Performed By: CRNA  Consent for Airway        Urgency: elective  Indications and Patient Condition       Indications for airway management: jack-procedural       Induction type:intravenous       Mask difficulty assessment: 2 - vent by mask + OA or adjuvant +/- NMBA    Final Airway Details       Final airway type: endotracheal airway       Successful airway: ETT - single  Endotracheal Airway Details        ETT size (mm): 7.0       Cuffed: yes       Cuff volume (mL): 7       Successful intubation technique: direct laryngoscopy       DL Blade Type: MAC 4       Grade View of Cords: 1       Adjucts: stylet       Position: Right       Measured from: gums/teeth       Secured at (cm): 23       Bite block used: None    Post intubation assessment        Placement verified by: capnometry, equal breath sounds and chest rise        Number of attempts at approach: 1       Number of other approaches attempted: 0       Secured with: tape       Ease of procedure: easy       Dentition: Intact and Unchanged    Medication(s) Administered   Medication Administration Time: 12/5/2023 11:52 AM

## 2023-12-05 NOTE — ANESTHESIA PROCEDURE NOTES
Brachial plexus Procedure Note    Pre-Procedure   Staff -        Anesthesiologist:  Herberth Osborne MD       Performed By: anesthesiologist       Pre-Anesthestic Checklist: patient identified, IV checked, site marked, risks and benefits discussed, informed consent, monitors and equipment checked, pre-op evaluation, at physician/surgeon's request and post-op pain management  Timeout:       Correct Patient: Yes        Correct Procedure: Yes        Correct Site: Yes        Correct Position: Yes        Correct Laterality: Yes        Site Marked: Yes  Procedure Documentation  Procedure: Brachial plexus       Diagnosis: PAIN CONTROL       Laterality: right       Patient Position: supine       Patient Prep/Sterile Barriers: sterile gloves, mask       Skin prep: Betadine (interscalene approach).       Needle Type: insulated and short bevel       Needle Gauge: 22.        Needle Length (Inches): 2        Ultrasound guided       1. Ultrasound was used to identify targeted nerve, plexus, vascular marker, or fascial plane and place a needle adjacent to it in real-time.       2. Ultrasound was used to visualize the spread of anesthetic in close proximity to the above referenced structure.       Nerve Stim: Initial Level 1 mA.  Lowest motor response mA.    Assessment/Narrative         The placement was negative for: blood aspirated and painful injection       Paresthesias: Yes and Resolved.       Bolus given via needle. no blood aspirated via catheter.        Secured via.        Insertion/Infusion Method: Single Shot       Complications: none    Medication(s) Administered   Bupivacaine 0.5% w/ 1:200K Epi (Other) - Other   30 mL - 12/5/2023 10:58:00 AM   Comments:  The surgeon has given a verbal order transferring care of this patient to me for the performance of a regional analgesia block for post-op pain control. It is requested of me because I am uniquely trained and qualified to perform this block and the surgeon is neither  "trained nor qualified to perform this procedure.          FOR [facility] ONLY:   Pain Team Contact information: please page the Pain Team Via [facility]. Search \"Pain\". During daytime hours, please page the attending first. At night please page the resident first.      "

## 2023-12-05 NOTE — ANESTHESIA CARE TRANSFER NOTE
Patient: Denise Ralph    Procedure: Procedure(s):  Right reverse total shoulder arthroplasty       Diagnosis: Rotator cuff arthropathy, right [M12.811]  Diagnosis Additional Information: No value filed.    Anesthesia Type:   General     Note:    Oropharynx: oropharynx clear of all foreign objects and spontaneously breathing  Level of Consciousness: awake  Oxygen Supplementation: face mask  Level of Supplemental Oxygen (L/min / FiO2): 6  Independent Airway: airway patency satisfactory and stable    Vital Signs Stable: post-procedure vital signs reviewed and stable  Report to RN Given: handoff report given  Patient transferred to: PACU    Handoff Report: Identifed the Patient, Identified the Reponsible Provider, Reviewed the pertinent medical history, Discussed the surgical course, Reviewed Intra-OP anesthesia mangement and issues during anesthesia, Set expectations for post-procedure period and Allowed opportunity for questions and acknowledgement of understanding      Vitals:  Vitals Value Taken Time   BP     Temp     Pulse 68 12/05/23 1423   Resp 36 12/05/23 1423   SpO2 99 % 12/05/23 1423   Vitals shown include unfiled device data.    Electronically Signed By: IRENE Haley CRNA  December 5, 2023  2:24 PM

## 2023-12-05 NOTE — OP NOTE
[facility] Orthopedic Operative Note    Pre-operative diagnosis: Rotator cuff arthropathy with Osteoarthritis of the right shoulder   Post-operative diagnosis: same   Procedure: Reverse Total Shoulder Arthroplasty of the right shoulder   Surgeon: Umesh Berman MD   Assistant(s): FRITZ Walton who provided retraction and positioning assistance and was crucial for all parts of the case.   Anesthesia: General endotrachial anesthesia with scalene block.   Estimated blood loss: Less than 100 ml   Total IV fluids: (See anesthesia record)   Blood transfusion: No transfusion was given during surgery   Total urine output: (See anesthesia record)   Drains: None   Specimens: None   Implants: BIOMET COMPREHENSIVE REVERSE TOTAL SHOULDER SYSTEM SIZE 11 MICRO PRESS FIT HUMERAL STEM, STANDARD HUMERAL TRAY WITH NO OFFSET, +3, 40/36 HUMERAL INSERT AND 36 MM GLENOSPHERE WITH MINI NON AUGMENTED GLENOID BASE PLATE      Findings: ARTHROPATHY SHOULDER   Complications: None   Condition: Stable       Activity: Activity as tolerated  Patient may move about with assist as indicated or with supervision     Indications:    Pre-Operative Antibiotics:    Post-Operative DVT Prophylaxis     TISSUE CULTURES X 5 Arthropathy of the shoulder refractory to conservative treatment  Ancef 2 gram IV 30 minutes prior to incision along with 1.95 grams TRANSEXAMIC ACID   ASA 81 MG BID DAY     The patient was taken to the pre operative area, where a scalene block was placed in the  right shoulder.      The patient was then taken to the operating room where general anesthetic was obtained. A Hahn catheter was placed. They were placed in the beach chair position. The right arm was confirmed as the operative arm. Pre operative range of motion was external rotation 50, abduction  90 and elevation to 150 degrees. The arm was prepped and draped in the usual fashion. Timeout was performed, confirming the right shoulder as the operative shoulder.  Approximately, a 4 inch incision was made over the anterior aspect of the shoulder extending from the coracoid distal and laterally. Full-thickness skin flaps were created. The deltopectoral interval was identified and the cephalic vein was retracted medially with the pectoralis and the deltoid was retracted laterally. The subdeltoid space was entered and freed up.  The subdeltoid space appeared normal there was no evidence of infection.    The conjoined tendon was identified and released off the subscapularis. The axillary nerve was palpated, identified and protected throughout the rest of the case. The biceps tendon was identified  AND HAD BEEN TENOTOMIZED. The subscapularis was then released from the humerus.   THE FLUID FROM THE JOINT APPEARED CLEAR AND NORMAL.  THERE WAS NO EVIDENCE FOR INFECTION.    The subscapularis was tagged and retracted. The inferior aspect of the capsule was then released off the neck of the humerus, giving me excellent exposure. The osteophytes were removed.  I THEN TOOK 4 TISSUE CULTURES FROM THE SYNOVIUM AND CAPSULE FROM THE JOINT.  A FIFTH WAS TAKEN FROM THE HUMERAL CANAL AFTER THE HEAD WAS OTEOTOMIZED.  I DID SEND A TISSUE SAMPLE FOR ACUTE PATHOLOGY AND IT WAS INTERPRETED BY THE PATHOLOGIST as  NOTHING FOR INFECTION AND LESS THAN 5 WBC PER HPF.  WITH THIS INFORMATION WE PROCEEDED WITH THE REVERSE TSA.      The humeral head was osteotomized in 30 degrees of retroversion using A FREE HAND TECHNIQUE AND THE BONE LOOKED QUITE GOOD.  ANOTHER CULTURE SPECIMEN WAS SENT FROM THE BONE.  THE REMAINING TWO ANCHORS WERE WELL FIXED AND REMOVED.  NO OSTEOLYSIS.  The canal was broached to accept a size 11 humeral stem. With the trial in place, a metal cap was placed to protect the humerus throughout the rest of the glenoid exposure.   I then retracted the humeral head posteriorly. I circumferentially freed up the subscapularis. While retracting the axillary nerve inferiorly, I released the  inferior capsule from its attachment on the inferior and posterior glenoid. I was able to obtain good visualization of the glenoid, which was completely devoid of any remaining articular cartilage. I then prepared it to accept the 25 MM NON augmented glenoid base plate securing it with 5 screws.  I then placed the real 36 mm glenosphere with 1.5 mm of inferior off set.  I then trialed the humerus with the NO offset base plate and +3 humeral insert.  With the trial components in place, the shoulder was very stable and had excellent range of motion. I confirmed the stem size and glenoid position with fluoroscopy. I then removed the trial components, prepared my real implants on the back table and then impacted the press-fit humeral stem with excellent fit and fill proximally and with good rotational stability. I PLACED 12 CC OF STIMULON ABSORBABLE BEADS WITH VANCOMYCIN AND TOBRAMYCIN DOWN THE HUMERAL CANAL AND THE REST AROUND THE GLENOSPHERE.  I placed the NON augmented humeral tray and +3 humeral insert.  I then reduced the shoulder.  I confirmed again the seating, sizing and orientation of the humeral implant with fluoroscopy. I was very satisfied. I then carefully repaired the subscapularis to the humerus WITH 0 VICRYL AND 1 STRATAFIX. At the completion of the subscapularis repair, external rotation was 50, abduction 90, elevation 140 degrees. The wound was irrigated with copious normal saline. ONE gram of Vancomycin powder was placed in the sub deltoid and joint space.  The deltopectoral interval was then closed with interrupted 0 Vicryl and running 0-STRATAFIX plus suture. The skin was closed in layers with 0 vicryl in the deep tissues, followed by 2-0 Vicryl, 3-0 STRATAFIX and Steri-Strips.  The incision was covered with an Aquacel dressing.  The arm was placed in a sling. They were then awoken and transferred to the recovery room in stable condition.   There were no noted complications. Sponge and needle counts  were correct.    THE POST OPERATIVE PLAN WILL BE FOR HER TO BE DISCHARGED WITH DOXYCYCLINE FOR 4 WEEKS UNTIL HER CULTURES REMAIN NEGATIVE.

## 2023-12-05 NOTE — ANESTHESIA POSTPROCEDURE EVALUATION
Patient: Denise Ralph    Procedure: Procedure(s):  Right reverse total shoulder arthroplasty       Anesthesia Type:  General    Note:  Disposition: Admission   Postop Pain Control: Uneventful            Sign Out: Well controlled pain   PONV: No   Neuro/Psych: Uneventful            Sign Out: Acceptable/Baseline neuro status   Airway/Respiratory: Uneventful            Sign Out: Acceptable/Baseline resp. status   CV/Hemodynamics: Uneventful            Sign Out: Acceptable CV status; No obvious hypovolemia; No obvious fluid overload   Other NRE:    DID A NON-ROUTINE EVENT OCCUR?            Last vitals:  Vitals Value Taken Time   /89 12/05/23 1505   Temp 96.5  F (35.8  C) 12/05/23 1419   Pulse 62 12/05/23 1509   Resp 16 12/05/23 1509   SpO2 96 % 12/05/23 1509   Vitals shown include unfiled device data.    Electronically Signed By: Ernesto Hamilton MD  December 5, 2023  3:10 PM

## 2023-12-05 NOTE — ANESTHESIA PREPROCEDURE EVALUATION
Anesthesia Pre-Procedure Evaluation    Patient: Denise Ralph   MRN: [id number] : 1955        Procedure : Procedure(s):  Right reverse total shoulder arthroplasty possible antibiotic spacer          Past Medical History:   Diagnosis Date    Anxiety     Gets panic attacks    Complication of anesthesia     Depressive disorder, not elsewhere classified     Essential hypertension, benign     No cardiologist    Irritable bowel syndrome     has had neg colonoscopy & EGD w/u    Lumbar degenerative disc disease     Had PT, traction, no surgery    Other chronic pain     JOint pain for many years.    PONV (postoperative nausea and vomiting)     Sciatica 2006    R thigh;  MRI = R L2-3 disc     Septic arthritis of shoulder, right (H) 2021    Unspecified glaucoma(365.9)       Past Surgical History:   Procedure Laterality Date    BIOPSY  2022    infection in right shoulder    COLONOSCOPY      Clear    EYE SURGERY Bilateral     Treatment of glaucoma    HYSTERECTOMY      HYSTERECTOMY VAGINAL      IRRIGATION AND DEBRIDEMENT UPPER EXTREMITY, COMBINED Right 10/02/2020    Procedure: Right shoulder arthroscopic extensive debridement of the glenohumeral joint and subacromial space for infection.;  Surgeon: Umesh Berman MD;  Location: [facility] OR    LAPAROSCOPIC CHOLECYSTECTOMY      with repeat operation because of bile leak    Left shoulder decomp surgery for impingement  approx     OPTICAL TRACKING SYSTEM FUSION POSTERIOR SPINE LUMBAR N/A 2017    Procedure: OPTICAL TRACKING SYSTEM FUSION SPINE POSTERIOR LUMBAR ONE LEVEL;  1. L4 Ang-Meyers osteotomy with exposure of the L4 and L5 roots  2. L4-5 complete discectomy with arthrodesis  3. Placement of Globus Creo GOMEZ pedicle screws bilaterally from L4 to L5  4. L4 - L5 posterior lateral fusion with placement of Medtronic Magnifuse and locally harvested autologous bone  5. Use of Stealth and O    OPTICAL TRACKING SYSTEM FUSION POSTERIOR SPINE LUMBAR  N/A 2021    Procedure: Reopening of previous incision with L3-4 decompressive laminectomies with facetectomies and decompression  L3-4 complete discectomy with arthrodesis and placement of a 9 x 28 mm Globus Caliber expandable interbody cage with locally harvested autologous bone placed centrally and anterior to the graft Placement/replacment of Globus Creo pedicle screws from L3-5 bilaterally  L3-4 posterior     ORTHOPEDIC SURGERY Right 10/2020    I & D shoulder    WRIST SURGERY Left       Allergies   Allergen Reactions    Erythromycin Shortness Of Breath    Msir [Morphine Sulfate] Nausea     Intollerant to Morphine Sulfate: Nausea,vomiting    Buchu-Cornsilk-Ch Grass-Hydran      Patient states she gets sick and loses weight from diuretics.    Dilaudid [Hydromorphone] Visual Disturbance     Hallucinations    Lisinopril      Cough      Losartan      Tongue felt weird      Metoclopramide Hcl Other (See Comments)     Muscle spasms in throat    Penicillins Itching      Social History     Tobacco Use    Smoking status: Former     Packs/day: 0.50     Years: 23.00     Additional pack years: 0.00     Total pack years: 11.50     Types: Cigarettes     Start date: 3/1/1972     Quit date: 10/1/1997     Years since quittin.1    Smokeless tobacco: Never    Tobacco comments:     quit    Substance Use Topics    Alcohol use: Yes     Alcohol/week: 1.7 standard drinks of alcohol     Types: 2 Glasses of wine per week     Comment: 2 glasses of wine daily      Wt Readings from Last 1 Encounters:   23 68.1 kg (150 lb 3.2 oz)        Anesthesia Evaluation   Pt has had prior anesthetic. Type: General.    History of anesthetic complications  - PONV.      ROS/MED HX  ENT/Pulmonary:       Neurologic:       Cardiovascular:     (+)  hypertension- -   -  - -                                      METS/Exercise Tolerance:     Hematologic: Comments: Lab Test        11/29/23     04/26/23     04/07/22     10/31/17     07/19/17                        1155          1514          1618          2126          1331          WBC          5.2          5.3          6.5            < >        5.9           HGB          12.6         12.8         12.6           < >        12.9          MCV          97           94           93             < >        96            PLT          230          220          251            < >        235           INR           --           --           --           --          0.95           < > = values in this interval not displayed.                  Lab Test        04/26/23 04/07/22 12/31/21                       1514          1618          1355          NA           135*         133          131*          POTASSIUM    4.2          4.2          4.6           CHLORIDE     100          100          97            CO2          21*          24           29            BUN          14.0         17           11            CR           0.65         0.55         0.53          ANIONGAP     14           9            5             MARLON          10.2         9.9          10.0          GLC          92           94           90                  Musculoskeletal: Comment: Septic arthritis of shoulder, right (H)      GI/Hepatic: Comment: Irritable bowel syndrome      Renal/Genitourinary:  - neg Renal ROS     Endo:  - neg endo ROS     Psychiatric/Substance Use:     (+) psychiatric history anxiety and depression       Infectious Disease:  - neg infectious disease ROS     Malignancy:  - neg malignancy ROS     Other:      (+)  , H/O Chronic Pain,         Physical Exam    Airway        Mallampati: II   TM distance: > 3 FB   Neck ROM: full   Mouth opening: > 3 cm    Respiratory Devices and Support         Dental       (+) Minor Abnormalities - some fillings, tiny chips      Cardiovascular   cardiovascular exam normal          Pulmonary   pulmonary exam normal                OUTSIDE LABS:  CBC:   Lab Results   Component Value Date    WBC 5.2  11/29/2023    WBC 5.3 04/26/2023    HGB 12.6 11/29/2023    HGB 12.8 04/26/2023    HCT 38.2 11/29/2023    HCT 37.7 04/26/2023     11/29/2023     04/26/2023     BMP:   Lab Results   Component Value Date     (L) 04/26/2023     04/07/2022    POTASSIUM 4.2 04/26/2023    POTASSIUM 4.2 04/07/2022    CHLORIDE 100 04/26/2023    CHLORIDE 100 04/07/2022    CO2 21 (L) 04/26/2023    CO2 24 04/07/2022    BUN 14.0 04/26/2023    BUN 17 04/07/2022    CR 0.65 04/26/2023    CR 0.55 04/07/2022    GLC 92 04/26/2023    GLC 94 04/07/2022     COAGS:   Lab Results   Component Value Date    PTT 27 02/27/2015    INR 0.95 07/19/2017     POC:   Lab Results   Component Value Date     (H) 06/13/2021     HEPATIC:   Lab Results   Component Value Date    ALBUMIN 4.7 04/26/2023    PROTTOTAL 6.9 04/26/2023    ALT 20 04/26/2023    AST 33 04/26/2023    ALKPHOS 68 04/26/2023    BILITOTAL 0.4 04/26/2023     OTHER:   Lab Results   Component Value Date    LACT 0.7 08/13/2021    A1C 5.0 08/19/2021    MARLON 10.2 04/26/2023    MAG 1.5 (L) 06/06/2019    LIPASE 239 10/31/2017    AMYLASE 121 (H) 09/06/2005    TSH 0.85 04/26/2023    CRP <2.9 08/27/2021    SED 8 11/29/2023       Anesthesia Plan    ASA Status:  3       Anesthesia Type: General.     - Airway: ETT   Induction: Intravenous, Propofol.   Maintenance: Balanced.        Consents    Anesthesia Plan(s) and associated risks, benefits, and realistic alternatives discussed. Questions answered and patient/representative(s) expressed understanding.     - Discussed:     - Discussed with:  Patient      - Extended Intubation/Ventilatory Support Discussed: No.      - Patient is DNR/DNI Status: No     Use of blood products discussed: Yes.     - Discussed with: Patient.     - Consented: consented to blood products     Postoperative Care    Pain management: IV analgesics.   PONV prophylaxis: Dexamethasone or Solumedrol, Ondansetron (or other 5HT-3)     Comments:               Herberth AGUILERA  "MD Dylon    I have reviewed the pertinent notes and labs in the chart from the past 30 days and (re)examined the patient.  Any updates or changes from those notes are reflected in this note.              # Overweight: Estimated body mass index is 25.18 kg/m  as calculated from the following:    Height as of 11/7/23: 1.645 m (5' 4.76\").    Weight as of this encounter: 68.1 kg (150 lb 3.2 oz).      "

## 2023-12-06 ENCOUNTER — APPOINTMENT (OUTPATIENT)
Dept: OCCUPATIONAL THERAPY | Facility: CLINIC | Age: 68
End: 2023-12-06
Attending: ORTHOPAEDIC SURGERY
Payer: COMMERCIAL

## 2023-12-06 VITALS
HEART RATE: 59 BPM | DIASTOLIC BLOOD PRESSURE: 69 MMHG | BODY MASS INDEX: 25.18 KG/M2 | TEMPERATURE: 97.1 F | SYSTOLIC BLOOD PRESSURE: 135 MMHG | RESPIRATION RATE: 16 BRPM | OXYGEN SATURATION: 98 % | WEIGHT: 150.2 LBS

## 2023-12-06 LAB
FASTING STATUS PATIENT QL REPORTED: NO
GLUCOSE SERPL-MCNC: 118 MG/DL (ref 70–99)
HGB BLD-MCNC: 11.3 G/DL (ref 11.7–15.7)

## 2023-12-06 PROCEDURE — 250N000011 HC RX IP 250 OP 636: Mod: JZ | Performed by: PHYSICIAN ASSISTANT

## 2023-12-06 PROCEDURE — 82947 ASSAY GLUCOSE BLOOD QUANT: CPT | Performed by: ORTHOPAEDIC SURGERY

## 2023-12-06 PROCEDURE — 85018 HEMOGLOBIN: CPT | Performed by: PHYSICIAN ASSISTANT

## 2023-12-06 PROCEDURE — 36415 COLL VENOUS BLD VENIPUNCTURE: CPT | Performed by: ORTHOPAEDIC SURGERY

## 2023-12-06 PROCEDURE — 97110 THERAPEUTIC EXERCISES: CPT | Mod: GO

## 2023-12-06 PROCEDURE — 97535 SELF CARE MNGMENT TRAINING: CPT | Mod: GO

## 2023-12-06 PROCEDURE — 250N000013 HC RX MED GY IP 250 OP 250 PS 637: Performed by: PHYSICIAN ASSISTANT

## 2023-12-06 PROCEDURE — 97165 OT EVAL LOW COMPLEX 30 MIN: CPT | Mod: GO

## 2023-12-06 RX ADMIN — AMLODIPINE BESYLATE 5 MG: 5 TABLET ORAL at 08:07

## 2023-12-06 RX ADMIN — OXYCODONE HYDROCHLORIDE 5 MG: 5 TABLET ORAL at 03:04

## 2023-12-06 RX ADMIN — ASPIRIN 81 MG: 81 TABLET, COATED ORAL at 08:07

## 2023-12-06 RX ADMIN — IBUPROFEN 600 MG: 600 TABLET, FILM COATED ORAL at 04:33

## 2023-12-06 RX ADMIN — SENNOSIDES AND DOCUSATE SODIUM 1 TABLET: 8.6; 5 TABLET ORAL at 08:07

## 2023-12-06 RX ADMIN — POLYETHYLENE GLYCOL 3350 17 G: 17 POWDER, FOR SOLUTION ORAL at 08:07

## 2023-12-06 RX ADMIN — ATENOLOL 50 MG: 50 TABLET ORAL at 08:07

## 2023-12-06 RX ADMIN — ONDANSETRON 4 MG: 2 INJECTION INTRAMUSCULAR; INTRAVENOUS at 02:57

## 2023-12-06 RX ADMIN — CEFAZOLIN 1 G: 1 INJECTION, POWDER, FOR SOLUTION INTRAMUSCULAR; INTRAVENOUS at 02:57

## 2023-12-06 ASSESSMENT — ACTIVITIES OF DAILY LIVING (ADL)
ADLS_ACUITY_SCORE: 22
ADLS_ACUITY_SCORE: 22
IADL_COMMENTS: INDP
ADLS_ACUITY_SCORE: 22

## 2023-12-06 NOTE — PLAN OF CARE
Nursing Summary:    Patient vital signs are at baseline: Yes  Patient able to ambulate as they were prior to admission or with assist devices provided by therapies during their stay:  Yes  Patient MUST void prior to discharge:  Yes  Patient able to tolerate oral intake:  Yes  Pain has adequate pain control using Oral analgesics:  Yes  Does patient have an identified :  Yes  Has goal D/C date and time been discussed with patient:  Yes     Pt is alert and oriented x4. PRN Oxycodone and Ibuprofen for pain and Zofran for nausea administered through this shift. Capno on. CMS intact. Dressing to right shoulder CDI; NWB.  Pt ambulates to the bathroom 1GB+W. Ongoing monitoring.    Plan: discharge today.    /69   Pulse 59   Temp 97.1  F (36.2  C) (Temporal)   Resp 16   Wt 68.1 kg (150 lb 3.2 oz)   LMP  (LMP Unknown)   SpO2 98%   BMI 25.18 kg/m

## 2023-12-06 NOTE — PLAN OF CARE
Goal Outcome Evaluation:      Plan of Care Reviewed With: patient    Overall Patient Progress: improvingOverall Patient Progress: improving    Patient vital signs are at baseline: Yes  Patient able to ambulate as they were prior to admission or with assist devices provided by therapies during their stay:  Yes  Patient MUST void prior to discharge:  Yes  Patient able to tolerate oral intake:  Yes  Pain has adequate pain control using Oral analgesics:  Yes  Does patient have an identified :  Yes spouse  Has goal D/C date and time been discussed with patient:  Yes    R shoulder still numb, pain at a 1, scheduled tylenol used for pain management. Dressing on R shoulder CDI. Up to the bathroom with assist of one using gait belt. Plan is to discharge home tomorrow.

## 2023-12-06 NOTE — PROGRESS NOTES
Occupational Therapy Discharge Summary    Reason for therapy discharge:    All goals and outcomes met, no further needs identified.    Progress towards therapy goal(s). See goals on Care Plan in [facility] electronic health record for goal details.  Goals met    Therapy recommendation(s):    Per ortho. Demo'd HEP and ADL within precautions and restrictions

## 2023-12-06 NOTE — PROGRESS NOTES
12/06/23 0800   Appointment Info   Signing Clinician's Name / Credentials (OT) Little Olmedo, OTR/L   Rehab Comments (OT) Initial OT Eval   Quick Adds   Quick Adds Certification   Living Environment   Living Environment Comments pt lives in a house with her spouse   Self-Care   Equipment Currently Used at Home none   Fall history within last six months no   Activity/Exercise/Self-Care Comment indp   Instrumental Activities of Daily Living (IADL)   IADL Comments indp   General Information   Onset of Illness/Injury or Date of Surgery 12/05/23   Referring Physician Kateryna Rubalcava PA-C   Patient/Family Therapy Goal Statement (OT) to go home soon   Additional Occupational Profile Info/Pertinent History of Current Problem 69 y/o female POD 1 reverse TSA   Existing Precautions/Restrictions shoulder;weight bearing   Right Upper Extremity (Weight-bearing Status) non weight-bearing (NWB)   General Observations and Info ok for Codman's, wrist, elbow, hand. NWB   Cognitive Status Examination   Orientation Status orientation to person, place and time   Visual Perception   Visual Impairment/Limitations WFL   Sensory   Sensory Quick Adds sensation intact   Pain Assessment   Patient Currently in Pain Yes, see Vital Sign flowsheet   Posture   Posture forward head position;protracted shoulders   Range of Motion Comprehensive   General Range of Motion no range of motion deficits identified   Comment, General Range of Motion within precautions and restrictions for R sh   Strength Comprehensive (MMT)   General Manual Muscle Testing (MMT) Assessment no strength deficits identified   Comment, General Manual Muscle Testing (MMT) Assessment within precautions and restrictions for R SH   Coordination   Upper Extremity Coordination Right UE impaired   Bed Mobility   Bed Mobility No deficits identified   Transfers   Transfers No deficits identified   Activities of Daily Living   BADL Assessment/Intervention bathing;upper body  dressing   Bathing Assessment/Intervention   Kevin Level (Bathing) minimum assist (75% patient effort)   Upper Body Dressing Assessment/Training   Kevin Level (Upper Body Dressing) minimum assist (75% patient effort)   Clinical Impression   Criteria for Skilled Therapeutic Interventions Met (OT) Yes, treatment indicated   OT Diagnosis decreased function in ADL   OT Problem List-Impairments impacting ADL problems related to;range of motion (ROM);strength;pain;post-surgical precautions   Assessment of Occupational Performance 3-5 Performance Deficits   Identified Performance Deficits bathing, dressing, home mgmt, exercises, IADL   Planned Therapy Interventions (OT) ADL retraining;IADL retraining;home program guidelines   Clinical Decision Making Complexity (OT) problem focused assessment/low complexity   Risk & Benefits of therapy have been explained evaluation/treatment results reviewed;care plan/treatment goals reviewed;risks/benefits reviewed;current/potential barriers reviewed;participants voiced agreement with care plan;participants included;patient   Clinical Impression Comments decreased function in ADL Warrants skilled therapy   OT Total Evaluation Time   OT Eval, Low Complexity Minutes (20435) 15   Therapy Certification   Medical Diagnosis reverse TSA   Start of Care Date 12/06/23   Certification date from 12/06/23   Certification date to 12/06/23   OT Goals   Therapy Frequency (OT) One time eval and treatment   OT Predicted Duration/Target Date for Goal Attainment 12/06/23   OT Goals Upper Body Dressing;Upper Body Bathing;OT Goal 1   OT: Upper Body Dressing Modified independent;within precautions   OT: Upper Body Bathing Modified independent;within precautions   OT: Goal 1 Pt will demo HEP within precautions for home   Interventions   Interventions Quick Adds Self-Care/Home Management;Therapeutic Procedures/Exercise   Self-Care/Home Management   Self-Care/Home Mgmt/ADL, Compensatory, Meal Prep  Minutes (14516) 20   Symptoms Noted During/After Treatment (Meal Preparation/Planning Training) increased pain   Treatment Detail/Skilled Intervention educated on NWB, precautions, restrictions, verbalized understanding. handout provided for carryover. educated on LB dressing techniques, min A then progressing to mod I with VC. educated on UB bathing techniques, demo'd progressing to mod I. educated on donning/doffing sling at mirror for visual feedback, progressing to mod I . indp toileting, amb, stairs, etc. end of session all needs in reach   Therapeutic Procedures/Exercise   Therapeutic Procedure: strength, endurance, ROM, flexibillity minutes (16881) 8   Symptoms Noted During/After Treatment increased pain   Treatment Detail/Skilled Intervention ed on Codman's, hand, wrist, elbow, with HEP. demo'd then return demo 5 repx1 set with VC for sequencing, techniques, etc   OT Discharge Planning   OT Plan intervention and discharge   OT Rationale for DC Rec below baseline function s/p reverse TSA. will be mod I within precautions for ADL and HEP with OT education and training   Total Session Time   Timed Code Treatment Minutes 28   Total Session Time (sum of timed and untimed services) 43                                                                                 [facility]      OUTPATIENT OCCUPATIONAL THERAPY  EVALUATION  PLAN OF TREATMENT FOR OUTPATIENT REHABILITATION  (COMPLETE FOR INITIAL CLAIMS ONLY)  Patient's Last Name, First Name, M.I.  YOB: 1955  Denise Ralph                          Provider's Name  [facility] Medical Record No.  [id number]                               Onset Date:  12/05/23   Start of Care Date:  12/06/23     Type:     ___PT   _X_OT   ___SLP Medical Diagnosis:  reverse TSA                        OT Diagnosis:  decreased function in ADL   Visits from SOC:  1    _________________________________________________________________________________  Plan of Treatment/Functional Goals    Planned Interventions: ADL retraining, IADL retraining, home program guidelines   Goals: See Occupational Therapy Goals on Care Plan in [facility] electronic health record.    Therapy Frequency: One time eval and treatment  Predicted Duration of Therapy Intervention: 12/06/23  _________________________________________________________________________________    I CERTIFY THE NEED FOR THESE SERVICES FURNISHED UNDER        THIS PLAN OF TREATMENT AND WHILE UNDER MY CARE .             Physician Signature               Date    X_____________________________________________________                  Certification date from: 12/06/23, Certification date to: 12/06/23    Referring Physician: Kateryna Rubalcava PA-C            Initial Assessment        See Occupational Therapy evaluation dated 12/06/23 in Epic electronic health record.

## 2023-12-06 NOTE — PLAN OF CARE
Goal Outcome Evaluation:       Pt is A&Ox4. Pain well controlled. VSS & on RA LS clear/dim.  IV with 50 ml/hr LR. Tolerating regular diet. No N/V . Good UOP. Normoactive BS no BM this shift. Pt ambulated with one assist, walker and gait belt with r arm sling in place.

## 2023-12-06 NOTE — PROGRESS NOTES
Patient vital signs are at baseline: Yes  Patient able to ambulate as they were prior to admission or with assist devices provided by therapies during their stay:  Yes  Patient MUST void prior to discharge:  Yes  Patient able to tolerate oral intake:  Yes  Pain has adequate pain control using Oral analgesics:  Yes  Does patient have an identified :  Yes  Has goal D/C date and time been discussed with patient:  Yes     Patient discharge to home. CMS intact. AVS gone over. No further question.Med send with.

## 2023-12-06 NOTE — PROGRESS NOTES
[facility]  Daily Orthopedic Post-Op Note         Assessment and Plan:    Assessment:   Post-operative day #1  Procedure: right reverse TSA   Doing well.  Up with OT and dressed  Pain is mild and block worn off  Denies SOB, fever, or nausea  Pain: 5/10      Plan:   -Ambulate  -aspirin for DVT prophy  -sling in place except when showering or doing approved exercises  -follow cultures but prophylactic Doxycycline for one month     Assessment:  Stable post op right reverse TSA with antibiotic bead placement  Plan:  Mobilize  Disposition: Home  Anticipated date of discharge: today         Interval History:   Doing well.  Continues to improve.  Pain is well-controlled.  No fevers.                   Physical Exam:   135/69, 97.1, 59, 16  Dressing clean, dry and intact  Calves soft and non tender  Distal neurovascular exam normal           Data:      Hemoglobin:   Hemoglobin   Date Value Ref Range Status   12/06/2023 11.3 (L) 11.7 - 15.7 g/dL Final   11/29/2023 12.6 11.7 - 15.7 g/dL Final   06/13/2021 11.4 (L) 11.7 - 15.7 g/dL Final   06/12/2021 10.9 (L) 11.7 - 15.7 g/dL Final       Kateryna Rubalcava PA-C   [phone]

## 2023-12-07 LAB
PATH REPORT.COMMENTS IMP SPEC: NORMAL
PATH REPORT.COMMENTS IMP SPEC: NORMAL
PATH REPORT.FINAL DX SPEC: NORMAL
PATH REPORT.GROSS SPEC: NORMAL
PATH REPORT.INTRAOP OBS SPEC DOC: NORMAL
PATH REPORT.MICROSCOPIC SPEC OTHER STN: NORMAL
PATH REPORT.RELEVANT HX SPEC: NORMAL
PHOTO IMAGE: NORMAL

## 2023-12-10 LAB
BACTERIA TISS BX CULT: NO GROWTH

## 2023-12-12 LAB
BACTERIA TISS BX CULT: NORMAL

## 2023-12-13 LAB — BACTERIA SNV CULT: NORMAL

## 2023-12-14 ENCOUNTER — TRANSFERRED RECORDS (OUTPATIENT)
Dept: HEALTH INFORMATION MANAGEMENT | Facility: CLINIC | Age: 68
End: 2023-12-14
Payer: COMMERCIAL

## 2023-12-20 ENCOUNTER — TRANSFERRED RECORDS (OUTPATIENT)
Dept: HEALTH INFORMATION MANAGEMENT | Facility: CLINIC | Age: 68
End: 2023-12-20
Payer: COMMERCIAL

## 2023-12-20 ENCOUNTER — TELEPHONE (OUTPATIENT)
Dept: INTERNAL MEDICINE | Facility: CLINIC | Age: 68
End: 2023-12-20
Payer: COMMERCIAL

## 2023-12-20 NOTE — TELEPHONE ENCOUNTER
Reason for Call:  Appointment Request    Patient requesting this type of appt: Pre-op    Requested provider: Juan F Smallwood    Reason patient unable to be scheduled: Not within requested timeframe    When does patient want to be seen/preferred time:  prior to surgery on 1-2    Comments: patient wondering if anyone would work her in. If Dr Smallwood cannot, she will see anyone    Could we send this information to you in [facility] or would you prefer to receive a phone call?:   Patient would prefer a phone call   Okay to leave a detailed message?: Yes at Home number on file [phone] (home)    Call taken on 12/20/2023 at 11:58 AM by Gayatri Cifuentes

## 2023-12-26 ENCOUNTER — OFFICE VISIT (OUTPATIENT)
Dept: INTERNAL MEDICINE | Facility: CLINIC | Age: 68
End: 2023-12-26
Payer: COMMERCIAL

## 2023-12-26 VITALS
DIASTOLIC BLOOD PRESSURE: 73 MMHG | TEMPERATURE: 97.6 F | SYSTOLIC BLOOD PRESSURE: 136 MMHG | RESPIRATION RATE: 16 BRPM | WEIGHT: 154 LBS | OXYGEN SATURATION: 98 % | BODY MASS INDEX: 25.66 KG/M2 | HEART RATE: 61 BPM | HEIGHT: 65 IN

## 2023-12-26 DIAGNOSIS — H35.341 MACULAR HOLE OF RIGHT EYE: ICD-10-CM

## 2023-12-26 DIAGNOSIS — F51.01 PRIMARY INSOMNIA: ICD-10-CM

## 2023-12-26 DIAGNOSIS — F41.1 ANXIETY STATE: ICD-10-CM

## 2023-12-26 DIAGNOSIS — I10 ESSENTIAL HYPERTENSION, BENIGN: ICD-10-CM

## 2023-12-26 DIAGNOSIS — Z01.818 PRE-OP EXAM: Primary | ICD-10-CM

## 2023-12-26 PROCEDURE — 99214 OFFICE O/P EST MOD 30 MIN: CPT

## 2023-12-26 NOTE — PROGRESS NOTES
Pre-op  [place] [address]  [address]  [facility] [address]  Phone: [phone]  Primary Provider: Juan F Smallwood  Pre-op Performing Provider: ÓSCAR MILTON      PREOPERATIVE EVALUATION:  Today's date: 12/26/2023    Denise is a 68 year old, presenting for the following:  Pre-Op Exam        12/26/2023     2:43 PM   Additional Questions   Roomed by jackelin       Surgical Information:  Surgery/Procedure: Vitrectomy of R eye  Surgery Location: [facility]   Surgeon: PEGGY Cary   Surgery Date: 1/2/23  Time of Surgery: 8:45 am   Where patient plans to recover: At home with family  Fax number for surgical facility: [phone]    Assessment & Plan     The proposed surgical procedure is considered LOW risk.    (Z01.818) Pre-op exam  (primary encounter diagnosis)  Comment: Pt presents for pre operative exam. Okay to proceed with procedure as planned. She is s/p right shoulder replacement. She is doing very well.  Plan:     (H35.341) Macular hole of right eye  Comment: Pt with macular hole of R eye  Plan:     (F51.01) Primary insomnia  Comment: Insomnia controlled with Trazodone nightly  Plan:     (I10) Essential hypertension, benign  Comment: BP at goal at 136/73. Continue Amlodipine 5MG daily and Atenolol 50MG BID  Plan:     (F41.1) Anxiety state  Comment: Pt with anxiety, uses Xanax PRN  Plan:             - No identified additional risk factors other than previously addressed    Antiplatelet or Anticoagulation Medication Instructions:  Pt will check with Eye clinic regarding ASA recommendations.     Additional Medication Instructions:  Pt will take Atenolol and Amlodipine morning of surgery     RECOMMENDATION:  APPROVAL GIVEN to proceed with proposed procedure, without further diagnostic evaluation.            Subjective       HPI related to upcoming procedure: Pt presents for pre-operative appt for vitrectomy of R eye.         12/21/2023     7:20 AM    Preop Questions   1. Have you ever had a heart attack or stroke? No   2. Have you ever had surgery on your heart or blood vessels, such as a stent placement, a coronary artery bypass, or surgery on an artery in your head, neck, heart, or legs? No   3. Do you have chest pain with activity? No   4. Do you have a history of  heart failure? No   5. Do you currently have a cold, bronchitis or symptoms of other infection? No   6. Do you have a cough, shortness of breath, or wheezing? No   7. Do you or anyone in your family have previous history of blood clots? No   8. Do you or does anyone in your family have a serious bleeding problem such as prolonged bleeding following surgeries or cuts? No   9. Have you ever had problems with anemia or been told to take iron pills? No   10. Have you had any abnormal blood loss such as black, tarry or bloody stools, or abnormal vaginal bleeding? No   11. Have you ever had a blood transfusion? No   12. Are you willing to have a blood transfusion if it is medically needed before, during, or after your surgery? Yes   13. Have you or any of your relatives ever had problems with anesthesia? No   14. Do you have sleep apnea, excessive snoring or daytime drowsiness? No   15. Do you have any artifical heart valves or other implanted medical devices like a pacemaker, defibrillator, or continuous glucose monitor? No   16. Do you have artificial joints? YES - R shoulder- had shoulder replacement on 12/5/23   17. Are you allergic to latex? No       Health Care Directive:  Patient has a Health Care Directive on file      Preoperative Review of :   reviewed - received oxycodone postoperatively- no longer requiring pain medication           Review of Systems  CONSTITUTIONAL: NEGATIVE for fever, chills, change in weight  ENT/MOUTH: NEGATIVE for ear, mouth and throat problems  RESP: NEGATIVE for significant cough or SOB  CV: NEGATIVE for chest pain, palpitations or peripheral edema    Patient  Active Problem List    Diagnosis Date Noted    Septic arthritis of shoulder, right (H) 08/14/2021     Priority: High    S/P reverse total shoulder arthroplasty, right 12/05/2023     Priority: Medium    Acute pain of right shoulder 08/13/2021     Priority: Medium    Controlled substance agreement signed 10/18/2017     Priority: Medium    Tension headache 10/18/2017     Priority: Medium    S/P lumbar fusion 08/03/2017     Priority: Medium    Glaucoma 12/05/2014     Priority: Medium    Advanced directives, counseling/discussion 10/16/2012     Priority: Medium     Discussed Advance Directive planning with patient; information given to patient to review.        Basal cell carcinoma 08/01/2009     Priority: Medium    Squamous cell carcinoma 04/01/2007     Priority: Medium    Essential hypertension, benign 09/07/2006     Priority: Medium    Anxiety state 08/09/2004     Priority: Medium      Past Medical History:   Diagnosis Date    Anxiety     Gets panic attacks    Complication of anesthesia     Depressive disorder, not elsewhere classified     Essential hypertension, benign     No cardiologist    Irritable bowel syndrome     has had neg colonoscopy & EGD w/u    Lumbar degenerative disc disease 1996    Had PT, traction, no surgery    Other chronic pain     JOint pain for many years.    PONV (postoperative nausea and vomiting)     Sciatica 03/2006    R thigh;  MRI = R L2-3 disc     Septic arthritis of shoulder, right (H) 08/14/2021    Unspecified glaucoma(365.9)      Past Surgical History:   Procedure Laterality Date    BIOPSY  1/2022    infection in right shoulder    COLONOSCOPY  2014    Clear    EYE SURGERY Bilateral     Treatment of glaucoma    HYSTERECTOMY      HYSTERECTOMY VAGINAL      IRRIGATION AND DEBRIDEMENT UPPER EXTREMITY, COMBINED Right 10/02/2020    Procedure: Right shoulder arthroscopic extensive debridement of the glenohumeral joint and subacromial space for infection.;  Surgeon: Umesh Berman MD;   Location: [facility] OR    LAPAROSCOPIC CHOLECYSTECTOMY  2002    with repeat operation because of bile leak    Left shoulder decomp surgery for impingement  approx 1993    OPTICAL TRACKING SYSTEM FUSION POSTERIOR SPINE LUMBAR N/A 08/03/2017    Procedure: OPTICAL TRACKING SYSTEM FUSION SPINE POSTERIOR LUMBAR ONE LEVEL;  1. L4 Ang-Meyers osteotomy with exposure of the L4 and L5 roots  2. L4-5 complete discectomy with arthrodesis  3. Placement of Globus Creo GOMEZ pedicle screws bilaterally from L4 to L5  4. L4 - L5 posterior lateral fusion with placement of Medtronic Magnifuse and locally harvested autologous bone  5. Use of Stealth and O    OPTICAL TRACKING SYSTEM FUSION POSTERIOR SPINE LUMBAR N/A 06/11/2021    Procedure: Reopening of previous incision with L3-4 decompressive laminectomies with facetectomies and decompression  L3-4 complete discectomy with arthrodesis and placement of a 9 x 28 mm Globus Caliber expandable interbody cage with locally harvested autologous bone placed centrally and anterior to the graft Placement/replacment of Globus Creo pedicle screws from L3-5 bilaterally  L3-4 posterior     ORTHOPEDIC SURGERY Right 10/2020    I & D shoulder    REVERSE ARTHROPLASTY SHOULDER Right 12/5/2023    Procedure: Right reverse total shoulder arthroplasty;  Surgeon: Umesh Berman MD;  Location: [facility] OR    WRIST SURGERY Left      Current Outpatient Medications   Medication Sig Dispense Refill    acetaminophen (TYLENOL) 325 MG tablet Take 2 tablets (650 mg) by mouth every 4 hours as needed for other (mild pain) 100 tablet 0    ALPRAZolam (XANAX) 0.25 MG tablet TAKE 1 TABLET BY MOUTH THREE TIMES A DAY AS NEEDED FOR ANXIETY 30 tablet 1    amLODIPine (NORVASC) 5 MG tablet Take 1 tablet (5 mg) by mouth daily 90 tablet 3    aspirin 81 MG EC tablet Take 1 tablet (81 mg) by mouth 2 times daily 60 tablet 0    atenolol (TENORMIN) 50 MG tablet Take 1 tablet (50 mg) by mouth 2 times daily 180 tablet 3    cyclobenzaprine  (FLEXERIL) 5 MG tablet TAKE 1 TO 2 TABLETS BY MOUTH 3 TIMES A DAY AS NEEDED      doxycycline monohydrate (MONODOX) 100 MG capsule Take 1 capsule (100 mg) by mouth 2 times daily 60 capsule 0    hydrOXYzine HCl (ATARAX) 10 MG tablet Take 1 tablet (10 mg) by mouth every 6 hours as needed for itching or anxiety (with pain, moderate pain) 30 tablet 0    ibuprofen (ADVIL/MOTRIN) 600 MG tablet Take 1 tablet (600 mg) by mouth every 6 hours as needed for mild pain 30 tablet 0    latanoprost (XALATAN) 0.005 % ophthalmic solution Place 1 drop into both eyes At Bedtime      multivitamin w/minerals (MULTI-VITAMIN) tablet Take 1 tablet by mouth daily      ondansetron (ZOFRAN) 4 MG tablet TAKE 1 TABLET BY MOUTH EVERY 6-8 HOURS AS NEEDED NAUSEA      Probiotic Product (PROBIOTIC DAILY PO) Take 1 tablet by mouth daily Probiotic 90 billion units with Fiber 1 PO daily      timolol maleate (TIMOPTIC) 0.5 % ophthalmic solution Place 1 drop Into the left eye every morning      traZODone (DESYREL) 100 MG tablet TAKE 1 TABLET BY MOUTH EVERYDAY AT BEDTIME 90 tablet 1    vitamin D3 (CHOLECALCIFEROL) 50 mcg (2000 units) tablet Take 1 tablet by mouth daily         Allergies   Allergen Reactions    Erythromycin Shortness Of Breath    Msir [Morphine Sulfate] Nausea     Intollerant to Morphine Sulfate: Nausea,vomiting    Buchu-Cornsilk-Ch Grass-Hydran      Patient states she gets sick and loses weight from diuretics.    Dilaudid [Hydromorphone] Visual Disturbance     Hallucinations    Lisinopril      Cough      Losartan      Tongue felt weird      Metoclopramide Hcl Other (See Comments)     Muscle spasms in throat    Penicillins Itching        Social History     Tobacco Use    Smoking status: Former     Packs/day: 0.50     Years: 23.00     Additional pack years: 0.00     Total pack years: 11.50     Types: Cigarettes     Start date: 3/1/1972     Quit date: 10/1/1997     Years since quittin.2    Smokeless tobacco: Never    Tobacco comments:      "quit 1997   Substance Use Topics    Alcohol use: Yes     Alcohol/week: 1.7 standard drinks of alcohol     Types: 2 Glasses of wine per week     Comment: 2 glasses of wine daily       History   Drug Use No         Objective     /73   Pulse 61   Temp 97.6  F (36.4  C) (Oral)   Resp 16   Ht 1.645 m (5' 4.75\")   Wt 69.9 kg (154 lb)   LMP  (LMP Unknown)   SpO2 98%   BMI 25.83 kg/m          Physical Exam  Constitutional:       General: She is not in acute distress.     Appearance: Normal appearance. She is not ill-appearing, toxic-appearing or diaphoretic.   HENT:      Head: Normocephalic and atraumatic.   Eyes:      Conjunctiva/sclera: Conjunctivae normal.   Cardiovascular:      Rate and Rhythm: Normal rate and regular rhythm.      Heart sounds: Normal heart sounds.   Pulmonary:      Effort: Pulmonary effort is normal.      Breath sounds: Normal breath sounds.   Skin:     General: Skin is warm and dry.   Neurological:      Mental Status: She is alert and oriented to person, place, and time.   Psychiatric:         Mood and Affect: Mood normal.         Behavior: Behavior normal.         Thought Content: Thought content normal.         Judgment: Judgment normal.       Recent Labs   Lab Test 12/06/23  0804 11/29/23  1155 04/26/23  1514 04/07/22  1618   HGB 11.3* 12.6 12.8 12.6   PLT  --  230 220 251   NA  --   --  135* 133   POTASSIUM  --   --  4.2 4.2   CR  --   --  0.65 0.55        Diagnostics:  No labs were ordered during this visit.   No EKG required, no history of coronary heart disease, significant arrhythmia, peripheral arterial disease or other structural heart disease.    Revised Cardiac Risk Index (RCRI):  The patient has the following serious cardiovascular risks for perioperative complications:   - No serious cardiac risks = 0 points     RCRI Interpretation: 0 points: Class I (very low risk - 0.4% complication rate)         Signed Electronically by: IRENE Funez CNP  Copy of this evaluation " report is provided to requesting physician.

## 2024-01-07 NOTE — DISCHARGE SUMMARY
[facility] Discharge Summary    Denise Ralph [id number]   Age: 68 year old 1955     Date of Admission:  12/5/2023  Date of Discharge::  12/6/2023 10:20 AM  Admitting Physician:  Umesh Berman MD  Discharge Physician:  Umesh Berman MD               Admission Diagnoses:   Osteoarthritis          Discharge Diagnosis:   Arthoplasty of the shoulder          Procedures:   Procedure(s): Total shoulder arthoplasty (Right)                Medications Prior to Admission:     No medications prior to admission.             Discharge Medications:     Discharge Medication List as of 12/6/2023  9:55 AM      START taking these medications    Details   acetaminophen (TYLENOL) 325 MG tablet Take 2 tablets (650 mg) by mouth every 4 hours as needed for other (mild pain), Disp-100 tablet, R-0, E-Prescribe      aspirin 81 MG EC tablet Take 1 tablet (81 mg) by mouth 2 times daily, Disp-60 tablet, R-0, E-Prescribe      doxycycline monohydrate (MONODOX) 100 MG capsule Take 1 capsule (100 mg) by mouth 2 times daily, Disp-60 capsule, R-0, E-Prescribe      hydrOXYzine HCl (ATARAX) 10 MG tablet Take 1 tablet (10 mg) by mouth every 6 hours as needed for itching or anxiety (with pain, moderate pain), Disp-30 tablet, R-0, E-Prescribe      ibuprofen (ADVIL/MOTRIN) 600 MG tablet Take 1 tablet (600 mg) by mouth every 6 hours as needed for mild pain, Disp-30 tablet, R-0, E-Prescribe      oxyCODONE (ROXICODONE) 5 MG tablet Take 1-2 tablets (5-10 mg) by mouth every 4 hours as needed for moderate to severe pain, Disp-30 tablet, R-0, E-Prescribe      senna-docusate (SENOKOT-S/PERICOLACE) 8.6-50 MG tablet Take 1-2 tablets by mouth 2 times daily Take while on oral narcotics to prevent or treat constipation., Disp-30 tablet, R-0, E-PrescribeWhile taking narcotics         CONTINUE these medications which have NOT CHANGED    Details   ALPRAZolam (XANAX) 0.25 MG tablet TAKE 1 TABLET BY MOUTH THREE TIMES A DAY AS NEEDED FOR ANXIETY, Disp-30  tablet, R-1, E-PrescribeNot to exceed 4 additional fills before 10/21/2023      amLODIPine (NORVASC) 5 MG tablet Take 1 tablet (5 mg) by mouth daily, Disp-90 tablet, R-3, E-Prescribe      atenolol (TENORMIN) 50 MG tablet Take 1 tablet (50 mg) by mouth 2 times daily, Disp-180 tablet, R-3, E-Prescribe      cyclobenzaprine (FLEXERIL) 5 MG tablet TAKE 1 TO 2 TABLETS BY MOUTH 3 TIMES A DAY AS NEEDED, Historical      latanoprost (XALATAN) 0.005 % ophthalmic solution Place 1 drop into both eyes At Bedtime, Historical      multivitamin w/minerals (MULTI-VITAMIN) tablet Take 1 tablet by mouth daily, Historical      ondansetron (ZOFRAN) 4 MG tablet TAKE 1 TABLET BY MOUTH EVERY 6-8 HOURS AS NEEDED NAUSEA, Historical      Probiotic Product (PROBIOTIC DAILY PO) Take 1 tablet by mouth daily Probiotic 90 billion units with Fiber 1 PO daily, Historical      timolol maleate (TIMOPTIC) 0.5 % ophthalmic solution Place 1 drop Into the left eye every morning, Historical      traZODone (DESYREL) 100 MG tablet TAKE 1 TABLET BY MOUTH EVERYDAY AT BEDTIME, Disp-90 tablet, R-1, E-Prescribe      vitamin D3 (CHOLECALCIFEROL) 50 mcg (2000 units) tablet Take 1 tablet by mouth daily, Historical                   Consultations:   Consultation during this admission received from: N/A          Brief History of Illness:   Reason for admission requiring a surgical or invasive procedure:   Arthoplasty of the shoulder   The patient underwent the following procedure(s):   Total shoulder arthoplasty (Right)   There were no immediate complications during this procedure.    Please refer to the full operative summary for details.             Hospital Course:   The patient's hospital course was unremarkable.  The patient recovered as anticipated and experienced no post-operative complications.  They Did not receive a blood transfusion.          Discharge Instructions and Follow-Up:   Discharge diet: Regular   Discharge activity: Activity as tolerated  Sling  full time except for showering and dressing or doing exercises.  May do codman passive pendulum exercised.  No active resisted internal rotation or external rotation past 20 degrees   Discharge follow-up: Follow up with me in 10-14 days   Anticoagulation Asprin 81mg daily for 30 days   Wound care: Leave Aquacel bandage in place  May get bandage wet in shower but do not soak or scrub             Discharge Disposition:   Discharged to home      Attestation:  I have reviewed today's vital signs, notes, medications, labs and imaging.    Kateryna Rubalcava PA-C

## 2024-01-11 ENCOUNTER — TRANSFERRED RECORDS (OUTPATIENT)
Dept: HEALTH INFORMATION MANAGEMENT | Facility: CLINIC | Age: 69
End: 2024-01-11
Payer: COMMERCIAL

## 2024-02-06 ENCOUNTER — NURSE TRIAGE (OUTPATIENT)
Dept: INTERNAL MEDICINE | Facility: CLINIC | Age: 69
End: 2024-02-06
Payer: COMMERCIAL

## 2024-02-06 NOTE — TELEPHONE ENCOUNTER
Per protocol, should be seen today.  No appointment available today.  Advised UC.  Patient ok with waiting until tomorrow.  Assisted in scheduling appointment for tomorrow and advised calling back with any concerning symptoms.  Patient verbalized understanding.     Next 5 appointments (look out 90 days)      Feb 07, 2024  1:00 PM  (Arrive by 12:40 PM)  Provider Visit with IRENE Berg CNP  [facility] ([facility] ) [address]  [address]  [facility] [address]  [phone]     Apr 29, 2024  2:30 PM  (Arrive by 2:10 PM)  Annual Wellness Visit with Juan F Smallwood MD  [facility] ([facility] ) [address]  [address]  [facility] [address]  [phone]            Reason for Disposition   Continuous (nonstop) coughing interferes with work or school and no improvement using cough treatment per Care Advice    Additional Information   Negative: SEVERE difficulty breathing (e.g., struggling for each breath, speaks in single words)   Negative: Lips or face are bluish now and persists when not coughing   Negative: Sounds like a life-threatening emergency to the triager   Negative: Chest pain is main symptom   Negative: Cough and < 3 weeks duration   Negative: Previous asthma attacks and this feels like an asthma attack   Negative: MODERATE difficulty breathing (e.g., speaks in phrases, SOB even at rest, pulse 100-120) and still present when not coughing   Negative: Chest pain  (Exception: MILD central chest pain, present only when coughing.)   Negative: Increasing difficulty breathing and always has some difficulty breathing   Negative: Patient sounds very sick or weak to the triager   Negative: MILD difficulty breathing (e.g., minimal/no SOB at rest, SOB with walking, pulse < 100) and still present when not coughing  (Exception: No change from usual, chronic shortness of breath.)    "Negative: Coughed up blood and > 1 tablespoon (15 ml)  (Exception: Blood-tinged sputum.)   Negative: Fever > 103 F (39.4 C)   Negative: Fever > 101 F (38.3 C) and age > 60 years   Negative: Fever > 100.0 F (37.8 C) and bedridden (e.g., CVA, chronic illness, recovering from surgery)   Negative: Fever > 100.0 F (37.8 C) and diabetes mellitus or weak immune system (e.g., HIV positive, cancer chemo, splenectomy, organ transplant, chronic steroids)    Answer Assessment - Initial Assessment Questions  1. ONSET: \"When did the cough begin?\"       About a month ago  2. SEVERITY: \"How bad is the cough today?\"       Continuing cough for past month  3. SPUTUM: \"Describe the color of your sputum\" (none, dry cough; clear, white, yellow, green)      Non productive  4. HEMOPTYSIS: \"Are you coughing up any blood?\" If so ask: \"How much?\" (flecks, streaks, tablespoons, etc.)      no  5. DIFFICULTY BREATHING: \"Are you having difficulty breathing?\" If Yes, ask: \"How bad is it?\" (e.g., mild, moderate, severe)     - MILD: No SOB at rest, mild SOB with walking, speaks normally in sentences, can lie down, no retractions, pulse < 100.     - MODERATE: SOB at rest, SOB with minimal exertion and prefers to sit, cannot lie down flat, speaks in phrases, mild retractions, audible wheezing, pulse 100-120.     - SEVERE: Very SOB at rest, speaks in single words, struggling to breathe, sitting hunched forward, retractions, pulse > 120       no  6. FEVER: \"Do you have a fever?\" If Yes, ask: \"What is your temperature, how was it measured, and when did it start?\"      no  7. CARDIAC HISTORY: \"Do you have any history of heart disease?\" (e.g., heart attack, congestive heart failure)       no  8. LUNG HISTORY: \"Do you have any history of lung disease?\"  (e.g., pulmonary embolus, asthma, emphysema)      no  9. PE RISK FACTORS: \"Do you have a history of blood clots?\" (or: recent major surgery, recent prolonged travel, bedridden)      no  10. OTHER SYMPTOMS: " "\"Do you have any other symptoms?\" (e.g., runny nose, wheezing, chest pain)        Runny nose, fatigue  11. PREGNANCY: \"Is there any chance you are pregnant?\" \"When was your last menstrual period?\"        no  12. TRAVEL: \"Have you traveled out of the country in the last month?\" (e.g., travel history, exposures)        no    Protocols used: Cough - Chronic-A-[place]    "

## 2024-02-07 ENCOUNTER — OFFICE VISIT (OUTPATIENT)
Dept: INTERNAL MEDICINE | Facility: CLINIC | Age: 69
End: 2024-02-07
Payer: COMMERCIAL

## 2024-02-07 VITALS
HEART RATE: 86 BPM | SYSTOLIC BLOOD PRESSURE: 147 MMHG | TEMPERATURE: 99.1 F | BODY MASS INDEX: 26.16 KG/M2 | OXYGEN SATURATION: 98 % | WEIGHT: 157 LBS | DIASTOLIC BLOOD PRESSURE: 86 MMHG | HEIGHT: 65 IN | RESPIRATION RATE: 18 BRPM

## 2024-02-07 DIAGNOSIS — J40 BRONCHITIS: Primary | ICD-10-CM

## 2024-02-07 PROCEDURE — 99214 OFFICE O/P EST MOD 30 MIN: CPT

## 2024-02-07 RX ORDER — AZITHROMYCIN 250 MG/1
TABLET, FILM COATED ORAL
Qty: 6 TABLET | Refills: 0 | Status: SHIPPED | OUTPATIENT
Start: 2024-02-07 | End: 2024-02-12

## 2024-02-07 RX ORDER — PREDNISONE 20 MG/1
40 TABLET ORAL DAILY
Qty: 10 TABLET | Refills: 0 | Status: SHIPPED | OUTPATIENT
Start: 2024-02-07 | End: 2024-02-12

## 2024-02-07 ASSESSMENT — ENCOUNTER SYMPTOMS: COUGH: 1

## 2024-02-07 NOTE — PROGRESS NOTES
"  Assessment & Plan     (J40) Bronchitis  (primary encounter diagnosis)  Comment: Patient presents to the clinic with a chief complaint of ongoing cough since beginning of January.  Clinical picture suggest acute bronchitis and therefore we will start patient on azithromycin and a short steroid burst to bring down inflammation and help with her cough.  Patient agrees with the plan.  Patient does have a significant allergy list which does include ingredients in azithromycin and prednisone.  Patient states she has previously tolerated these meds in the past and therefore we will go forward with treatment.  Plan: azithromycin (ZITHROMAX) 250 MG tablet,         predniSONE (DELTASONE) 20 MG tablet        Medication sent to pharmacy.  Discussed with patient to reach out to clinic if she experiences any side effects of medications.  Advised taking her steroid with protein in the morning and to avoid taking it late in the day as it can interfere with her sleep.  Patient agrees with the plan.      30 minutes spent by me on the date of the encounter doing chart review, patient visit, and documentation       BMI  Estimated body mass index is 26.33 kg/m  as calculated from the following:    Height as of this encounter: 1.645 m (5' 4.75\").    Weight as of this encounter: 71.2 kg (157 lb).   Weight management plan: Patient was referred to their PCP to discuss a diet and exercise plan.          Lyndsey Walker is a 69 year old, presenting for the following health issues: Patient has been dealing with a cough for over a month. She has been having coughing fits from time to time. It is a dry cough and no sputum.   No fevers that she has had.   She worries about her shoulder as she just had it replaced.   Eating and drinking okay.       Cough (Chronic cough and runny nose since last month)        2/7/2024    12:54 PM   Additional Questions   Roomed by azamit   Accompanied by self         2/7/2024    12:54 PM   Patient Reported " "Additional Medications   Patient reports taking the following new medications none     Cough    History of Present Illness       Reason for visit:  Cough.  Symptom onset:  More than a month  Symptoms include:  Cough.  Symptom intensity:  Mild  Symptom progression:  Staying the same  Had these symptoms before:  Yes  Has tried/received treatment for these symptoms:  Yes  Previous treatment was successful:  Yes  Prior treatment description:  Take Delsum cough medicine.  Stay hydrated. Rest. Cough drops.  What makes it worse:  Lying flat on my back.  What makes it better:  Not sure.  It's wearing me down.    She eats 2-3 servings of fruits and vegetables daily.She consumes 0 sweetened beverage(s) daily.She exercises with enough effort to increase her heart rate 10 to 19 minutes per day.  She exercises with enough effort to increase her heart rate 3 or less days per week.   She is taking medications regularly.                 Review of Systems  Constitutional, HEENT, cardiovascular, pulmonary, gi and gu systems are negative, except as otherwise noted.      Objective    BP (!) 147/86 (BP Location: Left arm, Patient Position: Sitting, Cuff Size: Adult Regular)   Pulse 86   Temp 99.1  F (37.3  C) (Oral)   Resp 18   Ht 1.645 m (5' 4.75\")   Wt 71.2 kg (157 lb)   LMP  (LMP Unknown)   SpO2 98%   BMI 26.33 kg/m    Body mass index is 26.33 kg/m .  Physical Exam   GENERAL: alert and no distress  EYES: Eyes grossly normal to inspection, PERRL and conjunctivae and sclerae normal  HENT: ear canals and TM's normal, nose and mouth without ulcers or lesions  NECK: no adenopathy, no asymmetry, masses, or scars  RESP: lungs clear to auscultation - no rales, rhonchi or wheezes  CV: regular rate and rhythm, normal S1 S2, no S3 or S4, no murmur, click or rub, no peripheral edema  ABDOMEN: soft, nontender, no hepatosplenomegaly, no masses and bowel sounds normal  MS: no gross musculoskeletal defects noted, no edema            Signed " Electronically by: IRENE Berg CNP

## 2024-02-19 ENCOUNTER — TELEPHONE (OUTPATIENT)
Dept: INTERNAL MEDICINE | Facility: CLINIC | Age: 69
End: 2024-02-19
Payer: COMMERCIAL

## 2024-02-19 NOTE — TELEPHONE ENCOUNTER
Patient Quality Outreach    Patient is due for the following:       Topic Date Due    Flu Vaccine (1) 09/01/2023    COVID-19 Vaccine (6 - 2023-24 season) 09/01/2023       Next Steps:   No follow up needed at this time.    Type of outreach:    Chart review performed, no outreach needed.      Questions for provider review:    None           Ariadne Jacques, CMA

## 2024-02-22 ENCOUNTER — TRANSFERRED RECORDS (OUTPATIENT)
Dept: HEALTH INFORMATION MANAGEMENT | Facility: CLINIC | Age: 69
End: 2024-02-22
Payer: COMMERCIAL

## 2024-02-23 DIAGNOSIS — I10 HYPERTENSION GOAL BP (BLOOD PRESSURE) < 140/80: ICD-10-CM

## 2024-02-23 RX ORDER — AMLODIPINE BESYLATE 5 MG/1
5 TABLET ORAL DAILY
Qty: 90 TABLET | Refills: 3 | Status: SHIPPED | OUTPATIENT
Start: 2024-02-23 | End: 2024-07-29

## 2024-03-08 ENCOUNTER — NURSE TRIAGE (OUTPATIENT)
Dept: INTERNAL MEDICINE | Facility: CLINIC | Age: 69
End: 2024-03-08
Payer: COMMERCIAL

## 2024-03-08 NOTE — TELEPHONE ENCOUNTER
Nurse Triage SBAR    Is this a 2nd Level Triage? NO    Situation: Patient calls regarding loose stools.    Background: She woke up with stomach cramping this morning just before 5 am and has has several episodes of diarrhea. She asks if this could be Norovirus or stomach flu and if she needs to be evaluated.     Assessment: Loose stools and abdominal cramping started at around 5 am today. She has had 5 episodes so far today. She did push herself to eat some food this morning to take her antibiotics (on daily antibiotic since January following shoulder surgery). Cramping initially on just the right side, but now in the middle of the abdomen as well. No fevers or lightheadedness. No known exposures to any GI illness, but she has been to stores for shopping. She has not eaten out recently.    Protocol Recommended Disposition:   Home Care    Recommendation: Informed patient this could be a viral illness, but cannot say if it is from Norovirus or another cause. Antibiotic could cause diarrhea, but would expect that to develop more gradually. Due to her age, advised that she monitor closely at home and home care advice given for diarrhea treatment. Patient instructed to call clinic back if she has 2 more diarrhea episodes today (total of 7 for the day) or having symptoms of dehydration to discuss what next steps should be. This writer did discuss ADS as possible option (depending on when she would have to call back) if she did have 7 episodes of diarrhea today. Patient states she is a hard stick for IVs, may need ER instead if dehydrated.    Does the patient meet one of the following criteria for ADS visit consideration? 16+ years old, with an [facility] PCP     TIP  Providers, please consider if this condition is appropriate for management at one of our Acute and Diagnostic Services sites.     If patient is a good candidate, please use dotphrase <dot>triageresponse and select Refer to ADS to document.    Sonya Del Real,  [facility]     Reason for Disposition   MILD-MODERATE diarrhea (e.g., 1-6 times / day more than normal)    Additional Information   Negative: Shock suspected (e.g., cold/pale/clammy skin, too weak to stand, low BP, rapid pulse)   Negative: Difficult to awaken or acting confused (e.g., disoriented, slurred speech)   Negative: Sounds like a life-threatening emergency to the triager   Negative: Vomiting also present and worse than the diarrhea   Negative: Blood in stool and without diarrhea   Negative: SEVERE abdominal pain (e.g., excruciating) and present > 1 hour   Negative: SEVERE abdominal pain and age > 60 years   Negative: Bloody, black, or tarry bowel movements  (Exception: Chronic-unchanged black-grey bowel movements and is taking iron pills or Pepto-Bismol.)   Negative: SEVERE diarrhea (e.g., 7 or more times / day more than normal) and age > 60 years   Negative: Constant abdominal pain lasting > 2 hours   Negative: Drinking very little and dehydration suspected (e.g., no urine > 12 hours, very dry mouth, very lightheaded)   Negative: Patient sounds very sick or weak to the triager   Negative: SEVERE diarrhea (e.g., 7 or more times / day more than normal) and present > 24 hours (1 day)   Negative: MODERATE diarrhea (e.g., 4-6 times / day more than normal) and present > 48 hours (2 days)   Negative: MODERATE diarrhea (e.g., 4-6 times / day more than normal) and age > 70 years   Negative: Abdominal pain (Exception: Pain clears completely with each passage of diarrhea stool.)   Negative: Fever > 101 F (38.3 C)   Negative: Blood in the stool  (Exception: Only on toilet paper. Reason: Diarrhea can cause rectal irritation with blood on wiping.)   Negative: Mucus or pus in stool has been present > 2 days and diarrhea is more than mild   Negative: Weak immune system (e.g., HIV positive, cancer chemo, splenectomy, organ transplant, chronic steroids)   Negative: Travel to a foreign country  in past month   Negative: Recent antibiotic therapy (i.e., within last 2 months) and diarrhea present > 3 days since antibiotic was stopped   Negative: Recent hospitalization and diarrhea present > 3 days   Negative: Tube feedings (e.g., nasogastric, g-tube, j-tube)   Negative: MILD diarrhea (e.g., 1-3 or more stools than normal in past 24 hours) diarrhea without known cause and present > 7 days   Negative: Patient wants to be seen   Negative: Diarrhea is a chronic symptom (recurrent or ongoing AND lasting > 4 weeks)   Negative: SEVERE diarrhea (e.g., 7 or more times / day more than normal)    Protocols used: Diarrhea-A-OH

## 2024-04-15 ENCOUNTER — HOSPITAL ENCOUNTER (OUTPATIENT)
Dept: MAMMOGRAPHY | Facility: CLINIC | Age: 69
Discharge: HOME | End: 2024-04-15
Attending: INTERNAL MEDICINE | Admitting: INTERNAL MEDICINE
Payer: COMMERCIAL

## 2024-04-15 DIAGNOSIS — Z12.31 VISIT FOR SCREENING MAMMOGRAM: ICD-10-CM

## 2024-04-15 PROCEDURE — 77063 BREAST TOMOSYNTHESIS BI: CPT

## 2024-04-22 SDOH — HEALTH STABILITY: PHYSICAL HEALTH: ON AVERAGE, HOW MANY DAYS PER WEEK DO YOU ENGAGE IN MODERATE TO STRENUOUS EXERCISE (LIKE A BRISK WALK)?: 2 DAYS

## 2024-04-22 SDOH — HEALTH STABILITY: PHYSICAL HEALTH: ON AVERAGE, HOW MANY MINUTES DO YOU ENGAGE IN EXERCISE AT THIS LEVEL?: 30 MIN

## 2024-04-22 ASSESSMENT — SOCIAL DETERMINANTS OF HEALTH (SDOH): HOW OFTEN DO YOU GET TOGETHER WITH FRIENDS OR RELATIVES?: PATIENT DECLINED

## 2024-04-29 ENCOUNTER — OFFICE VISIT (OUTPATIENT)
Dept: INTERNAL MEDICINE | Facility: CLINIC | Age: 69
End: 2024-04-29
Payer: COMMERCIAL

## 2024-04-29 VITALS
HEART RATE: 59 BPM | SYSTOLIC BLOOD PRESSURE: 139 MMHG | HEIGHT: 65 IN | OXYGEN SATURATION: 99 % | BODY MASS INDEX: 25.62 KG/M2 | RESPIRATION RATE: 16 BRPM | WEIGHT: 153.8 LBS | DIASTOLIC BLOOD PRESSURE: 85 MMHG | TEMPERATURE: 98.4 F

## 2024-04-29 DIAGNOSIS — I10 ESSENTIAL HYPERTENSION, BENIGN: ICD-10-CM

## 2024-04-29 DIAGNOSIS — Z00.00 ENCOUNTER FOR PREVENTATIVE ADULT HEALTH CARE EXAMINATION: Primary | ICD-10-CM

## 2024-04-29 DIAGNOSIS — F41.1 ANXIETY STATE: ICD-10-CM

## 2024-04-29 DIAGNOSIS — Z12.11 ENCOUNTER FOR COLORECTAL CANCER SCREENING: ICD-10-CM

## 2024-04-29 DIAGNOSIS — Z29.11 NEED FOR VACCINATION AGAINST RESPIRATORY SYNCYTIAL VIRUS: ICD-10-CM

## 2024-04-29 DIAGNOSIS — F51.01 PRIMARY INSOMNIA: ICD-10-CM

## 2024-04-29 DIAGNOSIS — Z12.12 ENCOUNTER FOR COLORECTAL CANCER SCREENING: ICD-10-CM

## 2024-04-29 PROBLEM — M00.9 SEPTIC ARTHRITIS OF SHOULDER, RIGHT (H): Status: RESOLVED | Noted: 2021-08-14 | Resolved: 2024-04-29

## 2024-04-29 LAB
ALBUMIN SERPL BCG-MCNC: 4.5 G/DL (ref 3.5–5.2)
ALBUMIN UR-MCNC: NEGATIVE MG/DL
ALP SERPL-CCNC: 78 U/L (ref 40–150)
ALT SERPL W P-5'-P-CCNC: 23 U/L (ref 0–50)
ANION GAP SERPL CALCULATED.3IONS-SCNC: 13 MMOL/L (ref 7–15)
APPEARANCE UR: CLEAR
AST SERPL W P-5'-P-CCNC: 39 U/L (ref 0–45)
BACTERIA #/AREA URNS HPF: ABNORMAL /HPF
BILIRUB SERPL-MCNC: 0.4 MG/DL
BILIRUB UR QL STRIP: NEGATIVE
BUN SERPL-MCNC: 16.8 MG/DL (ref 8–23)
CALCIUM SERPL-MCNC: 10 MG/DL (ref 8.8–10.2)
CHLORIDE SERPL-SCNC: 98 MMOL/L (ref 98–107)
CHOLEST SERPL-MCNC: 175 MG/DL
COLOR UR AUTO: YELLOW
CREAT SERPL-MCNC: 0.64 MG/DL (ref 0.51–0.95)
DEPRECATED HCO3 PLAS-SCNC: 24 MMOL/L (ref 22–29)
EGFRCR SERPLBLD CKD-EPI 2021: >90 ML/MIN/1.73M2
ERYTHROCYTE [DISTWIDTH] IN BLOOD BY AUTOMATED COUNT: 12.6 % (ref 10–15)
FASTING STATUS PATIENT QL REPORTED: NO
GLUCOSE SERPL-MCNC: 95 MG/DL (ref 70–99)
GLUCOSE UR STRIP-MCNC: NEGATIVE MG/DL
HCT VFR BLD AUTO: 38 % (ref 35–47)
HDLC SERPL-MCNC: 80 MG/DL
HGB BLD-MCNC: 12.8 G/DL (ref 11.7–15.7)
HGB UR QL STRIP: NEGATIVE
KETONES UR STRIP-MCNC: NEGATIVE MG/DL
LDLC SERPL CALC-MCNC: 75 MG/DL
LEUKOCYTE ESTERASE UR QL STRIP: NEGATIVE
MCH RBC QN AUTO: 31.5 PG (ref 26.5–33)
MCHC RBC AUTO-ENTMCNC: 33.7 G/DL (ref 31.5–36.5)
MCV RBC AUTO: 94 FL (ref 78–100)
NITRATE UR QL: NEGATIVE
NONHDLC SERPL-MCNC: 95 MG/DL
PH UR STRIP: 6.5 [PH] (ref 5–7)
PLATELET # BLD AUTO: 235 10E3/UL (ref 150–450)
POTASSIUM SERPL-SCNC: 4.4 MMOL/L (ref 3.4–5.3)
PROT SERPL-MCNC: 6.8 G/DL (ref 6.4–8.3)
RBC # BLD AUTO: 4.06 10E6/UL (ref 3.8–5.2)
RBC #/AREA URNS AUTO: ABNORMAL /HPF
SODIUM SERPL-SCNC: 135 MMOL/L (ref 135–145)
SP GR UR STRIP: 1.01 (ref 1–1.03)
SQUAMOUS #/AREA URNS AUTO: ABNORMAL /LPF
TRIGL SERPL-MCNC: 99 MG/DL
TSH SERPL DL<=0.005 MIU/L-ACNC: 1.79 UIU/ML (ref 0.3–4.2)
UROBILINOGEN UR STRIP-ACNC: 0.2 E.U./DL
WBC # BLD AUTO: 6 10E3/UL (ref 4–11)
WBC #/AREA URNS AUTO: ABNORMAL /HPF

## 2024-04-29 PROCEDURE — G0439 PPPS, SUBSEQ VISIT: HCPCS | Performed by: INTERNAL MEDICINE

## 2024-04-29 PROCEDURE — 36415 COLL VENOUS BLD VENIPUNCTURE: CPT | Performed by: INTERNAL MEDICINE

## 2024-04-29 PROCEDURE — 85027 COMPLETE CBC AUTOMATED: CPT | Performed by: INTERNAL MEDICINE

## 2024-04-29 PROCEDURE — 80053 COMPREHEN METABOLIC PANEL: CPT | Performed by: INTERNAL MEDICINE

## 2024-04-29 PROCEDURE — 81001 URINALYSIS AUTO W/SCOPE: CPT | Performed by: INTERNAL MEDICINE

## 2024-04-29 PROCEDURE — 80061 LIPID PANEL: CPT | Performed by: INTERNAL MEDICINE

## 2024-04-29 PROCEDURE — 84443 ASSAY THYROID STIM HORMONE: CPT | Performed by: INTERNAL MEDICINE

## 2024-04-29 RX ORDER — RESPIRATORY SYNCYTIAL VIRUS VACCINE 120MCG/0.5
0.5 KIT INTRAMUSCULAR ONCE
Qty: 1 EACH | Refills: 0 | Status: CANCELLED | OUTPATIENT
Start: 2024-04-29 | End: 2024-04-29

## 2024-04-29 ASSESSMENT — PAIN SCALES - GENERAL: PAINLEVEL: NO PAIN (1)

## 2024-04-29 NOTE — PROGRESS NOTES
Preventive Care Visit  [facility]  Juan F Smallwood MD, Internal Medicine  Apr 29, 2024      Assessment & Plan       Encounter for colorectal cancer screening    - Colonoscopy Screening  Referral; Future    Encounter for preventative adult health care examination    - Lipid panel reflex to direct LDL Non-fasting  - CBC with platelets  - Comprehensive metabolic panel (BMP + Alb, Alk Phos, ALT, AST, Total. Bili, TP)  - TSH with free T4 reflex  - UA with Microscopic reflex to Culture - lab collect    Essential hypertension, benign  Controlled, continue treatment   - OFFICE/OUTPT VISIT,EST,LEVL III    Anxiety state  On PRN Xanax, uses PRN infrequently   - OFFICE/OUTPT VISIT,EST,LEVL III    Primary insomnia  On Trazodone   - OFFICE/OUTPT VISIT,EST,LEVL III    Patient has been advised of split billing requirements and indicates understanding: Yes          Counseling  Appropriate preventive services were discussed with this patient, including applicable screening as appropriate for fall prevention, nutrition, physical activity, Tobacco-use cessation, weight loss and cognition.  Checklist reviewing preventive services available has been given to the patient.  Reviewed patient's diet, addressing concerns and/or questions.   She is at risk for lack of exercise and has been provided with information to increase physical activity for the benefit of her well-being.   She is at risk for psychosocial distress and has been provided with information to reduce risk.       See Patient Instructions    Lyndsey Walker is a 69 year old, presenting for the following:  Wellness Visit        4/29/2024     2:21 PM   Additional Questions   Roomed by Ariadne MILIAN   Accompanied by n/a         Health Care Directive  Patient has a Health Care Directive on file  Advance care planning document is on file and is current.    HPI      Has h/o HTN. on medical treatment. BP has been controlled. No side effects from  medications. No CP, HA, dizziness. good compliance with medications and low salt diet.  Has h/o anxiety, on PRN Xanax, rarely using. Helps with symptoms.   Has h/o insomnia. On Trazodone. Helps with symptoms.             4/22/2024   General Health   How would you rate your overall physical health? Good   Feel stress (tense, anxious, or unable to sleep) Only a little   (!) STRESS CONCERN      4/22/2024   Nutrition   Diet: Regular (no restrictions)         4/22/2024   Exercise   Days per week of moderate/strenous exercise 2 days   Average minutes spent exercising at this level 30 min   (!) EXERCISE CONCERN      4/22/2024   Social Factors   Frequency of gathering with friends or relatives Patient declined   Worry food won't last until get money to buy more No   Food not last or not have enough money for food? No   Do you have housing?  Yes   Are you worried about losing your housing? No   Lack of transportation? No   Unable to get utilities (heat,electricity)? No         4/28/2024   Fall Risk   Fallen 2 or more times in the past year? No   Trouble with walking or balance? No          4/22/2024   Activities of Daily Living- Home Safety   Needs help with the following daily activites None of the above   Safety concerns in the home None of the above         4/22/2024   Dental   Dentist two times every year? Yes         4/22/2024   Hearing Screening   Hearing concerns? None of the above         4/22/2024   Driving Risk Screening   Patient/family members have concerns about driving No         4/22/2024   General Alertness/Fatigue Screening   Have you been more tired than usual lately? No         4/22/2024   Urinary Incontinence Screening   Bothered by leaking urine in past 6 months No         4/22/2024   TB Screening   Were you born outside of the [place]? No         Today's PHQ-2 Score:       4/28/2024     3:24 PM   PHQ-2 ( 1999 [employer])   Q1: Little interest or pleasure in doing things 0   Q2: Feeling down, depressed or  hopeless 0   PHQ-2 Score 0   Q1: Little interest or pleasure in doing things Not at all   Q2: Feeling down, depressed or hopeless Not at all   PHQ-2 Score 0           2024   Substance Use   Alcohol more than 3/day or more than 7/wk No   Do you have a current opioid prescription? No   How severe/bad is pain from 1 to 10? 1/10   Do you use any other substances recreationally? No    (!) DECLINE     Social History     Tobacco Use    Smoking status: Former     Current packs/day: 0.00     Average packs/day: 0.5 packs/day for 25.6 years (12.8 ttl pk-yrs)     Types: Cigarettes     Start date: 3/1/1972     Quit date: 10/1/1997     Years since quittin.5    Smokeless tobacco: Never    Tobacco comments:     quit    Vaping Use    Vaping status: Never Used   Substance Use Topics    Alcohol use: Yes     Alcohol/week: 1.7 standard drinks of alcohol     Types: 2 Glasses of wine per week     Comment: 2 glasses of wine daily    Drug use: No           4/15/2024   LAST [facility]HS-7 RESULTS   1st degree relative breast or ovarian cancer Yes   Any relative bilateral breast cancer No   Any male have breast cancer No   Any ONE woman have BOTH breast AND ovarian cancer No   Any woman with breast cancer before 50yrs No   2 or more relatives with breast AND/OR ovarian cancer No   2 or more relatives with breast AND/OR bowel cancer No        Mammogram Screening - Mammogram every 1-2 years updated in Health Maintenance based on mutual decision making    ASCVD Risk   The 10-year ASCVD risk score (Christiana PERES, et al., 2019) is: 13.3%    Values used to calculate the score:      Age: 69 years      Sex: Female      Is Non- : No      Diabetic: No      Tobacco smoker: No      Systolic Blood Pressure: 139 mmHg      Is BP treated: Yes      HDL Cholesterol: 74 mg/dL      Total Cholesterol: 171 mg/dL            Reviewed and updated as needed this visit by Provider                    Lab work is in process  Labs  "reviewed in [facility]  Current providers sharing in care for this patient include:  Patient Care Team:  Juan F Smallwood MD as PCP - General (Internal Medicine)  Juan F Smallwood MD as Assigned PCP    The following health maintenance items are reviewed in Epic and correct as of today:  Health Maintenance   Topic Date Due    RSV VACCINE (Pregnancy & 60+) (1 - 1-dose 60+ series) Never done    INFLUENZA VACCINE (1) 09/01/2023    COVID-19 Vaccine (6 - 2023-24 season) 09/01/2023    ANNUAL REVIEW OF HM ORDERS  04/26/2024    MEDICARE ANNUAL WELLNESS VISIT  04/26/2024    COLORECTAL CANCER SCREENING  12/23/2024    FALL RISK ASSESSMENT  04/29/2025    MAMMO SCREENING  04/15/2026    GLUCOSE  12/06/2026    LIPID  04/26/2028    ADVANCE CARE PLANNING  04/29/2029    DTAP/TDAP/TD IMMUNIZATION (3 - Td or Tdap) 01/31/2030    DEXA  04/13/2037    PHQ-2 (once per calendar year)  Completed    Pneumococcal Vaccine: 65+ Years  Completed    ZOSTER IMMUNIZATION  Completed    IPV IMMUNIZATION  Aged Out    HPV IMMUNIZATION  Aged Out    MENINGITIS IMMUNIZATION  Aged Out    RSV MONOCLONAL ANTIBODY  Aged Out    HEPATITIS C SCREENING  Discontinued         Review of Systems  Constitutional, HEENT, cardiovascular, pulmonary, GI, , musculoskeletal, neuro, skin, endocrine and psych systems are negative, except as otherwise noted.     Objective    Exam  /85 (BP Location: Left arm, Cuff Size: Adult Regular)   Pulse 59   Temp 98.4  F (36.9  C) (Tympanic)   Resp 16   Ht 1.645 m (5' 4.75\")   Wt 69.8 kg (153 lb 12.8 oz)   LMP  (LMP Unknown)   SpO2 99%   BMI 25.79 kg/m     Estimated body mass index is 25.79 kg/m  as calculated from the following:    Height as of this encounter: 1.645 m (5' 4.75\").    Weight as of this encounter: 69.8 kg (153 lb 12.8 oz).    Physical Exam  GENERAL: alert and no distress  EYES: Eyes grossly normal to inspection, PERRL and conjunctivae and sclerae normal  HENT: ear canals and TM's normal, nose and mouth without " ulcers or lesions  NECK: no adenopathy, no asymmetry, masses, or scars  RESP: lungs clear to auscultation - no rales, rhonchi or wheezes  CV: regular rate and rhythm, normal S1 S2, no S3 or S4, no murmur, click or rub, no peripheral edema  ABDOMEN: soft, nontender, no hepatosplenomegaly, no masses and bowel sounds normal  MS: no gross musculoskeletal defects noted, no edema  SKIN: no suspicious lesions or rashes  NEURO: Normal strength and tone, mentation intact and speech normal  PSYCH: mentation appears normal, affect normal/bright         4/29/2024   Mini Cog   Clock Draw Score 2 Normal   3 Item Recall 3 objects recalled   Mini Cog Total Score 5              Signed Electronically by: Juan F Smallwood MD

## 2024-05-07 DIAGNOSIS — F41.1 ANXIETY STATE: ICD-10-CM

## 2024-05-07 RX ORDER — ALPRAZOLAM 0.25 MG
TABLET ORAL
Qty: 30 TABLET | Refills: 0 | Status: SHIPPED | OUTPATIENT
Start: 2024-05-07 | End: 2024-07-03

## 2024-05-08 DIAGNOSIS — F51.01 PRIMARY INSOMNIA: ICD-10-CM

## 2024-05-08 RX ORDER — TRAZODONE HYDROCHLORIDE 100 MG/1
100 TABLET ORAL AT BEDTIME
Qty: 90 TABLET | Refills: 2 | Status: SHIPPED | OUTPATIENT
Start: 2024-05-08

## 2024-05-08 NOTE — TELEPHONE ENCOUNTER
Prescription approved per [facility] Refill Protocol.  Leann Franco, RN  [facility] Triage Nurse

## 2024-05-30 ENCOUNTER — TRANSFERRED RECORDS (OUTPATIENT)
Dept: HEALTH INFORMATION MANAGEMENT | Facility: CLINIC | Age: 69
End: 2024-05-30
Payer: COMMERCIAL

## 2024-06-10 ENCOUNTER — MYC MEDICAL ADVICE (OUTPATIENT)
Dept: INTERNAL MEDICINE | Facility: CLINIC | Age: 69
End: 2024-06-10
Payer: COMMERCIAL

## 2024-06-10 DIAGNOSIS — R09.89 RUNNY NOSE: ICD-10-CM

## 2024-06-10 DIAGNOSIS — R09.81 CONGESTION OF PARANASAL SINUS: ICD-10-CM

## 2024-06-10 RX ORDER — FLUTICASONE PROPIONATE 50 MCG
SPRAY, SUSPENSION (ML) NASAL
Qty: 16 ML | Refills: 11 | Status: SHIPPED | OUTPATIENT
Start: 2024-06-10

## 2024-07-03 DIAGNOSIS — F41.1 ANXIETY STATE: ICD-10-CM

## 2024-07-03 RX ORDER — ALPRAZOLAM 0.25 MG
TABLET ORAL
Qty: 30 TABLET | Refills: 0 | Status: SHIPPED | OUTPATIENT
Start: 2024-07-03 | End: 2024-09-03

## 2024-07-26 ENCOUNTER — NURSE TRIAGE (OUTPATIENT)
Dept: INTERNAL MEDICINE | Facility: CLINIC | Age: 69
End: 2024-07-26
Payer: COMMERCIAL

## 2024-07-26 NOTE — TELEPHONE ENCOUNTER
Nurse Triage SBAR    Is this a 2nd Level Triage? NO    Situation: High BP reading today. Patient states that Dr. Smallwood advised her to call/follow-up if BP is high. Patient has anxiety.     Background: Patient is on Amlodipine and Atenolol for hypertension. States that usual BP reading when at rest is 125/75.     Assessment: BP of 155/95. Writer advised to check BP after 5 mins and it was 158/106. Patient has anxiety and states that she is panicking/getting worked up while retaking the BP. Patient has taken her meds today. Cough x 2 weeks now. Slight headache. Denies blurred vision, chest pain, difficulty breathing, headache, weakness/numbness, or any other symptoms.     Protocol Recommended Disposition:   See in Office Within 3 Days    Recommendation: Scheduled to see Ayse Marion on Monday. Advised patient to relax, drink water and recheck BP. Advised when to call and escalate care. Patient agreed to plan.     Does the patient meet one of the following criteria for ADS visit consideration? 16+ years old, with an [facility] PCP     TIP  Providers, please consider if this condition is appropriate for management at one of our Acute and Diagnostic Services sites.     If patient is a good candidate, please use dotphrase <dot>triageresponse and select Refer to ADS to document.    Reason for Disposition   Systolic BP >= 160 OR Diastolic >= 100    Additional Information   Negative: Sounds like a life-threatening emergency to the triager   Negative: Symptom is main concern (e.g., headache, chest pain)   Negative: Low blood pressure is main concern   Negative: Systolic BP >= 160 OR Diastolic >= 100, and any cardiac (e.g., breathing difficulty, chest pain) or neurologic symptoms (e.g., new-onset blurred or double vision)   Negative: Pregnant 20 or more weeks (or postpartum < 6 weeks) with new hand or face swelling   Negative: Pregnant 20 or more weeks (or postpartum < 6 weeks) and Systolic BP >= 160 OR Diastolic >= 110   Negative:  "Patient sounds very sick or weak to the triager   Negative: Systolic BP >= 200 OR Diastolic >= 120 and having NO cardiac or neurologic symptoms   Negative: Pregnant 20 or more weeks (or postpartum < 6 weeks) with Systolic BP >= 140 OR Diastolic >= 90   Negative: Systolic BP >= 180 OR Diastolic >= 110, and missed most recent dose of blood pressure medication   Negative: Systolic BP >= 180 OR Diastolic >= 110   Negative: Patient wants to be seen   Negative: Ran out of BP medications   Negative: Taking BP medications and feels is having side effects (e.g., impotence, cough, dizziness)    Answer Assessment - Initial Assessment Questions  1. BLOOD PRESSURE: \"What is the blood pressure?\" \"Did you take at least two measurements 5 minutes apart?\"      155/95 to 158/106 after 5 mins  2. ONSET: \"When did you take your blood pressure?\"      Today, 5 mins ago.   3. HOW: \"How did you take your blood pressure?\" (e.g., automatic home BP monitor, visiting nurse)      Automatic home BP monitor.   4. HISTORY: \"Do you have a history of high blood pressure?\"      Yes.   5. MEDICINES: \"Are you taking any medicines for blood pressure?\" \"Have you missed any doses recently?\"      Yes. Amlodipine and Atenolol.  6. OTHER SYMPTOMS: \"Do you have any symptoms?\" (e.g., blurred vision, chest pain, difficulty breathing, headache, weakness)      Cough x 2 weeks now. Slight headache. No other symptoms.    Protocols used: Blood Pressure - High-A-OH    "

## 2024-07-29 ENCOUNTER — OFFICE VISIT (OUTPATIENT)
Dept: INTERNAL MEDICINE | Facility: CLINIC | Age: 69
End: 2024-07-29
Payer: COMMERCIAL

## 2024-07-29 VITALS
TEMPERATURE: 98.7 F | HEART RATE: 72 BPM | DIASTOLIC BLOOD PRESSURE: 92 MMHG | HEIGHT: 65 IN | SYSTOLIC BLOOD PRESSURE: 162 MMHG | WEIGHT: 152.5 LBS | RESPIRATION RATE: 14 BRPM | BODY MASS INDEX: 25.41 KG/M2 | OXYGEN SATURATION: 98 %

## 2024-07-29 DIAGNOSIS — I10 PRIMARY HYPERTENSION: Primary | ICD-10-CM

## 2024-07-29 PROCEDURE — 99213 OFFICE O/P EST LOW 20 MIN: CPT

## 2024-07-29 RX ORDER — AMLODIPINE BESYLATE 10 MG/1
10 TABLET ORAL DAILY
Qty: 90 TABLET | Refills: 3 | Status: SHIPPED | OUTPATIENT
Start: 2024-07-29

## 2024-07-29 RX ORDER — DOXYCYCLINE HYCLATE 100 MG
100 TABLET ORAL DAILY
COMMUNITY

## 2024-07-29 ASSESSMENT — ENCOUNTER SYMPTOMS: HEADACHES: 1

## 2024-07-29 NOTE — PROGRESS NOTES
"  Assessment & Plan     (I10) Primary hypertension  (primary encounter diagnosis)  Comment: Pt reports that her SBP 3 days ago was in the 150's. Her SBP today is 155 and 162 for her physical examination. Yesterday was 150 again.   Plan: amLODIPine (NORVASC) 10 MG tablet        Change amlodipine dose from 5 mg to 10 mg and explained to patient the common side effect of lower extremity edema that is associated with Norvasc          BMI  Estimated body mass index is 25.57 kg/m  as calculated from the following:    Height as of this encounter: 1.645 m (5' 4.75\").    Weight as of this encounter: 69.2 kg (152 lb 8 oz).             Lyndsey Walker is a 69 year old, presenting for the following health issues: Pt reports that her SBP 3 days ago was in the 150's. Her SBP today is 155 and 162 for her physical examination. Yesterday was 150 again. Pt said that this morning her SBP was 120's. Patient admits that a lot of her hypertension is anxiety related. Pt reports anxiety and takes Xanax for that but it knocks her out. Pt doesn't exercise like she used to.  Provided patient with literature about hypertension and the numbers to worry about that 180/120 is considered hypertensive emergency or urgency depending upon if she is having symptoms associated with the elevated blood pressure.  Explained to patient the possible side effect of increased dose of Norvasc is lower extremity edema.  Patient is allergic to losartan where she has swollen tongue and she is allergic to lisinopril with the adverse effect of the bradykinin cough.    Patient is also reporting that she has a hard time sleeping at night which is a new issue.  Patient already takes trazodone for sleep aid.  Discussed taking melatonin and also discussed having good sleep hygiene and provided literature supporting it for her to read.  Headache and Hypertension      7/29/2024     1:27 PM   Additional Questions   Roomed by Stephanie Ramos MA   Accompanied by Self " "    Headache     History of Present Illness       Headaches:   Since the patient's last clinic visit, headaches are: no change  The patient is getting headaches:  Lately, daily.  She is not able to do normal daily activities when she has a migraine.  The patient is taking the following rescue/relief medications:  Ibuprofen (Advil, Motrin)   Patient states \"I get total relief\" from the rescue/relief medications.   The patient is taking the following medications to prevent migraines:  No medications to prevent migraines  In the past 4 weeks, the patient has gone to an Urgent Care or Emergency Room 0 times times due to headaches.    She eats 2-3 servings of fruits and vegetables daily.She consumes 1 sweetened beverage(s) daily.She exercises with enough effort to increase her heart rate 20 to 29 minutes per day.  She exercises with enough effort to increase her heart rate 3 or less days per week.   She is taking medications regularly.         Hypertension Follow-up    Do you check your blood pressure regularly outside of the clinic? Yes   Are you following a low salt diet? Yes  Are your blood pressures ever more than 140 on the top number (systolic) OR more   than 90 on the bottom number (diastolic), for example 140/90? Yes  How many servings of fruits and vegetables do you eat daily?  2-3  On average, how many sweetened beverages do you drink each day (Examples: soda, juice, sweet tea, etc.  Do NOT count diet or artificially sweetened beverages)?   1  How many days per week do you exercise enough to make your heart beat faster? 3 or less  How many minutes a day do you exercise enough to make your heart beat faster? 20 - 29  How many days per week do you miss taking your medication? 0        Review of Systems  Constitutional, HEENT, cardiovascular, pulmonary, gi and gu systems are negative, except as otherwise noted.      Objective    BP (!) 155/88 (BP Location: Left arm, Patient Position: Sitting, Cuff Size: Adult " "Regular)   Pulse 72   Temp 98.7  F (37.1  C) (Oral)   Resp 14   Ht 1.645 m (5' 4.75\")   Wt 69.2 kg (152 lb 8 oz)   LMP  (LMP Unknown)   SpO2 98%   BMI 25.57 kg/m    Body mass index is 25.57 kg/m .  Physical Exam   GENERAL: alert and no distress  NECK: no adenopathy, no asymmetry, masses, or scars  RESP: lungs clear to auscultation - no rales, rhonchi or wheezes  CV: regular rate and rhythm, normal S1 S2, no S3 or S4, no murmur, click or rub, no peripheral edema  ABDOMEN: soft, nontender, no hepatosplenomegaly, no masses and bowel sounds normal  MS: no gross musculoskeletal defects noted, no edema  SKIN: no suspicious lesions or rashes  NEURO: Normal strength and tone, mentation intact and speech normal  PSYCH: mentation appears normal, affect normal/bright            Signed Electronically by: IRENE Juarez CNP    "

## 2024-09-03 DIAGNOSIS — F41.1 ANXIETY STATE: ICD-10-CM

## 2024-09-03 RX ORDER — ALPRAZOLAM 0.25 MG
TABLET ORAL
Qty: 30 TABLET | Refills: 0 | Status: SHIPPED | OUTPATIENT
Start: 2024-09-03

## 2024-09-05 DIAGNOSIS — I10 HYPERTENSION GOAL BP (BLOOD PRESSURE) < 140/80: ICD-10-CM

## 2024-09-06 RX ORDER — ATENOLOL 50 MG/1
50 TABLET ORAL 2 TIMES DAILY
Qty: 180 TABLET | Refills: 3 | Status: SHIPPED | OUTPATIENT
Start: 2024-09-06

## 2024-09-25 ENCOUNTER — NURSE TRIAGE (OUTPATIENT)
Dept: INTERNAL MEDICINE | Facility: CLINIC | Age: 69
End: 2024-09-25
Payer: COMMERCIAL

## 2024-09-25 NOTE — TELEPHONE ENCOUNTER
Patient contacts clinic stating that they have been having severe neck stiffness since this weekend.  Patient denies fever denies headache at the time of the call and is associating this neck pain with possible shoulder tear.  Ultimately patient was suggested to schedule an office visit within 3 days time to which patient states that she will plan to go and visit [facility] for management of neck stiffness.    Reason for Disposition   MODERATE neck pain (e.g., interferes with normal activities like work or school)    Additional Information   Negative: Shock suspected (e.g., cold/pale/clammy skin, too weak to stand, low BP, rapid pulse)   Negative: Similar pain previously and it was from 'heart attack'   Negative: Similar pain previously from 'angina' and not relieved by nitroglycerin   Negative: Difficult to awaken or acting confused (e.g., disoriented, slurred speech)   Negative: Sounds like a life-threatening emergency to the triager   Negative: Followed an injury to neck (e.g., MVA, sports, impact or collision)   Negative: Chest pain   Negative: Lymph node in the neck is swollen or painful to the touch   Negative: Sore throat is main symptom   Negative: Difficulty breathing or unusual sweating (e.g., sweating without exertion)   Negative: Chest pain lasting longer than 5 minutes   Negative: Stiff neck (can't touch chin to chest) and has headache   Negative: Stiff neck (can't touch chin to chest) and fever   Negative: Weakness of an arm or hand   Negative: Problems with bowel or bladder control   Negative: Patient sounds very sick or weak to the triager   Negative: SEVERE pain (e.g., excruciating, unable to do any normal activities)   Negative: Head is twisting to one side (or ask 'is it turning against your will?')   Negative: Fever > 103 F (39.4 C)   Negative: Fever > 100.0 F (37.8 C) and Intravenous Drug Use (IVDU)   Negative: Fever > 100.0 F (37.8 C) and has diabetes mellitus or a weak immune system (e.g., HIV  "positive, cancer chemotherapy, organ transplant, splenectomy, chronic steroids)   Negative: Numbness in an arm or hand (i.e., loss of sensation)   Negative: Painful rash with multiple small blisters grouped together (i.e., dermatomal distribution or 'band' or 'stripe')   Negative: High-risk adult (e.g., history of cancer, HIV, or IV Drug Use)   Negative: Patient wants to be seen   Negative: Tenderness in front of neck over windpipe    Answer Assessment - Initial Assessment Questions  1. ONSET: \"When did the pain begin?\"       Saturday Morning  2. LOCATION: \"Where does it hurt?\"       Left sided, \"'whole neck on left side\"  3. PATTERN \"Does the pain come and go, or has it been constant since it started?\"       Constant. Hurts with rotating neck   -3-4/10 w/ advil    -9/10 w/o treatment    4. SEVERITY: \"How bad is the pain?\"  (Scale 1-10; or mild, moderate, severe)    - NO PAIN (0): no pain or only slight stiffness     - MILD (1-3): doesn't interfere with normal activities     - MODERATE (4-7): interferes with normal activities or awakens from sleep     - SEVERE (8-10):  excruciating pain, unable to do any normal activities       Mild-moderate  5. RADIATION: \"Does the pain go anywhere else, shoot into your arms?\"      Denies   6. CORD SYMPTOMS: \"Any weakness or numbness of the arms or legs?\"      Denies   7. CAUSE: \"What do you think is causing the neck pain?\"      Left should tear  8. NECK OVERUSE: \"Any recent activities that involved turning or twisting the neck?\"      Denies   9. OTHER SYMPTOMS: \"Do you have any other symptoms?\" (e.g., headache, fever, chest pain, difficulty breathing, neck swelling)      Does have headache at time of call, and other symptoms    Protocols used: Neck Pain or [id number]    "

## 2024-10-15 NOTE — CONSULTS
Note INFECTIOUS DISEASES CONSULTATION NOTE  Covering for Nils Contreras & Ludin     Date 2021     Name /  Denise Ralph   1955     MRN [id number]     Thank you for asking us to see Denise BILLY Ramo for infectious diseases evaluation      CHIEF COMPLAINT    R shoulder pain    HPI    2019 R rotator cuff surgery  10/2020 R shoulder infection => surgical I & D  Course of iv broad spectrum antimicrobial agents  No (+) cultures to confirm pathogen  21 Admit w R shoulder pain  Arthrocentesis - 88,610 WBC / 81 % PMNs  no crystals seen    From ortho consult  66 year old female who was admitted on 2021 for right shoulder pain and fever. Shoulder aspiration on  shows nucleated cell count of 88,610, negative gram stain, negative for crystals, cultures pending, CRP 31. The patient has a previous right shoulder rotator cuff repair in 2019 with a subsequent spontaneous septic right shoulder in 2020 which required irrigation and debridement followed by 6 weeks of IV antibiotics.     ROS    Recently increasing pain shoulder  No recent febrile illnesses  No recent dysuria, diarrhea, abdl pain, open skin sores  No recent trauma or change in activity w R shoulder  R-hand dominant    Rest of 14 pt ROS neg    OTHER HISTORY  [facility]  Past Medical History:   Diagnosis Date     Anxiety     Gets panic attacks     Complication of anesthesia      Depressive disorder, not elsewhere classified      Essential hypertension, benign     No cardiologist     Irritable bowel syndrome     has had neg colonoscopy & EGD w/u     Lumbar degenerative disc disease     Had PT, traction, no surgery     Other chronic pain     JOint pain for many years.     PONV (postoperative nausea and vomiting)      Sciatica 3/06    R thigh;  MRI = R L2-3 disc      Unspecified glaucoma(365.9)      Past Surgical History:   Procedure Laterality Date     EYE SURGERY Bilateral     Treatment of glaucoma     HYSTERECTOMY       HYSTERECTOMY  POST-OP DIAGNOSIS:  Encounter for sterilization 15-Oct-2024 18:08:44  Ashleigh Villarreal   VAGINAL       IRRIGATION AND DEBRIDEMENT UPPER EXTREMITY, COMBINED Right 10/2/2020    Procedure: Right shoulder arthroscopic extensive debridement of the glenohumeral joint and subacromial space for infection.;  Surgeon: Umesh Berman MD;  Location: [facility] OR     LAPAROSCOPIC CHOLECYSTECTOMY      with repeat operation because of bile leak     Left shoulder decomp surgery for impingement  approx      OPTICAL TRACKING SYSTEM FUSION POSTERIOR SPINE LUMBAR N/A 8/3/2017    Procedure: OPTICAL TRACKING SYSTEM FUSION SPINE POSTERIOR LUMBAR ONE LEVEL;  1. L4 Ang-Meyers osteotomy with exposure of the L4 and L5 roots  2. L4-5 complete discectomy with arthrodesis  3. Placement of Globus Creo GOMEZ pedicle screws bilaterally from L4 to L5  4. L4 - L5 posterior lateral fusion with placement of Medtronic Magnifuse and locally harvested autologous bone  5. Use of Stealth and O     OPTICAL TRACKING SYSTEM FUSION POSTERIOR SPINE LUMBAR N/A 2021    Procedure: Reopening of previous incision with L3-4 decompressive laminectomies with facetectomies and decompression  L3-4 complete discectomy with arthrodesis and placement of a 9 x 28 mm Globus Caliber expandable interbody cage with locally harvested autologous bone placed centrally and anterior to the graft Placement/replacment of Globus Creo pedicle screws from L3-5 bilaterally  L3-4 posterior      ORTHOPEDIC SURGERY Right 10/2020    I & D shoulder     WRIST SURGERY Left       Problem (# of Occurrences) Relation (Name,Age of Onset)    Breast Cancer (1) Mother: diagnosed age 60's    Cardiovascular (2) Mother: / mi, Brother:  of MI age 34 2nd to Cocaine Use    Respiratory (1) Father: pulmonary fibrosis.       Negative family history of: Cancer - colorectal        Family History   Problem Relation Age of Onset     Cardiovascular Mother         / mi     Breast Cancer Mother         diagnosed age 60's     Respiratory Father         pulmonary fibrosis.      Cardiovascular Brother          of MI age 34 2nd to Cocaine Use     Cancer - colorectal No family hx of      Social History     Socioeconomic History     Marital status:      Spouse name: Not on file     Number of children: Not on file     Years of education: Not on file     Highest education level: Not on file   Occupational History     Occupation:    Tobacco Use     Smoking status: Former Smoker     Packs/day: 0.50     Years: 25.00     Pack years: 12.50     Quit date: 10/1/1997     Years since quittin.8     Smokeless tobacco: Never Used     Tobacco comment: quit    Substance and Sexual Activity     Alcohol use: Yes     Alcohol/week: 1.7 standard drinks     Types: 2 Glasses of wine per week     Comment: 2 glasses of wine daily     Drug use: No     Sexual activity: Yes     Partners: Male   Other Topics Concern     Parent/sibling w/ CABG, MI or angioplasty before 65F 55M? Not Asked   Social History Narrative     Not on file     Social Determinants of Health     Financial Resource Strain:      Difficulty of Paying Living Expenses:    Food Insecurity:      Worried About Running Out of Food in the Last Year:      Ran Out of Food in the Last Year:    Transportation Needs:      Lack of Transportation (Medical):      Lack of Transportation (Non-Medical):    Physical Activity:      Days of Exercise per Week:      Minutes of Exercise per Session:    Stress:      Feeling of Stress :    Social Connections:      Frequency of Communication with Friends and Family:      Frequency of Social Gatherings with Friends and Family:      Attends [employer] Services:      Active Member of Clubs or Organizations:      Attends Club or Organization Meetings:      Marital Status:    Intimate Partner Violence:      Fear of Current or Ex-Partner:      Emotionally Abused:      Physically Abused:      Sexually Abused:      Allergies   Allergen Reactions     Erythromycin Shortness Of Breath     Msir [Morphine  "Sulfate] Nausea     Intollerant to Morphine Sulfate: Nausea,vomiting     Dilaudid [Hydromorphone] Visual Disturbance     Hallucinations     Diuretic      Patient states she gets sick and loses weight from diuretics.     Lisinopril      Cough       Losartan      Tongue felt weird       Penicillins Itching     Reglan Other (See Comments)     Muscle spasms in throat     Immunization History   Administered Date(s) Administered     COVID-19,PF,Pfizer 03/03/2021, 03/25/2021     HEPA 02/17/2006, 10/06/2006     Influenza (High Dose) 3 valent vaccine 10/10/2019     Influenza (IIV3) PF 01/06/2016     Influenza Vaccine IM > 6 months Valent IIV4 11/07/2017     Influenza, Quad, High Dose, Pf, 65yr + 11/02/2020     Pneumo Conj 13-V (2010&after) 10/10/2019     Pneumococcal 23 valent 11/02/2020     TD (ADULT, 7+) 01/31/2020     TDAP Vaccine (Adacel) 03/10/2009     Td (Adult), Adsorbed 03/27/1998, 07/21/2005     Zoster vaccine recombinant adjuvanted (SHINGRIX) 04/30/2018, 06/26/2018     Zoster vaccine, live 10/07/2014     EXAM  BP (!) 149/79 (BP Location: Right arm)   Pulse 71   Temp 98.9  F (37.2  C) (Oral)   Resp 18   Ht 1.651 m (5' 5\")   Wt 64 kg (141 lb)   SpO2 97%   BMI 23.46 kg/m      genl   In bed  no acute distress     R shoulder   Not red, not swollen  No palpable temp difference w L shoulder     abd   Soft / not tender     neuro   Normal conversation / moves all limbs     psyche   Somber mood     skin   No rash   CV   RRR   lungs   clear       DATA  Cultures        R shoulder synovial fluid    LABS  Recent Labs   Lab Test 08/14/21  0716 08/13/21  1133 04/12/21  1536   WBC 6.0 5.6 5.5   AST  --  30 30   ALT  --  27 29   BILITOTAL  --  0.4 0.7   ALKPHOS  --  86 66          ASSESSMENT   SUGGESTIONS  Patient Active Problem List   Diagnosis Code     Frequent UTI N39.0     FUO (fever of unknown origin) R50.9     Hospital discharge follow-up Z09     Acute pain of right shoulder M25.511     Septic arthritis R shoulder  "     Suspicious for infection R shoulder  Treated Oct 2020 for infection - no confirmed pathogen .... ? Not infection or difficult to grow pathogen  No evidence systemic infection  No other clue to specific pathogen  Crystal analysis this time negative     ? Pathogen of low virulence perhaps not found in culture (C acnes)  ? Non tuberculous mycobacterium or fungus not o/w easily cultured   ? Other    I ordered following to be done on OR specimens from tomorrow ( in addition to regular cultures and other tests)  -- AFB culture  -- fungal culture  -- PCR for 16s rRNA and PCR for 28s rRNA    Recommend request micro lab to hold anaerobic culture for 14 days (optimize recovery of C acnes)           Jose G Rubalcava MD  Covering for Drs Ludin & Ben  Infectious Disease service  Current Meds Reviewed  Current Facility-Administered Medications   Medication     ALPRAZolam (XANAX) tablet 0.25 mg     amLODIPine (NORVASC) tablet 2.5 mg     atenolol (TENORMIN) tablet 50 mg     ceFEPIme (MAXIPIME) 2 g vial to attach to  ml bag for ADULTS or 50 ml bag for PEDS     fentaNYL (PF) (SUBLIMAZE) injection 12.5-25 mcg     fluticasone (FLONASE) 50 MCG/ACT spray 1 spray     HYDROcodone-acetaminophen (NORCO) 5-325 MG per tablet 1-2 tablet     lactated ringers infusion     latanoprost (XALATAN) 0.005 % ophthalmic solution 1 drop     lidocaine (LMX4) cream     lidocaine 1 % 0.1-1 mL     melatonin tablet 1 mg     naloxone (NARCAN) injection 0.2 mg    Or     naloxone (NARCAN) injection 0.4 mg    Or     naloxone (NARCAN) injection 0.2 mg    Or     naloxone (NARCAN) injection 0.4 mg     ondansetron (ZOFRAN-ODT) ODT tab 4 mg    Or     ondansetron (ZOFRAN) injection 4 mg     polyethylene glycol (MIRALAX) Packet 17 g     prochlorperazine (COMPAZINE) injection 5 mg     senna-docusate (SENOKOT-S/PERICOLACE) 8.6-50 MG per tablet 1 tablet    Or     senna-docusate (SENOKOT-S/PERICOLACE) 8.6-50 MG per tablet 2 tablet     sodium chloride (PF) 0.9% PF  flush 3 mL     sodium chloride (PF) 0.9% PF flush 3 mL     timolol maleate (TIMOPTIC) 0.5 % ophthalmic solution 1 drop     traZODone (DESYREL) tablet 100 mg     vancomycin (VANCOCIN) 1000 mg in dextrose 5% 200 mL PREMIX

## 2024-11-05 ENCOUNTER — MYC MEDICAL ADVICE (OUTPATIENT)
Dept: INTERNAL MEDICINE | Facility: CLINIC | Age: 69
End: 2024-11-05
Payer: COMMERCIAL

## 2024-11-05 DIAGNOSIS — I10 ESSENTIAL HYPERTENSION, BENIGN: Primary | ICD-10-CM

## 2024-11-05 RX ORDER — AMLODIPINE BESYLATE 5 MG/1
5 TABLET ORAL DAILY
Qty: 90 TABLET | Refills: 1 | Status: SHIPPED | OUTPATIENT
Start: 2024-11-05

## 2024-11-08 ENCOUNTER — TELEPHONE (OUTPATIENT)
Dept: INTERNAL MEDICINE | Facility: CLINIC | Age: 69
End: 2024-11-08
Payer: COMMERCIAL

## 2024-11-08 ENCOUNTER — MYC MEDICAL ADVICE (OUTPATIENT)
Dept: INTERNAL MEDICINE | Facility: CLINIC | Age: 69
End: 2024-11-08
Payer: COMMERCIAL

## 2024-11-08 NOTE — TELEPHONE ENCOUNTER
Patient Quality Outreach    Patient is due for the following:       Topic Date Due    COVID-19 Vaccine (6 - 2024-25 season) 09/01/2024       Next Steps:   Schedule a nurse only visit for bp    Type of outreach:    Sent [employer] message.      Questions for provider review:    None           Ariadne Jacques, CMA

## 2024-11-11 ENCOUNTER — TRANSFERRED RECORDS (OUTPATIENT)
Dept: HEALTH INFORMATION MANAGEMENT | Facility: CLINIC | Age: 69
End: 2024-11-11
Payer: COMMERCIAL

## 2024-11-17 DIAGNOSIS — F41.1 ANXIETY STATE: ICD-10-CM

## 2024-11-18 RX ORDER — ALPRAZOLAM 0.25 MG/1
TABLET ORAL
Qty: 30 TABLET | Refills: 0 | Status: SHIPPED | OUTPATIENT
Start: 2024-11-18

## 2024-11-26 ENCOUNTER — TRANSFERRED RECORDS (OUTPATIENT)
Dept: HEALTH INFORMATION MANAGEMENT | Facility: CLINIC | Age: 69
End: 2024-11-26
Payer: COMMERCIAL

## 2025-01-19 DIAGNOSIS — F51.01 PRIMARY INSOMNIA: ICD-10-CM

## 2025-01-20 RX ORDER — TRAZODONE HYDROCHLORIDE 100 MG/1
100 TABLET ORAL AT BEDTIME
Qty: 90 TABLET | Refills: 2 | Status: SHIPPED | OUTPATIENT
Start: 2025-01-20

## 2025-02-08 DIAGNOSIS — F41.1 ANXIETY STATE: ICD-10-CM

## 2025-02-10 RX ORDER — ALPRAZOLAM 0.25 MG
TABLET ORAL
Qty: 30 TABLET | Refills: 0 | Status: SHIPPED | OUTPATIENT
Start: 2025-02-10

## 2025-03-28 NOTE — TELEPHONE ENCOUNTER
Message was left advising patient of providers decision. Paperwork was emailed and voicemail stated to arrive 15 mins prior to appt with the completed paperwork   Pharmacy request for 90 day supply

## 2025-04-18 ENCOUNTER — HOSPITAL ENCOUNTER (OUTPATIENT)
Dept: MAMMOGRAPHY | Facility: CLINIC | Age: 70
Discharge: HOME | End: 2025-04-18
Attending: INTERNAL MEDICINE | Admitting: INTERNAL MEDICINE
Payer: COMMERCIAL

## 2025-04-18 DIAGNOSIS — Z12.31 VISIT FOR SCREENING MAMMOGRAM: ICD-10-CM

## 2025-04-18 PROCEDURE — 77063 BREAST TOMOSYNTHESIS BI: CPT

## 2025-04-22 ENCOUNTER — TRANSFERRED RECORDS (OUTPATIENT)
Dept: HEALTH INFORMATION MANAGEMENT | Facility: CLINIC | Age: 70
End: 2025-04-22
Payer: COMMERCIAL

## 2025-04-23 ENCOUNTER — TRANSFERRED RECORDS (OUTPATIENT)
Dept: HEALTH INFORMATION MANAGEMENT | Facility: CLINIC | Age: 70
End: 2025-04-23
Payer: COMMERCIAL

## 2025-04-29 DIAGNOSIS — I10 ESSENTIAL HYPERTENSION, BENIGN: ICD-10-CM

## 2025-04-29 RX ORDER — AMLODIPINE BESYLATE 5 MG/1
5 TABLET ORAL DAILY
Qty: 90 TABLET | Refills: 0 | Status: SHIPPED | OUTPATIENT
Start: 2025-04-29

## 2025-05-02 PROBLEM — M51.360 DEGENERATION OF INTERVERTEBRAL DISC OF LUMBAR REGION WITH DISCOGENIC BACK PAIN: Status: ACTIVE | Noted: 2025-05-02

## 2025-05-06 ENCOUNTER — MYC MEDICAL ADVICE (OUTPATIENT)
Dept: FAMILY MEDICINE | Facility: CLINIC | Age: 70
End: 2025-05-06
Payer: COMMERCIAL

## 2025-05-12 ENCOUNTER — TRANSFERRED RECORDS (OUTPATIENT)
Dept: HEALTH INFORMATION MANAGEMENT | Facility: CLINIC | Age: 70
End: 2025-05-12
Payer: COMMERCIAL

## 2025-05-12 ENCOUNTER — TELEPHONE (OUTPATIENT)
Dept: INTERNAL MEDICINE | Facility: CLINIC | Age: 70
End: 2025-05-12
Payer: COMMERCIAL

## 2025-05-12 ENCOUNTER — MEDICAL CORRESPONDENCE (OUTPATIENT)
Dept: HEALTH INFORMATION MANAGEMENT | Facility: CLINIC | Age: 70
End: 2025-05-12
Payer: COMMERCIAL

## 2025-05-12 DIAGNOSIS — M19.90 CHRONIC OSTEOARTHRITIS: ICD-10-CM

## 2025-05-12 DIAGNOSIS — Z78.0 MENOPAUSE: ICD-10-CM

## 2025-05-12 DIAGNOSIS — M85.80 OSTEOPENIA: Primary | ICD-10-CM

## 2025-05-12 NOTE — TELEPHONE ENCOUNTER
Order/Referral Request    Who is requesting: UZMA BONILLA CNP     Orders being requested: [facility] cpt: [id number] and [id number] * orders will be handed to the RN for review       Reason service is needed/diagnosis: patient has upcoming spine surgery not yet scheduled     When are orders needed by: soon    Has this been discussed with Provider: unknown to writer     Does patient have a preference on a Group/Provider/Facility? Yes [facility]     Does patient have an appointment scheduled?: no     Where to send orders: Place orders within Epic    [facility] or would you prefer to receive a phone call?:   patient will wait for   to call and schedule

## 2025-05-12 NOTE — TELEPHONE ENCOUNTER
Needs diagnosis codes- not sure if we can add CPT codes, attempted to call [facility]  [phone] to inquire. They are closed after 5PM, please try again tomorrow.     Summer RN 5:06 PM May 12, 2025   [facility]

## 2025-05-13 NOTE — TELEPHONE ENCOUNTER
This is an outside order from her doctor at [facility]. I will give it to our dexa team and they will call her to schedule.

## 2025-05-13 NOTE — TELEPHONE ENCOUNTER
Form from [facility] states [facility] is asking for Dr Smallwood to order DEXA scan for Hips and Spine and Wrist/forearm. To evaluate for Osteoporosis.     Pt has history of Osteopenia and fracture of wrist.     Pended order. Please confirm and sign.

## 2025-05-15 ENCOUNTER — ANCILLARY PROCEDURE (OUTPATIENT)
Dept: BONE DENSITY | Facility: CLINIC | Age: 70
End: 2025-05-15
Attending: INTERNAL MEDICINE
Payer: COMMERCIAL

## 2025-05-15 DIAGNOSIS — M19.90 CHRONIC OSTEOARTHRITIS: ICD-10-CM

## 2025-05-15 DIAGNOSIS — Z78.0 MENOPAUSE: ICD-10-CM

## 2025-05-15 PROCEDURE — 77080 DXA BONE DENSITY AXIAL: CPT | Mod: TC | Performed by: PHYSICIAN ASSISTANT

## 2025-05-15 PROCEDURE — 77081 DXA BONE DENSITY APPENDICULR: CPT | Mod: TC | Performed by: PHYSICIAN ASSISTANT

## 2025-05-16 ENCOUNTER — ANCILLARY PROCEDURE (OUTPATIENT)
Dept: GENERAL RADIOLOGY | Facility: CLINIC | Age: 70
End: 2025-05-16
Attending: PHYSICIAN ASSISTANT
Payer: COMMERCIAL

## 2025-05-16 DIAGNOSIS — S99.922A INJURY OF TOE ON LEFT FOOT, INITIAL ENCOUNTER: ICD-10-CM

## 2025-05-16 PROCEDURE — 73660 X-RAY EXAM OF TOE(S): CPT | Mod: TC | Performed by: RADIOLOGY

## 2025-05-19 ENCOUNTER — RESULTS FOLLOW-UP (OUTPATIENT)
Dept: INTERNAL MEDICINE | Facility: CLINIC | Age: 70
End: 2025-05-19

## 2025-05-19 NOTE — LETTER
May 20, 2025      Denise Ralph  [address] [address] [id number]        Dear ,    We are writing to inform you of your test results.    Decreased bone density- osteopenia.   Recommend daily intake of vitamin D3 1000 units and calcium 1200 mg.     Resulted Orders   DX Bone Density    Narrative    EXAM: DX AXIAL AND PERIPHERAL  LOCATION: [facility]  DATE: 5/15/2025    INDICATION: Follow-up BMD screening.  DEMOGRAPHICS: Age- 70 years. Gender- Female. Menopausal status- Postmenopausal.  COMPARISON: 4/13/2022.  TECHNIQUE: Dual-energy x-ray absorptiometry (DXA) performed with routine technique. Forearm DXA performed since the hip and/or spine could not be measured or interpreted.      FINDINGS:    DXA RESULTS  -RIGHT Hip Total: BMD: 0.763 g/cm2. T-score: -1.9. Z-score: -0.4.  -RIGHT Hip Femoral neck: BMD: 0.767 g/cm2. T-score: -1.9. Z-score: -0.2.  -LEFT Hip Total: BMD: 0.783 g/cm2. T-score: -1.8. Z-score: -0.3.  -LEFT Hip Femoral neck: BMD: 0.793 g/cm2. T-score: -1.8. Z-score: 0.0.  -RIGHT Radius 33%: BMD: 0.674 g/cm2. T-score: -2.3. Z-score: -0.4.    WHO T-SCORE CRITERIA  -Normal: T score at or above -1 SD  -Osteopenia: T score between -1 and -2.5 SD  -Osteoporosis: T score at or below -2.5 SD    The World Health Organization (WHO) criteria is applicable to perimenopausal females, postmenopausal females, and men aged 50 years or older.    INTERVAL CHANGE   -There has been a 0.1% decrease in bilateral hip BMD.  -There has been a 4.8% decrease in the right radius 33% BMD.    FRACTURE RISK  -FRAX Results: The 10 year probability of major osteoporotic fracture is 18.1%, and of hip fracture is 3.7%, based on right femoral neck BMD.    RECOMMENDATIONS  The current National Osteoporosis Foundation Guide recommends that FDA-approved medical therapies be considered if the 10 year probability of major osteoporotic fracture risk is greater than or equal to 20%, or if the hip fracture  risk is greater than or   equal to 3%. Please note all treatment decisions require clinical judgement and consideration of individual patient factors, including patient preferences, comorbidities, previous drug use, risk factors not captured in the FRAX model (e.g. frailty,   falls, vitamin D deficiency, increased bone turnover, interval significant decline in bone density) and possible under or over-estimation of fracture risk by FRAX.      Impression    IMPRESSION: Low bone density (OSTEOPENIA). T score meets the WHO criteria for low bone density (osteopenia) at one or more measured sites. The risk of osteoporotic fracture increases approximately two-fold for each standard deviation decrease in T-score.       If you have any questions or concerns, please call the clinic at the number listed above.       Sincerely,      Juan F Smallwood MD    Electronically signed

## 2025-05-21 ENCOUNTER — TRANSFERRED RECORDS (OUTPATIENT)
Dept: HEALTH INFORMATION MANAGEMENT | Facility: CLINIC | Age: 70
End: 2025-05-21
Payer: COMMERCIAL

## 2025-07-04 ENCOUNTER — MYC MEDICAL ADVICE (OUTPATIENT)
Dept: INTERNAL MEDICINE | Facility: CLINIC | Age: 70
End: 2025-07-04
Payer: COMMERCIAL

## 2025-07-04 DIAGNOSIS — E87.1 HYPONATREMIA: Primary | ICD-10-CM

## 2025-07-08 ENCOUNTER — TRANSFERRED RECORDS (OUTPATIENT)
Dept: HEALTH INFORMATION MANAGEMENT | Facility: CLINIC | Age: 70
End: 2025-07-08
Payer: COMMERCIAL

## 2025-07-09 NOTE — TELEPHONE ENCOUNTER
Please order lab test.       Juan F Smallwood MD  5/5/2025  4:40 PM CDT       Normal result reviewed, please notify patient.  Slightly low sodium. Recommend to keep fluid intake up to 1.5 l daily.  Follow up lab in 1 month.

## 2025-07-10 ENCOUNTER — LAB (OUTPATIENT)
Dept: LAB | Facility: CLINIC | Age: 70
End: 2025-07-10
Payer: COMMERCIAL

## 2025-07-10 DIAGNOSIS — E87.1 HYPONATREMIA: ICD-10-CM

## 2025-07-24 RX ORDER — INFLUENZA A VIRUS A/VICTORIA/4897/2022 IVR-238 (H1N1) ANTIGEN (FORMALDEHYDE INACTIVATED), INFLUENZA A VIRUS A/CALIFORNIA/122/2022 SAN-022 (H3N2) ANTIGEN (FORMALDEHYDE INACTIVATED), AND INFLUENZA B VIRUS B/MICHIGAN/01/2021 ANTIGEN (FORMALDEHYDE INACTIVATED) 60; 60; 60 UG/.5ML; UG/.5ML; UG/.5ML
INJECTION, SUSPENSION INTRAMUSCULAR
COMMUNITY
Start: 2024-10-09 | End: 2025-07-29

## 2025-07-24 RX ORDER — COVID-19 VACCINE, MRNA 0.04 MG/.418ML
INJECTION, SUSPENSION INTRAMUSCULAR
COMMUNITY
Start: 2024-10-09

## 2025-07-24 RX ORDER — CALCIUM CARBONATE/VITAMIN D3 600 MG-10
TABLET ORAL
COMMUNITY
Start: 2021-01-01

## 2025-07-25 DIAGNOSIS — I10 ESSENTIAL HYPERTENSION, BENIGN: ICD-10-CM

## 2025-07-25 NOTE — TELEPHONE ENCOUNTER
Medication passed protocol, however, refill RN could not approve because of the medication warning/interaction. Provider, please approve or deny the prescription.    Belia Wise RN on 7/25/2025 at 2:41 PM

## 2025-07-28 RX ORDER — AMLODIPINE BESYLATE 5 MG/1
5 TABLET ORAL DAILY
Qty: 90 TABLET | Refills: 2 | Status: SHIPPED | OUTPATIENT
Start: 2025-07-28

## 2025-07-29 ENCOUNTER — OFFICE VISIT (OUTPATIENT)
Dept: INTERNAL MEDICINE | Facility: CLINIC | Age: 70
End: 2025-07-29
Payer: COMMERCIAL

## 2025-07-29 VITALS
OXYGEN SATURATION: 97 % | HEART RATE: 63 BPM | RESPIRATION RATE: 18 BRPM | SYSTOLIC BLOOD PRESSURE: 125 MMHG | HEIGHT: 65 IN | BODY MASS INDEX: 26.21 KG/M2 | WEIGHT: 157.3 LBS | TEMPERATURE: 97.5 F | DIASTOLIC BLOOD PRESSURE: 75 MMHG

## 2025-07-29 DIAGNOSIS — I10 ESSENTIAL HYPERTENSION, BENIGN: ICD-10-CM

## 2025-07-29 DIAGNOSIS — M54.16 LUMBAR RADICULOPATHY: ICD-10-CM

## 2025-07-29 DIAGNOSIS — F51.01 PRIMARY INSOMNIA: ICD-10-CM

## 2025-07-29 DIAGNOSIS — Z01.818 PRE-OP EXAM: Primary | ICD-10-CM

## 2025-07-29 DIAGNOSIS — M85.80 OSTEOPENIA, UNSPECIFIED LOCATION: ICD-10-CM

## 2025-07-29 LAB
ANION GAP SERPL CALCULATED.3IONS-SCNC: 12 MMOL/L (ref 7–15)
BUN SERPL-MCNC: 12 MG/DL (ref 8–23)
CALCIUM SERPL-MCNC: 10.3 MG/DL (ref 8.8–10.4)
CHLORIDE SERPL-SCNC: 95 MMOL/L (ref 98–107)
CREAT SERPL-MCNC: 0.61 MG/DL (ref 0.51–0.95)
EGFRCR SERPLBLD CKD-EPI 2021: >90 ML/MIN/1.73M2
ERYTHROCYTE [DISTWIDTH] IN BLOOD BY AUTOMATED COUNT: 11.8 % (ref 10–15)
GLUCOSE SERPL-MCNC: 91 MG/DL (ref 70–99)
HCO3 SERPL-SCNC: 26 MMOL/L (ref 22–29)
HCT VFR BLD AUTO: 37.5 % (ref 35–47)
HGB BLD-MCNC: 13 G/DL (ref 11.7–15.7)
MCH RBC QN AUTO: 32.1 PG (ref 26.5–33)
MCHC RBC AUTO-ENTMCNC: 34.7 G/DL (ref 31.5–36.5)
MCV RBC AUTO: 93 FL (ref 78–100)
PLATELET # BLD AUTO: 238 10E3/UL (ref 150–450)
POTASSIUM SERPL-SCNC: 4.3 MMOL/L (ref 3.4–5.3)
RBC # BLD AUTO: 4.05 10E6/UL (ref 3.8–5.2)
SODIUM SERPL-SCNC: 133 MMOL/L (ref 135–145)
WBC # BLD AUTO: 5.4 10E3/UL (ref 4–11)

## 2025-07-29 PROCEDURE — 36415 COLL VENOUS BLD VENIPUNCTURE: CPT

## 2025-07-29 PROCEDURE — 93000 ELECTROCARDIOGRAM COMPLETE: CPT

## 2025-07-29 PROCEDURE — 80048 BASIC METABOLIC PNL TOTAL CA: CPT

## 2025-07-29 PROCEDURE — 99214 OFFICE O/P EST MOD 30 MIN: CPT

## 2025-07-29 PROCEDURE — 3074F SYST BP LT 130 MM HG: CPT

## 2025-07-29 PROCEDURE — 3078F DIAST BP <80 MM HG: CPT

## 2025-07-29 PROCEDURE — 85027 COMPLETE CBC AUTOMATED: CPT

## 2025-07-29 NOTE — PROGRESS NOTES
Preoperative Evaluation  [facility]  [address]  [address]  [facility] [address]  Phone: [phone]  Primary Provider: Juan F Smallwood MD  Pre-op Performing Provider: IRENE Funez CNP  Jul 29, 2025 7/24/2025   Surgical Information   What procedure is being done? Bilateral L2-L3 adjacent level transforaminal lumbar interbody fusion.   Facility or Hospital where procedure/surgery will be performed: [facility].   Who is doing the procedure / surgery? Dr. Jay Dee   Date of surgery / procedure: 8- fusion.    Time of surgery / procedure: TBD   Where do you plan to recover after surgery? at home with family     Fax number for surgical facility: Note does not need to be faxed, will be available electronically in Epic.    Assessment & Plan     The proposed surgical procedure is considered INTERMEDIATE risk.    (Z01.818) Pre-op exam  (primary encounter diagnosis)  Comment: Okay to proceed with procedure as planned  Plan: EKG 12-lead complete w/read - Clinics, CBC with        platelets, Basic metabolic panel  (Ca, Cl, CO2,        Creat, Gluc, K, Na, BUN)            (M54.16) Lumbar radiculopathy  Comment: See HPI for details  Plan:     (I10) Essential hypertension, benign  Comment: BP elevated today at 144/88 and 177/98. She tells me that BP at home is typically <130/80. She feels stressed today about her procedure and this is likely why BP is elevated.  Plan:     (F51.01) Primary insomnia  Comment: Stable with Trazodone  Plan:     (M85.80) Osteopenia, unspecified location  Comment: History of Osteopenia. Managed by PCP  Plan:             - No identified additional risk factors other than previously addressed    Antiplatelet or Anticoagulation Medication Instructions   - We reviewed the medication list and the patient is not on an antiplatelet or anticoagulation medications.    Additional Medication Instructions  Take all scheduled  medications on the day of surgery EXCEPT for modifications listed below:  HOLD NSAIDs 7 days prior    Recommendation  Approval given to proceed with proposed procedure, without further diagnostic evaluation.            Lyndsey Walker is a 70 year old, presenting for the following:  Pre-Op Exam          7/29/2025     2:34 PM   Additional Questions   Roomed by MARIE   Accompanied by SELF         7/29/2025     2:34 PM   Patient Reported Additional Medications   Patient reports taking the following new medications NONE     HPI:   The patient has a history of multiple spinal fusions, including an L4-L5 fusion in 2017 and an L3-L4 fusion in 2021. She presents today for a preoperative evaluation in preparation for an upcoming L2-L3 fusion scheduled for 8/14/25. She reports significant chronic back pain that frequently interferes with her activities of daily living.        7/24/2025   Pre-Op Questionnaire   Have you ever had a heart attack or stroke? No   Have you ever had surgery on your heart or blood vessels, such as a stent placement, a coronary artery bypass, or surgery on an artery in your head, neck, heart, or legs? No   Do you have chest pain with activity? No   Do you have a history of heart failure? No   Do you currently have a cold, bronchitis or symptoms of other infection? No   Do you have a cough, shortness of breath, or wheezing? No   Do you or anyone in your family have previous history of blood clots? No   Do you or does anyone in your family have a serious bleeding problem such as prolonged bleeding following surgeries or cuts? No   Have you ever had problems with anemia or been told to take iron pills? No   Have you had any abnormal blood loss such as black, tarry or bloody stools, or abnormal vaginal bleeding? No   Have you ever had a blood transfusion? No   Are you willing to have a blood transfusion if it is medically needed before, during, or after your surgery? Yes   Have you or any of your  relatives ever had problems with anesthesia? No   Do you have sleep apnea, excessive snoring or daytime drowsiness? No   Do you have any artifical heart valves or other implanted medical devices like a pacemaker, defibrillator, or continuous glucose monitor? No   Do you have artificial joints? (!) YES- right shoulder    Are you allergic to latex? (!) YES- hives and radha      Advance Care Planning    Discussed advance care planning with patient; informed AVS has link to [employer].    Preoperative Review of    reviewed - controlled substances prescribed by other outside provider(s).        Patient Active Problem List    Diagnosis Date Noted    Degeneration of intervertebral disc of lumbar region with discogenic back pain 05/02/2025     Priority: Medium    S/P reverse total shoulder arthroplasty, right 12/05/2023     Priority: Medium    Acute pain of right shoulder 08/13/2021     Priority: Medium    Controlled substance agreement signed 10/18/2017     Priority: Medium    Tension headache 10/18/2017     Priority: Medium    S/P lumbar fusion 08/03/2017     Priority: Medium    Glaucoma 12/05/2014     Priority: Medium    Basal cell carcinoma 08/01/2009     Priority: Medium    Squamous cell carcinoma 04/01/2007     Priority: Medium    Essential hypertension, benign 09/07/2006     Priority: Medium    Anxiety state 08/09/2004     Priority: Medium      Past Medical History:   Diagnosis Date    Anxiety     Gets panic attacks    Complication of anesthesia     Depressive disorder, not elsewhere classified     Essential hypertension, benign     No cardiologist    Irritable bowel syndrome     has had neg colonoscopy & EGD w/u    Lumbar degenerative disc disease 1996    Had PT, traction, no surgery    Other chronic pain     JOint pain for many years.    PONV (postoperative nausea and vomiting)     Sciatica 03/2006    R thigh;  MRI = R L2-3 disc     Septic arthritis of shoulder, right (H) 08/14/2021    Unspecified  glaucoma(365.9)      Past Surgical History:   Procedure Laterality Date    BIOPSY  1/2022    infection in right shoulder    COLONOSCOPY  2014    Clear    EYE SURGERY Bilateral Istents & cataracts    Treatment of glaucoma    HYSTERECTOMY      HYSTERECTOMY VAGINAL      IRRIGATION AND DEBRIDEMENT UPPER EXTREMITY, COMBINED Right 10/02/2020    Procedure: Right shoulder arthroscopic extensive debridement of the glenohumeral joint and subacromial space for infection.;  Surgeon: Umesh Berman MD;  Location:  OR    LAPAROSCOPIC CHOLECYSTECTOMY  2002    with repeat operation because of bile leak    Left shoulder decomp surgery for impingement  approx 1993    OPTICAL TRACKING SYSTEM FUSION POSTERIOR SPINE LUMBAR N/A 08/03/2017    Procedure: OPTICAL TRACKING SYSTEM FUSION SPINE POSTERIOR LUMBAR ONE LEVEL;  1. L4 Ang-Meyers osteotomy with exposure of the L4 and L5 roots  2. L4-5 complete discectomy with arthrodesis  3. Placement of Globus Creo GOMEZ pedicle screws bilaterally from L4 to L5  4. L4 - L5 posterior lateral fusion with placement of Medtronic Magnifuse and locally harvested autologous bone  5. Use of Stealth and O    OPTICAL TRACKING SYSTEM FUSION POSTERIOR SPINE LUMBAR N/A 06/11/2021    Procedure: Reopening of previous incision with L3-4 decompressive laminectomies with facetectomies and decompression  L3-4 complete discectomy with arthrodesis and placement of a 9 x 28 mm Globus Caliber expandable interbody cage with locally harvested autologous bone placed centrally and anterior to the graft Placement/replacment of Globus Creo pedicle screws from L3-5 bilaterally  L3-4 posterior     ORTHOPEDIC SURGERY Right Broke wrist, partial rotator cuff torn    I & D shoulder    REVERSE ARTHROPLASTY SHOULDER Right 12/05/2023    Procedure: Right reverse total shoulder arthroplasty;  Surgeon: Umesh Berman MD;  Location: [facility] OR    WRIST SURGERY Left      Current Outpatient Medications   Medication Sig Dispense Refill     ALPRAZolam (XANAX) 0.25 MG tablet TAKE 1 TABLET BY MOUTH THREE TIMES A DAY AS NEEDED FOR ANXIETY 30 tablet 0    amLODIPine (NORVASC) 5 MG tablet Take 1 tablet (5 mg) by mouth daily. 90 tablet 2    atenolol (TENORMIN) 50 MG tablet TAKE 1 TABLET BY MOUTH TWICE A  tablet 1    calcium carbonate-vitamin D (CALTRATE) 600-10 MG-MCG per tablet       cyclobenzaprine (FLEXERIL) 5 MG tablet Take 5 mg by mouth nightly as needed.      ibuprofen (ADVIL/MOTRIN) 600 MG tablet Take 1 tablet (600 mg) by mouth every 6 hours as needed for mild pain 30 tablet 0    latanoprost (XALATAN) 0.005 % ophthalmic solution Place 1 drop into both eyes At Bedtime      multivitamin w/minerals (MULTI-VITAMIN) tablet Take 1 tablet by mouth daily      pregabalin (LYRICA) 75 MG capsule Take 75 mg by mouth 2 times daily.      Probiotic Product (PROBIOTIC DAILY PO) Take 1 tablet by mouth daily Probiotic 90 billion units with Fiber 1 PO daily      timolol maleate (TIMOPTIC) 0.5 % ophthalmic solution Place 1 drop Into the left eye every morning      traZODone (DESYREL) 100 MG tablet TAKE 1 TABLET BY MOUTH AT BEDTIME 90 tablet 2    COMIRNATY 30 MCG/0.3ML DILLON injection          Allergies   Allergen Reactions    Erythromycin Shortness Of Breath    Msir [Morphine Sulfate] Nausea and Nausea and Vomiting    Buchu-Cornsilk-Markel Grass-Hydran      Patient states she gets sick and loses weight from diuretics.    Dilaudid [Hydromorphone] Visual Disturbance and Hallucination    Lisinopril Cough    Losartan      Tongue felt weird      Metoclopramide Other (See Comments)     Muscle spasms in throat    Metoclopramide Hcl Other (See Comments)     Muscle spasms in throat    Oxycodone Nausea and Vomiting    Penicillins Itching    Adhesive Tape Rash        Social History     Tobacco Use    Smoking status: Former     Current packs/day: 0.00     Average packs/day: 0.5 packs/day for 25.6 years (12.8 ttl pk-yrs)     Types: Cigarettes     Start date: 3/1/1972     Quit date:  "10/1/1997     Years since quittin.8    Smokeless tobacco: Never    Tobacco comments:     quit    Substance Use Topics    Alcohol use: Yes     Alcohol/week: 1.7 standard drinks of alcohol     Types: 2 Glasses of wine per week     Comment: 2 glasses of wine daily     History   Drug Use No             Review of Systems  CONSTITUTIONAL: NEGATIVE for fever, chills  RESP: NEGATIVE for significant cough or SOB  CV: NEGATIVE for chest pain, palpitations or peripheral edema      Objective    /75   Pulse 63   Temp 97.5  F (36.4  C) (Tympanic)   Resp 18   Ht 1.645 m (5' 4.75\")   Wt 71.4 kg (157 lb 4.8 oz)   LMP  (LMP Unknown)   SpO2 97%   BMI 26.38 kg/m     Estimated body mass index is 26.38 kg/m  as calculated from the following:    Height as of this encounter: 1.645 m (5' 4.75\").    Weight as of this encounter: 71.4 kg (157 lb 4.8 oz).      Physical Exam  Constitutional:       General: She is not in acute distress.     Appearance: Normal appearance. She is not ill-appearing, toxic-appearing or diaphoretic.   HENT:      Head: Normocephalic and atraumatic.   Eyes:      Conjunctiva/sclera: Conjunctivae normal.   Cardiovascular:      Rate and Rhythm: Normal rate and regular rhythm.      Heart sounds: Normal heart sounds.   Pulmonary:      Effort: Pulmonary effort is normal.      Breath sounds: Normal breath sounds.   Skin:     General: Skin is warm and dry.   Neurological:      Mental Status: She is alert and oriented to person, place, and time.   Psychiatric:         Mood and Affect: Mood normal.         Behavior: Behavior normal.         Thought Content: Thought content normal.         Judgment: Judgment normal.     Recent Labs   Lab Test 07/10/25  1354 25  1433   HGB  --  12.6   PLT  --  222    132*   POTASSIUM  --  4.5   CR  --  0.60        Diagnostics  Labs pending at this time.  Results will be reviewed when available.   EKG: appears normal, NSR, normal axis, normal intervals, no acute " ST/T changes c/w ischemia, no LVH by voltage criteria, unchanged from previous tracings    Revised Cardiac Risk Index (RCRI)  The patient has the following serious cardiovascular risks for perioperative complications:   - No serious cardiac risks = 0 points     RCRI Interpretation: 0 points: Class I (very low risk - 0.4% complication rate)         Signed Electronically by: IRENE Funez CNP  A copy of this evaluation report is provided to the requesting physician.

## 2025-07-29 NOTE — NURSING NOTE
"BP (!) 177/94   Pulse 63   Temp 97.5  F (36.4  C) (Tympanic)   Resp 18   Ht 1.645 m (5' 4.75\")   Wt 71.4 kg (157 lb 4.8 oz)   LMP  (LMP Unknown)   SpO2 97%   BMI 26.38 kg/m      "

## 2025-08-14 ENCOUNTER — ANESTHESIA (OUTPATIENT)
Dept: SURGERY | Facility: CLINIC | Age: 70
End: 2025-08-14
Payer: COMMERCIAL

## 2025-08-14 ENCOUNTER — ANESTHESIA EVENT (OUTPATIENT)
Dept: SURGERY | Facility: CLINIC | Age: 70
End: 2025-08-14
Payer: COMMERCIAL

## 2025-08-14 ENCOUNTER — APPOINTMENT (OUTPATIENT)
Dept: GENERAL RADIOLOGY | Facility: CLINIC | Age: 70
DRG: 402 | End: 2025-08-14
Attending: NEUROLOGICAL SURGERY
Payer: COMMERCIAL

## 2025-08-14 ENCOUNTER — HOSPITAL ENCOUNTER (INPATIENT)
Facility: CLINIC | Age: 70
LOS: 2 days | Discharge: HOME | DRG: 402 | End: 2025-08-16
Attending: NEUROLOGICAL SURGERY | Admitting: NEUROLOGICAL SURGERY
Payer: COMMERCIAL

## 2025-08-14 DIAGNOSIS — Z98.1 S/P LUMBAR FUSION: Primary | ICD-10-CM

## 2025-08-14 LAB — GLUCOSE BLDC GLUCOMTR-MCNC: 86 MG/DL (ref 70–99)

## 2025-08-14 PROCEDURE — 250N000013 HC RX MED GY IP 250 OP 250 PS 637: Performed by: HOSPITALIST

## 2025-08-14 PROCEDURE — 272N000001 HC OR GENERAL SUPPLY STERILE: Performed by: NEUROLOGICAL SURGERY

## 2025-08-14 PROCEDURE — 258N000001 HC RX 258: Performed by: NEUROLOGICAL SURGERY

## 2025-08-14 PROCEDURE — 250N000011 HC RX IP 250 OP 636

## 2025-08-14 PROCEDURE — C1713 ANCHOR/SCREW BN/BN,TIS/BN: HCPCS | Performed by: NEUROLOGICAL SURGERY

## 2025-08-14 PROCEDURE — 120N000001 HC R&B MED SURG/OB

## 2025-08-14 PROCEDURE — 999N000182 XR SURGERY OARM

## 2025-08-14 PROCEDURE — 250N000011 HC RX IP 250 OP 636: Performed by: NEUROLOGICAL SURGERY

## 2025-08-14 PROCEDURE — 258N000003 HC RX IP 258 OP 636

## 2025-08-14 PROCEDURE — 250N000025 HC SEVOFLURANE, PER MIN: Performed by: NEUROLOGICAL SURGERY

## 2025-08-14 PROCEDURE — 370N000017 HC ANESTHESIA TECHNICAL FEE, PER MIN: Performed by: NEUROLOGICAL SURGERY

## 2025-08-14 PROCEDURE — 0ST20ZZ RESECTION OF LUMBAR VERTEBRAL DISC, OPEN APPROACH: ICD-10-PCS | Performed by: NEUROLOGICAL SURGERY

## 2025-08-14 PROCEDURE — 0SG00AJ FUSION OF LUMBAR VERTEBRAL JOINT WITH INTERBODY FUSION DEVICE, POSTERIOR APPROACH, ANTERIOR COLUMN, OPEN APPROACH: ICD-10-PCS | Performed by: NEUROLOGICAL SURGERY

## 2025-08-14 PROCEDURE — 250N000005 HC OR RX SURGIFLO HEMOSTATIC MATRIX 10ML 199102S OPNP: Performed by: NEUROLOGICAL SURGERY

## 2025-08-14 PROCEDURE — 01NB0ZZ RELEASE LUMBAR NERVE, OPEN APPROACH: ICD-10-PCS | Performed by: NEUROLOGICAL SURGERY

## 2025-08-14 PROCEDURE — 250N000009 HC RX 250

## 2025-08-14 PROCEDURE — C1762 CONN TISS, HUMAN(INC FASCIA): HCPCS | Performed by: NEUROLOGICAL SURGERY

## 2025-08-14 PROCEDURE — 999N000179 XR SURGERY CARM FLUORO LESS THAN 5 MIN W STILLS

## 2025-08-14 PROCEDURE — 258N000003 HC RX IP 258 OP 636: Performed by: ANESTHESIOLOGY

## 2025-08-14 PROCEDURE — 250N000009 HC RX 250: Performed by: NEUROLOGICAL SURGERY

## 2025-08-14 PROCEDURE — 8E0WXBZ COMPUTER ASSISTED PROCEDURE OF TRUNK REGION: ICD-10-PCS | Performed by: NEUROLOGICAL SURGERY

## 2025-08-14 PROCEDURE — 250N000011 HC RX IP 250 OP 636: Performed by: ANESTHESIOLOGY

## 2025-08-14 PROCEDURE — 258N000003 HC RX IP 258 OP 636: Performed by: NEUROLOGICAL SURGERY

## 2025-08-14 PROCEDURE — 250N000013 HC RX MED GY IP 250 OP 250 PS 637: Performed by: NEUROLOGICAL SURGERY

## 2025-08-14 PROCEDURE — 0SP004Z REMOVAL OF INTERNAL FIXATION DEVICE FROM LUMBAR VERTEBRAL JOINT, OPEN APPROACH: ICD-10-PCS | Performed by: NEUROLOGICAL SURGERY

## 2025-08-14 PROCEDURE — 0SG0071 FUSION OF LUMBAR VERTEBRAL JOINT WITH AUTOLOGOUS TISSUE SUBSTITUTE, POSTERIOR APPROACH, POSTERIOR COLUMN, OPEN APPROACH: ICD-10-PCS | Performed by: NEUROLOGICAL SURGERY

## 2025-08-14 PROCEDURE — 360N000085 HC SURGERY LEVEL 5 W/ FLUORO, PER MIN: Performed by: NEUROLOGICAL SURGERY

## 2025-08-14 PROCEDURE — 999N000141 HC STATISTIC PRE-PROCEDURE NURSING ASSESSMENT: Performed by: NEUROLOGICAL SURGERY

## 2025-08-14 PROCEDURE — 710N000009 HC RECOVERY PHASE 1, LEVEL 1, PER MIN: Performed by: NEUROLOGICAL SURGERY

## 2025-08-14 PROCEDURE — 0SH004Z INSERTION OF INTERNAL FIXATION DEVICE INTO LUMBAR VERTEBRAL JOINT, OPEN APPROACH: ICD-10-PCS | Performed by: NEUROLOGICAL SURGERY

## 2025-08-14 DEVICE — IMPLANTABLE DEVICE: Type: IMPLANTABLE DEVICE | Site: SPINE LUMBAR | Status: FUNCTIONAL

## 2025-08-14 DEVICE — CAP LCK CREO 5.5: Type: IMPLANTABLE DEVICE | Site: SPINE LUMBAR | Status: FUNCTIONAL

## 2025-08-14 DEVICE — CAP LCK CREO SPNL MIS NS: Type: IMPLANTABLE DEVICE | Site: SPINE LUMBAR | Status: FUNCTIONAL

## 2025-08-14 DEVICE — KIT BNGF 6CC DMNR CORT FBR ACCELERATE GRFTN CNN T50206: Type: IMPLANTABLE DEVICE | Site: SPINE LUMBAR | Status: FUNCTIONAL

## 2025-08-14 RX ORDER — DIPHENHYDRAMINE HCL 12.5MG/5ML
12.5 LIQUID (ML) ORAL EVERY 6 HOURS PRN
Status: DISCONTINUED | OUTPATIENT
Start: 2025-08-14 | End: 2025-08-16 | Stop reason: HOSPADM

## 2025-08-14 RX ORDER — HYDROMORPHONE HCL IN WATER/PF 6 MG/30 ML
0.4 PATIENT CONTROLLED ANALGESIA SYRINGE INTRAVENOUS EVERY 5 MIN PRN
Status: DISCONTINUED | OUTPATIENT
Start: 2025-08-14 | End: 2025-08-14 | Stop reason: HOSPADM

## 2025-08-14 RX ORDER — LIDOCAINE 40 MG/G
CREAM TOPICAL
Status: DISCONTINUED | OUTPATIENT
Start: 2025-08-14 | End: 2025-08-16 | Stop reason: HOSPADM

## 2025-08-14 RX ORDER — CEFAZOLIN SODIUM/WATER 2 G/20 ML
2 SYRINGE (ML) INTRAVENOUS
Status: COMPLETED | OUTPATIENT
Start: 2025-08-14 | End: 2025-08-14

## 2025-08-14 RX ORDER — ONDANSETRON 4 MG/1
4 TABLET, ORALLY DISINTEGRATING ORAL EVERY 6 HOURS PRN
Status: DISCONTINUED | OUTPATIENT
Start: 2025-08-14 | End: 2025-08-16 | Stop reason: HOSPADM

## 2025-08-14 RX ORDER — ALBUTEROL SULFATE 0.83 MG/ML
2.5 SOLUTION RESPIRATORY (INHALATION) EVERY 4 HOURS PRN
Status: DISCONTINUED | OUTPATIENT
Start: 2025-08-14 | End: 2025-08-14 | Stop reason: HOSPADM

## 2025-08-14 RX ORDER — OXYCODONE HYDROCHLORIDE 10 MG/1
10 TABLET ORAL EVERY 4 HOURS PRN
Status: DISCONTINUED | OUTPATIENT
Start: 2025-08-14 | End: 2025-08-16 | Stop reason: HOSPADM

## 2025-08-14 RX ORDER — SODIUM CHLORIDE 9 MG/ML
INJECTION, SOLUTION INTRAVENOUS CONTINUOUS
Status: DISCONTINUED | OUTPATIENT
Start: 2025-08-14 | End: 2025-08-16

## 2025-08-14 RX ORDER — DEXAMETHASONE SODIUM PHOSPHATE 4 MG/ML
4 INJECTION, SOLUTION INTRA-ARTICULAR; INTRALESIONAL; INTRAMUSCULAR; INTRAVENOUS; SOFT TISSUE
Status: DISCONTINUED | OUTPATIENT
Start: 2025-08-14 | End: 2025-08-14 | Stop reason: HOSPADM

## 2025-08-14 RX ORDER — ONDANSETRON 2 MG/ML
4 INJECTION INTRAMUSCULAR; INTRAVENOUS EVERY 30 MIN PRN
Status: DISCONTINUED | OUTPATIENT
Start: 2025-08-14 | End: 2025-08-14 | Stop reason: HOSPADM

## 2025-08-14 RX ORDER — ATENOLOL 50 MG/1
50 TABLET ORAL 2 TIMES DAILY
Status: DISCONTINUED | OUTPATIENT
Start: 2025-08-14 | End: 2025-08-16 | Stop reason: HOSPADM

## 2025-08-14 RX ORDER — METHOCARBAMOL 750 MG/1
750 TABLET, FILM COATED ORAL EVERY 6 HOURS PRN
Status: DISCONTINUED | OUTPATIENT
Start: 2025-08-14 | End: 2025-08-16 | Stop reason: HOSPADM

## 2025-08-14 RX ORDER — HYDROMORPHONE HCL IN WATER/PF 6 MG/30 ML
0.2 PATIENT CONTROLLED ANALGESIA SYRINGE INTRAVENOUS
Status: DISCONTINUED | OUTPATIENT
Start: 2025-08-14 | End: 2025-08-16 | Stop reason: HOSPADM

## 2025-08-14 RX ORDER — ALPRAZOLAM 0.25 MG
0.25 TABLET ORAL 3 TIMES DAILY PRN
Status: DISCONTINUED | OUTPATIENT
Start: 2025-08-14 | End: 2025-08-16 | Stop reason: HOSPADM

## 2025-08-14 RX ORDER — NALOXONE HYDROCHLORIDE 0.4 MG/ML
0.4 INJECTION, SOLUTION INTRAMUSCULAR; INTRAVENOUS; SUBCUTANEOUS
Status: DISCONTINUED | OUTPATIENT
Start: 2025-08-14 | End: 2025-08-16 | Stop reason: HOSPADM

## 2025-08-14 RX ORDER — CEFAZOLIN SODIUM/WATER 2 G/20 ML
2 SYRINGE (ML) INTRAVENOUS SEE ADMIN INSTRUCTIONS
Status: DISCONTINUED | OUTPATIENT
Start: 2025-08-14 | End: 2025-08-14 | Stop reason: HOSPADM

## 2025-08-14 RX ORDER — OXYCODONE HYDROCHLORIDE 5 MG/1
5-10 TABLET ORAL EVERY 4 HOURS PRN
Qty: 32 TABLET | Refills: 0 | Status: SHIPPED | OUTPATIENT
Start: 2025-08-14

## 2025-08-14 RX ORDER — SODIUM CHLORIDE, SODIUM LACTATE, POTASSIUM CHLORIDE, CALCIUM CHLORIDE 600; 310; 30; 20 MG/100ML; MG/100ML; MG/100ML; MG/100ML
INJECTION, SOLUTION INTRAVENOUS CONTINUOUS
Status: DISCONTINUED | OUTPATIENT
Start: 2025-08-14 | End: 2025-08-14 | Stop reason: HOSPADM

## 2025-08-14 RX ORDER — GLYCOPYRROLATE 0.2 MG/ML
INJECTION, SOLUTION INTRAMUSCULAR; INTRAVENOUS PRN
Status: DISCONTINUED | OUTPATIENT
Start: 2025-08-14 | End: 2025-08-14

## 2025-08-14 RX ORDER — LABETALOL HYDROCHLORIDE 5 MG/ML
10 INJECTION, SOLUTION INTRAVENOUS EVERY 10 MIN PRN
Status: DISCONTINUED | OUTPATIENT
Start: 2025-08-14 | End: 2025-08-14 | Stop reason: HOSPADM

## 2025-08-14 RX ORDER — DIPHENHYDRAMINE HYDROCHLORIDE 50 MG/ML
25 INJECTION, SOLUTION INTRAMUSCULAR; INTRAVENOUS ONCE
Status: COMPLETED | OUTPATIENT
Start: 2025-08-14 | End: 2025-08-14

## 2025-08-14 RX ORDER — MEPERIDINE HYDROCHLORIDE 50 MG/ML
12.5 INJECTION INTRAMUSCULAR; INTRAVENOUS; SUBCUTANEOUS EVERY 5 MIN PRN
Status: DISCONTINUED | OUTPATIENT
Start: 2025-08-14 | End: 2025-08-14 | Stop reason: HOSPADM

## 2025-08-14 RX ORDER — LIDOCAINE HYDROCHLORIDE 20 MG/ML
INJECTION, SOLUTION INFILTRATION; PERINEURAL PRN
Status: DISCONTINUED | OUTPATIENT
Start: 2025-08-14 | End: 2025-08-14

## 2025-08-14 RX ORDER — HYDROMORPHONE HCL IN WATER/PF 6 MG/30 ML
0.4 PATIENT CONTROLLED ANALGESIA SYRINGE INTRAVENOUS
Status: DISCONTINUED | OUTPATIENT
Start: 2025-08-14 | End: 2025-08-16 | Stop reason: HOSPADM

## 2025-08-14 RX ORDER — KETOROLAC TROMETHAMINE 30 MG/ML
INJECTION, SOLUTION INTRAMUSCULAR; INTRAVENOUS PRN
Status: DISCONTINUED | OUTPATIENT
Start: 2025-08-14 | End: 2025-08-14

## 2025-08-14 RX ORDER — CYCLOBENZAPRINE HCL 5 MG
5 TABLET ORAL
Qty: 60 TABLET | Refills: 1 | Status: SHIPPED | OUTPATIENT
Start: 2025-08-14

## 2025-08-14 RX ORDER — MULTIPLE VITAMINS W/ MINERALS TAB 9MG-400MCG
1 TAB ORAL DAILY
Status: DISCONTINUED | OUTPATIENT
Start: 2025-08-14 | End: 2025-08-16 | Stop reason: HOSPADM

## 2025-08-14 RX ORDER — PROPOFOL 10 MG/ML
INJECTION, EMULSION INTRAVENOUS CONTINUOUS PRN
Status: DISCONTINUED | OUTPATIENT
Start: 2025-08-14 | End: 2025-08-14

## 2025-08-14 RX ORDER — ACETAMINOPHEN 325 MG/1
975 TABLET ORAL EVERY 8 HOURS
Status: DISCONTINUED | OUTPATIENT
Start: 2025-08-14 | End: 2025-08-16 | Stop reason: HOSPADM

## 2025-08-14 RX ORDER — NALOXONE HYDROCHLORIDE 0.4 MG/ML
0.2 INJECTION, SOLUTION INTRAMUSCULAR; INTRAVENOUS; SUBCUTANEOUS
Status: DISCONTINUED | OUTPATIENT
Start: 2025-08-14 | End: 2025-08-16 | Stop reason: HOSPADM

## 2025-08-14 RX ORDER — FENTANYL CITRATE 50 UG/ML
INJECTION, SOLUTION INTRAMUSCULAR; INTRAVENOUS PRN
Status: DISCONTINUED | OUTPATIENT
Start: 2025-08-14 | End: 2025-08-14

## 2025-08-14 RX ORDER — MAGNESIUM HYDROXIDE 1200 MG/15ML
LIQUID ORAL PRN
Status: DISCONTINUED | OUTPATIENT
Start: 2025-08-14 | End: 2025-08-14 | Stop reason: HOSPADM

## 2025-08-14 RX ORDER — ONDANSETRON 2 MG/ML
4 INJECTION INTRAMUSCULAR; INTRAVENOUS EVERY 6 HOURS PRN
Status: DISCONTINUED | OUTPATIENT
Start: 2025-08-14 | End: 2025-08-16 | Stop reason: HOSPADM

## 2025-08-14 RX ORDER — PROCHLORPERAZINE MALEATE 5 MG/1
5 TABLET ORAL EVERY 6 HOURS PRN
Status: DISCONTINUED | OUTPATIENT
Start: 2025-08-14 | End: 2025-08-16 | Stop reason: HOSPADM

## 2025-08-14 RX ORDER — ONDANSETRON 4 MG/1
4 TABLET, ORALLY DISINTEGRATING ORAL EVERY 30 MIN PRN
Status: DISCONTINUED | OUTPATIENT
Start: 2025-08-14 | End: 2025-08-14 | Stop reason: HOSPADM

## 2025-08-14 RX ORDER — BISACODYL 10 MG
10 SUPPOSITORY, RECTAL RECTAL DAILY PRN
Status: DISCONTINUED | OUTPATIENT
Start: 2025-08-17 | End: 2025-08-16 | Stop reason: HOSPADM

## 2025-08-14 RX ORDER — DIPHENHYDRAMINE HYDROCHLORIDE 50 MG/ML
12.5 INJECTION, SOLUTION INTRAMUSCULAR; INTRAVENOUS EVERY 6 HOURS PRN
Status: DISCONTINUED | OUTPATIENT
Start: 2025-08-14 | End: 2025-08-16 | Stop reason: HOSPADM

## 2025-08-14 RX ORDER — OLOPATADINE HYDROCHLORIDE 1 MG/ML
1 SOLUTION OPHTHALMIC 2 TIMES DAILY
Status: DISCONTINUED | OUTPATIENT
Start: 2025-08-14 | End: 2025-08-16 | Stop reason: HOSPADM

## 2025-08-14 RX ORDER — TIMOLOL MALEATE 5 MG/ML
1 SOLUTION/ DROPS OPHTHALMIC EVERY MORNING
Status: DISCONTINUED | OUTPATIENT
Start: 2025-08-15 | End: 2025-08-16 | Stop reason: HOSPADM

## 2025-08-14 RX ORDER — PREGABALIN 75 MG/1
75 CAPSULE ORAL 2 TIMES DAILY
Status: DISCONTINUED | OUTPATIENT
Start: 2025-08-14 | End: 2025-08-16 | Stop reason: HOSPADM

## 2025-08-14 RX ORDER — VANCOMYCIN HYDROCHLORIDE 1 G/20ML
INJECTION, POWDER, LYOPHILIZED, FOR SOLUTION INTRAVENOUS PRN
Status: DISCONTINUED | OUTPATIENT
Start: 2025-08-14 | End: 2025-08-14 | Stop reason: HOSPADM

## 2025-08-14 RX ORDER — TRAZODONE HYDROCHLORIDE 100 MG/1
100 TABLET ORAL AT BEDTIME
Status: DISCONTINUED | OUTPATIENT
Start: 2025-08-14 | End: 2025-08-15

## 2025-08-14 RX ORDER — BUPIVACAINE HYDROCHLORIDE AND EPINEPHRINE 5; 5 MG/ML; UG/ML
INJECTION, SOLUTION EPIDURAL; INTRACAUDAL; PERINEURAL PRN
Status: DISCONTINUED | OUTPATIENT
Start: 2025-08-14 | End: 2025-08-14 | Stop reason: HOSPADM

## 2025-08-14 RX ORDER — POLYETHYLENE GLYCOL 3350 17 G/17G
17 POWDER, FOR SOLUTION ORAL DAILY
Status: DISCONTINUED | OUTPATIENT
Start: 2025-08-15 | End: 2025-08-16 | Stop reason: HOSPADM

## 2025-08-14 RX ORDER — DEXAMETHASONE SODIUM PHOSPHATE 4 MG/ML
INJECTION, SOLUTION INTRA-ARTICULAR; INTRALESIONAL; INTRAMUSCULAR; INTRAVENOUS; SOFT TISSUE PRN
Status: DISCONTINUED | OUTPATIENT
Start: 2025-08-14 | End: 2025-08-14

## 2025-08-14 RX ORDER — AMOXICILLIN 250 MG
1 CAPSULE ORAL 2 TIMES DAILY
Status: DISCONTINUED | OUTPATIENT
Start: 2025-08-14 | End: 2025-08-16 | Stop reason: HOSPADM

## 2025-08-14 RX ORDER — OXYCODONE HYDROCHLORIDE 5 MG/1
5 TABLET ORAL EVERY 4 HOURS PRN
Status: DISCONTINUED | OUTPATIENT
Start: 2025-08-14 | End: 2025-08-16 | Stop reason: HOSPADM

## 2025-08-14 RX ORDER — ONDANSETRON 2 MG/ML
INJECTION INTRAMUSCULAR; INTRAVENOUS PRN
Status: DISCONTINUED | OUTPATIENT
Start: 2025-08-14 | End: 2025-08-14

## 2025-08-14 RX ORDER — CEFAZOLIN SODIUM 2 G/50ML
2 SOLUTION INTRAVENOUS EVERY 8 HOURS
Status: DISCONTINUED | OUTPATIENT
Start: 2025-08-14 | End: 2025-08-16

## 2025-08-14 RX ORDER — ACETAMINOPHEN 325 MG/1
975 TABLET ORAL ONCE
Status: DISCONTINUED | OUTPATIENT
Start: 2025-08-14 | End: 2025-08-14 | Stop reason: HOSPADM

## 2025-08-14 RX ORDER — LATANOPROST 50 UG/ML
1 SOLUTION/ DROPS OPHTHALMIC AT BEDTIME
Status: DISCONTINUED | OUTPATIENT
Start: 2025-08-14 | End: 2025-08-16 | Stop reason: HOSPADM

## 2025-08-14 RX ORDER — FENTANYL CITRATE 50 UG/ML
50 INJECTION, SOLUTION INTRAMUSCULAR; INTRAVENOUS EVERY 5 MIN PRN
Status: DISCONTINUED | OUTPATIENT
Start: 2025-08-14 | End: 2025-08-14 | Stop reason: HOSPADM

## 2025-08-14 RX ORDER — AMLODIPINE BESYLATE 5 MG/1
5 TABLET ORAL DAILY
Status: DISCONTINUED | OUTPATIENT
Start: 2025-08-15 | End: 2025-08-16 | Stop reason: HOSPADM

## 2025-08-14 RX ORDER — PROPOFOL 10 MG/ML
INJECTION, EMULSION INTRAVENOUS PRN
Status: DISCONTINUED | OUTPATIENT
Start: 2025-08-14 | End: 2025-08-14

## 2025-08-14 RX ADMIN — PREGABALIN 75 MG: 75 CAPSULE ORAL at 20:35

## 2025-08-14 RX ADMIN — CEFAZOLIN SODIUM 2 G: 2 SOLUTION INTRAVENOUS at 20:48

## 2025-08-14 RX ADMIN — SODIUM CHLORIDE, SODIUM LACTATE, POTASSIUM CHLORIDE, AND CALCIUM CHLORIDE: .6; .31; .03; .02 INJECTION, SOLUTION INTRAVENOUS at 12:10

## 2025-08-14 RX ADMIN — LATANOPROST 1 DROP: 50 SOLUTION/ DROPS OPHTHALMIC at 21:47

## 2025-08-14 RX ADMIN — OLOPATADINE HYDROCHLORIDE 1 DROP: 1 SOLUTION OPHTHALMIC at 20:36

## 2025-08-14 RX ADMIN — Medication 1 TABLET: at 18:55

## 2025-08-14 RX ADMIN — PROPOFOL 50 MCG/KG/MIN: 10 INJECTION, EMULSION INTRAVENOUS at 12:45

## 2025-08-14 RX ADMIN — HYDROMORPHONE HYDROCHLORIDE 0.5 MG: 1 INJECTION, SOLUTION INTRAMUSCULAR; INTRAVENOUS; SUBCUTANEOUS at 13:14

## 2025-08-14 RX ADMIN — ATENOLOL 50 MG: 50 TABLET ORAL at 20:42

## 2025-08-14 RX ADMIN — LIDOCAINE HYDROCHLORIDE 50 MG: 20 INJECTION, SOLUTION INFILTRATION; PERINEURAL at 12:39

## 2025-08-14 RX ADMIN — ONDANSETRON 4 MG: 2 INJECTION INTRAMUSCULAR; INTRAVENOUS at 13:05

## 2025-08-14 RX ADMIN — SENNOSIDES AND DOCUSATE SODIUM 1 TABLET: 50; 8.6 TABLET ORAL at 20:35

## 2025-08-14 RX ADMIN — MIDAZOLAM 1 MG: 1 INJECTION INTRAMUSCULAR; INTRAVENOUS at 12:40

## 2025-08-14 RX ADMIN — MIDAZOLAM 1 MG: 1 INJECTION INTRAMUSCULAR; INTRAVENOUS at 12:33

## 2025-08-14 RX ADMIN — ONDANSETRON 4 MG: 4 TABLET, ORALLY DISINTEGRATING ORAL at 18:39

## 2025-08-14 RX ADMIN — TRAZODONE HYDROCHLORIDE 100 MG: 100 TABLET ORAL at 21:46

## 2025-08-14 RX ADMIN — HYDROMORPHONE HYDROCHLORIDE 0.4 MG: 0.2 INJECTION, SOLUTION INTRAMUSCULAR; INTRAVENOUS; SUBCUTANEOUS at 16:47

## 2025-08-14 RX ADMIN — FENTANYL CITRATE 50 MCG: 50 INJECTION, SOLUTION INTRAMUSCULAR; INTRAVENOUS at 16:10

## 2025-08-14 RX ADMIN — DIPHENHYDRAMINE HYDROCHLORIDE 25 MG: 50 INJECTION, SOLUTION INTRAMUSCULAR; INTRAVENOUS at 17:16

## 2025-08-14 RX ADMIN — KETOROLAC TROMETHAMINE 30 MG: 30 INJECTION, SOLUTION INTRAMUSCULAR at 13:47

## 2025-08-14 RX ADMIN — OXYCODONE HYDROCHLORIDE 5 MG: 5 TABLET ORAL at 18:39

## 2025-08-14 RX ADMIN — SUGAMMADEX 200 MG: 100 INJECTION, SOLUTION INTRAVENOUS at 15:24

## 2025-08-14 RX ADMIN — HYDROMORPHONE HYDROCHLORIDE 0.5 MG: 1 INJECTION, SOLUTION INTRAMUSCULAR; INTRAVENOUS; SUBCUTANEOUS at 13:24

## 2025-08-14 RX ADMIN — FENTANYL CITRATE 50 MCG: 50 INJECTION INTRAMUSCULAR; INTRAVENOUS at 12:39

## 2025-08-14 RX ADMIN — GLYCOPYRROLATE 0.2 MG: 0.2 INJECTION, SOLUTION INTRAMUSCULAR; INTRAVENOUS at 12:39

## 2025-08-14 RX ADMIN — FENTANYL CITRATE 50 MCG: 50 INJECTION INTRAMUSCULAR; INTRAVENOUS at 12:51

## 2025-08-14 RX ADMIN — PROPOFOL 200 MG: 10 INJECTION, EMULSION INTRAVENOUS at 12:39

## 2025-08-14 RX ADMIN — SODIUM CHLORIDE, SODIUM LACTATE, POTASSIUM CHLORIDE, AND CALCIUM CHLORIDE: .6; .31; .03; .02 INJECTION, SOLUTION INTRAVENOUS at 14:51

## 2025-08-14 RX ADMIN — HYDROMORPHONE HYDROCHLORIDE 0.4 MG: 0.2 INJECTION, SOLUTION INTRAMUSCULAR; INTRAVENOUS; SUBCUTANEOUS at 20:26

## 2025-08-14 RX ADMIN — PHENYLEPHRINE HYDROCHLORIDE 0.2 MCG/KG/MIN: 10 INJECTION INTRAVENOUS at 13:25

## 2025-08-14 RX ADMIN — DEXAMETHASONE SODIUM PHOSPHATE 8 MG: 4 INJECTION, SOLUTION INTRA-ARTICULAR; INTRALESIONAL; INTRAMUSCULAR; INTRAVENOUS; SOFT TISSUE at 12:40

## 2025-08-14 RX ADMIN — PHENYLEPHRINE HYDROCHLORIDE 150 MCG: 10 INJECTION INTRAVENOUS at 13:31

## 2025-08-14 RX ADMIN — METHOCARBAMOL 750 MG: 750 TABLET ORAL at 16:57

## 2025-08-14 RX ADMIN — SODIUM CHLORIDE: 0.9 INJECTION, SOLUTION INTRAVENOUS at 18:23

## 2025-08-14 RX ADMIN — Medication 2 G: at 12:35

## 2025-08-14 RX ADMIN — ACETAMINOPHEN 975 MG: 325 TABLET ORAL at 16:57

## 2025-08-14 RX ADMIN — HYDROMORPHONE HYDROCHLORIDE 0.4 MG: 0.2 INJECTION, SOLUTION INTRAMUSCULAR; INTRAVENOUS; SUBCUTANEOUS at 17:04

## 2025-08-14 RX ADMIN — FENTANYL CITRATE 50 MCG: 50 INJECTION, SOLUTION INTRAMUSCULAR; INTRAVENOUS at 16:19

## 2025-08-14 RX ADMIN — ROCURONIUM BROMIDE 50 MG: 50 INJECTION, SOLUTION INTRAVENOUS at 12:40

## 2025-08-14 RX ADMIN — METHOCARBAMOL 750 MG: 750 TABLET ORAL at 22:58

## 2025-08-14 RX ADMIN — ROCURONIUM BROMIDE 20 MG: 50 INJECTION, SOLUTION INTRAVENOUS at 13:47

## 2025-08-14 RX ADMIN — PHENYLEPHRINE HYDROCHLORIDE 100 MCG: 10 INJECTION INTRAVENOUS at 12:57

## 2025-08-14 ASSESSMENT — ACTIVITIES OF DAILY LIVING (ADL)
ADLS_ACUITY_SCORE: 34
ADLS_ACUITY_SCORE: 34
ADLS_ACUITY_SCORE: 39
ADLS_ACUITY_SCORE: 34
ADLS_ACUITY_SCORE: 34
ADLS_ACUITY_SCORE: 39
ADLS_ACUITY_SCORE: 46
ADLS_ACUITY_SCORE: 34
ADLS_ACUITY_SCORE: 34
ADLS_ACUITY_SCORE: 39
ADLS_ACUITY_SCORE: 46
ADLS_ACUITY_SCORE: 34
ADLS_ACUITY_SCORE: 34

## 2025-08-15 ENCOUNTER — APPOINTMENT (OUTPATIENT)
Dept: PHYSICAL THERAPY | Facility: CLINIC | Age: 70
DRG: 402 | End: 2025-08-15
Attending: NEUROLOGICAL SURGERY
Payer: COMMERCIAL

## 2025-08-15 LAB
ANION GAP SERPL CALCULATED.3IONS-SCNC: 13 MMOL/L (ref 7–15)
BUN SERPL-MCNC: 13 MG/DL (ref 8–23)
CALCIUM SERPL-MCNC: 9.2 MG/DL (ref 8.8–10.4)
CHLORIDE SERPL-SCNC: 97 MMOL/L (ref 98–107)
CREAT SERPL-MCNC: 0.64 MG/DL (ref 0.51–0.95)
EGFRCR SERPLBLD CKD-EPI 2021: >90 ML/MIN/1.73M2
GLUCOSE SERPL-MCNC: 114 MG/DL (ref 70–99)
GLUCOSE SERPL-MCNC: 115 MG/DL (ref 70–99)
HCO3 SERPL-SCNC: 23 MMOL/L (ref 22–29)
HGB BLD-MCNC: 10.7 G/DL (ref 11.7–15.7)
MCV RBC AUTO: 93.1 FL (ref 78–100)
POTASSIUM SERPL-SCNC: 4.6 MMOL/L (ref 3.4–5.3)
SODIUM SERPL-SCNC: 133 MMOL/L (ref 135–145)

## 2025-08-15 PROCEDURE — 97116 GAIT TRAINING THERAPY: CPT | Mod: GP | Performed by: PHYSICAL THERAPIST

## 2025-08-15 PROCEDURE — 999N000111 HC STATISTIC OT IP EVAL DEFER

## 2025-08-15 PROCEDURE — 85018 HEMOGLOBIN: CPT | Performed by: NEUROLOGICAL SURGERY

## 2025-08-15 PROCEDURE — 99222 1ST HOSP IP/OBS MODERATE 55: CPT

## 2025-08-15 PROCEDURE — 250N000013 HC RX MED GY IP 250 OP 250 PS 637: Performed by: NEUROLOGICAL SURGERY

## 2025-08-15 PROCEDURE — 250N000011 HC RX IP 250 OP 636: Performed by: NEUROLOGICAL SURGERY

## 2025-08-15 PROCEDURE — 80048 BASIC METABOLIC PNL TOTAL CA: CPT

## 2025-08-15 PROCEDURE — 36415 COLL VENOUS BLD VENIPUNCTURE: CPT | Performed by: NEUROLOGICAL SURGERY

## 2025-08-15 PROCEDURE — 97530 THERAPEUTIC ACTIVITIES: CPT | Mod: GP | Performed by: PHYSICAL THERAPIST

## 2025-08-15 PROCEDURE — 250N000009 HC RX 250: Performed by: NEUROLOGICAL SURGERY

## 2025-08-15 PROCEDURE — 97161 PT EVAL LOW COMPLEX 20 MIN: CPT | Mod: GP | Performed by: PHYSICAL THERAPIST

## 2025-08-15 PROCEDURE — 120N000001 HC R&B MED SURG/OB

## 2025-08-15 PROCEDURE — 80048 BASIC METABOLIC PNL TOTAL CA: CPT | Performed by: NEUROLOGICAL SURGERY

## 2025-08-15 RX ORDER — TRAZODONE HYDROCHLORIDE 100 MG/1
100 TABLET ORAL
Status: DISCONTINUED | OUTPATIENT
Start: 2025-08-15 | End: 2025-08-16 | Stop reason: HOSPADM

## 2025-08-15 RX ADMIN — OXYCODONE HYDROCHLORIDE 5 MG: 5 TABLET ORAL at 05:27

## 2025-08-15 RX ADMIN — LATANOPROST 1 DROP: 50 SOLUTION/ DROPS OPHTHALMIC at 22:53

## 2025-08-15 RX ADMIN — OLOPATADINE HYDROCHLORIDE 1 DROP: 1 SOLUTION OPHTHALMIC at 19:32

## 2025-08-15 RX ADMIN — CEFAZOLIN SODIUM 2 G: 2 SOLUTION INTRAVENOUS at 22:02

## 2025-08-15 RX ADMIN — METHOCARBAMOL 750 MG: 750 TABLET ORAL at 11:36

## 2025-08-15 RX ADMIN — TIMOLOL MALEATE 1 DROP: 5 SOLUTION OPHTHALMIC at 09:15

## 2025-08-15 RX ADMIN — CEFAZOLIN SODIUM 2 G: 2 SOLUTION INTRAVENOUS at 12:51

## 2025-08-15 RX ADMIN — OLOPATADINE HYDROCHLORIDE 1 DROP: 1 SOLUTION OPHTHALMIC at 09:09

## 2025-08-15 RX ADMIN — AMLODIPINE BESYLATE 5 MG: 5 TABLET ORAL at 09:07

## 2025-08-15 RX ADMIN — OXYCODONE HYDROCHLORIDE 10 MG: 10 TABLET ORAL at 20:39

## 2025-08-15 RX ADMIN — CEFAZOLIN SODIUM 2 G: 2 SOLUTION INTRAVENOUS at 05:20

## 2025-08-15 RX ADMIN — ONDANSETRON 4 MG: 4 TABLET, ORALLY DISINTEGRATING ORAL at 00:40

## 2025-08-15 RX ADMIN — ONDANSETRON 4 MG: 4 TABLET, ORALLY DISINTEGRATING ORAL at 05:26

## 2025-08-15 RX ADMIN — ONDANSETRON 4 MG: 4 TABLET, ORALLY DISINTEGRATING ORAL at 13:01

## 2025-08-15 RX ADMIN — POLYETHYLENE GLYCOL 3350 17 G: 17 POWDER, FOR SOLUTION ORAL at 09:04

## 2025-08-15 RX ADMIN — ATENOLOL 50 MG: 50 TABLET ORAL at 09:07

## 2025-08-15 RX ADMIN — METHOCARBAMOL 750 MG: 750 TABLET ORAL at 20:40

## 2025-08-15 RX ADMIN — OXYCODONE HYDROCHLORIDE 5 MG: 5 TABLET ORAL at 13:01

## 2025-08-15 RX ADMIN — SENNOSIDES AND DOCUSATE SODIUM 1 TABLET: 50; 8.6 TABLET ORAL at 09:08

## 2025-08-15 RX ADMIN — SENNOSIDES AND DOCUSATE SODIUM 1 TABLET: 50; 8.6 TABLET ORAL at 19:29

## 2025-08-15 RX ADMIN — ATENOLOL 50 MG: 50 TABLET ORAL at 19:27

## 2025-08-15 RX ADMIN — PREGABALIN 75 MG: 75 CAPSULE ORAL at 19:27

## 2025-08-15 RX ADMIN — OXYCODONE HYDROCHLORIDE 5 MG: 5 TABLET ORAL at 00:41

## 2025-08-15 RX ADMIN — PREGABALIN 75 MG: 75 CAPSULE ORAL at 09:08

## 2025-08-15 ASSESSMENT — ACTIVITIES OF DAILY LIVING (ADL)
ADLS_ACUITY_SCORE: 54
ADLS_ACUITY_SCORE: 54
ADLS_ACUITY_SCORE: 42
ADLS_ACUITY_SCORE: 38
ADLS_ACUITY_SCORE: 41
ADLS_ACUITY_SCORE: 41
ADLS_ACUITY_SCORE: 38
ADLS_ACUITY_SCORE: 54
ADLS_ACUITY_SCORE: 41
ADLS_ACUITY_SCORE: 54
ADLS_ACUITY_SCORE: 42
ADLS_ACUITY_SCORE: 42
ADLS_ACUITY_SCORE: 41
ADLS_ACUITY_SCORE: 41
ADLS_ACUITY_SCORE: 38
ADLS_ACUITY_SCORE: 41
ADLS_ACUITY_SCORE: 42
ADLS_ACUITY_SCORE: 41
ADLS_ACUITY_SCORE: 54
ADLS_ACUITY_SCORE: 38
ADLS_ACUITY_SCORE: 54

## 2025-08-16 ENCOUNTER — APPOINTMENT (OUTPATIENT)
Dept: PHYSICAL THERAPY | Facility: CLINIC | Age: 70
DRG: 402 | End: 2025-08-16
Attending: NEUROLOGICAL SURGERY
Payer: COMMERCIAL

## 2025-08-16 VITALS
BODY MASS INDEX: 25.72 KG/M2 | DIASTOLIC BLOOD PRESSURE: 60 MMHG | SYSTOLIC BLOOD PRESSURE: 128 MMHG | HEART RATE: 63 BPM | HEIGHT: 65 IN | WEIGHT: 154.4 LBS | OXYGEN SATURATION: 97 % | TEMPERATURE: 98.3 F | RESPIRATION RATE: 14 BRPM

## 2025-08-16 LAB
ANION GAP SERPL CALCULATED.3IONS-SCNC: 10 MMOL/L (ref 7–15)
BUN SERPL-MCNC: 11.1 MG/DL (ref 8–23)
CALCIUM SERPL-MCNC: 9.1 MG/DL (ref 8.8–10.4)
CHLORIDE SERPL-SCNC: 98 MMOL/L (ref 98–107)
CREAT SERPL-MCNC: 0.62 MG/DL (ref 0.51–0.95)
EGFRCR SERPLBLD CKD-EPI 2021: >90 ML/MIN/1.73M2
GLUCOSE SERPL-MCNC: 103 MG/DL (ref 70–99)
HCO3 SERPL-SCNC: 27 MMOL/L (ref 22–29)
HGB BLD-MCNC: 10.8 G/DL (ref 11.7–15.7)
MCV RBC AUTO: 93.5 FL (ref 78–100)
POTASSIUM SERPL-SCNC: 4.1 MMOL/L (ref 3.4–5.3)
SODIUM SERPL-SCNC: 135 MMOL/L (ref 135–145)

## 2025-08-16 PROCEDURE — 250N000011 HC RX IP 250 OP 636: Performed by: NEUROLOGICAL SURGERY

## 2025-08-16 PROCEDURE — 85018 HEMOGLOBIN: CPT

## 2025-08-16 PROCEDURE — 97530 THERAPEUTIC ACTIVITIES: CPT | Mod: GP | Performed by: PHYSICAL THERAPIST

## 2025-08-16 PROCEDURE — 36415 COLL VENOUS BLD VENIPUNCTURE: CPT

## 2025-08-16 PROCEDURE — 97116 GAIT TRAINING THERAPY: CPT | Mod: GP | Performed by: PHYSICAL THERAPIST

## 2025-08-16 PROCEDURE — 80048 BASIC METABOLIC PNL TOTAL CA: CPT

## 2025-08-16 PROCEDURE — 250N000013 HC RX MED GY IP 250 OP 250 PS 637: Performed by: NEUROLOGICAL SURGERY

## 2025-08-16 RX ORDER — ONDANSETRON 4 MG/1
4 TABLET, ORALLY DISINTEGRATING ORAL EVERY 6 HOURS PRN
Qty: 30 TABLET | Refills: 0 | Status: SHIPPED | OUTPATIENT
Start: 2025-08-16

## 2025-08-16 RX ADMIN — Medication 1 TABLET: at 08:28

## 2025-08-16 RX ADMIN — OXYCODONE HYDROCHLORIDE 10 MG: 10 TABLET ORAL at 05:05

## 2025-08-16 RX ADMIN — PROCHLORPERAZINE MALEATE 5 MG: 5 TABLET ORAL at 15:48

## 2025-08-16 RX ADMIN — OLOPATADINE HYDROCHLORIDE 1 DROP: 1 SOLUTION OPHTHALMIC at 08:33

## 2025-08-16 RX ADMIN — CEFAZOLIN SODIUM 2 G: 2 SOLUTION INTRAVENOUS at 05:04

## 2025-08-16 RX ADMIN — ACETAMINOPHEN 975 MG: 325 TABLET ORAL at 00:20

## 2025-08-16 RX ADMIN — ONDANSETRON 4 MG: 4 TABLET, ORALLY DISINTEGRATING ORAL at 11:38

## 2025-08-16 RX ADMIN — SENNOSIDES AND DOCUSATE SODIUM 1 TABLET: 50; 8.6 TABLET ORAL at 08:28

## 2025-08-16 RX ADMIN — OXYCODONE HYDROCHLORIDE 10 MG: 10 TABLET ORAL at 15:48

## 2025-08-16 RX ADMIN — ACETAMINOPHEN 975 MG: 325 TABLET ORAL at 15:47

## 2025-08-16 RX ADMIN — ACETAMINOPHEN 975 MG: 325 TABLET ORAL at 08:29

## 2025-08-16 RX ADMIN — ATENOLOL 50 MG: 50 TABLET ORAL at 08:28

## 2025-08-16 RX ADMIN — METHOCARBAMOL 750 MG: 750 TABLET ORAL at 04:16

## 2025-08-16 RX ADMIN — TIMOLOL MALEATE 1 DROP: 5 SOLUTION OPHTHALMIC at 08:33

## 2025-08-16 RX ADMIN — ONDANSETRON 4 MG: 4 TABLET, ORALLY DISINTEGRATING ORAL at 18:54

## 2025-08-16 RX ADMIN — AMLODIPINE BESYLATE 5 MG: 5 TABLET ORAL at 08:28

## 2025-08-16 RX ADMIN — OXYCODONE HYDROCHLORIDE 5 MG: 5 TABLET ORAL at 11:37

## 2025-08-16 RX ADMIN — ONDANSETRON 4 MG: 4 TABLET, ORALLY DISINTEGRATING ORAL at 05:11

## 2025-08-16 RX ADMIN — POLYETHYLENE GLYCOL 3350 17 G: 17 POWDER, FOR SOLUTION ORAL at 08:26

## 2025-08-16 RX ADMIN — METHOCARBAMOL 750 MG: 750 TABLET ORAL at 15:04

## 2025-08-16 RX ADMIN — PREGABALIN 75 MG: 75 CAPSULE ORAL at 08:28

## 2025-08-16 ASSESSMENT — ACTIVITIES OF DAILY LIVING (ADL)
ADLS_ACUITY_SCORE: 54
ADLS_ACUITY_SCORE: 55
ADLS_ACUITY_SCORE: 54

## 2025-08-26 ENCOUNTER — TRANSFERRED RECORDS (OUTPATIENT)
Dept: HEALTH INFORMATION MANAGEMENT | Facility: CLINIC | Age: 70
End: 2025-08-26
Payer: COMMERCIAL

## (undated) DEVICE — DRAPE IOBAN INCISE 23X17" 6650EZ

## (undated) DEVICE — SOL NACL 0.9% IRRIG 3000ML BAG 2B7477

## (undated) DEVICE — SU VICRYL 0 CT-2 27" UND J270H

## (undated) DEVICE — BLADE CLIPPER SGL USE 9680

## (undated) DEVICE — BASIN SET MINOR DISP

## (undated) DEVICE — SET HANDPIECE INTERPULSE W/COAXIAL FAN SPRAY TIP 0210118000

## (undated) DEVICE — DRSG AQUACEL AG HYDROFIBER  3.5X10" 422605

## (undated) DEVICE — BLADE BONE MILL STRK 5.0MM MED 5400-701-000

## (undated) DEVICE — SUCTION MANIFOLD NEPTUNE 2 SYS 4 PORT 0702-020-000

## (undated) DEVICE — CATH TRAY FOLEY SURESTEP 16FR W/URINE MTR STATLK LF A303416A

## (undated) DEVICE — SUCTION FRAZIER 12FR W/OBTURATOR 33120

## (undated) DEVICE — PREP DURAPREP 26ML APL 8630

## (undated) DEVICE — GLOVE PROTEXIS BLUE W/NEU-THERA 6.5  2D73EB65

## (undated) DEVICE — CATH TRAY FOLEY SURESTEP 16FR DRAIN BAG STATOCK A899916

## (undated) DEVICE — DRAPE X-RAY TUBE 00-901169-01-OEC

## (undated) DEVICE — LINEN HALF SHEET 5512

## (undated) DEVICE — PACK SHOULDER RIDGES

## (undated) DEVICE — SU PROLENE 3-0 PS-2 18" 8687H

## (undated) DEVICE — SCREW GUIDE AUGMENT REAM 110040300

## (undated) DEVICE — DRAIN JACKSON PRATT RESERVOIR 100ML SU130-1305

## (undated) DEVICE — DEVICE DUST COLLECTOR BONE BOX S-3500

## (undated) DEVICE — DRAPE U-POUCH 34X29" 1067

## (undated) DEVICE — SU STRATAFIX PDS PLUS 0 CT-1 18" SXPP1A401

## (undated) DEVICE — PREP CHLORAPREP 26ML TINTED HI-LITE ORANGE 930815

## (undated) DEVICE — MIDAS REX DISSECTING TOOL TRI-FLUTED BURR 14MH30T

## (undated) DEVICE — DRSG ADAPTIC 3X8" 6113

## (undated) DEVICE — ESU BIPOLAR SEALER AQUAMANTYS 6MM 23-112-1

## (undated) DEVICE — BAG CLEAR TRASH 1.3M 39X33" P4040C

## (undated) DEVICE — BUR OVAL 4X48MM CARBIDE  03-C0901

## (undated) DEVICE — NDL BLUNT 18GA 1" W/O FILTER 305181

## (undated) DEVICE — DRAPE C-ARM 60X42" 1013

## (undated) DEVICE — ESU PENCIL W/SMOKE EVAC CVPLP2000

## (undated) DEVICE — BNDG ELASTIC 4" DBL LENGTH UNSTERILE 6611-14

## (undated) DEVICE — BUR ARTHREX COOLCUT OVAL12 FLUTE 5.5MMX13CM AR-8550OBT

## (undated) DEVICE — PIN STEINMANN 1/8" THRD 406669

## (undated) DEVICE — ESU PENCIL SMOKE EVAC W/ROCKER SWITCH 0703-047-000

## (undated) DEVICE — DRILL ZIM COMPR AUG 2.7MM 110040202

## (undated) DEVICE — PEN MARKING SKIN W/LABELS 31145884

## (undated) DEVICE — GLOVE PROTEXIS MICRO 7.5  2D73PM75

## (undated) DEVICE — SUTURE VICRYL+ 0 CT-1 18" DYED VIO VCP740D

## (undated) DEVICE — GLOVE PROTEXIS BLUE W/NEU-THERA 7.5  2D73EB75

## (undated) DEVICE — GLOVE PROTEXIS POWDER FREE 7.0 ORTHOPEDIC 2D73ET70

## (undated) DEVICE — SU VICRYL 2-0 CT-1 18' J739D

## (undated) DEVICE — BLADE SAW SAGITTAL STRK 25X90X1.27MM HD SYS 6 6125-127-090

## (undated) DEVICE — DRSG STERI STRIP 1/2X4" R1547

## (undated) DEVICE — PACK SET-UP STD 9102

## (undated) DEVICE — SUTURE VICRYL+ 2-0 CT-2 27" UND VCP269H

## (undated) DEVICE — PIN PERCUTANEOUS NAVIGATION FOR SPINE O-ARM 100MM 9733235

## (undated) DEVICE — MARKER SPHERES PASSIVE MEDT PACK 5 8801075

## (undated) DEVICE — TUBING ACP CQ FOR JACKSON TABLE SPINE 5996-35

## (undated) DEVICE — CAST PADDING 4" UNSTERILE 9044

## (undated) DEVICE — COVER FOOTSWITCH W/CINCH 20X24" 923267

## (undated) DEVICE — ESU CLEANER TIP 31142717

## (undated) DEVICE — DRAPE MICROSCOPE OPMI ZEISS 48X118" 306071-0000-000

## (undated) DEVICE — ESU GROUND PAD ADULT W/CORD E7507

## (undated) DEVICE — DRSG TELFA ISLAND 4X10"

## (undated) DEVICE — DRSG GAUZE 4X4" 8044

## (undated) DEVICE — LINEN POUCH DBL 5427

## (undated) DEVICE — Device

## (undated) DEVICE — GLOVE PROTEXIS POWDER FREE 7.5 ORTHOPEDIC 2D73ET75

## (undated) DEVICE — RX SURGIFLO HEMOSTATIC MATRIX 8ML 2991

## (undated) DEVICE — ABLATOR ARTHREX APOLLO RF MP90 ASPIRATING 90DEG AR-9811

## (undated) DEVICE — KIT SHOULDER POSITIONING BEACH CHAIR ARM HOLDER AR-1644

## (undated) DEVICE — SU STRATAFIX MONOCRYL 3-0 SPIRAL PS-1 45CM SXMP1B102

## (undated) DEVICE — SPONGE COTTONOID 1/2X1" 80-1402

## (undated) DEVICE — GLOVE PROTEXIS BLUE W/NEU-THERA 8.5  2D73EB85

## (undated) DEVICE — SYR 03ML SLIP TIP W/O NDL LATEX FREE 309656

## (undated) DEVICE — GLOVE BIOGEL PI MICRO INDICATOR UNDERGLOVE SZ 7.0 48970

## (undated) DEVICE — GLOVE PROTEXIS POWDER FREE SMT 7.5  2D72PT75X

## (undated) DEVICE — SU WND CLOSURE VLOC 180 ABS 3-0 12" V-20 VLOCL0614

## (undated) DEVICE — SU VICRYL 0 CT-1 CR 8X18" J740D

## (undated) DEVICE — LINEN ORTHO ACL PACK 5447

## (undated) DEVICE — SUCTION TIP YANKAUER W/O VENT K86

## (undated) DEVICE — LIGHT HANDLE X2

## (undated) DEVICE — ARTHROSCOPIC CANNULA 7MMX7CM PURPLE  AR-6550

## (undated) DEVICE — DRAPE MAYO STAND 23X54 8337

## (undated) DEVICE — ESU ELEC BLADE 2.75" COATED/INSULATED E1455

## (undated) DEVICE — SUCTION FRAZIER 12FR W/HANDLE K73

## (undated) DEVICE — GOWN XLG DISP 9545

## (undated) DEVICE — LINEN FULL SHEET 5511

## (undated) DEVICE — KIT SUSPENSION SHOULDER 72200195

## (undated) DEVICE — PACK SMALL SPINE RIDGES

## (undated) DEVICE — BAG DECANTER STERILE WHITE DYNJDEC09

## (undated) DEVICE — DRSG XEROFORM 1X8"

## (undated) DEVICE — LINEN DRAPE 54X72" 5467

## (undated) DEVICE — NDL SPINAL 18GA 3.5" 405184

## (undated) DEVICE — CUSHION INSERT LG PRONE VIEW JACKSON TABLE

## (undated) DEVICE — SOL NACL 0.9% INJ 250ML BAG 2B1322Q

## (undated) DEVICE — TUBING ARTHROSCOPY PUMP ARTHREX AR-6410

## (undated) DEVICE — DRAPE STERI U 1015

## (undated) DEVICE — DRAPE LEGGINGS 8421

## (undated) DEVICE — GOWN REINFORCED XXLG 9071

## (undated) DEVICE — GLOVE PROTEXIS W/NEU-THERA 6.5  2D73TE65

## (undated) DEVICE — DRAPE SHOULDER PACK SPLITS 29365

## (undated) DEVICE — GLOVE PROTEXIS W/NEU-THERA 7.5  2D73TE75

## (undated) DEVICE — GLOVE PROTEXIS MICRO 7.5 LT BLUE 2D73PM75

## (undated) DEVICE — DRAPE STERI TOWEL LG 1010

## (undated) DEVICE — SPONGE SURGIFOAM 100 1974

## (undated) DEVICE — SOL NACL 0.9% 20ML VIAL

## (undated) DEVICE — MARKER SPHERES PASSIVE MEDT PACK 1 8801071

## (undated) DEVICE — SOLUTION WOUND CLEANSING 3/4OZ 10% PVP EA-L3001

## (undated) DEVICE — RX VISTASEAL FIBRIN SEALANT W/THROMBIN 10ML VST10

## (undated) DEVICE — BLADE KNIFE SURG 11 371111

## (undated) DEVICE — SU VICRYL 2-0 CT-2 27" UND J269H

## (undated) DEVICE — SU WND CLOSURE VLOC 180 ABS 3-0 12" P-14 VLOCL0114

## (undated) DEVICE — GUIDE AUG REAMER ZIM COMPR MINI BUSHING   110031869

## (undated) DEVICE — SOLUTION IV 0.9% NACL 250ML L8002

## (undated) DEVICE — DRSG ABDOMINAL 07 1/2X8" 7197D

## (undated) DEVICE — DRSG KERLIX FLUFFS X5

## (undated) DEVICE — GLOVE BIOGEL PI MICRO INDICATOR UNDERGLOVE SZ 7.5 48975

## (undated) DEVICE — GLOVE PROTEXIS W/NEU-THERA 8.5  2D73TE85

## (undated) DEVICE — SOL WATER IRRIG 1000ML BOTTLE 07139-09

## (undated) DEVICE — VIAL DECANTER STERILE WHITE DYNJDEC06

## (undated) DEVICE — TUBING SUCTION 12"X1/4" N612

## (undated) DEVICE — POSITIONER HEADREST FOAM DONUT H2INX9IN DIA NS LF DISP HR104

## (undated) DEVICE — GLOVE BIOGEL PI SZ 7.0 40870

## (undated) DEVICE — BUR ARTHREX COOLCUT EXCALIBUR 4.0MMX13CM AR-8400EX

## (undated) DEVICE — SU STRATAFIX PDS PLUS 1 CT-1 12" SXPP1A443

## (undated) DEVICE — TOOL DISSECT MIDAS MR8 14CM MATCH HD SYM-TRI MR8-14MH30T

## (undated) DEVICE — SYR 30ML LL W/O NDL 302832

## (undated) DEVICE — BIT DRILL BIOMET PERIPHERAL SCR 2.7MM SS 405889

## (undated) DEVICE — GLOVE PROTEXIS BLUE W/NEU-THERA 7.0  2D73EB70

## (undated) DEVICE — DECANTER BAG 2002S

## (undated) DEVICE — SUTURE MONOCRYL+ 3-0 PS-1 27" UNDYED MCP936H

## (undated) DEVICE — SUCTION WATERBUG FLOOR

## (undated) DEVICE — DRAIN HEMOVAC RESERVOIR KIT 10FR 1/8" MED 00-2550-002-10

## (undated) DEVICE — GLOVE BIOGEL PI SZ 7.5 40875

## (undated) DEVICE — IMM SHOULDER MED 79-84015

## (undated) DEVICE — PAD PROAXIS TABLE KIT SPK10182

## (undated) DEVICE — SLING ARM LG 79-99157

## (undated) DEVICE — STPL SKIN 35W APPOSE 8886803712

## (undated) DEVICE — DRAIN JACKSON PRATT 07MM FLAT 3/4 PERF

## (undated) DEVICE — MIDAS REX DISSECTING TOOL  14MH30

## (undated) DEVICE — SU ETHILON 3-0 PS-2 18" 1669H

## (undated) DEVICE — CAST PADDING 4" STERILE 9044S

## (undated) DEVICE — DRAIN JACKSON PRATT CHANNEL 10FR RND SIL W/TROCAR JP-2227

## (undated) DEVICE — BIT DRILL BIOMET CENTRAL SCR 3.2MM SS 405883

## (undated) DEVICE — SYR BULB IRRIG 50ML LATEX FREE 0035280

## (undated) DEVICE — SPONGE LAP 18X18" X8435

## (undated) DEVICE — SU DERMABOND ADVANCED .7ML DNX12

## (undated) RX ORDER — VANCOMYCIN HYDROCHLORIDE 1 G/20ML
INJECTION, POWDER, LYOPHILIZED, FOR SOLUTION INTRAVENOUS
Status: DISPENSED
Start: 2023-12-05

## (undated) RX ORDER — ACETAMINOPHEN 325 MG/1
TABLET ORAL
Status: DISPENSED
Start: 2020-10-02

## (undated) RX ORDER — HYDRALAZINE HYDROCHLORIDE 20 MG/ML
INJECTION INTRAMUSCULAR; INTRAVENOUS
Status: DISPENSED
Start: 2021-06-11

## (undated) RX ORDER — FENTANYL CITRATE 50 UG/ML
INJECTION, SOLUTION INTRAMUSCULAR; INTRAVENOUS
Status: DISPENSED
Start: 2020-10-02

## (undated) RX ORDER — VANCOMYCIN HYDROCHLORIDE 1 G/20ML
INJECTION, POWDER, LYOPHILIZED, FOR SOLUTION INTRAVENOUS
Status: DISPENSED
Start: 2021-06-11

## (undated) RX ORDER — FENTANYL CITRATE 50 UG/ML
INJECTION, SOLUTION INTRAMUSCULAR; INTRAVENOUS
Status: DISPENSED
Start: 2023-12-05

## (undated) RX ORDER — PHENYLEPHRINE HYDROCHLORIDE 10 MG/ML
INJECTION INTRAVENOUS
Status: DISPENSED
Start: 2025-08-14

## (undated) RX ORDER — LIDOCAINE HYDROCHLORIDE 10 MG/ML
INJECTION, SOLUTION EPIDURAL; INFILTRATION; INTRACAUDAL; PERINEURAL
Status: DISPENSED
Start: 2017-08-03

## (undated) RX ORDER — HYDROMORPHONE HYDROCHLORIDE 1 MG/ML
INJECTION, SOLUTION INTRAMUSCULAR; INTRAVENOUS; SUBCUTANEOUS
Status: DISPENSED
Start: 2021-06-11

## (undated) RX ORDER — FENTANYL CITRATE 50 UG/ML
INJECTION, SOLUTION INTRAMUSCULAR; INTRAVENOUS
Status: DISPENSED
Start: 2025-08-14

## (undated) RX ORDER — CEFAZOLIN SODIUM/WATER 2 G/20 ML
SYRINGE (ML) INTRAVENOUS
Status: DISPENSED
Start: 2025-08-14

## (undated) RX ORDER — CEFAZOLIN SODIUM 2 G/100ML
INJECTION, SOLUTION INTRAVENOUS
Status: DISPENSED
Start: 2020-10-02

## (undated) RX ORDER — DEXAMETHASONE SODIUM PHOSPHATE 4 MG/ML
INJECTION, SOLUTION INTRA-ARTICULAR; INTRALESIONAL; INTRAMUSCULAR; INTRAVENOUS; SOFT TISSUE
Status: DISPENSED
Start: 2017-08-03

## (undated) RX ORDER — EPHEDRINE SULFATE 50 MG/ML
INJECTION, SOLUTION INTRAMUSCULAR; INTRAVENOUS; SUBCUTANEOUS
Status: DISPENSED
Start: 2021-06-11

## (undated) RX ORDER — PROPOFOL 10 MG/ML
INJECTION, EMULSION INTRAVENOUS
Status: DISPENSED
Start: 2017-08-03

## (undated) RX ORDER — BUPIVACAINE HYDROCHLORIDE 2.5 MG/ML
INJECTION, SOLUTION EPIDURAL; INFILTRATION; INTRACAUDAL
Status: DISPENSED
Start: 2020-10-02

## (undated) RX ORDER — FENTANYL CITRATE 50 UG/ML
INJECTION, SOLUTION INTRAMUSCULAR; INTRAVENOUS
Status: DISPENSED
Start: 2021-06-11

## (undated) RX ORDER — TRANEXAMIC ACID 650 MG/1
TABLET ORAL
Status: DISPENSED
Start: 2023-12-05

## (undated) RX ORDER — EPHEDRINE SULFATE 50 MG/ML
INJECTION, SOLUTION INTRAMUSCULAR; INTRAVENOUS; SUBCUTANEOUS
Status: DISPENSED
Start: 2023-12-05

## (undated) RX ORDER — CELECOXIB 200 MG/1
CAPSULE ORAL
Status: DISPENSED
Start: 2021-06-11

## (undated) RX ORDER — KETOROLAC TROMETHAMINE 15 MG/ML
INJECTION, SOLUTION INTRAMUSCULAR; INTRAVENOUS
Status: DISPENSED
Start: 2023-12-05

## (undated) RX ORDER — PROPOFOL 10 MG/ML
INJECTION, EMULSION INTRAVENOUS
Status: DISPENSED
Start: 2021-06-11

## (undated) RX ORDER — HYDROMORPHONE HCL IN WATER/PF 6 MG/30 ML
PATIENT CONTROLLED ANALGESIA SYRINGE INTRAVENOUS
Status: DISPENSED
Start: 2025-08-14

## (undated) RX ORDER — GLYCOPYRROLATE 0.2 MG/ML
INJECTION INTRAMUSCULAR; INTRAVENOUS
Status: DISPENSED
Start: 2021-06-11

## (undated) RX ORDER — DEXAMETHASONE SODIUM PHOSPHATE 4 MG/ML
INJECTION, SOLUTION INTRA-ARTICULAR; INTRALESIONAL; INTRAMUSCULAR; INTRAVENOUS; SOFT TISSUE
Status: DISPENSED
Start: 2021-06-11

## (undated) RX ORDER — PROPOFOL 10 MG/ML
INJECTION, EMULSION INTRAVENOUS
Status: DISPENSED
Start: 2025-08-14

## (undated) RX ORDER — VANCOMYCIN HYDROCHLORIDE 1 G/20ML
INJECTION, POWDER, LYOPHILIZED, FOR SOLUTION INTRAVENOUS
Status: DISPENSED
Start: 2025-08-14

## (undated) RX ORDER — NEOSTIGMINE METHYLSULFATE 5 MG/5 ML
SYRINGE (ML) INTRAVENOUS
Status: DISPENSED
Start: 2017-08-03

## (undated) RX ORDER — MEPERIDINE HYDROCHLORIDE 25 MG/ML
INJECTION INTRAMUSCULAR; INTRAVENOUS; SUBCUTANEOUS
Status: DISPENSED
Start: 2017-08-03

## (undated) RX ORDER — CEFAZOLIN SODIUM/WATER 2 G/20 ML
SYRINGE (ML) INTRAVENOUS
Status: DISPENSED
Start: 2023-12-05

## (undated) RX ORDER — BUPIVACAINE HYDROCHLORIDE AND EPINEPHRINE 5; 5 MG/ML; UG/ML
INJECTION, SOLUTION EPIDURAL; INTRACAUDAL; PERINEURAL
Status: DISPENSED
Start: 2017-08-03

## (undated) RX ORDER — PROPOFOL 10 MG/ML
INJECTION, EMULSION INTRAVENOUS
Status: DISPENSED
Start: 2023-12-05

## (undated) RX ORDER — METHOCARBAMOL 750 MG/1
TABLET, FILM COATED ORAL
Status: DISPENSED
Start: 2025-08-14

## (undated) RX ORDER — BUPIVACAINE HYDROCHLORIDE AND EPINEPHRINE 5; 5 MG/ML; UG/ML
INJECTION, SOLUTION EPIDURAL; INTRACAUDAL; PERINEURAL
Status: DISPENSED
Start: 2021-06-11

## (undated) RX ORDER — GLYCOPYRROLATE 0.2 MG/ML
INJECTION INTRAMUSCULAR; INTRAVENOUS
Status: DISPENSED
Start: 2017-08-03

## (undated) RX ORDER — CEFAZOLIN SODIUM 2 G/100ML
INJECTION, SOLUTION INTRAVENOUS
Status: DISPENSED
Start: 2021-06-11

## (undated) RX ORDER — FENTANYL CITRATE 50 UG/ML
INJECTION, SOLUTION INTRAMUSCULAR; INTRAVENOUS
Status: DISPENSED
Start: 2017-08-03

## (undated) RX ORDER — ONDANSETRON 2 MG/ML
INJECTION INTRAMUSCULAR; INTRAVENOUS
Status: DISPENSED
Start: 2017-08-03

## (undated) RX ORDER — TOBRAMYCIN 1.2 G/30ML
INJECTION, POWDER, LYOPHILIZED, FOR SOLUTION INTRAVENOUS
Status: DISPENSED
Start: 2023-12-05

## (undated) RX ORDER — ACETAMINOPHEN 325 MG/1
TABLET ORAL
Status: DISPENSED
Start: 2023-12-05

## (undated) RX ORDER — DIPHENHYDRAMINE HYDROCHLORIDE 50 MG/ML
INJECTION, SOLUTION INTRAMUSCULAR; INTRAVENOUS
Status: DISPENSED
Start: 2025-08-14

## (undated) RX ORDER — ACETAMINOPHEN 325 MG/1
TABLET ORAL
Status: DISPENSED
Start: 2025-08-14

## (undated) RX ORDER — TRANEXAMIC ACID 10 MG/ML
INJECTION, SOLUTION INTRAVENOUS
Status: DISPENSED
Start: 2020-10-02

## (undated) RX ORDER — ONDANSETRON 2 MG/ML
INJECTION INTRAMUSCULAR; INTRAVENOUS
Status: DISPENSED
Start: 2021-06-11

## (undated) RX ORDER — ACETAMINOPHEN 325 MG/1
TABLET ORAL
Status: DISPENSED
Start: 2021-06-11

## (undated) RX ORDER — CEFAZOLIN SODIUM 2 G/100ML
INJECTION, SOLUTION INTRAVENOUS
Status: DISPENSED
Start: 2017-08-03

## (undated) RX ORDER — BUPIVACAINE HYDROCHLORIDE AND EPINEPHRINE 5; 5 MG/ML; UG/ML
INJECTION, SOLUTION EPIDURAL; INTRACAUDAL; PERINEURAL
Status: DISPENSED
Start: 2025-08-14

## (undated) RX ORDER — LIDOCAINE HYDROCHLORIDE 10 MG/ML
INJECTION, SOLUTION EPIDURAL; INFILTRATION; INTRACAUDAL; PERINEURAL
Status: DISPENSED
Start: 2021-06-11